# Patient Record
Sex: FEMALE | Race: WHITE | NOT HISPANIC OR LATINO | Employment: OTHER | URBAN - METROPOLITAN AREA
[De-identification: names, ages, dates, MRNs, and addresses within clinical notes are randomized per-mention and may not be internally consistent; named-entity substitution may affect disease eponyms.]

---

## 2017-01-12 ENCOUNTER — TELEPHONE (OUTPATIENT)
Dept: OPHTHALMOLOGY | Facility: CLINIC | Age: 82
End: 2017-01-12

## 2017-01-12 NOTE — TELEPHONE ENCOUNTER
1/12/17    Alejandrina Noble called to say that her right eye lid seems to be a little sore over the last week, it started with the bottom lid and now both lids are sore and she is not sure why.  They are not red or itchy.  If someone could please give her a call and discuss with her what options she might have.    Charisma Nina  Clinical

## 2017-01-12 NOTE — TELEPHONE ENCOUNTER
Called patient.  She complains of sore right eyelids.  No history of trauma.  No itching, redness, mattering.    I recommend that she use warm compresses tid for 5 minutes at a time.  If no improvement or worse, call and set time to see Dr. Gant.

## 2017-04-04 ENCOUNTER — OFFICE VISIT (OUTPATIENT)
Dept: OPHTHALMOLOGY | Facility: CLINIC | Age: 82
End: 2017-04-04
Payer: COMMERCIAL

## 2017-04-04 DIAGNOSIS — Z96.1 PSEUDOPHAKIA: ICD-10-CM

## 2017-04-04 DIAGNOSIS — H40.1492 PSEUDOEXFOLIATION GLAUCOMA, MODERATE STAGE: ICD-10-CM

## 2017-04-04 DIAGNOSIS — H52.4 PRESBYOPIA: ICD-10-CM

## 2017-04-04 DIAGNOSIS — Z01.01 ENCOUNTER FOR EXAMINATION OF EYES AND VISION WITH ABNORMAL FINDINGS: Primary | ICD-10-CM

## 2017-04-04 DIAGNOSIS — H18.519 CORNEAL GUTTATA: ICD-10-CM

## 2017-04-04 PROCEDURE — 92014 COMPRE OPH EXAM EST PT 1/>: CPT | Performed by: OPHTHALMOLOGY

## 2017-04-04 PROCEDURE — 92015 DETERMINE REFRACTIVE STATE: CPT | Performed by: OPHTHALMOLOGY

## 2017-04-04 ASSESSMENT — CUP TO DISC RATIO
OS_RATIO: 0.9
OD_RATIO: 0.7

## 2017-04-04 ASSESSMENT — REFRACTION_WEARINGRX
OS_AXIS: 119
OD_AXIS: 085
OS_SPHERE: -0.75
OD_ADD: +2.75
OS_CYLINDER: +1.00
SPECS_TYPE: PAL
OD_CYLINDER: +1.25
OD_SPHERE: -0.75
OS_ADD: +2.75

## 2017-04-04 ASSESSMENT — VISUAL ACUITY
CORRECTION_TYPE: GLASSES
OS_CC: J2
OD_CC: 20/20
OD_CC: J1
OS_CC: 20/50
METHOD: SNELLEN - LINEAR

## 2017-04-04 ASSESSMENT — TONOMETRY
OS_IOP_MMHG: 14
IOP_METHOD: TONOPEN
OD_IOP_MMHG: 11

## 2017-04-04 ASSESSMENT — REFRACTION_MANIFEST
OD_ADD: +3.00
OD_SPHERE: -0.75
OS_CYLINDER: SPHERE
OD_CYLINDER: +1.00
OS_SPHERE: -1.00
OS_ADD: +3.00
OD_AXIS: 090

## 2017-04-04 ASSESSMENT — EXTERNAL EXAM - RIGHT EYE: OD_EXAM: PROLAPSED FAT PADS: UPPER

## 2017-04-04 ASSESSMENT — SLIT LAMP EXAM - LIDS: COMMENTS: 1+ DERMATOCHALASIS - UPPER LID, 2+ SCLERAL SHOW, 2+ PTOSIS

## 2017-04-04 ASSESSMENT — CONF VISUAL FIELD
OD_NORMAL: 1
OS_NORMAL: 1

## 2017-04-04 ASSESSMENT — EXTERNAL EXAM - LEFT EYE: OS_EXAM: PROLAPSED FAT PADS: UPPER

## 2017-04-04 NOTE — MR AVS SNAPSHOT
"              After Visit Summary   4/4/2017    Alejandrina Whatley    MRN: [id number]           Patient Information     Date Of Birth          8/22/1923        Visit Information        Provider Department      4/4/2017 10:00 AM Prakash Gant MD [facility]        Today's Diagnoses     Encounter for examination of eyes and vision with abnormal findings    -  1    Presbyopia        Pseudoexfoliation glaucoma, moderate stage        Pseudophakia, ou        Corneal guttata          Care Instructions    Continue Cosopt twice daily both eyes and use the Latanoprost at bedtime both eyes   Possible clouding of posterior capsule discussed.   Glasses Rx given -optional   Return visit 4 months for intraocular pressure check, Stokes Visual Field, glaucoma OCT     Prakash Gant M.D.          Follow-ups after your visit        Who to contact     If you have questions or need follow up information about today's clinic visit or your schedule please contact [facility] directly at [phone].  Normal or non-critical lab and imaging results will be communicated to you by [employer], letter or phone within 4 business days after the clinic has received the results. If you do not hear from us within 7 days, please contact the clinic through [employer] or phone. If you have a critical or abnormal lab result, we will notify you by phone as soon as possible.  Submit refill requests through [employer] or call your pharmacy and they will forward the refill request to us. Please allow 3 business days for your refill to be completed.          Additional Information About Your Visit        Decision Curvehart Information     [employer] lets you send messages to your doctor, view your test results, renew your prescriptions, schedule appointments and more. To sign up, go to [email or web address] . Click on \"Log in\" on the left side of the screen, which will take you to the Welcome page. Then click on \"Sign up Now\" on the right side of " the page.     You will be asked to enter the access code listed below, as well as some personal information. Please follow the directions to create your username and password.     Your access code is: [email or web address]  Expires: 7/3/2017 10:59 AM     Your access code will  in 90 days. If you need help or a new code, please call your [place] clinic or [phone].        Care EveryWhere ID     This is your Care EveryWhere ID. This could be used by other organizations to access your [place] medical records  [id number]         Blood Pressure from Last 3 Encounters:   13 138/80   12 126/84   12 139/77    Weight from Last 3 Encounters:   13 60.8 kg (134 lb)   12 62.1 kg (137 lb)   12 62.1 kg (137 lb)              Today, you had the following     No orders found for display       Primary Care Provider Office Phone # Fax #    Jose Buenrostro [phone]       [address] [address]        Thank you!     Thank you for choosing [facility] [place]  for your care. Our goal is always to provide you with excellent care. Hearing back from our patients is one way we can continue to improve our services. Please take a few minutes to complete the written survey that you may receive in the mail after your visit with us. Thank you!             Your Updated Medication List - Protect others around you: Learn how to safely use, store and throw away your medicines at www.disposemymeds.org.          This list is accurate as of: 17 10:59 AM.  Always use your most recent med list.                   Brand Name Dispense Instructions for use    atenolol 25 MG tablet    TENORMIN    90 tablet    Take 1 tablet by mouth daily.       CALCIUM 500 PO      Take 1 tablet by mouth 2 times daily.       dorzolamide-timolol 2-0.5 % ophthalmic solution    COSOPT    1 Bottle    Place 1 drop into both eyes 2 times daily       latanoprost 0.005  % ophthalmic solution    XALATAN    3 Bottle    Place 1 drop into both eyes At Bedtime Both Eyes       simvastatin 20 MG tablet    ZOCOR    30 tablet    Take 1 tablet by mouth At Bedtime.       VITAMIN D HIGH POTENCY 1000 UNITS capsule   Generic drug:  cholecalciferol      Take 1 capsule by mouth daily.

## 2017-04-04 NOTE — PROGRESS NOTES
Current Eye Medications:  Generic cosopt bid both(8am) eyes and Latanoprost nightly left eye, last drops at 6pm     Subjective:  Comprehensive eye exam.   Pt reports she continues to see good and reports no other eye problems.     Objective:  See Ophthalmology Exam.       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      ICD-10-CM    1. Encounter for examination of eyes and vision with abnormal findings Z01.01 REFRACTIVE STATUS   2. Presbyopia H52.4 REFRACTIVE STATUS   3. Pseudoexfoliation glaucoma, moderate stage H40.1492 EYE EXAM (SIMPLE-NONBILLABLE)   4. Pseudophakia, ou Z96.1    5. Corneal guttata H18.51         Plan: Continue Cosopt twice daily both eyes and use the Latanoprost at bedtime both eyes   Possible clouding of posterior capsule discussed.   Glasses Rx given -optional   Return visit 4 months for intraocular pressure check, Stokes Visual Field, glaucoma OCT   Consider Brimonidine or laser in future.  (Had high intraocular pressure left eye after cataract surgery and was on Combigan briefly).    Prakash Gant M.D.

## 2017-04-04 NOTE — PATIENT INSTRUCTIONS
Continue Cosopt twice daily both eyes and use the Latanoprost at bedtime both eyes   Possible clouding of posterior capsule discussed.   Glasses Rx given -optional   Return visit 4 months for intraocular pressure check, Stokes Visual Field, glaucoma OCT     Prakash Gant M.D.

## 2017-08-07 ENCOUNTER — OFFICE VISIT (OUTPATIENT)
Dept: OPHTHALMOLOGY | Facility: CLINIC | Age: 82
End: 2017-08-07
Payer: COMMERCIAL

## 2017-08-07 DIAGNOSIS — H40.1492 PSEUDOEXFOLIATION GLAUCOMA, MODERATE STAGE: Primary | ICD-10-CM

## 2017-08-07 PROCEDURE — 92133 CPTRZD OPH DX IMG PST SGM ON: CPT | Performed by: OPHTHALMOLOGY

## 2017-08-07 PROCEDURE — 92012 INTRM OPH EXAM EST PATIENT: CPT | Performed by: OPHTHALMOLOGY

## 2017-08-07 PROCEDURE — 92083 EXTENDED VISUAL FIELD XM: CPT | Performed by: OPHTHALMOLOGY

## 2017-08-07 ASSESSMENT — VISUAL ACUITY
METHOD: SNELLEN - LINEAR
OD_CC+: -2
OD_CC: 20/20
CORRECTION_TYPE: GLASSES
OS_CC+: +1
OS_CC: 20/60

## 2017-08-07 ASSESSMENT — TONOMETRY
OS_IOP_MMHG: 19
OD_IOP_MMHG: 13
IOP_METHOD: APPLANATION

## 2017-08-07 ASSESSMENT — CUP TO DISC RATIO: OS_RATIO: 0.9 UD

## 2017-08-07 ASSESSMENT — SLIT LAMP EXAM - LIDS: COMMENTS: 1+ DERMATOCHALASIS - UPPER LID, 2+ SCLERAL SHOW, 2+ PTOSIS

## 2017-08-07 ASSESSMENT — EXTERNAL EXAM - RIGHT EYE: OD_EXAM: PROLAPSED FAT PADS: UPPER

## 2017-08-07 ASSESSMENT — EXTERNAL EXAM - LEFT EYE: OS_EXAM: PROLAPSED FAT PADS: UPPER

## 2017-08-07 NOTE — PROGRESS NOTES
Current Eye Medications:  cosopt both eyes twice a day, Latanoprost both eyes every evening.       Subjective:  Follow up Pseudoexfoliation Glaucoma.  Patient is here for a pressure check, OCT, and visual field.  Both eyes water frequently.  Occasionally she wakes with her left eye stuck shut.  Her glasses need adjusting, because she feels she is not seeing very well.     Objective:  See Ophthalmology Exam.       Assessment:  Intraocular pressure remains +/- left eye.  Stable glaucoma OCT and mostly stable Stokes Visual Field both eyes.      Plan:  Continue Cosopt twice daily both eyes ( use these drops 12 hours apart).  Continue Latanoprost at bedtime both eyes.   Return visit 4 months for intraocular pressure check.  Could consider Brimonidine left eye.    Prakash Gant M.D.

## 2017-08-07 NOTE — MR AVS SNAPSHOT
"              After Visit Summary   2017    Alejandrina Whatley    MRN: [id number]           Patient Information     Date Of Birth          1923        Visit Information        Provider Department      2017 10:15 AM Prakash Gant MD [facility]        Care Instructions    Continue Cosopt twice daily both eyes ( use these drops 12 hours apart)  Continue Latanoprost at bedtime both eyes   Return visit 4 months for intraocular pressure check    Prakash Gant M.D.            Follow-ups after your visit        Who to contact     If you have questions or need follow up information about today's clinic visit or your schedule please contact [facility] directly at [phone].  Normal or non-critical lab and imaging results will be communicated to you by Cher[employer], letter or phone within 4 business days after the clinic has received the results. If you do not hear from us within 7 days, please contact the clinic through Cherrish[employer] or phone. If you have a critical or abnormal lab result, we will notify you by phone as soon as possible.  Submit refill requests through [employer] or call your pharmacy and they will forward the refill request to us. Please allow 3 business days for your refill to be completed.          Additional Information About Your Visit        MyChart Information     [employer] lets you send messages to your doctor, view your test results, renew your prescriptions, schedule appointments and more. To sign up, go to [email or web address] . Click on \"Log in\" on the left side of the screen, which will take you to the Welcome page. Then click on \"Sign up Now\" on the right side of the page.     You will be asked to enter the access code listed below, as well as some personal information. Please follow the directions to create your username and password.     Your access code is: [email or web address]  Expires: 2017 11:40 AM     Your access code will  in 90 days. If you need help " or a new code, please call your [place] clinic or [phone].        Care EveryWhere ID     This is your Care EveryWhere ID. This could be used by other organizations to access your [place] medical records  [id number]         Blood Pressure from Last 3 Encounters:   07/18/13 138/80   09/13/12 126/84   09/11/12 139/77    Weight from Last 3 Encounters:   07/18/13 60.8 kg (134 lb)   08/06/12 62.1 kg (137 lb)   08/01/12 62.1 kg (137 lb)              Today, you had the following     No orders found for display       Primary Care Provider Office Phone # Fax #    Jose Buenrostro [phone]       [facility] [address] [facility] [place] [address]        Equal Access to Services     ZI WHITEHEAD : Hadii jeremy gordon hadasho Soomaali, waaxda luqadaha, qaybta kaalmada adeegyada, juan cortes . So [facility] [phone].    ATENCIÓN: Si habla español, tiene a cavazos disposición servicios gratuitos de asistencia lingüística. Chaz al [phone].    We comply with applicable federal civil rights laws and [place] laws. We do not discriminate on the basis of race, color, national origin, age, disability sex, sexual orientation or gender identity.            Thank you!     Thank you for choosing [facility] [place]  for your care. Our goal is always to provide you with excellent care. Hearing back from our patients is one way we can continue to improve our services. Please take a few minutes to complete the written survey that you may receive in the mail after your visit with us. Thank you!             Your Updated Medication List - Protect others around you: Learn how to safely use, store and throw away your medicines at www.disposemymeds.org.          This list is accurate as of: 8/7/17 11:40 AM.  Always use your most recent med list.                   Brand Name Dispense Instructions for use Diagnosis    atenolol 25 MG tablet    TENORMIN    90 tablet    Take 1  tablet by mouth daily.    HTN (hypertension)       CALCIUM 500 PO      Take 1 tablet by mouth 2 times daily.        dorzolamide-timolol 2-0.5 % ophthalmic solution    COSOPT    1 Bottle    Place 1 drop into both eyes 2 times daily    Pseudoexfoliation glaucoma, moderate stage       latanoprost 0.005 % ophthalmic solution    XALATAN    3 Bottle    Place 1 drop into both eyes At Bedtime Both Eyes    Pseudoexfoliation glaucoma, moderate stage       simvastatin 20 MG tablet    ZOCOR    30 tablet    Take 1 tablet by mouth At Bedtime.    Hypercholesteremia       VITAMIN D HIGH POTENCY 1000 UNITS capsule   Generic drug:  cholecalciferol      Take 1 capsule by mouth daily.

## 2017-08-07 NOTE — PATIENT INSTRUCTIONS
Continue Cosopt twice daily both eyes ( use these drops 12 hours apart).  Continue Latanoprost at bedtime both eyes.   Return visit 4 months for intraocular pressure check.  Could consider Brimonidine left eye.    Prakash Gant M.D.

## 2017-08-09 ASSESSMENT — PACHYMETRY
OD_CT(UM): 518
OS_CT(UM): 507

## 2017-08-11 ENCOUNTER — TELEPHONE (OUTPATIENT)
Dept: OPHTHALMOLOGY | Facility: CLINIC | Age: 82
End: 2017-08-11

## 2017-08-11 NOTE — PATIENT INSTRUCTIONS
Patient returned my call - she complains of watering and itching.  I suggested to try hot/cold packs.  If no improvement, she could use Zaditor twice a day, waiting 5 minutes before using her glaucoma drops.

## 2017-08-11 NOTE — TELEPHONE ENCOUNTER
Attempted to call patient - left a message stating I would try to call again later.    I was going to suggest artificial tears, hot/cold packs, and/or Zaditor (waiting 5 minutes between her glaucoma drops).

## 2017-08-24 ENCOUNTER — OFFICE VISIT (OUTPATIENT)
Dept: OPHTHALMOLOGY | Facility: CLINIC | Age: 82
End: 2017-08-24
Payer: COMMERCIAL

## 2017-08-24 ENCOUNTER — TELEPHONE (OUTPATIENT)
Dept: OPHTHALMOLOGY | Facility: CLINIC | Age: 82
End: 2017-08-24

## 2017-08-24 DIAGNOSIS — H16.041 MARGINAL CORNEAL ULCER, RIGHT: Primary | ICD-10-CM

## 2017-08-24 PROCEDURE — 92012 INTRM OPH EXAM EST PATIENT: CPT | Performed by: OPHTHALMOLOGY

## 2017-08-24 RX ORDER — TOBRAMYCIN AND DEXAMETHASONE 3; 1 MG/ML; MG/ML
1 SUSPENSION/ DROPS OPHTHALMIC 4 TIMES DAILY
Qty: 1 BOTTLE | Refills: 3 | Status: SHIPPED | OUTPATIENT
Start: 2017-08-24 | End: 2018-01-08

## 2017-08-24 ASSESSMENT — VISUAL ACUITY
OD_PH_CC: 20/25-1
METHOD: SNELLEN - LINEAR
OD_CC: 20/30
CORRECTION_TYPE: GLASSES
OD_CC+: -2
OS_PH_CC: 20/60-2
OS_CC: 20/70
OS_CC+: -1+1

## 2017-08-24 NOTE — PATIENT INSTRUCTIONS
Don't use Aloway for now.  Continue glaucoma drops as usual.  Tobradex drop right eye four times daily.  Return visit one week for an MD check.  Prakash Gant M.D.

## 2017-08-24 NOTE — PROGRESS NOTES
Current Eye Medications:  Xalatan qhs both eyes, Cosopt BID both eyes, Zaditor both eyes last night.     Subjective: patient was here 8-7-17, called on 8-11-17 with red/itchy/watery eyes.  Has not gotten better since then, worse today.  Tears are running down the check.  Right eye is watery, itchy, red, swollen upper eyelids.   A little sore to the touch and this is the better eye of the two.  Took Cosopt and Zaditor at the same time last evening.  She is very concerned, wanted to be checked today.         Objective:  See Ophthalmology Exam.       Assessment:  Marginal keratitis right eye.      Plan:  Don't use Aloway for now.  Continue glaucoma drops as usual.  Tobradex drop right eye four times daily.  Return visit one week for an MD check.  Prakash Gant M.D.

## 2017-08-24 NOTE — TELEPHONE ENCOUNTER
Called patient, she is a bit panicky today. She has a red sore right eye and it is swollen.  She feels she needs to be seen today to make sure the good eye is ok.  Told her that there will be a wait.  She has a ride now, will come by 11 to see Dr. Gant.

## 2017-08-24 NOTE — MR AVS SNAPSHOT
"              After Visit Summary   8/24/2017    Alejandrina Whatley    MRN: [id number]           Patient Information     Date Of Birth          8/22/1923        Visit Information        Provider Department      8/24/2017 11:00 AM Prakash Gant MD [facility]        Today's Diagnoses     Marginal corneal ulcer, right    -  1      Care Instructions    Don't use Aloway for now.  Continue glaucoma drops as usual.  Tobradex drop right eye four times daily.  Return visit one week for an MD check.  Prakash Gant M.D.            Follow-ups after your visit        Your next 10 appointments already scheduled     Dec 04, 2017 10:15 AM CST   Return Visit with Prakash Gant MD   [facility] ([facility]    [address] [address] [phone]              Who to contact     If you have questions or need follow up information about today's clinic visit or your schedule please contact [facility] directly at [phone].  Normal or non-critical lab and imaging results will be communicated to you by MyChart, letter or phone within 4 business days after the clinic has received the results. If you do not hear from us within 7 days, please contact the clinic through [employer] or phone. If you have a critical or abnormal lab result, we will notify you by phone as soon as possible.  Submit refill requests through [employer] or call your pharmacy and they will forward the refill request to us. Please allow 3 business days for your refill to be completed.          Additional Information About Your Visit        MyChart Information     [employer] lets you send messages to your doctor, view your test results, renew your prescriptions, schedule appointments and more. To sign up, go to [email or web address] . Click on \"Log in\" on the left side of the screen, which will take you to the Welcome page. Then click on \"Sign up Now\" on the right side of the page.     You " will be asked to enter the access code listed below, as well as some personal information. Please follow the directions to create your username and password.     Your access code is: [email or web address]  Expires: 2017 11:40 AM     Your access code will  in 90 days. If you need help or a new code, please call your [place] clinic or [phone].        Care EveryWhere ID     This is your Care EveryWhere ID. This could be used by other organizations to access your [place] medical records  [id number]         Blood Pressure from Last 3 Encounters:   13 138/80   12 126/84   12 139/77    Weight from Last 3 Encounters:   13 60.8 kg (134 lb)   12 62.1 kg (137 lb)   12 62.1 kg (137 lb)              Today, you had the following     No orders found for display         Today's Medication Changes          These changes are accurate as of: 17 12:18 PM.  If you have any questions, ask your nurse or doctor.               Start taking these medicines.        Dose/Directions    tobramycin-dexamethasone 0.3-0.1 % ophthalmic susp   Commonly known as:  TOBRADEX   Used for:  Marginal corneal ulcer, right   Started by:  Prakash Gant MD        Dose:  1 drop   Place 1 drop into the right eye 4 times daily   Quantity:  1 Bottle   Refills:  3            Where to get your medicines      These medications were sent to [id number] [address]  [address] [address]     Phone:  [phone]     tobramycin-dexamethasone 0.3-0.1 % ophthalmic susp                Primary Care Provider Office Phone # Fax #    Jose Buenrostro [phone] [address] [facility] [address]        Equal Access to Services     [facility] VENTURA AH: Nisha Thomas, waaxda luqadaha, qaybta kaalmada roxy, juan gentile. So [facility] [place] [phone].    ATENCIÓN: Si iva dorsey  disposición servicios gratuitos de asistencia lingüística. Chaz corea [phone].    We comply with applicable federal civil rights laws and [place] laws. We do not discriminate on the basis of race, color, national origin, age, disability sex, sexual orientation or gender identity.            Thank you!     Thank you for choosing [facility] [place]  for your care. Our goal is always to provide you with excellent care. Hearing back from our patients is one way we can continue to improve our services. Please take a few minutes to complete the written survey that you may receive in the mail after your visit with us. Thank you!             Your Updated Medication List - Protect others around you: Learn how to safely use, store and throw away your medicines at www.disposemymeds.org.          This list is accurate as of: 8/24/17 12:18 PM.  Always use your most recent med list.                   Brand Name Dispense Instructions for use Diagnosis    atenolol 25 MG tablet    TENORMIN    90 tablet    Take 1 tablet by mouth daily.    HTN (hypertension)       CALCIUM 500 PO      Take 1 tablet by mouth 2 times daily.        dorzolamide-timolol 2-0.5 % ophthalmic solution    COSOPT    1 Bottle    Place 1 drop into both eyes 2 times daily    Pseudoexfoliation glaucoma, moderate stage       latanoprost 0.005 % ophthalmic solution    XALATAN    3 Bottle    Place 1 drop into both eyes At Bedtime Both Eyes    Pseudoexfoliation glaucoma, moderate stage       simvastatin 20 MG tablet    ZOCOR    30 tablet    Take 1 tablet by mouth At Bedtime.    Hypercholesteremia       tobramycin-dexamethasone 0.3-0.1 % ophthalmic susp    TOBRADEX    1 Bottle    Place 1 drop into the right eye 4 times daily    Marginal corneal ulcer, right       VITAMIN D HIGH POTENCY 1000 UNITS capsule   Generic drug:  cholecalciferol      Take 1 capsule by mouth daily.

## 2017-08-25 PROBLEM — H16.041: Status: ACTIVE | Noted: 2017-08-25

## 2017-08-25 ASSESSMENT — SLIT LAMP EXAM - LIDS: COMMENTS: 1+ DERMATOCHALASIS - UPPER LID, 2+ SCLERAL SHOW, 2+ PTOSIS

## 2017-08-25 ASSESSMENT — EXTERNAL EXAM - LEFT EYE: OS_EXAM: PROLAPSED FAT PADS: UPPER

## 2017-08-25 ASSESSMENT — CUP TO DISC RATIO: OS_RATIO: 0.9 UD

## 2017-08-25 ASSESSMENT — EXTERNAL EXAM - RIGHT EYE: OD_EXAM: PROLAPSED FAT PADS: UPPER

## 2017-09-01 ENCOUNTER — OFFICE VISIT (OUTPATIENT)
Dept: OPHTHALMOLOGY | Facility: CLINIC | Age: 82
End: 2017-09-01
Payer: COMMERCIAL

## 2017-09-01 DIAGNOSIS — H40.1492 PSEUDOEXFOLIATION GLAUCOMA, MODERATE STAGE: ICD-10-CM

## 2017-09-01 DIAGNOSIS — H16.041 MARGINAL CORNEAL ULCER, RIGHT: Primary | ICD-10-CM

## 2017-09-01 PROCEDURE — 92012 INTRM OPH EXAM EST PATIENT: CPT | Performed by: OPHTHALMOLOGY

## 2017-09-01 ASSESSMENT — REFRACTION_WEARINGRX
OS_SPHERE: -0.75
OS_AXIS: 119
OD_CYLINDER: +1.25
SPECS_TYPE: PAL
OD_AXIS: 085
OS_CYLINDER: +1.00
OD_SPHERE: -0.75
OS_ADD: +2.75
OD_ADD: +2.75

## 2017-09-01 ASSESSMENT — VISUAL ACUITY
OS_PH_CC: 20/40-1
OD_CC+: -2
OS_CC: 20/70
CORRECTION_TYPE: GLASSES
METHOD: SNELLEN - LINEAR
OD_PH_CC: 20/20-2
OS_CC+: -2
OD_CC: 20/40

## 2017-09-01 ASSESSMENT — CUP TO DISC RATIO: OS_RATIO: 0.9 UD

## 2017-09-01 ASSESSMENT — SLIT LAMP EXAM - LIDS: COMMENTS: 1+ DERMATOCHALASIS - UPPER LID, 2+ SCLERAL SHOW, 2+ PTOSIS

## 2017-09-01 ASSESSMENT — EXTERNAL EXAM - RIGHT EYE: OD_EXAM: PROLAPSED FAT PADS: UPPER

## 2017-09-01 ASSESSMENT — EXTERNAL EXAM - LEFT EYE: OS_EXAM: PROLAPSED FAT PADS: UPPER

## 2017-09-01 NOTE — PATIENT INSTRUCTIONS
Continue Tobradex drop right eye two times daily for a week, then stop  Xalatan at bedtime both eyes   Cosopt twice daily  both eyes  Keep scheduled appt.    Prakash Gant M.D.

## 2017-09-01 NOTE — PROGRESS NOTES
Current Eye Medications: Tobradex drop right eye four times daily, Xalatan qhs both eyes, Cosopt BID both eyes     Subjective:  Here for MD check today for marginal cornea ulcer right eye.  A little watery this am. Left eye is feeling a little watery too today, sometimes she wakes up at night and the left eyelid is stuck together.      Objective:  See Ophthalmology Exam.       Assessment:  Marginal keratitis right eye improved.      Plan:   Continue Tobradex drop right eye two times daily for a week, then stop  Xalatan at bedtime both eyes   Cosopt twice daily  both eyes  Keep scheduled appt.    Prakash Gant M.D.

## 2017-09-01 NOTE — MR AVS SNAPSHOT
"              After Visit Summary   9/1/2017    Alejandrina Whatley    MRN: [id number]           Patient Information     Date Of Birth          8/22/1923        Visit Information        Provider Department      9/1/2017 11:00 AM Prakash Gant MD [facility]        Today's Diagnoses     Marginal corneal ulcer, right    -  1    Pseudoexfoliation glaucoma, moderate stage          Care Instructions    Continue Tobradex drop right eye two times daily for a week, then stop  Xalatan at bedtime both eyes   Cosopt twice daily  both eyes  Keep scheduled appt.    Prakash Gant M.D.            Follow-ups after your visit        Your next 10 appointments already scheduled     Dec 04, 2017 10:15 AM CST   Return Visit with Prakash Gant MD   [facility] ([address] [address] [phone]              Who to contact     If you have questions or need follow up information about today's clinic visit or your schedule please contact [facility] directly at [phone].  Normal or non-critical lab and imaging results will be communicated to you by [employer], letter or phone within 4 business days after the clinic has received the results. If you do not hear from us within 7 days, please contact the clinic through [employer] or phone. If you have a critical or abnormal lab result, we will notify you by phone as soon as possible.  Submit refill requests through [employer] or call your pharmacy and they will forward the refill request to us. Please allow 3 business days for your refill to be completed.          Additional Information About Your Visit        MyChart Information     [employer] lets you send messages to your doctor, view your test results, renew your prescriptions, schedule appointments and more. To sign up, go to [email or web address] . Click on \"Log in\" on the left side of the screen, which will take you to the Welcome page. Then " "click on \"Sign up Now\" on the right side of the page.     You will be asked to enter the access code listed below, as well as some personal information. Please follow the directions to create your username and password.     Your access code is: [email or web address]  Expires: 2017 11:40 AM     Your access code will  in 90 days. If you need help or a new code, please call your [place] clinic or [phone].        Care EveryWhere ID     This is your Care EveryWhere ID. This could be used by other organizations to access your [place] medical records  [id number]         Blood Pressure from Last 3 Encounters:   13 138/80   12 126/84   12 139/77    Weight from Last 3 Encounters:   13 60.8 kg (134 lb)   12 62.1 kg (137 lb)   12 62.1 kg (137 lb)              Today, you had the following     No orders found for display       Primary Care Provider Office Phone # Fax #    Jose Buenrostro [phone]       [address] [facility] [address]        Equal Access to Services     [facility] AH: Hadii jeremy gordon haddello Sojovanali, waaxda luqadaha, qaybta kaalmada adeegyada, juan gentile. So [facility] [phone].    ATENCIÓN: Si habla español, tiene a cavazos disposición servicios gratuitos de asistencia lingüística. Llame al [phone].    We comply with applicable federal civil rights laws and [place] laws. We do not discriminate on the basis of race, color, national origin, age, disability sex, sexual orientation or gender identity.            Thank you!     Thank you for choosing [facility]  for your care. Our goal is always to provide you with excellent care. Hearing back from our patients is one way we can continue to improve our services. Please take a few minutes to complete the written survey that you may receive in the mail after your visit with us. Thank you!             Your Updated Medication List - Protect others " around you: Learn how to safely use, store and throw away your medicines at www.disposemymeds.org.          This list is accurate as of: 9/1/17 11:51 AM.  Always use your most recent med list.                   Brand Name Dispense Instructions for use Diagnosis    atenolol 25 MG tablet    TENORMIN    90 tablet    Take 1 tablet by mouth daily.    HTN (hypertension)       CALCIUM 500 PO      Take 1 tablet by mouth 2 times daily.        dorzolamide-timolol 2-0.5 % ophthalmic solution    COSOPT    1 Bottle    Place 1 drop into both eyes 2 times daily    Pseudoexfoliation glaucoma, moderate stage       latanoprost 0.005 % ophthalmic solution    XALATAN    3 Bottle    Place 1 drop into both eyes At Bedtime Both Eyes    Pseudoexfoliation glaucoma, moderate stage       simvastatin 20 MG tablet    ZOCOR    30 tablet    Take 1 tablet by mouth At Bedtime.    Hypercholesteremia       tobramycin-dexamethasone 0.3-0.1 % ophthalmic susp    TOBRADEX    1 Bottle    Place 1 drop into the right eye 4 times daily    Marginal corneal ulcer, right       VITAMIN D HIGH POTENCY 1000 UNITS capsule   Generic drug:  cholecalciferol      Take 1 capsule by mouth daily.

## 2017-09-29 DIAGNOSIS — H40.1492 PSEUDOEXFOLIATION GLAUCOMA, MODERATE STAGE: ICD-10-CM

## 2017-09-29 RX ORDER — LATANOPROST 50 UG/ML
1 SOLUTION/ DROPS OPHTHALMIC AT BEDTIME
Qty: 3 BOTTLE | Refills: 4 | Status: SHIPPED | OUTPATIENT
Start: 2017-09-29 | End: 2018-04-23

## 2017-09-29 NOTE — TELEPHONE ENCOUNTER
Pt called I cleared up her confusion regarding tobramycin / dex usage, told she does nee to use this drop anymore. Told her take her dorzolamide / timolol twice a day both eye and her latanoprost both eyes nightly.. Pt asked me to refill her  latanoprost for her which I did .

## 2017-10-09 ENCOUNTER — TELEPHONE (OUTPATIENT)
Dept: OPHTHALMOLOGY | Facility: CLINIC | Age: 82
End: 2017-10-09

## 2017-10-09 NOTE — TELEPHONE ENCOUNTER
Spoke with patient - she woke yesterday morning with her right eye red and her eyelids puffy.  Today, her left eye has somewhat similar symptoms, but not as bad as right eye.  No mattering, but some watering and itching.    Spoke with Dr. Gant, he would like patient to schedule an appointment for tomorrow.  If she has any of the Tobradex at home, she could use it three times a day in the meantime.

## 2017-10-09 NOTE — TELEPHONE ENCOUNTER
Reason for call:  Other   Patient called regarding (reason for call): call back  Additional comments: Alejandrina Noble called to say that she woke up with her right eye all red and puffy and this morning she feels it is going into her left eye.  It is a little itchy as well, she believes it is the same infection she had earlier this summer.      Phone number to reach patient:  Home number on file [phone] (home)    Best Time:  any    Can we leave a detailed message on this number?  YES

## 2017-10-10 ENCOUNTER — OFFICE VISIT (OUTPATIENT)
Dept: OPHTHALMOLOGY | Facility: CLINIC | Age: 82
End: 2017-10-10
Payer: COMMERCIAL

## 2017-10-10 DIAGNOSIS — L30.9 PERIORBITAL DERMATITIS: Primary | ICD-10-CM

## 2017-10-10 PROCEDURE — 92012 INTRM OPH EXAM EST PATIENT: CPT | Performed by: OPHTHALMOLOGY

## 2017-10-10 RX ORDER — NEOMYCIN SULFATE, POLYMYXIN B SULFATE, AND DEXAMETHASONE 3.5; 10000; 1 MG/G; [USP'U]/G; MG/G
1 OINTMENT OPHTHALMIC 2 TIMES DAILY
Qty: 1 TUBE | Refills: 3 | Status: SHIPPED | OUTPATIENT
Start: 2017-10-10 | End: 2017-11-06

## 2017-10-10 ASSESSMENT — REFRACTION_WEARINGRX
OD_CYLINDER: +1.25
OD_SPHERE: -0.75
OS_CYLINDER: +1.00
OS_AXIS: 119
OS_ADD: +2.75
OD_ADD: +2.75
OS_SPHERE: -0.75
SPECS_TYPE: PAL
OD_AXIS: 085

## 2017-10-10 ASSESSMENT — TONOMETRY
IOP_METHOD: ICARE
OD_IOP_MMHG: 07
OS_IOP_MMHG: 08

## 2017-10-10 ASSESSMENT — VISUAL ACUITY
OD_CC+: -2
OS_CC: 20/60
CORRECTION_TYPE: GLASSES
OD_CC: 20/25
METHOD: SNELLEN - LINEAR

## 2017-10-10 NOTE — PROGRESS NOTES
Current Eye Medications:  Dorz/timolol BID both eyes last took at 9am, Latanoprost QHS both eyes.     Subjective:  Puffy, red, watering, and itchy right eye since 10/7/17. Now in left eye also since yesterday. Vision feels kind of blurry for last 1-2 days. Zero pain both eyes.     Objective:  See Ophthalmology Exam.       Assessment:  Periocular dermatitis right eye > left eye.      Plan:  Warm washcloth to eyelids for 10 minutes twice daily, then rinse and dry.  Al/Dex ointment: apply to area around both eyes twice daily after warm packs and a small squirt into both eyes at bedtime after Latanoprost drop.  Glasses Rx given - optional.  Keep scheduled appointment.    rPakash Gant M.D.

## 2017-10-10 NOTE — MR AVS SNAPSHOT
"              After Visit Summary   10/10/2017    Alejandrina Whatley    MRN: [id number]           Patient Information     Date Of Birth          8/22/1923        Visit Information        Provider Department      10/10/2017 1:30 PM Prakash Gant MD [facility]        Today's Diagnoses     Periorbital dermatitis, ou     -  1      Care Instructions    Warm washcloth to eyelids for 10 minutes twice daily, then rinse and dry.  Al/Dex ointment: apply to area around both eyes twice daily after warm packs and a small squirt into both eyes at bedtime after Latanoprost drop.  Glasses Rx given - optional  Keep scheduled appointment    Prakash Gant M.D.            Follow-ups after your visit        Your next 10 appointments already scheduled     Dec 04, 2017 10:15 AM CST   Return Visit with Prakash Gant MD   [facility] ([facility])    [address] [place] [address] [phone]              Who to contact     If you have questions or need follow up information about today's clinic visit or your schedule please contact [facility] directly at [phone].  Normal or non-critical lab and imaging results will be communicated to you by MyChart, letter or phone within 4 business days after the clinic has received the results. If you do not hear from us within 7 days, please contact the clinic through [employer] or phone. If you have a critical or abnormal lab result, we will notify you by phone as soon as possible.  Submit refill requests through [employer] or call your pharmacy and they will forward the refill request to us. Please allow 3 business days for your refill to be completed.          Additional Information About Your Visit        MyChart Information     [employer] lets you send messages to your doctor, view your test results, renew your prescriptions, schedule appointments and more. To sign up, go to [email or web address] . Click on \"Log in\" on the " "left side of the screen, which will take you to the Welcome page. Then click on \"Sign up Now\" on the right side of the page.     You will be asked to enter the access code listed below, as well as some personal information. Please follow the directions to create your username and password.     Your access code is: [email or web address]  Expires: 2017 11:40 AM     Your access code will  in 90 days. If you need help or a new code, please call your [facility] or [phone].        Care EveryWhere ID     This is your Care EveryWhere ID. This could be used by other organizations to access your [place] medical records  [id number]         Blood Pressure from Last 3 Encounters:   13 138/80   12 126/84   12 139/77    Weight from Last 3 Encounters:   13 60.8 kg (134 lb)   12 62.1 kg (137 lb)   12 62.1 kg (137 lb)              Today, you had the following     No orders found for display         Today's Medication Changes          These changes are accurate as of: 10/10/17  2:15 PM.  If you have any questions, ask your nurse or doctor.               Start taking these medicines.        Dose/Directions    neomycin-polymyxin-dexamethasone 3.5-78686-0.1 Oint ophthalmic ointment   Commonly known as:  MAXITROL   Used for:  Periorbital dermatitis        Dose:  1 Application   Place 1 Application into both eyes 2 times daily   Quantity:  1 Tube   Refills:  3            Where to get your medicines      These medications were sent to [id number] - [facility] [address] [facility]  [address] [place] [address]     Phone:  [phone]     neomycin-polymyxin-dexamethasone 3.5-75320-5.1 Oint ophthalmic ointment                Primary Care Provider Office Phone # Fax #    Jose Buenrostro [phone] [address] [facility] [address]        Equal Access to Services     ZI WHITEHEAD AH: [place] Zarate, " juan chanaan ah. So [facility] [phone].    ATENCIÓN: Si olivia harris, tiene a cavazos disposición servicios gratuitos de asistencia lingüística. Chaz al [phone].    We comply with applicable federal civil rights laws and [place] laws. We do not discriminate on the basis of race, color, national origin, age, disability, sex, sexual orientation, or gender identity.            Thank you!     Thank you for choosing [facility]\""  for your care. Our goal is always to provide you with excellent care. Hearing back from our patients is one way we can continue to improve our services. Please take a few minutes to complete the written survey that you may receive in the mail after your visit with us. Thank you!             Your Updated Medication List - Protect others around you: Learn how to safely use, store and throw away your medicines at www.disposemymeds.org.          This list is accurate as of: 10/10/17  2:15 PM.  Always use your most recent med list.                   Brand Name Dispense Instructions for use Diagnosis    atenolol 25 MG tablet    TENORMIN    90 tablet    Take 1 tablet by mouth daily.    HTN (hypertension)       CALCIUM 500 PO      Take 1 tablet by mouth 2 times daily.        dorzolamide-timolol 2-0.5 % ophthalmic solution    COSOPT    1 Bottle    Place 1 drop into both eyes 2 times daily    Pseudoexfoliation glaucoma, moderate stage       latanoprost 0.005 % ophthalmic solution    XALATAN    3 Bottle    Place 1 drop into both eyes At Bedtime Both Eyes    Pseudoexfoliation glaucoma, moderate stage       neomycin-polymyxin-dexamethasone 3.5-99831-1.1 Oint ophthalmic ointment    MAXITROL    1 Tube    Place 1 Application into both eyes 2 times daily    Periorbital dermatitis       simvastatin 20 MG tablet    ZOCOR    30 tablet    Take 1 tablet by mouth At Bedtime.    Hypercholesteremia       tobramycin-dexamethasone 0.3-0.1 % ophthalmic  susp    TOBRADEX    1 Bottle    Place 1 drop into the right eye 4 times daily    Marginal corneal ulcer, right       VITAMIN D HIGH POTENCY 1000 UNITS capsule   Generic drug:  cholecalciferol      Take 1 capsule by mouth daily.

## 2017-10-10 NOTE — PATIENT INSTRUCTIONS
Warm washcloth to eyelids for 10 minutes twice daily, then rinse and dry.  Al/Dex ointment: apply to area around both eyes twice daily after warm packs and a small squirt into both eyes at bedtime after Latanoprost drop.  Glasses Rx given - optional.  Keep scheduled appointment.    Prakash Gant M.D.

## 2017-11-06 ENCOUNTER — TELEPHONE (OUTPATIENT)
Dept: OPHTHALMOLOGY | Facility: CLINIC | Age: 82
End: 2017-11-06

## 2017-11-06 DIAGNOSIS — L30.9 PERIORBITAL DERMATITIS: ICD-10-CM

## 2017-11-06 RX ORDER — NEOMYCIN SULFATE, POLYMYXIN B SULFATE, AND DEXAMETHASONE 3.5; 10000; 1 MG/G; [USP'U]/G; MG/G
0.25 OINTMENT OPHTHALMIC AT BEDTIME
Qty: 1 TUBE | Refills: 3 | Status: SHIPPED | OUTPATIENT
Start: 2017-11-06 | End: 2018-01-08

## 2017-11-06 NOTE — TELEPHONE ENCOUNTER
Spoke with patient - she continues to have watering, itching, and her eyelids stuck together when she wakes.  She did not  the ointment that Dr. Gant prescribed.    Suggested she use the ointment as Dr. Gant directed her on 10-10-17 ( twice a day to eyelids, and inside eyelids every evening after using Latanoprost).  I had to repeat these instructions several times while we were on the phone.   If no improvement after using the ointment for 5 days, she will call and schedule an appointment with Dr. Gant.

## 2017-11-06 NOTE — TELEPHONE ENCOUNTER
Reason for call:  Other   Patient called regarding (reason for call): call back  Additional comments: Both eyes watery and right eye feel scratchy.  Not sure if she is using the ointment that she was told to use back in October.  Please give her a call and discuss this with her.        Phone number to reach patient:  Home number on file [phone] (home)    Best Time:  Any     Can we leave a detailed message on this number?  YES

## 2017-12-04 ENCOUNTER — OFFICE VISIT (OUTPATIENT)
Dept: OPHTHALMOLOGY | Facility: CLINIC | Age: 82
End: 2017-12-04
Payer: COMMERCIAL

## 2017-12-04 DIAGNOSIS — H40.1492 PSEUDOEXFOLIATION GLAUCOMA, MODERATE STAGE: Primary | ICD-10-CM

## 2017-12-04 PROCEDURE — 92012 INTRM OPH EXAM EST PATIENT: CPT | Performed by: OPHTHALMOLOGY

## 2017-12-04 ASSESSMENT — TONOMETRY
OD_IOP_MMHG: 13
OS_IOP_MMHG: 18
IOP_METHOD: APPLANATION

## 2017-12-04 ASSESSMENT — VISUAL ACUITY
OD_CC: 20/25
OS_CC+: -2
OS_CC: 20/60
OS_PH_CC: 50-2
METHOD: SNELLEN - LINEAR
CORRECTION_TYPE: GLASSES
OD_CC+: -3

## 2017-12-04 ASSESSMENT — EXTERNAL EXAM - RIGHT EYE: OD_EXAM: PROLAPSED FAT PADS: UPPER

## 2017-12-04 ASSESSMENT — EXTERNAL EXAM - LEFT EYE: OS_EXAM: PROLAPSED FAT PADS: UPPER

## 2017-12-04 NOTE — MR AVS SNAPSHOT
After Visit Summary   12/4/2017    Alejandrina Whatley    MRN: [id number]           Patient Information     Date Of Birth          8/22/1923        Visit Information        Provider Department      12/4/2017 10:15 AM Prakash Gant MD [facility]        Care Instructions    Ok to stop ointment.  Continue Cosopt twice daily both eyes and Latanoprost at bedtime both eyes   Recommend SLT left eye . Risks, benefits, complications, and alternatives were discussed.  No assurance of success was provided.  Patient will still need eye drops after laser.     SLT os scheduled 12-20-17 @ 1:15  PC ride, + walker, + rider  Status Post 1-3-18 @ 10:45      Prakash Gant M.D.            Follow-ups after your visit        Your next 10 appointments already scheduled     Jan 03, 2018 10:45 AM CST   Return Visit with Prakash Gant MD   [facility] ([address] [id number] [phone]              Who to contact     If you have questions or need follow up information about today's clinic visit or your schedule please contact [facility] directly at [phone].  Normal or non-critical lab and imaging results will be communicated to you by [employer], letter or phone within 4 business days after the clinic has received the results. If you do not hear from us within 7 days, please contact the clinic through [employer] or phone. If you have a critical or abnormal lab result, we will notify you by phone as soon as possible.  Submit refill requests through [employer] or call your pharmacy and they will forward the refill request to us. Please allow 3 business days for your refill to be completed.          Additional Information About Your Visit        [employer] Information     [employer] lets you send messages to your doctor, view your test results, renew your prescriptions, schedule appointments and more. To sign up, go to  "[email or web address] . Click on \"Log in\" on the left side of the screen, which will take you to the Welcome page. Then click on \"Sign up Now\" on the right side of the page.     You will be asked to enter the access code listed below, as well as some personal information. Please follow the directions to create your username and password.     Your access code is: [email or web address]  Expires: 3/4/2018 11:35 AM     Your access code will  in 90 days. If you need help or a new code, please call your [place] clinic or [phone].        Care EveryWhere ID     This is your Care EveryWhere ID. This could be used by other organizations to access your [place] medical records  [id number]         Blood Pressure from Last 3 Encounters:   13 138/80   12 126/84   12 139/77    Weight from Last 3 Encounters:   13 60.8 kg (134 lb)   12 62.1 kg (137 lb)   12 62.1 kg (137 lb)              Today, you had the following     No orders found for display       Primary Care Provider Office Phone # Fax #    Jose Buenrostro [phone]       [address] [place] [address]        Equal Access to Services     ZI WHITEHEAD : Hadii jeremy gordon hadasho Soomaali, waaxda luqadaha, qaybta kaalmada adeaviva, juan gentile. So [facility] [phone].    ATENCIÓN: Si habla español, tiene a cavaozs disposición servicios gratuitos de asistencia lingüística. Llame al [phone].    We comply with applicable federal civil rights laws and [place] laws. We do not discriminate on the basis of race, color, national origin, age, disability, sex, sexual orientation, or gender identity.            Thank you!     Thank you for choosing [facility]  for your care. Our goal is always to provide you with excellent care. Hearing back from our patients is one way we can continue to improve our services. Please take a few minutes to complete the written survey that " you may receive in the mail after your visit with us. Thank you!             Your Updated Medication List - Protect others around you: Learn how to safely use, store and throw away your medicines at www.disposemymeds.org.          This list is accurate as of: 12/4/17 11:35 AM.  Always use your most recent med list.                   Brand Name Dispense Instructions for use Diagnosis    atenolol 25 MG tablet    TENORMIN    90 tablet    Take 1 tablet by mouth daily.    HTN (hypertension)       CALCIUM 500 PO      Take 1 tablet by mouth 2 times daily.        dorzolamide-timolol 2-0.5 % ophthalmic solution    COSOPT    1 Bottle    Place 1 drop into both eyes 2 times daily    Pseudoexfoliation glaucoma, moderate stage       latanoprost 0.005 % ophthalmic solution    XALATAN    3 Bottle    Place 1 drop into both eyes At Bedtime Both Eyes    Pseudoexfoliation glaucoma, moderate stage       neomycin-polymyxin-dexamethasone 3.5-29718-8.1 Oint ophthalmic ointment    MAXITROL    1 Tube    Place 0.25 inches into both eyes At Bedtime and apply a small amount on eyelids twice a day    Periorbital dermatitis       simvastatin 20 MG tablet    ZOCOR    30 tablet    Take 1 tablet by mouth At Bedtime.    Hypercholesteremia       tobramycin-dexamethasone 0.3-0.1 % ophthalmic susp    TOBRADEX    1 Bottle    Place 1 drop into the right eye 4 times daily    Marginal corneal ulcer, right       VITAMIN D HIGH POTENCY 1000 UNITS capsule   Generic drug:  cholecalciferol      Take 1 capsule by mouth daily.

## 2017-12-04 NOTE — PATIENT INSTRUCTIONS
Ok to stop ointment.  Continue Cosopt twice daily both eyes and Latanoprost at bedtime both eyes   Recommend SLT left eye . Risks, benefits, complications, and alternatives were discussed.  No assurance of success was provided.  Patient will still need eye drops after laser.     SLT os scheduled 12-20-17 @ 1:15  PC ride, + walker, + rider  Status Post 1-3-18 @ 10:45      Prakash Gant M.D.

## 2017-12-04 NOTE — PROGRESS NOTES
Current Eye Medications:  cosopt both eyes twice a day, Latanoprost both eyes every evening.  maxitrol ointment both eyes every evening. Last drops:  9:30am     Subjective:  Follow up Pseudoexfoliation Glaucoma and periocular dermatitis.  Patient is here for a pressure check.  Patient complains of decreasing vision in each eye, especially when reading.  Eyelids are feeling much better and she no longer has itching or redness.  Patient is very concerned that she is going blind and asks about it repeatedly.      Objective:  See Ophthalmology Exam.       Assessment:  Periocular dermatitis improved.  Intraocular pressure too high left eye in patient with advanced open angle glaucoma.      Plan: Ok to stop ointment.  Continue Cosopt twice daily both eyes and Latanoprost at bedtime both eyes   Recommend SLT left eye . Risks, benefits, complications, and alternatives were discussed.  No assurance of success was provided.  Patient will still need eye drops after laser.     SLT os scheduled 12-20-17 @ 1:15  PC ride, + walker, + rider  Status Post 1-3-18 @ 10:45    Treat superior 180.

## 2018-01-08 ENCOUNTER — OFFICE VISIT (OUTPATIENT)
Dept: OPHTHALMOLOGY | Facility: CLINIC | Age: 83
End: 2018-01-08
Payer: COMMERCIAL

## 2018-01-08 DIAGNOSIS — H40.1492 PSEUDOEXFOLIATION GLAUCOMA, MODERATE STAGE: Primary | ICD-10-CM

## 2018-01-08 PROCEDURE — 92012 INTRM OPH EXAM EST PATIENT: CPT | Performed by: OPHTHALMOLOGY

## 2018-01-08 ASSESSMENT — TONOMETRY
IOP_METHOD: APPLANATION
OS_IOP_MMHG: 15
OD_IOP_MMHG: 16

## 2018-01-08 ASSESSMENT — VISUAL ACUITY
OD_CC+: -2
METHOD: SNELLEN - LINEAR
OD_CC: 20/20
CORRECTION_TYPE: GLASSES
OS_CC: 20/70
OS_PH_CC: 20/60

## 2018-01-08 ASSESSMENT — EXTERNAL EXAM - RIGHT EYE: OD_EXAM: PROLAPSED FAT PADS: UPPER

## 2018-01-08 ASSESSMENT — EXTERNAL EXAM - LEFT EYE: OS_EXAM: PROLAPSED FAT PADS: UPPER

## 2018-01-08 NOTE — PATIENT INSTRUCTIONS
Continue Cosopt twice daily both eyes and Latanoprost at bedtime both eyes.  Return visit one month for intraocular pressure check.    Prakash Gant M.D.

## 2018-01-08 NOTE — PROGRESS NOTES
Current Eye Medications:  cosopt both eyes twice a day, Latanoprost both eyes every evening.  Last drops:  8am.   Patient states she puts in one drop in the morning, one in the afternoon, and a different drop at night.       Subjective:  Status/Post SLT left eye:  12-20-17.  No problems since the laser.  Left eye is comfortable.  Occasionally she wakes with her eyelids stuck to her eye.       Objective:  See Ophthalmology Exam.       Assessment:  Good initial decrease in intraocular pressure left eye after selective laser trabeculoplasty.      Plan:  Continue Cosopt twice daily both eyes and Latanoprost at bedtime both eyes.  Return visit one month for intraocular pressure check.    Prakash Gant M.D.

## 2018-01-08 NOTE — MR AVS SNAPSHOT
"              After Visit Summary   2018    Alejandrina Whatley    MRN: [id number]           Patient Information     Date Of Birth          1923        Visit Information        Provider Department      2018 11:00 AM Prakash Gant MD [facility]        Care Instructions    Continue Cosopt twice daily both eyes and Latanoprost at bedtime both eyes.  Return visit one month for intraocular pressure check.    Prakash Gant M.D.            Follow-ups after your visit        Who to contact     If you have questions or need follow up information about today's clinic visit or your schedule please contact [facility] directly at [phone].  Normal or non-critical lab and imaging results will be communicated to you by [employer], letter or phone within 4 business days after the clinic has received the results. If you do not hear from us within 7 days, please contact the clinic through [employer] or phone. If you have a critical or abnormal lab result, we will notify you by phone as soon as possible.  Submit refill requests through [employer] or call your pharmacy and they will forward the refill request to us. Please allow 3 business days for your refill to be completed.          Additional Information About Your Visit        MyChart Information     [employer] lets you send messages to your doctor, view your test results, renew your prescriptions, schedule appointments and more. To sign up, go to [email or web address] . Click on \"Log in\" on the left side of the screen, which will take you to the Welcome page. Then click on \"Sign up Now\" on the right side of the page.     You will be asked to enter the access code listed below, as well as some personal information. Please follow the directions to create your username and password.     Your access code is: [email or web address]  Expires: 3/4/2018 11:35 AM     Your access code will  in 90 days. If you need help or a new code, please call your " [facility] or [phone].        Care EveryWhere ID     This is your Care EveryWhere ID. This could be used by other organizations to access your [place] medical records  [id number]         Blood Pressure from Last 3 Encounters:   07/18/13 138/80   09/13/12 126/84   09/11/12 139/77    Weight from Last 3 Encounters:   07/18/13 60.8 kg (134 lb)   08/06/12 62.1 kg (137 lb)   08/01/12 62.1 kg (137 lb)              Today, you had the following     No orders found for display       Primary Care Provider Office Phone # Fax #    Jose Buenrostro [phone]       [address] [facility] [address]        Equal Access to Services     ZI WHITEHEAD : Hadii aad ku hadasho Soomaali, waaxda luqadaha, qaybta kaalmada adeegyada, juan cortes . So [facility] [phone].    ATENCIÓN: Si habla español, tiene a cavazos disposición servicios gratuitos de asistencia lingüística. Llame al [phone].    We comply with applicable federal civil rights laws and [place] laws. We do not discriminate on the basis of race, color, national origin, age, disability, sex, sexual orientation, or gender identity.            Thank you!     Thank you for choosing [facility] [place]  for your care. Our goal is always to provide you with excellent care. Hearing back from our patients is one way we can continue to improve our services. Please take a few minutes to complete the written survey that you may receive in the mail after your visit with us. Thank you!             Your Updated Medication List - Protect others around you: Learn how to safely use, store and throw away your medicines at www.disposemymeds.org.          This list is accurate as of: 1/8/18 11:54 AM.  Always use your most recent med list.                   Brand Name Dispense Instructions for use Diagnosis    atenolol 25 MG tablet    TENORMIN    90 tablet    Take 1 tablet by mouth daily.    HTN (hypertension)        CALCIUM 500 PO      Take 1 tablet by mouth 2 times daily.        dorzolamide-timolol 2-0.5 % ophthalmic solution    COSOPT    1 Bottle    Place 1 drop into both eyes 2 times daily    Pseudoexfoliation glaucoma, moderate stage       latanoprost 0.005 % ophthalmic solution    XALATAN    3 Bottle    Place 1 drop into both eyes At Bedtime Both Eyes    Pseudoexfoliation glaucoma, moderate stage       simvastatin 20 MG tablet    ZOCOR    30 tablet    Take 1 tablet by mouth At Bedtime.    Hypercholesteremia       VITAMIN D HIGH POTENCY 1000 UNITS capsule   Generic drug:  cholecalciferol      Take 1 capsule by mouth daily.

## 2018-02-05 ENCOUNTER — OFFICE VISIT (OUTPATIENT)
Dept: OPHTHALMOLOGY | Facility: CLINIC | Age: 83
End: 2018-02-05
Payer: COMMERCIAL

## 2018-02-05 DIAGNOSIS — H40.1492 PSEUDOEXFOLIATION GLAUCOMA, MODERATE STAGE: Primary | ICD-10-CM

## 2018-02-05 PROCEDURE — 92012 INTRM OPH EXAM EST PATIENT: CPT | Performed by: OPHTHALMOLOGY

## 2018-02-05 ASSESSMENT — TONOMETRY
OD_IOP_MMHG: 13
OS_IOP_MMHG: 13
IOP_METHOD: APPLANATION

## 2018-02-05 ASSESSMENT — VISUAL ACUITY
CORRECTION_TYPE: GLASSES
METHOD: SNELLEN - LINEAR
OS_PH_CC: 20/50-2
OD_CC+: -2
OS_CC: 20/80
OD_CC: 20/20
OS_CC+: -1

## 2018-02-05 ASSESSMENT — EXTERNAL EXAM - RIGHT EYE: OD_EXAM: PROLAPSED FAT PADS: UPPER

## 2018-02-05 ASSESSMENT — EXTERNAL EXAM - LEFT EYE: OS_EXAM: PROLAPSED FAT PADS: UPPER

## 2018-02-05 NOTE — PATIENT INSTRUCTIONS
Continue Cosopt twice daily both eyes (use this 12 hours apart)  Continue Latanoprost at bedtime both eyes.  May use the Maxitrol ointment as needed.   Return visit 2 months for a complete exam.    Prakash Gant M.D.

## 2018-02-05 NOTE — PROGRESS NOTES
Current Eye Medications: Cosopt twice daily both eyes and Latanoprost at bedtime both eyes     Subjective:  Here for IOP check today. Doesn't miss a drop.  Doing well.      Objective:  See Ophthalmology Exam.       Assessment:  Good response to selective laser trabeculoplasty left eye.      Plan:  Continue Cosopt twice daily both eyes (use this 12 hours apart)  Continue Latanoprost at bedtime both eyes.  May use the Maxitrol ointment as needed.   Return visit 2 months for a complete exam.    Prakash Gant M.D.

## 2018-02-05 NOTE — MR AVS SNAPSHOT
"              After Visit Summary   2/5/2018    Alejandrina Whatley    MRN: [id number]           Patient Information     Date Of Birth          8/22/1923        Visit Information        Provider Department      2/5/2018 11:00 AM Prakash Gant MD [facility]        Today's Diagnoses     Pseudoexfoliation glaucoma, moderate stage    -  1      Care Instructions    Continue Cosopt twice daily both eyes (use this 12 hours apart)  Continue Latanoprost at bedtime both eyes.  May use the Maxitrol ointment as needed.   Return visit 2 months for a complete exam.    Prakash Gant M.D.            Follow-ups after your visit        Who to contact     If you have questions or need follow up information about today's clinic visit or your schedule please contact [facility] directly at [phone].  Normal or non-critical lab and imaging results will be communicated to you by [employer], letter or phone within 4 business days after the clinic has received the results. If you do not hear from us within 7 days, please contact the clinic through [employer] or phone. If you have a critical or abnormal lab result, we will notify you by phone as soon as possible.  Submit refill requests through [employer] or call your pharmacy and they will forward the refill request to us. Please allow 3 business days for your refill to be completed.          Additional Information About Your Visit        MyChart Information     [employer] lets you send messages to your doctor, view your test results, renew your prescriptions, schedule appointments and more. To sign up, go to [email or web address] . Click on \"Log in\" on the left side of the screen, which will take you to the Welcome page. Then click on \"Sign up Now\" on the right side of the page.     You will be asked to enter the access code listed below, as well as some personal information. Please follow the directions to create your username and password.     Your access code is: " [email or web address]  Expires: 3/4/2018 11:35 AM     Your access code will  in 90 days. If you need help or a new code, please call your [place] clinic or [phone].        Care EveryWhere ID     This is your Care EveryWhere ID. This could be used by other organizations to access your [place] medical records  [id number]         Blood Pressure from Last 3 Encounters:   13 138/80   12 126/84   12 139/77    Weight from Last 3 Encounters:   13 60.8 kg (134 lb)   12 62.1 kg (137 lb)   12 62.1 kg (137 lb)              Today, you had the following     No orders found for display       Primary Care Provider Office Phone # Fax #    Jose Buenrostro [phone]       [address] [facility] [place] [place] [address]        Equal Access to Services     ZA WHITEHEAD : Hadii aad ku hadasho Soomaali, waaxda luqadaha, qaybta kaalmada adeegyada, waxay tamerain hayidalmisn jonathan cortes . So [facility] [phone].    ATENCIÓN: Si habla español, tiene a cavazos disposición servicios gratuitos de asistencia lingüística. Llame al [phone].    We comply with applicable federal civil rights laws and [place] laws. We do not discriminate on the basis of race, color, national origin, age, disability, sex, sexual orientation, or gender identity.            Thank you!     Thank you for choosing [facility] [place]  for your care. Our goal is always to provide you with excellent care. Hearing back from our patients is one way we can continue to improve our services. Please take a few minutes to complete the written survey that you may receive in the mail after your visit with us. Thank you!             Your Updated Medication List - Protect others around you: Learn how to safely use, store and throw away your medicines at www.disposemymeds.org.          This list is accurate as of 18 11:14 AM.  Always use your most recent med list.                   Brand Name Dispense Instructions  for use Diagnosis    atenolol 25 MG tablet    TENORMIN    90 tablet    Take 1 tablet by mouth daily.    HTN (hypertension)       CALCIUM 500 PO      Take 1 tablet by mouth 2 times daily.        dorzolamide-timolol 2-0.5 % ophthalmic solution    COSOPT    1 Bottle    Place 1 drop into both eyes 2 times daily    Pseudoexfoliation glaucoma, moderate stage       latanoprost 0.005 % ophthalmic solution    XALATAN    3 Bottle    Place 1 drop into both eyes At Bedtime Both Eyes    Pseudoexfoliation glaucoma, moderate stage       simvastatin 20 MG tablet    ZOCOR    30 tablet    Take 1 tablet by mouth At Bedtime.    Hypercholesteremia       VITAMIN D HIGH POTENCY 1000 UNITS capsule   Generic drug:  cholecalciferol      Take 1 capsule by mouth daily.

## 2018-02-05 NOTE — LETTER
2/5/2018         RE: Alejandrina Whatley  [address]   [facility] [place] [address]        Dear Colleague,    Thank you for referring your patient, Alejandrina Whatley, to the [facility]. Please see a copy of my visit note below.     Current Eye Medications: Cosopt twice daily both eyes and Latanoprost at bedtime both eyes     Subjective:  Here for IOP check today. Doesn't miss a drop.  Doing well.      Objective:  See Ophthalmology Exam.       Assessment:  Good response to selective laser trabeculoplasty left eye.      Plan:  Continue Cosopt twice daily both eyes (use this 12 hours apart)  Continue Latanoprost at bedtime both eyes.  May use the Maxitrol ointment as needed.   Return visit 2 months for a complete exam.    Prakash Gant M.D.           Again, thank you for allowing me to participate in the care of your patient.        Sincerely,        Prakash Gant MD

## 2018-04-18 ENCOUNTER — TELEPHONE (OUTPATIENT)
Dept: OPHTHALMOLOGY | Facility: CLINIC | Age: 83
End: 2018-04-18

## 2018-04-18 DIAGNOSIS — H40.1492 PSEUDOEXFOLIATION GLAUCOMA, MODERATE STAGE: ICD-10-CM

## 2018-04-18 NOTE — TELEPHONE ENCOUNTER
Reason for call:  Medication   If this is a refill request, has the caller requested the refill from the pharmacy already? Yes  Will the patient be using a [facility]? No  Name of the pharmacy and phone number for the current request: [facility]    Name of the medication requested: dorzolamide-timolol (COSOPT) 2-0.5 % ophthalmic solution    Other request: Patient was recently transferred to a long term care facility in [place]. The Cosopt is not available through the pharmacy that they use. Per the pharmacist they do have Timolol in stock as a partial replacement, however the Dorzolamide is limited as they only receive a few bottles each week to dispense between several patients. Wondering if alternatives for this can be sent to new pharmacy and then have an order with directions for use faxed to [facility] at [phone]. Franchesca can be contacted with questions. The patient currently only has about 1-3 days of drops left on hand.     Phone number to reach patient:  Other phone number:  [phone] - Franchesca     Best Time:  n/a    Can we leave a detailed message on this number?  Not Applicable

## 2018-04-19 ENCOUNTER — TELEPHONE (OUTPATIENT)
Dept: OPHTHALMOLOGY | Facility: CLINIC | Age: 83
End: 2018-04-19

## 2018-04-19 RX ORDER — DORZOLAMIDE HYDROCHLORIDE AND TIMOLOL MALEATE 20; 5 MG/ML; MG/ML
1 SOLUTION/ DROPS OPHTHALMIC 2 TIMES DAILY
Qty: 1 BOTTLE | Refills: 11 | Status: SHIPPED | OUTPATIENT
Start: 2018-04-19

## 2018-04-19 NOTE — TELEPHONE ENCOUNTER
Tried to call and there was no answer at the phone x 2.  No answering machine that I could leave a message.  Spoke to Franchesca and she will give her Timolol only without Dorzolamide due to backorder per Dr. Gant. If it comes back into stock, they will fill for her.

## 2018-04-23 DIAGNOSIS — H40.1492 PSEUDOEXFOLIATION GLAUCOMA, MODERATE STAGE: ICD-10-CM

## 2018-04-23 RX ORDER — LATANOPROST 50 UG/ML
1 SOLUTION/ DROPS OPHTHALMIC AT BEDTIME
Qty: 3 BOTTLE | Refills: 4 | Status: SHIPPED | OUTPATIENT
Start: 2018-04-23 | End: 2022-01-01

## 2018-04-23 NOTE — TELEPHONE ENCOUNTER
Reason for call:  Medication   If this is a refill request, has the caller requested the refill from the pharmacy already? Yes  Will the patient be using a [facility]? No  Name of the pharmacy and phone number for the current request: [facility]    Name of the medication requested: latanoprost (XALATAN) 0.005 % ophthalmic solution    Other request: last dispensed 04/19/2018    Phone number to reach patient:  [phone]    Best Time:  n/a    Can we leave a detailed message on this number?  YES

## 2018-05-02 ENCOUNTER — HOSPITAL ENCOUNTER (EMERGENCY)
Facility: CLINIC | Age: 83
Discharge: HOME | End: 2018-05-02
Attending: EMERGENCY MEDICINE | Admitting: EMERGENCY MEDICINE
Payer: COMMERCIAL

## 2018-05-02 ENCOUNTER — APPOINTMENT (OUTPATIENT)
Dept: GENERAL RADIOLOGY | Facility: CLINIC | Age: 83
End: 2018-05-02
Attending: EMERGENCY MEDICINE
Payer: COMMERCIAL

## 2018-05-02 VITALS
TEMPERATURE: 97.9 F | SYSTOLIC BLOOD PRESSURE: 169 MMHG | RESPIRATION RATE: 16 BRPM | OXYGEN SATURATION: 93 % | DIASTOLIC BLOOD PRESSURE: 84 MMHG

## 2018-05-02 DIAGNOSIS — R11.0 NAUSEA: ICD-10-CM

## 2018-05-02 DIAGNOSIS — J40 BRONCHITIS: ICD-10-CM

## 2018-05-02 LAB
ALBUMIN SERPL-MCNC: 3.1 G/DL (ref 3.4–5)
ALBUMIN UR-MCNC: NEGATIVE MG/DL
ALP SERPL-CCNC: 75 U/L (ref 40–150)
ALT SERPL W P-5'-P-CCNC: 18 U/L (ref 0–50)
ANION GAP SERPL CALCULATED.3IONS-SCNC: 4 MMOL/L (ref 3–14)
APPEARANCE UR: CLEAR
AST SERPL W P-5'-P-CCNC: 22 U/L (ref 0–45)
BACTERIA #/AREA URNS HPF: ABNORMAL /HPF
BASOPHILS # BLD AUTO: 0 10E9/L (ref 0–0.2)
BASOPHILS NFR BLD AUTO: 0.3 %
BILIRUB DIRECT SERPL-MCNC: <0.1 MG/DL (ref 0–0.2)
BILIRUB SERPL-MCNC: 0.3 MG/DL (ref 0.2–1.3)
BILIRUB UR QL STRIP: NEGATIVE
BUN SERPL-MCNC: 22 MG/DL (ref 7–30)
CALCIUM SERPL-MCNC: 8.5 MG/DL (ref 8.5–10.1)
CHLORIDE SERPL-SCNC: 108 MMOL/L (ref 94–109)
CO2 SERPL-SCNC: 27 MMOL/L (ref 20–32)
COLOR UR AUTO: YELLOW
CREAT SERPL-MCNC: 0.9 MG/DL (ref 0.52–1.04)
DIFFERENTIAL METHOD BLD: ABNORMAL
EOSINOPHIL # BLD AUTO: 0.1 10E9/L (ref 0–0.7)
EOSINOPHIL NFR BLD AUTO: 1.1 %
ERYTHROCYTE [DISTWIDTH] IN BLOOD BY AUTOMATED COUNT: 15.4 % (ref 10–15)
GFR SERPL CREATININE-BSD FRML MDRD: 58 ML/MIN/1.7M2
GLUCOSE SERPL-MCNC: 125 MG/DL (ref 70–99)
GLUCOSE UR STRIP-MCNC: NEGATIVE MG/DL
HCT VFR BLD AUTO: 36.9 % (ref 35–47)
HGB BLD-MCNC: 11.9 G/DL (ref 11.7–15.7)
HGB UR QL STRIP: NEGATIVE
IMM GRANULOCYTES # BLD: 0 10E9/L (ref 0–0.4)
IMM GRANULOCYTES NFR BLD: 0.2 %
INTERPRETATION ECG - MUSE: NORMAL
KETONES UR STRIP-MCNC: NEGATIVE MG/DL
LEUKOCYTE ESTERASE UR QL STRIP: NEGATIVE
LYMPHOCYTES # BLD AUTO: 1 10E9/L (ref 0.8–5.3)
LYMPHOCYTES NFR BLD AUTO: 8.3 %
MCH RBC QN AUTO: 32.6 PG (ref 26.5–33)
MCHC RBC AUTO-ENTMCNC: 32.2 G/DL (ref 31.5–36.5)
MCV RBC AUTO: 101 FL (ref 78–100)
MONOCYTES # BLD AUTO: 1.5 10E9/L (ref 0–1.3)
MONOCYTES NFR BLD AUTO: 11.9 %
MUCOUS THREADS #/AREA URNS LPF: PRESENT /LPF
NEUTROPHILS # BLD AUTO: 9.5 10E9/L (ref 1.6–8.3)
NEUTROPHILS NFR BLD AUTO: 78.2 %
NITRATE UR QL: NEGATIVE
NRBC # BLD AUTO: 0 10*3/UL
NRBC BLD AUTO-RTO: 0 /100
PH UR STRIP: 5 PH (ref 5–7)
PLATELET # BLD AUTO: 188 10E9/L (ref 150–450)
POTASSIUM SERPL-SCNC: 4.1 MMOL/L (ref 3.4–5.3)
PROT SERPL-MCNC: 7.4 G/DL (ref 6.8–8.8)
RBC # BLD AUTO: 3.65 10E12/L (ref 3.8–5.2)
RBC #/AREA URNS AUTO: 1 /HPF (ref 0–2)
SODIUM SERPL-SCNC: 139 MMOL/L (ref 133–144)
SOURCE: ABNORMAL
SP GR UR STRIP: 1.02 (ref 1–1.03)
SQUAMOUS #/AREA URNS AUTO: 1 /HPF (ref 0–1)
TROPONIN I BLD-MCNC: 0 UG/L (ref 0–0.1)
UROBILINOGEN UR STRIP-MCNC: 0 MG/DL (ref 0–2)
WBC # BLD AUTO: 12.2 10E9/L (ref 4–11)
WBC #/AREA URNS AUTO: 4 /HPF (ref 0–5)

## 2018-05-02 PROCEDURE — 81001 URINALYSIS AUTO W/SCOPE: CPT | Performed by: EMERGENCY MEDICINE

## 2018-05-02 PROCEDURE — 85025 COMPLETE CBC W/AUTO DIFF WBC: CPT | Performed by: EMERGENCY MEDICINE

## 2018-05-02 PROCEDURE — 96374 THER/PROPH/DIAG INJ IV PUSH: CPT

## 2018-05-02 PROCEDURE — 71046 X-RAY EXAM CHEST 2 VIEWS: CPT

## 2018-05-02 PROCEDURE — 93005 ELECTROCARDIOGRAM TRACING: CPT

## 2018-05-02 PROCEDURE — 84484 ASSAY OF TROPONIN QUANT: CPT

## 2018-05-02 PROCEDURE — 80048 BASIC METABOLIC PNL TOTAL CA: CPT | Performed by: EMERGENCY MEDICINE

## 2018-05-02 PROCEDURE — 99285 EMERGENCY DEPT VISIT HI MDM: CPT | Mod: 25

## 2018-05-02 PROCEDURE — 80076 HEPATIC FUNCTION PANEL: CPT | Performed by: EMERGENCY MEDICINE

## 2018-05-02 PROCEDURE — 25000128 H RX IP 250 OP 636: Performed by: EMERGENCY MEDICINE

## 2018-05-02 RX ORDER — ONDANSETRON 2 MG/ML
4 INJECTION INTRAMUSCULAR; INTRAVENOUS ONCE
Status: COMPLETED | OUTPATIENT
Start: 2018-05-02 | End: 2018-05-02

## 2018-05-02 RX ORDER — ONDANSETRON 4 MG/1
4 TABLET, FILM COATED ORAL EVERY 6 HOURS
Qty: 8 TABLET | Refills: 0 | Status: ON HOLD | OUTPATIENT
Start: 2018-05-02 | End: 2019-12-03

## 2018-05-02 RX ORDER — ACETAMINOPHEN 500 MG
500 TABLET ORAL EVERY 4 HOURS PRN
Status: DISCONTINUED | OUTPATIENT
Start: 2018-05-02 | End: 2018-05-02 | Stop reason: HOSPADM

## 2018-05-02 RX ORDER — BENZONATATE 100 MG/1
100 CAPSULE ORAL 3 TIMES DAILY PRN
Qty: 12 CAPSULE | Refills: 0 | Status: SHIPPED | OUTPATIENT
Start: 2018-05-02 | End: 2018-05-14

## 2018-05-02 RX ORDER — ACETAMINOPHEN 500 MG
1000 TABLET ORAL EVERY 6 HOURS PRN
Qty: 60 TABLET | Refills: 0 | Status: SHIPPED | OUTPATIENT
Start: 2018-05-02 | End: 2018-05-09

## 2018-05-02 RX ORDER — AZITHROMYCIN 250 MG/1
TABLET, FILM COATED ORAL
Qty: 6 TABLET | Refills: 0 | Status: SHIPPED | OUTPATIENT
Start: 2018-05-02 | End: 2018-05-07

## 2018-05-02 RX ADMIN — ONDANSETRON 4 MG: 2 INJECTION INTRAMUSCULAR; INTRAVENOUS at 13:59

## 2018-05-02 RX ADMIN — SODIUM CHLORIDE 500 ML: 9 INJECTION, SOLUTION INTRAVENOUS at 13:59

## 2018-05-02 ASSESSMENT — ENCOUNTER SYMPTOMS
COUGH: 1
APPETITE CHANGE: 1
VOMITING: 0
WEAKNESS: 1
NAUSEA: 1
DIARRHEA: 1

## 2018-05-02 NOTE — ED AVS SNAPSHOT
[facility] Emergency Department    [address]    [facility] [address]    Phone:  [phone]    Fax:  [phone]                                       Alejandrina Whatley   MRN: [id number]    Department:  [facility] Emergency Department   Date of Visit:  5/2/2018           Patient Information     Date Of Birth          8/22/1923        Your diagnoses for this visit were:     Nausea     Bronchitis        You were seen by Timmy Razo MD.      Follow-up Information     Follow up with Reggie Whatley MD In 2 days.    Specialty:  Family Practice    Contact information:    [facility]  [address] [facility] [address]  [phone]          Discharge Instructions         Diet for Vomiting or Diarrhea (Adult)    Your symptoms may return or get worse after eating certain foods listed below. If this happens, stop eating these foods until your symptoms ease and you feel better.  Once the vomiting stops, follow the steps below.   During the first 12 to 24 hours  During the first 12 to 24 hours, follow this diet:    Drinks. Plain water, sport drinks like electrolyte solutions, soft drinks without caffeine, mineral water (plain or flavored), clear fruit juices, and decaffeinated tea and coffee.    Soups. Clear broth.    Desserts. Plain gelatin, popsicles, and fruit juice bars. As you feel better, you may add 6 to 8 ounces of yogurt per day. If you have diarrhea, don't have foods or drinks that contain sugar, high-fructose corn syrup, or sugar alcohols.  During the next 24 hours  During the next 24 hours you may add the following to the above:    Hot cereal, plain toast, bread, rolls, and crackers    Plain noodles, rice, mashed potatoes, and chicken noodle or rice soup    Unsweetened canned fruit (but not pineapple) and bananas  Don't eat more than 15 grams of fat a day. Do this by staying away from margarine, butter, oils, mayonnaise, sauces, gravies, fried foods,  peanut butter, meat, poultry, and fish.  Don't eat much fiber. Stay away from raw or cooked vegetables, fresh fruits (except bananas), and bran cereals.  Limit how much caffeine and chocolate you have. Do not use any spices or seasonings except salt.  During the next 24 hours  Slowly go back to your normal diet, as you feel better and your symptoms ease.  Date Last Reviewed: 8/1/2016 2000-2017 [employer]. [address] [place] [address], [place] [address]. All rights reserved. This information is not intended as a substitute for professional medical care. Always follow your healthcare professional's instructions.          Discharge References/Attachments     BRONCHITIS, ANTIBIOTIC TREATMENT (ADULT) (ENGLISH)      24 Hour Appointment Hotline       To make an appointment at any [facility] clinic, call [phone] ([phone]). If you don't have a family doctor or clinic, we will help you find one. [facility] clinics are conveniently located to serve the needs of you and your family.          ED Discharge Orders     Clostridium difficile toxin B           Enteric Bacteria and Virus Panel by LILIB[facility] Stool                    Review of your medicines      START taking        Dose / Directions Last dose taken    azithromycin 250 MG tablet   Commonly known as:  ZITHROMAX Z-ANDREA   Quantity:  6 tablet        Two tablets on the first day, then one tablet daily for the next 4 days   Refills:  0        benzonatate 100 MG capsule   Commonly known as:  TESSALON   Dose:  100 mg   Quantity:  12 capsule        Take 1 capsule (100 mg) by mouth 3 times daily as needed for cough   Refills:  0        ondansetron 4 MG tablet   Commonly known as:  ZOFRAN   Dose:  4 mg   Quantity:  8 tablet        Take 1 tablet (4 mg) by mouth every 6 hours   Refills:  0          CONTINUE these medicines which may have CHANGED, or have new prescriptions. If we are uncertain of the size of tablets/capsules you have at home, strength may be listed as  something that might have changed.        Dose / Directions Last dose taken    acetaminophen 500 MG tablet   Commonly known as:  TYLENOL   Dose:  1000 mg   What changed:    - medication strength  - how much to take  - when to take this  - reasons to take this   Quantity:  60 tablet        Take 2 tablets (1,000 mg) by mouth every 6 hours as needed for pain or fever   Refills:  0          Our records show that you are taking the medicines listed below. If these are incorrect, please call your family doctor or clinic.        Dose / Directions Last dose taken    atenolol 25 MG tablet   Commonly known as:  TENORMIN   Dose:  25 mg   Quantity:  90 tablet        Take 1 tablet by mouth daily.   Refills:  3        CALCIUM 500 PO   Dose:  1 tablet        Take 1 tablet by mouth 2 times daily.   Refills:  0        dorzolamide-timolol 2-0.5 % ophthalmic solution   Commonly known as:  COSOPT   Dose:  1 drop   Quantity:  1 Bottle        Place 1 drop into both eyes 2 times daily   Refills:  11        DULOXETINE HCL PO   Dose:  30 mg        Take 30 mg by mouth   Refills:  0        latanoprost 0.005 % ophthalmic solution   Commonly known as:  XALATAN   Dose:  1 drop   Quantity:  3 Bottle        Place 1 drop into both eyes At Bedtime Both Eyes   Refills:  4        PROLIA SC        Refills:  0        simvastatin 20 MG tablet   Commonly known as:  ZOCOR   Dose:  20 mg   Quantity:  30 tablet        Take 1 tablet by mouth At Bedtime.   Refills:  0        VITAMIN D HIGH POTENCY 1000 units capsule   Dose:  1 capsule   Generic drug:  cholecalciferol        Take 1 capsule by mouth daily.   Refills:  0                Prescriptions were sent or printed at these locations (4 Prescriptions)                   Other Prescriptions                Printed at Department/Unit printer (4 of 4)         acetaminophen (TYLENOL) 500 MG tablet               azithromycin (ZITHROMAX Z-ANDREA) 250 MG tablet               benzonatate (TESSALON) 100 MG capsule                ondansetron (ZOFRAN) 4 MG tablet                Procedures and tests performed during your visit     Basic metabolic panel (BMP)    CBC + differential    EKG 12 lead    Hepatic panel    ISTAT troponin    IV access    Straight cath for urine    Troponin POCT    UA with Microscopic    XR Chest 2 Views      Orders Needing Specimen Collection     None      Pending Results     Date and Time Order Name Status Description    5/2/2018 1342 EKG 12 lead Preliminary             Pending Culture Results     No orders found from 4/30/2018 to 5/3/2018.            Pending Results Instructions     If you had any lab results that were not finalized at the time of your Discharge, you can call the ED Lab Result RN at [phone]. You will be contacted by this team for any positive Lab results or changes in treatment. The nurses are available 7 days a week from 10A to 6:30P.  You can leave a message 24 hours per day and they will return your call.        Test Results From Your Hospital Stay        5/2/2018  2:13 PM      Component Results     Component Value Ref Range & Units Status    WBC 12.2 (H) 4.0 - 11.0 10e9/L Final    RBC Count 3.65 (L) 3.8 - 5.2 10e12/L Final    Hemoglobin 11.9 11.7 - 15.7 g/dL Final    Hematocrit 36.9 35.0 - 47.0 % Final     (H) 78 - 100 fl Final    MCH 32.6 26.5 - 33.0 pg Final    MCHC 32.2 31.5 - 36.5 g/dL Final    RDW 15.4 (H) 10.0 - 15.0 % Final    Platelet Count 188 150 - 450 10e9/L Final    Diff Method Automated Method  Final    % Neutrophils 78.2 % Final    % Lymphocytes 8.3 % Final    % Monocytes 11.9 % Final    % Eosinophils 1.1 % Final    % Basophils 0.3 % Final    % Immature Granulocytes 0.2 % Final    Nucleated RBCs 0 0 /100 Final    Absolute Neutrophil 9.5 (H) 1.6 - 8.3 10e9/L Final    Absolute Lymphocytes 1.0 0.8 - 5.3 10e9/L Final    Absolute Monocytes 1.5 (H) 0.0 - 1.3 10e9/L Final    Absolute Eosinophils 0.1 0.0 - 0.7 10e9/L Final    Absolute Basophils 0.0 0.0 - 0.2 10e9/L Final     Abs Immature Granulocytes 0.0 0 - 0.4 10e9/L Final    Absolute Nucleated RBC 0.0  Final         5/2/2018  2:32 PM      Component Results     Component Value Ref Range & Units Status    Sodium 139 133 - 144 mmol/L Final    Potassium 4.1 3.4 - 5.3 mmol/L Final    Chloride 108 94 - 109 mmol/L Final    Carbon Dioxide 27 20 - 32 mmol/L Final    Anion Gap 4 3 - 14 mmol/L Final    Glucose 125 (H) 70 - 99 mg/dL Final    Urea Nitrogen 22 7 - 30 mg/dL Final    Creatinine 0.90 0.52 - 1.04 mg/dL Final    GFR Estimate 58 (L) >60 mL/min/1.7m2 Final    Non  GFR Calc    GFR Estimate If Black 70 >60 mL/min/1.7m2 Final    African American GFR Calc    Calcium 8.5 8.5 - 10.1 mg/dL Final         5/2/2018  2:32 PM      Component Results     Component Value Ref Range & Units Status    Bilirubin Direct <0.1 0.0 - 0.2 mg/dL Final    Bilirubin Total 0.3 0.2 - 1.3 mg/dL Final    Albumin 3.1 (L) 3.4 - 5.0 g/dL Final    Protein Total 7.4 6.8 - 8.8 g/dL Final    Alkaline Phosphatase 75 40 - 150 U/L Final    ALT 18 0 - 50 U/L Final    AST 22 0 - 45 U/L Final         5/2/2018  4:07 PM      Component Results     Component Value Ref Range & Units Status    Color Urine Yellow  Final    Appearance Urine Clear  Final    Glucose Urine Negative NEG^Negative mg/dL Final    Bilirubin Urine Negative NEG^Negative Final    Ketones Urine Negative NEG^Negative mg/dL Final    Specific Gravity Urine 1.020 1.003 - 1.035 Final    Blood Urine Negative NEG^Negative Final    pH Urine 5.0 5.0 - 7.0 pH Final    Protein Albumin Urine Negative NEG^Negative mg/dL Final    Urobilinogen mg/dL 0.0 0.0 - 2.0 mg/dL Final    Nitrite Urine Negative NEG^Negative Final    Leukocyte Esterase Urine Negative NEG^Negative Final    Source Midstream Urine  Final    WBC Urine 4 0 - 5 /HPF Final    RBC Urine 1 0 - 2 /HPF Final    Bacteria Urine Few (A) NEG^Negative /HPF Final    Squamous Epithelial /HPF Urine 1 0 - 1 /HPF Final    Mucous Urine Present (A) NEG^Negative  /LPF Final         5/2/2018  3:01 PM      Narrative     XR CHEST 2 VW 5/2/2018 2:45 PM    HISTORY: Cough.    COMPARISON: 8/1/2012    FINDINGS: No airspace consolidation, pleural effusion or pneumothorax.  Normal heart size. Marked degenerative change at both shoulders.        Impression     IMPRESSION: No acute cardiopulmonary abnormality.    YOUNG CASEY MD         5/2/2018  2:14 PM      Component Results     Component Value Ref Range & Units Status    Troponin I 0.00 0.00 - 0.10 ug/L Final                Clinical Quality Measure: Blood Pressure Screening     Your blood pressure was checked while you were in the emergency department today. The last reading we obtained was  BP: 169/84 (Simultaneous filing. User may not have seen previous data.) . Please read the guidelines below about what these numbers mean and what you should do about them.  If your systolic blood pressure (the top number) is less than 120 and your diastolic blood pressure (the bottom number) is less than 80, then your blood pressure is normal. There is nothing more that you need to do about it.  If your systolic blood pressure (the top number) is 120-139 or your diastolic blood pressure (the bottom number) is 80-89, your blood pressure may be higher than it should be. You should have your blood pressure rechecked within a year by a primary care provider.  If your systolic blood pressure (the top number) is 140 or greater or your diastolic blood pressure (the bottom number) is 90 or greater, you may have high blood pressure. High blood pressure is treatable, but if left untreated over time it can put you at risk for heart attack, stroke, or kidney failure. You should have your blood pressure rechecked by a primary care provider within the next 4 weeks.  If your provider in the emergency department today gave you specific instructions to follow-up with your doctor or provider even sooner than that, you should follow that instruction and not wait  "for up to 4 weeks for your follow-up visit.        Thank you for choosing [facility]       Thank you for choosing [facility] for your care. Our goal is always to provide you with excellent care. Hearing back from our patients is one way we can continue to improve our services. Please take a few minutes to complete the written survey that you may receive in the mail after you visit with us. Thank you!        [employer] Mobile Information     [employer] lets you send messages to your doctor, view your test results, renew your prescriptions, schedule appointments and more. To sign up, go to [email or web address] . Click on \"Log in\" on the left side of the screen, which will take you to the Welcome page. Then click on \"Sign up Now\" on the right side of the page.     You will be asked to enter the access code listed below, as well as some personal information. Please follow the directions to create your username and password.     Your access code is: [email or web address]  Expires: 2018  4:36 PM     Your access code will  in 90 days. If you need help or a new code, please call your [facility] clinic or [phone].        Care EveryWhere ID     This is your Care EveryWhere ID. This could be used by other organizations to access your [facility] medical records  [id number]        Equal Access to Services     ZI WHITEHEAD AH: Nisha Thomas, waaxda luqadaha, qaybta kaalmada roxy, juan gentile. So [facility] [phone].    ATENCIÓN: Si habla español, tiene a cavazos disposición servicios gratuitos de asistencia lingüística. Llame al [phone].    We comply with applicable federal civil rights laws and [place] laws. We do not discriminate on the basis of race, color, national origin, age, disability, sex, sexual orientation, or gender identity.            After Visit Summary       This is your record. Keep this with you and show to your community pharmacist(s) and doctor(s) at your next visit.  "

## 2018-05-02 NOTE — ED TRIAGE NOTES
C/O 1 week hx, decreased appetite, diarrhea intermittent x4 times the past week, cough.  C/O chronic back pain LLE sciatic nerve pain that is baseline. Dtr speaks for pt frequently regarding symptoms and complaints. ABC in tact. A/OX4

## 2018-05-02 NOTE — DISCHARGE INSTRUCTIONS
Diet for Vomiting or Diarrhea (Adult)    Your symptoms may return or get worse after eating certain foods listed below. If this happens, stop eating these foods until your symptoms ease and you feel better.  Once the vomiting stops, follow the steps below.   During the first 12 to 24 hours  During the first 12 to 24 hours, follow this diet:    Drinks. Plain water, sport drinks like electrolyte solutions, soft drinks without caffeine, mineral water (plain or flavored), clear fruit juices, and decaffeinated tea and coffee.    Soups. Clear broth.    Desserts. Plain gelatin, popsicles, and fruit juice bars. As you feel better, you may add 6 to 8 ounces of yogurt per day. If you have diarrhea, don't have foods or drinks that contain sugar, high-fructose corn syrup, or sugar alcohols.  During the next 24 hours  During the next 24 hours you may add the following to the above:    Hot cereal, plain toast, bread, rolls, and crackers    Plain noodles, rice, mashed potatoes, and chicken noodle or rice soup    Unsweetened canned fruit (but not pineapple) and bananas  Don't eat more than 15 grams of fat a day. Do this by staying away from margarine, butter, oils, mayonnaise, sauces, gravies, fried foods, peanut butter, meat, poultry, and fish.  Don't eat much fiber. Stay away from raw or cooked vegetables, fresh fruits (except bananas), and bran cereals.  Limit how much caffeine and chocolate you have. Do not use any spices or seasonings except salt.  During the next 24 hours  Slowly go back to your normal diet, as you feel better and your symptoms ease.  Date Last Reviewed: 8/1/2016 2000-2017 [employer]. [address] [place] [address], [place] [address]. All rights reserved. This information is not intended as a substitute for professional medical care. Always follow your healthcare professional's instructions.

## 2018-05-02 NOTE — ED NOTES
Pt given food and drink for PO challenge, Dtr ate crackers. Boxed lunch given to pt and reinforced to dtr to allow pt to eat food. Pt ate some of the meal.

## 2018-05-02 NOTE — ED AVS SNAPSHOT
[facility] Emergency Department    [address]    [facility] [address]    Phone:  [phone]    Fax:  [phone]                                       Alejandrina Whatley   MRN: [id number]    Department:  [facility] Emergency Department   Date of Visit:  5/2/2018           After Visit Summary Signature Page     I have received my discharge instructions, and my questions have been answered. I have discussed any challenges I see with this plan with the nurse or doctor.    ..........................................................................................................................................  Patient/Patient Representative Signature      ..........................................................................................................................................  Patient Representative Print Name and Relationship to Patient    ..................................................               ................................................  Date                                            Time    ..........................................................................................................................................  Reviewed by Signature/Title    ...................................................              ..............................................  Date                                                            Time

## 2018-05-02 NOTE — ED PROVIDER NOTES
History     Chief Complaint:  Generalized Weakness    HPI   Alejandrina Whatley is a 89 year old female, with a complicated past medical history as noted below, who presents with her daughter to the emergency department for evaluation of generalized weakness and associated decreased appetite, intermittent diarrhea and cough for the last week. Of note, daughter speaks frequently for patient regarding symptoms and complaints. It was reported that patient has had 4 episodes of intermittent diarrhea for the past few days, the most recent this morning, producing watery stool. Patient also noted to be nauseated to the point where patient has not felt like eating, with her most recent meal of toast and coffee this morning. Of note, patient reports to drinking prune juice despite having diarrhea. Patient endorses back pain and LLE sciatica nerve pain, though this is baseline. The patient denies any new pain or vomiting.     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  Smoking Status: No  Smokeless  Tobacco: No  Alcohol Use: No   Marital Status:   [5]     Review of Systems   Constitutional: Positive for appetite change.   Respiratory: Positive for cough.    Gastrointestinal: Positive for diarrhea and nausea. Negative for vomiting.   Neurological: Positive for weakness (Generalized).   All other systems reviewed and are negative.    Physical Exam   Vitals:  Patient Vitals for the past 24 hrs:   BP Temp Temp src Heart Rate Resp SpO2   05/02/18 1430 169/84 - - - - 93 %   05/02/18 1415 159/84 - - - - -   05/02/18 1400 156/88 - - - - 93 %   05/02/18 1349 (!) 153/92 - - 76 - 94 %   05/02/18 1345 (!) 153/92 - - - - -   05/02/18 1336 - 97.9  F (36.6  C) Oral - 16 -      Physical Exam  Vital signs and nursing notes reviewed.     Constitutional: laying on gurney appears comfortable  HENT: Mildly dry oral mucosa   Eyes: Conjunctivae are normal bilaterally. Pupils equal  Neck: normal range of motion  Cardiovascular: Normal rate, regular rhythm, normal heart sounds.   Pulmonary/Chest: Effort normal and breath sounds normal. No respiratory distress.   Abdominal: Soft. Bowel sounds are normal. No tenderness to palpation. No rebound or guarding.   Musculoskeletal: No joint swelling or edema.   Neurological: Alert, but some short term memory problems. No focal weakness  Skin: Skin is warm and dry. No rash noted.   Psych: normal affect   Emergency Department Course     ECG:  ECG taken at 1403, ECG read at 1405 by Dr. Razo  Normal sinus rhythm  Normal ECG  Rate 76 bpm. MS interval 174. QRS duration 72. QT/QTc 382/429. P-R-T axes 21 4 3.     Imaging:  Radiology findings were communicated with the patient and family who voiced understanding of the findings.  XR Chest:   IMPRESSION: No acute cardiopulmonary abnormality.  Reading per radiology.     Laboratory:  Laboratory findings were communicated with the patient and family who voiced understanding of the findings.  CBC: WBC 12.2 (H) o/w WNL (HGB 11.9, )  BMP:  Glucose 125 (H), GFR Estimate 58 (L) o/w WNL (Creatinine 0.90)  Hepatic Panel: Albumin 3.1 (L) o/w WNL  Troponin POCT (Collected 1401): 0.00     UA with Microscopic: Bacteria Few (A), Mucous Urine Present (A) o/w WNL     Interventions:  1359 0.9% NaCl Bolus 500 mL IV  1359  Zofran 4 mg IV     Emergency Department Course:  Nursing notes and vitals reviewed.  IV was inserted and blood was drawn for laboratory testing, results above.  The patient was sent for a XR Chest while in the emergency department, results above.   The patient provided a urine sample here in the emergency department. This was sent for laboratory testing, findings above.   EKG obtained in the ED, see results above.      1:33 PM: I performed an exam of the patient as documented above. History obtained from primarily patient's daughter.   2:06 PM: Patient declines a cath at this time. Reports she will try to give a clean sample.   4:30 PM: Reassessed patient. Discussed plan of care and patient will be discharged.   5:05 PM: Spoke with patient and daughter prior to discharge per their request.    I discussed the treatment plan with the patient. They expressed understanding of this plan and consented to discharge. They will be discharged home with instructions for care and follow up. In addition, the patient will return to the emergency department if their symptoms persist, worsen, if new symptoms arise or if there is any concern.  All questions were answered.     I personally reviewed the laboratory results with the Patient and daughter and answered all related questions prior to discharge.    Impression & Plan      Medical Decision Making:  Patient is a 89 year old female who presents with family member due to symptoms of nausea and just not generally feeling well with intermittent episodes of diarrhea for the last week. On examination, she has normal vital signs. Patient also reports an intermittent cough for the last week, which seems to be worse  at night. She does cough occasional greenish-sputum. There is no indication of pneumonia on chest x-ray. She has no signs of hypoxia or increased work of breathing. She has clear lung sounds.  Multiple studies were done here in the emergency department which she showed no evidence or urinary tract infection, signs of dehydration or other concerning lab values. She had a mild leukocytosis of uncertain significance. She was unable to provide a stool sample here so that it is doubtful the diarrhea is causing any of the symptomology, but it is unclear. I did recommend collecting a stool sample if she is still having loose stools tomorrow and bringing it back for lab evaluation. She's given nausea medication. She's drinking fluids and ate a sandwich here in the emergency department without vomiting, therefore I do not feel there is any clear indication for admission. She's given IV fluids, therefore I feel as though she can be safely discharged home.  The plan is to treat her with azithromycin for bronchitis, and mild leukocytosis and she was given some tessalon to hopefully help with the cough at night. She was advised to use zofran and try to stay hydrated and eat consistent meals.  She is to follow up with her primary care physician in 48 hours for recheck and she was discharged home.     Diagnosis:    ICD-10-CM    1. Nausea R11.0    2. Bronchitis J40         Disposition:   Discharged.    Discharge Medications:  New Prescriptions    ACETAMINOPHEN (TYLENOL) 500 MG TABLET    Take 2 tablets (1,000 mg) by mouth every 6 hours as needed for pain or fever    AZITHROMYCIN (ZITHROMAX Z-ANDREA) 250 MG TABLET    Two tablets on the first day, then one tablet daily for the next 4 days    BENZONATATE (TESSALON) 100 MG CAPSULE    Take 1 capsule (100 mg) by mouth 3 times daily as needed for cough    ONDANSETRON (ZOFRAN) 4 MG TABLET    Take 1 tablet (4 mg) by mouth every 6 hours       Scribe Disclosure:  Magdalena UPTON, am serving as  a scribe at 1:33 PM on 5/2/2018 to document services personally performed by Timmy Razo MD, based on my observations and the provider's statements to me.  5/2/2018   [facility] EMERGENCY DEPARTMENT       Timmy Razo MD  05/02/18 1738

## 2018-05-10 ENCOUNTER — RECORDS - HEALTHEAST (OUTPATIENT)
Dept: LAB | Facility: CLINIC | Age: 83
End: 2018-05-10

## 2018-05-10 LAB
BASOPHILS # BLD AUTO: 0 THOU/UL (ref 0–0.2)
BASOPHILS NFR BLD AUTO: 0 % (ref 0–2)
EOSINOPHIL # BLD AUTO: 0.1 THOU/UL (ref 0–0.4)
EOSINOPHIL NFR BLD AUTO: 1 % (ref 0–6)
ERYTHROCYTE [DISTWIDTH] IN BLOOD BY AUTOMATED COUNT: 15.5 % (ref 11–14.5)
HCT VFR BLD AUTO: 38.7 % (ref 35–47)
HGB BLD-MCNC: 12.3 G/DL (ref 12–16)
LYMPHOCYTES # BLD AUTO: 1.9 THOU/UL (ref 0.8–4.4)
LYMPHOCYTES NFR BLD AUTO: 16 % (ref 20–40)
MCH RBC QN AUTO: 32.5 PG (ref 27–34)
MCHC RBC AUTO-ENTMCNC: 31.8 G/DL (ref 32–36)
MCV RBC AUTO: 102 FL (ref 80–100)
MONOCYTES # BLD AUTO: 0.8 THOU/UL (ref 0–0.9)
MONOCYTES NFR BLD AUTO: 7 % (ref 2–10)
NEUTROPHILS # BLD AUTO: 8.8 THOU/UL (ref 2–7.7)
NEUTROPHILS NFR BLD AUTO: 76 % (ref 50–70)
PLATELET # BLD AUTO: 255 THOU/UL (ref 140–440)
PMV BLD AUTO: 9.9 FL (ref 8.5–12.5)
RBC # BLD AUTO: 3.79 MILL/UL (ref 3.8–5.4)
WBC: 11.8 THOU/UL (ref 4–11)

## 2018-05-24 ENCOUNTER — RECORDS - HEALTHEAST (OUTPATIENT)
Dept: LAB | Facility: CLINIC | Age: 83
End: 2018-05-24

## 2018-05-24 LAB
BASOPHILS # BLD AUTO: 0 THOU/UL (ref 0–0.2)
BASOPHILS NFR BLD AUTO: 1 % (ref 0–2)
EOSINOPHIL # BLD AUTO: 0.2 THOU/UL (ref 0–0.4)
EOSINOPHIL NFR BLD AUTO: 3 % (ref 0–6)
ERYTHROCYTE [DISTWIDTH] IN BLOOD BY AUTOMATED COUNT: 15.3 % (ref 11–14.5)
HCT VFR BLD AUTO: 36.2 % (ref 35–47)
HGB BLD-MCNC: 11.6 G/DL (ref 12–16)
LYMPHOCYTES # BLD AUTO: 1.7 THOU/UL (ref 0.8–4.4)
LYMPHOCYTES NFR BLD AUTO: 25 % (ref 20–40)
MCH RBC QN AUTO: 33.1 PG (ref 27–34)
MCHC RBC AUTO-ENTMCNC: 32 G/DL (ref 32–36)
MCV RBC AUTO: 103 FL (ref 80–100)
MONOCYTES # BLD AUTO: 0.7 THOU/UL (ref 0–0.9)
MONOCYTES NFR BLD AUTO: 10 % (ref 2–10)
NEUTROPHILS # BLD AUTO: 4.3 THOU/UL (ref 2–7.7)
NEUTROPHILS NFR BLD AUTO: 62 % (ref 50–70)
PLATELET # BLD AUTO: 207 THOU/UL (ref 140–440)
PMV BLD AUTO: 10.1 FL (ref 8.5–12.5)
RBC # BLD AUTO: 3.5 MILL/UL (ref 3.8–5.4)
WBC: 6.9 THOU/UL (ref 4–11)

## 2018-10-02 ENCOUNTER — RECORDS - HEALTHEAST (OUTPATIENT)
Dept: LAB | Facility: CLINIC | Age: 83
End: 2018-10-02

## 2018-10-02 LAB
25(OH)D3 SERPL-MCNC: 28.3 NG/ML (ref 30–80)
ALBUMIN SERPL-MCNC: 3.4 G/DL (ref 3.5–5)
ALP SERPL-CCNC: 88 U/L (ref 45–120)
ALT SERPL W P-5'-P-CCNC: 12 U/L (ref 0–45)
ANION GAP SERPL CALCULATED.3IONS-SCNC: 9 MMOL/L (ref 5–18)
AST SERPL W P-5'-P-CCNC: 21 U/L (ref 0–40)
BASOPHILS # BLD AUTO: 0 THOU/UL (ref 0–0.2)
BASOPHILS NFR BLD AUTO: 0 % (ref 0–2)
BILIRUB SERPL-MCNC: 0.4 MG/DL (ref 0–1)
BUN SERPL-MCNC: 25 MG/DL (ref 8–28)
CALCIUM SERPL-MCNC: 9.6 MG/DL (ref 8.5–10.5)
CHLORIDE BLD-SCNC: 108 MMOL/L (ref 98–107)
CO2 SERPL-SCNC: 25 MMOL/L (ref 22–31)
CREAT SERPL-MCNC: 0.99 MG/DL (ref 0.6–1.1)
EOSINOPHIL # BLD AUTO: 0.1 THOU/UL (ref 0–0.4)
EOSINOPHIL NFR BLD AUTO: 1 % (ref 0–6)
ERYTHROCYTE [DISTWIDTH] IN BLOOD BY AUTOMATED COUNT: 13.8 % (ref 11–14.5)
GFR SERPL CREATININE-BSD FRML MDRD: 52 ML/MIN/1.73M2
GLUCOSE BLD-MCNC: 121 MG/DL (ref 70–125)
HCT VFR BLD AUTO: 39 % (ref 35–47)
HGB BLD-MCNC: 12.2 G/DL (ref 12–16)
LYMPHOCYTES # BLD AUTO: 1.4 THOU/UL (ref 0.8–4.4)
LYMPHOCYTES NFR BLD AUTO: 14 % (ref 20–40)
MCH RBC QN AUTO: 33.4 PG (ref 27–34)
MCHC RBC AUTO-ENTMCNC: 31.3 G/DL (ref 32–36)
MCV RBC AUTO: 107 FL (ref 80–100)
MONOCYTES # BLD AUTO: 0.8 THOU/UL (ref 0–0.9)
MONOCYTES NFR BLD AUTO: 8 % (ref 2–10)
NEUTROPHILS # BLD AUTO: 7.7 THOU/UL (ref 2–7.7)
NEUTROPHILS NFR BLD AUTO: 77 % (ref 50–70)
PLATELET # BLD AUTO: 199 THOU/UL (ref 140–440)
PMV BLD AUTO: 10.5 FL (ref 8.5–12.5)
POTASSIUM BLD-SCNC: 4.3 MMOL/L (ref 3.5–5)
PROT SERPL-MCNC: 7.2 G/DL (ref 6–8)
RBC # BLD AUTO: 3.65 MILL/UL (ref 3.8–5.4)
SODIUM SERPL-SCNC: 142 MMOL/L (ref 136–145)
WBC: 10 THOU/UL (ref 4–11)

## 2019-02-13 ENCOUNTER — RECORDS - HEALTHEAST (OUTPATIENT)
Dept: LAB | Facility: CLINIC | Age: 84
End: 2019-02-13

## 2019-02-14 LAB
ANION GAP SERPL CALCULATED.3IONS-SCNC: 9 MMOL/L (ref 5–18)
BASOPHILS # BLD AUTO: 0 THOU/UL (ref 0–0.2)
BASOPHILS NFR BLD AUTO: 0 % (ref 0–2)
BUN SERPL-MCNC: 17 MG/DL (ref 8–28)
CALCIUM SERPL-MCNC: 9.4 MG/DL (ref 8.5–10.5)
CHLORIDE BLD-SCNC: 107 MMOL/L (ref 98–107)
CO2 SERPL-SCNC: 24 MMOL/L (ref 22–31)
CREAT SERPL-MCNC: 0.89 MG/DL (ref 0.6–1.1)
EOSINOPHIL # BLD AUTO: 0.1 THOU/UL (ref 0–0.4)
EOSINOPHIL NFR BLD AUTO: 2 % (ref 0–6)
ERYTHROCYTE [DISTWIDTH] IN BLOOD BY AUTOMATED COUNT: 13.8 % (ref 11–14.5)
GFR SERPL CREATININE-BSD FRML MDRD: 59 ML/MIN/1.73M2
GLUCOSE BLD-MCNC: 123 MG/DL (ref 70–125)
HCT VFR BLD AUTO: 38.5 % (ref 35–47)
HGB BLD-MCNC: 12 G/DL (ref 12–16)
LYMPHOCYTES # BLD AUTO: 1.3 THOU/UL (ref 0.8–4.4)
LYMPHOCYTES NFR BLD AUTO: 19 % (ref 20–40)
MCH RBC QN AUTO: 33.5 PG (ref 27–34)
MCHC RBC AUTO-ENTMCNC: 31.2 G/DL (ref 32–36)
MCV RBC AUTO: 108 FL (ref 80–100)
MONOCYTES # BLD AUTO: 0.6 THOU/UL (ref 0–0.9)
MONOCYTES NFR BLD AUTO: 9 % (ref 2–10)
NEUTROPHILS # BLD AUTO: 4.8 THOU/UL (ref 2–7.7)
NEUTROPHILS NFR BLD AUTO: 70 % (ref 50–70)
PLATELET # BLD AUTO: 245 THOU/UL (ref 140–440)
PMV BLD AUTO: 10.8 FL (ref 8.5–12.5)
POTASSIUM BLD-SCNC: 4.6 MMOL/L (ref 3.5–5)
RBC # BLD AUTO: 3.58 MILL/UL (ref 3.8–5.4)
SODIUM SERPL-SCNC: 140 MMOL/L (ref 136–145)
WBC: 6.8 THOU/UL (ref 4–11)

## 2019-02-20 ENCOUNTER — RECORDS - HEALTHEAST (OUTPATIENT)
Dept: LAB | Facility: CLINIC | Age: 84
End: 2019-02-20

## 2019-02-20 LAB
ALBUMIN UR-MCNC: ABNORMAL MG/DL
APPEARANCE UR: ABNORMAL
BACTERIA #/AREA URNS HPF: ABNORMAL HPF
BILIRUB UR QL STRIP: NEGATIVE
COLOR UR AUTO: YELLOW
GLUCOSE UR STRIP-MCNC: NEGATIVE MG/DL
HGB UR QL STRIP: ABNORMAL
KETONES UR STRIP-MCNC: NEGATIVE MG/DL
LEUKOCYTE ESTERASE UR QL STRIP: ABNORMAL
MUCOUS THREADS #/AREA URNS LPF: ABNORMAL LPF
NITRATE UR QL: POSITIVE
PH UR STRIP: 6.5 [PH] (ref 4.5–8)
RBC #/AREA URNS AUTO: ABNORMAL HPF
SP GR UR STRIP: 1.02 (ref 1–1.03)
SQUAMOUS #/AREA URNS AUTO: ABNORMAL LPF
UROBILINOGEN UR STRIP-ACNC: ABNORMAL
WBC #/AREA URNS AUTO: ABNORMAL HPF
WBC CLUMPS #/AREA URNS HPF: PRESENT /[HPF]

## 2019-02-22 LAB — BACTERIA SPEC CULT: ABNORMAL

## 2019-05-22 ENCOUNTER — RECORDS - HEALTHEAST (OUTPATIENT)
Dept: LAB | Facility: CLINIC | Age: 84
End: 2019-05-22

## 2019-05-22 LAB
ALBUMIN SERPL-MCNC: 3.4 G/DL (ref 3.5–5)
ALP SERPL-CCNC: 94 U/L (ref 45–120)
ALT SERPL W P-5'-P-CCNC: <9 U/L (ref 0–45)
ANION GAP SERPL CALCULATED.3IONS-SCNC: 9 MMOL/L (ref 5–18)
AST SERPL W P-5'-P-CCNC: 17 U/L (ref 0–40)
BILIRUB SERPL-MCNC: 0.3 MG/DL (ref 0–1)
BUN SERPL-MCNC: 15 MG/DL (ref 8–28)
CALCIUM SERPL-MCNC: 9.1 MG/DL (ref 8.5–10.5)
CHLORIDE BLD-SCNC: 105 MMOL/L (ref 98–107)
CHOLEST SERPL-MCNC: 230 MG/DL
CO2 SERPL-SCNC: 26 MMOL/L (ref 22–31)
CREAT SERPL-MCNC: 0.94 MG/DL (ref 0.6–1.1)
FASTING STATUS PATIENT QL REPORTED: ABNORMAL
GFR SERPL CREATININE-BSD FRML MDRD: 55 ML/MIN/1.73M2
GLUCOSE BLD-MCNC: 111 MG/DL (ref 70–125)
HDLC SERPL-MCNC: 46 MG/DL
LDLC SERPL CALC-MCNC: 148 MG/DL
POTASSIUM BLD-SCNC: 4.5 MMOL/L (ref 3.5–5)
PROT SERPL-MCNC: 7.2 G/DL (ref 6–8)
SODIUM SERPL-SCNC: 140 MMOL/L (ref 136–145)
TRIGL SERPL-MCNC: 182 MG/DL

## 2019-07-25 ENCOUNTER — DOCUMENTATION ONLY (OUTPATIENT)
Dept: OPHTHALMOLOGY | Facility: CLINIC | Age: 84
End: 2019-07-25

## 2019-08-23 ENCOUNTER — RECORDS - HEALTHEAST (OUTPATIENT)
Dept: LAB | Facility: CLINIC | Age: 84
End: 2019-08-23

## 2019-08-23 LAB
25(OH)D3 SERPL-MCNC: 23.4 NG/ML (ref 30–80)
ANION GAP SERPL CALCULATED.3IONS-SCNC: 7 MMOL/L (ref 5–18)
BASOPHILS # BLD AUTO: 0 THOU/UL (ref 0–0.2)
BASOPHILS NFR BLD AUTO: 1 % (ref 0–2)
BUN SERPL-MCNC: 22 MG/DL (ref 8–28)
CALCIUM SERPL-MCNC: 9.2 MG/DL (ref 8.5–10.5)
CHLORIDE BLD-SCNC: 107 MMOL/L (ref 98–107)
CO2 SERPL-SCNC: 27 MMOL/L (ref 22–31)
CREAT SERPL-MCNC: 0.98 MG/DL (ref 0.6–1.1)
EOSINOPHIL # BLD AUTO: 0.1 THOU/UL (ref 0–0.4)
EOSINOPHIL NFR BLD AUTO: 1 % (ref 0–6)
ERYTHROCYTE [DISTWIDTH] IN BLOOD BY AUTOMATED COUNT: 14 % (ref 11–14.5)
GFR SERPL CREATININE-BSD FRML MDRD: 53 ML/MIN/1.73M2
GLUCOSE BLD-MCNC: 86 MG/DL (ref 70–125)
HCT VFR BLD AUTO: 36.4 % (ref 35–47)
HGB BLD-MCNC: 11.6 G/DL (ref 12–16)
LYMPHOCYTES # BLD AUTO: 1.5 THOU/UL (ref 0.8–4.4)
LYMPHOCYTES NFR BLD AUTO: 19 % (ref 20–40)
MCH RBC QN AUTO: 34.5 PG (ref 27–34)
MCHC RBC AUTO-ENTMCNC: 31.9 G/DL (ref 32–36)
MCV RBC AUTO: 108 FL (ref 80–100)
MONOCYTES # BLD AUTO: 0.9 THOU/UL (ref 0–0.9)
MONOCYTES NFR BLD AUTO: 11 % (ref 2–10)
NEUTROPHILS # BLD AUTO: 5.5 THOU/UL (ref 2–7.7)
NEUTROPHILS NFR BLD AUTO: 68 % (ref 50–70)
PLATELET # BLD AUTO: 172 THOU/UL (ref 140–440)
PMV BLD AUTO: 10.3 FL (ref 8.5–12.5)
POTASSIUM BLD-SCNC: 4.6 MMOL/L (ref 3.5–5)
RBC # BLD AUTO: 3.36 MILL/UL (ref 3.8–5.4)
SODIUM SERPL-SCNC: 141 MMOL/L (ref 136–145)
WBC: 8 THOU/UL (ref 4–11)

## 2019-11-12 ENCOUNTER — RECORDS - HEALTHEAST (OUTPATIENT)
Dept: LAB | Facility: CLINIC | Age: 84
End: 2019-11-12

## 2019-11-12 LAB
ALBUMIN UR-MCNC: ABNORMAL MG/DL
APPEARANCE UR: CLEAR
BACTERIA #/AREA URNS HPF: ABNORMAL HPF
BILIRUB UR QL STRIP: NEGATIVE
COLOR UR AUTO: YELLOW
GLUCOSE UR STRIP-MCNC: NEGATIVE MG/DL
HGB UR QL STRIP: NEGATIVE
KETONES UR STRIP-MCNC: NEGATIVE MG/DL
LEUKOCYTE ESTERASE UR QL STRIP: ABNORMAL
MUCOUS THREADS #/AREA URNS LPF: ABNORMAL LPF
NITRATE UR QL: NEGATIVE
PH UR STRIP: 6.5 [PH] (ref 4.5–8)
RBC #/AREA URNS AUTO: ABNORMAL HPF
SP GR UR STRIP: 1.01 (ref 1–1.03)
SQUAMOUS #/AREA URNS AUTO: ABNORMAL LPF
UROBILINOGEN UR STRIP-ACNC: ABNORMAL
WBC #/AREA URNS AUTO: ABNORMAL HPF

## 2019-11-15 LAB
BACTERIA SPEC CULT: ABNORMAL
BACTERIA SPEC CULT: ABNORMAL

## 2019-12-03 ENCOUNTER — HOSPITAL ENCOUNTER (INPATIENT)
Facility: CLINIC | Age: 84
LOS: 3 days | Discharge: HOME HEALTH | DRG: 065 | End: 2019-12-06
Attending: EMERGENCY MEDICINE | Admitting: INTERNAL MEDICINE
Payer: COMMERCIAL

## 2019-12-03 ENCOUNTER — APPOINTMENT (OUTPATIENT)
Dept: MRI IMAGING | Facility: CLINIC | Age: 84
DRG: 065 | End: 2019-12-03
Attending: EMERGENCY MEDICINE
Payer: COMMERCIAL

## 2019-12-03 DIAGNOSIS — I10 HYPERTENSION GOAL BP (BLOOD PRESSURE) < 130/80: ICD-10-CM

## 2019-12-03 DIAGNOSIS — I63.81 CEREBROVASCULAR ACCIDENT (CVA) DUE TO OCCLUSION OF SMALL ARTERY (H): Primary | ICD-10-CM

## 2019-12-03 DIAGNOSIS — I63.9 ACUTE CVA (CEREBROVASCULAR ACCIDENT) (H): ICD-10-CM

## 2019-12-03 LAB
ALBUMIN UR-MCNC: 10 MG/DL
ANION GAP SERPL CALCULATED.3IONS-SCNC: 7 MMOL/L (ref 3–14)
APPEARANCE UR: CLEAR
BASOPHILS # BLD AUTO: 0 10E9/L (ref 0–0.2)
BASOPHILS NFR BLD AUTO: 0.4 %
BILIRUB UR QL STRIP: NEGATIVE
BUN SERPL-MCNC: 19 MG/DL (ref 7–30)
CALCIUM SERPL-MCNC: 9.1 MG/DL (ref 8.5–10.1)
CHLORIDE SERPL-SCNC: 107 MMOL/L (ref 94–109)
CO2 SERPL-SCNC: 26 MMOL/L (ref 20–32)
COLOR UR AUTO: ABNORMAL
CREAT SERPL-MCNC: 0.9 MG/DL (ref 0.52–1.04)
DIFFERENTIAL METHOD BLD: ABNORMAL
EOSINOPHIL # BLD AUTO: 0.1 10E9/L (ref 0–0.7)
EOSINOPHIL NFR BLD AUTO: 1.1 %
ERYTHROCYTE [DISTWIDTH] IN BLOOD BY AUTOMATED COUNT: 13.7 % (ref 10–15)
GFR SERPL CREATININE-BSD FRML MDRD: 54 ML/MIN/{1.73_M2}
GLUCOSE SERPL-MCNC: 98 MG/DL (ref 70–99)
GLUCOSE UR STRIP-MCNC: NEGATIVE MG/DL
HCT VFR BLD AUTO: 41.7 % (ref 35–47)
HGB BLD-MCNC: 13.3 G/DL (ref 11.7–15.7)
HGB UR QL STRIP: NEGATIVE
IMM GRANULOCYTES # BLD: 0 10E9/L (ref 0–0.4)
IMM GRANULOCYTES NFR BLD: 0.4 %
KETONES UR STRIP-MCNC: NEGATIVE MG/DL
LEUKOCYTE ESTERASE UR QL STRIP: NEGATIVE
LYMPHOCYTES # BLD AUTO: 1.8 10E9/L (ref 0.8–5.3)
LYMPHOCYTES NFR BLD AUTO: 22 %
MCH RBC QN AUTO: 34.2 PG (ref 26.5–33)
MCHC RBC AUTO-ENTMCNC: 31.9 G/DL (ref 31.5–36.5)
MCV RBC AUTO: 107 FL (ref 78–100)
MONOCYTES # BLD AUTO: 0.8 10E9/L (ref 0–1.3)
MONOCYTES NFR BLD AUTO: 9.7 %
MUCOUS THREADS #/AREA URNS LPF: PRESENT /LPF
NEUTROPHILS # BLD AUTO: 5.4 10E9/L (ref 1.6–8.3)
NEUTROPHILS NFR BLD AUTO: 66.4 %
NITRATE UR QL: NEGATIVE
NRBC # BLD AUTO: 0 10*3/UL
NRBC BLD AUTO-RTO: 0 /100
PH UR STRIP: 6.5 PH (ref 5–7)
PLATELET # BLD AUTO: 200 10E9/L (ref 150–450)
POTASSIUM SERPL-SCNC: 4 MMOL/L (ref 3.4–5.3)
RBC # BLD AUTO: 3.89 10E12/L (ref 3.8–5.2)
RBC #/AREA URNS AUTO: 1 /HPF (ref 0–2)
SODIUM SERPL-SCNC: 140 MMOL/L (ref 133–144)
SOURCE: ABNORMAL
SP GR UR STRIP: 1.02 (ref 1–1.03)
SQUAMOUS #/AREA URNS AUTO: <1 /HPF (ref 0–1)
TROPONIN I SERPL-MCNC: <0.015 UG/L (ref 0–0.04)
UROBILINOGEN UR STRIP-MCNC: NORMAL MG/DL (ref 0–2)
WBC # BLD AUTO: 8.1 10E9/L (ref 4–11)
WBC #/AREA URNS AUTO: 1 /HPF (ref 0–5)

## 2019-12-03 PROCEDURE — 25000132 ZZH RX MED GY IP 250 OP 250 PS 637: Performed by: EMERGENCY MEDICINE

## 2019-12-03 PROCEDURE — 81001 URINALYSIS AUTO W/SCOPE: CPT | Performed by: EMERGENCY MEDICINE

## 2019-12-03 PROCEDURE — 99223 1ST HOSP IP/OBS HIGH 75: CPT | Mod: AI | Performed by: INTERNAL MEDICINE

## 2019-12-03 PROCEDURE — 93005 ELECTROCARDIOGRAM TRACING: CPT

## 2019-12-03 PROCEDURE — 84484 ASSAY OF TROPONIN QUANT: CPT | Performed by: EMERGENCY MEDICINE

## 2019-12-03 PROCEDURE — 80048 BASIC METABOLIC PNL TOTAL CA: CPT | Performed by: EMERGENCY MEDICINE

## 2019-12-03 PROCEDURE — 25000132 ZZH RX MED GY IP 250 OP 250 PS 637: Performed by: INTERNAL MEDICINE

## 2019-12-03 PROCEDURE — 70553 MRI BRAIN STEM W/O & W/DYE: CPT

## 2019-12-03 PROCEDURE — 25500064 ZZH RX 255 OP 636: Performed by: EMERGENCY MEDICINE

## 2019-12-03 PROCEDURE — 99285 EMERGENCY DEPT VISIT HI MDM: CPT | Mod: 25

## 2019-12-03 PROCEDURE — 25800030 ZZH RX IP 258 OP 636: Performed by: INTERNAL MEDICINE

## 2019-12-03 PROCEDURE — 25000128 H RX IP 250 OP 636: Performed by: INTERNAL MEDICINE

## 2019-12-03 PROCEDURE — 96374 THER/PROPH/DIAG INJ IV PUSH: CPT | Mod: 59

## 2019-12-03 PROCEDURE — 25000128 H RX IP 250 OP 636: Performed by: EMERGENCY MEDICINE

## 2019-12-03 PROCEDURE — A9585 GADOBUTROL INJECTION: HCPCS | Performed by: EMERGENCY MEDICINE

## 2019-12-03 PROCEDURE — 85025 COMPLETE CBC W/AUTO DIFF WBC: CPT | Performed by: EMERGENCY MEDICINE

## 2019-12-03 PROCEDURE — 12000000 ZZH R&B MED SURG/OB

## 2019-12-03 RX ORDER — CLOPIDOGREL BISULFATE 75 MG/1
75 TABLET ORAL DAILY
Status: DISCONTINUED | OUTPATIENT
Start: 2019-12-04 | End: 2019-12-06 | Stop reason: HOSPADM

## 2019-12-03 RX ORDER — ACETAMINOPHEN 325 MG/1
650 TABLET ORAL EVERY 4 HOURS PRN
Status: DISCONTINUED | OUTPATIENT
Start: 2019-12-03 | End: 2019-12-06 | Stop reason: HOSPADM

## 2019-12-03 RX ORDER — LATANOPROST 50 UG/ML
1 SOLUTION/ DROPS OPHTHALMIC AT BEDTIME
Status: DISCONTINUED | OUTPATIENT
Start: 2019-12-03 | End: 2019-12-06 | Stop reason: HOSPADM

## 2019-12-03 RX ORDER — CITALOPRAM HYDROBROMIDE 20 MG/1
20 TABLET ORAL EVERY EVENING
COMMUNITY
End: 2020-05-22

## 2019-12-03 RX ORDER — HYDRALAZINE HYDROCHLORIDE 20 MG/ML
5 INJECTION INTRAMUSCULAR; INTRAVENOUS EVERY 4 HOURS PRN
Status: DISCONTINUED | OUTPATIENT
Start: 2019-12-03 | End: 2019-12-06

## 2019-12-03 RX ORDER — ONDANSETRON 2 MG/ML
4 INJECTION INTRAMUSCULAR; INTRAVENOUS EVERY 6 HOURS PRN
Status: DISCONTINUED | OUTPATIENT
Start: 2019-12-03 | End: 2019-12-06 | Stop reason: HOSPADM

## 2019-12-03 RX ORDER — POLYETHYLENE GLYCOL 3350 17 G/17G
17 POWDER, FOR SOLUTION ORAL DAILY PRN
Status: DISCONTINUED | OUTPATIENT
Start: 2019-12-03 | End: 2019-12-06

## 2019-12-03 RX ORDER — SODIUM CHLORIDE 9 MG/ML
INJECTION, SOLUTION INTRAVENOUS CONTINUOUS
Status: DISCONTINUED | OUTPATIENT
Start: 2019-12-03 | End: 2019-12-04

## 2019-12-03 RX ORDER — ASPIRIN 325 MG
325 TABLET ORAL ONCE
Status: COMPLETED | OUTPATIENT
Start: 2019-12-03 | End: 2019-12-03

## 2019-12-03 RX ORDER — NALOXONE HYDROCHLORIDE 0.4 MG/ML
.1-.4 INJECTION, SOLUTION INTRAMUSCULAR; INTRAVENOUS; SUBCUTANEOUS
Status: DISCONTINUED | OUTPATIENT
Start: 2019-12-03 | End: 2019-12-06 | Stop reason: HOSPADM

## 2019-12-03 RX ORDER — GADOBUTROL 604.72 MG/ML
7.5 INJECTION INTRAVENOUS ONCE
Status: COMPLETED | OUTPATIENT
Start: 2019-12-03 | End: 2019-12-03

## 2019-12-03 RX ORDER — PROCHLORPERAZINE MALEATE 5 MG
5 TABLET ORAL EVERY 6 HOURS PRN
Status: DISCONTINUED | OUTPATIENT
Start: 2019-12-03 | End: 2019-12-06

## 2019-12-03 RX ORDER — ONDANSETRON 4 MG/1
4 TABLET, ORALLY DISINTEGRATING ORAL EVERY 6 HOURS PRN
Status: DISCONTINUED | OUTPATIENT
Start: 2019-12-03 | End: 2019-12-06 | Stop reason: HOSPADM

## 2019-12-03 RX ORDER — SIMVASTATIN 20 MG
20 TABLET ORAL AT BEDTIME
Status: DISCONTINUED | OUTPATIENT
Start: 2019-12-03 | End: 2019-12-04

## 2019-12-03 RX ORDER — LABETALOL 20 MG/4 ML (5 MG/ML) INTRAVENOUS SYRINGE
20 ONCE
Status: COMPLETED | OUTPATIENT
Start: 2019-12-03 | End: 2019-12-03

## 2019-12-03 RX ORDER — DORZOLAMIDE HYDROCHLORIDE AND TIMOLOL MALEATE 20; 5 MG/ML; MG/ML
1 SOLUTION/ DROPS OPHTHALMIC 2 TIMES DAILY
Status: DISCONTINUED | OUTPATIENT
Start: 2019-12-03 | End: 2019-12-06 | Stop reason: HOSPADM

## 2019-12-03 RX ORDER — CLOPIDOGREL BISULFATE 75 MG/1
300 TABLET ORAL ONCE
Status: COMPLETED | OUTPATIENT
Start: 2019-12-03 | End: 2019-12-03

## 2019-12-03 RX ORDER — ACETAMINOPHEN 500 MG
1000 TABLET ORAL 3 TIMES DAILY
Status: ON HOLD | COMMUNITY
End: 2022-02-19

## 2019-12-03 RX ORDER — AMOXICILLIN 250 MG
2 CAPSULE ORAL 2 TIMES DAILY
Status: DISCONTINUED | OUTPATIENT
Start: 2019-12-03 | End: 2019-12-06

## 2019-12-03 RX ORDER — LIDOCAINE 40 MG/G
CREAM TOPICAL
Status: DISCONTINUED | OUTPATIENT
Start: 2019-12-03 | End: 2019-12-06 | Stop reason: HOSPADM

## 2019-12-03 RX ORDER — METOPROLOL TARTRATE 25 MG/1
25 TABLET, FILM COATED ORAL 2 TIMES DAILY
Status: DISCONTINUED | OUTPATIENT
Start: 2019-12-03 | End: 2019-12-04

## 2019-12-03 RX ORDER — AMOXICILLIN 250 MG
1 CAPSULE ORAL 2 TIMES DAILY
Status: DISCONTINUED | OUTPATIENT
Start: 2019-12-03 | End: 2019-12-06

## 2019-12-03 RX ORDER — CHOLECALCIFEROL (VITAMIN D3) 50 MCG
2000 TABLET ORAL DAILY
COMMUNITY
End: 2023-01-01

## 2019-12-03 RX ORDER — ASPIRIN 81 MG/1
81 TABLET ORAL DAILY
Status: DISCONTINUED | OUTPATIENT
Start: 2019-12-04 | End: 2019-12-06 | Stop reason: HOSPADM

## 2019-12-03 RX ORDER — POLYETHYLENE GLYCOL 3350 17 G/17G
8.5 POWDER, FOR SOLUTION ORAL DAILY
COMMUNITY
End: 2021-09-19

## 2019-12-03 RX ORDER — PROCHLORPERAZINE 25 MG
12.5 SUPPOSITORY, RECTAL RECTAL EVERY 12 HOURS PRN
Status: DISCONTINUED | OUTPATIENT
Start: 2019-12-03 | End: 2019-12-06

## 2019-12-03 RX ADMIN — ENOXAPARIN SODIUM 40 MG: 40 INJECTION SUBCUTANEOUS at 20:25

## 2019-12-03 RX ADMIN — LATANOPROST 1 DROP: 50 SOLUTION OPHTHALMIC at 20:36

## 2019-12-03 RX ADMIN — DORZOLAMIDE HYDROCHLORIDE AND TIMOLOL MALEATE 1 DROP: 20; 5 SOLUTION OPHTHALMIC at 20:35

## 2019-12-03 RX ADMIN — METOPROLOL TARTRATE 25 MG: 25 TABLET ORAL at 20:25

## 2019-12-03 RX ADMIN — SENNOSIDES AND DOCUSATE SODIUM 2 TABLET: 8.6; 5 TABLET ORAL at 20:25

## 2019-12-03 RX ADMIN — GADOBUTROL 6 ML: 604.72 INJECTION INTRAVENOUS at 15:53

## 2019-12-03 RX ADMIN — CLOPIDOGREL BISULFATE 300 MG: 75 TABLET ORAL at 17:38

## 2019-12-03 RX ADMIN — LABETALOL 20 MG/4 ML (5 MG/ML) INTRAVENOUS SYRINGE 20 MG: at 15:07

## 2019-12-03 RX ADMIN — SIMVASTATIN 20 MG: 20 TABLET, FILM COATED ORAL at 20:25

## 2019-12-03 RX ADMIN — ASPIRIN 325 MG ORAL TABLET 325 MG: 325 PILL ORAL at 17:38

## 2019-12-03 RX ADMIN — SODIUM CHLORIDE, PRESERVATIVE FREE: 5 INJECTION INTRAVENOUS at 20:33

## 2019-12-03 ASSESSMENT — ENCOUNTER SYMPTOMS
SHORTNESS OF BREATH: 1
VOMITING: 0
DYSURIA: 0
FREQUENCY: 0
CHILLS: 0
NAUSEA: 1
ABDOMINAL PAIN: 0
DIFFICULTY URINATING: 0
HEADACHES: 0
HEMATURIA: 0
FEVER: 0

## 2019-12-03 ASSESSMENT — MIFFLIN-ST. JEOR: SCORE: 896.19

## 2019-12-03 ASSESSMENT — ACTIVITIES OF DAILY LIVING (ADL): ADLS_ACUITY_SCORE: 13

## 2019-12-03 NOTE — ED TRIAGE NOTES
Pt comes in from assisted living. Pt's blood pressure was high this morning, 218/118. Pt denies headache, blurry vision, chest pain, or shortness of breath. Pt does feel like her head is heavy. Last week on Tuesday and Wednesday pt saw people who were not there in her apartment, pt was not checked out at that time.

## 2019-12-03 NOTE — ED PROVIDER NOTES
"    History     Chief Complaint:  Hypertension    HPI   Alejandrina Whatley is a 89 year old female who presents with hypertension. The patient's daughter reports that her assisted living facility reported a blood pressure of 218/110 today.  She states the patient's assisted living facility told her not to come in, but she called her sister who is an RN and advised her to come in to the ED. Here in the ED, her blood pressure is 189/92. The patient reports that she saw hallucinations on 11/26 and 11/27 while in her room of people who were not their. She reports that while in the lobby of the ED, she had head \"heaviness,\" but denies headache, vision changes, chest pain, vomiting, fevers, chills, abdominal pain, diarrhea, urinary symptoms, and missing medication doses. She notes baseline shortness of breath with exertion that has not changed and occasional nausea. Her daughter reports she seems to be at her baseline.     Allergies:  Actonel  Conjugated Estrogens  Macrobid  Nitrofurantoin  Premarin  Sulfa Drugs  Hydrocodone  Oxycodone  Risedronate    Medications:    Tenormin  Prolia  Zocor    Past Medical History:    Abdominal aortic aneurysm  Actinic keratosis  Cataract  Compression fractures  Degenerative joint disease  Glaucoma  Hypertension  Hypercholesteremia  Hyperlipidemia  Lumbar spinal stenosis  Osteoporosis  Urinary incontinence    Past Surgical History:    AAA repair, stent placement  Vesicourethropexy  Appendectomy  Cataract  Extracapsular cataract extraction with intraocular lens implant  Hysterectomy  Laser selective trabeculoplasty (L) (X2)    Family History:    Heart Disease (Father)  Myocardial Infarction (Father)  Hypertension (Mother)    Social History:  Smoking status: Never  Alcohol use: No  Drug use: No  PCP: Reggie HUMPHRIES Phylicia  Presents to the ED with daughter  Marital Status:   [5]    Review of Systems   Constitutional: Negative for chills and fever.   Eyes: Negative for visual disturbance. "   Respiratory: Positive for shortness of breath.    Cardiovascular: Negative for chest pain.   Gastrointestinal: Positive for nausea. Negative for abdominal pain and vomiting.   Genitourinary: Negative for decreased urine volume, difficulty urinating, dysuria, frequency, hematuria and urgency.   Neurological: Negative for headaches.       Physical Exam     Patient Vitals for the past 24 hrs:   BP Temp Temp src Pulse Heart Rate Resp SpO2   12/03/19 1520 (!) 167/87 -- -- -- 64 -- --   12/03/19 1515 (!) 167/87 -- -- 65 66 -- --   12/03/19 1510 (!) 155/94 -- -- 67 68 -- --   12/03/19 1500 (!) 193/105 -- -- 65 66 -- --   12/03/19 1450 -- -- -- -- 65 -- --   12/03/19 1445 (!) 189/101 -- -- 69 69 -- --   12/03/19 1430 (!) 193/96 -- -- -- -- -- --   12/03/19 1402 (!) 189/92 97.6  F (36.4  C) Temporal 67 -- 18 98 %     Physical Exam  General: Patient is awake, alert and interactive when I enter the room  Head: The scalp, face, and head appear normal  Eyes: The pupils are equal, round, and reactive to light. Conjunctivae and sclerae are normal. No nystagmus. Full ROM of both eyes and appears symmetric without obvious palsy.  Neck: Normal range of motion. No anterior cervical lymphadenopathy noted  CV: Regular rate. S1/S2. No murmurs.   Resp: Lungs are clear without wheezes or rales. No respiratory distress.   GI: Abdomen is soft, no rigidity, guarding, or rebound. No distension. No tenderness to palpation in any quadrant.     MS: Normal tone. Joints grossly normal without effusions. No asymmetric leg swelling, calf or thigh tenderness.    Skin: No rash or lesions noted. Normal capillary refill noted  Neuro:   Speech is normal and fluent.   Face is symmetric without droop. CN's II-XII intact. Negative pronator drift.   Right Arm: Good  strength. 5/5 elbow flexion. 5/5 elbow extension. Sensation intact to light touch.   Left Arm: Good  strength. 5/5 elbow flexion. 5/5 elbow extension. Sensation intact to light touch.    Right Le/5 straight leg raise, 5/5 knee flexion, 5/5 knee extension, 5/5 dorsiflexion, 5/5 plantar flexion. Sensation intact to light touch.   Left Le/5 straight leg raise, 5/5 knee flexion, 5/5 knee extension, 5/5 dorsiflexion, 5/5 plantar flexion. Sensation intact to light touch.    Psych:  Normal affect.  Appropriate interactions.    Emergency Department Course   ECG (14:38:45):  Rate 66 bpm. MI interval 198. QRS duration 68. QT/QTc 396/415. P-R-T axes -8 2 24. Normal sinus rhythm. Normal ECG. No significant change compared to EKG dated 18. Interpreted at 1508 by Xavi Stringer MD.     Imaging:  Radiographic findings were communicated with the patient and family who voiced understanding of the findings.    MR Brain w/o & w Contrast  1. Acute ischemic infarct in the right posterior lateral putamen.  2. Moderate cerebral atrophy.  3. Moderately extensive white matter changes which are most likely due  to chronic small vessel ischemic disease given the patient's age.  As read by Radiology.     MR Brain ([place] [facility] [place]) w/o Contrast Angiogram  Pending    MR Neck (Carotids) w/o & w Contrast Angiogram  Pending    Laboratory:  CBC: WNL (WBC 8.1, HGB 13.3, )  BMP: GFR 54 (L) o/w WNL (Creatinine 0.90)  Troponin I (Collected 1435): <0.015  UA: Protein albumin 10, Mucous present o/w WNL    Procedures:  None    Interventions:  1507: Labetalol 20 mg IV    Emergency Department Course:  Past medical records, nursing notes, and vitals reviewed.  1440: I performed an exam of the patient and obtained history, as documented above.  The patient was sent for a brain MRI while in the emergency department, findings above.  EKG performed, results above.  IV inserted and blood drawn.  The patient provided a urine sample here in the emergency department. This was sent for laboratory testing, findings above.    1631: Findings and plan explained to the Patient and daughter who consents to admission.     1657:  Discussed the patient with Dr. Magdaleno, who will admit the patient to an adult med/surg bed for further monitoring, evaluation, and treatment.      1700: I spoke to Dr. Gongora of stroke neurology who recommended obtaining MRA images     1713: I spoke with MRI about the results.     [id number]: I spoke with Dr. Magdaleno about what MRI recommended.     Impression & Plan    Medical Decision Making:  Patient is a 89-year-old female who resides in a nursing home who presents to the emergency department today with hypertension, head heaviness and new hallucinations over the past week.  Upon initial evaluation the patient is hypertensive but otherwise hemodynamically stable with normal vital signs.  She is afebrile.  Physical exam is detailed above followed by an overall normal neurologic evaluation.  I was concerned about the patient's ongoing hallucinations therefore an MRI was obtained.  This revealed an acute infarct of the posterior right putamen.  Although I do not believe that this adequately explains the patient's hallucinations it does reveal that the patient is having an acute stroke.  There is no clear time of onset and the patient has minimal symptoms therefore is not a significant candidate for TPA at this time.  I spoke with Dr. Gongora of stroke neurology who recommended obtaining MRI imaging.  Unfortunately we will have to wait 8 hours as she has already received a contrast load.  I do not believe that there is a significant emergent indication at this point that we would need to bolus her again so we will wait 8 hours.  I discussed this plan with hospitalist who is amenable.  The remainder of the patient's work-up is overall unremarkable.  EKG did not show any significant signs of ischemia.  Troponin is negative.  Her urine was reassuring.  I have very low suspicion for meningitis as the cause of her presentation here today.  In any case due to the patient's acute infarct she will need to be admitted for further rule out  and re-stratification.  I discussed my plan with patient and her daughter who are amenable at this time.  All questions were answered and patient be admitted in stable condition.    Diagnosis:    ICD-10-CM    1. Acute CVA (cerebrovascular accident) (H) I63.9        Disposition:  Admitted to adult med/surg    Agustin Cole  12/3/2019   [facility] EMERGENCY DEPARTMENT  I, Agustin Cole, am serving as a scribe at 2:40 PM on 12/3/2019 to document services personally performed by Xavi Stringer MD based on my observations and the provider's statements to me.      Xavi Stringer MD  12/03/19 [id number]

## 2019-12-03 NOTE — ED NOTES
RECEIVING UNIT ED HANDOFF REVIEW    Above ED Nurse Handoff Report was reviewed: Yes  Reviewed by: Jackie Walters RN on December 3, 2019 at 6:00 PM   [facility]  ED Nurse Handoff Report    Alejandrina Whatley is a 89 year old female   ED Chief complaint: Hypertension  . ED Diagnosis:   Final diagnoses:   Acute CVA (cerebrovascular accident) (H)     Allergies:   Allergies   Allergen Reactions     Actonel [Bisphosphonates] Rash     Conjugated Estrogens Rash     Macrobid [Nitrofuran Derivatives] Rash     Nitrofurantoin Rash     Premarin Rash     Sulfa Drugs Rash     Hydrocodone Nausea and Vomiting     Oxycodone Nausea and Vomiting     Risedronate      leg pain       Code Status: Full Code  Activity level - Baseline/Home:  Independent. Activity Level - Current:   Assist X 1. Lift room needed: No. Bariatric: No   Needed: No   Isolation: No. Infection: Not Applicable.     Vital Signs:   Vitals:    12/03/19 1630 12/03/19 1645 12/03/19 1700 12/03/19 1715   BP: (!) 174/87 (!) 172/93 (!) 173/90 (!) 192/100   Pulse: 64 63 62 64   Resp:       Temp:       TempSrc:       SpO2: 96%          Cardiac Rhythm:  ,      Pain level: 0-10 Pain Scale: 0  Patient confused: Yes. Patient Falls Risk: Yes.   Elimination Status: Has voided   Patient Report - Initial Complaint: Hyptension. Focused Assessment: Pt complains of 'head heaviness'. Episode of hallucinations earlier this week.  Tests Performed:   Labs Ordered and Resulted from Time of ED Arrival Up to the Time of Departure from the ED   CBC WITH PLATELETS DIFFERENTIAL - Abnormal; Notable for the following components:       Result Value     (*)     MCH 34.2 (*)     All other components within normal limits   BASIC METABOLIC PANEL - Abnormal; Notable for the following components:    GFR Estimate 54 (*)     All other components within normal limits   ROUTINE UA WITH MICROSCOPIC - Abnormal; Notable for the following components:    Protein Albumin Urine 10 (*)      Mucous Urine Present (*)     All other components within normal limits   TROPONIN I     MR Brain w/o & w Contrast   Final Result   IMPRESSION:   1. Acute ischemic infarct in the right posterior lateral putamen.   2. Moderate cerebral atrophy.   3. Moderately extensive white matter changes which are most likely due   to chronic small vessel ischemic disease given the patient's age.      ROXANN WILLIAMSON MD      MRA Brain ([place] [facility] [place]) wo Contrast    (Results Pending)   MRA Neck (Carotids) wo & w Contrast    (Results Pending)     Treatments provided: See MAR  Family Comments: Daughter at bedside  OBS brochure/video discussed/provided to patient:  N/A  ED Medications:   Medications   aspirin (ASA) tablet 325 mg (has no administration in time range)   clopidogrel (PLAVIX) tablet 300 mg (has no administration in time range)   labetalol (NORMODYNE/TRANDATE) syringe 20 mg (20 mg Intravenous Given 12/3/19 1507)   gadobutrol (GADAVIST) injection 7.5 mL (6 mLs Intravenous Given 12/3/19 1553)     Drips infusing:  No  For the majority of the shift, the patient's behavior Green. Interventions performed were monitor.     Severe Sepsis OR Septic Shock Diagnosis Present: No      ED Nurse Name/Phone Number: Soto Mayfield RN,   5:26 PM

## 2019-12-04 ENCOUNTER — APPOINTMENT (OUTPATIENT)
Dept: PHYSICAL THERAPY | Facility: CLINIC | Age: 84
DRG: 065 | End: 2019-12-04
Attending: INTERNAL MEDICINE
Payer: COMMERCIAL

## 2019-12-04 ENCOUNTER — APPOINTMENT (OUTPATIENT)
Dept: CARDIOLOGY | Facility: CLINIC | Age: 84
DRG: 065 | End: 2019-12-04
Attending: INTERNAL MEDICINE
Payer: COMMERCIAL

## 2019-12-04 ENCOUNTER — APPOINTMENT (OUTPATIENT)
Dept: MRI IMAGING | Facility: CLINIC | Age: 84
DRG: 065 | End: 2019-12-04
Attending: INTERNAL MEDICINE
Payer: COMMERCIAL

## 2019-12-04 LAB
ANION GAP SERPL CALCULATED.3IONS-SCNC: 8 MMOL/L (ref 3–14)
BUN SERPL-MCNC: 15 MG/DL (ref 7–30)
CALCIUM SERPL-MCNC: 8.6 MG/DL (ref 8.5–10.1)
CHLORIDE SERPL-SCNC: 109 MMOL/L (ref 94–109)
CHOLEST SERPL-MCNC: 208 MG/DL
CO2 SERPL-SCNC: 23 MMOL/L (ref 20–32)
CREAT SERPL-MCNC: 0.84 MG/DL (ref 0.52–1.04)
GFR SERPL CREATININE-BSD FRML MDRD: 59 ML/MIN/{1.73_M2}
GLUCOSE SERPL-MCNC: 156 MG/DL (ref 70–99)
HBA1C MFR BLD: 6 % (ref 0–5.6)
HDLC SERPL-MCNC: 43 MG/DL
INTERPRETATION ECG - MUSE: NORMAL
LDLC SERPL CALC-MCNC: 137 MG/DL
NONHDLC SERPL-MCNC: 165 MG/DL
POTASSIUM SERPL-SCNC: 3.9 MMOL/L (ref 3.4–5.3)
SODIUM SERPL-SCNC: 140 MMOL/L (ref 133–144)
TRIGL SERPL-MCNC: 139 MG/DL

## 2019-12-04 PROCEDURE — 99233 SBSQ HOSP IP/OBS HIGH 50: CPT | Performed by: INTERNAL MEDICINE

## 2019-12-04 PROCEDURE — 25000132 ZZH RX MED GY IP 250 OP 250 PS 637: Performed by: INTERNAL MEDICINE

## 2019-12-04 PROCEDURE — 97161 PT EVAL LOW COMPLEX 20 MIN: CPT | Mod: GP

## 2019-12-04 PROCEDURE — 93306 TTE W/DOPPLER COMPLETE: CPT | Mod: 26 | Performed by: INTERNAL MEDICINE

## 2019-12-04 PROCEDURE — 25500064 ZZH RX 255 OP 636: Performed by: INTERNAL MEDICINE

## 2019-12-04 PROCEDURE — 25000132 ZZH RX MED GY IP 250 OP 250 PS 637: Performed by: PHYSICIAN ASSISTANT

## 2019-12-04 PROCEDURE — 83036 HEMOGLOBIN GLYCOSYLATED A1C: CPT | Performed by: PHYSICIAN ASSISTANT

## 2019-12-04 PROCEDURE — A9585 GADOBUTROL INJECTION: HCPCS | Performed by: INTERNAL MEDICINE

## 2019-12-04 PROCEDURE — 40000264 ECHOCARDIOGRAM COMPLETE

## 2019-12-04 PROCEDURE — 70544 MR ANGIOGRAPHY HEAD W/O DYE: CPT

## 2019-12-04 PROCEDURE — 25800025 ZZH RX 258: Performed by: INTERNAL MEDICINE

## 2019-12-04 PROCEDURE — G0426 INPT/ED TELECONSULT50: HCPCS | Mod: G0 | Performed by: PHYSICIAN ASSISTANT

## 2019-12-04 PROCEDURE — 80061 LIPID PANEL: CPT | Performed by: INTERNAL MEDICINE

## 2019-12-04 PROCEDURE — 25000128 H RX IP 250 OP 636: Performed by: INTERNAL MEDICINE

## 2019-12-04 PROCEDURE — 36415 COLL VENOUS BLD VENIPUNCTURE: CPT | Performed by: INTERNAL MEDICINE

## 2019-12-04 PROCEDURE — 80048 BASIC METABOLIC PNL TOTAL CA: CPT | Performed by: INTERNAL MEDICINE

## 2019-12-04 PROCEDURE — 70549 MR ANGIOGRAPH NECK W/O&W/DYE: CPT

## 2019-12-04 PROCEDURE — 12000000 ZZH R&B MED SURG/OB

## 2019-12-04 RX ORDER — ACYCLOVIR 200 MG/1
30 CAPSULE ORAL ONCE
Status: COMPLETED | OUTPATIENT
Start: 2019-12-04 | End: 2019-12-04

## 2019-12-04 RX ORDER — GADOBUTROL 604.72 MG/ML
10 INJECTION INTRAVENOUS ONCE
Status: COMPLETED | OUTPATIENT
Start: 2019-12-04 | End: 2019-12-04

## 2019-12-04 RX ORDER — ATENOLOL 25 MG/1
25 TABLET ORAL DAILY
Status: DISCONTINUED | OUTPATIENT
Start: 2019-12-05 | End: 2019-12-05

## 2019-12-04 RX ORDER — CITALOPRAM HYDROBROMIDE 20 MG/1
20 TABLET ORAL DAILY
Status: DISCONTINUED | OUTPATIENT
Start: 2019-12-04 | End: 2019-12-06 | Stop reason: HOSPADM

## 2019-12-04 RX ORDER — LABETALOL 20 MG/4 ML (5 MG/ML) INTRAVENOUS SYRINGE
10
Status: DISCONTINUED | OUTPATIENT
Start: 2019-12-04 | End: 2019-12-05

## 2019-12-04 RX ORDER — ATORVASTATIN CALCIUM 40 MG/1
40 TABLET, FILM COATED ORAL EVERY EVENING
Status: DISCONTINUED | OUTPATIENT
Start: 2019-12-04 | End: 2019-12-06 | Stop reason: HOSPADM

## 2019-12-04 RX ADMIN — ATORVASTATIN CALCIUM 40 MG: 40 TABLET, FILM COATED ORAL at 20:22

## 2019-12-04 RX ADMIN — SODIUM CHLORIDE 30 ML: 9 INJECTION, SOLUTION INTRAMUSCULAR; INTRAVENOUS; SUBCUTANEOUS at 09:45

## 2019-12-04 RX ADMIN — HUMAN ALBUMIN MICROSPHERES AND PERFLUTREN 3 ML: 10; .22 INJECTION, SOLUTION INTRAVENOUS at 09:45

## 2019-12-04 RX ADMIN — CITALOPRAM HYDROBROMIDE 20 MG: 20 TABLET ORAL at 20:22

## 2019-12-04 RX ADMIN — ENOXAPARIN SODIUM 40 MG: 40 INJECTION SUBCUTANEOUS at 20:22

## 2019-12-04 RX ADMIN — CLOPIDOGREL BISULFATE 75 MG: 75 TABLET ORAL at 10:06

## 2019-12-04 RX ADMIN — SENNOSIDES AND DOCUSATE SODIUM 2 TABLET: 8.6; 5 TABLET ORAL at 20:22

## 2019-12-04 RX ADMIN — LATANOPROST 1 DROP: 50 SOLUTION OPHTHALMIC at 20:23

## 2019-12-04 RX ADMIN — ASPIRIN 81 MG: 81 TABLET, COATED ORAL at 10:06

## 2019-12-04 RX ADMIN — SENNOSIDES AND DOCUSATE SODIUM 2 TABLET: 8.6; 5 TABLET ORAL at 10:06

## 2019-12-04 RX ADMIN — DORZOLAMIDE HYDROCHLORIDE AND TIMOLOL MALEATE 1 DROP: 20; 5 SOLUTION OPHTHALMIC at 20:24

## 2019-12-04 RX ADMIN — DORZOLAMIDE HYDROCHLORIDE AND TIMOLOL MALEATE 1 DROP: 20; 5 SOLUTION OPHTHALMIC at 10:07

## 2019-12-04 RX ADMIN — GADOBUTROL 10 ML: 604.72 INJECTION INTRAVENOUS at 08:05

## 2019-12-04 RX ADMIN — METOPROLOL TARTRATE 25 MG: 25 TABLET ORAL at 10:06

## 2019-12-04 ASSESSMENT — ACTIVITIES OF DAILY LIVING (ADL)
ADLS_ACUITY_SCORE: 15

## 2019-12-04 NOTE — PHARMACY-ADMISSION MEDICATION HISTORY
Admission medication history interview status for this patient is complete. See [facility] admission navigator for allergy information, prior to admission medications and immunization status.     Medication history interview source(s):Patient  Medication history resources (including written lists, pill bottles, clinic record):EPIC    Changes made to PTA medication list:  Added: Celexa, Miralax prn, Tylenol, Metrogel prn, Tramadol  Deleted: Duloxetine, Simvastatin, Calcium  Changed: as above    Medication reconciliation/reorder completed by provider prior to medication history?  No     Do you take OTC medications (eg tylenol, ibuprofen, fish oil, eye/ear drops, etc)? Yes     For patients on insulin therapy: No      Prior to Admission medications    Medication Sig Last Dose Taking? Auth Provider   acetaminophen (TYLENOL) 500 MG tablet Take 1,000 mg by mouth 3 times daily 8am, 2pm, 8pm 12/3/2019 at 2x Yes Unknown, Entered By History   atenolol (TENORMIN) 25 MG tablet Take 1 tablet by mouth daily. 12/3/2019 at am Yes Aniyah Eden MD   Cholecalciferol (VITAMIN D3) 50 MCG (2000 UT) TABS Take 2,000 Units by mouth daily 12/3/2019 at am Yes Unknown, Entered By History   citalopram (CELEXA) 10 MG tablet Take 10 mg by mouth every evening 12/2/2019 at Unknown time Yes Unknown, Entered By History   dorzolamide-timolol (COSOPT) 2-0.5 % ophthalmic solution Place 1 drop into both eyes 2 times daily 12/3/2019 at am Yes Prakash Gant MD   latanoprost (XALATAN) 0.005 % ophthalmic solution Place 1 drop into both eyes At Bedtime Both Eyes 12/2/2019 at 8pm Yes Prakash Gant MD   polyethylene glycol (MIRALAX/GLYCOLAX) packet Take 1 packet by mouth daily as needed for constipation  Yes Unknown, Entered By History   traMADol (ULTRAM) 25 mg TABS half-tab Take 25 mg by mouth every evening 12/2/2019 at Unknown time Yes Unknown, Entered By History   metroNIDAZOLE (METROCREAM) 0.75 % external cream Apply 1 g topically 2  times daily as needed (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 and affected areas) not using  Unknown, Entered By History

## 2019-12-04 NOTE — PROGRESS NOTES
[facility]  Hospitalist Progress Note  Theron Clarke MD 12/04/19    Reason for Stay (Diagnosis):          Assessment and Plan:        Alejandrian Whatley is a 89-year-old female with past medical history significant for AAA, hypertension, hyperlipidemia who presented to the emergency room with hypertension and was found to have cerebrovascular accident.  Remarkably she does not appear to have focal deficits though MRI shows an acute ischemic infarct in the right posterior lateral putamen.  Stroke neurology is following.  Plan is for gradual blood pressure reduction after initially allowing a period of permissive hypertension.    1. Acute cerebrovascular accident: Ischemic infarct of the right posterior lateral putamen. no focal neurologic deficits noted at this point.  Neuro Stroke was consulted.  The patient was given loading dose of Plavix and full-strength aspirin.  -- continue Plavix 75 mg and Aspirin 81 mg.     -- Echocardiogram without major abnormalities.    --monitoring on telemetry.   -- Started atorvastatin 40 mg, LDL noted to be elevated.  --Appreciate stroke neurology input.    2. Hypertensive urgency:  hypertension is due to stroke.  I will maintain blood pressure systolic around 180 and will give metoprolol 25 twice a day and hydralazine for accelerated hypertension and systolic above 190.  I will add low dose of lisinopril as well to control her blood pressure better, but will go with permissive hypertension at this point.   --PRN labetalol available for systolic pressure greater than 190  --Plan to resume home atenolol in the morning    3. Chronic kidney disease, stage III:  creatinine 0.9.      4. Hallucinations:   This is likely related to underlying possible undiagnosed early dementia.  The patient is not hallucinating at this point.  Risk of having more hallucinations and delirium in the hospital and needs close monitoring.    Patient's daughter present.  Currently at  "baseline.    DVT Prophylaxis: Pneumatic Compression Devices  Code Status: Full Code  Discharge Dispo/Date: ?1-2 days then back to [facility]        Interval History (Subjective):      I assumed care today, discussed w/ stroke neurology  bp in 150-180 systolic range  No focal deficits, seems to be at baseline  Some nausea this morning advancing diet today                  Physical Exam:      Last Vital Signs:  BP (!) 182/79 (BP Location: Left arm)   Pulse 73   Temp 97.8  F (36.6  C) (Oral)   Resp 16   Ht 1.473 m (4' 10\")   Wt 61.6 kg (135 lb 14.4 oz)   SpO2 95%   BMI 28.40 kg/m        Intake/Output Summary (Last 24 hours) at 12/4/2019 1229  Last data filed at 12/4/2019 0549  Gross per 24 hour   Intake 926 ml   Output --   Net 926 ml       General: Alert, awake, no acute distress.  HEENT: NC/AT, eyes anicteric, external occular movements intact, face symmetric.  Dentition WNL, MM moist.  Cardiac: RRR, S1, S2.  No murmurs appreciated.  Pulmonary: Normal chest rise, normal work of breathing.  Lungs CTA BL  Abdomen: soft, non-tender, non-distended.  Bowel Sounds Present.  No guarding.  Extremities: no deformities.  Warm, well perfused.  Skin: no rashes or lesions noted.  Warm and Dry.  Neuro: No focal deficits noted.  Speech clear.  Coordination and strength grossly normal.  Psych: Appropriate affect.         Medications:      All current medications were reviewed with changes reflected in problem list.         Data:      All new lab and imaging data was reviewed.   Labs:  Recent Labs   Lab 12/04/19  1017      POTASSIUM 3.9   CHLORIDE 109   CO2 23   ANIONGAP 8   *   BUN 15   CR 0.84   GFRESTIMATED 59*   GFRESTBLACK 68   TRANG 8.6     Recent Labs   Lab 12/03/19  1435   WBC 8.1   HGB 13.3   HCT 41.7   *        Recent Labs   Lab 12/04/19  1017   CHOL 208*   HDL 43*   *   TRIG 139      Imaging:   Recent Results (from the past 48 hour(s))   MR Brain w/o & w Contrast    Narrative    MRI BRAIN " WITHOUT AND WITH CONTRAST  12/3/2019 3:55 PM    HISTORY:  Confusion, HTN, headache.     TECHNIQUE:  Multiplanar, multisequence MRI of the brain without and  with 6 mL Gadavist.    COMPARISON: None.    FINDINGS: Diffusion-weighted images show a 0.8 cm area of restricted  diffusion in the posterior lateral right putamen consistent with acute  ischemic infarct. There is no evidence for any intracranial  hemorrhage. Moderate cerebral atrophy is present. Moderately extensive  white matter changes are seen in both hemispheres without mass effect.  Postcontrast images do not show any abnormal areas of enhancement or  any focal mass lesions. Vascular structures are patent at the skull  base. There is some mucosal thickening in the right sphenoid sinus.      Impression    IMPRESSION:  1. Acute ischemic infarct in the right posterior lateral putamen.  2. Moderate cerebral atrophy.  3. Moderately extensive white matter changes which are most likely due  to chronic small vessel ischemic disease given the patient's age.    ROXANN WILLIAMSON MD   MRA Brain ([facility]) wo Contrast    Narrative    MR ANGIOGRAM OF THE HEAD WITHOUT CONTRAST December 4, 2019 8:32 AM     HISTORY: Hallucinations. Right posterior putaminal recent infarct.    TECHNIQUE: 3D time-of-flight MR angiogram of the head without  contrast.    COMPARISON: MRI yesterday.    FINDINGS: Motion artifacts cause signal dropout in the region of the  proximal middle cerebral arteries especially on the right and in the  right carotid siphon. These vessels appear patent on the  gadolinium-enhanced images of the head obtained on the neck MRI. This  also shows moderate stenoses of the right middle cerebral artery M2  segments proximally and congenitally absent right A1 segment as well  as mild atherosclerotic plaque in the carotid siphons without  significant stenosis. There is fetal origin of both posterior cerebral  arteries from the internal carotids, a normal variant  accounting for  small size of basilar and vertebral arteries.      Impression    IMPRESSION:  1. Moderate stenoses of the right middle cerebral artery M2 segments.  2. 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  3. 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  4. No other significant stenosis or arterial occlusion.    UMBERTO CARRASCO MD   MRA Neck (Carotids) wo & w Contrast    Narrative    MRA NECK WITHOUT AND WITH CONTRAST  2019 8:33 AM     HISTORY: Right posterior putaminal infarct on MRI. Hallucinations.    TECHNIQUE: 2D time-of-flight MR angiogram of the neck without contrast  and 3D MR angiogram of the neck with 10 mL Gadavist. Estimates of  carotid stenoses are made relative to the distal internal carotid  artery diameters except as noted.    COMPARISON: None.    FINDINGS:    Right Carotid: Tortuous common carotid and proximal internal carotid.  No significant stenosis.    Left Carotid:  Tortuous common and internal carotid arteries. Mild  stenosis proximal internal carotid artery.    Vertebral and Basilar:  Possible stenoses of the origins of the  vertebral arteries versus artifacts. Mild diffuse narrowing of the  right vertebral artery V4 segment.    Aortic Arch and Branches:  Tortuous subclavian arteries.      Impression    IMPRESSION:    1. Tortuous common and internal carotid arteries.  2. Possible stenoses at origins of the vertebral arteries versus  motion artifacts.    UMBERTO CARRASCO MD   Echo Complete with Bubble Study    Narrative    [id number]  [id number]  [id number]  [id number]^DUNIA^GARRET^[facility]  Echocardiography Laboratory  [address] [place] [address]        Name: JONY WHATLEY  MRN: [id number]  : 1923  Study Date: 2019 09:11 AM  Age: 89 yrs  Gender: Female  Patient Location: [facility]  Reason For Study: Syncope  Ordering Physician: GARRET DUQUE  Referring Physician: Reggie Whatley  Performed By:  Ailyn Stewart     BSA: 1.5 m2  Height: 58 in  Weight: 135 lb  HR: 72  BP: 192/100 mmHg  _____________________________________________________________________________  __        Procedure  Complete Portable Bubble Echo Adult. [employer]ison (NDC #[id number]) given  intravenously.  _____________________________________________________________________________  __        Interpretation Summary     Technically difficult, suboptimal study. he left ventricle is normal in size.  Proximal septal thickening is noted. Left ventricular systolic function is  normal. The visual ejection fraction is estimated at 60-65%. Grade I or early  diastolic dysfunction. No regional wall motion abnormalities noted. There is  no thrombus seen in the left ventricle.  The right ventricle is normal size. The right ventricular systolic function is  normal.  Normal left atrial size. Right atrial size is normal. A contrast injection  (Bubble Study) was performed that was negative for flow across the interatrial  septum.  Mild to moderate tricuspid regurgitation.  No pericardial effusion.  No previous study for comparison.  _____________________________________________________________________________  __        Left Ventricle  The left ventricle is normal in size. Proximal septal thickening is noted.  Left ventricular systolic function is normal. The visual ejection fraction is  estimated at 60-65%. Grade I or early diastolic dysfunction. No regional wall  motion abnormalities noted. There is no thrombus seen in the left ventricle.     Right Ventricle  The right ventricle is normal size. The right ventricular systolic function is  normal.     Atria  Normal left atrial size. Right atrial size is normal. A contrast injection  (Bubble Study) was performed that was negative for flow across the interatrial  septum.     Mitral Valve  The mitral valve leaflets are mildly thickened. There is mild mitral annular  calcification. There is trace mitral  regurgitation.        Tricuspid Valve  There is mild to moderate (1-2+) tricuspid regurgitation. The right  ventricular systolic pressure is approximated at 34.5 mmHg plus the right  atrial pressure.     Aortic Valve  There is mild trileaflet aortic sclerosis. No aortic regurgitation is present.  No aortic stenosis is present.     Pulmonic Valve  There is no pulmonic valvular stenosis.     Vessels  The aortic root is normal size. Normal size ascending aorta. Inferior vena  cava not well visualized for estimation of right atrial pressure.     Pericardium  There is no pericardial effusion.        Rhythm  Sinus rhythm was noted.  _____________________________________________________________________________  __  MMode/2D Measurements & Calculations  IVSd: 1.2 cm     LVIDd: 4.1 cm  LVIDs: 2.8 cm  LVPWd: 1.0 cm  FS: 32.4 %  LV mass(C)d: 147.3 grams  LV mass(C)dI: 95.6 grams/m2  Ao root diam: 3.2 cm  LA dimension: 3.2 cm  asc Aorta Diam: 3.0 cm  LA/Ao: 0.99  LVOT diam: 1.8 cm  LVOT area: 2.7 cm2  LA Volume (BP): 42.0 ml  LA Volume Index (BP): 27.3 ml/m2  RWT: 0.49           Doppler Measurements & Calculations  MV E max delon: 81.9 cm/sec  MV A max delon: 114.0 cm/sec  MV E/A: 0.72  MV dec time: 0.30 sec  LV V1 max PG: 3.6 mmHg  LV V1 max: 94.8 cm/sec  LV V1 VTI: 20.8 cm  SV(LVOT): 55.5 ml  SI(LVOT): 36.0 ml/m2  TR max delon: 293.7 cm/sec  TR max P.5 mmHg  E/E' av.7  Lateral E/e': 17.9  Medial E/e': 21.6           _____________________________________________________________________________  __           Report approved by: Todd Gil 2019 10:21 AM              Theron Clarke MD , MD.

## 2019-12-04 NOTE — PLAN OF CARE
PT: Patient seen by physical therapy for evaluation.  Patient with PMH including AAA, HTN, hyperlipidemia now admitted with HTN, found to have acute CVA.  [facility], uses 4WW for mobility at baseline.  Patient receives assistance with housecleaning, medication management, 3 meals per day.      Discharge Planner PT   Patient plan for discharge: Return to [facility]  Current status: Patient independent with bed mobility.  Patient SBA with repeated sit to stand transfer with 4WW including toilet transfer.  Patient amb 200 feet with 4WW and graded assist from SBA to independent.  Patient reports feeling back to baseline for mobility.  Barriers to return to prior living situation: none  Recommendations for discharge: [facility] with previously received level of assistance  Rationale for recommendations: Patient presents at baseline for functional mobility.  No further inpatient physical therapy required at this time, will complete order.       Entered by: Ilene Huertas 12/04/2019 4:02 PM

## 2019-12-04 NOTE — CONSULTS
Care Transition Initial Assessment - RN        Met with: Patient and talked with Pat Dtg over the phone.  DATA   Active Problems:    CVA (cerebral vascular accident) (H)       Cognitive Status: awake, alert and oriented.  Primary Care Clinic Name: [facility]  Primary Care MD Name: mela  Contact information and PCP information verified: Yes  Lives With: facility resident([facility])   Living Arrangements: assisted living  Quality of Family Relationships: supportive      Who is your support system?: Children, Facility resident(s)/Staff       Insurance concerns: No Insurance issues identified  ASSESSMENT  Patient currently receives the following services:  Pt lives at [facility].  I talked with Cristine Segovia [phone].  She confirmed pt gets medication administration, 3 meals a day, laundry support, and med making.  She is independent with a walker at base line.  I will fax discharge orders to Fax: [phone]        Identified issues/concerns regarding health management: Pt is admitted with a CVA.  Pt doesn't have any deficits.  She plans on returning to [facility].  Her dtg Pat is supportive and involved with her care.  Pat declines needing resources for Neurology f/u.  They don't know if that will be necessary.  They follow with Dr Whatley with [facility].  They were suppose to meet with Petra care coordinator with [facility] today.  She will follow along with her care and would like to be contacted at [phone] at time of discharge.      PLAN  Patient given options and choices for discharge yes .  Patient/family is agreeable to the plan?  Yes:   Patient anticipates discharging to [facility] .        Patient anticipates needs for home equipment: No  Transportation/person available to transport on day of discharge  is her Dtg Pat and she is aware.  Plan/Disposition: Home   Appointments: TBD      Care  (CTS) will continue to follow as needed.    Kailee  Arcadio RN BSN   Inpatient Care Coordination  [facility]  [phone]

## 2019-12-04 NOTE — PROGRESS NOTES
12/04/19 1419   Quick Adds   Type of Visit Initial PT Evaluation   Living Environment   Lives With alone;facility resident   Living Arrangements assisted living   Home Accessibility no concerns   Transportation Anticipated family or friend will provide   Living Environment Comment Patient receives 3 meals a day, housecleaning, medication management   Self-Care   Usual Activity Tolerance good   Current Activity Tolerance good   Equipment Currently Used at Home walker, rolling   Activity/Exercise/Self-Care Comment Pt reports that she walks fast normally, does her own laundry, uses 4WW at baseline   Functional Level Prior   Ambulation 1-->assistive equipment  (4WW)   Transferring 1-->assistive equipment   Toileting 0-->independent   Bathing 0-->independent   Fall history within last six months yes   Number of times patient has fallen within last six months 1   Which of the above functional risks had a recent onset or change? ambulation;transferring;toileting;bathing;dressing   Prior Functional Level Comment Patient uses 4WW with mobility, does own laundry   General Information   Onset of Illness/Injury or Date of Surgery - Date 12/03/19   Referring Physician Khris Magdaleno MD   Patient/Family Goals Statement return to [facility]   Pertinent History of Current Problem (include personal factors and/or comorbidities that impact the POC) Alejandrina Whatley is a 89-year-old female with past medical history significant for AAA, hypertension, hyperlipidemia who presented to the emergency room with hypertension and was found to have cerebrovascular accident.   Precautions/Limitations fall precautions   General Observations Pt appears to be moving well, SBA for all functional mobility   Cognitive Status Examination   Orientation orientation to person, place and time   Level of Consciousness alert   Follows Commands and Answers Questions 100% of the time   Personal Safety and Judgment impaired  (moves quickly at times)   Memory  "intact   Integumentary/Edema   Integumentary/Edema no deficits were identifed   Posture    Posture Forward head position;Protracted shoulders;Kyphosis   Range of Motion (ROM)   ROM Comment WFL   Strength   Strength Comments WFL   Bed Mobility   Bed Mobility Comments Transferred sit<>supine with SBA. Able to reposition and scoot up in bed with SBA and VC. Supine at end of session with all needs in reach.   Transfer Skills   Transfer Comments SBA with 4WW   Gait   Gait Comments Amb 200 feet with 4WW and graded assist from SBA to modified independent   Balance   Balance Comments no loss of balance noted, required UE support at walker during mobility   Clinical Impression   Criteria for Skilled Therapeutic Intervention evaluation only   PT Diagnosis Patient presents at baseline mobility   Influenced by the following impairments fatigue   Clinical Presentation Stable/Uncomplicated   Clinical Presentation Rationale complex pmh, stable presentation, good social support   Clinical Decision Making (Complexity) Low complexity   Therapy Frequency   (evaluation only)   Predicted Duration of Therapy Intervention (days/wks) 1x evaluation    Anticipated Discharge Disposition   (return to [facility] with previously received services)   Risk & Benefits of therapy have been explained Yes   Patient, Family & other staff in agreement with plan of care Yes   [facility] [place]-PAC TM \"6 Clicks\"   2016, Trustees of [facility], under license to [employer].  All rights reserved.   6 Clicks Short Forms Basic Mobility Inpatient Short Form   [facility] AM-PAC  \"6 Clicks\" V.2 Basic Mobility Inpatient Short Form   1. Turning from your back to your side while in a flat bed without using bedrails? 4 - None   2. Moving from lying on your back to sitting on the side of a flat bed without using bedrails? 4 - None   3. Moving to and from a bed to a chair (including a wheelchair)? 4 - None   4. Standing up from a chair using your arms " (e.g., wheelchair, or bedside chair)? 4 - None   5. To walk in hospital room? 4 - None   6. Climbing 3-5 steps with a railing? 2 - A Lot   Basic Mobility Raw Score (Score out of 24.Lower scores equate to lower levels of function) 22   Total Evaluation Time   Total Evaluation Time (Minutes) 20

## 2019-12-04 NOTE — CONSULTS
Attempted stroke education via iPad this afternoon. Patient and family refusing stroke education at this time. Please reorder consult if needed at a later date.

## 2019-12-04 NOTE — PLAN OF CARE
Pt sleeping intermittently overnight and very restless/anxious related to hospital stay and fixation on frequent urination, granddaughter at bedside assisting with IV pole and safety, pt oriented with forgetfullness, VSS except BPs still elevated, tele in place, PIV with NS 75ml/hr, repeat MRI this AM but neuro checks WDL, ECHO due to be done this morning as well.

## 2019-12-04 NOTE — H&P
Admitted:     12/03/2019      CHIEF COMPLAINT:  Elevated blood pressure.      HISTORY OF PRESENT ILLNESS:  Alejandrina Whatley is a 89-year-old female with a past medical history significant for abdominal aortic aneurysm, glaucoma, hypertension, hyperlipidemia, osteoporosis, urinary incontinence, who presented today from assisted living facility for elevated blood pressure.  The patient is in the emergency room with her daughter.  History is obtained mostly from the daughter who stated that her blood pressure was 220/110 today and the assisted living facility contacted her, but stated not to take her to the emergency room as blood pressure improved but another daughter who is an RN advised her to come to the emergency room.  In the emergency room, on arrival, her blood pressure 189/92.  Daughter also reported that patient had some hallucination on 11/26 and 11/27.  While in the room, stated she saw people around in her room.  After arrival to the emergency room, also she felt heaviness in her head, but denied any headache, blurring of vision, no chest pain, no vomiting, no cough, no runny nose, no sore throat.  She also denied any weakness of her extremities or tingling sensation.  The patient has shortness of breath with exertion at baseline.  There were no new symptoms.  Her daughter also stated the patient seems to be at her baseline at this point.      In the emergency room, she was evaluated, discussed with ED physician, Dr. Stringer, who did an MRI of the brain, which showed acute infarct in the right posterior lateral  and contacted Neuro/Stroke, who recommended MRA of the neck and brain, but Radiology stated due to gadolinium load, repeating MRI was not recommended for another 8 hours.      PAST MEDICAL HISTORY:   1. Abdominal aortic aneurysm.   2. Actinic keratosis.   3. Degenerative joint disease.   4. Glaucoma.   5. Hypertension.   6. Hyperlipidemia.   7. Osteoporosis.   8. Urinary incontinence.      PAST SURGICAL  HISTORY:   1. Appendectomy.   2. AAA repair with stent placement.   3. Extracapsular cataract extraction.   4. Hysterectomy.   5. Laser selective trabeculectomy.      FAMILY HISTORY:  Reviewed and noncontributory to her current condition and includes heart disease in father and hypertension in mother.      SOCIAL HISTORY:  The patient lives in an assisted living facility.  She does not smoke.  She does not drink alcohol.  She does not use illicit drugs.  She is in the emergency room with her daughter who is her power of .      REVIEW OF SYSTEMS:  Ten points reviewed, all are negative except those mentioned in history of present illness.      ALLERGIES:  MACROBID, PREMARIN, SULFA, HYDROCODONE, RISEDRONATE AMONG OTHERS.      PHYSICAL EXAMINATION:   GENERAL:  The patient is awake, alert, oriented, looks younger than her stated age.   VITAL SIGNS:  Blood pressure 197/86, pulse rate 67, temperature 96.7, oxygen saturation 97%.   HEENT:  Pink, nonicteric.  Extraocular muscle movement intact.   NECK:  Supple, no JVD, no thyromegaly.   CHEST:  Good air entry bilaterally.  No wheezing, crackles or rales.   CARDIOVASCULAR:  S1 and S2 regular, no gallop or murmur.   ABDOMEN:  Soft, nontender, nondistended, positive bowel sounds, no organomegaly.   EXTREMITIES:  No edema, cyanosis or clubbing.   NEUROLOGIC:  Alert and oriented x 3.  Follows instructions.  Cranial nerves II-XII grossly intact.  Motor, 4/5 bilateral upper and lower extremities.  Deep tendon reflexes +2, sensation intact.  Gait not tested.   PSYCHIATRIC:  Normal mood and affect, keeps eye contact, responds to questions appropriately.      DIAGNOSTIC TESTS OF INTEREST:  Sodium 140, potassium 4, BUN 19, creatinine 0.9, calcium 9.1.  Troponin negative.  Glucose 98.  WBC 8.1, hemoglobin 13.3, platelet 200.  Urinalysis is negative.  MRI of the brain done showed acute ischemic infarct in the right posterolateral putamen, moderate cerebral atrophy, moderately  extensive white matter changes due to chronic small vessel ischemic disease.  EKG showed normal sinus rhythm at 66 beats per minute.  QTc is 415.      ASSESSMENT AND PLAN:   Alejandrina Whatley is a 89-year-old female with past medical history significant for AAA, hypertension, hyperlipidemia who presented to the emergency room with hypertension and was found to have cerebrovascular accident and is being admitted to the hospital.   1. Acute cerebrovascular accident:  The patient came in with hypertension and for heaviness in her head and MRI showed a CVA.  There were no focal neurologic deficits noted at this point. This is at baseline.  Neuro Stroke was contacted in the emergency room and recommended MRA of the neck and brain, but the patient was already given contrast and Radiology recommended to wait at least 8 hours and to hydrate the patient.  The patient will have MRA of the neck and brain done early this morning or tomorrow.  Neuro Stroke was consulted.  The patient was given loading dose of Plavix and full-strength aspirin.  I will continue Plavix 75 mg and Aspirin 81 mg.    Echocardiogram is ordered.  The patient will be monitored on telemetry.  We will continue Zocor.  Check lipid profile in the morning.   2. Hypertensive urgency:  The patient's blood pressure is elevated.  I suspect hypertension is due to stroke.  I will maintain blood pressure systolic around 180 and will give metoprolol 25 twice a day and hydralazine for accelerated hypertension and systolic above 190.  I will add low dose of lisinopril as well to control her blood pressure better, but will go with permissive hypertension at this point.   3. Chronic kidney disease, stage III:  Given her age, the patient has good renal function, creatinine 0.9.  We will check BMP in the morning.  Continue to hydrate.   4. Hallucinations:   This is likely related to underlying possible undiagnosed early dementia.  The patient is not hallucinating at this point.   Risk of having more hallucinations and delirium in the hospital and needs close monitoring.  I discussed with the patient and also with her daughter at bedside who is the POA.  All their questions and concerns were addressed.      CODE STATUS:  In the event of cardiorespiratory arrest, the patient is do not intubate/do not resuscitate.      Continuation #[id number] added lg 19            GARRET DUQUE MD             D: 2019   T: 2019   MT: LATOYA      Name:     JONY WHATLEY   MRN:      [id number]        Account:      [id number]   :      1923        Admitted:     2019                   Document: [id number]       cc: Reggie Whatley MD

## 2019-12-04 NOTE — CONSULTS
"  [facility]    Stroke Consult Note    Reason for Consult:  \"CVA\"    Chief Complaint: Hypertension     HPI  Alejandrina Whatley is a 89 year old woman who presents from her assisted living facility with severe HTN as high as 218/118 with also some hallucinations in the past week who was found to have an acute stroke while in the emergency department. She has complained of some wobbly legs but otherwise has not had any focal weakness.     Stroke Evaluation summarized:  MRI/Head CT: MRI shows acute infarct R posterior lateral putamen, moderately extensive SVID  Intracranial Vascular Imaging: MRA head shows moderate stenoses of b/l M2 segments. Congenital absence of R A1 segment. Fetal origin b/l PCAs.  Cervical Carotid and Vertebral Artery Vascular Imaging: MRA neck shows tortuous common and ICAs. Possible stenoses at origins of verts vs motion artifacts  Echocardiogram: EF 60-65%, normal LA size, mild-mod TR  EKG/Telemetry: SR  LDL: 137  A1c: 6.0  Troponin:   Other testing: Not Applicable    Impression  Ischemic Stroke due to small-vessel occlusion  R putamen    Recommendations  - Continue Plavix 75 mg and aspirin 81mg daily together for 21 days; then aspirin 81mg alone  - Start lipitor 40mg  - Increase home celexa from 10mg to 20mg  - PT/OT/ST  - Patient learning center stroke education ipad video visit (PLC consult)  - Continue permissive HTN until tomorrow, then lower gradually to goal of <140/90 after several weeks    Patient Follow-up    - in the next 1 week(s) with PCP  - in 4-6 weeks with local neurologist    Thank you for this consult. No further stroke evaluation is recommended, so we will sign off. Please contact us with any additional questions.    Andra Russell PA-C  Neurology  12/04/2019 11:19 AM  Text Page (8am-5pm)  To page stroke neurology after hours or on a subsequent day, click here: AMCOM  Choose \"On Call\" tab at top, then search dropdown box for \"Neurology Adult\" & press Enter, look " for Neuro ICU/Stroke    _____________________________________________________    Past Medical History   Past Medical History:   Diagnosis Date     Abdominal aortic aneurysm (H) 2001    had a stent placed 2009     AK (actinic keratosis) 11/11/2009     Cataract 2/22/2011     Compression fractures      Compression Fractures of Lumbar Vertebra 2/4/2010     DJD (degenerative joint disease)      Generalised Weakness and Fatigue 3/3/2011     Glaucoma (increased eye pressure) 2009    Dr. 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  Dr. Clark.     HYSTERECTOMY, CERVIX STATUS UNKNOWN  1971     LASER SELECTIVE TRABECULOPLASTY  3/2010; 10/2015    left eye 1st Clark (notes show inf treatment); inf 180 (MARI)     LASER SELECTIVE TRABECULOPLASTY  12/2017    left eye sup 180     Medications   Home Meds  Medication Sig   acetaminophen (TYLENOL) 500 MG tablet Take 1,000 mg by mouth 3 times daily 8am, 2pm, 8pm   atenolol (TENORMIN) 25 MG tablet Take 1 tablet by mouth daily.   Cholecalciferol (VITAMIN D3) 50 MCG (2000 UT) TABS Take 2,000 Units by mouth daily   citalopram (CELEXA) 10 MG tablet Take 10 mg by mouth every evening   dorzolamide-timolol (COSOPT) 2-0.5 % ophthalmic solution Place 1 drop into both eyes 2 times daily   latanoprost (XALATAN) 0.005 % ophthalmic solution Place 1 drop into both eyes At Bedtime  Both Eyes   polyethylene glycol (MIRALAX/GLYCOLAX) packet Take 17 g by mouth daily as needed for constipation    traMADol (ULTRAM) 25 mg TABS half-tab Take 25 mg by mouth every evening    metroNIDAZOLE (METROCREAM) 0.75 % external cream Apply 1 g topically 2 times daily as needed (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 and affected areas)       Scheduled Meds    aspirin  81 mg Oral Daily     atorvastatin  40 mg Oral QPM     citalopram  20 mg Oral Daily     clopidogrel  75 mg Oral Daily     dorzolamide-timolol  1 drop Both Eyes BID     enoxaparin ANTICOAGULANT  40 mg Subcutaneous Q24H     latanoprost  1 drop Both Eyes At Bedtime     metoprolol tartrate  25 mg Oral BID     senna-docusate  1 tablet Oral BID    Or     senna-docusate  2 tablet Oral BID     sodium chloride (PF)  3 mL Intracatheter Q8H       Infusion Meds      PRN Meds  acetaminophen, hydrALAZINE, lidocaine 4%, lidocaine (buffered or not buffered), melatonin, naloxone, ondansetron **OR** ondansetron, polyethylene glycol, prochlorperazine **OR** prochlorperazine **OR** prochlorperazine, sodium chloride (PF)    Allergies   Allergies   Allergen Reactions     Actonel [Bisphosphonates] Rash     Conjugated Estrogens Rash     Macrobid [Nitrofuran Derivatives] Rash     Nitrofurantoin Rash     Premarin Rash     Sulfa Drugs Rash     Hydrocodone Nausea and Vomiting     Oxycodone Nausea and Vomiting     Risedronate      leg pain     Family History   Family History   Problem Relation Age of Onset     Heart Disease Father 79        MI     Hypertension Mother      Social History   Social History     Tobacco Use     Smoking status: Never Smoker     Smokeless tobacco: Never Used     Tobacco comment: No second hand exposure.   Substance Use Topics     Alcohol use: No     Drug use: No       Review of Systems   The 5 point Review of Systems is negative other than noted in the HPI or here.        PHYSICAL EXAMINATION   Temp:  [96.7  F (35.9  C)-97.8  F (36.6  C)] 97.8  F (36.6  C)  Pulse:  [62-73]  73  Heart Rate:  [62-72] 68  Resp:  [14-18] 16  BP: (144-197)/() 182/79  SpO2:  [93 %-98 %] 95 %    General:  patient lying in bed without any acute distress    HEENT:  normocephalic/atraumatic  Pulmonary:  no respiratory distress    Neurologic  Mental Status:  alert, oriented x 3, follows commands, speech clear and fluent, naming and repetition normal, states own age age is 69 not 89  Cranial Nerves:  visual fields intact (tested by nurse), EOMI with normal smooth pursuit, facial sensation intact and symmetric (tested by nurse), facial movements symmetric, hearing not formally tested but intact to conversation, no dysarthria, shoulder shrug equal bilaterally, tongue protrusion midline  Motor:  no abnormal movements, able to move all limbs antigravity spontaneously with no signs of hemiparesis observed, no pronator drift, mild fix of L arm with rapid arm rolling  Reflexes:  unable to test (telestroke)  Sensory:  light touch sensation intact and symmetric throughout upper and lower extremities (assessed by nurse), no extinction on double simultaneous stimulation (assessed by nurse)  Coordination:  normal finger-to-nose and heel-to-shin bilaterally without dysmetria, rapid alternating movements symmetric  Station/Gait:  unable to test due to telestroke    Dysphagia Screen  Per Nursing    Stroke Scales    NIHSS  Interval     Interval Comments     1a. Level of Consciousness 0-->Alert, keenly responsive   1b. LOC Questions 0-->Answers both questions correctly   1c. LOC Commands 0-->Performs both tasks correctly   2.   Best Gaze 0-->Normal   3.   Visual 0-->No visual loss   4.   Facial Palsy 0-->Normal symmetrical movements   5a. Motor Arm, Left 0-->No drift, limb holds 90 (or 45) degrees for full 10 secs   5b. Motor Arm, Right 0-->No drift, limb holds 90 (or 45) degrees for full 10 secs   6a. Motor Leg, Left 0-->No drift, leg holds 30 degree position for full 5 secs   6b. Motor Leg, right 0-->No drift, leg holds 30  degree position for full 5 secs   7.   Limb Ataxia 0-->Absent   8.   Sensory 0-->Normal, no sensory loss   9.   Best Language 0-->No aphasia, normal   10. Dysarthria 0-->Normal   11. Extinction and Inattention  0-->No abnormality   Total 0 (12/04/19 1119)       Imaging  I personally reviewed all imaging; relevant findings per HPI.    Labs Data   CBC  Recent Labs   Lab 12/03/19  1435   WBC 8.1   RBC 3.89   HGB 13.3   HCT 41.7        Basic Metabolic Panel   Recent Labs   Lab 12/04/19  1017 12/03/19  1435    140   POTASSIUM 3.9 4.0   CHLORIDE 109 107   CO2 23 26   BUN 15 19   CR 0.84 0.90   * 98   TRANG 8.6 9.1     Liver Panel  Recent Labs   Lab Test 05/02/18  1348 07/18/13  1037 10/26/11  1000   PROTTOTAL 7.4  --  7.6   ALBUMIN 3.1*  --  3.9   BILITOTAL 0.3  --  0.5   ALKPHOS 75  --  81   AST 22  --  25   ALT 18 21 16     INRNo lab results found.   Lipid Profile  Recent Labs   Lab Test 12/04/19  1017 07/18/13  1037 10/26/11  1000   CHOL 208* 176 188   HDL 43* 49* 50   * 98 109   TRIG 139 146 142   CHOLHDLRATIO  --  3.6 3.8     A1C  Recent Labs   Lab Test 12/04/19  1017   A1C 6.0*     Troponin I  Recent Labs   Lab 12/03/19  1435   TROPI <0.015          Stroke Code / Stroke Consult Data Data Telestroke Service Details  (for non-emergent stroke consult with tele)  Video start time 12/04/19   1045   Video end time 12/04/19   1117   Type of service telemedicine diagnostic assessment of acute neurological changes   Reason telemedicine is appropriate patient requires assessment with a specialist for diagnosis and treatment of neurological symptoms   Mode of transmission secure interactive audio and video communication per [employer]   Originating site (patient location) [facility]    Distant site (provider location) [facility]       Stroke Education  -[facility] consult    PHQ-2 Depression Screening  Over the last 2 weeks, how many days have you noted: Never   (0 points)  Several  (1 point) More than half  (2 points) Nearly all  (3 points)   Little interest/pleasure in doing things?      3   Feeling down, depressed, or hopeless?    3     PHQ-2 depression score: 6, consistent with a positive screen for depression.        I have personally spent a total of 50 minutes providing care and consulting with this patient's medical providers today, with more than 50% of this time spent in consultation, coordination of care, and discussion with the patient and/or family regarding diagnostic results, prognosis, symptom management, risks and benefits of management options, and development of plan of care.

## 2019-12-05 PROCEDURE — 25000132 ZZH RX MED GY IP 250 OP 250 PS 637: Performed by: INTERNAL MEDICINE

## 2019-12-05 PROCEDURE — 25000128 H RX IP 250 OP 636: Performed by: INTERNAL MEDICINE

## 2019-12-05 PROCEDURE — 12000000 ZZH R&B MED SURG/OB

## 2019-12-05 PROCEDURE — 99232 SBSQ HOSP IP/OBS MODERATE 35: CPT | Performed by: INTERNAL MEDICINE

## 2019-12-05 PROCEDURE — 25000132 ZZH RX MED GY IP 250 OP 250 PS 637: Performed by: PHYSICIAN ASSISTANT

## 2019-12-05 RX ORDER — LABETALOL 20 MG/4 ML (5 MG/ML) INTRAVENOUS SYRINGE
10
Status: DISCONTINUED | OUTPATIENT
Start: 2019-12-05 | End: 2019-12-06

## 2019-12-05 RX ORDER — CITALOPRAM HYDROBROMIDE 10 MG/1
10 TABLET ORAL EVERY EVENING
Status: DISCONTINUED | OUTPATIENT
Start: 2019-12-05 | End: 2019-12-05

## 2019-12-05 RX ORDER — CARVEDILOL 6.25 MG/1
6.25 TABLET ORAL 2 TIMES DAILY WITH MEALS
Status: DISCONTINUED | OUTPATIENT
Start: 2019-12-05 | End: 2019-12-06 | Stop reason: HOSPADM

## 2019-12-05 RX ADMIN — ENOXAPARIN SODIUM 40 MG: 40 INJECTION SUBCUTANEOUS at 21:30

## 2019-12-05 RX ADMIN — DORZOLAMIDE HYDROCHLORIDE AND TIMOLOL MALEATE 1 DROP: 20; 5 SOLUTION OPHTHALMIC at 09:44

## 2019-12-05 RX ADMIN — TRAMADOL HYDROCHLORIDE 25 MG: 50 TABLET, FILM COATED ORAL at 21:29

## 2019-12-05 RX ADMIN — LATANOPROST 1 DROP: 50 SOLUTION OPHTHALMIC at 21:30

## 2019-12-05 RX ADMIN — DORZOLAMIDE HYDROCHLORIDE AND TIMOLOL MALEATE 1 DROP: 20; 5 SOLUTION OPHTHALMIC at 21:30

## 2019-12-05 RX ADMIN — ASPIRIN 81 MG: 81 TABLET, COATED ORAL at 09:43

## 2019-12-05 RX ADMIN — CARVEDILOL 6.25 MG: 6.25 TABLET, FILM COATED ORAL at 19:14

## 2019-12-05 RX ADMIN — CITALOPRAM HYDROBROMIDE 20 MG: 20 TABLET ORAL at 19:56

## 2019-12-05 RX ADMIN — CLOPIDOGREL BISULFATE 75 MG: 75 TABLET ORAL at 09:43

## 2019-12-05 RX ADMIN — ACETAMINOPHEN 650 MG: 325 TABLET, FILM COATED ORAL at 19:56

## 2019-12-05 RX ADMIN — CARVEDILOL 6.25 MG: 6.25 TABLET, FILM COATED ORAL at 09:43

## 2019-12-05 RX ADMIN — ATORVASTATIN CALCIUM 40 MG: 40 TABLET, FILM COATED ORAL at 19:56

## 2019-12-05 ASSESSMENT — ACTIVITIES OF DAILY LIVING (ADL)
ADLS_ACUITY_SCORE: 15
ADLS_ACUITY_SCORE: 14
ADLS_ACUITY_SCORE: 14
ADLS_ACUITY_SCORE: 15
ADLS_ACUITY_SCORE: 14
ADLS_ACUITY_SCORE: 15

## 2019-12-05 NOTE — PROGRESS NOTES
Discharge Planner   Discharge Plans in progress: YES  Barriers to discharge plan: improved BP control  Follow up plan: I notified Cristine RN at the [facility] that pt isn't ready for discharge.  They prefer on day of discharge that pt return late AM or early afternoon.  Dtg Pat will transport.     Kailee Ervin RN BSN   Inpatient Care Coordination  [facility]  [phone]         Entered by: Dayana Ervin 12/05/2019 10:10 AM

## 2019-12-05 NOTE — PLAN OF CARE
Pt sleeping well overnight, triggers alarm for toileting and very forgetful for calling for assistance, VSS with BPs still elevated, denies pain, assist of 1/SBA with rolling walker for mobility, PIV saline locked, tele in place, neuros WDL.

## 2019-12-05 NOTE — PLAN OF CARE
Pt up in room--- family here to help with the tele stroke assessment  but all refused the stroke education ----- appetite good ---continue to monitor for restlessness and impulsiveness

## 2019-12-05 NOTE — PLAN OF CARE
Assumed care of pt at 1100  Ambulatory status:Standby with 4WW walker  VS:B/P: 145/84, T: 97, P: 73, R: 18   Tele:Normal sinus  CV:WDl  Neuro:A&O x4, forgetful  Pain:Denies  Resp:WDL  GI:No BM today. Pt had several loose stools over night. BS active in all 4 quads and passing flatus  :WDL  Treatment:Antihypentensive trial (Coreg 6.25mg added). Blood pressures in 140s-150's  Disposition:Possible discharge to assisted living tomorrow pending blood pressures

## 2019-12-05 NOTE — PROGRESS NOTES
[facility]  Hospitalist Progress Note  Theron Clarke MD 12/05/2019    Reason for Stay (Diagnosis):          Assessment and Plan:        Alejandrina Whatley is a 89-year-old female with past medical history significant for AAA, hypertension, hyperlipidemia who presented to the emergency room with hypertension and was found to have cerebrovascular accident.  Remarkably she does not appear to have focal deficits though MRI shows an acute ischemic infarct in the right posterior lateral putamen.  Stroke neurology is following.  Plan is for gradual blood pressure reduction after initially allowing a period of permissive hypertension.    1. Acute cerebrovascular accident: Ischemic infarct of the right posterior lateral putamen. no focal neurologic deficits noted at this point.  Neuro Stroke was consulted.  The patient was given loading dose of Plavix and full-strength aspirin.  -- continue Plavix 75 mg and Aspirin 81 mg.     -- Echocardiogram without major abnormalities.    --monitoring on telemetry.   -- Started atorvastatin 40 mg, LDL noted to be elevated.  --Appreciate stroke neurology input.    2. Hypertensive urgency:  hypertension is due to stroke.   --PRN labetalol available for systolic pressure greater than 170  --start Coreg 6.25 mg twice daily    3. Chronic kidney disease, stage III:  creatinine 0.9.      4. Hallucinations: Seem to be currently resolved.  This is likely related to underlying possible undiagnosed early dementia.  The patient is not hallucinating at this point.  Risk of having more hallucinations and delirium in the hospital and needs close monitoring.    Patient's daughter present.  Currently at baseline.    DVT Prophylaxis: Pneumatic Compression Devices  Code Status: Full Code  Discharge Dispo/Date: ?1 day none back to [facility] and blood pressure trend        Interval History (Subjective):      Blood pressure 180 systolic this morning, improved to 150 after starting Coreg  Still feels  "well, no new issues.  Anticipate likely discharge back to assisted living tomorrow pending blood pressure trend.                  Physical Exam:      Last Vital Signs:  BP (!) 150/59 (BP Location: Left arm)   Pulse 73   Temp 97  F (36.1  C) (Oral)   Resp 18   Ht 1.473 m (4' 10\")   Wt 61.6 kg (135 lb 14.4 oz)   SpO2 95%   BMI 28.40 kg/m        Intake/Output Summary (Last 24 hours) at 12/4/2019 1229  Last data filed at 12/4/2019 0549  Gross per 24 hour   Intake 926 ml   Output --   Net 926 ml       General: Alert, awake, no acute distress.  HEENT: NC/AT, eyes anicteric, external occular movements intact, face symmetric.  Dentition WNL, MM moist.  Cardiac: RRR, S1, S2.  No murmurs appreciated.  Pulmonary: Normal chest rise, normal work of breathing.  Lungs CTA BL  Abdomen: soft, non-tender, non-distended.  Bowel Sounds Present.  No guarding.  Extremities: no deformities.  Warm, well perfused.  Skin: no rashes or lesions noted.  Warm and Dry.  Neuro: No focal deficits noted.  Speech clear.  Coordination and strength grossly normal.  Psych: Appropriate affect.         Medications:      All current medications were reviewed with changes reflected in problem list.         Data:      All new lab and imaging data was reviewed.   Labs:  Recent Labs   Lab 12/04/19  1017      POTASSIUM 3.9   CHLORIDE 109   CO2 23   ANIONGAP 8   *   BUN 15   CR 0.84   GFRESTIMATED 59*   GFRESTBLACK 68   TRANG 8.6     Recent Labs   Lab 12/03/19  1435   WBC 8.1   HGB 13.3   HCT 41.7   *        Recent Labs   Lab 12/04/19  1017   CHOL 208*   HDL 43*   *   TRIG 139      Imaging:   Recent Results (from the past 48 hour(s))   MR Brain w/o & w Contrast    Narrative    MRI BRAIN WITHOUT AND WITH CONTRAST  12/3/2019 3:55 PM    HISTORY:  Confusion, HTN, headache.     TECHNIQUE:  Multiplanar, multisequence MRI of the brain without and  with 6 mL Gadavist.    COMPARISON: None.    FINDINGS: Diffusion-weighted images show " a 0.8 cm area of restricted  diffusion in the posterior lateral right putamen consistent with acute  ischemic infarct. There is no evidence for any intracranial  hemorrhage. Moderate cerebral atrophy is present. Moderately extensive  white matter changes are seen in both hemispheres without mass effect.  Postcontrast images do not show any abnormal areas of enhancement or  any focal mass lesions. Vascular structures are patent at the skull  base. There is some mucosal thickening in the right sphenoid sinus.      Impression    IMPRESSION:  1. Acute ischemic infarct in the right posterior lateral putamen.  2. Moderate cerebral atrophy.  3. Moderately extensive white matter changes which are most likely due  to chronic small vessel ischemic disease given the patient's age.    ROXANN WILLIAMSON MD   MRA Brain ([place] [facility] [place]) wo Contrast    Narrative    MR ANGIOGRAM OF THE HEAD WITHOUT CONTRAST December 4, 2019 8:32 AM     HISTORY: Hallucinations. Right posterior putaminal recent infarct.    TECHNIQUE: 3D time-of-flight MR angiogram of the head without  contrast.    COMPARISON: MRI yesterday.    FINDINGS: Motion artifacts cause signal dropout in the region of the  proximal middle cerebral arteries especially on the right and in the  right carotid siphon. These vessels appear patent on the  gadolinium-enhanced images of the head obtained on the neck MRI. This  also shows moderate stenoses of the right middle cerebral artery M2  segments proximally and congenitally absent right A1 segment as well  as mild atherosclerotic plaque in the carotid siphons without  significant stenosis. There is fetal origin of both posterior cerebral  arteries from the internal carotids, a normal variant accounting for  small size of basilar and vertebral arteries.      Impression    IMPRESSION:  1. Moderate stenoses of the right middle cerebral artery M2 segments.  2. 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  3.  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  4. No other significant stenosis or arterial occlusion.    UMBERTO CARRASCO MD   MRA Neck (Carotids) wo & w Contrast    Narrative    MRA NECK WITHOUT AND WITH CONTRAST  2019 8:33 AM     HISTORY: Right posterior putaminal infarct on MRI. Hallucinations.    TECHNIQUE: 2D time-of-flight MR angiogram of the neck without contrast  and 3D MR angiogram of the neck with 10 mL Gadavist. Estimates of  carotid stenoses are made relative to the distal internal carotid  artery diameters except as noted.    COMPARISON: None.    FINDINGS:    Right Carotid: Tortuous common carotid and proximal internal carotid.  No significant stenosis.    Left Carotid:  Tortuous common and internal carotid arteries. Mild  stenosis proximal internal carotid artery.    Vertebral and Basilar:  Possible stenoses of the origins of the  vertebral arteries versus artifacts. Mild diffuse narrowing of the  right vertebral artery V4 segment.    Aortic Arch and Branches:  Tortuous subclavian arteries.      Impression    IMPRESSION:    1. Tortuous common and internal carotid arteries.  2. Possible stenoses at origins of the vertebral arteries versus  motion artifacts.    UMBERTO CARRASCO MD   Echo Complete with Bubble Study    Narrative    [id number]  [id number]  [id number]  [id number]^DUNIA^GARRET^[facility]  Echocardiography Laboratory  [address] [place] [address]        Name: JONY WHATLEY  MRN: [id number]  : 1923  Study Date: 2019 09:11 AM  Age: 89 yrs  Gender: Female  Patient Location: [facility]  Reason For Study: Syncope  Ordering Physician: GARRET DUQUE  Referring Physician: Reggie Whatley  Performed By: Ailyn Stewart     BSA: 1.5 m2  Height: 58 in  Weight: 135 lb  HR: 72  BP: 192/100 mmHg  _____________________________________________________________________________  __        Procedure  Complete Portable Bubble Echo Adult. Optison (NDC  #[id number]) given  intravenously.  _____________________________________________________________________________  __        Interpretation Summary     Technically difficult, suboptimal study. he left ventricle is normal in size.  Proximal septal thickening is noted. Left ventricular systolic function is  normal. The visual ejection fraction is estimated at 60-65%. Grade I or early  diastolic dysfunction. No regional wall motion abnormalities noted. There is  no thrombus seen in the left ventricle.  The right ventricle is normal size. The right ventricular systolic function is  normal.  Normal left atrial size. Right atrial size is normal. A contrast injection  (Bubble Study) was performed that was negative for flow across the interatrial  septum.  Mild to moderate tricuspid regurgitation.  No pericardial effusion.  No previous study for comparison.  _____________________________________________________________________________  __        Left Ventricle  The left ventricle is normal in size. Proximal septal thickening is noted.  Left ventricular systolic function is normal. The visual ejection fraction is  estimated at 60-65%. Grade I or early diastolic dysfunction. No regional wall  motion abnormalities noted. There is no thrombus seen in the left ventricle.     Right Ventricle  The right ventricle is normal size. The right ventricular systolic function is  normal.     Atria  Normal left atrial size. Right atrial size is normal. A contrast injection  (Bubble Study) was performed that was negative for flow across the interatrial  septum.     Mitral Valve  The mitral valve leaflets are mildly thickened. There is mild mitral annular  calcification. There is trace mitral regurgitation.        Tricuspid Valve  There is mild to moderate (1-2+) tricuspid regurgitation. The right  ventricular systolic pressure is approximated at 34.5 mmHg plus the right  atrial pressure.     Aortic Valve  There is mild trileaflet aortic  sclerosis. No aortic regurgitation is present.  No aortic stenosis is present.     Pulmonic Valve  There is no pulmonic valvular stenosis.     Vessels  The aortic root is normal size. Normal size ascending aorta. Inferior vena  cava not well visualized for estimation of right atrial pressure.     Pericardium  There is no pericardial effusion.        Rhythm  Sinus rhythm was noted.  _____________________________________________________________________________  __  MMode/2D Measurements & Calculations  IVSd: 1.2 cm     LVIDd: 4.1 cm  LVIDs: 2.8 cm  LVPWd: 1.0 cm  FS: 32.4 %  LV mass(C)d: 147.3 grams  LV mass(C)dI: 95.6 grams/m2  Ao root diam: 3.2 cm  LA dimension: 3.2 cm  asc Aorta Diam: 3.0 cm  LA/Ao: 0.99  LVOT diam: 1.8 cm  LVOT area: 2.7 cm2  LA Volume (BP): 42.0 ml  LA Volume Index (BP): 27.3 ml/m2  RWT: 0.49           Doppler Measurements & Calculations  MV E max delon: 81.9 cm/sec  MV A max delon: 114.0 cm/sec  MV E/A: 0.72  MV dec time: 0.30 sec  LV V1 max PG: 3.6 mmHg  LV V1 max: 94.8 cm/sec  LV V1 VTI: 20.8 cm  SV(LVOT): 55.5 ml  SI(LVOT): 36.0 ml/m2  TR max delon: 293.7 cm/sec  TR max P.5 mmHg  E/E' av.7  Lateral E/e': 17.9  Medial E/e': 21.6           _____________________________________________________________________________  __           Report approved by: Todd Gil 2019 10:21 AM              Theron Clarke MD , MD.

## 2019-12-06 ENCOUNTER — APPOINTMENT (OUTPATIENT)
Dept: PHYSICAL THERAPY | Facility: CLINIC | Age: 84
DRG: 065 | End: 2019-12-06
Attending: INTERNAL MEDICINE
Payer: COMMERCIAL

## 2019-12-06 VITALS
DIASTOLIC BLOOD PRESSURE: 75 MMHG | OXYGEN SATURATION: 98 % | TEMPERATURE: 96.1 F | WEIGHT: 135.9 LBS | RESPIRATION RATE: 16 BRPM | SYSTOLIC BLOOD PRESSURE: 151 MMHG | BODY MASS INDEX: 28.53 KG/M2 | HEIGHT: 58 IN | HEART RATE: 73 BPM

## 2019-12-06 LAB
CREAT SERPL-MCNC: 0.9 MG/DL (ref 0.52–1.04)
GFR SERPL CREATININE-BSD FRML MDRD: 54 ML/MIN/{1.73_M2}
PLATELET # BLD AUTO: 177 10E9/L (ref 150–450)

## 2019-12-06 PROCEDURE — 97530 THERAPEUTIC ACTIVITIES: CPT | Mod: GP | Performed by: PHYSICAL THERAPIST

## 2019-12-06 PROCEDURE — 82565 ASSAY OF CREATININE: CPT | Performed by: INTERNAL MEDICINE

## 2019-12-06 PROCEDURE — 25000132 ZZH RX MED GY IP 250 OP 250 PS 637: Performed by: INTERNAL MEDICINE

## 2019-12-06 PROCEDURE — 85049 AUTOMATED PLATELET COUNT: CPT | Performed by: INTERNAL MEDICINE

## 2019-12-06 PROCEDURE — 97164 PT RE-EVAL EST PLAN CARE: CPT | Mod: GP | Performed by: PHYSICAL THERAPIST

## 2019-12-06 PROCEDURE — 99239 HOSP IP/OBS DSCHRG MGMT >30: CPT | Performed by: INTERNAL MEDICINE

## 2019-12-06 PROCEDURE — 97116 GAIT TRAINING THERAPY: CPT | Mod: GP | Performed by: PHYSICAL THERAPIST

## 2019-12-06 RX ORDER — CARVEDILOL 6.25 MG/1
6.25 TABLET ORAL 2 TIMES DAILY WITH MEALS
Qty: 60 TABLET | Refills: 0 | Status: SHIPPED | OUTPATIENT
Start: 2019-12-06 | End: 2020-05-11

## 2019-12-06 RX ORDER — CLOPIDOGREL BISULFATE 75 MG/1
75 TABLET ORAL DAILY
Qty: 21 TABLET | Refills: 0 | Status: ON HOLD | OUTPATIENT
Start: 2019-12-07 | End: 2019-12-09

## 2019-12-06 RX ORDER — ATORVASTATIN CALCIUM 40 MG/1
40 TABLET, FILM COATED ORAL EVERY EVENING
Qty: 30 TABLET | Refills: 0 | Status: SHIPPED | OUTPATIENT
Start: 2019-12-06 | End: 2020-05-11

## 2019-12-06 RX ORDER — AMLODIPINE BESYLATE 5 MG/1
5 TABLET ORAL DAILY
Qty: 30 TABLET | Refills: 0 | Status: ON HOLD | OUTPATIENT
Start: 2019-12-06 | End: 2022-03-02

## 2019-12-06 RX ORDER — HYDROCHLOROTHIAZIDE 12.5 MG/1
12.5 CAPSULE ORAL DAILY
Status: DISCONTINUED | OUTPATIENT
Start: 2019-12-06 | End: 2019-12-06 | Stop reason: HOSPADM

## 2019-12-06 RX ORDER — HYDROCHLOROTHIAZIDE 12.5 MG/1
12.5 CAPSULE ORAL DAILY
Qty: 30 CAPSULE | Refills: 0 | Status: SHIPPED | OUTPATIENT
Start: 2019-12-06 | End: 2019-12-06

## 2019-12-06 RX ADMIN — DORZOLAMIDE HYDROCHLORIDE AND TIMOLOL MALEATE 1 DROP: 20; 5 SOLUTION OPHTHALMIC at 08:06

## 2019-12-06 RX ADMIN — CLOPIDOGREL BISULFATE 75 MG: 75 TABLET ORAL at 08:04

## 2019-12-06 RX ADMIN — ASPIRIN 81 MG: 81 TABLET, COATED ORAL at 08:04

## 2019-12-06 RX ADMIN — LATANOPROST 1 DROP: 50 SOLUTION OPHTHALMIC at 13:30

## 2019-12-06 RX ADMIN — HYDROCHLOROTHIAZIDE 12.5 MG: 12.5 CAPSULE ORAL at 11:35

## 2019-12-06 RX ADMIN — CARVEDILOL 6.25 MG: 6.25 TABLET, FILM COATED ORAL at 08:05

## 2019-12-06 ASSESSMENT — ACTIVITIES OF DAILY LIVING (ADL)
ADLS_ACUITY_SCORE: 14
ADLS_ACUITY_SCORE: 15

## 2019-12-06 NOTE — PROGRESS NOTES
[facility]  Met with pt and daughter to discuss plans for HC.  Pt to be discharged home today and has agreed to have [facility] follow with services of SN, PT and OT. Patient care support center processing referral.  Pt and daughter verbalized understanding that initial visit is scheduled for 12/7 or 12/8/19.  Daughter has 24 hour phone number for [facility] for any questions or concerns.

## 2019-12-06 NOTE — PLAN OF CARE
Patient up SBA. A & O x4, occasionally forgetful. BP's 150/90, 148/70. Denies SOB. BS+, passing gas. Neuro-checks complete. Held stool softeners d/t frequent stools overnight last night. Hoping to discharge home tomorrow if BP's stabilized.

## 2019-12-06 NOTE — PROGRESS NOTES
CM faxed discharge orders to [facility] in [place] at [phone].    CM will continue to follow patient until discharge for any additional needs.     Pat Segovia, RN, BSN, CPHN, CM  Inpatient Care Coordination  [facility]  [phone]    Addendum 1:donnam - RN updated CM that orders for home care were added to the discharge. Patient & daughter are agreeable to [facility]. Updated AVS and resent discharge orders to [facility] (since the first fax did not include home care company.)  Sent email to [facility] and Cheri Kraft to request services. Spoke with Cheri Kraft via phone. She stated [facility] would be able to accept patient. Updated Petra LOWE - [facility] ([phone])on patient's discharge. adelina

## 2019-12-06 NOTE — PROVIDER NOTIFICATION
Paged MD: Pts. dtr. reports pt. takes Tramadol QHS for herniated disc/back pain. Didn't specify dose. Can we please have this ordered? Thanks!    Update: 25 mg Tramadol ordered.    Sarah Huerta RN on 12/6/2019 at 12:05 AM

## 2019-12-06 NOTE — DISCHARGE SUMMARY
[facility]  Discharge Summary  Name: Alejandrina Whatley    MRN: [id number]  YOB: 1923    Age: 89 year old  Date of Discharge:  12/6/2019  Date of Admission: 12/3/2019  Primary Care Provider: Reggie Whatley  Discharge Physician:  Neto Clarke MD  Discharging Service:  Hospitalist      Hospital Course/Discharge Diagnoses:  Alejandrina Whatley is a 89-year-old female with past medical history significant for AAA, hypertension, hyperlipidemia who presented to the emergency room with hypertension and was found to have cerebrovascular accident.  Remarkably she does not appear to have focal deficits though MRI shows an acute ischemic infarct in the right posterior lateral putamen.  Stroke neurology is following.  She remained hospitalized for gradual blood pressure reduction after initially allowing a period of permissive hypertension.    At this point she will discharge back to her assisted living with multiple new medications as noted below.  Her daughter has been involved in the plan of care as Alejandrina clearly has some memory deficits at this point.  No focal deficits but is generally somewhat weak so I have referred her for home PT/OT.     1. Acute cerebrovascular accident: Ischemic infarct of the right posterior lateral putamen. no focal neurologic deficits noted at this point.  Neuro Stroke was consulted.  The patient was given loading dose of Plavix and full-strength aspirin.  -- continue Plavix 75 mg for 21 days and Aspirin 81 mg.   Aspirin will be continued indefinitely, can follow-up in clinic.  -- Echocardiogram without major abnormalities.    -- Started atorvastatin 40 mg, LDL noted to be elevated.  --Appreciate stroke neurology input.     2. Hypertensive urgency:  hypertension most likely due to stroke.   --started Coreg 6.25 mg twice daily, tolerating well so far but blood pressure control not optimal.  Adding amlodipine 5 mg.  Recommend follow-up basic metabolic panel and recheck blood  pressure within the next few days.       3. Chronic kidney disease, stage III:  creatinine 0.9.       4. Hallucinations: Seem to be currently resolved.  This is likely related to underlying possible undiagnosed early dementia.  The patient is not hallucinating at this point.  Risk of having more hallucinations and delirium in the hospital and needs close monitoring.    Patient's daughter present.  Currently at baseline.      Discharge Disposition:  Discharged to home     Allergies:  Allergies   Allergen Reactions     Actonel [Bisphosphonates] Rash     Conjugated Estrogens Rash     Macrobid [Nitrofuran Derivatives] Rash     Nitrofurantoin Rash     Premarin Rash     Sulfa Drugs Rash     Hydrocodone Nausea and Vomiting     Oxycodone Nausea and Vomiting     Risedronate      leg pain        Discharge Medications:        Review of your medicines      START taking      Dose / Directions   amLODIPine 5 MG tablet  Commonly known as:  NORVASC  Used for:  Hypertension goal BP (blood pressure) < 130/80      Dose:  5 mg  Take 1 tablet (5 mg) by mouth daily  Quantity:  30 tablet  Refills:  0     aspirin 81 MG EC tablet  Commonly known as:  ASA      Dose:  81 mg  Start taking on:  December 7, 2019  Take 1 tablet (81 mg) by mouth daily  Quantity:  30 tablet  Refills:  0     atorvastatin 40 MG tablet  Commonly known as:  LIPITOR      Dose:  40 mg  Take 1 tablet (40 mg) by mouth every evening  Quantity:  30 tablet  Refills:  0     carvedilol 6.25 MG tablet  Commonly known as:  COREG      Dose:  6.25 mg  Take 1 tablet (6.25 mg) by mouth 2 times daily (with meals)  Quantity:  60 tablet  Refills:  0     clopidogrel 75 MG tablet  Commonly known as:  PLAVIX      Dose:  75 mg  Start taking on:  December 7, 2019  Take 1 tablet (75 mg) by mouth daily for 21 days Then stop this medication  Quantity:  21 tablet  Refills:  0        CONTINUE these medicines which have NOT CHANGED      Dose / Directions   acetaminophen 500 MG tablet  Commonly  known as:  TYLENOL      Dose:  1,000 mg  Take 1,000 mg by mouth 3 times daily 8am, 2pm, 8pm  Refills:  0     citalopram 10 MG tablet  Commonly known as:  celeXA      Dose:  10 mg  Take 10 mg by mouth every evening  Refills:  0     dorzolamide-timolol 2-0.5 % ophthalmic solution  Commonly known as:  COSOPT  Used for:  Pseudoexfoliation glaucoma, moderate stage      Dose:  1 drop  Place 1 drop into both eyes 2 times daily  Quantity:  1 Bottle  Refills:  11     latanoprost 0.005 % ophthalmic solution  Commonly known as:  XALATAN  Used for:  Pseudoexfoliation glaucoma, moderate stage      Dose:  1 drop  Place 1 drop into both eyes At Bedtime Both Eyes  Quantity:  3 Bottle  Refills:  4     metroNIDAZOLE 0.75 % external cream  Commonly known as:  METROCREAM      Dose:  1 g  Apply 1 g topically 2 times daily as needed (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 and affected areas)  Refills:  0     polyethylene glycol packet  Commonly known as:  MIRALAX/GLYCOLAX      Dose:  17 g  Take 17 g by mouth daily as needed for constipation  Refills:  0     traMADol 25 mg Tabs half-tab  Commonly known as:  ULTRAM      Dose:  25 mg  Take 25 mg by mouth every evening  Refills:  0     Vitamin D3 50 MCG (2000 UT) Tabs      Dose:  2,000 Units  Take 2,000 Units by mouth daily  Refills:  0        STOP taking    atenolol 25 MG tablet  Commonly known as:  TENORMIN              Where to get your medicines      These medications were sent to [facility] [id number] [facility] [address]    Phone:  [phone]     amLODIPine 5 MG tablet    aspirin 81 MG EC tablet    atorvastatin 40 MG tablet    carvedilol 6.25 MG tablet    clopidogrel 75 MG tablet         Condition on Discharge:  Discharge condition: Stable       Code status on discharge: DNR / DNI     History of Illness:  See detailed admission note for full details.    Physical Exam:  Vital signs:  Temp: 96.1  F (35.6  C) Temp src: Oral BP: (!)  "163/61   Heart Rate: 71 Resp: 16 SpO2: 98 % O2 Device: None (Room air)   Height: 147.3 cm (4' 10\") Weight: 61.6 kg (135 lb 14.4 oz)  Estimated body mass index is 28.4 kg/m  as calculated from the following:    Height as of this encounter: 1.473 m (4' 10\").    Weight as of this encounter: 61.6 kg (135 lb 14.4 oz).    Wt Readings from Last 1 Encounters:   12/03/19 61.6 kg (135 lb 14.4 oz)     General: Alert, awake, no acute distress.  HEENT: NC/AT, eyes anicteric, external occular movements intact, face symmetric.  Dentition WNL, MM moist.  Cardiac: RRR, S1, S2.  No murmurs appreciated.  Pulmonary: Normal chest rise, normal work of breathing.  Lungs CTA BL  Abdomen: soft, non-tender, non-distended.  Bowel Sounds Present.  No guarding.  Extremities: no deformities.  Warm, well perfused.  Skin: no rashes or lesions noted.  Warm and Dry.  Neuro: No focal deficits noted.  Speech clear.  Coordination and strength grossly normal.  Psych: Appropriate affect.    Procedures other than Imaging:  none     Imaging:  Results for orders placed or performed during the hospital encounter of 12/03/19   MR Brain w/o & w Contrast    Narrative    MRI BRAIN WITHOUT AND WITH CONTRAST  12/3/2019 3:55 PM    HISTORY:  Confusion, HTN, headache.     TECHNIQUE:  Multiplanar, multisequence MRI of the brain without and  with 6 mL Gadavist.    COMPARISON: None.    FINDINGS: Diffusion-weighted images show a 0.8 cm area of restricted  diffusion in the posterior lateral right putamen consistent with acute  ischemic infarct. There is no evidence for any intracranial  hemorrhage. Moderate cerebral atrophy is present. Moderately extensive  white matter changes are seen in both hemispheres without mass effect.  Postcontrast images do not show any abnormal areas of enhancement or  any focal mass lesions. Vascular structures are patent at the skull  base. There is some mucosal thickening in the right sphenoid sinus.      Impression    IMPRESSION:  1. Acute " ischemic infarct in the right posterior lateral putamen.  2. Moderate cerebral atrophy.  3. Moderately extensive white matter changes which are most likely due  to chronic small vessel ischemic disease given the patient's age.    ROXANN WILLIAMSON MD   MRA Brain ([facility]) wo Contrast    Narrative    MR ANGIOGRAM OF THE HEAD WITHOUT CONTRAST December 4, 2019 8:32 AM     HISTORY: Hallucinations. Right posterior putaminal recent infarct.    TECHNIQUE: 3D time-of-flight MR angiogram of the head without  contrast.    COMPARISON: MRI yesterday.    FINDINGS: Motion artifacts cause signal dropout in the region of the  proximal middle cerebral arteries especially on the right and in the  right carotid siphon. These vessels appear patent on the  gadolinium-enhanced images of the head obtained on the neck MRI. This  also shows moderate stenoses of the right middle cerebral artery M2  segments proximally and congenitally absent right A1 segment as well  as mild atherosclerotic plaque in the carotid siphons without  significant stenosis. There is fetal origin of both posterior cerebral  arteries from the internal carotids, a normal variant accounting for  small size of basilar and vertebral arteries.      Impression    IMPRESSION:  1. Moderate stenoses of the right middle cerebral artery M2 segments.  2. 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  3. 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  4. No other significant stenosis or arterial occlusion.    UMBERTO CARRASCO MD   MRA Neck (Carotids) wo & w Contrast    Narrative    MRA NECK WITHOUT AND WITH CONTRAST  12/4/2019 8:33 AM     HISTORY: Right posterior putaminal infarct on MRI. Hallucinations.    TECHNIQUE: 2D time-of-flight MR angiogram of the neck without contrast  and 3D MR angiogram of the neck with 10 mL Gadavist. Estimates of  carotid stenoses are made relative to the distal internal carotid  artery diameters except as  noted.    COMPARISON: None.    FINDINGS:    Right Carotid: Tortuous common carotid and proximal internal carotid.  No significant stenosis.    Left Carotid:  Tortuous common and internal carotid arteries. Mild  stenosis proximal internal carotid artery.    Vertebral and Basilar:  Possible stenoses of the origins of the  vertebral arteries versus artifacts. Mild diffuse narrowing of the  right vertebral artery V4 segment.    Aortic Arch and Branches:  Tortuous subclavian arteries.      Impression    IMPRESSION:    1. Tortuous common and internal carotid arteries.  2. Possible stenoses at origins of the vertebral arteries versus  motion artifacts.    UMBERTO CARRASCO MD   Echo Complete with Bubble Study    Narrative    [id number]  [id number]  [id number]  [id number]^DUNIA^GARRET^[facility]  Echocardiography Laboratory  [address] [place] [address]        Name: JONY WHATLEY  MRN: [id number]  : 1923  Study Date: 2019 09:11 AM  Age: 89 yrs  Gender: Female  Patient Location: [facility]  Reason For Study: Syncope  Ordering Physician: GARRET DUQUE  Referring Physician: Reggie Whatley  Performed By: Ailyn Stewart     BSA: 1.5 m2  Height: 58 in  Weight: 135 lb  HR: 72  BP: 192/100 mmHg  _____________________________________________________________________________  __        Procedure  Complete Portable Bubble Echo Adult. [facility] (NDC #5192-5872) given  intravenously.  _____________________________________________________________________________  __        Interpretation Summary     Technically difficult, suboptimal study. he left ventricle is normal in size.  Proximal septal thickening is noted. Left ventricular systolic function is  normal. The visual ejection fraction is estimated at 60-65%. Grade I or early  diastolic dysfunction. No regional wall motion abnormalities noted. There is  no thrombus seen in the left ventricle.  The right ventricle is normal size. The right  ventricular systolic function is  normal.  Normal left atrial size. Right atrial size is normal. A contrast injection  (Bubble Study) was performed that was negative for flow across the interatrial  septum.  Mild to moderate tricuspid regurgitation.  No pericardial effusion.  No previous study for comparison.  _____________________________________________________________________________  __        Left Ventricle  The left ventricle is normal in size. Proximal septal thickening is noted.  Left ventricular systolic function is normal. The visual ejection fraction is  estimated at 60-65%. Grade I or early diastolic dysfunction. No regional wall  motion abnormalities noted. There is no thrombus seen in the left ventricle.     Right Ventricle  The right ventricle is normal size. The right ventricular systolic function is  normal.     Atria  Normal left atrial size. Right atrial size is normal. A contrast injection  (Bubble Study) was performed that was negative for flow across the interatrial  septum.     Mitral Valve  The mitral valve leaflets are mildly thickened. There is mild mitral annular  calcification. There is trace mitral regurgitation.        Tricuspid Valve  There is mild to moderate (1-2+) tricuspid regurgitation. The right  ventricular systolic pressure is approximated at 34.5 mmHg plus the right  atrial pressure.     Aortic Valve  There is mild trileaflet aortic sclerosis. No aortic regurgitation is present.  No aortic stenosis is present.     Pulmonic Valve  There is no pulmonic valvular stenosis.     Vessels  The aortic root is normal size. Normal size ascending aorta. Inferior vena  cava not well visualized for estimation of right atrial pressure.     Pericardium  There is no pericardial effusion.        Rhythm  Sinus rhythm was noted.  _____________________________________________________________________________  __  MMode/2D Measurements & Calculations  IVSd: 1.2 cm     LVIDd: 4.1 cm  LVIDs: 2.8  cm  LVPWd: 1.0 cm  FS: 32.4 %  LV mass(C)d: 147.3 grams  LV mass(C)dI: 95.6 grams/m2  Ao root diam: 3.2 cm  LA dimension: 3.2 cm  asc Aorta Diam: 3.0 cm  LA/Ao: 0.99  LVOT diam: 1.8 cm  LVOT area: 2.7 cm2  LA Volume (BP): 42.0 ml  LA Volume Index (BP): 27.3 ml/m2  RWT: 0.49           Doppler Measurements & Calculations  MV E max delon: 81.9 cm/sec  MV A max delon: 114.0 cm/sec  MV E/A: 0.72  MV dec time: 0.30 sec  LV V1 max PG: 3.6 mmHg  LV V1 max: 94.8 cm/sec  LV V1 VTI: 20.8 cm  SV(LVOT): 55.5 ml  SI(LVOT): 36.0 ml/m2  TR max delon: 293.7 cm/sec  TR max P.5 mmHg  E/E' av.7  Lateral E/e': 17.9  Medial E/e': 21.6           _____________________________________________________________________________  __           Report approved by: Todd Gil 2019 10:21 AM             Consultations:  Consultation during this admission received from neurology.       Recent Lab Results:  Recent Labs   Lab 19  0601 19  1435   WBC  --  8.1   HGB  --  13.3   HCT  --  41.7   MCV  --  107*    200          Lab Results   Component Value Date     2019     2019     2018    Lab Results   Component Value Date    CHLORIDE 109 2019    CHLORIDE 107 2019    CHLORIDE 108 2018    Lab Results   Component Value Date    BUN 15 2019    BUN 19 2019    BUN 22 2018      Lab Results   Component Value Date    POTASSIUM 3.9 2019    POTASSIUM 4.0 2019    POTASSIUM 4.1 2018    Lab Results   Component Value Date    CO2 23 2019    CO2 26 2019    CO2 27 2018    Lab Results   Component Value Date    CR 0.90 2019    CR 0.84 2019    CR 0.90 2019        Recent Labs   Lab 19  1017   CHOL 208*   HDL 43*   *   TRIG 139          Pending Results:    Unresulted Labs Ordered in the Past 30 Days of this Admission     No orders found from 11/3/2019 to 2019.           Discharge Instructions and  Follow-Up:   Discharge Procedure Orders   Home care nursing referral   Referral Priority: Routine Referral Type: Home Health Therapies & Aides   Number of Visits Requested: 1     Home Care PT Referral for Hospital Discharge   Referral Priority: Routine Referral Type: Home Health Therapies & Aides   Number of Visits Requested: 1     Home Care OT Referral for Hospital Discharge   Referral Priority: Routine   Number of Visits Requested: 1     Reason for your hospital stay   Order Comments: You were admitted for an acute stroke which led to very high blood pressure.  We have added multiple new medications to prevent recurrence of stroke and to help you recover.  You will require blood pressure monitoring and repeat lab work after leaving the hospital as directed.     Follow-up and recommended labs and tests    Order Comments: Follow up with primary care provider, Reggie Whatley, within 3-5 days to evaluate medication change and for hospital follow- up.  The following labs/tests are recommended: Please check a basic metabolic panel and review blood pressure.  You were started on low doses of 2 new blood pressure medications here in place of your atenolol which has been stopped.    You were also started on aspirin, Plavix and atorvastatin (cholesterol medication) given that you suffered a stroke     Activity   Order Comments: Your activity upon discharge: activity as tolerated with assistance as needed     Order Specific Question Answer Comments   Is discharge order? Yes      MD face to face encounter   Order Comments: Documentation of Face to Face and Certification for Home Health Services    I certify that patient: Alejandrina Whatley is under my care and that I, or a nurse practitioner or physician's assistant working with me, had a face-to-face encounter that meets the physician face-to-face encounter requirements with this patient on: 12/6/2019.    This encounter with the patient was in whole, or in part, for the following  medical condition, which is the primary reason for home health care: Stroke.    I certify that, based on my findings, the following services are medically necessary home health services: Nursing, Occupational Therapy and Physical Therapy.    My clinical findings support the need for the above services because: Nurse is needed: To assess blood pressure, home safety after changes in medications or other medical regimen.., Occupational Therapy Services are needed to assess and treat cognitive ability and address ADL safety due to impairment in cognition and mobility. and Physical Therapy Services are needed to assess and treat the following functional impairments: Cognition and mobility.    Further, I certify that my clinical findings support that this patient is homebound (i.e. absences from home require considerable and taxing effort and are for medical reasons or [employer] services or infrequently or of short duration when for other reasons) because: Leaving home is medically contraindicated for the following reason(s): Other physician ordered restriction: Increased fall risk due to recent stroke, memory impairment...    Based on the above findings. I certify that this patient is confined to the home and needs intermittent skilled nursing care, physical therapy and/or speech therapy.  The patient is under my care, and I have initiated the establishment of the plan of care.  This patient will be followed by a physician who will periodically review the plan of care.  Physician/Provider to provide follow up care: Reggie Whatley    Attending hospital physician (the Medicare certified [place] provider): Theron Clarke MD  Physician Signature: See electronic signature associated with these discharge orders.  Date: 12/6/2019     DNR/DNI     Order Specific Question Answer Comments   Code status determined by: Other (please document)      Diet   Order Comments: Follow this diet upon discharge: Orders Placed This  Encounter      Combination Diet Regular Diet Adult     Order Specific Question Answer Comments   Is discharge order? Yes        Total time spent in face to face contact with the patient and coordinating discharge was:  50 Minutes.

## 2019-12-06 NOTE — PHARMACY - DISCHARGE MEDICATION RECONCILIATION AND EDUCATION
Discharge medication review for this patient is complete.   Patient was not counseled or given any education materials as discharged to LTC, TCU facility, Memory Care Facility, etc.  See EPIC for allergy information, prior to admission medications and immunization status.   Pharmacist assisted with medication reconciliation of discharge medications with PTA medications.    MD was contacted with any questions/concerns:None    Additional medication history information:None    Discharge Medication List     Review of your medicines      START taking      Dose / Directions   amLODIPine 5 MG tablet  Commonly known as:  NORVASC  Used for:  Hypertension goal BP (blood pressure) < 130/80      Dose:  5 mg  Take 1 tablet (5 mg) by mouth daily  Quantity:  30 tablet  Refills:  0     aspirin 81 MG EC tablet  Commonly known as:  ASA      Dose:  81 mg  Start taking on:  December 7, 2019  Take 1 tablet (81 mg) by mouth daily  Quantity:  30 tablet  Refills:  0     atorvastatin 40 MG tablet  Commonly known as:  LIPITOR      Dose:  40 mg  Take 1 tablet (40 mg) by mouth every evening  Quantity:  30 tablet  Refills:  0     carvedilol 6.25 MG tablet  Commonly known as:  COREG      Dose:  6.25 mg  Take 1 tablet (6.25 mg) by mouth 2 times daily (with meals)  Quantity:  60 tablet  Refills:  0     clopidogrel 75 MG tablet  Commonly known as:  PLAVIX      Dose:  75 mg  Start taking on:  December 7, 2019  Take 1 tablet (75 mg) by mouth daily for 21 days Then stop this medication  Quantity:  21 tablet  Refills:  0        CONTINUE these medicines which have NOT CHANGED      Dose / Directions   acetaminophen 500 MG tablet  Commonly known as:  TYLENOL      Dose:  1,000 mg  Take 1,000 mg by mouth 3 times daily 8am, 2pm, 8pm  Refills:  0     citalopram 10 MG tablet  Commonly known as:  celeXA      Dose:  10 mg  Take 10 mg by mouth every evening  Refills:  0     dorzolamide-timolol 2-0.5 % ophthalmic solution  Commonly known as:  COSOPT  Used for:   Pseudoexfoliation glaucoma, moderate stage      Dose:  1 drop  Place 1 drop into both eyes 2 times daily  Quantity:  1 Bottle  Refills:  11     latanoprost 0.005 % ophthalmic solution  Commonly known as:  XALATAN  Used for:  Pseudoexfoliation glaucoma, moderate stage      Dose:  1 drop  Place 1 drop into both eyes At Bedtime Both Eyes  Quantity:  3 Bottle  Refills:  4     metroNIDAZOLE 0.75 % external cream  Commonly known as:  METROCREAM      Dose:  1 g  Apply 1 g topically 2 times daily as needed (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 and affected areas)  Refills:  0     polyethylene glycol packet  Commonly known as:  MIRALAX/GLYCOLAX      Dose:  17 g  Take 17 g by mouth daily as needed for constipation  Refills:  0     traMADol 25 mg Tabs half-tab  Commonly known as:  ULTRAM      Dose:  25 mg  Take 25 mg by mouth every evening  Refills:  0     Vitamin D3 50 MCG (2000 UT) Tabs      Dose:  2,000 Units  Take 2,000 Units by mouth daily  Refills:  0        STOP taking    atenolol 25 MG tablet  Commonly known as:  TENORMIN              Where to get your medicines      These medications were sent to [facility] [place], [place] - [address] [facility]  [address] [facility] [address]    Phone:  [phone]     amLODIPine 5 MG tablet    aspirin 81 MG EC tablet    atorvastatin 40 MG tablet    carvedilol 6.25 MG tablet    clopidogrel 75 MG tablet

## 2019-12-06 NOTE — PLAN OF CARE
VSS, tele SR w/ PAC in 60s. Neuros intact, pt very forgetful. Pt complaining of weakness in legs and L hand- only able to ambulate to the bathroom, was unable to ambulate back to bed. Using gait belt and walker. Regular diet, SBA.

## 2019-12-06 NOTE — PLAN OF CARE
Pt hospitalized for stroke.  Discharge education provided to patient and patient's daughter who helps a lot with care.  Daughter verbalized understanding of medications and orders.  Medications filled at [facility] pharmacy and sent with patient.   Pt verbalized will follow up with [facility] provider within next week to evaluate medication change.  Patient discharging to [facility] and transportation provided by daughter.  Home care PT, OT, and RN added through [facility]. No further questions at this time.

## 2019-12-06 NOTE — DISCHARGE INSTRUCTIONS
You have a new order for [facility] for OT,PT & RN services. They will be contacting you within 24-48 hours to set up initial visit. If you have not heard from them after this time, please contact them at ([phone]).

## 2019-12-06 NOTE — PROGRESS NOTES
CM was updated that patient will be discharged today. Contacted daughter, Pat. She is on her way to [facility] now. She is still agreeable to transporting her mom back to [facility] in [place]. Attempted to contact [facility] to update on patient's return; left message with Franchesca LE at [facility]. Provided her with CM & SW contact information. Await return call.     CM will continue to follow patient until discharge for any additional needs.     Pat Segovia, RN, BSN, CPHN, CM  Inpatient Care Coordination  [facility]  [phone] (for today)    Addendum 10:15am - Hospitalist wants PT to reassess patient for return to her AL. Possibly may need TCU for further therapies. Await PT evaluation.  adelina

## 2019-12-06 NOTE — PLAN OF CARE
"  PT: Patient seen by physical therapy for re-evaluation.  Patient with PMH including AAA, HTN, hyperlipidemia now admitted with HTN, found to have acute CVA.  Patient lives in care home alone, uses 4WW for mobility at baseline.  Patient receives assistance with housecleaning, medication management, and 3 meals per day.      Discharge Planner PT   Patient plan for discharge: Return to [facility] today  Current status: Pt toileting with nursing upon initiation of session. Able to complete all pericares indep. Tolerates standing at the sink for hand washing. completes sit<>stand from various surfaces with SBA/indep. Pt ambulates 100' with use of 4WW and SBA with occasional cuing for walker management. Pt's daughter frequently reports \"she wont remember that\" or \"she can't do that\" when referring to walker brakes. Attempted to educate pt to park 4WW against wall prior to sitting when able to ensure walker doesn't move. Discussed use of 4WW versus FWW for mobility in the apartment. Discussed HHPT with pt an daughter. Pt sitting in bedside chair at end of session. RN updated.   Barriers to return to prior living situation: slight weakness  Recommendations for discharge: Return to [facility] with previously received level of assistance, and HHPT  Rationale for recommendations: Patient presents slightly below baseline mobility. Would benefit from HHPT to maximize safety/ indep with mobility in home environment. Pt requires HHPT as leaving the home with would be a taxing effort for the pt.        Entered by: Leilani Orozco 12/06/2019 12:31 PM         Physical Therapy Discharge Summary    Reason for therapy discharge:    All goals and outcomes met, no further needs identified.    Progress towards therapy goal(s). See goals on Care Plan in [facility] electronic health record for goal details.  Goals met    Therapy recommendation(s):    Continued therapy is recommended.  Rationale/Recommendations:  HHPT.      "

## 2019-12-06 NOTE — PROGRESS NOTES
12/06/19 1239   Quick Adds   Type of Visit PT Re-evaluation   Living Environment   Lives With alone;facility resident   Living Arrangements assisted living   Home Accessibility no concerns   Transportation Anticipated family or friend will provide   Self-Care   Usual Activity Tolerance good   Current Activity Tolerance moderate   Equipment Currently Used at Home walker, rolling   Functional Level Prior   Ambulation 1-->assistive equipment  (4WW)   Transferring 1-->assistive equipment   Toileting 0-->independent   Bathing 0-->independent   Fall history within last six months yes   Number of times patient has fallen within last six months 1   Which of the above functional risks had a recent onset or change? ambulation;transferring;toileting;bathing;dressing   Prior Functional Level Comment Patient uses 4WW with mobility, does own laundry   General Information   Onset of Illness/Injury or Date of Surgery - Date 12/03/19   Referring Physician Theron Clarke   Patient/Family Goals Statement return to [facility]   Pertinent History of Current Problem (include personal factors and/or comorbidities that impact the POC) Alejandrina Whatley is a 89-year-old female with past medical history significant for AAA, hypertension, hyperlipidemia who presented to the emergency room with hypertension and was found to have cerebrovascular accident.   Precautions/Limitations fall precautions   General Observations Pt appears to be moving well, SBA for all functional mobility   Cognitive Status Examination   Orientation orientation to person, place and time   Level of Consciousness alert   Follows Commands and Answers Questions 100% of the time   Personal Safety and Judgment at risk behaviors demonstrated   Memory intact   Pain Assessment   Patient Currently in Pain No   Integumentary/Edema   Integumentary/Edema no deficits were identifed   Posture    Posture Forward head position;Protracted shoulders;Kyphosis   Range of Motion (ROM)   ROM  "Comment WFL   Strength   Strength Comments WFL   Bed Mobility   Bed Mobility Comments sit<>supine SBA   Transfer Skills   Transfer Comments SBA sit<>stand   Gait   Gait Comments SBA with 4WW   Balance   Balance Comments requires B UE support for safe dynamic mobility   Sensory Examination   Sensory Perception no deficits were identified   Coordination   Coordination no deficits were identified   Muscle Tone   Muscle Tone no deficits were identified   General Therapy Interventions   Planned Therapy Interventions gait training;home program guidelines;progressive activity/exercise   Clinical Impression   Criteria for Skilled Therapeutic Intervention yes, treatment indicated   PT Diagnosis impaired functional mobility   Influenced by the following impairments weakness, cognition   Functional limitations due to impairments Difficulty with ambulation/transfers   Clinical Presentation Stable/Uncomplicated   Clinical Presentation Rationale complex pmh, stable presentation, good social support   Clinical Decision Making (Complexity) Low complexity   Therapy Frequency Daily   Predicted Duration of Therapy Intervention (days/wks) 1x eval and treat   Anticipated Discharge Disposition Home with Home Therapy   Risk & Benefits of therapy have been explained Yes   Patient, Family & other staff in agreement with plan of care Yes   [facility] [employer] TM \"6 Clicks\"   2016, Trustees of [facility], under license to [employer].  All rights reserved.   6 Clicks Short Forms Basic Mobility Inpatient Short Form   [facility] [employer]  \"6 Clicks\" V.2 Basic Mobility Inpatient Short Form   1. Turning from your back to your side while in a flat bed without using bedrails? 4 - None   2. Moving from lying on your back to sitting on the side of a flat bed without using bedrails? 4 - None   3. Moving to and from a bed to a chair (including a wheelchair)? 3 - A Little   4. Standing up from a chair using your arms (e.g., wheelchair, " or bedside chair)? 4 - None   5. To walk in hospital room? 3 - A Little   6. Climbing 3-5 steps with a railing? 3 - A Little   Basic Mobility Raw Score (Score out of 24.Lower scores equate to lower levels of function) 21   Total Evaluation Time   Total Evaluation Time (Minutes) 8

## 2019-12-07 ENCOUNTER — HOSPITAL ENCOUNTER (OUTPATIENT)
Facility: CLINIC | Age: 84
Setting detail: OBSERVATION
Discharge: NURSING, REHAB OR LONG-TERM CARE | End: 2019-12-09
Attending: EMERGENCY MEDICINE | Admitting: INTERNAL MEDICINE
Payer: COMMERCIAL

## 2019-12-07 ENCOUNTER — APPOINTMENT (OUTPATIENT)
Dept: GENERAL RADIOLOGY | Facility: CLINIC | Age: 84
End: 2019-12-07
Attending: EMERGENCY MEDICINE
Payer: COMMERCIAL

## 2019-12-07 ENCOUNTER — APPOINTMENT (OUTPATIENT)
Dept: CT IMAGING | Facility: CLINIC | Age: 84
End: 2019-12-07
Attending: EMERGENCY MEDICINE
Payer: COMMERCIAL

## 2019-12-07 DIAGNOSIS — M19.019 OSTEOARTHRITIS OF SHOULDER, UNSPECIFIED LATERALITY, UNSPECIFIED OSTEOARTHRITIS TYPE: Primary | ICD-10-CM

## 2019-12-07 DIAGNOSIS — R53.1 WEAKNESS: ICD-10-CM

## 2019-12-07 DIAGNOSIS — I63.81 CEREBROVASCULAR ACCIDENT (CVA) DUE TO OCCLUSION OF SMALL ARTERY (H): ICD-10-CM

## 2019-12-07 LAB
ALBUMIN SERPL-MCNC: 3 G/DL (ref 3.4–5)
ALBUMIN UR-MCNC: NEGATIVE MG/DL
ALP SERPL-CCNC: 69 U/L (ref 40–150)
ALT SERPL W P-5'-P-CCNC: 15 U/L (ref 0–50)
ANION GAP SERPL CALCULATED.3IONS-SCNC: 5 MMOL/L (ref 3–14)
APPEARANCE UR: CLEAR
AST SERPL W P-5'-P-CCNC: 19 U/L (ref 0–45)
BASOPHILS # BLD AUTO: 0 10E9/L (ref 0–0.2)
BASOPHILS NFR BLD AUTO: 0.1 %
BILIRUB SERPL-MCNC: 0.2 MG/DL (ref 0.2–1.3)
BILIRUB UR QL STRIP: NEGATIVE
BUN SERPL-MCNC: 24 MG/DL (ref 7–30)
CALCIUM SERPL-MCNC: 8.7 MG/DL (ref 8.5–10.1)
CHLORIDE SERPL-SCNC: 108 MMOL/L (ref 94–109)
CO2 BLDCOV-SCNC: 24 MMOL/L (ref 21–28)
CO2 SERPL-SCNC: 25 MMOL/L (ref 20–32)
COLOR UR AUTO: YELLOW
CREAT SERPL-MCNC: 0.88 MG/DL (ref 0.52–1.04)
DIFFERENTIAL METHOD BLD: ABNORMAL
EOSINOPHIL # BLD AUTO: 0.1 10E9/L (ref 0–0.7)
EOSINOPHIL NFR BLD AUTO: 0.9 %
ERYTHROCYTE [DISTWIDTH] IN BLOOD BY AUTOMATED COUNT: 13.7 % (ref 10–15)
GFR SERPL CREATININE-BSD FRML MDRD: 56 ML/MIN/{1.73_M2}
GLUCOSE SERPL-MCNC: 112 MG/DL (ref 70–99)
GLUCOSE UR STRIP-MCNC: NEGATIVE MG/DL
HCT VFR BLD AUTO: 37.8 % (ref 35–47)
HGB BLD-MCNC: 12.3 G/DL (ref 11.7–15.7)
HGB UR QL STRIP: NEGATIVE
IMM GRANULOCYTES # BLD: 0 10E9/L (ref 0–0.4)
IMM GRANULOCYTES NFR BLD: 0.2 %
INTERPRETATION ECG - MUSE: NORMAL
KETONES UR STRIP-MCNC: NEGATIVE MG/DL
LACTATE BLD-SCNC: 1.2 MMOL/L (ref 0.7–2.1)
LEUKOCYTE ESTERASE UR QL STRIP: NEGATIVE
LYMPHOCYTES # BLD AUTO: 1.3 10E9/L (ref 0.8–5.3)
LYMPHOCYTES NFR BLD AUTO: 15.2 %
MCH RBC QN AUTO: 34.1 PG (ref 26.5–33)
MCHC RBC AUTO-ENTMCNC: 32.5 G/DL (ref 31.5–36.5)
MCV RBC AUTO: 105 FL (ref 78–100)
MONOCYTES # BLD AUTO: 0.8 10E9/L (ref 0–1.3)
MONOCYTES NFR BLD AUTO: 9.4 %
NEUTROPHILS # BLD AUTO: 6.3 10E9/L (ref 1.6–8.3)
NEUTROPHILS NFR BLD AUTO: 74.2 %
NITRATE UR QL: NEGATIVE
NRBC # BLD AUTO: 0 10*3/UL
NRBC BLD AUTO-RTO: 0 /100
PCO2 BLDV: 44 MM HG (ref 40–50)
PH BLDV: 7.35 PH (ref 7.32–7.43)
PH UR STRIP: 6 PH (ref 5–7)
PLATELET # BLD AUTO: 194 10E9/L (ref 150–450)
PO2 BLDV: 34 MM HG (ref 25–47)
POTASSIUM SERPL-SCNC: 3.9 MMOL/L (ref 3.4–5.3)
PROT SERPL-MCNC: 6.9 G/DL (ref 6.8–8.8)
RBC # BLD AUTO: 3.61 10E12/L (ref 3.8–5.2)
RBC #/AREA URNS AUTO: <1 /HPF (ref 0–2)
SAO2 % BLDV FROM PO2: 62 %
SODIUM SERPL-SCNC: 138 MMOL/L (ref 133–144)
SOURCE: NORMAL
SP GR UR STRIP: 1.02 (ref 1–1.03)
SQUAMOUS #/AREA URNS AUTO: <1 /HPF (ref 0–1)
TROPONIN I SERPL-MCNC: <0.015 UG/L (ref 0–0.04)
UROBILINOGEN UR STRIP-MCNC: NORMAL MG/DL (ref 0–2)
WBC # BLD AUTO: 8.5 10E9/L (ref 4–11)
WBC #/AREA URNS AUTO: 1 /HPF (ref 0–5)

## 2019-12-07 PROCEDURE — 96361 HYDRATE IV INFUSION ADD-ON: CPT

## 2019-12-07 PROCEDURE — 99285 EMERGENCY DEPT VISIT HI MDM: CPT | Mod: 25

## 2019-12-07 PROCEDURE — 25000132 ZZH RX MED GY IP 250 OP 250 PS 637: Performed by: INTERNAL MEDICINE

## 2019-12-07 PROCEDURE — 81001 URINALYSIS AUTO W/SCOPE: CPT | Performed by: EMERGENCY MEDICINE

## 2019-12-07 PROCEDURE — 93005 ELECTROCARDIOGRAM TRACING: CPT

## 2019-12-07 PROCEDURE — G0378 HOSPITAL OBSERVATION PER HR: HCPCS

## 2019-12-07 PROCEDURE — 82803 BLOOD GASES ANY COMBINATION: CPT

## 2019-12-07 PROCEDURE — 99219 ZZC INITIAL OBSERVATION CARE,LEVL II: CPT | Performed by: INTERNAL MEDICINE

## 2019-12-07 PROCEDURE — 85025 COMPLETE CBC W/AUTO DIFF WBC: CPT | Performed by: EMERGENCY MEDICINE

## 2019-12-07 PROCEDURE — 70450 CT HEAD/BRAIN W/O DYE: CPT

## 2019-12-07 PROCEDURE — 83605 ASSAY OF LACTIC ACID: CPT

## 2019-12-07 PROCEDURE — 84484 ASSAY OF TROPONIN QUANT: CPT | Performed by: EMERGENCY MEDICINE

## 2019-12-07 PROCEDURE — 25800030 ZZH RX IP 258 OP 636: Performed by: EMERGENCY MEDICINE

## 2019-12-07 PROCEDURE — 80053 COMPREHEN METABOLIC PANEL: CPT | Performed by: EMERGENCY MEDICINE

## 2019-12-07 PROCEDURE — 96360 HYDRATION IV INFUSION INIT: CPT

## 2019-12-07 PROCEDURE — 71046 X-RAY EXAM CHEST 2 VIEWS: CPT

## 2019-12-07 RX ORDER — NALOXONE HYDROCHLORIDE 0.4 MG/ML
.1-.4 INJECTION, SOLUTION INTRAMUSCULAR; INTRAVENOUS; SUBCUTANEOUS
Status: DISCONTINUED | OUTPATIENT
Start: 2019-12-07 | End: 2019-12-09 | Stop reason: HOSPADM

## 2019-12-07 RX ORDER — POLYETHYLENE GLYCOL 3350 17 G/17G
17 POWDER, FOR SOLUTION ORAL DAILY PRN
Status: DISCONTINUED | OUTPATIENT
Start: 2019-12-07 | End: 2019-12-09 | Stop reason: HOSPADM

## 2019-12-07 RX ORDER — ONDANSETRON 2 MG/ML
4 INJECTION INTRAMUSCULAR; INTRAVENOUS EVERY 6 HOURS PRN
Status: DISCONTINUED | OUTPATIENT
Start: 2019-12-07 | End: 2019-12-09 | Stop reason: HOSPADM

## 2019-12-07 RX ORDER — CLOPIDOGREL BISULFATE 75 MG/1
75 TABLET ORAL DAILY
Status: DISCONTINUED | OUTPATIENT
Start: 2019-12-08 | End: 2019-12-09 | Stop reason: HOSPADM

## 2019-12-07 RX ORDER — ASPIRIN 81 MG/1
81 TABLET ORAL DAILY
Status: DISCONTINUED | OUTPATIENT
Start: 2019-12-08 | End: 2019-12-09 | Stop reason: HOSPADM

## 2019-12-07 RX ORDER — AMLODIPINE BESYLATE 5 MG/1
5 TABLET ORAL DAILY
Status: DISCONTINUED | OUTPATIENT
Start: 2019-12-08 | End: 2019-12-09 | Stop reason: HOSPADM

## 2019-12-07 RX ORDER — DORZOLAMIDE HYDROCHLORIDE AND TIMOLOL MALEATE 20; 5 MG/ML; MG/ML
1 SOLUTION/ DROPS OPHTHALMIC 2 TIMES DAILY
Status: DISCONTINUED | OUTPATIENT
Start: 2019-12-07 | End: 2019-12-09 | Stop reason: HOSPADM

## 2019-12-07 RX ORDER — CARVEDILOL 6.25 MG/1
6.25 TABLET ORAL 2 TIMES DAILY WITH MEALS
Status: DISCONTINUED | OUTPATIENT
Start: 2019-12-07 | End: 2019-12-09 | Stop reason: HOSPADM

## 2019-12-07 RX ORDER — LATANOPROST 50 UG/ML
1 SOLUTION/ DROPS OPHTHALMIC AT BEDTIME
Status: DISCONTINUED | OUTPATIENT
Start: 2019-12-07 | End: 2019-12-08

## 2019-12-07 RX ORDER — ACETAMINOPHEN 500 MG
1000 TABLET ORAL 3 TIMES DAILY
Status: DISCONTINUED | OUTPATIENT
Start: 2019-12-07 | End: 2019-12-09 | Stop reason: HOSPADM

## 2019-12-07 RX ORDER — ACETAMINOPHEN 325 MG/1
650 TABLET ORAL EVERY 4 HOURS PRN
Status: DISCONTINUED | OUTPATIENT
Start: 2019-12-07 | End: 2019-12-09 | Stop reason: HOSPADM

## 2019-12-07 RX ORDER — BISACODYL 10 MG
10 SUPPOSITORY, RECTAL RECTAL DAILY PRN
Status: DISCONTINUED | OUTPATIENT
Start: 2019-12-07 | End: 2019-12-09 | Stop reason: HOSPADM

## 2019-12-07 RX ORDER — ONDANSETRON 4 MG/1
4 TABLET, ORALLY DISINTEGRATING ORAL EVERY 6 HOURS PRN
Status: DISCONTINUED | OUTPATIENT
Start: 2019-12-07 | End: 2019-12-09 | Stop reason: HOSPADM

## 2019-12-07 RX ORDER — SODIUM CHLORIDE 9 MG/ML
1000 INJECTION, SOLUTION INTRAVENOUS CONTINUOUS
Status: DISCONTINUED | OUTPATIENT
Start: 2019-12-07 | End: 2019-12-07

## 2019-12-07 RX ORDER — ATORVASTATIN CALCIUM 40 MG/1
40 TABLET, FILM COATED ORAL EVERY EVENING
Status: DISCONTINUED | OUTPATIENT
Start: 2019-12-07 | End: 2019-12-09 | Stop reason: HOSPADM

## 2019-12-07 RX ADMIN — DORZOLAMIDE HYDROCHLORIDE AND TIMOLOL MALEATE 1 DROP: 20; 5 SOLUTION/ DROPS OPHTHALMIC at 19:08

## 2019-12-07 RX ADMIN — SODIUM CHLORIDE 1000 ML: 9 INJECTION, SOLUTION INTRAVENOUS at 13:22

## 2019-12-07 RX ADMIN — ACETAMINOPHEN 1000 MG: 500 TABLET, FILM COATED ORAL at 19:07

## 2019-12-07 RX ADMIN — CARVEDILOL 6.25 MG: 6.25 TABLET, FILM COATED ORAL at 19:07

## 2019-12-07 RX ADMIN — LATANOPROST 1 DROP: 50 SOLUTION/ DROPS OPHTHALMIC at 19:08

## 2019-12-07 RX ADMIN — ATORVASTATIN CALCIUM 40 MG: 40 TABLET, FILM COATED ORAL at 19:07

## 2019-12-07 ASSESSMENT — ACTIVITIES OF DAILY LIVING (ADL)
RETIRED_EATING: 0-->INDEPENDENT
BATHING: 2-->ASSISTIVE PERSON
SWALLOWING: 0-->SWALLOWS FOODS/LIQUIDS WITHOUT DIFFICULTY
RETIRED_COMMUNICATION: 0-->UNDERSTANDS/COMMUNICATES WITHOUT DIFFICULTY
FALL_HISTORY_WITHIN_LAST_SIX_MONTHS: NO
WHICH_OF_THE_ABOVE_FUNCTIONAL_RISKS_HAD_A_RECENT_ONSET_OR_CHANGE?: AMBULATION
DRESS: 2-->ASSISTIVE PERSON
TOILETING: 2-->ASSISTIVE PERSON
COGNITION: 0 - NO COGNITION ISSUES REPORTED

## 2019-12-07 NOTE — ED PROVIDER NOTES
History     Chief Complaint:  Hypertension      HPI   Alejandrina Whatley is a 89 year old female who presents with hypertension. Patient's daughter states that she had a stroke on Tuesday and got home from the hospital yesterday. She was told that the patient's doctor wants to have a blood pressure of 140-150, but is 180 today. Additionally, her blood pressure should not drop below 140. She states that the patient is not herself at all and is dizzy, unable to walk due to weakness, and unable to take simple instructions.     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  Lumbar spinal stenosis  Osteoporosis, post-menopausal  Pseudoexfoliation of lens capsule  Urinary incontinence  Insomnia  CED  CKD  Vitamin D deficiency  Radicular pain of left lower extremity  Mild major depression  Peripheral artery disesae  Thoracic compression fracture  Abdominal aortic anurysm    Past Surgical History:    AAA Repair  C anter vesicourethropexy simple  Appendectomy   Cataract IOL, Rt/Lt  Extracapsular cataract extraction w Intraocular lens implant  Hysterectomy  Laser selective trabeculoplasty, Lt  IR vertebroplasty cervicothoracic including guide x2  HCHG Trabeculoplasty by selective laser unilateral, Left    Family History:    Father: Heart disease  Mother: hypertension  Sister: Alzheimer's disease    Social History:  Denies tobacco use  Denies alcohol use  Denies drug use  Marital Status:   [5]    Review of Systems   All other systems reviewed and are negative.      Physical Exam      Physical Exam    First Vitals:     Patient Vitals for the past 24 hrs:   BP Temp Temp src Pulse Resp SpO2   12/07/19 1137 (!) 140/68 98.6  F (37  C) Oral 70 20 96 %       General: Alert and Interactive.   Head: No signs of trauma.   Mouth/Throat: Oropharynx is clear and moist.   Eyes: Conjunctivae are normal. Pupils are equal, round, and reactive to light.   Neck: Normal range of motion. No nuchal rigidity.   CV: Normal rate and regular rhythm.    Resp: Effort normal and breath sounds normal. No respiratory distress.   GI: Soft. There is no tenderness or guarding.   MSK: Normal range of motion. no edema.   Neuro: The patient is alert and oriented to person, place, and time.  PERRLA, EOMI, strength in upper/lower extremities normal and symmetrical.   Sensation normal. Speech normal.  Neuro Exam:  PERRLA, EOMI, visual fields intact  strength in upper/lower extremities normal and symetrical.  No drift.  DTR's normal.  Sensation normal.  Finger nose finger, rapid alternating movements normal   Rhomberg negative  Speech normal  Gait normal  GCS 15  GCS eye subscore is 4. GCS verbal subscore is 5. GCS motor subscore is 6.   Skin: Skin is warm and dry. No rash noted.   Psych: normal mood and affect. behavior is normal.       Emergency Department Course   ECG:  Indication: hypertension  Time: 1136  Vent. Rate 70 bpm. OH interval 194. QRS duration 70. QT/QTc 384/414. P-R-T axis -13 -1 -7.  Normal sinus rhythm Nonspecific t wave abnormality Abnormal ECG. Read time: 1137    Imaging:  XR Chest 2 views:   IMPRESSION: No significant interval change. Lungs clear. Advanced  degenerative changes in the spine and both glenohumeral joints.  Postoperative changes of vertebroplasty are again noted in the mid  thoracic spine. as per radiology.    CT Head without contrast:   IMPRESSION:   1. Small recent infarct in the posterior right putamen without  hemorrhage.  2. No new infarct elsewhere. No hemorrhage.  3. Atrophy of the brain.   White matter changes consistent with  sequelae of small vessel ischemic disease. This is unchanged. as per radiology.    Laboratory:  UA with Microscopic: o/w WNL    ISTAT VBG: pH 7.35 / PCO2 44 / PO2 34 / Bicarb 24 / O2 sat 62 / lactic acid 1.2    CBC: WBC: 8.5, HGB: 12.3, PLT: 194    CMP: Glucose 112(H), GFR: 56 (L), Albumin: 30 (L), o/w WNL (Creatinine: 0.88)    1259 Troponin: <0.015    Interventions:  1322 NS 1,000 mL IV      Emergency Department Course:  Nursing notes and vitals reviewed. (1222) I performed an exam of the patient as documented above.     IV inserted. Medicine administered as documented above. Blood drawn. This was sent to the lab for further testing, results above.     The patient was sent for a XR chest 2 views, CT head w/o Contrast while in the emergency department, findings above.     1352 I rechecked the patient and discussed the results of her workup thus far.     1345  I consulted with  the hospitalist services. They are in agreement to accept the patient for admission.        Impression & Plan      Medical Decision Making:  Alejandrina Whatley is a 89 year old female who presents for evaluation of weakness.  Associated symptoms today have included weakness lethargy, difficulty getting out of bed.  The workup here in the emergency room was to evaluate for infections, metabolic, electrolyte, neurologic or cardiovascular causes.  Of note, there are no signs of pneumonia or UTI causing the symptoms.  In addition, I do not believe symptoms are due to CVA, TIA, myasthesia gravis, myopathy or acute coronary syndromes and there are no indications at this point for a further workup with a benign history, exam and laboratory tests.  They were ambulated in ED and did well.  I believe supportive outpatient management is indicated; will have them followup with their primary care doctor in 1-2 days for a recheck. Return for any additional symptoms or worsening weakness.        Diagnosis:    ICD-10-CM    1.  Weakness R53.1        Disposition:  Admitted to observation    Discharge Medications:  New Prescriptions    No medications on file     Scribe Disclosure:  I, Deni Buck, am serving as a scribe on 12/7/2019 at 12:22 PM to personally document services performed by Reggie Hannah MD based on my observations and the provider's statements to me.       Deni Buck  12/7/2019    EMERGENCY DEPARTMENT       Reggie Hannah MD  12/07/19 1445

## 2019-12-07 NOTE — ED TRIAGE NOTES
Pt discharged from [facility] 2 days ago after having a stroke. Pt in a care facility, orders stated to send pt to ER if sbp >150, sbp this am at care facillity from 160-178. sbp for ems 133, no further complaints

## 2019-12-07 NOTE — ED NOTES
Bed: ED06  Expected date:   Expected time:   Means of arrival:   Comments:  Isaac 593 - 89F from NH, HTN - ETA 12min

## 2019-12-07 NOTE — PHARMACY-ADMISSION MEDICATION HISTORY
Pharmacy Medication History  Admission medication history interview status for the 12/7/2019  admission is complete. See EPIC admission navigator for prior to admission medications     Medication history sources: MAR (patient have a service with [facility] )  Medication history source reliability: Good  Adherence assessment: Good    Significant changes made to the medication list:  Patient was recently in the hospital. Nothing changed       Additional medication history information:   Called [facility] ([phone]) and talk to nurse Neumann for last doses.     Medication reconciliation completed by provider prior to medication history? No    Time spent in this activity: 20 minutes       Prior to Admission medications    Medication Sig Last Dose Taking? Auth Provider   acetaminophen (TYLENOL) 500 MG tablet Take 1,000 mg by mouth 3 times daily 8am, 2pm, 8pm 12/7/2019 at am Yes Unknown, Entered By History   amLODIPine (NORVASC) 5 MG tablet Take 1 tablet (5 mg) by mouth daily 12/7/2019 at am Yes Theron Clarke MD   aspirin (ASA) 81 MG EC tablet Take 1 tablet (81 mg) by mouth daily 12/7/2019 at am Yes Theron Clarke MD   atorvastatin (LIPITOR) 40 MG tablet Take 1 tablet (40 mg) by mouth every evening 12/6/2019 at Unknown time Yes Theron Clarke MD   carvedilol (COREG) 6.25 MG tablet Take 1 tablet (6.25 mg) by mouth 2 times daily (with meals) 12/7/2019 at am Yes Theron Clarke MD   Cholecalciferol (VITAMIN D3) 50 MCG (2000 UT) TABS Take 2,000 Units by mouth daily 12/7/2019 at am Yes Unknown, Entered By History   citalopram (CELEXA) 10 MG tablet Take 10 mg by mouth every evening 12/6/2019 at Unknown time Yes Unknown, Entered By History   clopidogrel (PLAVIX) 75 MG tablet Take 1 tablet (75 mg) by mouth daily for 21 days Then stop this medication 12/7/2019 at am Yes Theron Clarke MD   dorzolamide-timolol (COSOPT) 2-0.5 %  ophthalmic solution Place 1 drop into both eyes 2 times daily 12/7/2019 at am Yes Prakash Gant MD   latanoprost (XALATAN) 0.005 % ophthalmic solution Place 1 drop into both eyes At Bedtime Both Eyes 12/6/2019 at Unknown time Yes Prakash Gant MD   metroNIDAZOLE (METROCREAM) 0.75 % external cream Apply 1 g topically 2 times daily as needed (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 and affected areas)  Yes Unknown, Entered By History   polyethylene glycol (MIRALAX/GLYCOLAX) packet Take 17 g by mouth daily as needed for constipation   Yes Unknown, Entered By History   traMADol (ULTRAM) 25 mg TABS half-tab Take 25 mg by mouth every evening  12/6/2019 at Unknown time Yes Unknown, Entered By History     Neida Wilson   Student Pharmacist

## 2019-12-07 NOTE — ED NOTES
"[facility]  ED Nurse Handoff Report    ED Chief complaint: Hypertension      ED Diagnosis:   Final diagnoses:   Weakness       Code Status: Full Code    Allergies:   Allergies   Allergen Reactions     Actonel [Bisphosphonates] Rash     Conjugated Estrogens Rash     Macrobid [Nitrofuran Derivatives] Rash     Nitrofurantoin Rash     Premarin Rash     Sulfa Drugs Rash     Hydrocodone Nausea and Vomiting     Oxycodone Nausea and Vomiting     Risedronate      leg pain       Activity level - Baseline/Home:  Stand with Assist  Activity Level - Current:   Stand with Assist    Patient's Preferred language: english   Needed?: No    Isolation: No  Infection: Not Applicable  Bariatric?: No    Vital Signs:   Vitals:    12/07/19 1230 12/07/19 1245 12/07/19 1330 12/07/19 1345   BP: (!) 145/77  138/73 (!) 138/111   Pulse: 70  64 69   Resp:       Temp:       TempSrc:       SpO2: 95% 96% 96% 96%       Cardiac Rhythm: ,        Pain level:      Is this patient confused?: No   Does this patient have a guardian?  No         If yes, is there guardianship documents in the Epic \"Code/ACP\" activity?  N/A         Guardian Notified?  N/A  Yellow Jacket - Suicide Severity Rating Scale Completed?  Yes  If yes, what color did the patient score?  White    Patient Report: Initial Complaint: HTN  Focused Assessment: pt was just at [facility] for CVA, discharged yesterday, today at care facility sbp was running 160-178, staff had orders to sent pt to ER if sbp >150. Sbp here has been 108-140s. No pain. No residual from recent CVA. Pt alert and oriented. Assist of 1 for mobility.   Tests Performed: labs, CT, xray  Abnormal Results:   Results for orders placed or performed during the hospital encounter of 12/07/19   CBC with platelets differential     Status: Abnormal   Result Value Ref Range    WBC 8.5 4.0 - 11.0 10e9/L    RBC Count 3.61 (L) 3.8 - 5.2 10e12/L    Hemoglobin 12.3 11.7 - 15.7 g/dL    Hematocrit 37.8 35.0 - " 47.0 %     (H) 78 - 100 fl    MCH 34.1 (H) 26.5 - 33.0 pg    MCHC 32.5 31.5 - 36.5 g/dL    RDW 13.7 10.0 - 15.0 %    Platelet Count 194 150 - 450 10e9/L    Diff Method Automated Method     % Neutrophils 74.2 %    % Lymphocytes 15.2 %    % Monocytes 9.4 %    % Eosinophils 0.9 %    % Basophils 0.1 %    % Immature Granulocytes 0.2 %    Nucleated RBCs 0 0 /100    Absolute Neutrophil 6.3 1.6 - 8.3 10e9/L    Absolute Lymphocytes 1.3 0.8 - 5.3 10e9/L    Absolute Monocytes 0.8 0.0 - 1.3 10e9/L    Absolute Eosinophils 0.1 0.0 - 0.7 10e9/L    Absolute Basophils 0.0 0.0 - 0.2 10e9/L    Abs Immature Granulocytes 0.0 0 - 0.4 10e9/L    Absolute Nucleated RBC 0.0    Comprehensive metabolic panel     Status: Abnormal   Result Value Ref Range    Sodium 138 133 - 144 mmol/L    Potassium 3.9 3.4 - 5.3 mmol/L    Chloride 108 94 - 109 mmol/L    Carbon Dioxide 25 20 - 32 mmol/L    Anion Gap 5 3 - 14 mmol/L    Glucose 112 (H) 70 - 99 mg/dL    Urea Nitrogen 24 7 - 30 mg/dL    Creatinine 0.88 0.52 - 1.04 mg/dL    GFR Estimate 56 (L) >60 mL/min/[1.73_m2]    GFR Estimate If Black 64 >60 mL/min/[1.73_m2]    Calcium 8.7 8.5 - 10.1 mg/dL    Bilirubin Total 0.2 0.2 - 1.3 mg/dL    Albumin 3.0 (L) 3.4 - 5.0 g/dL    Protein Total 6.9 6.8 - 8.8 g/dL    Alkaline Phosphatase 69 40 - 150 U/L    ALT 15 0 - 50 U/L    AST 19 0 - 45 U/L   Troponin I     Status: None   Result Value Ref Range    Troponin I ES <0.015 0.000 - 0.045 ug/L   UA with Microscopic reflex to Culture     Status: None   Result Value Ref Range    Color Urine Yellow     Appearance Urine Clear     Glucose Urine Negative NEG^Negative mg/dL    Bilirubin Urine Negative NEG^Negative    Ketones Urine Negative NEG^Negative mg/dL    Specific Gravity Urine 1.019 1.003 - 1.035    Blood Urine Negative NEG^Negative    pH Urine 6.0 5.0 - 7.0 pH    Protein Albumin Urine Negative NEG^Negative mg/dL    Urobilinogen mg/dL Normal 0.0 - 2.0 mg/dL    Nitrite Urine Negative NEG^Negative    Leukocyte  Esterase Urine Negative NEG^Negative    Source Catheterized Urine     WBC Urine 1 0 - 5 /HPF    RBC Urine <1 0 - 2 /HPF    Squamous Epithelial /HPF Urine <1 0 - 1 /HPF   EKG 12-lead, tracing only     Status: None   Result Value Ref Range    Interpretation ECG Click View Image link to view waveform and result    ISTAT gases lactate martina POCT     Status: None   Result Value Ref Range    Ph Venous 7.35 7.32 - 7.43 pH    PCO2 Venous 44 40 - 50 mm Hg    PO2 Venous 34 25 - 47 mm Hg    Bicarbonate Venous 24 21 - 28 mmol/L    O2 Sat Venous 62 %    Lactic Acid 1.2 0.7 - 2.1 mmol/L     Treatments provided: fluids    Family Comments: daughter here    OBS brochure/video discussed/provided to patient/family: Yes              Name of person given brochure if not patient: pt and daughter              Relationship to patient: pt and daughter    ED Medications:   Medications   0.9% sodium chloride BOLUS (1,000 mLs Intravenous New Bag 12/7/19 1322)     Followed by   sodium chloride 0.9% infusion (has no administration in time range)       Drips infusing?:  Yes    For the majority of the shift this patient was Green.   Interventions performed were none.    Severe Sepsis OR Septic Shock Diagnosis Present: No    To be done/followed up on inpatient unit:  none    ED NURSE PHONE NUMBER: [phone]

## 2019-12-07 NOTE — PROGRESS NOTES
RECEIVING UNIT ED HANDOFF REVIEW    ED Nurse Handoff Report was reviewed by: Guera Andersen RN on December 7, 2019 at 3:08 PM        ED

## 2019-12-07 NOTE — H&P
[facility]  Hospitalist History and Physical    Name: Alejandrina Whatley    MRN: [id number]  YOB: 1923    Age: 89 year old  Date of Admission:  12/7/2019  Physician:  Michael Leonardo DO, [facility]    Assessment & Plan   Alejandrina Whatley is a 89 year old female who presented to the [facility] ER from her assisted living.  She was recently hospitalized at [facility] for a CVA and discharged yesterday.  She got home and was really struggling to meet her physical needs to function in her residence.  She continues to feel globally weak.    CVA, recent discharge yesterday from [facility]:  -  No new neuro focal deficit noted.  CT head stable.  Continue ASA + plavix (was on 21 day plavix course).  -  Neuro consult given recent event/family request    Hypertension:  -  Patient with some labile BP's taken earlier today.  I explained to her and her daughter some elevation remains common at this point after the CVA.   Patient had some dizziness earlier though I'm not sure it correlated precisely with the BP's.  I will check orthostatics in the AM.  Monitor BP trend here.  Continue d/c amlodipine for now.    Dyslipidemia:  -  Continue statin    Generalized weakness/Deconditioning:  -  Not doing well at home physically.  Suspect she might need TCU/rehab stay.  I have asked PT/OT and SW to see her.  Her daughter does not feel her current setting can meet her needs.    Dementia:  -  Patient with baseline poor memory per her daughter.  No acute change today.    DVT Prophylaxis: Ambulate  Code Status: DNR / DNI    Disposition: Expected discharge in 1-2 days.  Possibly to TCU/rehab.    Primary Care Physician   Reggie Whatley, [phone]    Chief Complaint   Weakness    History is obtained from the patient and her daughter.  I also spoke with the ER provider about the history.     History of Present Illness   Alejandrina Whatley is a 89 year old female who presents with generalized weakness.  She was just discharged from [facility] yesterday following a  CVA hospital stay.  She got home and feels she was not able to manage well.  She also reports she didn't sleep well because staff at the assisted living didn't come during the night when called.   This AM she continued struggling, not feeling well.  She also had some labile BP's and some dizziness at one point.  Given all these issues she presented to the [facility] ER. Eval in the ER did not reveal any major new neuro problems.     Past Medical History    Past Medical History:   Diagnosis Date     Abdominal aortic aneurysm (H) 2001    had a stent placed 2009     AK (actinic keratosis) 11/11/2009     Cataract 2/22/2011     Compression fractures      Compression Fractures of Lumbar Vertebra 2/4/2010     DJD (degenerative joint disease)      Generalised Weakness and Fatigue 3/3/2011     Glaucoma (increased eye pressure) 2009    Dr. 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  Dr. Clark.     HYSTERECTOMY, CERVIX STATUS UNKNOWN  1971     LASER SELECTIVE TRABECULOPLASTY  3/2010; 10/2015    left eye 1st Clark (notes show inf treatment); inf 180 (MARI)     LASER SELECTIVE TRABECULOPLASTY  12/2017    left eye sup 180        Prior to Admission Medications   Prior to Admission Medications   Prescriptions Last Dose Informant Patient Reported? Taking?   Cholecalciferol (VITAMIN D3) 50 MCG (2000 UT) TABS 12/7/2019  at am  Yes Yes   Sig: Take 2,000 Units by mouth daily   acetaminophen (TYLENOL) 500 MG tablet 12/7/2019 at am  Yes Yes   Sig: Take 1,000 mg by mouth 3 times daily 8am, 2pm, 8pm   amLODIPine (NORVASC) 5 MG tablet 12/7/2019 at am  No Yes   Sig: Take 1 tablet (5 mg) by mouth daily   aspirin (ASA) 81 MG EC tablet 12/7/2019 at am  No Yes   Sig: Take 1 tablet (81 mg) by mouth daily   atorvastatin (LIPITOR) 40 MG tablet 12/6/2019 at Unknown time  No Yes   Sig: Take 1 tablet (40 mg) by mouth every evening   carvedilol (COREG) 6.25 MG tablet 12/7/2019 at am  No Yes   Sig: Take 1 tablet (6.25 mg) by mouth 2 times daily (with meals)   citalopram (CELEXA) 10 MG tablet 12/6/2019 at Unknown time  Yes Yes   Sig: Take 10 mg by mouth every evening   clopidogrel (PLAVIX) 75 MG tablet 12/7/2019 at am  No Yes   Sig: Take 1 tablet (75 mg) by mouth daily for 21 days Then stop this medication   dorzolamide-timolol (COSOPT) 2-0.5 % ophthalmic solution 12/7/2019 at am  No Yes   Sig: Place 1 drop into both eyes 2 times daily   latanoprost (XALATAN) 0.005 % ophthalmic solution 12/6/2019 at Unknown time  No Yes   Sig: Place 1 drop into both eyes At Bedtime Both Eyes   metroNIDAZOLE (METROCREAM) 0.75 % external cream   Yes Yes   Sig: Apply 1 g topically 2 times daily as needed (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 and affected areas)   polyethylene glycol (MIRALAX/GLYCOLAX) packet   Yes Yes   Sig: Take 17 g by mouth daily as needed for constipation    traMADol (ULTRAM) 25 mg TABS half-tab 12/6/2019 at Unknown time  Yes Yes   Sig: Take 25 mg by mouth every evening       Facility-Administered Medications: None     Allergies   Allergies   Allergen Reactions     Actonel [Bisphosphonates] Rash     Conjugated Estrogens Rash     Macrobid [Nitrofuran Derivatives] Rash     Nitrofurantoin Rash     Premarin Rash     Sulfa Drugs Rash     Hydrocodone Nausea and Vomiting     Oxycodone Nausea and Vomiting     Risedronate      leg pain       Social History   Social History      Tobacco Use     Smoking status: Never Smoker     Smokeless tobacco: Never Used     Tobacco comment: No second hand exposure.   Substance Use Topics     Alcohol use: No       Family History   Reviewed, non-contributory.    Review of Systems   A Comprehensive greater than 10 system review of systems was carried out.  Pertinent positives and negatives are noted above.  Otherwise negative for contributory information.    Physical Exam   Temp: 96.5  F (35.8  C) Temp src: Oral BP: 128/50 Pulse: 75   Resp: 16 SpO2: 96 % O2 Device: None (Room air)    Vital Signs with Ranges  Temp:  [96.5  F (35.8  C)-98.6  F (37  C)] 96.5  F (35.8  C)  Pulse:  [64-75] 75  Resp:  [16-20] 16  BP: (108-149)/() 128/50  SpO2:  [93 %-97 %] 96 %  0 lbs 0 oz    GEN:  Alert, oriented x 2, confused with details, appears comfortable, no overt distress  HEENT:  Normocephalic/atraumatic, no scleral icterus, no nasal discharge, mouth moist.  CV:  Regular rate and rhythm, no loud murmur/rub.  LUNGS:  Clear to auscultation bilaterally without rales/rhonchi/wheezing/retractions.  Symmetric chest rise on inhalation noted.  ABD:  Active bowel sounds, soft, non-tender/non-distended.  No rebound/guarding/rigidity.  EXT:  Trace edema.  No cyanosis.  No acute joint synovitis noted.  SKIN:  Dry to touch, no exanthems noted in the visualized areas.  NEURO:  Symmetric muscle strength, sensation to touch grossly intact.  Coordination without acute change on Finger-Nose-Finger.  No new focal deficits appreciated.    Data   Data reviewed today:  I personally reviewed the EKG tracing showing sinus without major ST elevation/depression..    Recent Labs   Lab 12/07/19  1227 12/06/19  0601 12/03/19  1435   WBC 8.5  --  8.1   HGB 12.3  --  13.3   HCT 37.8  --  41.7   *  --  107*    177 200     Recent Labs   Lab 12/07/19  1227 12/06/19  0601 12/04/19  1017 12/03/19  1435     --  140 140   POTASSIUM 3.9  --  3.9 4.0   CHLORIDE 108  --  109 107    CO2 25  --  23 26   ANIONGAP 5  --  8 7   *  --  156* 98   BUN 24  --  15 19   CR 0.88 0.90 0.84 0.90   GFRESTIMATED 56* 54* 59* 54*   GFRESTBLACK 64 62 68 62   TRANG 8.7  --  8.6 9.1   PROTTOTAL 6.9  --   --   --    ALBUMIN 3.0*  --   --   --    BILITOTAL 0.2  --   --   --    ALKPHOS 69  --   --   --    AST 19  --   --   --    ALT 15  --   --   --      Recent Labs   Lab 12/07/19  1227 12/03/19  1435   TROPI <0.015 <0.015     Recent Labs   Lab 12/07/19  1322   COLOR Yellow   APPEARANCE Clear   URINEGLC Negative   URINEBILI Negative   URINEKETONE Negative   SG 1.019   UBLD Negative   URINEPH 6.0   PROTEIN Negative   NITRITE Negative   LEUKEST Negative   RBCU <1   WBCU 1         Recent Results (from the past 24 hour(s))   CT Head w/o Contrast    Narrative    CT SCAN OF THE HEAD WITHOUT CONTRAST   12/7/2019 12:55 PM     HISTORY: Weakness, recent infarct in the right posterior putamen.    TECHNIQUE:  Axial images of the head and coronal reformations without  IV contrast material. Radiation dose for this scan was reduced using  automated exposure control, adjustment of the mA and/or kV according  to patient size, or iterative reconstruction technique.    COMPARISON: MRI 12/3/2019    FINDINGS:  There is generalized atrophy of the brain.  There is low  attenuation in the white matter of the cerebral hemispheres consistent  with sequelae of small vessel ischemic disease. The small recent  infarct in the posterior right putamen is seen as an area of low  attenuation. No acute infarct is seen elsewhere. There is no evidence  of intracranial hemorrhage or mass.     The visualized portions of the sinuses and mastoids appear normal.  There is no evidence of trauma.      Impression    IMPRESSION:   1. Small recent infarct in the posterior right putamen without  hemorrhage.  2. No new infarct elsewhere. No hemorrhage.  3. Atrophy of the brain.  White matter changes consistent with  sequelae of small vessel ischemic disease.  This is unchanged.    UMBERTO CARRASCO MD   XR Chest 2 Views    Narrative    CHEST TWO VIEWS  12/7/2019 1:16 PM     HISTORY: Weakness.    COMPARISON: 5/2/2018.      Impression    IMPRESSION: No significant interval change. Lungs clear. Advanced  degenerative changes in the spine and both glenohumeral joints.  Postoperative changes of vertebroplasty are again noted in the mid  thoracic spine.    KISHAN MARKHAM MD

## 2019-12-08 ENCOUNTER — APPOINTMENT (OUTPATIENT)
Dept: PHYSICAL THERAPY | Facility: CLINIC | Age: 84
End: 2019-12-08
Attending: INTERNAL MEDICINE
Payer: COMMERCIAL

## 2019-12-08 PROCEDURE — 99225 ZZC SUBSEQUENT OBSERVATION CARE,LEVEL II: CPT | Performed by: PHYSICIAN ASSISTANT

## 2019-12-08 PROCEDURE — 99215 OFFICE O/P EST HI 40 MIN: CPT | Performed by: PSYCHIATRY & NEUROLOGY

## 2019-12-08 PROCEDURE — G0378 HOSPITAL OBSERVATION PER HR: HCPCS

## 2019-12-08 PROCEDURE — 25000132 ZZH RX MED GY IP 250 OP 250 PS 637: Performed by: HOSPITALIST

## 2019-12-08 PROCEDURE — 97161 PT EVAL LOW COMPLEX 20 MIN: CPT | Mod: GP | Performed by: PHYSICAL THERAPIST

## 2019-12-08 PROCEDURE — 25000132 ZZH RX MED GY IP 250 OP 250 PS 637: Performed by: INTERNAL MEDICINE

## 2019-12-08 RX ORDER — LATANOPROST 50 UG/ML
1 SOLUTION/ DROPS OPHTHALMIC AT BEDTIME
Status: DISCONTINUED | OUTPATIENT
Start: 2019-12-08 | End: 2019-12-09 | Stop reason: HOSPADM

## 2019-12-08 RX ORDER — LOPERAMIDE HCL 2 MG
2 CAPSULE ORAL 4 TIMES DAILY PRN
Status: DISCONTINUED | OUTPATIENT
Start: 2019-12-08 | End: 2019-12-09 | Stop reason: HOSPADM

## 2019-12-08 RX ADMIN — ACETAMINOPHEN 1000 MG: 500 TABLET, FILM COATED ORAL at 13:14

## 2019-12-08 RX ADMIN — LATANOPROST 1 DROP: 50 SOLUTION/ DROPS OPHTHALMIC at 14:03

## 2019-12-08 RX ADMIN — AMLODIPINE BESYLATE 5 MG: 5 TABLET ORAL at 08:21

## 2019-12-08 RX ADMIN — LOPERAMIDE HYDROCHLORIDE 2 MG: 2 CAPSULE ORAL at 03:44

## 2019-12-08 RX ADMIN — ATORVASTATIN CALCIUM 40 MG: 40 TABLET, FILM COATED ORAL at 20:33

## 2019-12-08 RX ADMIN — DORZOLAMIDE HYDROCHLORIDE AND TIMOLOL MALEATE 1 DROP: 20; 5 SOLUTION/ DROPS OPHTHALMIC at 08:26

## 2019-12-08 RX ADMIN — ACETAMINOPHEN 1000 MG: 500 TABLET, FILM COATED ORAL at 20:33

## 2019-12-08 RX ADMIN — CARVEDILOL 6.25 MG: 6.25 TABLET, FILM COATED ORAL at 18:00

## 2019-12-08 RX ADMIN — CARVEDILOL 6.25 MG: 6.25 TABLET, FILM COATED ORAL at 08:21

## 2019-12-08 RX ADMIN — ASPIRIN 81 MG: 81 TABLET, DELAYED RELEASE ORAL at 08:22

## 2019-12-08 RX ADMIN — ACETAMINOPHEN 1000 MG: 500 TABLET, FILM COATED ORAL at 08:21

## 2019-12-08 RX ADMIN — LOPERAMIDE HYDROCHLORIDE 2 MG: 2 CAPSULE ORAL at 09:53

## 2019-12-08 RX ADMIN — DORZOLAMIDE HYDROCHLORIDE AND TIMOLOL MALEATE 1 DROP: 20; 5 SOLUTION/ DROPS OPHTHALMIC at 20:33

## 2019-12-08 RX ADMIN — CLOPIDOGREL BISULFATE 75 MG: 75 TABLET ORAL at 08:21

## 2019-12-08 NOTE — PROGRESS NOTES
"   12/08/19 0941   Quick Adds   Type of Visit Initial PT Evaluation   Living Environment   Lives With facility resident   Living Arrangements assisted living   Home Accessibility no concerns   Transportation Anticipated family or friend will provide  (patient no longer drives)   Living Environment Comment lives in [place]; has long hallways   Self-Care   Usual Activity Tolerance good   Current Activity Tolerance poor   Regular Exercise Yes   Activity/Exercise Type walking;other (see comments)  (ex class when able)   Equipment Currently Used at Home walker, rolling  (4WW)   Activity/Exercise/Self-Care Comment normally independent with walker; gets meals provided; has housekeeping assist   Functional Level Prior   Ambulation 1-->assistive equipment   Transferring 1-->assistive equipment   Toileting 0-->independent   Bathing 0-->independent   Cognition 1 - attention or memory deficits   Fall history within last six months yes   Number of times patient has fallen within last six months 1   Which of the above functional risks had a recent onset or change? ambulation;transferring;toileting;bathing;dressing   Prior Functional Level Comment normally independent and able to manage own cares; use of a 4WW   General Information   Onset of Illness/Injury or Date of Surgery - Date 12/07/19   Referring Physician Michael Leonardo, DO   Patient/Family Goals Statement go to TCU   Pertinent History of Current Problem (include personal factors and/or comorbidities that impact the POC) per chart: \"Alejandrina Whatley is a 89 year old female who presents with generalized weakness.  She was just discharged from [facility] yesterday following a CVA hospital stay.  She got home and feels she was not able to manage well.  She also reports she didn't sleep well because staff at the assisted living didn't come during the night when called.   This AM she continued struggling, not feeling well.  She also had some labile BP's and some dizziness at one point.  " "Given all these issues she presented to the [facility] ER. Eval in the ER did not reveal any major new neuro problems. \" see medical record for further information   Precautions/Limitations fall precautions   General Observations patient in bed   General Info Comments Activity: up with assist   Cognitive Status Examination   Orientation orientation to person, place and time   Level of Consciousness alert   Follows Commands and Answers Questions 75% of the time;100% of the time   Personal Safety and Judgment impaired;at risk behaviors demonstrated   Memory impaired   Cognitive Comment oriented but forgetful   Pain Assessment   Patient Currently in Pain No   Posture    Posture Comments forward flexed at head and trunk;    Range of Motion (ROM)   ROM Comment WFL   Strength   Strength Comments significant functional weakness noted with mobility; LE's buckling during transfers   Bed Mobility   Bed Mobility Comments SBA for supine to sit; mod A to return to bed   Transfer Skills   Transfer Comments sit>stand with mod A; transfer to bedside chair with mod A and walker; return to bed with max A secondary to buckling of LE's   Gait   Gait Comments difficulty taking 2-3 steps to bedside chair secondary to buckling of LE's; use of walker and max A   Balance   Balance Comments impaired; current need for walker and assist   General Therapy Interventions   Intervention Comments eval only for discharge recommendations   Clinical Impression   Criteria for Skilled Therapeutic Intervention yes, treatment indicated;evaluation only  (defer treatment to TCU)   PT Diagnosis impaired gait/transfers; weakness   Influenced by the following impairments weakness; impaired balance; forgetful   Functional limitations due to impairments impaired independence with functional mobility   Clinical Presentation Stable/Uncomplicated   Clinical Presentation Rationale complex pmh, stable presentation, good social support   Clinical Decision Making (Complexity) " "Low complexity   Therapy Frequency Other (see comments)  (1x eval and treatment)   Predicted Duration of Therapy Intervention (days/wks) 1x eval and treatment   Anticipated Equipment Needs at Discharge front wheeled walker;wheelchair   Anticipated Discharge Disposition Transitional Care Facility   Risk & Benefits of therapy have been explained Yes   Patient, Family & other staff in agreement with plan of care Yes   [facility] TM \"6 Clicks\"   2016, Trustees of [facility], under license to [employer].  All rights reserved.   6 Clicks Short Forms Basic Mobility Inpatient Short Form   [facility]  \"6 Clicks\" V.2 Basic Mobility Inpatient Short Form   1. Turning from your back to your side while in a flat bed without using bedrails? 3 - A Little   2. Moving from lying on your back to sitting on the side of a flat bed without using bedrails? 3 - A Little   3. Moving to and from a bed to a chair (including a wheelchair)? 2 - A Lot   4. Standing up from a chair using your arms (e.g., wheelchair, or bedside chair)? 2 - A Lot   5. To walk in hospital room? 2 - A Lot   6. Climbing 3-5 steps with a railing? 1 - Total   Basic Mobility Raw Score (Score out of 24.Lower scores equate to lower levels of function) 13   Total Evaluation Time   Total Evaluation Time (Minutes) 15     "

## 2019-12-08 NOTE — PROGRESS NOTES
Observation Goals:    -diagnostic tests and consults completed and resulted - not met  -vital signs normal or at patient baseline - met  -tolerating oral intake to maintain hydration - met  -returns to baseline functional status - not met  -safe disposition plan has been identified - not met

## 2019-12-08 NOTE — PROGRESS NOTES
I was paged for new onset watery stools, without abdominal pain per report, also reportedly had some loose stools at [place]. No fever, WBC yesterday within normal reference range. PRN Imodium ordered.

## 2019-12-08 NOTE — CONSULTS
"  [facility]    Stroke Consult Note    Reason for Consult:  \"Patient readmitted following recent CVA\"    Chief Complaint: Hypertension       HPI  Alejandrina Whatley is a 89 year old female who was seen at [facility] 12/4 for evaluation following a right putamen stroke. At this time she was noted to have mild left arm weakness and was discharged on 12/6 with home health PT/OT. Yesterday she was brought to the [facility] ED for evaluation of generalized weakness since her discharge. CTH again demonstrated the recent right putamen infarct without new pathology. She denies new focal symptoms, speech difficulty, vision changes, or headache. She does endorse multiple episodes of diarrhea over the last few days.     Impression  Generalized weakness  Known right putamen infarct - LUE with mild increase in weakness from previous presentation. Concern for new area of infarct is low and it is likely related to her recent diarrhea and generalized weakness.     Recommendations  - Continue previously recommended course of DAPT with ASA 81 mg + Plavix 75 mg for 21 days, then  mg alone indefinitely   - Continue to gradually lower BP to goal <140/90 over the next several weeks  - Continue atorvastatin 40 mg with goal LDL 40-70  - Therapies  - No further inpatient stroke evaluation is indicated      Patient Follow-up    - in the next 1-2 week(s) with PCP  - in 4-6 weeks with local neurologist    Thank you for this consult. No further stroke evaluation is recommended, so we will sign off. Please contact us with any additional questions.    CRESCENCIO Brady, [facility]  Neurology  12/08/2019 3:12 PM  Text Page (8am-5pm)  To page stroke neurology after hours or on a subsequent day, click here: AMCOM  Choose \"On Call\" tab at top, then search dropdown box for \"Neurology Adult\" & press Enter, look for Neuro ICU/Stroke  _____________________________________________________    Past Medical History   Past Medical History: "   Diagnosis Date     Abdominal aortic aneurysm (H) 2001    had a stent placed 2009     AK (actinic keratosis) 11/11/2009     Cataract 2/22/2011     Compression fractures      Compression Fractures of Lumbar Vertebra 2/4/2010     DJD (degenerative joint disease)      Generalised Weakness and Fatigue 3/3/2011     Glaucoma (increased eye pressure) 2009    Dr. 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  Dr. Clark.     HYSTERECTOMY, CERVIX STATUS UNKNOWN  1971     LASER SELECTIVE TRABECULOPLASTY  3/2010; 10/2015    left eye 1st Clark (notes show inf treatment); inf 180 (MARI)     LASER SELECTIVE TRABECULOPLASTY  12/2017    left eye sup 180     Medications   Home Meds  Prior to Admission medications    Medication Sig Start Date End Date Taking? Authorizing Provider   acetaminophen (TYLENOL) 500 MG tablet Take 1,000 mg by mouth 3 times daily 8am, 2pm, 8pm   Yes Unknown, Entered By History   amLODIPine (NORVASC) 5 MG tablet Take 1 tablet (5 mg) by mouth daily 12/6/19  Yes Theron Clarke MD   aspirin (ASA) 81 MG EC tablet Take 1 tablet (81 mg) by mouth daily 12/7/19  Yes Theron Clarke MD   atorvastatin (LIPITOR) 40 MG tablet Take 1 tablet (40 mg) by mouth every evening 12/6/19  Yes Theron Clarke MD   carvedilol (COREG) 6.25 MG tablet Take 1 tablet (6.25 mg)  by mouth 2 times daily (with meals) 12/6/19  Yes Theron Clarke MD   Cholecalciferol (VITAMIN D3) 50 MCG (2000 UT) TABS Take 2,000 Units by mouth daily   Yes Unknown, Entered By History   citalopram (CELEXA) 10 MG tablet Take 10 mg by mouth every evening   Yes Unknown, Entered By History   clopidogrel (PLAVIX) 75 MG tablet Take 1 tablet (75 mg) by mouth daily for 21 days Then stop this medication 12/7/19 12/28/19 Yes Theron Clarke MD   dorzolamide-timolol (COSOPT) 2-0.5 % ophthalmic solution Place 1 drop into both eyes 2 times daily 4/19/18  Yes Prakash Gant MD   latanoprost (XALATAN) 0.005 % ophthalmic solution Place 1 drop into both eyes At Bedtime Both Eyes 4/23/18  Yes Prakash Gant MD   metroNIDAZOLE (METROCREAM) 0.75 % external cream Apply 1 g topically 2 times daily as needed (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 and affected areas)   Yes Unknown, Entered By History   polyethylene glycol (MIRALAX/GLYCOLAX) packet Take 17 g by mouth daily as needed for constipation    Yes Unknown, Entered By History   traMADol (ULTRAM) 25 mg TABS half-tab Take 25 mg by mouth every evening    Yes Unknown, Entered By History       Scheduled Meds    acetaminophen  1,000 mg Oral TID     amLODIPine  5 mg Oral Daily     aspirin  81 mg Oral Daily     atorvastatin  40 mg Oral QPM     carvedilol  6.25 mg Oral BID w/meals     clopidogrel  75 mg Oral Daily     dorzolamide-timolol  1 drop Both Eyes BID     latanoprost  1 drop Both Eyes At Bedtime       Infusion Meds      PRN Meds  acetaminophen, bisacodyl, loperamide, melatonin, naloxone, ondansetron **OR** ondansetron, polyethylene glycol, traMADol    Allergies   Allergies   Allergen Reactions     Actonel [Bisphosphonates] Rash     Conjugated Estrogens Rash     Macrobid [Nitrofuran Derivatives] Rash     Nitrofurantoin Rash     Premarin Rash     Sulfa Drugs Rash     Hydrocodone Nausea and Vomiting     Oxycodone Nausea and Vomiting     Risedronate      leg pain      Family History   Family History   Problem Relation Age of Onset     Heart Disease Father 79        MI     Hypertension Mother      Social History   Social History     Tobacco Use     Smoking status: Never Smoker     Smokeless tobacco: Never Used     Tobacco comment: No second hand exposure.   Substance Use Topics     Alcohol use: No     Drug use: No       Review of Systems   The 10 point Review of Systems is negative other than noted in the HPI or here.        PHYSICAL EXAMINATION   Temp:  [95.9  F (35.5  C)-97.2  F (36.2  C)] 96.3  F (35.7  C)  Pulse:  [71-75] 74  Heart Rate:  [70-72] 70  Resp:  [16] 16  BP: (106-166)/(49-75) 106/49  SpO2:  [93 %-96 %] 96 %    Neurologic  Mental Status:  alert, oriented x 3, follows commands, speech clear and fluent, naming and repetition normal  Cranial Nerves:  visual fields intact, PERRL, EOMI with normal smooth pursuit, facial movements symmetric, hearing not formally tested but intact to conversation, no dysarthria, tongue protrusion midline  Motor:  normal muscle tone and bulk, no abnormal movements, able to move all limbs spontaneously, LUE with drift but does not hit bed, left arm fix with rapid arm rolling  Sensory:  light touch sensation intact and symmetric throughout upper and lower extremities, no extinction on double simultaneous stimulation   Coordination:  normal finger-to-nose on RUE, LUE with mild ataxia not out of proportion to weakness  Station/Gait:  deferred    Dysphagia Screen  Per Nursing    Stroke Scales    NIHSS  Interval     Interval Comments     1a. Level of Consciousness 0-->Alert, keenly responsive   1b. LOC Questions 0-->Answers both questions correctly   1c. LOC Commands 0-->Performs both tasks correctly   2.   Best Gaze 0-->Normal   3.   Visual 0-->No visual loss   4.   Facial Palsy 0-->Normal symmetrical movements   5a. Motor Arm, Left 1-->Drift, limb holds 90 (or 45) degrees, but drifts down before full 10 seconds, does not hit bed or other  support   5b. Motor Arm, Right 0-->No drift, limb holds 90 (or 45) degrees for full 10 secs   6a. Motor Leg, Left 0-->No drift, leg holds 30 degree position for full 5 secs   6b. Motor Leg, right 0-->No drift, leg holds 30 degree position for full 5 secs   7.   Limb Ataxia 0-->Absent   8.   Sensory 0-->Normal, no sensory loss   9.   Best Language 0-->No aphasia, normal   10. Dysarthria 0-->Normal   11. Extinction and Inattention  0-->No abnormality   Total 1 (12/08/19 1510)       Imaging  I personally reviewed all imaging; relevant findings per HPI.    Labs Data   CBC  Recent Labs   Lab 12/07/19  1227 12/06/19  0601 12/03/19  1435   WBC 8.5  --  8.1   RBC 3.61*  --  3.89   HGB 12.3  --  13.3   HCT 37.8  --  41.7    177 200     Basic Metabolic Panel   Recent Labs   Lab 12/07/19  1227 12/06/19  0601 12/04/19  1017 12/03/19  1435     --  140 140   POTASSIUM 3.9  --  3.9 4.0   CHLORIDE 108  --  109 107   CO2 25  --  23 26   BUN 24  --  15 19   CR 0.88 0.90 0.84 0.90   *  --  156* 98   TRANG 8.7  --  8.6 9.1     Liver Panel  Recent Labs   Lab Test 12/07/19  1227 05/02/18  1348 07/18/13  1037 10/26/11  1000   PROTTOTAL 6.9 7.4  --  7.6   ALBUMIN 3.0* 3.1*  --  3.9   BILITOTAL 0.2 0.3  --  0.5   ALKPHOS 69 75  --  81   AST 19 22  --  25   ALT 15 18 21 16     INRNo lab results found.   Lipid Profile  Recent Labs   Lab Test 12/04/19  1017 07/18/13  1037 10/26/11  1000   CHOL 208* 176 188   HDL 43* 49* 50   * 98 109   TRIG 139 146 142   CHOLHDLRATIO  --  3.6 3.8     A1C  Recent Labs   Lab Test 12/04/19  1017   A1C 6.0*     Troponin I  Recent Labs   Lab 12/07/19  1227 12/03/19  1435   TROPI <0.015 <0.015          Stroke Code / Stroke Consult Data Data This was a non-emergent, non-tele stroke consult.    I have personally spent a total of 50 minutes providing care and consulting with this patient's medical providers today, with more than 50% of this time spent in consultation, coordination of care, and  discussion with the patient and/or family regarding diagnostic results, prognosis, symptom management, risks and benefits of management options, and development of plan of care.

## 2019-12-08 NOTE — PROGRESS NOTES
[facility]    Medicine Progress Note - Hospitalist Service       Date of Admission:  12/7/2019    Assessment & Plan   Alejandrina Whatley is a 89 year old female who presented to the [facility] ER from her assisted living.  She was recently hospitalized at [facility] for a CVA and discharged yesterday.  She got home and was really struggling to meet her physical needs to function in her residence.  She continues to feel globally weak.    CVA, recent discharge yesterday from [facility]  No new neuro focal deficit noted.  CT head stable.    - Continue ASA + plavix (was on 21 day plavix course)  - Neuro consult given recent event/family request, no new recommendations    Hypertension  Patient with some labile BP's taken earlier today.  I explained to her and her daughter some elevation remains common at this point after the CVA.   Some c/o dizziness but unclear if related to BP changes. Orthostatics negative.   - Continues on PTA regimen of amlodipine, coreg    Dyslipidemia  - Continue statin    Generalized weakness/Deconditioning  Not doing well at home physically.  Her daughter does not feel her current setting can meet her needs. At daughters request, comprehensive workup for alternative causes of weakness; UA, CBC, CMP, troponin, VBG, CXR unremarkable. Suspect weakness 2/2 recent hospitalization, CVA. Discussed extensively with daughter.  - PT eval, recommending TCU  - SW assisting with discharge    Dementia  Patient with baseline poor memory per her daughter. Some question of recent worsening. No acute change today.  - OT eval at TCU    Diet: Regular Diet Adult    DVT Prophylaxis: Ambulate every shift  Clark Catheter: not present  Code Status: DNR/DNI      Disposition Plan   Expected discharge: Tomorrow, recommended to transitional care unit once safe disposition plan/ TCU bed available.  Entered: Randy Wilson PA-C 12/08/2019, 3:30 PM       The patient's care was discussed with the Attending Physician, Dr. Guo,  Bedside Nurse, Care Coordinator/, Patient and Patient's Family.    Randy Wilson PA-C  Hospitalist Service  [facility]    ______________________________________________________________________    Interval History   Pt seen & evaluated with daughter at bedside. Daughter with multiple questions regarding weakness, blood pressures, continuously asking for updates. Highly concerned of the level of deconditioning of her mother. Worried she will not be able to return to [facility]; reiterated it is an early stage and will know more as she progresses with therapies at TCU. Encouraged an ongoing discussion at TCU regarding functional status.    Data reviewed today: I reviewed all medications, new labs and imaging results over the last 24 hours. I personally reviewed no images or EKG's today.    Physical Exam   Vital Signs: Temp: 96.3  F (35.7  C) Temp src: Oral BP: 106/49 Pulse: 74 Heart Rate: 70 Resp: 16 SpO2: 96 % O2 Device: None (Room air)    Weight: 0 lbs 0 oz  GEN: well-developed, well-nourished, appears comfortable  HEENT: NCAT, PERRL, EOM intact bilaterally, sclera clear, conjunctiva normal, nose & mouth patent, mucous membranes moist  CHEST: lungs CTA bilaterally, no increased work of breathing, no wheeze, rales, rhonchi  HEART: RRR, S1 & S2  ABD: soft, nontender, nondistended, no guarding or rigidity, +BS in all 4 quadrants  MSK: full AROM bilateral UE/LE right greater than left  NEURO: awake, alert, follows commands, LUE  strength 4/5, 5/5 on right; no facial droop appreciated, speech clear, moving bilateral lower extremities spontaneously  SKIN: warm & dry without rash, no pedal edema    Data   Recent Labs   Lab 12/07/19  1227 12/06/19  0601 12/04/19  1017 12/03/19  1435   WBC 8.5  --   --  8.1   HGB 12.3  --   --  13.3   *  --   --  107*    177  --  200     --  140 140   POTASSIUM 3.9  --  3.9 4.0   CHLORIDE 108  --  109 107   CO2 25  --  23 26   BUN 24  --  15 19    CR 0.88 0.90 0.84 0.90   ANIONGAP 5  --  8 7   TRANG 8.7  --  8.6 9.1   *  --  156* 98   ALBUMIN 3.0*  --   --   --    PROTTOTAL 6.9  --   --   --    BILITOTAL 0.2  --   --   --    ALKPHOS 69  --   --   --    ALT 15  --   --   --    AST 19  --   --   --    TROPI <0.015  --   --  <0.015     No results found for this or any previous visit (from the past 24 hour(s)).  Medications       acetaminophen  1,000 mg Oral TID     amLODIPine  5 mg Oral Daily     aspirin  81 mg Oral Daily     atorvastatin  40 mg Oral QPM     carvedilol  6.25 mg Oral BID w/meals     clopidogrel  75 mg Oral Daily     dorzolamide-timolol  1 drop Both Eyes BID     latanoprost  1 drop Both Eyes At Bedtime

## 2019-12-08 NOTE — PLAN OF CARE
diagnostic tests and consults completed and resulted  Yes  -vital signs normal or at patient baseline  Yes BP soft after BP meds this AM  -tolerating oral intake to maintain hydration  Yes  -returns to baseline functional status  No  -safe disposition plan has been identified No

## 2019-12-08 NOTE — PROGRESS NOTES
SW:    Writer received phone call from Kelsie at [facility] can medically and financially accept pt tomorrow Monday vs. Tuesday depending on bed availability.  to follow up in the AM.    RACQUEL Beasley

## 2019-12-08 NOTE — PROGRESS NOTES
[facility]      OUTPATIENT PHYSICAL THERAPY EVALUATION  PLAN OF TREATMENT FOR OUTPATIENT REHABILITATION  (COMPLETE FOR INITIAL CLAIMS ONLY)  Patient's Last Name, First Name, M.I.  YOB: 1923  Alejandrina Whatley                        Provider's Name  [facility] Medical Record No.  [id number]                               Onset Date:  12/07/19   Start of Care Date:  12/08/19      Type:     _X_PT   ___OT   ___SLP Medical Diagnosis:  recent CVA; weakness; failure to manage at home                        PT Diagnosis:  impaired gait/transfers; weakness   Visits from SOC:  1   _________________________________________________________________________________  Plan of Treatment/Functional Goals    Planned Interventions:  ,     , eval only for discharge recommendations     Goals: See Physical Therapy Goals on Care Plan in [facility] electronic health record.    Therapy Frequency: Other (see comments)(1x eval and treatment)  Predicted Duration of Therapy Intervention: 1x eval and treatment  _________________________________________________________________________________    I CERTIFY THE NEED FOR THESE SERVICES FURNISHED UNDER        THIS PLAN OF TREATMENT AND WHILE UNDER MY CARE     (Physician co-signature of this document indicates review and certification of the therapy plan).                Certification date from: 12/08/19, Certification date to: 12/08/19    Referring Physician: Michael Leonardo DO            Initial Assessment        See Physical Therapy evaluation dated 12/08/19 in Epic electronic health record.

## 2019-12-08 NOTE — PLAN OF CARE
Discharge Planner OT   Patient plan for discharge: N/A  Current status: Orders rec'd and chart reviewed. Patient under Observation cares; per chart: patient recently at [facility] with a CVA and discharged back to [facility] on 12/6; presented to [facility] on 12/7 for weakness and inability to manage independently at home. At baseline patient reports independence with mobility and cares with use of a 4WW; Gets assist with meals and cleaning only. Spoke with PT, pt needing increased A with ADLs and functional mobility, TCU recommended. Will defer inpt OT to TCU.   Barriers to return to prior living situation: see PT note  Recommendations for discharge: see PT note  Rationale for recommendations: plan is for pt to discharge to TCU, pt agreeable. OT deferred to TCU/next level of care, order completed.        Entered by: Mirtha Foote 12/08/2019 2:47 PM

## 2019-12-08 NOTE — PLAN OF CARE
AVSS; neuros intact except for some occasional forgetfulness to time; up with 1-2 assist and a walker/gait belt to BR/commode; pt with multiple loose stools; MD notified; lomotil given; pt has continued to have loose stools; pt denied pain; tolerating fluids without nausea;  neurology consult ordered.

## 2019-12-08 NOTE — PLAN OF CARE
diagnostic tests and consults completed and resulted  Yes  -vital signs normal or at patient baseline  Yes Orthostatics were negative  -tolerating oral intake to maintain hydration  Yes  -returns to baseline functional status  No  -safe disposition plan has been identified No

## 2019-12-08 NOTE — PROVIDER NOTIFICATION
MD notified pt has had 4 large loose/watery stools without any abdominal pain; also notified MD pt stated she had been given something for constipation at [facility] and had some loose stools there.  MD ordered Imodium.

## 2019-12-08 NOTE — CONSULTS
Care Transition Initial Assessment -      Met with: patient and patients dtr  Active Problems:    Weakness       DATA  Lives With: facility resident   Living Arrangements: assisted living  Identified issues/concerns regarding health management: Consult acknowledged and chart reviewed. Pt is a 89 year old female admitted to [facility] for weakness observation status. Per therapy recommending TCU. Writer met with pt and dtr to discuss discharge. Agreeable to TCU, would like referrals to [facility]. Pt lives in an graciela with 3 meals per day and medication management at baseline. Would like a shared room. HE w/c transport to be setup for discharge.   Transportation Anticipated: family or friend will provide(patient no longer drives)    ASSESSMENT  Cognitive Status:  Alert & oriented  Concerns to be addressed: discharge and placement.     PLAN  Financial costs for the patient includes TBD   Patient given options and choices for discharge YES medicare list given   Patient/family is agreeable to the plan?  Yes   Transportation to be setup with HE w/c   Patient Goals and Preferences: tcu tbd  Patient anticipates discharging to:  Tcu tbd    Referrals sent to [facility] via [facility].     RACQUEL Beasley

## 2019-12-08 NOTE — PLAN OF CARE
Goals to be met before discharge:  1. diagnostic tests and consults completed and resulted: NOT MET, awaiting neurology consult  2. vital signs normal or at patient baseline: MET  3. tolerating oral intake to maintain hydration: MET  4. returns to baseline functional status: MET   5. safe disposition plan has been identified: NOT MET

## 2019-12-08 NOTE — PLAN OF CARE
Discharge Planner PT   Patient plan for discharge: rehab  Current status: PT: Order received; 1x evaluation completed for discharge recommendations. Patient under Observation cares; per chart: patient recently at [facility] with a CVA and discharged back to [facility] on 12/6; presented to [facility] on 12/7 for weakness and inability to manage independently at home. At baseline patient reports independence with mobility and cares with use of a 4WW; Gets assist with meals and cleaning only;On eval patient noted to be oriented but forgetful; reports sleeping poorly and feeling very weak when up with nursing staff this am; on eval patient SBA for supine to sit bed mobility with HOB elevated and use of bedrail; more difficult than normal; sit>stand with mod A; unsteady and some buckling of LE's; transfer to bedside chair with same assist; max A to return to EOB secondary to buckling of LE's; mod A for LE's back into supine at end of session; unable to tolerate further activity; nurse present and aware.  Barriers to return to prior living situation: Lives alone; weakness; falls risk; unable to manage own cares in current condition  Recommendations for discharge: TCU  Rationale for recommendations: Patient below baseline for strength and demonstrates impaired functional mobility/poor activity tolerance. Feels generally weak; was recently discharge from [facility] to home and was unable to manage cares; Would benefit from ongoing skilled PT at TCU to maximize strength, tolerance for activity and return of independence with all functional mobility prior to return to [facility].       Entered by: Hoa Dumont 12/08/2019 9:56 AM

## 2019-12-09 VITALS
OXYGEN SATURATION: 97 % | SYSTOLIC BLOOD PRESSURE: 150 MMHG | TEMPERATURE: 95.7 F | DIASTOLIC BLOOD PRESSURE: 73 MMHG | RESPIRATION RATE: 16 BRPM | HEART RATE: 74 BPM

## 2019-12-09 PROCEDURE — 99217 ZZC OBSERVATION CARE DISCHARGE: CPT | Performed by: PHYSICIAN ASSISTANT

## 2019-12-09 PROCEDURE — G0378 HOSPITAL OBSERVATION PER HR: HCPCS

## 2019-12-09 PROCEDURE — 25000132 ZZH RX MED GY IP 250 OP 250 PS 637: Performed by: INTERNAL MEDICINE

## 2019-12-09 RX ORDER — CLOPIDOGREL BISULFATE 75 MG/1
75 TABLET ORAL DAILY
Qty: 21 TABLET | Refills: 0 | DISCHARGE
Start: 2019-12-09 | End: 2019-12-26

## 2019-12-09 RX ORDER — ASPIRIN 325 MG
325 TABLET ORAL DAILY
Status: ON HOLD | DISCHARGE
Start: 2019-12-25 | End: 2020-01-10

## 2019-12-09 RX ORDER — TRAMADOL HYDROCHLORIDE 50 MG/1
25 TABLET ORAL
Qty: 10 TABLET | Refills: 0 | Status: SHIPPED | OUTPATIENT
Start: 2019-12-09 | End: 2019-12-11

## 2019-12-09 RX ADMIN — DORZOLAMIDE HYDROCHLORIDE AND TIMOLOL MALEATE 1 DROP: 20; 5 SOLUTION/ DROPS OPHTHALMIC at 09:45

## 2019-12-09 RX ADMIN — CLOPIDOGREL BISULFATE 75 MG: 75 TABLET ORAL at 09:05

## 2019-12-09 RX ADMIN — ACETAMINOPHEN 1000 MG: 500 TABLET, FILM COATED ORAL at 09:04

## 2019-12-09 RX ADMIN — CARVEDILOL 6.25 MG: 6.25 TABLET, FILM COATED ORAL at 09:04

## 2019-12-09 RX ADMIN — ASPIRIN 81 MG: 81 TABLET, DELAYED RELEASE ORAL at 09:04

## 2019-12-09 RX ADMIN — AMLODIPINE BESYLATE 5 MG: 5 TABLET ORAL at 09:04

## 2019-12-09 NOTE — PLAN OF CARE
Observation Goals:     -diagnostic tests and consults completed and resulted - met  -vital signs normal or at patient baseline - met  -tolerating oral intake to maintain hydration - met  -returns to baseline functional status - partially met, will need therapy outpatient.   -safe disposition plan has been identified - met    Disoriented to year/day of the month. LUE/LLE weakness (both 4/5 but arm weaker than leg), LUE numbness, L pupil nonreactive/slightly reactive, bilateral ataxia on finger-to-nose. [place] [facility] [facility] via . Granddaughter updated.

## 2019-12-09 NOTE — PLAN OF CARE
Alert to self and situation, confused about time and place.VSS. Up with Assist of 1 GB and walker to bedside commode. Neuro checks q4h, all intact. Regular diet. Continent of bowel and bladder, no loose stools.

## 2019-12-09 NOTE — PLAN OF CARE
diagnostic tests and consults completed and resulted  Yes  -vital signs normal or at patient baseline  Yes   -tolerating oral intake to maintain hydration  Yes  -returns to baseline functional status  No  -safe disposition plan has been identified No

## 2019-12-09 NOTE — PROVIDER NOTIFICATION
MCKINLEY Ruiz, notified of L pupil not reactive or possibly minimally reactive to light (difficult to ascertain). Also aware of minimal weakness to LUE, slight L pronator drift, c/o LUE numbness, and bilateral ataxia on finger-to-nose. Sara will re-assess herself and follow up w/ neuro team as needed.

## 2019-12-09 NOTE — PROVIDER NOTIFICATION
MCKINLEY Ruiz, updated on LUE now weaker but still 4/5 strength, L pronator drift more obvious than before, LLE now slightly weaker than R (4/5 strength); L pupil still non-reactive/very sluggish and bilateral ataxia on finger-to-nose. Sara states still ok to discharge this afternoon.

## 2019-12-09 NOTE — PROGRESS NOTES
Observation Goals:     -diagnostic tests and consults completed and resulted - met  -vital signs normal or at patient baseline - met  -tolerating oral intake to maintain hydration - met  -returns to baseline functional status - partially met, will need therapy outpatient.   -safe disposition plan has been identified - met

## 2019-12-09 NOTE — DISCHARGE SUMMARY
[facility]  Hospitalist Discharge Summary       Date of Admission:  12/7/2019  Date of Discharge:  12/9/2019  Discharging Provider: Sara Britt PA-C      Discharge Diagnoses   Recent right putamen infarct  Hypertension  Dyslipidemia  Generalized weakness/deconditioning    Follow-ups Needed After Discharge   Follow-up Appointments     Follow Up and recommended labs and tests      Follow up with snf physician.  The following labs/tests are   recommended: Monitor BP. Slowly lower SBP < 140 over next 2-3 weeks  Follow up with Neurology 4-6 weeks             Unresulted Labs Ordered in the Past 30 Days of this Admission     No orders found from 11/7/2019 to 12/8/2019.          Discharge Disposition   Discharged to short-term care facility  Condition at discharge: Stable    Hospital Course   HPI from H&P per Dr. Jorden DO   Alejandrina Whatley is a 89 year old female who presents with generalized weakness.  She was just discharged from [facility] yesterday following a CVA hospital stay.  She got home and feels she was not able to manage well.  She also reports she didn't sleep well because staff at the assisted living didn't come during the night when called.   This AM she continued struggling, not feeling well.  She also had some labile BP's and some dizziness at one point.  Given all these issues she presented to the [facility] ER. Eval in the ER did not reveal any major new neuro problems.     Recent right putamen infarct: Recent hospitalization at [facility] 12/4 and started on ASA and plavix. C/o left sided weakness of left hand and generalized weakness.   No new neuro focal deficit noted (had some left sided weakness noted on discharge from [facility] per Neuro).  CT head stable.    - Continue ASA 81 mg + plavix x 21 days total. Last dose 12/24. Then switch to  mg/d lifelong  - Neuro consult given recent event/family request, no new recommendations    Hypertension  Patient with some labile BP's during Obs  stay.  Just recently started on Coreg and Norvasc during most recent hospitalization. Neurology recommends gradually lowing to SBP < 140  - Continues on PTA regimen of amlodipine, coreg. Monitor at TCU. Can consider up titration if remains above goal.    Dyslipidemia  - Continue statin    Generalized weakness/Deconditioning  Not doing well at home physically.  Her daughter does not feel her current setting can meet her needs. At daughters request, comprehensive workup for alternative causes of weakness; UA, CBC, CMP, troponin, VBG, CXR unremarkable. Suspect weakness 2/2 recent hospitalization, CVA. Discussed extensively with daughter.  - PT eval, recommending TCU    Dementia  Patient with baseline poor memory per her daughter. Some question of recent worsening. No acute change today.  - OT eval at TCU      Consultations This Hospital Stay   NEUROLOGY IP CONSULT  SOCIAL WORK IP CONSULT  OCCUPATIONAL THERAPY ADULT IP CONSULT  PHYSICAL THERAPY ADULT IP CONSULT  PHYSICAL THERAPY ADULT IP CONSULT  OCCUPATIONAL THERAPY ADULT IP CONSULT    Code Status   DNR/DNI    Time Spent on this Encounter   I, Sara Britt PA-C, personally saw the patient today and spent greater than 30 minutes discharging this patient.       Sara Britt PA-C  [facility]  ______________________________________________________________________    Physical Exam   Vital Signs: Temp: 95.7  F (35.4  C) Temp src: Oral BP: (!) 150/73   Heart Rate: 69 Resp: 16 SpO2: 97 % O2 Device: None (Room air)    Weight: 0 lbs 0 oz  Constitutional: Alert, resting comfortably in NAD  HEENT: Head normocephalic, atraumatic. Eyes sclera non icteric. Left pupil non/minimally reactive  Respiratory: Normal effort, symmetric expansion, no crackles or wheezing  Cardiovascular: RRR no murmurs   GI: Non distended, normal bowels sounds, no tenderness or guarding  MSK: LE without edema. Dorsalis pedis pulse palpated bilaterally.   Skin/Integumen:  Clear  Neuro: CN II-XII grossly intact. Slight weakness of left finger extension, remainder of strength 5/5 and symmetric throughout UE/LE  Psych:  Alert and oriented x 3. Normal affect         Primary Care Physician   Reggie Whatley    Discharge Orders      General info for SNF    Length of Stay Estimate: Short Term Care: Estimated # of Days <30  Condition at Discharge: Stable  Level of care:skilled   Rehabilitation Potential: Good  Admission H&P remains valid and up-to-date: Yes  Recent Chemotherapy: N/A  Use Nursing Home Standing Orders: Yes     Mantoux instructions    Give two-step Mantoux (PPD) Per Facility Policy Yes     Follow Up and recommended labs and tests    Follow up with [facility] physician.  The following labs/tests are recommended: Monitor BP. Slowly lower SBP < 140 over next 2-3 weeks  Follow up with Neurology 4-6 weeks     Activity - Up with assistive device     Physical Therapy Adult Consult    Evaluate and treat as clinically indicated.    Reason: recent CVA     Occupational Therapy Adult Consult    Evaluate and treat as clinically indicated.    Reason:  Recent CVA     Advance Diet as Tolerated    Follow this diet upon discharge: Orders Placed This Encounter      Regular Diet Adult       Significant Results and Procedures   Most Recent 3 CBC's:  Recent Labs   Lab Test 12/07/19  1227 12/06/19  0601 12/03/19  1435 05/02/18  1348   WBC 8.5  --  8.1 12.2*   HGB 12.3  --  13.3 11.9   *  --  107* 101*    177 200 188     Most Recent 3 BMP's:  Recent Labs   Lab Test 12/07/19  1227 12/06/19  0601 12/04/19  1017 12/03/19  1435     --  140 140   POTASSIUM 3.9  --  3.9 4.0   CHLORIDE 108  --  109 107   CO2 25  --  23 26   BUN 24  --  15 19   CR 0.88 0.90 0.84 0.90   ANIONGAP 5  --  8 7   TRANG 8.7  --  8.6 9.1   *  --  156* 98     Most Recent TSH and T4:No lab results found.,   Results for orders placed or performed during the hospital encounter of 12/07/19   XR Chest 2 Views     Narrative    CHEST TWO VIEWS  12/7/2019 1:16 PM     HISTORY: Weakness.    COMPARISON: 5/2/2018.      Impression    IMPRESSION: No significant interval change. Lungs clear. Advanced  degenerative changes in the spine and both glenohumeral joints.  Postoperative changes of vertebroplasty are again noted in the mid  thoracic spine.    KISHAN MARKHAM MD   CT Head w/o Contrast    Narrative    CT SCAN OF THE HEAD WITHOUT CONTRAST   12/7/2019 12:55 PM     HISTORY: Weakness, recent infarct in the right posterior putamen.    TECHNIQUE:  Axial images of the head and coronal reformations without  IV contrast material. Radiation dose for this scan was reduced using  automated exposure control, adjustment of the mA and/or kV according  to patient size, or iterative reconstruction technique.    COMPARISON: MRI 12/3/2019    FINDINGS:  There is generalized atrophy of the brain.  There is low  attenuation in the white matter of the cerebral hemispheres consistent  with sequelae of small vessel ischemic disease. The small recent  infarct in the posterior right putamen is seen as an area of low  attenuation. No acute infarct is seen elsewhere. There is no evidence  of intracranial hemorrhage or mass.     The visualized portions of the sinuses and mastoids appear normal.  There is no evidence of trauma.      Impression    IMPRESSION:   1. Small recent infarct in the posterior right putamen without  hemorrhage.  2. No new infarct elsewhere. No hemorrhage.  3. Atrophy of the brain.  White matter changes consistent with  sequelae of small vessel ischemic disease. This is unchanged.    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 Then start  mg lifelong  Qty: 14 tablet, Refills: 0    Associated Diagnoses: Cerebrovascular accident (CVA) due to occlusion of small artery (H)      clopidogrel (PLAVIX) 75 MG tablet Take 1 tablet (75 mg) by mouth daily For 14 days. Stop date 12/24. Then start full dose ASA.  Qty: 21 tablet, Refills: 0    Associated Diagnoses: Cerebrovascular accident (CVA) due to occlusion of small artery (H)         CONTINUE these medications which have NOT CHANGED    Details   acetaminophen (TYLENOL) 500 MG tablet Take 1,000 mg by mouth 3 times daily 8am, 2pm, 8pm      amLODIPine (NORVASC) 5 MG tablet Take 1 tablet (5 mg) by mouth daily  Qty: 30 tablet, Refills: 0    Associated Diagnoses: Hypertension goal BP (blood pressure) < 130/80      atorvastatin (LIPITOR) 40 MG tablet Take 1 tablet (40 mg) by mouth every evening  Qty: 30 tablet, Refills: 0    Comments: Future refills by PCP Dr. Reggie Whatley with phone number [phone].  Associated Diagnoses: Cerebrovascular accident (CVA) due to occlusion of small artery (H)      carvedilol (COREG) 6.25 MG tablet Take 1 tablet (6.25 mg) by mouth 2 times daily (with meals)  Qty: 60 tablet, Refills: 0    Comments: Future refills by PCP Dr. Reggie Whatley with phone number [phone].  Associated Diagnoses: Cerebrovascular accident (CVA) due to occlusion of small artery (H)      Cholecalciferol (VITAMIN D3) 50 MCG (2000 UT) TABS Take 2,000 Units by mouth daily      citalopram (CELEXA) 10 MG tablet Take 10 mg by mouth every evening      dorzolamide-timolol (COSOPT) 2-0.5 % ophthalmic solution Place 1 drop into both eyes 2 times daily  Qty: 1 Bottle, Refills: 11    Comments: May be substituted for Timolol and Dorzolamide separately if needed due to long term backorder of Cosopt.  Associated Diagnoses: Pseudoexfoliation glaucoma, moderate stage      latanoprost (XALATAN) 0.005 % ophthalmic  solution Place 1 drop into both eyes At Bedtime Both Eyes  Qty: 3 Bottle, Refills: 4    Associated Diagnoses: Pseudoexfoliation glaucoma, moderate stage      metroNIDAZOLE (METROCREAM) 0.75 % external cream Apply 1 g topically 2 times daily as needed (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 and affected areas)      polyethylene glycol (MIRALAX/GLYCOLAX) packet Take 17 g by mouth daily as needed for constipation       traMADol (ULTRAM) 25 mg TABS half-tab Take 25 mg by mouth every evening            Allergies   Allergies   Allergen Reactions     Actonel [Bisphosphonates] Rash     Conjugated Estrogens Rash     Macrobid [Nitrofuran Derivatives] Rash     Nitrofurantoin Rash     Premarin Rash     Sulfa Drugs Rash     Hydrocodone Nausea and Vomiting     Oxycodone Nausea and Vomiting     Risedronate      leg pain

## 2019-12-09 NOTE — PLAN OF CARE
diagnostic tests and consults completed and resulted  Yes  -vital signs normal or at patient baseline  Yes Orthostatics were negative  -tolerating oral intake to maintain hydration  Yes  -returns to baseline functional status  No  -safe disposition plan has been identified No    A&O for the most part, little forgetful baseline dementia. VSS on RA. BP labile. Neuros grossly intact except for some left sided weakness especially on the UE. At times ataxic and pronator drift on the LUE and other times absent. Wonder if underlying weakness is contributing to it. 1 loose stool this shift.Up AX1-2 with GB and walker to the chair and BSC. On regular diet. Discharge pending TCU placement. Pt declined PIV, removed this shift. Continue to monitor.

## 2019-12-09 NOTE — PROGRESS NOTES
SW:  D: Spoke with [facility] who could accept the pt today into a private room, pt would be responsible for $38.50 / day private room fee, this is all they have available.     Call placed to [facility] and left  for admissions inquiring if they could accept the pt today, awaiting response.     ADDENDUM:  Spoke with [facility] who can accept this pt into a shared room today. Spoke with pt and granddaughter who would like to accept room at [facility] for today. Per granddaughter this pt will need [facility] transport upon discharge, [facility] for coverage.   Tentative transport set for (next available) 1315. Discharge orders faxed.      PAS-RR     D: Per DHS regulation, HARI completed and submitted PAS-RR to [place] Board  on Aging Direct Connect via the Senior LinkAge Line.  PAS-RR  confirmation # is : [id number]    P: Further questions may be directed to Senior LinkAge Line at #[phone], option #4 for PAS-RR staff.      P: HARI will continue to follow for discharge plans.    RACQUEL Hanna   [phone]  [facility]

## 2019-12-11 ENCOUNTER — DOCUMENTATION ONLY (OUTPATIENT)
Dept: OTHER | Facility: CLINIC | Age: 84
End: 2019-12-11

## 2019-12-11 ENCOUNTER — NURSING HOME VISIT (OUTPATIENT)
Dept: GERIATRICS | Facility: CLINIC | Age: 84
End: 2019-12-11
Payer: COMMERCIAL

## 2019-12-11 VITALS
HEIGHT: 58 IN | OXYGEN SATURATION: 95 % | HEART RATE: 73 BPM | WEIGHT: 134 LBS | TEMPERATURE: 98.9 F | SYSTOLIC BLOOD PRESSURE: 137 MMHG | DIASTOLIC BLOOD PRESSURE: 71 MMHG | BODY MASS INDEX: 28.13 KG/M2 | RESPIRATION RATE: 18 BRPM

## 2019-12-11 DIAGNOSIS — Z87.39 HISTORY OF HERNIATED INTERVERTEBRAL DISC: ICD-10-CM

## 2019-12-11 DIAGNOSIS — K59.00 CONSTIPATION, UNSPECIFIED CONSTIPATION TYPE: ICD-10-CM

## 2019-12-11 DIAGNOSIS — R63.0 POOR APPETITE: ICD-10-CM

## 2019-12-11 DIAGNOSIS — I69.354 HEMIPARESIS AFFECTING LEFT SIDE AS LATE EFFECT OF CEREBROVASCULAR ACCIDENT (CVA) (H): Primary | ICD-10-CM

## 2019-12-11 DIAGNOSIS — R53.81 PHYSICAL DECONDITIONING: ICD-10-CM

## 2019-12-11 DIAGNOSIS — I10 BENIGN ESSENTIAL HYPERTENSION: ICD-10-CM

## 2019-12-11 DIAGNOSIS — E78.5 HYPERLIPIDEMIA, UNSPECIFIED HYPERLIPIDEMIA TYPE: ICD-10-CM

## 2019-12-11 DIAGNOSIS — F03.90 DEMENTIA WITHOUT BEHAVIORAL DISTURBANCE, UNSPECIFIED DEMENTIA TYPE: ICD-10-CM

## 2019-12-11 DIAGNOSIS — M19.019 OSTEOARTHRITIS OF SHOULDER, UNSPECIFIED LATERALITY, UNSPECIFIED OSTEOARTHRITIS TYPE: ICD-10-CM

## 2019-12-11 DIAGNOSIS — F32.A DEPRESSION, UNSPECIFIED DEPRESSION TYPE: ICD-10-CM

## 2019-12-11 PROCEDURE — 99310 SBSQ NF CARE HIGH MDM 45: CPT | Performed by: NURSE PRACTITIONER

## 2019-12-11 RX ORDER — TRAMADOL HYDROCHLORIDE 50 MG/1
25 TABLET ORAL
Qty: 30 TABLET | Refills: 0 | Status: SHIPPED | OUTPATIENT
Start: 2019-12-11 | End: 2019-12-18

## 2019-12-11 ASSESSMENT — MIFFLIN-ST. JEOR: SCORE: 887.57

## 2019-12-11 NOTE — LETTER
"    12/11/2019        RE: Alejandrina Whatley  [address]  [place] [address]        [place] [facility]  PRIMARY CARE PROVIDER AND CLINIC:  Reggie Whatley MD, [facility] PHYSICIAN [facility] [address] [facility] / [facility]   Chief Complaint   Patient presents with     Hospital F/U     [facility] Medical Record Number:  [id number]  Place of Service where encounter took place:  [facility] ([facility] [[id number]]    Alejandrina Whatley  is a 89 year old  (8/22/1923), admitted to the above facility from  [facility]. Hospital stay 12/7/19 through 12/9/19. Patient was also admitted to [facility] 12/3/19 through 12/6/19.  Admitted to this facility for  rehab, medical management and nursing care.    HPI:    HPI information obtained from: facility chart records, facility staff, patient report, [facility] chart review and family/first contact daughter, Pat report.     Brief Summary of Hospital Course:     This is a 89-year-old female, with a past medical history significant for right putamen infarct with left-sided weakness on 12/4/19, hypertension, hyperlipidemia and dementia, who was admitted to [facility] with weakness, labile blood pressures and dizziness. A head CT revealed \"1. Small recent infarct in the posterior right putamen without hemorrhage\". No new neuro deficits noted. Neurology consulted with no new recommendations. Orthostatics negative. Elevated blood pressure thought to be related to recent CVA. A TCU was recommended for ongoing physical rehabilitation.     Updates on Status Since Skilled nursing Admission:     Daughter present during today's visit and provides much of the information. Describes events leading up to stroke. States 1 week prior to stroke, patient was having hallucinations. Questioned if this was related, but was told it was not by Neurology. Also had some buzzing that comes and goes which Neurology was made aware of. Walked with a walker " prior to hospitalization. Had a 100 year old friend she used to walk down the halls with quickly at their AL. Has a herniated disc and pain sometimes slows her down. Now, weakness has gotten worse on the left side. Doesn't want to work with therapy because she's too tired. Enjoys all the activities at the TCU. Has had a poor appetite since the stroke. Per patient report, has no numbness on the left side. Would not mind staying in long term care if she can't get stronger.     CODE STATUS/ADVANCE DIRECTIVES DISCUSSION:   DNR / DNI  Patient's living condition: lives in an assisted living facility  ALLERGIES: Actonel [bisphosphonates]; Conjugated estrogens; Macrobid [nitrofuran derivatives]; Nitrofurantoin; Premarin; Sulfa drugs; Hydrocodone; Oxycodone; and Risedronate  PAST MEDICAL HISTORY:  has a past medical history of Abdominal aortic aneurysm (H) (2001), AK (actinic keratosis) (11/11/2009), Cataract (2/22/2011), Compression fractures, Compression Fractures of Lumbar Vertebra (2/4/2010), DJD (degenerative joint disease), Generalised Weakness and Fatigue (3/3/2011), Glaucoma (increased eye pressure) (2009), HTN (hypertension) (11/11/2009), Hypercholesteremia (2001), Hyperlipidemia LDL goal <100 (10/31/2010), 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 She also has no past medical history of Amblyopia, Complication of anesthesia, Diabetes (H), Diabetic retinopathy (H), Macular degeneration, Malignant hyperthermia, Retinal detachment, Strabismus, or Uveitis.  PAST SURGICAL HISTORY:   has a past surgical history that includes hysterectomy, cervix status unknown (1971); APPENDECTOMY (1971); ANTER VESICOURETHROPEXY,SIMPLE (2008); aaa repair (2/2009); Extracapsular cataract extration with intraocular lens implant  (4/2010,5/2010); Laser selective trabeculoplasty (3/2010; 10/2015); cataract iol, rt/lt; and Laser selective trabeculoplasty (12/2017).  FAMILY HISTORY: family history includes Heart Disease (age of onset: 79) in her father; Hypertension in her mother.  SOCIAL HISTORY:   reports that she has never smoked. She has never used smokeless tobacco. She reports that she does not drink alcohol or use drugs.    Post Discharge Medication Reconciliation Status: discharge medications reconciled, continue medications without change    Current Outpatient Medications   Medication Sig Dispense Refill     carvedilol (COREG) 6.25 MG tablet Take 1 tablet (6.25 mg) by mouth 2 times daily (with meals) 60 tablet 0     acetaminophen (TYLENOL) 500 MG tablet Take 1,000 mg by mouth 3 times daily 8am, 2pm, 8pm       amLODIPine (NORVASC) 5 MG tablet Take 1 tablet (5 mg) by mouth daily 30 tablet 0     [START ON 12/25/2019] aspirin (ASA) 325 MG tablet Take 1 tablet (325 mg) by mouth daily (Patient not taking: Reported on 12/11/2019)       aspirin (ASA) 81 MG EC tablet Take 1 tablet (81 mg) by mouth daily for 14 days Stop after 12/24. Then start  mg lifelong 14 tablet 0     atorvastatin (LIPITOR) 40 MG tablet Take 1 tablet (40 mg) by mouth every evening 30 tablet 0     Cholecalciferol (VITAMIN D3) 50 MCG (2000 UT) TABS Take 2,000 Units by mouth daily       citalopram (CELEXA) 10 MG tablet Take 10 mg by mouth every evening       clopidogrel (PLAVIX) 75 MG tablet Take 1 tablet (75 mg) by mouth daily For 14 days. Stop date 12/24. Then start full dose ASA. 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 and affected areas)        "polyethylene glycol (MIRALAX/GLYCOLAX) packet Take 17 g by mouth daily as needed for constipation        traMADol (ULTRAM) 50 MG tablet Take 0.5 tablets (25 mg) by mouth every evening as needed for moderate to severe pain 10 tablet 0     ROS:  10 point ROS of systems including Constitutional, Eyes, Respiratory, Cardiovascular, Gastroenterology, Genitourinary, Integumentary, Musculoskeletal, Psychiatric were all negative except for pertinent positives noted in my HPI.    Vitals:  /71   Pulse 73   Temp 98.9  F (37.2  C)   Resp 18   Ht 1.473 m (4' 10\")   Wt 60.8 kg (134 lb)   SpO2 95%   BMI 28.01 kg/m     Exam:  GENERAL APPEARANCE:  Alert, in no distress  ENT:  Mouth and posterior oropharynx normal, moist mucous membranes  EYES:  EOMI  RESP:  respiratory effort and palpation of chest normal, lungs clear to auscultation , no respiratory distress  CV:  Palpation and auscultation of heart done , regular rate and rhythm, no murmur, rub, or gallop  ABDOMEN:  normal bowel sounds, soft, nontender, no hepatosplenomegaly or other masses  M/S:   Left-sided weakness  SKIN:  Inspection of skin and subcutaneous tissue baseline, Palpation of skin and subcutaneous tissue baseline  NEURO:   Cranial nerves 2-12 are normal tested and grossly at patient's baseline  PSYCH:  affect and mood normal    Lab/Diagnostic data:  Labs done in SNF are in [place] EPIC. Please refer to them using [employer] Everywhere.    ASSESSMENT/PLAN:  Ischemic Infarct of the Right Posterior Lateral Putamen with Left-Sided Weakness on 12/6/19. Continue Clopidogrel for 21 days following hospital discharge on 12/24/19. Continue Aspirin 81 mg PO daily then 325 mg PO daily on 12/25/19.  Physical and Occupational Therapy ordered for deconditioning. Daughter and patient's goal is to return to AL, but would be open to the idea of staying in long term care.    Hypertension and Hyperlipidemia. Allow permissive blood pressures while recovering from above. " "Monitor blood pressure daily. Continue Amlodipine, Atorvastatin and Carvedilol as ordered.    Dementia. With hallucinations on 1 day a week prior to CVA. Occupational Therapy to evaluate cognitive status. Lived in AL prior to admission.    Constipation. Continue Miralax as ordered.     Poor Appetite. Noted since CVA. Patient states she's \"just not hungry\". Does not feel it's related to mood as she enjoys being in TCU. Continue to encourage adequate intake. Dietary to follow while in TCU. Monitor weights.     History of Herniated DIsc. Per daughter's report. Continue Acetaminophen and Tramadol as ordered.    Glaucoma. Continue Dorzolamide-Timolol and Latanoprost as ordered.    Depression. Continue Citalopram as ordered.    Physical Deconditioning. Secondary to recent hospitalizations and co-morbidities. Physical and Occupational Therapy ordered.     Total time spent with patient visit was 35 min including patient visit and review of past records. Greater than 50% of total time spent with counseling and coordinating care due to discussion with daughter about TCU plan of care including how often patient would receive therapy, how long patient would tentatively plan on staying, how insurance determines coverage for therapy, reviewing code status, discussing life after a stroke and probability patient could return to AL.     Electronically signed by:  CRESCENCIO Garnett CNP                     Sincerely,        CRESCENCIO Garnett CNP    "

## 2019-12-11 NOTE — PROGRESS NOTES
"[place] [facility]  PRIMARY CARE PROVIDER AND CLINIC:  Reggie Whatley MD, [facility] PHYSICIAN [facility] [address] [facility]   Chief Complaint   Patient presents with     Hospital F/U     [place] Medical Record Number:  [id number]  Place of Service where encounter took place:  [facility] ([facility]) [[id number]]    Alejandrina Whatley  is a 89 year old  (8/22/1923), admitted to the above facility from  [facility]. Hospital stay 12/7/19 through 12/9/19. Patient was also admitted to [facility] 12/3/19 through 12/6/19.  Admitted to this facility for  rehab, medical management and nursing care.    HPI:    HPI information obtained from: facility chart records, facility staff, patient report, [facility] chart review and family/first contact daughter, Pat report.     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 A head CT revealed \"1. Small recent infarct in the posterior right putamen without hemorrhage\". No new neuro deficits noted. Neurology consulted with no new recommendations. Orthostatics negative. Elevated blood pressure thought to be related to recent CVA. A TCU was recommended for ongoing physical rehabilitation.     Updates on Status Since Skilled nursing Admission:     Daughter present during today's visit and provides much of the information. Describes events leading up to stroke. States 1 week prior to stroke, patient was having hallucinations. Questioned if this was related, but was told it was not by Neurology. Also had some buzzing that comes and goes which Neurology was made aware of. Walked with a walker prior to hospitalization. Had a 100 year old friend she used to walk down the halls with " quickly at their AL. Has a herniated disc and pain sometimes slows her down. Now, weakness has gotten worse on the left side. Doesn't want to work with therapy because she's too tired. Enjoys all the activities at the TCU. Has had a poor appetite since the stroke. Per patient report, has no numbness on the left side. Would not mind staying in long term care if she can't get stronger.     CODE STATUS/ADVANCE DIRECTIVES DISCUSSION:   DNR / DNI  Patient's living condition: lives in an assisted living facility  ALLERGIES: Actonel [bisphosphonates]; Conjugated estrogens; Macrobid [nitrofuran derivatives]; Nitrofurantoin; Premarin; Sulfa drugs; Hydrocodone; Oxycodone; and Risedronate  PAST MEDICAL HISTORY:  has a past medical history of Abdominal aortic aneurysm (H) (2001), AK (actinic keratosis) (11/11/2009), Cataract (2/22/2011), Compression fractures, Compression Fractures of Lumbar Vertebra (2/4/2010), DJD (degenerative joint disease), Generalised Weakness and Fatigue (3/3/2011), Glaucoma (increased eye pressure) (2009), HTN (hypertension) (11/11/2009), Hypercholesteremia (2001), Hyperlipidemia LDL goal <100 (10/31/2010), 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 She also has no past medical history of Amblyopia, Complication of anesthesia, Diabetes (H), Diabetic retinopathy (H), Macular degeneration, Malignant hyperthermia, Retinal detachment, Strabismus, or Uveitis.  PAST SURGICAL HISTORY:   has a past surgical history that includes hysterectomy, cervix status unknown (1971); APPENDECTOMY (1971); ANTER VESICOURETHROPEXY,SIMPLE (2008); aaa repair (2/2009); Extracapsular cataract extration with intraocular lens implant (4/2010,5/2010); Laser selective trabeculoplasty (3/2010; 10/2015); cataract iol, rt/lt; and Laser  selective trabeculoplasty (12/2017).  FAMILY HISTORY: family history includes Heart Disease (age of onset: 79) in her father; Hypertension in her mother.  SOCIAL HISTORY:   reports that she has never smoked. She has never used smokeless tobacco. She reports that she does not drink alcohol or use drugs.    Post Discharge Medication Reconciliation Status: discharge medications reconciled, continue medications without change    Current Outpatient Medications   Medication Sig Dispense Refill     carvedilol (COREG) 6.25 MG tablet Take 1 tablet (6.25 mg) by mouth 2 times daily (with meals) 60 tablet 0     acetaminophen (TYLENOL) 500 MG tablet Take 1,000 mg by mouth 3 times daily 8am, 2pm, 8pm       amLODIPine (NORVASC) 5 MG tablet Take 1 tablet (5 mg) by mouth daily 30 tablet 0     [START ON 12/25/2019] aspirin (ASA) 325 MG tablet Take 1 tablet (325 mg) by mouth daily (Patient not taking: Reported on 12/11/2019)       aspirin (ASA) 81 MG EC tablet Take 1 tablet (81 mg) by mouth daily for 14 days Stop after 12/24. Then start  mg lifelong 14 tablet 0     atorvastatin (LIPITOR) 40 MG tablet Take 1 tablet (40 mg) by mouth every evening 30 tablet 0     Cholecalciferol (VITAMIN D3) 50 MCG (2000 UT) TABS Take 2,000 Units by mouth daily       citalopram (CELEXA) 10 MG tablet Take 10 mg by mouth every evening       clopidogrel (PLAVIX) 75 MG tablet Take 1 tablet (75 mg) by mouth daily For 14 days. Stop date 12/24. Then start full dose ASA. 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 and affected areas)       polyethylene glycol (MIRALAX/GLYCOLAX) packet Take 17 g by mouth daily as needed for constipation         "traMADol (ULTRAM) 50 MG tablet Take 0.5 tablets (25 mg) by mouth every evening as needed for moderate to severe pain 10 tablet 0     ROS:  10 point ROS of systems including Constitutional, Eyes, Respiratory, Cardiovascular, Gastroenterology, Genitourinary, Integumentary, Musculoskeletal, Psychiatric were all negative except for pertinent positives noted in my HPI.    Vitals:  /71   Pulse 73   Temp 98.9  F (37.2  C)   Resp 18   Ht 1.473 m (4' 10\")   Wt 60.8 kg (134 lb)   SpO2 95%   BMI 28.01 kg/m    Exam:  GENERAL APPEARANCE:  Alert, in no distress  ENT:  Mouth and posterior oropharynx normal, moist mucous membranes  EYES:  EOMI  RESP:  respiratory effort and palpation of chest normal, lungs clear to auscultation , no respiratory distress  CV:  Palpation and auscultation of heart done , regular rate and rhythm, no murmur, rub, or gallop  ABDOMEN:  normal bowel sounds, soft, nontender, no hepatosplenomegaly or other masses  M/S:   Left-sided weakness  SKIN:  Inspection of skin and subcutaneous tissue baseline, Palpation of skin and subcutaneous tissue baseline  NEURO:   Cranial nerves 2-12 are normal tested and grossly at patient's baseline  PSYCH:  affect and mood normal    Lab/Diagnostic data:  Labs done in SNF are in [place] [facility]. Please refer to them using [employer] Everywhere.    ASSESSMENT/PLAN:  Ischemic Infarct of the Right Posterior Lateral Putamen with Left-Sided Weakness on 12/6/19. Continue Clopidogrel for 21 days following hospital discharge on 12/24/19. Continue Aspirin 81 mg PO daily then 325 mg PO daily on 12/25/19.  Physical and Occupational Therapy ordered for deconditioning. Daughter and patient's goal is to return to AL, but would be open to the idea of staying in long term care.    Hypertension and Hyperlipidemia. Allow permissive blood pressures while recovering from above. Monitor blood pressure daily. PTA Atenolol was discontinued. Continue Amlodipine, Atorvastatin and Carvedilol " "as ordered which were initiated during hospitalization.    Dementia. With hallucinations on 1 day a week prior to CVA. Occupational Therapy to evaluate cognitive status. Lived in AL prior to admission.    Constipation. Continue Miralax as ordered.     Poor Appetite. Noted since CVA. Patient states she's \"just not hungry\". Does not feel it's related to mood as she enjoys being in TCU. Continue to encourage adequate intake. Dietary to follow while in TCU. Monitor weights.     History of Herniated DIsc. Per daughter's report. Continue Acetaminophen and Tramadol as ordered.    Glaucoma. Continue Dorzolamide-Timolol and Latanoprost as ordered.    Depression. Continue Citalopram as ordered.    Physical Deconditioning. Secondary to recent hospitalizations and co-morbidities. Physical and Occupational Therapy ordered.     Total time spent with patient visit was 35 min including patient visit and review of past records. Greater than 50% of total time spent with counseling and coordinating care due to discussion with daughter about TCU plan of care including how often patient would receive therapy, how long patient would tentatively plan on staying, how insurance determines coverage for therapy, reviewing code status, discussing life after a stroke and probability patient could return to AL.     Electronically signed by:  CRESCENCIO Garnett CNP                 "

## 2019-12-13 ENCOUNTER — TELEPHONE (OUTPATIENT)
Dept: GERIATRICS | Facility: CLINIC | Age: 84
End: 2019-12-13

## 2019-12-13 NOTE — TELEPHONE ENCOUNTER
"89-year-old female recently hospitalized for small recent infarct in the posterior right putamen without hemorrhage    Call from PT today, concerned that previous L dorsiflexion 2/5 but today no active movement.    I contacted floor nurse, no hallucinations, VS stable, /73, appears OK, nothing out of ordinary, intake appropriate, continues ASA and plavix, of note was \"let down\" to floor yesterday during bath and has bruise on L knee and abrasion R hand, neuro checks pupil, hand/leg generally WNL with L side weakness.    No new orders, facility to continue to monitor.  "

## 2019-12-17 ENCOUNTER — NURSING HOME VISIT (OUTPATIENT)
Dept: GERIATRICS | Facility: CLINIC | Age: 84
End: 2019-12-17
Payer: COMMERCIAL

## 2019-12-17 VITALS
HEIGHT: 58 IN | BODY MASS INDEX: 28.21 KG/M2 | TEMPERATURE: 98.1 F | WEIGHT: 134.4 LBS | DIASTOLIC BLOOD PRESSURE: 77 MMHG | RESPIRATION RATE: 18 BRPM | OXYGEN SATURATION: 98 % | HEART RATE: 71 BPM | SYSTOLIC BLOOD PRESSURE: 129 MMHG

## 2019-12-17 DIAGNOSIS — F32.A DEPRESSION, UNSPECIFIED DEPRESSION TYPE: ICD-10-CM

## 2019-12-17 DIAGNOSIS — F03.90 DEMENTIA WITHOUT BEHAVIORAL DISTURBANCE, UNSPECIFIED DEMENTIA TYPE: ICD-10-CM

## 2019-12-17 DIAGNOSIS — I10 ESSENTIAL HYPERTENSION: ICD-10-CM

## 2019-12-17 DIAGNOSIS — I63.81 CEREBROVASCULAR ACCIDENT (CVA) DUE TO OCCLUSION OF SMALL ARTERY (H): Primary | ICD-10-CM

## 2019-12-17 DIAGNOSIS — R53.81 PHYSICAL DECONDITIONING: ICD-10-CM

## 2019-12-17 DIAGNOSIS — I69.354 HEMIPARESIS AFFECTING LEFT SIDE AS LATE EFFECT OF CEREBROVASCULAR ACCIDENT (CVA) (H): ICD-10-CM

## 2019-12-17 PROCEDURE — 99207 ZZC CDG-EXAM COMPONENT: MEETS DETAILED - DOWN CODED: CPT | Performed by: INTERNAL MEDICINE

## 2019-12-17 PROCEDURE — 99304 1ST NF CARE SF/LOW MDM 25: CPT | Performed by: INTERNAL MEDICINE

## 2019-12-17 RX ORDER — ACYCLOVIR 50 MG/G
CREAM TOPICAL
COMMUNITY
Start: 2019-12-16 | End: 2019-12-26

## 2019-12-17 ASSESSMENT — MIFFLIN-ST. JEOR: SCORE: 889.38

## 2019-12-17 NOTE — PROGRESS NOTES
"[place] [facility]  PHYSICIAN NOTE    PRIMARY CARE PROVIDER AND CLINIC:  Reggie Whatley MD, [facility] PHYSICIAN [facility] [address] [facility]    Chief Complaint   Patient presents with     Hospital F/U     [place] Medical Record Number:  [id number]  Place of Service where encounter took place:  [facility] ([facility]) [[id number]]    Alejandrina Whatley is a 89 year old (8/22/1923), admitted to the above facility from  [facility]. Hospital stay 12/7/19 through 12/9/19. Admitted to this facility for  rehab, medical management and nursing care. NOTE, Patient was also admitted to [facility] 12/3/19 through 12/6/19.    HPI:    HPI information obtained from: facility chart records, facility staff, patient report and [facility] chart review.     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 Noted to have hallucinations and memory deficit concerning for dementia though not formally diagnosed. She discharged to her prior [facility] but didn't feel had enough support there and also had generalized weakness/deconditioning so readmitted to [facility] prior to transition to TCU.    Updates on status since skilled nursing admission: Daughter involved in her mom's care though not present at time of my visit. Alejandrina is reportedly enjoying activities at TCU though has low appetite. Open to idea of LTC rather than residential if doesn't get stronger. No nursing concerns today.  When I spoke with Alejandrina in her room she was lying in bed awake in late afternoon. Denies dep/anxiety though has a lot of down thoughts she voices like, \"no hope, not much to live for, I used to be so independent\". She is unable to tell me anything about her hospital stay but when I mention the stroke she says, \"oh yeah, my entire left side is paralyzed\"! She is feeling " "very tired. No pain or dyspnea. Says she sleeps ok. She is on Tramadol at home and thinks its for her \"bones\"; when I say its for pain she denies pain and is ok with stopping it. No more on-going diarrhea.    CODE STATUS/ADVANCE DIRECTIVES DISCUSSION:   DNR / DNI  Patient's living condition: lives in an assisted living facility    ALLERGIES: Actonel [bisphosphonates]; Conjugated estrogens; Macrobid [nitrofuran derivatives]; Nitrofurantoin; Premarin; Sulfa drugs; Hydrocodone; Oxycodone; and Risedronate    Past Medical History:   Diagnosis Date     Abdominal aortic aneurysm (H) 2001    had a stent placed 2009     AK (actinic keratosis) 11/11/2009     Cataract 2/22/2011     Compression fractures      Compression Fractures of Lumbar Vertebra 2/4/2010     DJD (degenerative joint disease)      Generalised Weakness and Fatigue 3/3/2011     Glaucoma (increased eye pressure) 2009    Dr. 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  Dr. Clark.     HYSTERECTOMY, CERVIX STATUS UNKNOWN  1971     LASER SELECTIVE TRABECULOPLASTY  3/2010; 10/2015    left eye 1st Clark (notes show inf treatment); inf 180 (MARI)     LASER SELECTIVE TRABECULOPLASTY  12/2017    left eye sup 180     Family History   Problem Relation Age of Onset     Heart Disease Father 79        MI     Hypertension Mother      Social History     Tobacco Use     Smoking " status: Never Smoker     Smokeless tobacco: Never Used     Tobacco comment: No second hand exposure.   Substance Use Topics     Alcohol use: No     Drug use: No        Post-discharge medication reconciliation status: I discontinued Tramadol today    Current Outpatient Medications   Medication Sig Dispense Refill     acetaminophen (TYLENOL) 500 MG tablet Take 1,000 mg by mouth 3 times daily 8am, 2pm, 8pm       acyclovir (ZOVIRAX) 5 % external cream Apply topically 5 times daily       amLODIPine (NORVASC) 5 MG tablet Take 1 tablet (5 mg) by mouth daily 30 tablet 0     aspirin (ASA) 81 MG EC tablet Take 1 tablet (81 mg) by mouth daily for 14 days Stop after 12/24. Then start  mg lifelong 14 tablet 0     atorvastatin (LIPITOR) 40 MG tablet Take 1 tablet (40 mg) by mouth every evening 30 tablet 0     carvedilol (COREG) 6.25 MG tablet Take 1 tablet (6.25 mg) by mouth 2 times daily (with meals) 60 tablet 0     Cholecalciferol (VITAMIN D3) 50 MCG (2000 UT) TABS Take 2,000 Units by mouth daily       citalopram (CELEXA) 10 MG tablet Take 10 mg by mouth every evening       clopidogrel (PLAVIX) 75 MG tablet Take 1 tablet (75 mg) by mouth daily For 14 days. Stop date 12/24. Then start full dose ASA. 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 and affected areas)       Multiple Vitamins-Minerals (CERTAVITE/ANTIOXIDANTS PO) Take 1 tablet by mouth daily       polyethylene glycol (MIRALAX/GLYCOLAX) packet Take 17 g by mouth daily as needed for constipation        traMADol (ULTRAM) 50 MG tablet Take 0.5 tablets (25 mg) by mouth every evening as needed for moderate to severe pain 30 tablet 0     [START ON 12/25/2019]  "aspirin (ASA) 325 MG tablet Take 1 tablet (325 mg) by mouth daily (Patient not taking: Reported on 12/11/2019)         ROS:  Limited secondary to cognitive impairment but today pt reports as above in HPI    Exam:  /77   Pulse 71   Temp 98.1  F (36.7  C)   Resp 18   Ht 1.473 m (4' 10\")   Wt 61 kg (134 lb 6.4 oz)   SpO2 98%   BMI 28.09 kg/m    Sad affect, frustrated but pleasant  No scleral icterus   Moist oral mucosa  Bandage along nose from derm biopsy site yesterday  HRRR without mrg  Breathing non-labored  Abdomen soft  No edema  Mild left facial droop and hemiparesis on left    Lab/Diagnostic data:  CMP, CBC, UA and CXR unremarkable recent hospital stay    Head CT 12/7/19  IMPRESSION:   1. Small recent infarct in the posterior right putamen without hemorrhage.  2. No new infarct elsewhere. No hemorrhage.  3. Atrophy of the brain.  White matter changes consistent with sequelae of small vessel ischemic disease. This is unchanged.    ASSESSMENT/PLAN:  Ischemic cerebrovascular accident (CVA) Right Putamen d/t small vessel occlusion  Hemiparesis affecting left side as late effect of cerebrovascular accident (CVA)  Physical deconditioning  Continue physical therapy and occupational therapy here for safe dispo planning; ?LTC  ASA 81 mg + Plavix 75 mg daily til 12/24/19 and then transition to single agent  mg  F/u with neuro as directed    Essential hypertension  Reasonable control on new regimen; -130s  No labs needed    Dementia without behavioral disturbance, unspecified dementia type  Occupational therapy eval and treat  Tramadol 25 mg at bedtime which is home medication discontinued today to try and help avoid delirium as she says she has no pain    Depression  Continue Celexa; seemed down today though notes have also mentioned she enjoys activities at TCU  If truly seems down which can happen after a stroke, consider increasing Celexa to 20 mg daily         Electronically signed " by:  Yulissa Garcia, DO

## 2019-12-17 NOTE — LETTER
"    12/17/2019        RE: Alejandrina Whatley  [address]  [facility] [address]        [place] [facility]  PHYSICIAN NOTE    PRIMARY CARE PROVIDER AND CLINIC:  Reggie Whatley MD, [facility] PHYSICIAN [facility]    Chief Complaint   Patient presents with     Hospital F/U     [place] Medical Record Number:  [id number]  Place of Service where encounter took place:  [facility] ([facility]) [[id number]]    Alejandrina Whatley is a 89 year old (8/22/1923), admitted to the above facility from  [facility]. Hospital stay 12/7/19 through 12/9/19. Admitted to this facility for  rehab, medical management and nursing care. NOTE, Patient was also admitted to [facility] 12/3/19 through 12/6/19.    HPI:    HPI information obtained from: facility chart records, facility staff, patient report and [facility] chart review.     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 Noted to have hallucinations and memory deficit concerning for dementia though not formally diagnosed. She discharged to her prior [facility] but didn't feel had enough support there and also had generalized weakness/deconditioning so readmitted to [facility] prior to transition to TCU.    Updates on status since skilled nursing admission: Daughter involved in her mom's care though not present at time of my visit. Alejandrina is reportedly enjoying activities at TCU though has low appetite. Open to idea of LTC rather than [facility] if doesn't get stronger. No nursing concerns today.  When I spoke with Alejandrina in her room she was lying in bed awake in late afternoon. Denies dep/anxiety though has a lot of down thoughts she voices like, \"no hope, not much to live for, I used to be so independent\". She is unable to tell me anything about her hospital stay but " "when I mention the stroke she says, \"oh yeah, my entire left side is paralyzed\"! She is feeling very tired. No pain or dyspnea. Says she sleeps ok. She is on Tramadol at home and thinks its for her \"bones\"; when I say its for pain she denies pain and is ok with stopping it. No more on-going diarrhea.    CODE STATUS/ADVANCE DIRECTIVES DISCUSSION:   DNR / DNI  Patient's living condition: lives in an assisted living facility    ALLERGIES: Actonel [bisphosphonates]; Conjugated estrogens; Macrobid [nitrofuran derivatives]; Nitrofurantoin; Premarin; Sulfa drugs; Hydrocodone; Oxycodone; and Risedronate    Past Medical History:   Diagnosis Date     Abdominal aortic aneurysm (H) 2001    had a stent placed 2009     AK (actinic keratosis) 11/11/2009     Cataract 2/22/2011     Compression fractures      Compression Fractures of Lumbar Vertebra 2/4/2010     DJD (degenerative joint disease)      Generalised Weakness and Fatigue 3/3/2011     Glaucoma (increased eye pressure) 2009    Dr. 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  Dr. Clark.     HYSTERECTOMY, CERVIX STATUS UNKNOWN  1971     LASER SELECTIVE TRABECULOPLASTY  3/2010; 10/2015    left eye 1st Clark (notes show inf treatment); inf 180 (MARI)     LASER SELECTIVE TRABECULOPLASTY  12/2017    left eye sup 180     Family History   Problem Relation Age of Onset     Heart Disease " Father 79        MI     Hypertension Mother      Social History     Tobacco Use     Smoking status: Never Smoker     Smokeless tobacco: Never Used     Tobacco comment: No second hand exposure.   Substance Use Topics     Alcohol use: No     Drug use: No        Post-discharge medication reconciliation status: I discontinued Tramadol today    Current Outpatient Medications   Medication Sig Dispense Refill     acetaminophen (TYLENOL) 500 MG tablet Take 1,000 mg by mouth 3 times daily 8am, 2pm, 8pm       acyclovir (ZOVIRAX) 5 % external cream Apply topically 5 times daily       amLODIPine (NORVASC) 5 MG tablet Take 1 tablet (5 mg) by mouth daily 30 tablet 0     aspirin (ASA) 81 MG EC tablet Take 1 tablet (81 mg) by mouth daily for 14 days Stop after 12/24. Then start  mg lifelong 14 tablet 0     atorvastatin (LIPITOR) 40 MG tablet Take 1 tablet (40 mg) by mouth every evening 30 tablet 0     carvedilol (COREG) 6.25 MG tablet Take 1 tablet (6.25 mg) by mouth 2 times daily (with meals) 60 tablet 0     Cholecalciferol (VITAMIN D3) 50 MCG (2000 UT) TABS Take 2,000 Units by mouth daily       citalopram (CELEXA) 10 MG tablet Take 10 mg by mouth every evening       clopidogrel (PLAVIX) 75 MG tablet Take 1 tablet (75 mg) by mouth daily For 14 days. Stop date 12/24. Then start full dose ASA. 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 and affected areas)       Multiple Vitamins-Minerals (CERTAVITE/ANTIOXIDANTS PO) Take 1 tablet by mouth daily       polyethylene glycol (MIRALAX/GLYCOLAX) packet Take 17 g by mouth daily as needed for constipation        traMADol (ULTRAM) 50 MG tablet Take 0.5 tablets (25 mg) by mouth  "every evening as needed for moderate to severe pain 30 tablet 0     [START ON 12/25/2019] aspirin (ASA) 325 MG tablet Take 1 tablet (325 mg) by mouth daily (Patient not taking: Reported on 12/11/2019)         ROS:  Limited secondary to cognitive impairment but today pt reports as above in HPI    Exam:  /77   Pulse 71   Temp 98.1  F (36.7  C)   Resp 18   Ht 1.473 m (4' 10\")   Wt 61 kg (134 lb 6.4 oz)   SpO2 98%   BMI 28.09 kg/m     Sad affect, frustrated but pleasant  No scleral icterus   Moist oral mucosa  Bandage along nose from derm biopsy site yesterday  HRRR without mrg  Breathing non-labored  Abdomen soft  No edema  Mild left facial droop and hemiparesis on left    Lab/Diagnostic data:  CMP, CBC, UA and CXR unremarkable recent hospital stay    Head CT 12/7/19  IMPRESSION:   1. Small recent infarct in the posterior right putamen without hemorrhage.  2. No new infarct elsewhere. No hemorrhage.  3. Atrophy of the brain.  White matter changes consistent with sequelae of small vessel ischemic disease. This is unchanged.    ASSESSMENT/PLAN:  Ischemic cerebrovascular accident (CVA) Right Putamen d/t small vessel occlusion  Hemiparesis affecting left side as late effect of cerebrovascular accident (CVA)  Physical deconditioning  Continue physical therapy and occupational therapy here for safe dispo planning; ?LTC  ASA 81 mg + Plavix 75 mg daily til 12/24/19 and then transition to single agent  mg  F/u with neuro as directed    Essential hypertension  Reasonable control on new regimen; -130s  No labs needed    Dementia without behavioral disturbance, unspecified dementia type  Occupational therapy eval and treat  Tramadol 25 mg at bedtime which is home medication discontinued today to try and help avoid delirium as she says she has no pain    Depression  Continue Celexa; seemed down today though notes have also mentioned she enjoys activities at TCU  If truly seems down which can happen after " a stroke, consider increasing Celexa to 20 mg daily         Electronically signed by:  Yulissa Garcia, DO        Sincerely,        Yulissa Garcia, DO

## 2019-12-20 ENCOUNTER — NURSING HOME VISIT (OUTPATIENT)
Dept: GERIATRICS | Facility: CLINIC | Age: 84
End: 2019-12-20
Payer: COMMERCIAL

## 2019-12-20 VITALS
BODY MASS INDEX: 28.21 KG/M2 | TEMPERATURE: 98.6 F | RESPIRATION RATE: 18 BRPM | SYSTOLIC BLOOD PRESSURE: 142 MMHG | HEIGHT: 58 IN | WEIGHT: 134.4 LBS | HEART RATE: 80 BPM | DIASTOLIC BLOOD PRESSURE: 74 MMHG | OXYGEN SATURATION: 95 %

## 2019-12-20 DIAGNOSIS — R53.81 PHYSICAL DECONDITIONING: ICD-10-CM

## 2019-12-20 DIAGNOSIS — F32.A DEPRESSION, UNSPECIFIED DEPRESSION TYPE: ICD-10-CM

## 2019-12-20 DIAGNOSIS — G47.00 INSOMNIA, UNSPECIFIED TYPE: ICD-10-CM

## 2019-12-20 DIAGNOSIS — I69.354 HEMIPARESIS AFFECTING LEFT SIDE AS LATE EFFECT OF CEREBROVASCULAR ACCIDENT (CVA) (H): Primary | ICD-10-CM

## 2019-12-20 DIAGNOSIS — F03.90 DEMENTIA WITHOUT BEHAVIORAL DISTURBANCE, UNSPECIFIED DEMENTIA TYPE: ICD-10-CM

## 2019-12-20 DIAGNOSIS — I10 BENIGN ESSENTIAL HYPERTENSION: ICD-10-CM

## 2019-12-20 PROCEDURE — 99309 SBSQ NF CARE MODERATE MDM 30: CPT | Performed by: NURSE PRACTITIONER

## 2019-12-20 RX ORDER — LANOLIN ALCOHOL/MO/W.PET/CERES
3 CREAM (GRAM) TOPICAL
Start: 2019-12-20 | End: 2022-02-15

## 2019-12-20 ASSESSMENT — MIFFLIN-ST. JEOR: SCORE: 889.38

## 2019-12-20 NOTE — PROGRESS NOTES
[place] [facility]  [place] Medical Record Number:  [id number]  Place of Service where encounter took place:  [facility] ([facility]) [[id number]]  Chief Complaint   Patient presents with     RECHECK       HPI:    Alejandrina Whatley  is a 89 year old (8/22/1923), who is being seen today for an episodic care visit.  HPI information obtained from: facility chart records, facility staff, patient report and [facility] chart review.     Brief Summary of Hospital Course: Ms. Alejandrina Whatley is a 89 year old female with PMH significant for hypertension, hyperlipidemia and dementia. She was admitted to [facility] from 12/3/19-12/6/19 for weakness, dizziness, and labile blood pressures. 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 Continues on plavix + baby ASA through 12/24 (21 day course), then to start  mg daily. Aslo started on atorvastatin, coreg and amlodipine for HTN urgency with goal SBP around 140. Was then readmitted to [facility] 12/7-12/8 for placement to TCU for additional support and generalized deconditioning. Discharged to TCU for physical rehabilitation.    Today's concern is:  Ms. Whatley was seen today for routine follow up at TCU. Overall she tells me she is feeling well. She tells me she has left sided weakness to her entire left side from her recent stroke. She tells me her strength is improving a little, but she is still very weak. She is right handed. She is able to feed herself and denies any issues swallowing after stroke. She tells me she has poor appetite however. Denies SOB, cough, CP, dizziness/lightheadedness. She reports some edema to LLE at times. Reports bowels moving well- denies constipation/ loose stools. Denies trouble urinating or dysuria. Has no MS pain. Denies headaches, vision changes. Reports that she is a little down, but feels she is coping ok. We discussed in house psychologist, but she is not  interested in starting this now- she wants to wait. Reports she is having trouble sleeping at night. Nursing tells me she is having restless legs. SBP generally 130s-140s, generally 70s. Weight down 1 lb from admission. Ambulating 20 feet on parallel bars.    Past Medical and Surgical History reviewed in Epic today.    MEDICATIONS:  Current Outpatient Medications   Medication Sig Dispense Refill     acetaminophen (TYLENOL) 500 MG tablet Take 1,000 mg by mouth 3 times daily 8am, 2pm, 8pm       acyclovir (ZOVIRAX) 5 % external cream Apply topically 5 times daily To cold sore on lips       amLODIPine (NORVASC) 5 MG tablet Take 1 tablet (5 mg) by mouth daily 30 tablet 0     aspirin (ASA) 81 MG EC tablet Take 1 tablet (81 mg) by mouth daily for 14 days Stop after 12/24. Then start  mg lifelong 14 tablet 0     atorvastatin (LIPITOR) 40 MG tablet Take 1 tablet (40 mg) by mouth every evening 30 tablet 0     carvedilol (COREG) 6.25 MG tablet Take 1 tablet (6.25 mg) by mouth 2 times daily (with meals) 60 tablet 0     Cholecalciferol (VITAMIN D3) 50 MCG (2000 UT) TABS Take 2,000 Units by mouth daily       citalopram (CELEXA) 10 MG tablet Take 10 mg by mouth every evening       clopidogrel (PLAVIX) 75 MG tablet Take 1 tablet (75 mg) by mouth daily For 14 days. Stop date 12/24. Then start full dose ASA. 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 and affected areas)       Multiple Vitamins-Minerals (CERTAVITE/ANTIOXIDANTS PO) Take 1 tablet by mouth daily       polyethylene glycol (MIRALAX/GLYCOLAX) packet Take 17 g by mouth daily as needed for constipation        [START ON 12/25/2019] aspirin (ASA) 325 MG tablet  "Take 1 tablet (325 mg) by mouth daily (Patient not taking: Reported on 12/11/2019)       REVIEW OF SYSTEMS:  10 point ROS of systems including Constitutional, Eyes, Respiratory, Cardiovascular, Gastroenterology, Genitourinary, Integumentary, Musculoskeletal, Psychiatric were all negative except for pertinent positives noted in my HPI.    Objective:  BP (!) 142/74   Pulse 80   Temp 98.6  F (37  C)   Resp 18   Ht 1.473 m (4' 10\")   Wt 61 kg (134 lb 6.4 oz)   SpO2 95%   BMI 28.09 kg/m    Exam:  GENERAL APPEARANCE:  Alert, pleasant and cooperative, elderly female sitting in wheelchair on exam today  HEENT: normocephalic, conjunctivae, lids, pupils and irises normal, moist mucous membranes, nose without drainage or crusting, hearing acuity: intact  RESP:  respiratory effort normal, no respiratory distress, Lung sounds clear, patient is on RA  CV: auscultation of heart done, rate and rhythm regular. no murmur, no rub or gallop. No peripheral edema  ABDOMEN: + bowel sounds, soft, nontender, no grimacing or guarding with palpation.  M/S:  no tenderness or swelling of the joints; able to move all extremities, but weakness to left upper and lower extermities   SKIN:  Inspection and palpation of skin and subcutaneous tissue: skin warm, dry without rashes  NEURO: no facial asymmetry, no speech deficits and able to follow directions, moves all extremities symmetrically  PSYCH: insight and judgement impaired, memory impaired, affect ok and mood down    Labs:   Labs done in SNF are in [place] [facility]. Please refer to them using [employer] Everywhere. and Recent labs in [facility] reviewed by me today.     ASSESSMENT/PLAN:  (I69.354) Hemiparesis affecting left side as late effect of cerebrovascular accident (CVA) (H)  (primary encounter diagnosis)  Comment: acute on chronic -not at goal  - with right putamen CVA d/t small vessel occlusion diagnosed 12/4  Plan: Continue baby ASA+ plavix through 12/24, then  mg daily. Therapy as " below. Follow up with neurology as scheduled for 1/7/19.     (I10) Benign essential hypertension  Comment: acute on chronic, BP reviewed and is generally meeting goal of SBP around 140  Plan: Continue amlodipine, coreg. Vs per TCU policy. Monitor.     (F03.90) Dementia without behavioral disturbance, unspecified dementia type (H)  Comment: Acute, CPT 4.2/5.6 indicating 24 hour supervision, SLUMS 14/30  Plan: Nursing to continue to provide supportive cares. May need higher level of care at discharge.  to assist with discharge planning.     (F32.9) Depression, unspecified depression type  Comment: acute on chronic, does report being more down recently- PHQ9 per  6/27  Plan: Continue PTA celexa. Discussed in house psych is available, but she declines at this time. Monitor. Could consider increase in celexa if persists.     (R53.81) Physical deconditioning  Comment: Acute, secondary to recent hospitalization, medical conditions as above  Plan: Encourage participation in physical therapy/occupational therapy for strengthening and deconditioning. Discharge planning per their recommendation. Social work to assist with d/c planning.    (G47.00) Insomnia  Comment: acute, tells me she sleeps well at home, but has not slept well here- may also be having some restless legs that could be contributing  Plan: melatonin 3 mg at bedtime PRN for insomnia. Monitor.       Electronically signed by:  CRESCENCIO Narayanan CNP

## 2019-12-20 NOTE — LETTER
12/20/2019        RE: Alejandrina Whatley  [address]  [facility] [address]        [place] [facility]  [place] Medical Record Number:  [id number]  Place of Service where encounter took place:  [facility] ([facility]) [[id number]]  Chief Complaint   Patient presents with     RECHECK       HPI:    Alejandrina Whtaley  is a 89 year old (8/22/1923), who is being seen today for an episodic care visit.  HPI information obtained from: facility chart records, facility staff, patient report and [facility] chart review.     Brief Summary of Hospital Course:  Ms. Alejandrina Whatley is a 89 year old female with PMH significant for hypertension, hyperlipidemia and dementia. She was admitted to [facility] from 12/3/19-12/6/19 for weakness, dizziness, and labile blood pressures. 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 Continues on plavix + baby ASA through 12/24 (21 day course), then to start  mg daily. Aslo started on atorvastatin, coreg and amlodipine for HTN urgency with goal SBP around 140. Was then readmitted to [facility] 12/7-12/8 for placement to TCU for additional support and generalized deconditioning. Discharged to TCU for physical rehabilitation.    Today's concern is:  Ms. Whatley was seen today for routine follow up at TCU. Overall she tells me she is feeling well. She tells me she has left sided weakness to her entire left side from her recent stroke. She tells me her strength is improving a little, but she is still very weak. She is right handed. She is able to feed herself and denies any issues swallowing after stroke. She tells me she has poor appetite however. Denies SOB, cough, CP, dizziness/lightheadedness. She reports some edema to LLE at times. Reports bowels moving well- denies constipation/ loose stools. Denies trouble urinating or dysuria. Has no MS pain. Denies headaches, vision changes. Reports that she  is a little down, but feels she is coping ok. We discussed in house psychologist, but she is not interested in starting this now- she wants to wait. Reports she is having trouble sleeping at night. Nursing tells me she is having restless legs. SBP generally 130s-140s, generally 70s. Weight down 1 lb from admission. Ambulating 20 feet on parallel bars.    Past Medical and Surgical History reviewed in Epic today.    MEDICATIONS:  Current Outpatient Medications   Medication Sig Dispense Refill     acetaminophen (TYLENOL) 500 MG tablet Take 1,000 mg by mouth 3 times daily 8am, 2pm, 8pm       acyclovir (ZOVIRAX) 5 % external cream Apply topically 5 times daily To cold sore on lips       amLODIPine (NORVASC) 5 MG tablet Take 1 tablet (5 mg) by mouth daily 30 tablet 0     aspirin (ASA) 81 MG EC tablet Take 1 tablet (81 mg) by mouth daily for 14 days Stop after 12/24. Then start  mg lifelong 14 tablet 0     atorvastatin (LIPITOR) 40 MG tablet Take 1 tablet (40 mg) by mouth every evening 30 tablet 0     carvedilol (COREG) 6.25 MG tablet Take 1 tablet (6.25 mg) by mouth 2 times daily (with meals) 60 tablet 0     Cholecalciferol (VITAMIN D3) 50 MCG (2000 UT) TABS Take 2,000 Units by mouth daily       citalopram (CELEXA) 10 MG tablet Take 10 mg by mouth every evening       clopidogrel (PLAVIX) 75 MG tablet Take 1 tablet (75 mg) by mouth daily For 14 days. Stop date 12/24. Then start full dose ASA. 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 and affected areas)       Multiple Vitamins-Minerals (CERTAVITE/ANTIOXIDANTS PO) Take 1 tablet by mouth daily       polyethylene glycol (MIRALAX/GLYCOLAX) packet Take 17 g by  "mouth daily as needed for constipation        [START ON 12/25/2019] aspirin (ASA) 325 MG tablet Take 1 tablet (325 mg) by mouth daily (Patient not taking: Reported on 12/11/2019)       REVIEW OF SYSTEMS:  10 point ROS of systems including Constitutional, Eyes, Respiratory, Cardiovascular, Gastroenterology, Genitourinary, Integumentary, Musculoskeletal, Psychiatric were all negative except for pertinent positives noted in my HPI.    Objective:  BP (!) 142/74   Pulse 80   Temp 98.6  F (37  C)   Resp 18   Ht 1.473 m (4' 10\")   Wt 61 kg (134 lb 6.4 oz)   SpO2 95%   BMI 28.09 kg/m     Exam:  GENERAL APPEARANCE:  Alert, pleasant and cooperative, elderly female sitting in wheelchair on exam today  HEENT: normocephalic, conjunctivae, lids, pupils and irises normal, moist mucous membranes, nose without drainage or crusting, hearing acuity: intact  RESP:  respiratory effort normal, no respiratory distress, Lung sounds clear, patient is on RA  CV: auscultation of heart done, rate and rhythm regular. no murmur, no rub or gallop. No peripheral edema  ABDOMEN: + bowel sounds, soft, nontender, no grimacing or guarding with palpation.  M/S:  no tenderness or swelling of the joints; able to move all extremities, but weakness to left upper and lower extermities   SKIN:  Inspection and palpation of skin and subcutaneous tissue: skin warm, dry without rashes  NEURO: no facial asymmetry, no speech deficits and able to follow directions, moves all extremities symmetrically  PSYCH: insight and judgement impaired, memory impaired, affect ok and mood down    Labs:   [facility] in SNF [facility]. Please refer to them using [employer] Everywhere. and Recent labs in [facility] reviewed by me today.     ASSESSMENT/PLAN:  (I69.354) Hemiparesis affecting left side as late effect of cerebrovascular accident (CVA) (H)  (primary encounter diagnosis)  Comment: acute on chronic -not at goal  - with right putamen CVA d/t small vessel occlusion " diagnosed 12/4  Plan: Continue baby ASA+ plavix through 12/24, then  mg daily. Therapy as below. Follow up with neurology as scheduled for 1/7/19.     (I10) Benign essential hypertension  Comment: acute on chronic, BP reviewed and is generally meeting goal of SBP around 140  Plan: Continue amlodipine, coreg. Vs per TCU policy. Monitor.     (F03.90) Dementia without behavioral disturbance, unspecified dementia type (H)  Comment: Acute, CPT 4.2/5.6 indicating 24 hour supervision, SLUMS 14/30  Plan: Nursing to continue to provide supportive cares. May need higher level of care at discharge.  to assist with discharge planning.     (F32.9) Depression, unspecified depression type  Comment: acute on chronic, does report being more down recently- PHQ9 per  6/27  Plan: Continue PTA celexa. Discussed in house psych is available, but she declines at this time. Monitor. Could consider increase in celexa if persists.     (R53.81) Physical deconditioning  Comment: Acute, secondary to recent hospitalization, medical conditions as above  Plan: Encourage participation in physical therapy/occupational therapy for strengthening and deconditioning. Discharge planning per their recommendation. Social work to assist with d/c planning.    (G47.00) Insomnia  Comment: acute, tells me she sleeps well at home, but has not slept well here- may also be having some restless legs that could be contributing  Plan: melatonin 3 mg at bedtime PRN for insomnia. Monitor.       Electronically signed by:  CRESCENCIO Narayanan CNP           Sincerely,        CRESCENCIO Narayanan CNP

## 2019-12-26 ENCOUNTER — NURSING HOME VISIT (OUTPATIENT)
Dept: GERIATRICS | Facility: CLINIC | Age: 84
End: 2019-12-26
Payer: COMMERCIAL

## 2019-12-26 VITALS
OXYGEN SATURATION: 93 % | DIASTOLIC BLOOD PRESSURE: 78 MMHG | HEART RATE: 72 BPM | TEMPERATURE: 97.9 F | WEIGHT: 133 LBS | RESPIRATION RATE: 18 BRPM | BODY MASS INDEX: 27.92 KG/M2 | SYSTOLIC BLOOD PRESSURE: 118 MMHG | HEIGHT: 58 IN

## 2019-12-26 DIAGNOSIS — F03.90 DEMENTIA WITHOUT BEHAVIORAL DISTURBANCE, UNSPECIFIED DEMENTIA TYPE: ICD-10-CM

## 2019-12-26 DIAGNOSIS — R53.81 PHYSICAL DECONDITIONING: ICD-10-CM

## 2019-12-26 DIAGNOSIS — F32.A DEPRESSION, UNSPECIFIED DEPRESSION TYPE: ICD-10-CM

## 2019-12-26 DIAGNOSIS — I10 BENIGN ESSENTIAL HYPERTENSION: ICD-10-CM

## 2019-12-26 DIAGNOSIS — I69.354 HEMIPARESIS AFFECTING LEFT SIDE AS LATE EFFECT OF CEREBROVASCULAR ACCIDENT (CVA) (H): Primary | ICD-10-CM

## 2019-12-26 DIAGNOSIS — G47.00 INSOMNIA, UNSPECIFIED TYPE: ICD-10-CM

## 2019-12-26 DIAGNOSIS — K59.00 CONSTIPATION, UNSPECIFIED CONSTIPATION TYPE: ICD-10-CM

## 2019-12-26 PROCEDURE — 99309 SBSQ NF CARE MODERATE MDM 30: CPT | Performed by: NURSE PRACTITIONER

## 2019-12-26 ASSESSMENT — MIFFLIN-ST. JEOR: SCORE: 883.03

## 2019-12-26 NOTE — LETTER
"    12/26/2019        RE: Alejandrina Whatley  [address]  [facility] [address]        [place] [facility]  [place] Medical Record Number:  [id number]  Place of Service where encounter took place:  [facility] ([facility]) [[id number]]  Chief Complaint   Patient presents with     RECHECK       HPI:    Alejandrina Whatley  is a 89 year old (8/22/1923), who is being seen today for an episodic care visit.  HPI information obtained from: facility chart records, facility staff, patient report and [facility] chart review.     Brief Summary of Hospital Course: Ms. Alejandrina Whatley is a 89 year old female with PMH significant for hypertension, hyperlipidemia and dementia. She was admitted to [facility] from 12/3/19-12/6/19 for weakness, dizziness, and labile blood pressures. 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 Continues on plavix + baby ASA through 12/24 (21 day course), then to start  mg daily. Aslo started on atorvastatin, coreg and amlodipine for HTN urgency with goal SBP around 140. Was then readmitted to [facility] 12/7-12/8 for placement to TCU for additional support and generalized deconditioning. Discharged to TCU for physical rehabilitation.      Today's concern is:  Ms. Whatley was seen today for routine follow up at TCU. She tells me she is feeling well. Denies SOB, CP, dizziness/lightheadedness, edema, trouble urinating/ dysuria. Reports therapy is going well and feels her strength is continuing to improve to her right arm. Ambulating 12 feet per therapy notes. Therapy also working on transfers. She is still max assist for toileting. Reports feeling constipated- last \"good\" BM was earlier this week. Denies headaches/vision changes. Has found melatonin effective for her sleep. -153- most 130s.     Past Medical and Surgical History reviewed in Epic today.    MEDICATIONS:  Current Outpatient Medications " "  Medication Sig Dispense Refill     acetaminophen (TYLENOL) 500 MG tablet Take 1,000 mg by mouth 3 times daily 8am, 2pm, 8pm       amLODIPine (NORVASC) 5 MG tablet Take 1 tablet (5 mg) by mouth daily 30 tablet 0     aspirin (ASA) 325 MG tablet Take 1 tablet (325 mg) by mouth daily       atorvastatin (LIPITOR) 40 MG tablet Take 1 tablet (40 mg) by mouth every evening 30 tablet 0     carvedilol (COREG) 6.25 MG tablet Take 1 tablet (6.25 mg) by mouth 2 times daily (with meals) 60 tablet 0     Cholecalciferol (VITAMIN D3) 50 MCG (2000 UT) TABS Take 2,000 Units by mouth daily       citalopram (CELEXA) 10 MG tablet Take 10 mg by mouth every evening       dorzolamide-timolol (COSOPT) 2-0.5 % ophthalmic solution Place 1 drop into both eyes 2 times daily 1 Bottle 11     latanoprost (XALATAN) 0.005 % ophthalmic solution Place 1 drop into both eyes At Bedtime Both Eyes 3 Bottle 4     melatonin 3 MG tablet Take 1 tablet (3 mg) by mouth nightly as needed for sleep       metroNIDAZOLE (METROCREAM) 0.75 % external cream Apply 1 g topically 2 times daily as needed (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    Objective:  /78   Pulse 72   Temp 97.9  F (36.6  C)   Resp 18   Ht 1.473 m (4' 10\")   Wt 60.3 kg (133 lb)   SpO2 93%   BMI 27.80 kg/m     Exam:  GENERAL APPEARANCE:  Alert, pleasant and cooperative, elderly female sitting in wheelchair on exam today  HEENT: normocephalic, conjunctivae, lids, pupils and irises normal, moist mucous membranes, nose without drainage or crusting, " hearing acuity: intact  RESP:  respiratory effort normal, no respiratory distress, Lung sounds clear, patient is on RA  CV: auscultation of heart done, rate and rhythm regular. no murmur, no rub or gallop. No peripheral edema  ABDOMEN: + bowel sounds, soft, nontender, no grimacing or guarding with palpation.  M/S:  no tenderness or swelling of the joints; able to move all extremities, but weakness to left upper and lower extermities   SKIN:  Inspection and palpation of skin and subcutaneous tissue: skin warm, dry without rashes  NEURO: no facial asymmetry, no speech deficits and able to follow directions, moves all extremities symmetrically  PSYCH: insight and judgement impaired, memory impaired, affect  and mood ok    Labs:   Labs done in SNF are in [place] [employer] Please refer to them using [employer] Everywhere. and Recent labs in EPIC reviewed by me today.     ASSESSMENT/PLAN:  (I69.354) Hemiparesis affecting left side as late effect of cerebrovascular accident (CVA) (H)  (primary encounter diagnosis)  Comment: acute on chronic -not at goal -continues to have left sided weakness.  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 Therapy as below. Follow up with neurology as scheduled for 1/7/19.      (I10) Benign essential hypertension  Comment: acute on chronic, BP reviewed and is generally meeting goal   Plan: Continue amlodipine, coreg. Vs per TCU policy. Monitor.      (F03.90) Dementia without behavioral disturbance, unspecified dementia type (H)  Comment: Acute, CPT 4.2/5.6 indicating 24 hour supervision, SLUMS 14/30  Plan: Nursing to continue to provide supportive cares. May need higher level of care at discharge.  to assist with discharge planning.      (F32.9) Depression, unspecified depression type  Comment: acute on chronic, PHQ9 per  6/27  Plan: Continue PTA celexa.. Monitor.  Could consider increase in celexa if persists, or referral for ACP.      (R53.81) Physical deconditioning  Comment: Acute, secondary to recent hospitalization, medical conditions as above  Plan: Encourage participation in physical therapy/occupational therapy for strengthening and deconditioning. Discharge planning per their recommendation. Social work to assist with d/c planning.     (G47.00) Insomnia  Comment: acute, would like melatonin scheduled  Plan: Melatonin 3 mg at bedtime for insomnia. Monitor.      (K59.00) constipation  Comment: acute   Plan: Senna S 1 tab po at bedtime. Miralax PRN. Nursing to monitor bowels and use house orders PRN- notify provider if used to adjust daily regimen.        Electronically signed by:  CRESCENCIO Narayanan CNP           Sincerely,        CRESCENCIO Narayanan CNP

## 2019-12-26 NOTE — PROGRESS NOTES
"[place] [facility]  [place] Medical Record Number:  [id number]  Place of Service where encounter took place:  [facility] ([facility]) [[id number]]  Chief Complaint   Patient presents with     RECHECK       HPI:    Alejandrina Whatley  is a 89 year old (8/22/1923), who is being seen today for an episodic care visit.  HPI information obtained from: facility chart records, facility staff, patient report and [facility] chart review.     Brief Summary of Hospital Course: Ms. Alejandrina Whatley is a 89 year old female with PMH significant for hypertension, hyperlipidemia and dementia. She was admitted to [facility] from 12/3/19-12/6/19 for weakness, dizziness, and labile blood pressures. 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 Continues on plavix + baby ASA through 12/24 (21 day course), then to start  mg daily. Aslo started on atorvastatin, coreg and amlodipine for HTN urgency with goal SBP around 140. Was then readmitted to [facility] 12/7-12/8 for placement to TCU for additional support and generalized deconditioning. Discharged to TCU for physical rehabilitation.      Today's concern is:  Ms. Whatley was seen today for routine follow up at TCU. She tells me she is feeling well. Denies SOB, CP, dizziness/lightheadedness, edema, trouble urinating/ dysuria. Reports therapy is going well and feels her strength is continuing to improve to her right arm. Ambulating 12 feet per therapy notes. Therapy also working on transfers. She is still max assist for toileting. Reports feeling constipated- last \"good\" BM was earlier this week. Denies headaches/vision changes. Has found melatonin effective for her sleep. -153- most 130s.     Past Medical and Surgical History reviewed in Epic today.    MEDICATIONS:  Current Outpatient Medications   Medication Sig Dispense Refill     acetaminophen (TYLENOL) 500 MG tablet Take 1,000 mg by mouth 3 " "times daily 8am, 2pm, 8pm       amLODIPine (NORVASC) 5 MG tablet Take 1 tablet (5 mg) by mouth daily 30 tablet 0     aspirin (ASA) 325 MG tablet Take 1 tablet (325 mg) by mouth daily       atorvastatin (LIPITOR) 40 MG tablet Take 1 tablet (40 mg) by mouth every evening 30 tablet 0     carvedilol (COREG) 6.25 MG tablet Take 1 tablet (6.25 mg) by mouth 2 times daily (with meals) 60 tablet 0     Cholecalciferol (VITAMIN D3) 50 MCG (2000 UT) TABS Take 2,000 Units by mouth daily       citalopram (CELEXA) 10 MG tablet Take 10 mg by mouth every evening       dorzolamide-timolol (COSOPT) 2-0.5 % ophthalmic solution Place 1 drop into both eyes 2 times daily 1 Bottle 11     latanoprost (XALATAN) 0.005 % ophthalmic solution Place 1 drop into both eyes At Bedtime Both Eyes 3 Bottle 4     melatonin 3 MG tablet Take 1 tablet (3 mg) by mouth nightly as needed for sleep       metroNIDAZOLE (METROCREAM) 0.75 % external cream Apply 1 g topically 2 times daily as needed (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    Objective:  /78   Pulse 72   Temp 97.9  F (36.6  C)   Resp 18   Ht 1.473 m (4' 10\")   Wt 60.3 kg (133 lb)   SpO2 93%   BMI 27.80 kg/m    Exam:  GENERAL APPEARANCE:  Alert, pleasant and cooperative, elderly female sitting in wheelchair on exam today  HEENT: normocephalic, conjunctivae, lids, pupils and irises normal, moist mucous membranes, nose without drainage or crusting, hearing acuity: intact  RESP:  respiratory effort normal, no respiratory distress, Lung sounds " clear, patient is on RA  CV: auscultation of heart done, rate and rhythm regular. no murmur, no rub or gallop. No peripheral edema  ABDOMEN: + bowel sounds, soft, nontender, no grimacing or guarding with palpation.  M/S:  no tenderness or swelling of the joints; able to move all extremities, but weakness to left upper and lower extermities   SKIN:  Inspection and palpation of skin and subcutaneous tissue: skin warm, dry without rashes  NEURO: no facial asymmetry, no speech deficits and able to follow directions, moves all extremities symmetrically  PSYCH: insight and judgement impaired, memory impaired, affect and mood ok    Labs:   Labs done in SNF are in [place] EPIC. Please refer to them using [employer] Everywhere. and Recent labs in EPIC reviewed by me today.     ASSESSMENT/PLAN:  (I69.354) Hemiparesis affecting left side as late effect of cerebrovascular accident (CVA) (H)  (primary encounter diagnosis)  Comment: acute on chronic -not at goal -continues to have left sided weakness.  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 Therapy as below. Follow up with neurology as scheduled for 1/7/19.      (I10) Benign essential hypertension  Comment: acute on chronic, BP reviewed and is generally meeting goal   Plan: Continue amlodipine, coreg. Vs per TCU policy. Monitor.      (F03.90) Dementia without behavioral disturbance, unspecified dementia type (H)  Comment: Acute, CPT 4.2/5.6 indicating 24 hour supervision, SLUMS 14/30  Plan: Nursing to continue to provide supportive cares. May need higher level of care at discharge.  to assist with discharge planning.      (F32.9) Depression, unspecified depression type  Comment: acute on chronic, PHQ9 per  6/27  Plan: Continue PTA celexa.. Monitor. Could consider increase in celexa if persists, or referral for ACP.      (R53.81) Physical  deconditioning  Comment: Acute, secondary to recent hospitalization, medical conditions as above  Plan: Encourage participation in physical therapy/occupational therapy for strengthening and deconditioning. Discharge planning per their recommendation. Social work to assist with d/c planning.     (G47.00) Insomnia  Comment: acute, would like melatonin scheduled  Plan: Melatonin 3 mg at bedtime for insomnia. Monitor.      (K59.00) constipation  Comment: acute   Plan: Senna S 1 tab po at bedtime. Miralax PRN. Nursing to monitor bowels and use house orders PRN- notify provider if used to adjust daily regimen.        Electronically signed by:  CRESCENCIO Narayanan CNP

## 2020-01-03 ENCOUNTER — NURSING HOME VISIT (OUTPATIENT)
Dept: GERIATRICS | Facility: CLINIC | Age: 85
End: 2020-01-03
Payer: COMMERCIAL

## 2020-01-03 VITALS
DIASTOLIC BLOOD PRESSURE: 79 MMHG | RESPIRATION RATE: 19 BRPM | OXYGEN SATURATION: 96 % | WEIGHT: 132.2 LBS | SYSTOLIC BLOOD PRESSURE: 141 MMHG | HEART RATE: 67 BPM | TEMPERATURE: 98.1 F | HEIGHT: 58 IN | BODY MASS INDEX: 27.75 KG/M2

## 2020-01-03 DIAGNOSIS — G47.00 INSOMNIA, UNSPECIFIED TYPE: ICD-10-CM

## 2020-01-03 DIAGNOSIS — I10 BENIGN ESSENTIAL HYPERTENSION: ICD-10-CM

## 2020-01-03 DIAGNOSIS — R53.81 PHYSICAL DECONDITIONING: ICD-10-CM

## 2020-01-03 DIAGNOSIS — I69.354 HEMIPARESIS AFFECTING LEFT SIDE AS LATE EFFECT OF CEREBROVASCULAR ACCIDENT (CVA) (H): Primary | ICD-10-CM

## 2020-01-03 DIAGNOSIS — F32.A DEPRESSION, UNSPECIFIED DEPRESSION TYPE: ICD-10-CM

## 2020-01-03 DIAGNOSIS — F03.90 DEMENTIA WITHOUT BEHAVIORAL DISTURBANCE, UNSPECIFIED DEMENTIA TYPE: ICD-10-CM

## 2020-01-03 PROCEDURE — 99309 SBSQ NF CARE MODERATE MDM 30: CPT | Performed by: NURSE PRACTITIONER

## 2020-01-03 RX ORDER — AMOXICILLIN 250 MG
1 CAPSULE ORAL AT BEDTIME
Status: ON HOLD | COMMUNITY
End: 2022-02-19

## 2020-01-03 ASSESSMENT — MIFFLIN-ST. JEOR: SCORE: 879.41

## 2020-01-03 NOTE — PROGRESS NOTES
"[place] [facility]  [place] Medical Record Number:  [id number]  Place of Service where encounter took place:  [facility] ([facility]) [[id number]]  Chief Complaint   Patient presents with     RECHECK       HPI:    Alejandrina Whatley  is a 89 year old (8/22/1923), who is being seen today for an episodic care visit.  HPI information obtained from: facility chart records, facility staff, patient report and [facility] chart review.     Brief Summary of Hospital Course: Ms. Alejandrina Whatley is a 89 year old female with PMH significant for hypertension, hyperlipidemia and dementia. She was admitted to [facility] from 12/3/19-12/6/19 for weakness, dizziness, and labile blood pressures. 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 Continues on plavix + baby ASA through 12/24 (21 day course), then to start  mg daily. Also started on atorvastatin, coreg and amlodipine for HTN urgency with goal SBP around 140. Was then readmitted to [facility] 12/7-12/8 for placement to TCU for additional support and generalized deconditioning. Discharged to TCU for physical rehabilitation.    Today's concern is:  Ms. Whatley was seen today for routine follow up at TCU. She has just woken up and is feeling tired this morning. Has no complaints today. Denies headaches, vision changes, SOB, dizziness/lightheadedness, CP. Reports some chronic edema to left lower extremity- was not present at time of visit. Bowels moving well. Denies dysuria/trouble voiding. Reports her mood is fine and she \"feels good today.\" Is sleeping well. -147 recently- generally under 140, HR , oxygen saturations >93% on RA. SLUMS 14 /30. Ambulating 40 feet with walker. Plan is for patient to move to LTC when therapy complete.      Past Medical and Surgical History reviewed in Epic today.    MEDICATIONS:  Current Outpatient Medications   Medication Sig Dispense Refill     " "acetaminophen (TYLENOL) 500 MG tablet Take 1,000 mg by mouth 3 times daily 8am, 2pm, 8pm       amLODIPine (NORVASC) 5 MG tablet Take 1 tablet (5 mg) by mouth daily 30 tablet 0     aspirin (ASA) 325 MG tablet Take 1 tablet (325 mg) by mouth daily       atorvastatin (LIPITOR) 40 MG tablet Take 1 tablet (40 mg) by mouth every evening 30 tablet 0     carvedilol (COREG) 6.25 MG tablet Take 1 tablet (6.25 mg) by mouth 2 times daily (with meals) 60 tablet 0     Cholecalciferol (VITAMIN D3) 50 MCG (2000 UT) TABS Take 2,000 Units by mouth daily       citalopram (CELEXA) 10 MG tablet Take 10 mg by mouth every evening       dorzolamide-timolol (COSOPT) 2-0.5 % ophthalmic solution Place 1 drop into both eyes 2 times daily 1 Bottle 11     latanoprost (XALATAN) 0.005 % ophthalmic solution Place 1 drop into both eyes At Bedtime Both Eyes 3 Bottle 4     melatonin 3 MG tablet Take 1 tablet (3 mg) by mouth nightly as needed for sleep       metroNIDAZOLE (METROCREAM) 0.75 % external cream Apply 1 g topically 2 times daily as needed (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    Objective:  BP (!) 141/79   Pulse 67   Temp 98.1  F (36.7  C)   Resp 19   Ht 1.473 m (4' 10\")   Wt 60 kg (132 lb 3.2 oz)   SpO2 96%   BMI 27.63 kg/m    Exam:  GENERAL APPEARANCE:  Alert, pleasant and cooperative, elderly female sitting in wheelchair on exam today  HEENT: normocephalic, conjunctivae, lids, pupils and irises normal, " moist mucous membranes, nose without drainage or crusting, hearing acuity: intact  RESP:  respiratory effort normal, no respiratory distress, Lung sounds clear, patient is on RA  CV: auscultation of heart done, rate and rhythm regular. no murmur, no rub or gallop. No peripheral edema  ABDOMEN: + bowel sounds, soft, nontender, no grimacing or guarding with palpation.  M/S:  no tenderness or swelling of the joints; able to move all extremities, but weakness to left upper and lower extermities   SKIN:  Inspection and palpation of skin and subcutaneous tissue: skin warm, dry without rashes  NEURO: no facial asymmetry, no speech deficits and able to follow directions, moves all extremities symmetrically  PSYCH: insight and judgement impaired, memory impaired, affect and mood ok    Labs:   Recent labs in [facility] reviewed by me today.     ASSESSMENT/PLAN:  (I69.354) Hemiparesis affecting left side as late effect of cerebrovascular accident (CVA) (H)  (primary encounter diagnosis)  Comment: acute on chronic  -continues to have left sided weakness- working with therapy.  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 Therapy as below. Follow up with neurology as scheduled for 1/7/19.      (I10) Benign essential hypertension  Comment: acute on chronic, BP reviewed and is meeting goal   -goal SBP around 140  Plan: Continue amlodipine, coreg. Vs per TCU policy. Monitor.      (F03.90) Dementia without behavioral disturbance, unspecified dementia type (H)  Comment: Acute, CPT 4.2/5.6 indicating 24 hour supervision, SLUMS 14/30  Plan: Nursing to continue to provide supportive cares. Plan is for move to LTC when finished with therapy in TCU.      (F32.9) Depression, unspecified depression type  Comment: acute on chronic, PHQ9 per  6/27 on admission to TCU, today reports that her mood is good.   Plan: Continue PTA celexa.  Monitor.      (R53.81) Physical deconditioning  Comment: Acute, secondary to recent hospitalization, medical conditions as above- making progress with therapy as above.  Plan: Encourage participation in physical therapy/occupational therapy for strengthening and deconditioning. Discharge planning per their recommendation. Social work to assist with d/c planning.     (G47.00) Insomnia  Comment: acute, is now sleeping well  -melatonin started at TCU  Plan: Melatonin 3 mg at bedtime for insomnia. Monitor.            Electronically signed by:  CRESCENCIO Narayanan CNP       ADDENDUM: Patient's daughter requested to meet with me to review medication list and answer questions. She states patient doesn't use metrondiazole so will discontinue. Had several questions regarding stopping of tramadol. Nursing without reports of pain, today Alejandrina also reported no complaints of back pain recently. Discussed risks of taking tramadol, especially when no signs of pain. I guess patient was on previously for hernia of back. Patient and family agreeable to having nursing monitor for back pain. Daughter also plans to monitor for back pain. At this time will not restart that medication since no indication. Can consider restarting if back pain redevelops.

## 2020-01-03 NOTE — LETTER
"    1/3/2020        RE: Alejandrina Whatley  [address]  [facility] [address]        [facility]  [facility] [facility] Record Number:  [id number]  Place of Service where encounter took place:  [facility] ([facility]) [[id number]]  Chief Complaint   Patient presents with     RECHECK       HPI:    Alejandrina Whatley  is a 89 year old (8/22/1923), who is being seen today for an episodic care visit.  HPI information obtained from: facility chart records, facility staff, patient report and [facility] chart review.     Brief Summary of Hospital Course: Ms. Alejandrina Whatley is a 89 year old female with PMH significant for hypertension, hyperlipidemia and dementia. She was admitted to [facility] from 12/3/19-12/6/19 for weakness, dizziness, and labile blood pressures. 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 Continues on plavix + baby ASA through 12/24 (21 day course), then to start  mg daily. Also started on atorvastatin, coreg and amlodipine for HTN urgency with goal SBP around 140. Was then readmitted to [facility] 12/7-12/8 for placement to TCU for additional support and generalized deconditioning. Discharged to TCU for physical rehabilitation.    Today's concern is:  Ms. Whatley was seen today for routine follow up at TCU. She has just woken up and is feeling tired this morning. Has no complaints today. Denies headaches, vision changes, SOB, dizziness/lightheadedness, CP. Reports some chronic edema to left lower extremity- was not present at time of visit. Bowels moving well. Denies dysuria/trouble voiding. Reports her mood is fine and she \"feels good today.\" Is sleeping well. -147 recently- generally under 140, HR , oxygen saturations >93% on RA. SLUMS 14 /30. Ambulating 40 feet with walker. Plan is for patient to move to LTC when therapy complete.      Past Medical and Surgical History reviewed in Epic " "today.    MEDICATIONS:  Current Outpatient Medications   Medication Sig Dispense Refill     acetaminophen (TYLENOL) 500 MG tablet Take 1,000 mg by mouth 3 times daily 8am, 2pm, 8pm       amLODIPine (NORVASC) 5 MG tablet Take 1 tablet (5 mg) by mouth daily 30 tablet 0     aspirin (ASA) 325 MG tablet Take 1 tablet (325 mg) by mouth daily       atorvastatin (LIPITOR) 40 MG tablet Take 1 tablet (40 mg) by mouth every evening 30 tablet 0     carvedilol (COREG) 6.25 MG tablet Take 1 tablet (6.25 mg) by mouth 2 times daily (with meals) 60 tablet 0     Cholecalciferol (VITAMIN D3) 50 MCG (2000 UT) TABS Take 2,000 Units by mouth daily       citalopram (CELEXA) 10 MG tablet Take 10 mg by mouth every evening       dorzolamide-timolol (COSOPT) 2-0.5 % ophthalmic solution Place 1 drop into both eyes 2 times daily 1 Bottle 11     latanoprost (XALATAN) 0.005 % ophthalmic solution Place 1 drop into both eyes At Bedtime Both Eyes 3 Bottle 4     melatonin 3 MG tablet Take 1 tablet (3 mg) by mouth nightly as needed for sleep       metroNIDAZOLE (METROCREAM) 0.75 % external cream Apply 1 g topically 2 times daily as needed (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    Objective:  BP (!) 141/79   Pulse 67   Temp 98.1  F (36.7  C)   Resp 19   Ht 1.473 m (4' 10\")   Wt 60 kg (132 lb 3.2 oz)   SpO2 96%   BMI 27.63 kg/m     Exam:  GENERAL APPEARANCE:  Alert, pleasant and cooperative, elderly female sitting in " wheelchair on exam today  HEENT: normocephalic, conjunctivae, lids, pupils and irises normal, moist mucous membranes, nose without drainage or crusting, hearing acuity: intact  RESP:  respiratory effort normal, no respiratory distress, Lung sounds clear, patient is on RA  CV: auscultation of heart done, rate and rhythm regular. no murmur, no rub or gallop. No peripheral edema  ABDOMEN: + bowel sounds, soft, nontender, no grimacing or guarding with palpation.  M/S:  no tenderness or swelling of the joints; able to move all extremities, but weakness to left upper and lower extermities   SKIN:  Inspection and palpation of skin and subcutaneous tissue: skin warm, dry without rashes  NEURO: no facial asymmetry, no speech deficits and able to follow directions, moves all extremities symmetrically  PSYCH: insight and judgement impaired, memory impaired, affect and mood ok    Labs:   Recent labs in EPIC reviewed by me today.     ASSESSMENT/PLAN:  (I69.354) Hemiparesis affecting left side as late effect of cerebrovascular accident (CVA) (H)  (primary encounter diagnosis)  Comment: acute on chronic  -continues to have left sided weakness- working with therapy.  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 Therapy as below. Follow up with neurology as scheduled for 1/7/19.      (I10) Benign essential hypertension  Comment: acute on chronic, BP reviewed and is meeting goal   -goal SBP around 140  Plan: Continue amlodipine, coreg. Vs per TCU policy. Monitor.      (F03.90) Dementia without behavioral disturbance, unspecified dementia type (H)  Comment: Acute, CPT 4.2/5.6 indicating 24 hour supervision, SLUMS 14/30  Plan: Nursing to continue to provide supportive cares. Plan is for move to LTC when finished with therapy in TCU.      (F32.9) Depression, unspecified depression type  Comment: acute on chronic, PHQ9 per social  services 6/27 on admission to TCU, today reports that her mood is good.   Plan: Continue PTA celexa. Monitor.      (R53.81) Physical deconditioning  Comment: Acute, secondary to recent hospitalization, medical conditions as above- making progress with therapy as above.  Plan: Encourage participation in physical therapy/occupational therapy for strengthening and deconditioning. Discharge planning per their recommendation. Social work to assist with d/c planning.     (G47.00) Insomnia  Comment: acute,  is now sleeping well  -melatonin started at TCU  Plan: Melatonin 3 mg at bedtime for insomnia. Monitor.            Electronically signed by:  CRESCENCIO Narayanan CNP           Sincerely,        CRESCENCIO Narayanan CNP

## 2020-01-05 ENCOUNTER — RECORDS - HEALTHEAST (OUTPATIENT)
Dept: LAB | Facility: CLINIC | Age: 85
End: 2020-01-05

## 2020-01-05 ENCOUNTER — TELEPHONE (OUTPATIENT)
Dept: GERIATRICS | Facility: CLINIC | Age: 85
End: 2020-01-05

## 2020-01-05 LAB
ALBUMIN UR-MCNC: NEGATIVE MG/DL
APPEARANCE UR: ABNORMAL
BACTERIA #/AREA URNS HPF: ABNORMAL HPF
BILIRUB UR QL STRIP: NEGATIVE
COLOR UR AUTO: ABNORMAL
GLUCOSE UR STRIP-MCNC: NEGATIVE MG/DL
HGB UR QL STRIP: NEGATIVE
KETONES UR STRIP-MCNC: NEGATIVE MG/DL
LEUKOCYTE ESTERASE UR QL STRIP: ABNORMAL
NITRATE UR QL: POSITIVE
PH UR STRIP: 6.5 [PH] (ref 4.5–8)
RBC #/AREA URNS AUTO: ABNORMAL HPF
SP GR UR STRIP: 1.01 (ref 1–1.03)
SQUAMOUS #/AREA URNS AUTO: ABNORMAL LPF
TRANS CELLS #/AREA URNS HPF: ABNORMAL LPF
UROBILINOGEN UR STRIP-ACNC: ABNORMAL
WBC #/AREA URNS AUTO: ABNORMAL HPF

## 2020-01-05 NOTE — TELEPHONE ENCOUNTER
Patient with urinary pain, urgency and frequency. Staff request UA/UC order    PLAN  UA/UC now. Update with results.     Electronically signed by CRESCENCIO Mcconnell GNP

## 2020-01-07 ENCOUNTER — TRANSFERRED RECORDS (OUTPATIENT)
Dept: HEALTH INFORMATION MANAGEMENT | Facility: CLINIC | Age: 85
End: 2020-01-07

## 2020-01-07 ENCOUNTER — NURSING HOME VISIT (OUTPATIENT)
Dept: GERIATRICS | Facility: CLINIC | Age: 85
End: 2020-01-07
Payer: COMMERCIAL

## 2020-01-07 VITALS
BODY MASS INDEX: 25.57 KG/M2 | DIASTOLIC BLOOD PRESSURE: 72 MMHG | SYSTOLIC BLOOD PRESSURE: 139 MMHG | OXYGEN SATURATION: 97 % | TEMPERATURE: 98.2 F | HEIGHT: 58 IN | RESPIRATION RATE: 18 BRPM | WEIGHT: 121.8 LBS | HEART RATE: 66 BPM

## 2020-01-07 DIAGNOSIS — I69.354 HEMIPARESIS AFFECTING LEFT SIDE AS LATE EFFECT OF CEREBROVASCULAR ACCIDENT (CVA) (H): ICD-10-CM

## 2020-01-07 DIAGNOSIS — R53.81 PHYSICAL DECONDITIONING: ICD-10-CM

## 2020-01-07 DIAGNOSIS — G47.00 INSOMNIA, UNSPECIFIED TYPE: ICD-10-CM

## 2020-01-07 DIAGNOSIS — F32.A DEPRESSION, UNSPECIFIED DEPRESSION TYPE: ICD-10-CM

## 2020-01-07 DIAGNOSIS — I10 BENIGN ESSENTIAL HYPERTENSION: ICD-10-CM

## 2020-01-07 DIAGNOSIS — N39.0 URINARY TRACT INFECTION WITHOUT HEMATURIA, SITE UNSPECIFIED: Primary | ICD-10-CM

## 2020-01-07 DIAGNOSIS — F03.90 DEMENTIA WITHOUT BEHAVIORAL DISTURBANCE, UNSPECIFIED DEMENTIA TYPE: ICD-10-CM

## 2020-01-07 LAB — BACTERIA SPEC CULT: ABNORMAL

## 2020-01-07 PROCEDURE — 99309 SBSQ NF CARE MODERATE MDM 30: CPT | Performed by: NURSE PRACTITIONER

## 2020-01-07 RX ORDER — CEFDINIR 300 MG/1
300 CAPSULE ORAL 2 TIMES DAILY
Start: 2020-01-07 | End: 2020-01-20

## 2020-01-07 ASSESSMENT — MIFFLIN-ST. JEOR: SCORE: 832.23

## 2020-01-07 NOTE — PROGRESS NOTES
[facility]  [facility] Record Number:  [id number]  Place of Service where encounter took place:  [facility] ([facility] [[id number]]  Chief Complaint   Patient presents with     RECHECK       HPI:    Alejandrina Whatley  is a 89 year old (8/22/1923), who is being seen today for an episodic care visit.  HPI information obtained from: facility chart records, facility staff, patient report and [facility] chart review.     Brief Summary of Hospital Course: Ms. Alejandrina Whatley is a 89 year old female with PMH significant for hypertension, hyperlipidemia and dementia. She was admitted to [facility] from 12/3/19-12/6/19 for weakness, dizziness, and labile blood pressures. 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 Continues on plavix + baby ASA through 12/24 (21 day course), then to start  mg daily. Also started on atorvastatin, coreg and amlodipine for HTN urgency with goal SBP around 140. Was then readmitted to [facility] 12/7-12/8 for placement to TCU for additional support and generalized deconditioning. Discharged to TCU for physical rehabilitation.    Today's concern is:  Ms. Whatley was seen today for routine follow up at TCU. Continues to report frequency, urgency. Denies dysuria today. Has remained afebrile. UC result shows >100,000 col/ml of Klebsiella pneumoniae. Besides urinary symptoms she reports she is feeling well. Denies CP, dizziness/lightheadedness, edema, SOB, back pain. Reports chronic edema to LLE. Reports bowels moving well. -139 over past week, HR , oxygen saturations >92% on RA. Ambulating 50 feet with rail in hallway per therapy notes. Plan is to move to LTC when therapy us complete.    Past Medical and Surgical History reviewed in Epic today.    MEDICATIONS:  Current Outpatient Medications   Medication Sig Dispense Refill     acetaminophen (TYLENOL) 500 MG tablet Take 1,000 mg by  "mouth 3 times daily 8am, 2pm, 8pm       amLODIPine (NORVASC) 5 MG tablet Take 1 tablet (5 mg) by mouth daily 30 tablet 0     aspirin (ASA) 325 MG tablet Take 1 tablet (325 mg) by mouth daily       atorvastatin (LIPITOR) 40 MG tablet Take 1 tablet (40 mg) by mouth every evening 30 tablet 0     carvedilol (COREG) 6.25 MG tablet Take 1 tablet (6.25 mg) by mouth 2 times daily (with meals) 60 tablet 0     cefdinir (OMNICEF) 300 MG capsule Take 1 capsule (300 mg) by mouth 2 times daily       Cholecalciferol (VITAMIN D3) 50 MCG (2000 UT) TABS Take 2,000 Units by mouth daily       citalopram (CELEXA) 10 MG tablet Take 10 mg by mouth every evening       dorzolamide-timolol (COSOPT) 2-0.5 % ophthalmic solution Place 1 drop into both eyes 2 times daily 1 Bottle 11     latanoprost (XALATAN) 0.005 % ophthalmic solution Place 1 drop into both eyes At Bedtime Both Eyes 3 Bottle 4     melatonin 3 MG tablet Take 1 tablet (3 mg) by mouth nightly as needed for sleep       Multiple Vitamins-Minerals (CERTAVITE/ANTIOXIDANTS PO) Take 1 tablet by mouth daily       polyethylene glycol (MIRALAX/GLYCOLAX) packet Take 17 g by mouth daily as needed for constipation        senna-docusate (SENOKOT-S/PERICOLACE) 8.6-50 MG tablet Take 1 tablet by mouth daily         REVIEW OF SYSTEMS:  10 point ROS of systems including Constitutional, Eyes, Respiratory, Cardiovascular, Gastroenterology, Genitourinary, Integumentary, Musculoskeletal, Psychiatric were all negative except for pertinent positives noted in my HPI.    Objective:  /72   Pulse 66   Temp 98.2  F (36.8  C)   Resp 18   Ht 1.473 m (4' 10\")   Wt 55.2 kg (121 lb 12.8 oz)   SpO2 97%   BMI 25.46 kg/m    Exam:  GENERAL APPEARANCE:  Alert, pleasant and cooperative, elderly female sitting in wheelchair on exam today  HEENT: normocephalic, conjunctivae, lids, pupils and irises normal, moist mucous membranes, nose without drainage or crusting, hearing acuity: intact  RESP:  respiratory " effort normal, no respiratory distress, Lung sounds clear, patient is on RA  CV: auscultation of heart done, rate and rhythm regular. no murmur, no rub or gallop. No peripheral edema  ABDOMEN: + bowel sounds, soft, nontender, no grimacing or guarding with palpation.  M/S: no tenderness or swelling of the joints; able to move all extremities, though with weakness to left upper and lower extremity  SKIN:  Inspection and palpation of skin and subcutaneous tissue: skin warm, dry without rashes  NEURO: no facial asymmetry, no speech deficits and able to follow directions, moves all extremities symmetrically  PSYCH: insight and judgement impaired, memory impaired, affect and mood normal      Labs:   Recent labs in [facility] reviewed by me today.     ASSESSMENT/PLAN:  (N39.0) UTI  Comment: Acute, UC shows >100,000 col/ml Klebsiella pneumoniae  Estimated Creatinine Clearance: 32.6 mL/min (based on SCr of 0.88 mg/dL).   Plan: Cefdinir 300 mg po BID x 7 days. Monitor symptoms for improvement.    (I69.354) Hemiparesis affecting left side as late effect of cerebrovascular accident (CVA) (H)   Comment: acute on chronic  -continues to have left sided weakness- working with therapy.  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 Therapy as below. Follow up with neurology as scheduled for 1/7/19.      (I10) Benign essential hypertension  Comment: acute on chronic, BP reviewed and is meeting goal   -goal SBP around 140  Plan: Continue amlodipine, coreg. Vs per TCU policy. Monitor.      (F03.90) Dementia without behavioral disturbance, unspecified dementia type (H)  Comment: Acute, CPT 4.2/5.6 indicating 24 hour supervision, SLUMS 14/30  Plan: Nursing to continue to provide supportive cares. Plan is for move to LTC when finished with therapy in TCU.      (F32.9) Depression, unspecified depression type  Comment:  chronic, baseline  Plan: Continue PTA celexa. Monitor.      (R53.81) Physical deconditioning  Comment: Acute, secondary to recent hospitalization, medical conditions as above- making progress with therapy as above.  Plan: Encourage participation in physical therapy/occupational therapy for strengthening and deconditioning. Discharge planning per their recommendation. Social work to assist with d/c planning.     (G47.00) Insomnia  Comment: acute, is now sleeping well  -melatonin started at TCU  Plan: Melatonin 3 mg at bedtime for insomnia. Monitor.       Electronically signed by:  CRESCENCIO Narayanan CNP

## 2020-01-07 NOTE — LETTER
1/7/2020        RE: Alejandrina Whatley  [address]  [facility] [address]        [facility]  [place] [facility] Record Number:  [id number]  Place of Service where encounter took place:  [facility] ([facility]) [[id number]]  Chief Complaint   Patient presents with     RECHECK       HPI:    Alejandrina Whatley  is a 89 year old (8/22/1923), who is being seen today for an episodic care visit.  HPI information obtained from: facility chart records, facility staff, patient report and [facility] chart review.     Brief Summary of Hospital Course: Ms. Alejandrina Whatley is a 89 year old female with PMH significant for hypertension, hyperlipidemia and dementia. She was admitted to [facility] from 12/3/19-12/6/19 for weakness, dizziness, and labile blood pressures. 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 Continues on plavix + baby ASA through 12/24 (21 day course), then to start  mg daily. Also started on atorvastatin, coreg and amlodipine for HTN urgency with goal SBP around 140. Was then readmitted to [facility] 12/7-12/8 for placement to TCU for additional support and generalized deconditioning. Discharged to TCU for physical rehabilitation.    Today's concern is:  Ms. Whatley was seen today for routine follow up at TCU. Continues to report frequency, urgency. Denies dysuria today. Has remained afebrile. UC result shows >100,000 col/ml of Klebsiella pneumoniae. Besides urinary symptoms she reports she is feeling well. Denies CP, dizziness/lightheadedness, edema, SOB, back pain. Reports chronic edema to LLE. Reports bowels moving well. -139 over past week, HR , oxygen saturations >92% on RA. Ambulating 50 feet with rail in hallway per therapy notes. Plan is to move to LTC when therapy us complete.    Past Medical and Surgical History reviewed in Epic today.    MEDICATIONS:  Current Outpatient Medications  "  Medication Sig Dispense Refill     acetaminophen (TYLENOL) 500 MG tablet Take 1,000 mg by mouth 3 times daily 8am, 2pm, 8pm       amLODIPine (NORVASC) 5 MG tablet Take 1 tablet (5 mg) by mouth daily 30 tablet 0     aspirin (ASA) 325 MG tablet Take 1 tablet (325 mg) by mouth daily       atorvastatin (LIPITOR) 40 MG tablet Take 1 tablet (40 mg) by mouth every evening 30 tablet 0     carvedilol (COREG) 6.25 MG tablet Take 1 tablet (6.25 mg) by mouth 2 times daily (with meals) 60 tablet 0     cefdinir (OMNICEF) 300 MG capsule Take 1 capsule (300 mg) by mouth 2 times daily       Cholecalciferol (VITAMIN D3) 50 MCG (2000 UT) TABS Take 2,000 Units by mouth daily       citalopram (CELEXA) 10 MG tablet Take 10 mg by mouth every evening       dorzolamide-timolol (COSOPT) 2-0.5 % ophthalmic solution Place 1 drop into both eyes 2 times daily 1 Bottle 11     latanoprost (XALATAN) 0.005 % ophthalmic solution Place 1 drop into both eyes At Bedtime Both Eyes 3 Bottle 4     melatonin 3 MG tablet Take 1 tablet (3 mg) by mouth nightly as needed for sleep       Multiple Vitamins-Minerals (CERTAVITE/ANTIOXIDANTS PO) Take 1 tablet by mouth daily       polyethylene glycol (MIRALAX/GLYCOLAX) packet Take 17 g by mouth daily as needed for constipation        senna-docusate (SENOKOT-S/PERICOLACE) 8.6-50 MG tablet Take 1 tablet by mouth daily         REVIEW OF SYSTEMS:  10 point ROS of systems including Constitutional, Eyes, Respiratory, Cardiovascular, Gastroenterology, Genitourinary, Integumentary, Musculoskeletal, Psychiatric were all negative except for pertinent positives noted in my HPI.    Objective:  /72   Pulse 66   Temp 98.2  F (36.8  C)   Resp 18   Ht 1.473 m (4' 10\")   Wt 55.2 kg (121 lb 12.8 oz)   SpO2 97%   BMI 25.46 kg/m     Exam:  GENERAL APPEARANCE:  Alert, pleasant and cooperative, elderly female sitting in wheelchair on exam today  HEENT: normocephalic, conjunctivae, lids, pupils and irises normal, moist " mucous membranes, nose without drainage or crusting, hearing acuity: intact  RESP:  respiratory effort normal, no respiratory distress, Lung sounds clear, patient is on RA  CV: auscultation of heart done, rate and rhythm regular. no murmur, no rub or gallop. No peripheral edema  ABDOMEN: + bowel sounds, soft, nontender, no grimacing or guarding with palpation.  M/S: no tenderness or swelling of the joints; able to move all extremities, though with weakness to left upper and lower extremity  SKIN:  Inspection and palpation of skin and subcutaneous tissue: skin warm, dry without rashes  NEURO: no facial asymmetry, no speech deficits and able to follow directions, moves all extremities symmetrically  PSYCH: insight and judgement impaired, memory impaired, affect and mood normal      Labs:   Recent labs in [facility] reviewed by me today.     ASSESSMENT/PLAN:  (N39.0) UTI  Comment: Acute, UC shows >100,000 col/ml Klebsiella pneumoniae  Estimated Creatinine Clearance: 32.6 mL/min (based on SCr of 0.88 mg/dL).   Plan: Cefdinir 300 mg po BID x 7 days. Monitor symptoms for improvement.    (I69.354) Hemiparesis affecting left side as late effect of cerebrovascular accident (CVA) (H)   Comment: acute on chronic  -continues to have left sided weakness- working with therapy.  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 Therapy as below. Follow up with neurology as scheduled for 1/7/19.      (I10) Benign essential hypertension  Comment: acute on chronic, BP reviewed and is meeting goal   -goal SBP around 140  Plan: Continue amlodipine, coreg. Vs per TCU policy. Monitor.      (F03.90) Dementia without behavioral disturbance, unspecified dementia type (H)  Comment: Acute, CPT 4.2/5.6 indicating 24 hour supervision, SLUMS 14/30  Plan: Nursing to continue to provide supportive cares. Plan is for move to LTC when finished with therapy in  TCU.      (F32.9) Depression, unspecified depression type  Comment: chronic, baseline  Plan: Continue PTA celexa. Monitor.      (R53.81) Physical deconditioning  Comment: Acute, secondary to recent hospitalization, medical conditions as above- making progress with therapy as above.  Plan: Encourage participation in physical therapy/occupational therapy for strengthening and deconditioning. Discharge planning per their recommendation. Social work to assist with d/c planning.     (G47.00) Insomnia  Comment: acute, is now sleeping well  -melatonin started at [facility]  Plan: Melatonin 3 mg at bedtime for insomnia. Monitor.       Electronically signed by:  CRESCENCIO Narayanan CNP             Sincerely,        CRESCENCIO Narayanan CNP

## 2020-01-08 ENCOUNTER — HOSPITAL ENCOUNTER (INPATIENT)
Facility: CLINIC | Age: 85
LOS: 2 days | Discharge: NURSING, REHAB OR LONG-TERM CARE | DRG: 065 | End: 2020-01-10
Attending: EMERGENCY MEDICINE | Admitting: HOSPITALIST
Payer: COMMERCIAL

## 2020-01-08 ENCOUNTER — HOSPITAL ENCOUNTER (EMERGENCY)
Facility: CLINIC | Age: 85
End: 2020-01-08

## 2020-01-08 DIAGNOSIS — I63.81 CEREBROVASCULAR ACCIDENT (CVA) DUE TO OCCLUSION OF SMALL ARTERY (H): Primary | ICD-10-CM

## 2020-01-08 DIAGNOSIS — I63.9 CEREBROVASCULAR ACCIDENT (CVA), UNSPECIFIED MECHANISM (H): ICD-10-CM

## 2020-01-08 LAB
ANION GAP SERPL CALCULATED.3IONS-SCNC: 4 MMOL/L (ref 3–14)
BASOPHILS # BLD AUTO: 0 10E9/L (ref 0–0.2)
BASOPHILS NFR BLD AUTO: 0.4 %
BUN SERPL-MCNC: 23 MG/DL (ref 7–30)
CALCIUM SERPL-MCNC: 9.2 MG/DL (ref 8.5–10.1)
CHLORIDE SERPL-SCNC: 109 MMOL/L (ref 94–109)
CO2 SERPL-SCNC: 28 MMOL/L (ref 20–32)
CREAT SERPL-MCNC: 0.86 MG/DL (ref 0.52–1.04)
DIFFERENTIAL METHOD BLD: ABNORMAL
EOSINOPHIL # BLD AUTO: 0.2 10E9/L (ref 0–0.7)
EOSINOPHIL NFR BLD AUTO: 2.2 %
ERYTHROCYTE [DISTWIDTH] IN BLOOD BY AUTOMATED COUNT: 13.3 % (ref 10–15)
GFR SERPL CREATININE-BSD FRML MDRD: 57 ML/MIN/{1.73_M2}
GLUCOSE SERPL-MCNC: 154 MG/DL (ref 70–99)
HCT VFR BLD AUTO: 39.6 % (ref 35–47)
HGB BLD-MCNC: 12.8 G/DL (ref 11.7–15.7)
IMM GRANULOCYTES # BLD: 0 10E9/L (ref 0–0.4)
IMM GRANULOCYTES NFR BLD: 0.1 %
INR PPP: 1.06 (ref 0.86–1.14)
INTERPRETATION ECG - MUSE: NORMAL
LYMPHOCYTES # BLD AUTO: 1.9 10E9/L (ref 0.8–5.3)
LYMPHOCYTES NFR BLD AUTO: 23 %
MCH RBC QN AUTO: 33.5 PG (ref 26.5–33)
MCHC RBC AUTO-ENTMCNC: 32.3 G/DL (ref 31.5–36.5)
MCV RBC AUTO: 104 FL (ref 78–100)
MONOCYTES # BLD AUTO: 0.6 10E9/L (ref 0–1.3)
MONOCYTES NFR BLD AUTO: 7.7 %
NEUTROPHILS # BLD AUTO: 5.4 10E9/L (ref 1.6–8.3)
NEUTROPHILS NFR BLD AUTO: 66.6 %
NRBC # BLD AUTO: 0 10*3/UL
NRBC BLD AUTO-RTO: 0 /100
PLATELET # BLD AUTO: 191 10E9/L (ref 150–450)
POTASSIUM SERPL-SCNC: 4.4 MMOL/L (ref 3.4–5.3)
RBC # BLD AUTO: 3.82 10E12/L (ref 3.8–5.2)
SODIUM SERPL-SCNC: 141 MMOL/L (ref 133–144)
TROPONIN I SERPL-MCNC: <0.015 UG/L (ref 0–0.04)
WBC # BLD AUTO: 8.1 10E9/L (ref 4–11)

## 2020-01-08 PROCEDURE — 80048 BASIC METABOLIC PNL TOTAL CA: CPT | Performed by: EMERGENCY MEDICINE

## 2020-01-08 PROCEDURE — 25000132 ZZH RX MED GY IP 250 OP 250 PS 637: Performed by: EMERGENCY MEDICINE

## 2020-01-08 PROCEDURE — 93005 ELECTROCARDIOGRAM TRACING: CPT

## 2020-01-08 PROCEDURE — 99223 1ST HOSP IP/OBS HIGH 75: CPT | Mod: AI | Performed by: HOSPITALIST

## 2020-01-08 PROCEDURE — 36415 COLL VENOUS BLD VENIPUNCTURE: CPT | Performed by: HOSPITALIST

## 2020-01-08 PROCEDURE — 84484 ASSAY OF TROPONIN QUANT: CPT | Performed by: HOSPITALIST

## 2020-01-08 PROCEDURE — 99285 EMERGENCY DEPT VISIT HI MDM: CPT | Mod: 25

## 2020-01-08 PROCEDURE — 25800030 ZZH RX IP 258 OP 636: Performed by: HOSPITALIST

## 2020-01-08 PROCEDURE — 85025 COMPLETE CBC W/AUTO DIFF WBC: CPT | Performed by: EMERGENCY MEDICINE

## 2020-01-08 PROCEDURE — 12000000 ZZH R&B MED SURG/OB

## 2020-01-08 PROCEDURE — 85610 PROTHROMBIN TIME: CPT | Performed by: EMERGENCY MEDICINE

## 2020-01-08 PROCEDURE — 25000132 ZZH RX MED GY IP 250 OP 250 PS 637: Performed by: HOSPITALIST

## 2020-01-08 RX ORDER — CLOPIDOGREL 300 MG/1
300 TABLET, FILM COATED ORAL ONCE
Status: COMPLETED | OUTPATIENT
Start: 2020-01-08 | End: 2020-01-08

## 2020-01-08 RX ORDER — CEFDINIR 300 MG/1
300 CAPSULE ORAL 2 TIMES DAILY
Status: DISCONTINUED | OUTPATIENT
Start: 2020-01-08 | End: 2020-01-10 | Stop reason: HOSPADM

## 2020-01-08 RX ORDER — LABETALOL HYDROCHLORIDE 5 MG/ML
10-40 INJECTION, SOLUTION INTRAVENOUS EVERY 10 MIN PRN
Status: DISCONTINUED | OUTPATIENT
Start: 2020-01-08 | End: 2020-01-10 | Stop reason: HOSPADM

## 2020-01-08 RX ORDER — AMOXICILLIN 250 MG
1 CAPSULE ORAL DAILY
Status: DISCONTINUED | OUTPATIENT
Start: 2020-01-09 | End: 2020-01-10 | Stop reason: HOSPADM

## 2020-01-08 RX ORDER — ONDANSETRON 4 MG/1
4 TABLET, ORALLY DISINTEGRATING ORAL EVERY 6 HOURS PRN
Status: DISCONTINUED | OUTPATIENT
Start: 2020-01-08 | End: 2020-01-10 | Stop reason: HOSPADM

## 2020-01-08 RX ORDER — DORZOLAMIDE HYDROCHLORIDE AND TIMOLOL MALEATE 20; 5 MG/ML; MG/ML
1 SOLUTION/ DROPS OPHTHALMIC 2 TIMES DAILY
Status: DISCONTINUED | OUTPATIENT
Start: 2020-01-08 | End: 2020-01-10 | Stop reason: HOSPADM

## 2020-01-08 RX ORDER — SODIUM CHLORIDE 9 MG/ML
INJECTION, SOLUTION INTRAVENOUS CONTINUOUS
Status: ACTIVE | OUTPATIENT
Start: 2020-01-08 | End: 2020-01-09

## 2020-01-08 RX ORDER — POLYETHYLENE GLYCOL 3350 17 G/17G
17 POWDER, FOR SOLUTION ORAL DAILY PRN
Status: DISCONTINUED | OUTPATIENT
Start: 2020-01-08 | End: 2020-01-10 | Stop reason: HOSPADM

## 2020-01-08 RX ORDER — LANOLIN ALCOHOL/MO/W.PET/CERES
3 CREAM (GRAM) TOPICAL
Status: DISCONTINUED | OUTPATIENT
Start: 2020-01-08 | End: 2020-01-10 | Stop reason: HOSPADM

## 2020-01-08 RX ORDER — ACETAMINOPHEN 500 MG
1000 TABLET ORAL 3 TIMES DAILY
Status: DISCONTINUED | OUTPATIENT
Start: 2020-01-08 | End: 2020-01-10 | Stop reason: HOSPADM

## 2020-01-08 RX ORDER — CLOPIDOGREL BISULFATE 75 MG/1
75 TABLET ORAL DAILY
Status: DISCONTINUED | OUTPATIENT
Start: 2020-01-09 | End: 2020-01-10 | Stop reason: HOSPADM

## 2020-01-08 RX ORDER — CITALOPRAM HYDROBROMIDE 10 MG/1
10 TABLET ORAL EVERY EVENING
Status: DISCONTINUED | OUTPATIENT
Start: 2020-01-09 | End: 2020-01-10 | Stop reason: HOSPADM

## 2020-01-08 RX ORDER — LIDOCAINE 40 MG/G
CREAM TOPICAL
Status: DISCONTINUED | OUTPATIENT
Start: 2020-01-08 | End: 2020-01-10 | Stop reason: HOSPADM

## 2020-01-08 RX ORDER — HYDRALAZINE HYDROCHLORIDE 20 MG/ML
10-20 INJECTION INTRAMUSCULAR; INTRAVENOUS
Status: DISCONTINUED | OUTPATIENT
Start: 2020-01-08 | End: 2020-01-10 | Stop reason: HOSPADM

## 2020-01-08 RX ORDER — PROCHLORPERAZINE MALEATE 5 MG
5 TABLET ORAL EVERY 6 HOURS PRN
Status: DISCONTINUED | OUTPATIENT
Start: 2020-01-08 | End: 2020-01-10 | Stop reason: HOSPADM

## 2020-01-08 RX ORDER — ONDANSETRON 2 MG/ML
4 INJECTION INTRAMUSCULAR; INTRAVENOUS EVERY 6 HOURS PRN
Status: DISCONTINUED | OUTPATIENT
Start: 2020-01-08 | End: 2020-01-10 | Stop reason: HOSPADM

## 2020-01-08 RX ORDER — LATANOPROST 50 UG/ML
1 SOLUTION/ DROPS OPHTHALMIC AT BEDTIME
Status: DISCONTINUED | OUTPATIENT
Start: 2020-01-08 | End: 2020-01-10 | Stop reason: HOSPADM

## 2020-01-08 RX ORDER — ASPIRIN 81 MG/1
81 TABLET ORAL DAILY
Status: DISCONTINUED | OUTPATIENT
Start: 2020-01-09 | End: 2020-01-10 | Stop reason: HOSPADM

## 2020-01-08 RX ORDER — NALOXONE HYDROCHLORIDE 0.4 MG/ML
.1-.4 INJECTION, SOLUTION INTRAMUSCULAR; INTRAVENOUS; SUBCUTANEOUS
Status: DISCONTINUED | OUTPATIENT
Start: 2020-01-08 | End: 2020-01-10 | Stop reason: HOSPADM

## 2020-01-08 RX ORDER — PROCHLORPERAZINE 25 MG
12.5 SUPPOSITORY, RECTAL RECTAL EVERY 12 HOURS PRN
Status: DISCONTINUED | OUTPATIENT
Start: 2020-01-08 | End: 2020-01-10 | Stop reason: HOSPADM

## 2020-01-08 RX ORDER — ATORVASTATIN CALCIUM 40 MG/1
40 TABLET, FILM COATED ORAL EVERY EVENING
Status: DISCONTINUED | OUTPATIENT
Start: 2020-01-09 | End: 2020-01-10 | Stop reason: HOSPADM

## 2020-01-08 RX ADMIN — SODIUM CHLORIDE: 9 INJECTION, SOLUTION INTRAVENOUS at 23:08

## 2020-01-08 RX ADMIN — CLOPIDOGREL BISULFATE 300 MG: 300 TABLET, FILM COATED ORAL at 21:02

## 2020-01-08 RX ADMIN — MELATONIN 3 MG: 3 TAB ORAL at 23:23

## 2020-01-08 RX ADMIN — ACETAMINOPHEN 1000 MG: 500 TABLET, FILM COATED ORAL at 23:08

## 2020-01-08 ASSESSMENT — MIFFLIN-ST. JEOR
SCORE: 873.96
SCORE: 873.51

## 2020-01-08 ASSESSMENT — ENCOUNTER SYMPTOMS
WEAKNESS: 1
HEADACHES: 0
SHORTNESS OF BREATH: 1
NUMBNESS: 1

## 2020-01-09 ENCOUNTER — APPOINTMENT (OUTPATIENT)
Dept: OCCUPATIONAL THERAPY | Facility: CLINIC | Age: 85
DRG: 065 | End: 2020-01-09
Attending: HOSPITALIST
Payer: COMMERCIAL

## 2020-01-09 ENCOUNTER — APPOINTMENT (OUTPATIENT)
Dept: CARDIOLOGY | Facility: CLINIC | Age: 85
DRG: 065 | End: 2020-01-09
Payer: COMMERCIAL

## 2020-01-09 ENCOUNTER — APPOINTMENT (OUTPATIENT)
Dept: CT IMAGING | Facility: CLINIC | Age: 85
DRG: 065 | End: 2020-01-09
Payer: COMMERCIAL

## 2020-01-09 LAB
GLUCOSE BLDC GLUCOMTR-MCNC: 107 MG/DL (ref 70–99)
TROPONIN I SERPL-MCNC: <0.015 UG/L (ref 0–0.04)

## 2020-01-09 PROCEDURE — 36415 COLL VENOUS BLD VENIPUNCTURE: CPT | Performed by: HOSPITALIST

## 2020-01-09 PROCEDURE — 97535 SELF CARE MNGMENT TRAINING: CPT | Mod: GO | Performed by: OCCUPATIONAL THERAPIST

## 2020-01-09 PROCEDURE — 70498 CT ANGIOGRAPHY NECK: CPT

## 2020-01-09 PROCEDURE — 93308 TTE F-UP OR LMTD: CPT | Mod: 26 | Performed by: INTERNAL MEDICINE

## 2020-01-09 PROCEDURE — 40000895 ZZH STATISTIC SLP IP EVAL DEFER

## 2020-01-09 PROCEDURE — 25000132 ZZH RX MED GY IP 250 OP 250 PS 637: Performed by: PHYSICIAN ASSISTANT

## 2020-01-09 PROCEDURE — 99232 SBSQ HOSP IP/OBS MODERATE 35: CPT | Performed by: PHYSICIAN ASSISTANT

## 2020-01-09 PROCEDURE — 93321 DOPPLER ECHO F-UP/LMTD STD: CPT | Mod: 26 | Performed by: INTERNAL MEDICINE

## 2020-01-09 PROCEDURE — 84484 ASSAY OF TROPONIN QUANT: CPT | Performed by: HOSPITALIST

## 2020-01-09 PROCEDURE — 00000146 ZZHCL STATISTIC GLUCOSE BY METER IP

## 2020-01-09 PROCEDURE — 40000264 ECHOCARDIOGRAM LIMITED

## 2020-01-09 PROCEDURE — 12000000 ZZH R&B MED SURG/OB

## 2020-01-09 PROCEDURE — 25000132 ZZH RX MED GY IP 250 OP 250 PS 637: Performed by: HOSPITALIST

## 2020-01-09 PROCEDURE — 25000128 H RX IP 250 OP 636: Performed by: STUDENT IN AN ORGANIZED HEALTH CARE EDUCATION/TRAINING PROGRAM

## 2020-01-09 PROCEDURE — 97165 OT EVAL LOW COMPLEX 30 MIN: CPT | Mod: GO | Performed by: OCCUPATIONAL THERAPIST

## 2020-01-09 PROCEDURE — 99222 1ST HOSP IP/OBS MODERATE 55: CPT | Mod: GC | Performed by: PSYCHIATRY & NEUROLOGY

## 2020-01-09 PROCEDURE — 25000125 ZZHC RX 250: Performed by: STUDENT IN AN ORGANIZED HEALTH CARE EDUCATION/TRAINING PROGRAM

## 2020-01-09 PROCEDURE — 25500064 ZZH RX 255 OP 636: Performed by: HOSPITALIST

## 2020-01-09 PROCEDURE — 93325 DOPPLER ECHO COLOR FLOW MAPG: CPT | Mod: 26 | Performed by: INTERNAL MEDICINE

## 2020-01-09 RX ORDER — AMLODIPINE BESYLATE 5 MG/1
5 TABLET ORAL DAILY
Status: DISCONTINUED | OUTPATIENT
Start: 2020-01-09 | End: 2020-01-10 | Stop reason: HOSPADM

## 2020-01-09 RX ORDER — CARVEDILOL 6.25 MG/1
6.25 TABLET ORAL 2 TIMES DAILY WITH MEALS
Status: DISCONTINUED | OUTPATIENT
Start: 2020-01-09 | End: 2020-01-10 | Stop reason: HOSPADM

## 2020-01-09 RX ORDER — IOPAMIDOL 755 MG/ML
70 INJECTION, SOLUTION INTRAVASCULAR ONCE
Status: COMPLETED | OUTPATIENT
Start: 2020-01-09 | End: 2020-01-09

## 2020-01-09 RX ADMIN — HUMAN ALBUMIN MICROSPHERES AND PERFLUTREN 9 ML: 10; .22 INJECTION, SOLUTION INTRAVENOUS at 15:30

## 2020-01-09 RX ADMIN — DORZOLAMIDE HYDROCHLORIDE AND TIMOLOL MALEATE 1 DROP: 20; 5 SOLUTION/ DROPS OPHTHALMIC at 21:06

## 2020-01-09 RX ADMIN — IOPAMIDOL 70 ML: 755 INJECTION, SOLUTION INTRAVENOUS at 19:20

## 2020-01-09 RX ADMIN — ACETAMINOPHEN 1000 MG: 500 TABLET, FILM COATED ORAL at 23:47

## 2020-01-09 RX ADMIN — ACETAMINOPHEN 1000 MG: 500 TABLET, FILM COATED ORAL at 10:39

## 2020-01-09 RX ADMIN — SENNOSIDES AND DOCUSATE SODIUM 1 TABLET: 8.6; 5 TABLET ORAL at 10:40

## 2020-01-09 RX ADMIN — SODIUM CHLORIDE 90 ML: 9 INJECTION, SOLUTION INTRAVENOUS at 19:20

## 2020-01-09 RX ADMIN — LATANOPROST 1 DROP: 50 SOLUTION/ DROPS OPHTHALMIC at 21:06

## 2020-01-09 RX ADMIN — CEFDINIR 300 MG: 300 CAPSULE ORAL at 10:39

## 2020-01-09 RX ADMIN — AMLODIPINE BESYLATE 5 MG: 5 TABLET ORAL at 18:13

## 2020-01-09 RX ADMIN — ATORVASTATIN CALCIUM 40 MG: 40 TABLET, FILM COATED ORAL at 21:04

## 2020-01-09 RX ADMIN — ACETAMINOPHEN 1000 MG: 500 TABLET, FILM COATED ORAL at 18:12

## 2020-01-09 RX ADMIN — CARVEDILOL 6.25 MG: 6.25 TABLET, FILM COATED ORAL at 18:12

## 2020-01-09 RX ADMIN — DORZOLAMIDE HYDROCHLORIDE AND TIMOLOL MALEATE 1 DROP: 20; 5 SOLUTION/ DROPS OPHTHALMIC at 10:44

## 2020-01-09 RX ADMIN — CITALOPRAM HYDROBROMIDE 10 MG: 10 TABLET ORAL at 21:04

## 2020-01-09 RX ADMIN — CLOPIDOGREL BISULFATE 75 MG: 75 TABLET ORAL at 10:39

## 2020-01-09 RX ADMIN — CEFDINIR 300 MG: 300 CAPSULE ORAL at 21:04

## 2020-01-09 RX ADMIN — ASPIRIN 81 MG: 81 TABLET, DELAYED RELEASE ORAL at 10:40

## 2020-01-09 ASSESSMENT — ACTIVITIES OF DAILY LIVING (ADL)
ADLS_ACUITY_SCORE: 21
ADLS_ACUITY_SCORE: 21
ADLS_ACUITY_SCORE: 26
ADLS_ACUITY_SCORE: 21
ADLS_ACUITY_SCORE: 26
ADLS_ACUITY_SCORE: 21

## 2020-01-09 NOTE — H&P
[facility]    History and Physical - Hospitalist Service       Date of Admission:  1/8/2020    Assessment & Plan   Alejandrina Whatley is a 89 year old female admitted on 1/8/2020.  Past history of HTN, hyperlipidemia, CKD and recent CVA (discharged 12/6/19) with left sided weakness who presents with recurrent stroke.    Acute CVA  Patient instructed to present to ED 1/8 after MRI performed in neurology clinic follow up on 1/7/20 returned positive for new right hemisphere CVA with restricted diffusion per ED report.  Unfortunately we do not have access to MRI imaging or formal report.  Unclear why MRI was obtained as patient denies any new symptoms.  Her neuro exam demonstrates 4/5 strength in left upper and lower extremities, though review of prior notes suggest intact strength.  Neurology was contacted by ED provider who recommended Plavix load (had previously completed 21 days of ASA+Plavix and has been on aspirin alone since 12/24), recommend no additional imaging or work-up at this time as was completed just 1 month ago.  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  Currently disoriented to specific hospital, but otherwise fully intact.  - frequent re-orientation    Herniated disc of spine  Daughter requesting to resume prn tramadol for pain, currently receiving only scheduled tylenol.  Discussed that I feel this is a poor medication in this age group and would certainly not recommend  starting it in acute CVA as it may cloud the neurologic picture, but could be revisited in the future.  - continue scheduled tylenol  - trial lidocaine patch if reporting uncontrolled pain         Diet: reg diet once cleared by bedside swallow  DVT Prophylaxis: Pneumatic Compression Devices  Clark Catheter: not present  Code Status: DNR/DNI per patient, daughter and POLST    Disposition Plan   Expected discharge: 2 - 3 days, recommended to prior living arrangement once work-up complete.  Entered: Pablo Robbins MD 01/08/2020, 9:33 PM     The patient's care was discussed with the Patient and Patient's Family.    Pablo Robbins MD  [facility]    ______________________________________________________________________    Chief Complaint   Acute CVA    History is obtained from the patient, her daughter who is at bedside, question with emergency room provider in addition to chart review    History of Present Illness   Alejandrina Whatley is a 89 year old female who presents with acute CVA.  She was hospitalized in early December with acute CVA of the right putamen.  Interestingly, she had no symptoms at time of admission but did develop left-sided weakness on the day of discharge according to her daughter.  Neurology recommended aspirin and Plavix for 21 days which she completed in late December and has been on aspirin alone since this time.  She initially discharged home but returned to the hospital 1 day later as she did not have enough support at home and she was subsequently discharged to TCU where she has been residing ever since.  She had follow-up with [facility] yesterday.  A repeat MRI was ordered for unclear reasons.  The patient's daughter was contacted this evening by Dr. Kingston and was instructed to bring her mother emergently to the ED as the MRI showed a new active stroke.    Patient denies any strokelike symptoms.  She reports she has been having left-sided weakness ever since  her original stroke in early December.  Her daughter reports that her strength has somewhat improved, but therapy has told her that she will never walk independently again.  She denies any increase in left-sided weakness and denies any new weakness or paresthesias.  She denies any headache, visual changes, dysarthria, dysphasia or confusion.  She denies any chest pain or pressure or palpitations.  She denies any lightheadedness, nausea and has no pain complaints.  She states that she is feeling at her baseline.  Her daughter reports that she is chronically fatigued following her stroke and has had chronically poor appetite with a 9 pound weight loss over the past month.    Neurology was contacted by the emergency room provider and they recommended loading her with Plavix.  Recommended against any further imaging at this time.  We will attempt to obtain outside imaging tomorrow.    Review of Systems    The 10 point Review of Systems is negative other than noted in the HPI or here.     Past Medical History    History of CVA of the right putamen  Hypertension  Hyperlipidemia  Chronic kidney disease stage III  Glaucoma  Depression   Dementia dementia      Past Surgical History   I have reviewed this patient's surgical history and updated it with pertinent information if needed.  Past Surgical History:   Procedure Laterality Date     AAA REPAIR  2/2009    stent placed     C ANTER VESICOURETHROPEXY,SIMPLE  2008    Helped     C APPENDECTOMY  1971     CATARACT IOL, RT/LT       EXTRACAPSULAR CATARACT EXTRATION WITH INTRAOCULAR LENS IMPLANT  4/2010,5/2010    bilaterally.  Dr. Clark.     HYSTERECTOMY, CERVIX STATUS UNKNOWN  1971     LASER SELECTIVE TRABECULOPLASTY  3/2010; 10/2015    left eye 1st Clark (notes show inf treatment); inf 180 (MARI)     LASER SELECTIVE TRABECULOPLASTY  12/2017    left eye sup 180       Social History   Currently residing in TCU following stroke, with plan to transition to long-term care.  She has no  history of tobacco or alcohol use.    Family History   Reviewed and noncontributory.    Prior to Admission Medications   Prior to Admission Medications   Prescriptions Last Dose Informant Patient Reported? Taking?   Cholecalciferol (VITAMIN D3) 50 MCG (2000 UT) TABS 1/8/2020 at 0800 [facility] Yes Yes   Sig: Take 2,000 Units by mouth daily   Multiple Vitamins-Minerals (CERTAVITE/ANTIOXIDANTS PO) 1/8/2020 at 0800 [facility] Yes Yes   Sig: Take 1 tablet by mouth daily   acetaminophen (TYLENOL) 500 MG tablet 1/8/2020 at 1400 [facility] Yes Yes   Sig: Take 1,000 mg by mouth 3 times daily 8am, 2pm, 8pm   amLODIPine (NORVASC) 5 MG tablet 1/8/2020 at 0800 [facility] No Yes   Sig: Take 1 tablet (5 mg) by mouth daily   aspirin (ASA) 325 MG tablet 1/8/2020 at 0800 [facility] No Yes   Sig: Take 1 tablet (325 mg) by mouth daily   atorvastatin (LIPITOR) 40 MG tablet 1/8/2020 at 1600 [facility] No Yes   Sig: Take 1 tablet (40 mg) by mouth every evening   carvedilol (COREG) 6.25 MG tablet 1/8/2020 at 1800 [facility] No Yes   Sig: Take 1 tablet (6.25 mg) by mouth 2 times daily (with meals)   cefdinir (OMNICEF) 300 MG capsule 1/8/2020 at 0800 [facility] No Yes   Sig: Take 1 capsule (300 mg) by mouth 2 times daily   citalopram (CELEXA) 10 MG tablet 1/8/2020 at 1600  Yes Yes   Sig: Take 10 mg by mouth every evening   dorzolamide-timolol (COSOPT) 2-0.5 % ophthalmic solution 1/8/2020 at 0800  No Yes   Sig: Place 1 drop into both eyes 2 times daily   latanoprost (XALATAN) 0.005 % ophthalmic solution 1/8/2020 at 1400 [facility] No Yes   Sig: Place 1 drop into both eyes At Bedtime Both Eyes   melatonin 3 MG tablet 1/7/2020 at 2000  No Yes   Sig: Take 1 tablet (3 mg) by mouth nightly as needed for sleep   polyethylene glycol (MIRALAX/GLYCOLAX) packet Past Month at Unknown time Nursing Home Yes Yes   Sig: Take 17 g by mouth daily as needed for constipation    senna-docusate (SENOKOT-S/PERICOLACE) 8.6-50 MG tablet 1/7/2020  at 2000 Nursing Home Yes Yes   Sig: Take 1 tablet by mouth daily      Facility-Administered Medications: None     Allergies   Allergies   Allergen Reactions     Actonel [Bisphosphonates] Rash     Conjugated Estrogens Rash     Macrobid [Nitrofuran Derivatives] Rash     Nitrofurantoin Rash     Premarin Rash     Sulfa Drugs Rash     Hydrocodone Nausea and Vomiting     Oxycodone Nausea and Vomiting     Risedronate      leg pain       Physical Exam   Vital Signs: Temp: 98.3  F (36.8  C) Temp src: Oral BP: 137/57   Heart Rate: 74 Resp: 16 SpO2: 95 % O2 Device: None (Room air)    Weight: 131 lbs 0 oz    General Appearance: Well-nourished elderly female no acute distress  Eyes: Pupils equal, round and reactive to light, sclera anicteric  HEENT: Mucous membranes moist, no neck lymphadenopathy  Respiratory: Lungs clear to auscultation bilaterally, no wheeze or crackles, no tachypnea  Cardiovascular: Regular in rhythm, normal S1/S2, no murmurs, rubs or gallops  GI: Abdomen soft, nontender, nondistended, normal bowel sounds  Lymph/Hematologic: No peripheral edema  Musculoskeletal: Extremities warm well perfused  Neurologic: Alert and oriented to self, hospital and date, cranial nerves II through XII intact, no pronator drift, sensation to light touch intact, decreased coordination of left upper and lower extremities, 4 5 strength in left upper and lower extremities, 5 of 5 strength in right  Psychiatric: Normal affect    Data   Data reviewed today: I reviewed all medications, new labs and imaging results over the last 24 hours. I personally reviewed the EKG tracing showing normal sinus rhythm without ischemic changes.    Recent Labs   Lab 01/08/20  1927   WBC 8.1   HGB 12.8   *      INR 1.06      POTASSIUM 4.4   CHLORIDE 109   CO2 28   BUN 23   CR 0.86   ANIONGAP 4   TRANG 9.2   *

## 2020-01-09 NOTE — ED PROVIDER NOTES
History     Chief Complaint:  Neurologic Problem    The history is provided by the patient, the EMS personnel and a relative.      Alejandrina Whatley is a 89 year old female who presents with EMS from facility after found to have stroke yesterday. EMS states that the patient has a history of a stroke in early December 2019 that resulted in left sided weakness that is still present. She was started on Plavix for 3 weeks and has since stopped that and now on full strength aspirin. The patient has been in TCU since her stroke, doing PT two hours a day to try and regain strength in her left side though still can't ambulate w/o assistance. She notes some small improvement in strength. Has numbness on left side as well but no new neuro symptoms.    The patient's daughter brought her to neurology yesterday to make sure she has not had any strokes. They had an MRI done which resulted today and the patient was instructed to be seen immediately for an abnormal MRI. The patient tells EMS she has no new symptoms. They also note that the patient is on Aspirin and Cefdinir for an UTI which she was given yesterday and will continue for one week (1/7-1/13).    Patient denies any new numbness, weakness, headache, or illness recently.     Spoke to Dr. Kingston from [facility] who patient saw in clinic and obtained MRI brain yesterday that showed an area of new stroke with restricted diffusion on right cerebral white matter. Not able to send MRI results or images at this time as clinic is closed and images will have to be sent tomorrow.    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  Lumbar spinal stenosis  Osteoporosis, post-menopausal  Pseudoexfoliation of lens capsule  Urinary incontinence  Insomnia  CED  CKD  Vitamin D deficiency  Radicular pain of left lower extremity  Mild major depression  Peripheral artery disease  Thoracic compression fracture  Abdominal aortic aneurysm     Past Surgical History:    AAA Repair  C anter vesicourethropexy simple  Appendectomy   Cataract IOL, Rt/Lt  Extracapsular cataract extraction w Intraocular lens implant  Hysterectomy  Laser selective trabeculoplasty, Lt  IR vertebroplasty cervicothoracic including guide x2  HCHG Trabeculoplasty by selective laser unilateral, Left    Family History:    MI: father  Hypertension     Social History:  Negative for tobacco use.  Negative for alcohol use.  Negative for drug use.  Presents via EMS with her daughter and granddaughters.   Marital Status:       Review of Systems   Respiratory: Positive for shortness of breath.    Neurological: Positive for weakness (not new) and numbness (not new). Negative for headaches.   All other systems reviewed and are negative.    Physical Exam     Patient Vitals for the past 24 hrs:   BP Temp Temp src Heart Rate Resp SpO2 Height Weight   01/08/20 1912 137/57 98.3  F (36.8  C) Oral 74 16 96 % 1.473 m (4' 10\") 59.4 kg (131 lb)       Physical Exam  General: Resting comfortably on the gurney  Eyes:  The pupils are equal and round    Conjunctivae and sclerae are normal  ENT:    Moist mucous membranes  Neck:  Normal range of motion  CV:  Regular rate and rhythm    Skin warm and well perfused   Resp:  Lungs are clear    Non-labored    No rales    No wheezing   GI:  Abdomen is soft, there is no rigidity    No distension    No rebound tenderness     No abdominal tenderness  MS:  Normal muscular tone  Skin:  No rash or acute skin lesions noted  Neuro:  "  Awake, alert.      Speech is normal and fluent.    Face is symmetric.     4/5 strength on LUE/LLE. 5/5 strength on right UE/LE    SILT on UE/LE bilaterally  Psych: Normal affect.  Appropriate interactions.    Emergency Department Course     ECG:  Indication: 1935  Time: Neurologic Problem  Vent. Rate 77 bpm. TN interval 204. QRS duration 74. QT/QTc 394/445. P-R-T axis 27 1 40. Sinus rhythm. Normal ECG. No significant change when compared to EKG dated 12/7/19. Read time: 1940    Laboratory:  Laboratory findings were communicated with the patient, family and Admitting MD who voiced understanding of the findings.    CBC: WBC: 8.1, HGB: 12.8, PLT: 191  BMP: Glucose 154 (H), GFR Estimate: 57 (L), o/w WNL (Creatinine: 0.86)    INR: 1.06    Interventions:  2102 Plavix 300 mg PO     Emergency Department Course:  Past medical records, nursing notes, and vitals reviewed.    1914 I performed an exam of the patient as documented above.     EKG obtained in the ED, see results above.   IV was inserted and blood was drawn for laboratory testing, results above.    1905 I consulted with neurology who saw the patient yesterday, regarding the patient's history and presentation here in the emergency department. They note that just a brain MRI was done. No imaging of the vessels were done.     2000 I consulted with Dr. Brown, stroke/neurology, regarding the patient's history and presentation here in the emergency department.    2004 I rechecked the patient and discussed the results of her workup thus far.     2021 Findings and plan explained to the Patient and daughter who consents to admission. Discussed the patient with Dr. Robbins, who will admit the patient to a neurology bed for further monitoring, evaluation, and treatment.    2040 I rechecked the patient and discussed the results of her workup thus far.     I personally reviewed the laboratory results with the Patient and daughter and answered all related questions prior to  admission.     Impression & Plan     Medical Decision Making:  Alejandrina Whatley is a 89-year-old female who presented emergency department with stroke.  Per report over the phone from physician who obtained MRI brain yesterday, has an area of new stroke on the right cerebral white matter (though I do not have the report or images as done at clinic and clinic is now closed so he will have to have images sent over tomorrow when clinic open).  Recommended for patient to be admitted to the hospital and likely plavix loaded again.  I am unclear as to why the MRI brain was done though I think the daughter was pushing for it to be done yesterday.  Does not appear to have any new neurologic deficits.  Not a TPA candidate.  Spoke to Dr. Palm from stroke neurology who recommended loading with Plavix. No additional imaging recommended given that she had imaging of vessels done last month and no new neurologic symptoms. Discussed with hospitalist for admission.    Diagnosis:    ICD-10-CM    1. Cerebrovascular accident (CVA), unspecified mechanism (H) I63.9        Disposition:  Admitted to Dr. Robbins.    Scribe Disclosure:  I, Mario Brunner, am serving as a scribe at 7:22 PM on 1/8/2020 to document services personally performed by Daysi Anaya MD based on my observations and the provider's statements to me.     I, Cici Nichols, am serving as a scribe on 1/8/2020 at 8:06 PM to personally document services performed by Daysi Anaya MD based on my observations and the provider's statements to me.        Daysi Anaya MD  01/09/20 0058

## 2020-01-09 NOTE — PLAN OF CARE
Discharge Planner OT   Patient plan for discharge: Per dtr., back to TCU  Current status: Evaluation completed and treatment initiated. Pt. admitted due to recurrent CVA(admitted from TCU);pt. had a CVA early December, has been residing at [facility]. Per. dtr., pt. gets assist w / all ADL's, completed stand-pivot transfer X 1, has been ambulating up to 40 feet w/PT/assist X 1.     Currently, dtr./pt. report no new neuro symptoms, pt. A & O X 3, able to recall 3/3 words after short delay(low score on SLUMS at TCU per chart); L-sided weakness appears baseline, LUE MMT=grossly 4 to 4-/5, able to actively move LUE into shoulder flex. yo grossly 45 degrees;vision=baseline, visual tracking=intact;peripheral vision=WNL. Pt. needed overall mod. A for supine-sit, SBA to sit EOB;completed sit-stand w/ overall mod. A, pivot transfer to chair overall min. -mod. A X 2, leaning forward;pt. sat up for breakfast, transferred back to bed w/ min. A X 2/vc's for posture(improved posture 2nd trial). Will plan to see 5/week while hospitalized.  Barriers to return to prior living situation: None to TCU  Recommendations for discharge: Return to TCU  Rationale for recommendations: Pt. would benefit from ongoing PT/OT to assist pt. in achieving maximal safety/indep. w/ ADL's, fx. transfers/mobility for eventual return to E.       Entered by: Franchesca Tolbert 01/09/2020 1:03 PM

## 2020-01-09 NOTE — PROGRESS NOTES
01/09/20 1200   Quick Adds   Type of Visit Initial Occupational Therapy Evaluation   Living Environment   Lives With other (see comments)  (admitted from [facility])   Living Arrangements extended care facility   Home Accessibility no concerns   Transportation Anticipated family or friend will provide   Living Environment Comment Pt. admitted from TCU;per dtr., plan to go back ot TCU, then LTC(was in senior living prior to CVA in December).   Self-Care   Usual Activity Tolerance fair   Current Activity Tolerance fair   Regular Exercise Yes  (PT/OT at TCU;has made good progress per dtr.)   Equipment Currently Used at Home grab bar, toilet;grab bar, tub/shower;raised toilet;tub bench;walker, rolling   Activity/Exercise/Self-Care Comment Per dtr., pt. has been ambulating approx. 40 feet w/ PT/assist X 1(conflictin info., as dtr. also stated pt. does not ambulate);Dtr. related pt. completes stand-pivottransfers w/ assist X 1-2;Pt. gets assist for dressing, toileting, bathing;pt. sits in WC at sink to complete grooming tasks.   Functional Level   Ambulation 3-->assistive equipment and person   Transferring 3-->assistive equipment and person   Toileting 3-->assistive equipment and person   Bathing 3-->assistive equipment and person   Dressing 2-->assistive person   Cognition 1 - attention or memory deficits  (low SLUMS score at TCU)   Which of the above functional risks had a recent onset or change? ambulation;transferring;toileting;bathing;dressing   General Information   Onset of Illness/Injury or Date of Surgery - Date 01/08/20   Referring Physician Dr. Robbins   Patient/Family Goals Statement Per dtr., back to [facility]   Additional Occupational Profile Info/Pertinent History of Current Problem OT:Alejandrina Whatley is a 89 year old female admitted on 1/8/2020.  Past history of HTN, hyperlipidemia, CKD and recent CVA (discharged 12/6/19) with left sided weakness who presents with recurrent stroke.    Precautions/Limitations fall precautions   General Observations Dtr. present, answered baseline questions for pt., due to pt. fatigue/lack of sleep. Pt. pleasant and coooperative, no new neuro. symptoms reported(baseline L-sided weakness).   General Info Comments Per dtr., pt. has made good progress at TCU since early December;plan is to move to LTC following rehab.   Cognitive Status Examination   Orientation orientation to person, place and time   Level of Consciousness alert   Follows Commands (Cognition) WFL;follows one step commands   Memory intact  (STM recall 3/3 after short delay)   Cognitive Comment will onitor, defer formal testing to TCU;appears baseline;per chart--low score on SLUMS at U.    Visual Perception   Visual Perception Comments wers glasses;visual tracking-intact;peripheral vision=WNL   Sensory Examination   Sensory Comments no numbness/tingling reported   Pain Assessment   Patient Currently in Pain No   Posture   Posture forward head position;protracted shoulders;kyphosis   Range of Motion (ROM)   ROM Comment RUE=WNL LUE=AROM shoulder flex. to grossly 45 degrees, PROM shoulder flex. to WFL/grossly 130;elbow thru hand=WNL/WFL   Strength   Strength Comments RUE=WNL LUE=grossly 4-4-/5 shoulder/elbow strength   Hand Strength   Hand Strength Comments WFL, weak L grasp(baseline per pt.)   Coordination   Upper Extremity Coordination Left UE impaired   Coordination Comments impaired LUE GMC coord. noted, baseline per pt.   Mobility   Bed Mobility Comments mod. A supine-sit--baseline per pt.   Transfer Skill: Sit to Stand   Level of [place]: Sit/Stand minimum assist (75% patients effort)   Physical Assist/Nonphysical Assist: Sit/Stand verbal cues;2 persons  (stand-pivot transfer)   Transfer Skill: Toilet Transfer   Toilet Transfer Skill Comments assist for toileting/baseline   Tub/Shower Transfer   Tub/Shower Transfer Comments assist for bathing/baseline   Balance   Balance Comments impaired  "  Toileting   Level of [place]: Toilet dependent (less than 25% patients effort)   Instrumental Activities of Daily Living (IADL)   Previous Responsibilities   (IADL's completed by staff/family)   Clinical Impression   Criteria for Skilled Therapeutic Interventions Met yes, treatment indicated   OT Diagnosis Decline in ADL performance   Influenced by the following impairments L-sided weakness, impaired balance, impaired cognition   Assessment of Occupational Performance 3-5 Performance Deficits   Identified Performance Deficits Currently below baseline w/ dressing,bathing, toileitng, stnading/grooming, fx. transfers/mobility   Clinical Decision Making (Complexity) Low complexity   Therapy Frequency 5x/week   Predicted Duration of Therapy Intervention (days/wks)   (3-5 days)   Anticipated Discharge Disposition Transitional Care Facility  (return to TCU)   Risks and Benefits of Treatment have been explained. Yes   Patient, Family & other staff in agreement with plan of care Yes   [facility] TM \"6 Clicks\"   2016, Trustees of [facility], under license to [employer].  All rights reserved.   6 Clicks Short Forms Daily Activity Inpatient Short Form   [facility]  \"6 Clicks\" Daily Activity Inpatient Short Form   1. Putting on and taking off regular lower body clothing? 2 - A Lot   2. Bathing (including washing, rinsing, drying)? 2 - A Lot   3. Toileting, which includes using toilet, bedpan or urinal? 2 - A Lot   4. Putting on and taking off regular upper body clothing? 3 - A Little   5. Taking care of personal grooming such as brushing teeth? 3 - A Little   6. Eating meals? 4 - None   Daily Activity Raw Score (Score out of 24.Lower scores equate to lower levels of function) 16   Total Evaluation Time   Total Evaluation Time (Minutes) 15     "

## 2020-01-09 NOTE — ED TRIAGE NOTES
EMS arrival:    Neurologist called daughter.  Told daughter she needs to be in the ED now.  Stroke in Dec.  Possible occulsive CVA.  MRI yesterday.  MRI results today.      Patient from [facility] in [place].   Residual left sided weakness from previous stroke in Dec.

## 2020-01-09 NOTE — PROGRESS NOTES
[facility]    Medicine Progress Note - Hospitalist Service       Date of Admission:  1/8/2020  Assessment & Plan   Alejandrina Whatley is a 89 year old female admitted on 1/8/2020.  Past history of HTN, hyperlipidemia, CKD and recent CVA (discharged 12/6/19) with left sided weakness who presents with recurrent stroke.    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 Treated with ASA and Plavix x 3 weeks, now on  mg/d. No new residual symptoms Patient instructed to present to ED 1/8 after MRI performed in neurology clinic follow up on 1/7/20 returned positive for new right hemisphere CVA with restricted diffusion per ED report.  Unfortunately we do not have access to MRI imaging or formal report.    - Continues to to deny new symptoms.  - Neurology consult, [facility] from [facility] clinic of neurology be pushed to our system.  - Received Plavix load in ER, continue Plavix and ASA  - Permissive HTN  - Tele  - Holding on lipid, A1C, echo as this was just completed in Dec  - PT/OT/SW, suspect likely d/c back to TCU    HTN  - hold PTA amlodipine and carvedilol to allow for permissive HTN    Hyperlipidemia  - continue PTA statin    UTI  - continue PTA cefdinir (initiated 1/7/20 at TCU)    Depression  - continue PTA Celexa    Glaucoma  - continue PTA eye drops    Dementia  Low SLUMS score noted at TCU.  Currently disoriented to specific hospital, but otherwise fully intact.  - frequent re-orientation    Herniated disc of spine  Daughter requesting to resume prn tramadol for pain, currently receiving only scheduled tylenol.  Discussed that I feel this is a poor medication in this age group and would certainly not recommend starting it in acute CVA as it may cloud the neurologic picture, but could be revisited in the future.  - continue scheduled tylenol  - trial lidocaine patch if reporting uncontrolled pain.  Currently reporting pain at 0/10         Diet: Regular Diet Adult    DVT Prophylaxis: Pneumatic Compression Devices  Clark Catheter: not present  Code Status: DNR/DNI      Disposition Plan   Expected discharge: Pending Neuro input. Today vs tomorrow, suspect likely back to TCU  Entered: Sara Britt PA-C 01/09/2020, 8:39 AM       The patient's care was discussed with the Patient.    Sara Britt PA-C  Hospitalist Service  [facility]    ______________________________________________________________________    Interval History   Didn't sleep well.  Denies concerns or complaints. Hopeful to go back to TCU. Currently denying pain    Data reviewed today: I reviewed all medications, new labs and imaging results over the last 24 hours. I personally reviewed no images or EKG's today.    Physical Exam   Vital Signs: Temp: 97.8  F (36.6  C) Temp src: Oral BP: (!) 148/78   Heart Rate: 66 Resp: 16 SpO2: 96 % O2 Device: None (Room air)    Weight: 130 lbs 14.4 oz  Constitutional: Alert, resting comfortably in NAD  HEENT: Head normocephalic, atraumatic. Eyes sclera non icteric. Oropharynx clear and most  Respiratory: Normal effort, symmetric expansion, no crackles or wheezing  Cardiovascular: RRR no murmurs   GI: Non distended, normal bowels sounds, no tenderness or guarding  MSK: LE without edema. Dorsalis pedis pulse palpated bilaterally.   Skin/Integumen: Clear  Neuro: CN II-XII grossly intact. Strength in RUE 5/5. Strength LUE 4/5. Strenght in LE 5/5 and symmetric.   Psych:  Alert and oriented x 3. Normal affect      Data   Recent Labs   Lab 01/09/20  0618 01/08/20 2239 01/08/20  1927   WBC  --   --  8.1   HGB  --   --  12.8   MCV  --   --  104*   PLT  --   --  191   INR  --   --  1.06   NA  --   --  141   POTASSIUM  --   --  4.4   CHLORIDE  --   --  109   CO2  --   --  28   BUN  --   --  23   CR  --   --  0.86   ANIONGAP  --   --  4   TRANG  --   --  9.2   GLC  --   --  154*   TROPI <0.015 <0.015   --      No results found for this or any previous visit (from the past 24 hour(s)).

## 2020-01-09 NOTE — PHARMACY-ADMISSION MEDICATION HISTORY
Pharmacy Medication History  Admission medication history interview status for the 1/8/2020  admission is complete. See EPIC admission navigator for prior to admission medications     Medication history sources: MAR ([facility])  Medication history source reliability: Good  Adherence assessment: Good    Significant changes made to the medication list:  None      Additional medication history information:   None    Medication reconciliation completed by provider prior to medication history? No    Time spent in this activity: 15 minutes      Prior to Admission medications    Medication Sig Last Dose Taking? Auth Provider   acetaminophen (TYLENOL) 500 MG tablet Take 1,000 mg by mouth 3 times daily 8am, 2pm, 8pm 1/8/2020 at 1400 Yes Unknown, Entered By History   amLODIPine (NORVASC) 5 MG tablet Take 1 tablet (5 mg) by mouth daily 1/8/2020 at 0800 Yes Theron Clarke MD   aspirin (ASA) 325 MG tablet Take 1 tablet (325 mg) by mouth daily 1/8/2020 at 0800 Yes Sara Britt PA-C   atorvastatin (LIPITOR) 40 MG tablet Take 1 tablet (40 mg) by mouth every evening 1/8/2020 at 1600 Yes Theron Clarke MD   carvedilol (COREG) 6.25 MG tablet Take 1 tablet (6.25 mg) by mouth 2 times daily (with meals) 1/8/2020 at 1800 Yes Theron Clarke MD   cefdinir (OMNICEF) 300 MG capsule Take 1 capsule (300 mg) by mouth 2 times daily 1/8/2020 at 0800 Yes Silva Armijo APRN CNP   Cholecalciferol (VITAMIN D3) 50 MCG (2000 UT) TABS Take 2,000 Units by mouth daily 1/8/2020 at 0800 Yes Unknown, Entered By History   citalopram (CELEXA) 10 MG tablet Take 10 mg by mouth every evening 1/8/2020 at 1600 Yes Unknown, Entered By History   dorzolamide-timolol (COSOPT) 2-0.5 % ophthalmic solution Place 1 drop into both eyes 2 times daily 1/8/2020 at 0800 Yes Prakash Gant MD   latanoprost (XALATAN) 0.005 % ophthalmic solution Place 1 drop into both eyes At Bedtime Both Eyes 1/8/2020 at 1400 Yes  Prakash Gant MD   melatonin 3 MG tablet Take 1 tablet (3 mg) by mouth nightly as needed for sleep 1/7/2020 at 2000 Yes Silva Armijo APRN CNP   Multiple Vitamins-Minerals (CERTAVITE/ANTIOXIDANTS PO) Take 1 tablet by mouth daily 1/8/2020 at 0800 Yes Reported, Patient   polyethylene glycol (MIRALAX/GLYCOLAX) packet Take 17 g by mouth daily as needed for constipation  Past Month at Unknown time Yes Unknown, Entered By History   senna-docusate (SENOKOT-S/PERICOLACE) 8.6-50 MG tablet Take 1 tablet by mouth daily 1/7/2020 at 2000 Yes Reported, Patient

## 2020-01-09 NOTE — ED NOTES
"[facility]  ED Nurse Handoff Report    ED Chief complaint: Neurologic Problem      ED Diagnosis:   Final diagnoses:   Cerebrovascular accident (CVA), unspecified mechanism (H)       Code Status: MD to address    Allergies:   Allergies   Allergen Reactions     Actonel [Bisphosphonates] Rash     Conjugated Estrogens Rash     Macrobid [Nitrofuran Derivatives] Rash     Nitrofurantoin Rash     Premarin Rash     Sulfa Drugs Rash     Hydrocodone Nausea and Vomiting     Oxycodone Nausea and Vomiting     Risedronate      leg pain       Patient Story:  Patient at  yesterday.  Daughter brought her in.  Daughter states she requested to have an MRI completed.  Phone call today to bring her in due to stroke.     Focused Assessment:   Patient has no complaints.  Has residual left arm weakness from previous stroke in Dec.  Currently staying in a TCU.      Treatments and/or interventions provided:  IV insertion.  Labs sent.    Patient's response to treatments and/or interventions:      To be done/followed up on inpatient unit:  Contin[facility] of care    Does this patient have any cognitive concerns?: Oriented to person, place.     Activity level - Baseline/Home:  Stand with Assist  Activity Level - Current:   Not assessed.      Patient's Preferred language: English   Needed?: No    Isolation: None  Infection: Not Applicable  Bariatric?: No    Vital Signs:   Vitals:    01/08/20 1912   BP: 137/57   Resp: 16   Temp: 98.3  F (36.8  C)   TempSrc: Oral   SpO2: 96%   Weight: 59.4 kg (131 lb)   Height: 1.473 m (4' 10\")       Cardiac Rhythm:     Was the PSS-3 completed:   Yes  What interventions are required if any?               Family Comments:  Daughter & Granddaughter with patient.  Very involved & fixated with patient care, medications, diagnosis.   OBS brochure/video discussed/provided to patient/family: N/A              Name of person given brochure if not patient:               Relationship to patient: "     For the majority of the shift this patient's behavior was Green.   Behavioral interventions performed were     ED NURSE PHONE NUMBER:  *[phone]

## 2020-01-09 NOTE — PLAN OF CARE
Patient A&0x3.  Disoriented to place. Up with 1, belt and walker from bed to chair. Neuro's are unchanged w left side weakness noted. Patient up to chair this shift. Tele shows SR w 1st degree AV block. Tolerating reg diet. Lungs clear. On RA. Denies pain.

## 2020-01-09 NOTE — PLAN OF CARE
A&O. VSS. Denies pain. Neuros intact ex LUE and LLE weakness, ataxia to LLE. NIH score 1. Passed bedside swallow test. Awaiting MRI results from clinic. Pure wick in place. UOP adequate. Reports minimal sleep. Cares clustered. Plan for neuro consult today.

## 2020-01-09 NOTE — PLAN OF CARE
Discharge Planner SLP   Patient plan for discharge: Did not discus  Current status: Pt passed nurse swallow screen and a regular diet order was placed. Per chart review and conversation with pt, no changes in speech, language, cognition, or swallow function.     SLP evaluation not warranted. Please re consult should concerns arise.     Barriers to return to prior living situation: None per SLP  Recommendations for discharge: Defer to other medical providers  Rationale for recommendations: No SLP evaluation warranted          Entered by: Donita Rodriguez 01/09/2020 8:25 AM

## 2020-01-09 NOTE — CONSULTS
[facility]    Stroke Consult Note    Reason for Consult:  Stroke consult    Chief Complaint: Neurologic Problem       HPI  89F hx of ischemic stroke R putamen 12/2019, HLD, HTN, cognitive impairment who presents from TCU with new incidental ischemic strokes found on MRI. The patient had her R putamen stroke last month and was recovering in TCU. She had a repeat MRI scan for unclear reasons showing two new strokes, one in the R periventricular white matter of R MCA territory and L cerebellum, both are subacute. She has no new symptoms but was called in for workup.  _____________________________________________________    Past Medical History   Past Medical History:   Diagnosis Date     Abdominal aortic aneurysm (H) 2001    had a stent placed 2009     AK (actinic keratosis) 11/11/2009     Cataract 2/22/2011     Compression fractures      Compression Fractures of Lumbar Vertebra 2/4/2010     DJD (degenerative joint disease)      Generalised Weakness and Fatigue 3/3/2011     Glaucoma (increased eye pressure) 2009    Dr. 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  Dr. Clark.     HYSTERECTOMY, CERVIX STATUS UNKNOWN  1971     LASER SELECTIVE TRABECULOPLASTY  3/2010; 10/2015    left eye 1st dEuardo (notes show inf treatment); inf 180 (MARI)     LASER SELECTIVE TRABECULOPLASTY  12/2017     left eye sup 180     Medications   Home Meds  Prior to Admission medications    Medication Sig Start Date End Date Taking? Authorizing Provider   acetaminophen (TYLENOL) 500 MG tablet Take 1,000 mg by mouth 3 times daily 8am, 2pm, 8pm   Yes Unknown, Entered By History   amLODIPine (NORVASC) 5 MG tablet Take 1 tablet (5 mg) by mouth daily 12/6/19  Yes Theron Clarke MD   aspirin (ASA) 325 MG tablet Take 1 tablet (325 mg) by mouth daily 12/25/19  Yes Sara Britt PA-C   atorvastatin (LIPITOR) 40 MG tablet Take 1 tablet (40 mg) by mouth every evening 12/6/19  Yes Theron Clarke MD   carvedilol (COREG) 6.25 MG tablet Take 1 tablet (6.25 mg) by mouth 2 times daily (with meals) 12/6/19  Yes Theron Clarke MD   cefdinir (OMNICEF) 300 MG capsule Take 1 capsule (300 mg) by mouth 2 times daily 1/7/20  Yes Silva Armijo APRN CNP   Cholecalciferol (VITAMIN D3) 50 MCG (2000 UT) TABS Take 2,000 Units by mouth daily   Yes Unknown, Entered By History   citalopram (CELEXA) 10 MG tablet Take 10 mg by mouth every evening   Yes Unknown, Entered By History   dorzolamide-timolol (COSOPT) 2-0.5 % ophthalmic solution Place 1 drop into both eyes 2 times daily 4/19/18  Yes Prakash Gant MD   latanoprost (XALATAN) 0.005 % ophthalmic solution Place 1 drop into both eyes At Bedtime Both Eyes 4/23/18  Yes Prakash Gant MD   melatonin 3 MG tablet Take 1 tablet (3 mg) by mouth nightly as needed for sleep 12/20/19  Yes Silva Armijo APRN CNP   Multiple Vitamins-Minerals (CERTAVITE/ANTIOXIDANTS PO) Take 1 tablet by mouth daily   Yes Reported, Patient   polyethylene glycol (MIRALAX/GLYCOLAX) packet Take 17 g by mouth daily as needed for constipation    Yes Unknown, Entered By History   senna-docusate (SENOKOT-S/PERICOLACE) 8.6-50 MG tablet Take 1 tablet by mouth daily   Yes Reported, Patient       Scheduled Meds    acetaminophen  1,000 mg Oral TID     aspirin  81 mg Oral  Daily     atorvastatin  40 mg Oral QPM     cefdinir  300 mg Oral BID     citalopram  10 mg Oral QPM     clopidogrel  75 mg Oral or NG Tube Daily     dorzolamide-timolol  1 drop Both Eyes BID     latanoprost  1 drop Both Eyes At Bedtime     senna-docusate  1 tablet Oral Daily     sodium chloride (PF)  3 mL Intracatheter Q8H       Infusion Meds    - MEDICATION INSTRUCTIONS -         PRN Meds  hydrALAZINE, labetalol, lidocaine 4%, lidocaine (buffered or not buffered), - MEDICATION INSTRUCTIONS -, melatonin, naloxone, ondansetron **OR** ondansetron, polyethylene glycol, prochlorperazine **OR** prochlorperazine **OR** prochlorperazine, sodium chloride (PF)    Allergies   Allergies   Allergen Reactions     Actonel [Bisphosphonates] Rash     Conjugated Estrogens Rash     Macrobid [Nitrofuran Derivatives] Rash     Nitrofurantoin Rash     Premarin Rash     Sulfa Drugs Rash     Hydrocodone Nausea and Vomiting     Oxycodone Nausea and Vomiting     Risedronate      leg pain     Family History   Family History   Problem Relation Age of Onset     Heart Disease Father 79        MI     Hypertension Mother      Social History   Social History     Tobacco Use     Smoking status: Never Smoker     Smokeless tobacco: Never Used     Tobacco comment: No second hand exposure.   Substance Use Topics     Alcohol use: No     Drug use: No       Review of Systems   The 10 point Review of Systems is negative other than noted in the HPI or here.        PHYSICAL EXAMINATION   Temp:  [97.4  F (36.3  C)-98.3  F (36.8  C)] 97.8  F (36.6  C)  Heart Rate:  [61-81] 81  Resp:  [16] 16  BP: (130-150)/(54-78) 150/66  SpO2:  [93 %-98 %] 93 %      Mental status: AOx4, speech fluent  Cranial nerves: EOMI, VFF, face symmetric  Motor: wiggles feet equally. No drift of UEs  Sensory: equal to LT  Gait: defer      Dysphagia Screen  Passed screening, no dysarthria - Regular Diet with thin liquids  01/09/2020 06347    Stroke Scales    NIHSS  Interval baseline  (01/08/20 2217)   Interval Comments     1a. Level of Consciousness 0-->Alert, keenly responsive   1b. LOC Questions 0-->Answers both questions correctly   1c. LOC Commands 0-->Performs both tasks correctly   2.   Best Gaze 0-->Normal   3.   Visual 0-->No visual loss   4.   Facial Palsy 0-->Normal symmetrical movements   5a. Motor Arm, Left 0-->No drift, limb holds 90 (or 45) degrees for full 10 secs   5b. Motor Arm, Right 0-->No drift, limb holds 90 (or 45) degrees for full 10 secs   6a. Motor Leg, Left 0-->No drift, leg holds 30 degree position for full 5 secs   6b. Motor Leg, right 0-->No drift, leg holds 30 degree position for full 5 secs   7.   Limb Ataxia 1-->Present in one limb(LLE)   8.   Sensory 0-->Normal, no sensory loss   9.   Best Language 0-->No aphasia, normal   10. Dysarthria 0-->Normal   11. Extinction and Inattention  0-->No abnormality   Total 1 (01/08/20 2217)       Imaging  I personally reviewed all imaging; relevant findings per HPI.    Labs CBC  Recent Labs   Lab 01/08/20 1927   WBC 8.1   RBC 3.82   HGB 12.8   HCT 39.6        Basic Metabolic Panel   Recent Labs   Lab 01/08/20 1927      POTASSIUM 4.4   CHLORIDE 109   CO2 28   BUN 23   CR 0.86   *   TRANG 9.2     Liver Panel  Recent Labs   Lab Test 12/07/19  1227 05/02/18  1348 07/18/13  1037 10/26/11  1000   PROTTOTAL 6.9 7.4  --  7.6   ALBUMIN 3.0* 3.1*  --  3.9   BILITOTAL 0.2 0.3  --  0.5   ALKPHOS 69 75  --  81   AST 19 22  --  25   ALT 15 18 21 16     INR  Recent Labs   Lab Test 01/08/20 1927   INR 1.06      Lipid Profile  Recent Labs   Lab Test 12/04/19  1017 07/18/13  1037 10/26/11  1000   CHOL 208* 176 188   HDL 43* 49* 50   * 98 109   TRIG 139 146 142   CHOLHDLRATIO  --  3.6 3.8     A1C  Recent Labs   Lab Test 12/04/19  1017   A1C 6.0*     Troponin I  Recent Labs   Lab 01/09/20  0618 01/08/20  2239   TROPI <0.015 <0.015     =================================================================    ASSESSMENT/PLAN:  "89F hx of ischemic stroke R putamen 12/2019, HLD, HTN, cognitive impairment who presents from TCU with new incidental ischemic strokes found on MRI.     ## subacute ischemic strokes: etiology is ESUS. With AIS in L cerebellum and R periventricular R MCA white matter. DAPT was restarted here on admission. This is her 3rd stroke in 2 months, making small vessel disease as the etiology unlikely. Stroke workup:  (12/2019), A1c 6 (12/2019).   --continue aspirin and plavix for 3 weeks, then just aspirin  --recommended JAYASHREE but family and patient declined.   --goal SBP < 180, goal A1c < 7, goal LDL < 70  --is outside the window for 24h permissive HTN as these strokes are subacute on MRI  --CTA head/neck to better visualize her L MCA stenoses  --PT/OT/SLP  --cardiac monitoring  --will need zio patch at discharge  --atorvastatin 40mg daily  --TTE to be repeated in the setting of possible cardioembolic stroke  --stroke team to continue to follow    =================================================================    Neto Teresa  Vascular Neurology Fellow    01/09/2020 2:25 PM  Text Page (8am-7pm)  To page stroke neurology after hours or on a subsequent day, click here: [facility]  Choose \"On Call\" tab at top, then search dropdown box for \"Neurology Adult\" & press Enter, look for Neuro ICU/Stroke      Stroke Code / Stroke Consult Data Data     "

## 2020-01-09 NOTE — PLAN OF CARE
Discharge Planner PT   Patient plan for discharge: per chart back to TCU.   Current status: 89y/o F admitted due to recurrent CVA. Per OT, pt has been residing at [facility]. Had a CVA early Dec and currently working with PT for this. Per discussion with OT, pt with no new neuro symptoms, currently requiring mod A with bed mobility, mod A with pivot transfers.   Barriers to return to prior living situation: none anticipated for return to TCU  Recommendations for discharge: return to TCU.   Rationale for recommendations: Per OT report and chart, pt with no new neuro symptoms. Chart also indicating discharge possibly today or tomorrow, therefore will defer PT evaluation to TCU. Will sign off.        Entered by: Jolie Mahmood 01/09/2020 2:41 PM

## 2020-01-10 VITALS
HEIGHT: 58 IN | TEMPERATURE: 97.8 F | RESPIRATION RATE: 16 BRPM | SYSTOLIC BLOOD PRESSURE: 140 MMHG | BODY MASS INDEX: 27.48 KG/M2 | WEIGHT: 130.9 LBS | HEART RATE: 68 BPM | DIASTOLIC BLOOD PRESSURE: 76 MMHG | OXYGEN SATURATION: 96 %

## 2020-01-10 PROCEDURE — 25000132 ZZH RX MED GY IP 250 OP 250 PS 637: Performed by: HOSPITALIST

## 2020-01-10 PROCEDURE — 25000132 ZZH RX MED GY IP 250 OP 250 PS 637: Performed by: PHYSICIAN ASSISTANT

## 2020-01-10 PROCEDURE — 99239 HOSP IP/OBS DSCHRG MGMT >30: CPT | Performed by: PHYSICIAN ASSISTANT

## 2020-01-10 RX ORDER — CLOPIDOGREL BISULFATE 75 MG/1
75 TABLET ORAL DAILY
Qty: 20 TABLET | Refills: 0 | DISCHARGE
Start: 2020-01-11 | End: 2020-02-04

## 2020-01-10 RX ORDER — ASPIRIN 325 MG
325 TABLET ORAL DAILY
Qty: 30 TABLET | Refills: 0 | Status: ON HOLD | DISCHARGE
Start: 2020-01-31 | End: 2022-02-19

## 2020-01-10 RX ADMIN — ACETAMINOPHEN 1000 MG: 500 TABLET, FILM COATED ORAL at 09:10

## 2020-01-10 RX ADMIN — SENNOSIDES AND DOCUSATE SODIUM 1 TABLET: 8.6; 5 TABLET ORAL at 09:10

## 2020-01-10 RX ADMIN — CARVEDILOL 6.25 MG: 6.25 TABLET, FILM COATED ORAL at 09:10

## 2020-01-10 RX ADMIN — DORZOLAMIDE HYDROCHLORIDE AND TIMOLOL MALEATE 1 DROP: 20; 5 SOLUTION/ DROPS OPHTHALMIC at 09:13

## 2020-01-10 RX ADMIN — AMLODIPINE BESYLATE 5 MG: 5 TABLET ORAL at 09:10

## 2020-01-10 RX ADMIN — ASPIRIN 81 MG: 81 TABLET, DELAYED RELEASE ORAL at 09:10

## 2020-01-10 RX ADMIN — CEFDINIR 300 MG: 300 CAPSULE ORAL at 09:10

## 2020-01-10 RX ADMIN — CLOPIDOGREL BISULFATE 75 MG: 75 TABLET ORAL at 09:10

## 2020-01-10 ASSESSMENT — ACTIVITIES OF DAILY LIVING (ADL)
ADLS_ACUITY_SCORE: 26

## 2020-01-10 NOTE — PLAN OF CARE
Up with [facility]BA to [facility]. Denies pain. Discharged back to [facility] at this time via [facility]. Copy of discharge paperwork sent with pt.

## 2020-01-10 NOTE — DISCHARGE SUMMARY
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  No follow up labs or test are   needed.             Unresulted Labs Ordered in the Past 30 Days of this Admission     No orders found from 12/9/2019 to 1/9/2020.          Discharge Disposition   Discharged to home  Condition at discharge: Stable    Hospital Course   Alejandrina Whatley is a 89 year old female admitted on 1/8/2020.  Past history of HTN, hyperlipidemia, CKD and recent CVA (discharged 12/6/19) with left sided weakness who presents with recurrent stroke.    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 Treated with ASA and Plavix x 3 weeks, now on  mg/d. No new residual symptoms. Patient instructed to present to ED 1/8 after MRI performed in neurology clinic follow up on 1/7/20 returned positive for new subacute CVA  - Greatly appreciate Neurology consult. They were able to review outpatient imaging and confirmed subacute CVA in left cerebellar and right MCA territory. CTA Head & Neck with mild stenosis of right MCA. Neurology recs JAYASHREE and [facility] to look for embolic source. Family/patient declined. They note they want to limit excessive tests, medications and hospitalizations. Ultimately patient will discharge on ASA 81 mg/d and Plavix 75 mg/d x 3 weeks and return to  mg/d. Neurology rec follow up with local Neurology but family declined  - Continue Atorvastatin.       - Discharge back to TCU    HTN  - Continue PTA Norvasc and Coreg    Hyperlipidemia  - continue PTA statin    UTI, present on admission: Started on Cefdinir at TCU for UTI. Cultures not available to review. Continue to complete course      Depression  - continue PTA Celexa          Consultations This Hospital Stay   NEUROLOGY IP CONSULT  PHYSICAL THERAPY ADULT IP CONSULT  OCCUPATIONAL THERAPY ADULT IP CONSULT  SPEECH LANGUAGE PATH ADULT IP CONSULT  SWALLOW EVAL SPEECH PATH AT BEDSIDE IP CONSULT  SMOKING CESSATION PROGRAM IP CONSULT  PHYSICAL THERAPY ADULT IP CONSULT  OCCUPATIONAL THERAPY ADULT IP CONSULT    Code Status   DNR/DNI    Time Spent on this Encounter   I, Sara Britt PA-C, personally saw the patient today and spent greater than 30 minutes discharging this patient.       Sara Britt PA-C  [facility]  ______________________________________________________________________    Physical Exam   Vital Signs: Temp: 97.8  F (36.6  C) Temp src: Oral BP: (!) 140/76 Pulse: 68 Heart Rate: 72 Resp: 16 SpO2: 96 % O2 Device: None (Room air)    Weight: 130 lbs 14.4 oz  Constitutional: Alert, resting comfortably in NAD  HEENT: Head normocephalic, atraumatic. Eyes sclera non icteric. Oropharynx clear and most  Respiratory:     Normal effort, symmetric expansion, no crackles or wheezing  Cardiovascular: RRR no murmurs   GI: Non distended, normal bowels sounds, no tenderness or guarding  MSK: LE without edema. Dorsalis pedis pulse palpated bilaterally.   Skin/Integumen: Clear  Neuro: CN II-XII grossly intact. Strength in RUE 5/5. Strength LUE 4/5. Strenght in LE 5/5 and symmetric.   Psych:  Alert and oriented x 3. Normal affect         Primary Care Physician   Reggie Whatley    Discharge Orders      General info for SNF    Length of Stay Estimate: Short Term Care: Estimated # of Days <30  Condition at Discharge: Stable  Level of care:skilled   Rehabilitation Potential: Fair  Admission H&P remains  valid and up-to-date: Yes  Recent Chemotherapy: N/A  Use Nursing Home Standing Orders: Yes     Mantoux instructions    Give two-step Mantoux (PPD) Per Facility Policy Yes     Follow Up and recommended labs and tests    Follow up with Nursing home physician.  No follow up labs or test are needed.     Activity - Up with nursing assistance     Physical Therapy Adult Consult    Evaluate and treat as clinically indicated.    Reason:  H/o CVA     Occupational Therapy Adult Consult    Evaluate and treat as clinically indicated.    Reason:  H/o CVA     Fall precautions     Advance Diet as Tolerated    Follow this diet upon discharge: Orders Placed This Encounter      Regular Diet Adult       Significant Results and Procedures   Most Recent 3 CBC's:  Recent Labs   Lab Test 01/08/20 1927 12/07/19  1227 12/06/19  0601 12/03/19  1435   WBC 8.1 8.5  --  8.1   HGB 12.8 12.3  --  13.3   * 105*  --  107*    194 177 200     Most Recent 3 BMP's:  Recent Labs   Lab Test 01/08/20 1927 12/07/19 1227 12/06/19  0601 12/04/19  1017    138  --  140   POTASSIUM 4.4 3.9  --  3.9   CHLORIDE 109 108  --  109   CO2 28 25  --  23   BUN 23 24  --  15   CR 0.86 0.88 0.90 0.84   ANIONGAP 4 5  --  8   TRANG 9.2 8.7  --  8.6   * 112*  --  156*     Most Recent 3 INR's:  Recent Labs   Lab Test 01/08/20  1927   INR 1.06     Most Recent 3 Troponin's:  Recent Labs   Lab Test 01/09/20  0618 01/08/20  2239 12/07/19  1227  05/02/18  1401   TROPI <0.015 <0.015 <0.015   < >  --    TROPONIN  --   --   --   --  0.00    < > = values in this interval not displayed.     Most Recent Cholesterol Panel:  Recent Labs   Lab Test 12/04/19  1017   CHOL 208*   *   HDL 43*   TRIG 139   ,   Results for orders placed or performed during the hospital encounter of 01/08/20   CTA Head Neck with Contrast    Narrative    CT ANGIOGRAM OF THE HEAD AND NECK WITH CONTRAST  1/9/2020 8:32 PM     HISTORY: Evaluate known right MCA stenoses.     TECHNIQUE:   CT angiography with an injection of 70 mL [id number]-370 IV  with scans through the head and neck. Images were transferred to a  separate 3-D workstation where multiplanar reformations and 3-D images  were created. Estimates of carotid stenoses are made relative to the  distal internal carotid artery diameters except as noted. Radiation  dose for this scan was reduced using automated exposure control,  adjustment of the mA and/or kV according to patient size, or iterative  reconstruction technique.    COMPARISON: MRA 12/4/2019.     CT ANGIOGRAM HEAD FINDINGS:    The vertebral arteries, basilar artery, and posterior cerebral  arteries are patent. The intracranial arteries, anterior cerebral  arteries, and middle cerebral arteries are patent. No evidence of  large vessel occlusion or high-grade stenosis. Moderate plaquing is  present throughout the carotid siphons. No aneurysms are identified.  Large bilateral posterior communicating arteries are present. A patent  anterior communicating artery is present. Right A1 segment is  hypoplastic. No aneurysms are identified.     CT ANGIOGRAM NECK FINDINGS:   A three-vessel aortic arch is present. The bilateral common carotid,  internal carotid, external carotid, and vertebral arteries are patent.  Moderate plaquing is present at the carotid bifurcations without  evidence of flow-limiting stenosis. There is approximately 10%  stenosis of the proximal right internal carotid artery, and  approximately 5% stenosis of the proximal left internal carotid  artery. Mild plaquing is present at the left vertebral artery origin  without evidence of flow-limiting stenosis.    There is complete opacification of the right sphenoid sinus and  sphenoethmoidal recess. Associated right sphenoid sinus high  attenuation material may represent inspissated secretions or mycetoma.  There is chronic right sphenoid sinus wall thickening suggestive of  chronic sinusitis.       Impression    IMPRESSION:    CTA Head:  1. Mild stenosis of the right middle cerebral artery distal M1  segment, less conspicuous compared to prior MRA.  2. Otherwise unremarkable MRA of the head.  CTA Neck:   1. Moderate plaquing at the carotid bifurcations without evidence of  flow-limiting stenosis.  2. Otherwise unremarkable MRA of the neck.       UMBERTO REYES MD   Echo Limited    Narrative    [id number]  [id number]  [id number]  [id number]^TREVON^HOLLI           [facility]  Echocardiography Laboratory  [facility] [place] [address]        Name: JONY DONALDSON  MRN: [id number]  : 1923  Study Date: 2020 02:56 PM  Age: 89 yrs  Gender: Female  Patient Location: [facility]  Reason For Study: CVA  Ordering Physician: HOLLI LESTER  Referring Physician: HOLLI LESTER  Performed By: BETO Perez     BSA: 1.7 m2  Height: 69 in  Weight: 130 lb  HR: 73  BP: 150/96 mmHg  _____________________________________________________________________________  __        Procedure  Limited Portable Echo Adult. Optison (NDC #[id number]) given intravenously.  _____________________________________________________________________________  __        Interpretation Summary     Technically difficult imaging.  A cardiac source of embolus was not identified.  Left ventricular systolic function is normal.  The visual ejection fraction is estimated at 55-60%.  Doppler interrogation does not demonstrate significant stenosis or  insufficiency involving cardiac valves.     No change since 2019.  _____________________________________________________________________________  __        Left Ventricle  The left ventricle is normal in size. There is normal left ventricular wall  thickness. Left ventricular systolic function is normal. The visual ejection  fraction is estimated at 55-60%. Grade I or early diastolic dysfunction. No  regional wall motion abnormalities noted. There is no thrombus seen in the  left ventricle.     Right  Ventricle  The right ventricle is normal in structure, function and size. There is no  mass or thrombus in the right ventricle.     Atria  Normal left atrial size. Right atrial size is normal. There is no atrial shunt  seen. The left atrial appendage is not well visualized.     Mitral Valve  There is mild mitral annular calcification. There is no mitral regurgitation  noted. There is no mitral valve stenosis.        Tricuspid Valve  Normal tricuspid valve. The right ventricular systolic pressure is  approximated at 31.3 mmHg plus the right atrial pressure. Right ventricle  systolic pressure estimate normal. There is no tricuspid stenosis.     Aortic Valve  The aortic valve is trileaflet. No aortic regurgitation is present. No aortic  stenosis is present.     Pulmonic Valve  Normal pulmonic valve. There is no pulmonic valvular regurgitation. There is  no pulmonic valvular stenosis.     Vessels  The aortic root is normal size. Normal size ascending aorta. Inferior vena  cava not well visualized for estimation of right atrial pressure.     Pericardium  The pericardium appears normal. There is no pleural effusion.        Rhythm  Sinus rhythm was noted.  _____________________________________________________________________________  __  MMode/2D Measurements & Calculations  IVSd: 1.1 cm     LVIDd: 3.6 cm  LVIDs: 2.0 cm  LVPWd: 1.2 cm  FS: 43.7 %  LV mass(C)d: 132.8 grams  LV mass(C)dI: 77.2 grams/m2  Ao root diam: 3.1 cm  asc Aorta Diam: 2.9 cm  LVOT diam: 1.8 cm  LVOT area: 2.6 cm2  RWT: 0.68        Doppler Measurements & Calculations  PA acc time: 0.08 sec  TR max delon: 279.9 cm/sec  TR max P.3 mmHg              _____________________________________________________________________________  __        Report approved by: Dr. Willie Collins 2020 04:32 PM            Discharge Medications   Discharge Medication List as of 1/10/2020 10:25 AM      START taking these medications    Details   aspirin (ASA) 81 MG EC  tablet Take 1 tablet (81 mg) by mouth daily for 20 days, Disp-20 tablet, R-0, Transitional      clopidogrel (PLAVIX) 75 MG tablet Take 1 tablet (75 mg) by mouth daily for 20 days, Disp-20 tablet, R-0, Transitional         CONTINUE these medications which have CHANGED    Details   aspirin (ASA) 325 MG tablet Take 1 tablet (325 mg) by mouth daily, Disp-30 tablet, R-0, Transitional         CONTINUE these medications which have NOT CHANGED    Details   acetaminophen (TYLENOL) 500 MG tablet Take 1,000 mg by mouth 3 times daily 8am, 2pm, 8pm, Historical      amLODIPine (NORVASC) 5 MG tablet Take 1 tablet (5 mg) by mouth daily, Disp-30 tablet, R-0, E-Prescribe      atorvastatin (LIPITOR) 40 MG tablet Take 1 tablet (40 mg) by mouth every evening, Disp-30 tablet, R-0, E-PrescribeFuture refills by PCP Dr. Reggie Whatley with phone number [phone].      carvedilol (COREG) 6.25 MG tablet Take 1 tablet (6.25 mg) by mouth 2 times daily (with meals), Disp-60 tablet, R-0, E-PrescribeFuture refills by PCP Dr. Reggie Whatley with phone number [phone].      cefdinir (OMNICEF) 300 MG capsule Take 1 capsule (300 mg) by mouth 2 times daily, No Print Out      Cholecalciferol (VITAMIN D3) 50 MCG (2000 UT) TABS Take 2,000 Units by mouth daily, Historical      citalopram (CELEXA) 10 MG tablet Take 10 mg by mouth every evening, Historical      dorzolamide-timolol (COSOPT) 2-0.5 % ophthalmic solution Place 1 drop into both eyes 2 times daily, Disp-1 Bottle, R-11, Local PrintMay be substituted for Timolol and Dorzolamide separately if needed due to long term backorder of Cosopt.      latanoprost (XALATAN) 0.005 % ophthalmic solution Place 1 drop into both eyes At Bedtime Both Eyes, Disp-3 Bottle, R-4, E-Prescribe      melatonin 3 MG tablet Take 1 tablet (3 mg) by mouth nightly as needed for sleep, No Print Out      Multiple Vitamins-Minerals (CERTAVITE/ANTIOXIDANTS PO) Take 1 tablet by mouth daily, Historical      polyethylene glycol  (MIRALAX/GLYCOLAX) packet Take 17 g by mouth daily as needed for constipation , Historical      senna-docusate (SENOKOT-S/PERICOLACE) 8.6-50 MG tablet Take 1 tablet by mouth daily, Historical           Allergies   Allergies   Allergen Reactions     Actonel [Bisphosphonates] Rash     Conjugated Estrogens Rash     Macrobid [Nitrofuran Derivatives] Rash     Nitrofurantoin Rash     Premarin Rash     Sulfa Drugs Rash     Hydrocodone Nausea and Vomiting     Oxycodone Nausea and Vomiting     Risedronate      leg pain

## 2020-01-10 NOTE — PLAN OF CARE
Occupational Therapy Discharge Summary    Reason for therapy discharge:    Discharged to transitional care facility.    Progress towards therapy goal(s). [facility] for goal details.  Goals not met.  Barriers to achieving goals:   discharge from facility.    Therapy recommendation(s):    Continued therapy is recommended.  Rationale/Recommendations:  Pt discharged back to [facility] 1/10/20. Rec. continued skilled OT to assist pt. in achieving maximal safety/indep. W/ ADL's/fx.transfers/mobility.

## 2020-01-10 NOTE — PLAN OF CARE
Pt. A&Ox4, but forgetful at times. Tolerating regular diet. Up A+2 to commode. Voiding with external catheter overnight (Purewhick). VSS on Ra, ex htn. CMS/Skin intact. Neuros intact ex LUE/LLE hemiparesis and ataxia. Denies pain. Possible discharge back to TCU today ([place]). Continue to monitor.

## 2020-01-10 NOTE — PLAN OF CARE
"Brief neuro note:    CTA head/neck unchanged from MRA.    Family refusing further workup for stroke.     Please continue aspirin and plavix for 21 days, then just aspirin 81mg daily.     Follow up stroke clinic 4-8 weeks.    Patient ok to discharge from stroke standpoint. Please call if questions.     =================================================================    Neto Teresa  Vascular Neurology Fellow    01/10/2020 8:30 AM  Text Page (8am-7pm)  To page stroke neurology after hours or on a subsequent day, click here: AMCOM  Choose \"On Call\" tab at top, then search dropdown box for \"Neurology Adult\" & press Enter, look for Neuro ICU/Stroke    "

## 2020-01-10 NOTE — PROGRESS NOTES
SW:  D:Call placed to Balw[facility] zu[facility]iga [facility] regarding bed hold and if patient can return to TCU. Adry zuñiga [facility] called back to say that they can take this patient today. Patient to leave via [facility] wheelchair ride at 11:00 today, 1/10/20.  P: Will continue to follow.     DERREK Denny, [facility]  [phone]

## 2020-01-10 NOTE — PLAN OF CARE
VSS. RA. A&Ox4, forgetful. Neuro deficits include LUE/LLE hemiparesis and ataxia. Tele: NSR with first degree AVB. Incontinent of urine at times. Up Ax2 GB walker to commode. NPO for CTA, completed, awaiting results, back on regular diet. Possible discharge back to [facility] tomorrow.

## 2020-01-13 ENCOUNTER — DOCUMENTATION ONLY (OUTPATIENT)
Dept: OTHER | Facility: CLINIC | Age: 85
End: 2020-01-13

## 2020-01-14 ENCOUNTER — NURSING HOME VISIT (OUTPATIENT)
Dept: GERIATRICS | Facility: CLINIC | Age: 85
End: 2020-01-14
Payer: COMMERCIAL

## 2020-01-14 VITALS
HEART RATE: 68 BPM | TEMPERATURE: 98 F | BODY MASS INDEX: 27.66 KG/M2 | OXYGEN SATURATION: 96 % | DIASTOLIC BLOOD PRESSURE: 69 MMHG | RESPIRATION RATE: 18 BRPM | WEIGHT: 131.8 LBS | SYSTOLIC BLOOD PRESSURE: 109 MMHG | HEIGHT: 58 IN

## 2020-01-14 DIAGNOSIS — M51.26 LUMBAR HERNIATED DISC: ICD-10-CM

## 2020-01-14 DIAGNOSIS — N39.0 URINARY TRACT INFECTION WITHOUT HEMATURIA, SITE UNSPECIFIED: ICD-10-CM

## 2020-01-14 DIAGNOSIS — I69.354 HEMIPARESIS AFFECTING LEFT SIDE AS LATE EFFECT OF CEREBROVASCULAR ACCIDENT (CVA) (H): ICD-10-CM

## 2020-01-14 DIAGNOSIS — K59.00 CONSTIPATION, UNSPECIFIED CONSTIPATION TYPE: ICD-10-CM

## 2020-01-14 DIAGNOSIS — E78.5 HYPERLIPIDEMIA, UNSPECIFIED HYPERLIPIDEMIA TYPE: ICD-10-CM

## 2020-01-14 DIAGNOSIS — F32.A DEPRESSION, UNSPECIFIED DEPRESSION TYPE: ICD-10-CM

## 2020-01-14 DIAGNOSIS — G47.00 INSOMNIA, UNSPECIFIED TYPE: ICD-10-CM

## 2020-01-14 DIAGNOSIS — I63.9 CEREBROVASCULAR ACCIDENT (CVA), UNSPECIFIED MECHANISM (H): Primary | ICD-10-CM

## 2020-01-14 DIAGNOSIS — I10 ESSENTIAL HYPERTENSION: ICD-10-CM

## 2020-01-14 PROCEDURE — 99310 SBSQ NF CARE HIGH MDM 45: CPT | Performed by: NURSE PRACTITIONER

## 2020-01-14 RX ORDER — TRAMADOL HYDROCHLORIDE 50 MG/1
25 TABLET ORAL AT BEDTIME
Qty: 20 TABLET | Refills: 0 | Status: SHIPPED | OUTPATIENT
Start: 2020-01-14 | End: 2020-02-27

## 2020-01-14 RX ORDER — TRAMADOL HYDROCHLORIDE 50 MG/1
25 TABLET ORAL AT BEDTIME
COMMUNITY
End: 2020-01-14

## 2020-01-14 ASSESSMENT — MIFFLIN-ST. JEOR: SCORE: 877.59

## 2020-01-14 NOTE — LETTER
1/14/2020        RE: Alejandrina Whatley  [address]  [facility] [address]        [place] [facility]  PRIMARY CARE PROVIDER AND CLINIC:  Reggie Whatley MD, [facility] PHYSICIAN [facility] [address] [facility] / [facility]   Chief Complaint   Patient presents with     Hospital F/U     [place] Medical Record Number:  [id number]  Place of Service where encounter took place:  [facility] ([facility] [[id number]]    Alejandrina Whatley  is a 89 year old  (8/22/1923), re- admitted to the above facility from  [facility]. Hospital stay 1/8/20 through 1/10/20..  Re-admitted to this facility for  rehab, medical management and nursing care.    HPI:    HPI information obtained from: facility chart records, facility staff, patient report, [facility] chart review and family/first contact daughter Pat report.     Brief Summary of Hospital Course:     This is a 89-year-old female, with a past medical history significant for right putamen infarct with left-sided weakness on 12/4/19, hypertension, hyperlipidemia and dementia, who was initially admitted to [facility] 12/3/19 through 12/6/19 for CVA then [facility] 12/7/19 through 12/9/19 for labile blood pressures weakness and dizziness. [facility] until 1/8/20 when she was instructed to go the ED by Neurologist for a new subacute CVA on MRI 1/7/20 outpatient. Neurology was consulted and reviewed outpatient MRI. Confirmed subacute CVA in left cerebellar and right MCA territory. A CTA of the head and neck revealed mild stenosis of right MCA. Recommended JAYASHREE and Ziopatch. Family declined further work-up in an effort to limit excessive tests, medications and hospitalizations. Discharged back to [facility] for ongoing physical rehabilitation.     Updates on Status Since Skilled nursing Admission:     States she was sent to the hospital because the doctor read  the test wrong. Wasn't happy about rushing to the hospital. Is hoping to walk again. Drank prune juice today in an effort to have a bowel movement. States she has never had daily bowel movements. Does not like to take medications. Would like daughter to be called regarding her care. Denies shortness of breath at rest.     Contacted daughter, Pat, who recalls events leading up to hospitalization and hospitalization. Questions why patient's Tramadol was discontinued. States she has tried many things for her back pain and this was the first thing that worked. Has been complaining of back pain more without it. Also questions why patient has multiple medications administered in the afternoon. Was only on 3 time per day medication pass at her previous AL.    CODE STATUS/ADVANCE DIRECTIVES DISCUSSION:   DNR / DNI  Patient's living condition: lives in an assisted living facility  ALLERGIES: Actonel [bisphosphonates]; Conjugated estrogens; Macrobid [nitrofuran derivatives]; Nitrofurantoin; Premarin; Sulfa drugs; Hydrocodone; Oxycodone; and Risedronate  PAST MEDICAL HISTORY:  has a past medical history of Abdominal aortic aneurysm (H) (2001), AK (actinic keratosis) (11/11/2009), Cataract (2/22/2011), Compression fractures, Compression Fractures of Lumbar Vertebra (2/4/2010), DJD (degenerative joint disease), Generalised Weakness and Fatigue (3/3/2011), Glaucoma (increased eye pressure) (2009), HTN (hypertension) (11/11/2009), Hypercholesteremia (2001), Hyperlipidemia LDL goal <100 (10/31/2010), 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 She also has no past medical history of Amblyopia, Complication of anesthesia, Diabetes (H), Diabetic retinopathy (H), Macular degeneration, Malignant hyperthermia, Retinal detachment,  Strabismus, or Uveitis.  PAST SURGICAL HISTORY:   has a past surgical history that includes hysterectomy, cervix status unknown (1971); APPENDECTOMY (1971); ANTER VESICOURETHROPEXY,SIMPLE (2008); aaa repair (2/2009); Extracapsular cataract extration with intraocular lens implant (4/2010,5/2010); Laser selective trabeculoplasty (3/2010; 10/2015); cataract iol, rt/lt; and Laser selective trabeculoplasty (12/2017).  FAMILY HISTORY: family history includes Heart Disease (age of onset: 79) in her father; Hypertension in her mother.  SOCIAL HISTORY:   reports that she has never smoked. She has never used smokeless tobacco. She reports that she does not drink alcohol or use drugs.    Post Discharge Medication Reconciliation Status: discharge medications reconciled and changed, per note/orders (see AVS)    Current Outpatient Medications   Medication Sig Dispense Refill     acetaminophen (TYLENOL) 500 MG tablet Take 1,000 mg by mouth 3 times daily 8am, 2pm, 8pm       amLODIPine (NORVASC) 5 MG tablet Take 1 tablet (5 mg) by mouth daily 30 tablet 0     aspirin (ASA) 81 MG EC tablet Take 1 tablet (81 mg) by mouth daily for 20 days 20 tablet 0     atorvastatin (LIPITOR) 40 MG tablet Take 1 tablet (40 mg) by mouth every evening 30 tablet 0     carvedilol (COREG) 6.25 MG tablet Take 1 tablet (6.25 mg) by mouth 2 times daily (with meals) 60 tablet 0     cefdinir (OMNICEF) 300 MG capsule Take 1 capsule (300 mg) by mouth 2 times daily       Cholecalciferol (VITAMIN D3) 50 MCG (2000 UT) TABS Take 2,000 Units by mouth daily       citalopram (CELEXA) 10 MG tablet Take 10 mg by mouth every evening       clopidogrel (PLAVIX) 75 MG tablet Take 1 tablet (75 mg) by mouth daily for 20 days 20 tablet 0     dorzolamide-timolol (COSOPT) 2-0.5 % ophthalmic solution Place 1 drop into both eyes 2 times daily 1 Bottle 11     latanoprost (XALATAN) 0.005 % ophthalmic solution Place 1 drop into both eyes At Bedtime Both Eyes 3 Bottle 4     melatonin 3  "MG tablet Take 1 tablet (3 mg) by mouth nightly as needed for sleep       Multiple Vitamins-Minerals (CERTAVITE/ANTIOXIDANTS PO) Take 1 tablet by mouth daily       polyethylene glycol (MIRALAX/GLYCOLAX) packet Take 17 g by mouth daily as needed for constipation        senna-docusate (SENOKOT-S/PERICOLACE) 8.6-50 MG tablet Take 1 tablet by mouth daily       traMADol (ULTRAM) 50 MG tablet Take 0.5 tablets (25 mg) by mouth At Bedtime 20 tablet 0     [START ON 1/31/2020] aspirin (ASA) 325 MG tablet Take 1 tablet (325 mg) by mouth daily (Patient not taking: Reported on 1/14/2020) 30 tablet 0     ROS:  4 point ROS including Respiratory, CV, GI and , other than that noted in the HPI,  is negative    Vitals:  /69   Pulse 68   Temp 98  F (36.7  C)   Resp 18   Ht 1.473 m (4' 10\")   Wt 59.8 kg (131 lb 12.8 oz)   SpO2 96%   BMI 27.55 kg/m     Exam:  GENERAL APPEARANCE:  Alert, in no distress  ENT:  Mouth and posterior oropharynx normal, moist mucous membranes  EYES:  EOMI  RESP:  respiratory effort and palpation of chest normal, lungs clear to auscultation , no respiratory distress  CV:  Palpation and auscultation of heart done , regular rate and rhythm, no murmur, rub, or gallop  ABDOMEN:  normal bowel sounds, soft, nontender, no hepatosplenomegaly or other masses  M/S:   Left-sided weakness  SKIN:  Inspection of skin and subcutaneous tissue baseline, Palpation of skin and subcutaneous tissue baseline  NEURO:   Cranial nerves 2-12 are normal tested and grossly at patient's baseline  PSYCH:  affect and mood normal    Lab/Diagnostic data:  Labs done in SNF are in [facility]. Please refer to them using [facility]/Care Everywhere.    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 Initially treated with Aspirin 81 mg PO daily  and Clopidogrel 75 mg daily x 21 days until 12/24/19 then 325 mg PO " daily. Started on Atorvastatin due to LDL of  137 on 12/4/19. During this hospitalization, recommended treatment with Aspirin 81 mg and Clopidogrel 75 mg PO daily x 21 days until 1/30/20 then Aspirin 325 mg PO daily indefinitely. Family declined JAYASHREE and Ziopatch to look for embolic source with request to limit excessive tests, medications and hospitalizations.Family also declined Neurology follow-up which was recommended in 4-8 weeks per hospital documentation.    Klebsiella Pneumoniae Urinary Tract Infection 1/5/20. Treated with Cefdinir x 7 days starting 1/7/20. Last day of antibiotic should be today. No urinary symptoms reported.     Hypertension and Hyperlipidemia. Monitor blood pressure daily. PTA Atenolol was discontinued during 12/3/19 through 12/6/19 hospitalization. Continue Amlodipine, Atorvastatin and Carvedilol as ordered which were initiated during the 12/3/19 through 12/6/19 hospitalization.     Dementia. CPT Score 4.2/5.6 and SLUMS Score 14/30 during TCU stay indicating the need for 24 hour supervision. The plan is for patient to move to long term care once her TCU stay is completed.     Constipation. Patient reports she does not routinely have daily bowel movements. Feels her bowels are at baseline and does not want medication adjusted at this point. Drinking Prune Juice. Continue Miralax and Senna-S as ordered.      History of Herniated DIsc. Per daughter's report, patient is experiencing back pain that is limiting her. Tramadol was discontinued 12/17/19 due to concerns of delirium. MD during hospitalization also re-enforced side effects of medication to daughter. After daughter's insistence, will re-initiate Tramadol 25 mg PO at bedtime given goals of care is ultimately comfort. Discontinue if increased confusion and/or falls. Continue Acetaminophen as ordered.     Glaucoma. Continue Dorzolamide-Timolol and Latanoprost as ordered.     Depression. Continue Citalopram as ordered.    Insomnia. Started  on Melatonin 12/26/19.      Physical Deconditioning. Secondary to recent hospitalizations and co-morbidities. Physical and Occupational Therapy ordered    Total time spent with patient visit at the skilled nursing facility was 35 min including patient visit and review of past records. Greater than 50% of total time spent with counseling and coordinating care due to review of recent hospitalizations, review of TCU stay, discussion with daughter regarding Tramadol and scheduling of medications via telephone and discussion with patient regarding plan of care.     Electronically signed by:  CRESCENCIO Garnett CNP                 Sincerely,        CRESCENCIO Garnett CNP

## 2020-01-14 NOTE — PROGRESS NOTES
[place] [facility]  PRIMARY CARE PROVIDER AND CLINIC:  Reggie Whatley MD, [facility] PHYSICIAN [facility] [address] [facility] / [facility]   Chief Complaint   Patient presents with     Hospital F/U     [place] Medical Record Number:  [id number]  Place of Service where encounter took place:  [facility] ([facility]) [[id number]]    Alejandrina Whatley  is a 89 year old  (8/22/1923), re- admitted to the above facility from  [facility]. Hospital stay 1/8/20 through 1/10/20..  Re-admitted to this facility for  rehab, medical management and nursing care.    HPI:    HPI information obtained from: facility chart records, facility staff, patient report, [facility] chart review and family/first contact daughter Pat report.     Brief Summary of Hospital Course:     This is a 89-year-old female, with a past medical history significant for right putamen infarct with left-sided weakness on 12/4/19, hypertension, hyperlipidemia and dementia, who was initially admitted to [facility] 12/3/19 through 12/6/19 for CVA then [facility] 12/7/19 through 12/9/19 for labile blood pressures weakness and dizziness. [facility] until 1/8/20 when she was instructed to go the ED by Neurologist for a new subacute CVA on MRI 1/7/20 outpatient. Neurology was consulted and reviewed outpatient MRI. Confirmed subacute CVA in left cerebellar and right MCA territory. A CTA of the head and neck revealed mild stenosis of right MCA. Recommended JAYASHREE and Ziopatch. Family declined further work-up in an effort to limit excessive tests, medications and hospitalizations. Discharged back to [facility] for ongoing physical rehabilitation.     Updates on Status Since Skilled nursing Admission:     States she was sent to the hospital because the doctor read the test wrong. Wasn't happy about rushing to the hospital. Is hoping to walk again.  Drank prune juice today in an effort to have a bowel movement. States she has never had daily bowel movements. Does not like to take medications. Would like daughter to be called regarding her care. Denies shortness of breath at rest.     Contacted daughter, Pat, who recalls events leading up to hospitalization and hospitalization. Questions why patient's Tramadol was discontinued. States she has tried many things for her back pain and this was the first thing that worked. Has been complaining of back pain more without it. Also questions why patient has multiple medications administered in the afternoon. Was only on 3 time per day medication pass at her previous AL.    CODE STATUS/ADVANCE DIRECTIVES DISCUSSION:   DNR / DNI  Patient's living condition: lives in an assisted living facility  ALLERGIES: Actonel [bisphosphonates]; Conjugated estrogens; Macrobid [nitrofuran derivatives]; Nitrofurantoin; Premarin; Sulfa drugs; Hydrocodone; Oxycodone; and Risedronate  PAST MEDICAL HISTORY:  has a past medical history of Abdominal aortic aneurysm (H) (2001), AK (actinic keratosis) (11/11/2009), Cataract (2/22/2011), Compression fractures, Compression Fractures of Lumbar Vertebra (2/4/2010), DJD (degenerative joint disease), Generalised Weakness and Fatigue (3/3/2011), Glaucoma (increased eye pressure) (2009), HTN (hypertension) (11/11/2009), Hypercholesteremia (2001), Hyperlipidemia LDL goal <100 (10/31/2010), 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 She also has no past medical history of Amblyopia, Complication of anesthesia, Diabetes (H), Diabetic retinopathy (H), Macular degeneration, Malignant hyperthermia, Retinal detachment, Strabismus, or Uveitis.  PAST SURGICAL HISTORY:   has a past surgical history that includes  hysterectomy, cervix status unknown (1971); APPENDECTOMY (1971); ANTER VESICOURETHROPEXY,SIMPLE (2008); aaa repair (2/2009); Extracapsular cataract extration with intraocular lens implant (4/2010,5/2010); Laser selective trabeculoplasty (3/2010; 10/2015); cataract iol, rt/lt; and Laser selective trabeculoplasty (12/2017).  FAMILY HISTORY: family history includes Heart Disease (age of onset: 79) in her father; Hypertension in her mother.  SOCIAL HISTORY:   reports that she has never smoked. She has never used smokeless tobacco. She reports that she does not drink alcohol or use drugs.    Post Discharge Medication Reconciliation Status: discharge medications reconciled and changed, per note/orders (see AVS)    Current Outpatient Medications   Medication Sig Dispense Refill     acetaminophen (TYLENOL) 500 MG tablet Take 1,000 mg by mouth 3 times daily 8am, 2pm, 8pm       amLODIPine (NORVASC) 5 MG tablet Take 1 tablet (5 mg) by mouth daily 30 tablet 0     aspirin (ASA) 81 MG EC tablet Take 1 tablet (81 mg) by mouth daily for 20 days 20 tablet 0     atorvastatin (LIPITOR) 40 MG tablet Take 1 tablet (40 mg) by mouth every evening 30 tablet 0     carvedilol (COREG) 6.25 MG tablet Take 1 tablet (6.25 mg) by mouth 2 times daily (with meals) 60 tablet 0     cefdinir (OMNICEF) 300 MG capsule Take 1 capsule (300 mg) by mouth 2 times daily       Cholecalciferol (VITAMIN D3) 50 MCG (2000 UT) TABS Take 2,000 Units by mouth daily       citalopram (CELEXA) 10 MG tablet Take 10 mg by mouth every evening       clopidogrel (PLAVIX) 75 MG tablet Take 1 tablet (75 mg) by mouth daily for 20 days 20 tablet 0     dorzolamide-timolol (COSOPT) 2-0.5 % ophthalmic solution Place 1 drop into both eyes 2 times daily 1 Bottle 11     latanoprost (XALATAN) 0.005 % ophthalmic solution Place 1 drop into both eyes At Bedtime Both Eyes 3 Bottle 4     melatonin 3 MG tablet Take 1 tablet (3 mg) by mouth nightly as needed for sleep       Multiple  "Vitamins-Minerals (CERTAVITE/ANTIOXIDANTS PO) Take 1 tablet by mouth daily       polyethylene glycol (MIRALAX/GLYCOLAX) packet Take 17 g by mouth daily as needed for constipation        senna-docusate (SENOKOT-S/PERICOLACE) 8.6-50 MG tablet Take 1 tablet by mouth daily       traMADol (ULTRAM) 50 MG tablet Take 0.5 tablets (25 mg) by mouth At Bedtime 20 tablet 0     [START ON 1/31/2020] aspirin (ASA) 325 MG tablet Take 1 tablet (325 mg) by mouth daily (Patient not taking: Reported on 1/14/2020) 30 tablet 0     ROS:  4 point ROS including Respiratory, CV, GI and , other than that noted in the HPI,  is negative    Vitals:  /69   Pulse 68   Temp 98  F (36.7  C)   Resp 18   Ht 1.473 m (4' 10\")   Wt 59.8 kg (131 lb 12.8 oz)   SpO2 96%   BMI 27.55 kg/m    Exam:  GENERAL APPEARANCE:  Alert, in no distress  ENT:  Mouth and posterior oropharynx normal, moist mucous membranes  EYES:  EOMI  RESP:  respiratory effort and palpation of chest normal, lungs clear to auscultation , no respiratory distress  CV:  Palpation and auscultation of heart done , regular rate and rhythm, no murmur, rub, or gallop  ABDOMEN:  normal bowel sounds, soft, nontender, no hepatosplenomegaly or other masses  M/S:   Left-sided weakness  SKIN:  Inspection of skin and subcutaneous tissue baseline, Palpation of skin and subcutaneous tissue baseline  NEURO:   Cranial nerves 2-12 are normal tested and grossly at patient's baseline  PSYCH:  affect and mood normal    Lab/Diagnostic data:  Labs done in SNF are in [place] EPIC. Please refer to them using [facility]/Care Everywhere.    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 Initially treated with Aspirin 81 mg PO daily and Clopidogrel 75 mg daily x 21 days until 12/24/19 then 325 mg PO daily. Started on Atorvastatin due to LDL of  137 on 12/4/19. During this " hospitalization, recommended treatment with Aspirin 81 mg and Clopidogrel 75 mg PO daily x 21 days until 1/30/20 then Aspirin 325 mg PO daily indefinitely. Family declined JAYASHREE and Ziopatch to look for embolic source with request to limit excessive tests, medications and hospitalizations.Family also declined Neurology follow-up which was recommended in 4-8 weeks per hospital documentation.    Klebsiella Pneumoniae Urinary Tract Infection 1/5/20. Treated with Cefdinir x 7 days starting 1/7/20. Last day of antibiotic should be today. No urinary symptoms reported.     Hypertension and Hyperlipidemia. Monitor blood pressure daily. PTA Atenolol was discontinued during 12/3/19 through 12/6/19 hospitalization. Continue Amlodipine, Atorvastatin and Carvedilol as ordered which were initiated during the 12/3/19 through 12/6/19 hospitalization.     Dementia. CPT Score 4.2/5.6 and SLUMS Score 14/30 during TCU stay indicating the need for 24 hour supervision. The plan is for patient to move to long term care once her TCU stay is completed.     Constipation. Patient reports she does not routinely have daily bowel movements. Feels her bowels are at baseline and does not want medication adjusted at this point. Drinking Prune Juice. Continue Miralax and Senna-S as ordered.      History of Herniated DIsc. Per daughter's report, patient is experiencing back pain that is limiting her. Tramadol was discontinued 12/17/19 due to concerns of delirium. MD during hospitalization also re-enforced side effects of medication to daughter. After daughter's insistence, will re-initiate Tramadol 25 mg PO at bedtime given goals of care is ultimately comfort. Discontinue if increased confusion and/or falls. Continue Acetaminophen as ordered.     Glaucoma. Continue Dorzolamide-Timolol and Latanoprost as ordered.     Depression. Continue Citalopram as ordered.    Insomnia. Started on Melatonin 12/26/19.      Physical Deconditioning. Secondary to recent  hospitalizations and co-morbidities. Physical and Occupational Therapy ordered    Total time spent with patient visit at the skilled nursing facility was 35 min including patient visit and review of past records. Greater than 50% of total time spent with counseling and coordinating care due to review of recent hospitalizations, review of TCU stay, discussion with daughter regarding Tramadol and scheduling of medications via telephone and discussion with patient regarding plan of care.     Electronically signed by:  CRESCENCIO Garnett CNP

## 2020-01-20 ENCOUNTER — NURSING HOME VISIT (OUTPATIENT)
Dept: GERIATRICS | Facility: CLINIC | Age: 85
End: 2020-01-20
Payer: COMMERCIAL

## 2020-01-20 VITALS
RESPIRATION RATE: 18 BRPM | HEIGHT: 58 IN | WEIGHT: 131.8 LBS | BODY MASS INDEX: 27.66 KG/M2 | DIASTOLIC BLOOD PRESSURE: 79 MMHG | HEART RATE: 71 BPM | SYSTOLIC BLOOD PRESSURE: 130 MMHG | OXYGEN SATURATION: 98 % | TEMPERATURE: 97.9 F

## 2020-01-20 DIAGNOSIS — F03.90 DEMENTIA WITHOUT BEHAVIORAL DISTURBANCE, UNSPECIFIED DEMENTIA TYPE: ICD-10-CM

## 2020-01-20 DIAGNOSIS — M51.26 LUMBAR HERNIATED DISC: ICD-10-CM

## 2020-01-20 DIAGNOSIS — K59.00 CONSTIPATION, UNSPECIFIED CONSTIPATION TYPE: ICD-10-CM

## 2020-01-20 DIAGNOSIS — I63.9 CEREBROVASCULAR ACCIDENT (CVA), UNSPECIFIED MECHANISM (H): ICD-10-CM

## 2020-01-20 DIAGNOSIS — I10 ESSENTIAL HYPERTENSION: ICD-10-CM

## 2020-01-20 DIAGNOSIS — F32.A DEPRESSION, UNSPECIFIED DEPRESSION TYPE: ICD-10-CM

## 2020-01-20 DIAGNOSIS — I69.354 HEMIPARESIS AFFECTING LEFT SIDE AS LATE EFFECT OF CEREBROVASCULAR ACCIDENT (CVA) (H): Primary | ICD-10-CM

## 2020-01-20 DIAGNOSIS — G47.00 INSOMNIA, UNSPECIFIED TYPE: ICD-10-CM

## 2020-01-20 DIAGNOSIS — R53.81 PHYSICAL DECONDITIONING: ICD-10-CM

## 2020-01-20 PROCEDURE — 99309 SBSQ NF CARE MODERATE MDM 30: CPT | Performed by: NURSE PRACTITIONER

## 2020-01-20 ASSESSMENT — MIFFLIN-ST. JEOR: SCORE: 877.59

## 2020-01-20 NOTE — PROGRESS NOTES
[facility]  [facility] Record Number:  [id number]  Place of Service where encounter took place:  [facility] ([facility]) [[id number]]  Chief Complaint   Patient presents with     RECHECK       HPI:    Alejandrina Whatley  is a 89 year old (8/22/1923), who is being seen today for an episodic care visit.  HPI information obtained from: facility chart records, facility staff, patient report and [facility] chart review.     Brief Summary of Hospital/TCU Course: Ms. Alejandrina Whatley is a 89 year old female with PMH significant for hypertension, hyperlipidemia and dementia. She was admitted to [facility] from 12/3/19-12/6/19 for weakness, dizziness, and labile blood pressures. 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 Continues on plavix + baby ASA through 12/24 (21 day course), then to start  mg daily. Also started on atorvastatin, coreg and amlodipine for HTN urgency with goal SBP around 140. Was then readmitted to [facility] 12/7-12/9 for labile blood pressures, weakness and dizziness. Discharged to TCU for physical rehabilitation. While at TCU developed UTI  with urine culture showing >100,000 col/ml Klebsiella pneumoniae. Treated with 7 days of cefdinir.    On 1/8/20 when she was instructed by Neurologist to go to ED for subacute CVA on MRI that was completed 1/7/20. She was hospitalized at [facility] from 1/8-1/10. 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 CTA of head and neck showed mild stenosis of MCA. TTE and ziopatch were recommended, but family declined additional work up to limit excessive tests, medications and hospitalizations. They also declined wanting to have further evaluation with outpatient neurology. Recommended to complete ASA 81 mg + Plavix daily x21 days until 1/30/20 and then restart  mg daily indefinitely. [facility].     Today's concern is:  Alejandrina was seen today for routine follow up at TCU. Reports she is doing well. Had therapy this morning and is exhausted from it. Feels therapy is going well. Ambulating 34 feet with therapy today. Denies any pain today. Reports feeling constipated and is interested in trying 1/2 dose of miralax daily. Denies abdominal pain, nausea, SOB, CP, dizzziness/lightheadedness. No dysuria or trouble voiding. SBP 120s-130s. HR 60s-70s, oxygen saturations >94% on RA.     Past Medical and Surgical History reviewed in Epic today.    MEDICATIONS:  Current Outpatient Medications   Medication Sig Dispense Refill     acetaminophen (TYLENOL) 500 MG tablet Take 1,000 mg by mouth 3 times daily 8am, 2pm, 8pm       amLODIPine (NORVASC) 5 MG tablet Take 1 tablet (5 mg) by mouth daily 30 tablet 0     aspirin (ASA) 81 MG EC tablet Take 1 tablet (81 mg) by mouth daily for 20 days 20 tablet 0     atorvastatin (LIPITOR) 40 MG tablet Take 1 tablet (40 mg) by mouth every evening 30 tablet 0     carvedilol (COREG) 6.25 MG tablet Take 1 tablet (6.25 mg) by mouth 2 times daily (with meals) 60 tablet 0     Cholecalciferol (VITAMIN D3) 50 MCG (2000 UT) TABS Take 2,000 Units by mouth daily       citalopram (CELEXA) 10 MG tablet Take 10 mg by mouth every evening       clopidogrel (PLAVIX) 75 MG tablet Take 1 tablet (75 mg) by mouth daily for 20 days 20 tablet 0     dorzolamide-timolol (COSOPT) 2-0.5 % ophthalmic solution Place 1 drop into both eyes 2 times daily 1 Bottle 11     latanoprost (XALATAN) 0.005 % ophthalmic solution Place 1 drop into both eyes At Bedtime Both Eyes 3 Bottle 4     melatonin 3 MG tablet Take 1 tablet (3 mg) by mouth nightly as needed for sleep       Multiple Vitamins-Minerals (CERTAVITE/ANTIOXIDANTS PO) Take 1 tablet by mouth daily       polyethylene glycol (MIRALAX/GLYCOLAX) packet Take 17 g by mouth daily as needed for constipation        senna-docusate  "(SENOKOT-S/PERICOLACE) 8.6-50 MG tablet Take 1 tablet by mouth daily       traMADol (ULTRAM) 50 MG tablet Take 0.5 tablets (25 mg) by mouth At Bedtime 20 tablet 0     [START ON 1/31/2020] aspirin (ASA) 325 MG tablet Take 1 tablet (325 mg) by mouth daily (Patient not taking: Reported on 1/14/2020) 30 tablet 0     REVIEW OF SYSTEMS:  10 point ROS of systems including Constitutional, Eyes, Respiratory, Cardiovascular, Gastroenterology, Genitourinary, Integumentary, Musculoskeletal, Psychiatric were all negative except for pertinent positives noted in my HPI.    Objective:  /79   Pulse 71   Temp 97.9  F (36.6  C)   Resp 18   Ht 1.473 m (4' 10\")   Wt 59.8 kg (131 lb 12.8 oz)   SpO2 98%   BMI 27.55 kg/m    Exam:  GENERAL APPEARANCE:  Alert, pleasant and cooperative, elderly female lying in bed on exam  HEENT: normocephalic, conjunctivae, lids, pupils and irises normal, moist mucous membranes, nose without drainage or crusting, hearing acuity: intact  RESP:  respiratory effort normal, no respiratory distress, Lung sounds clear, patient is on RA  CV: auscultation of heart done, rate and rhythm regular. no murmur, no rub or gallop.  ABDOMEN: + bowel sounds, soft, nontender, no grimacing or guarding with palpation.  M/S:   Gait and station: uses walker for ambulation; no tenderness or swelling of the joints; able to move all extremities - with left sided weakness  SKIN:  Inspection and palpation of skin and subcutaneous tissue: skin warm, dry without rashes  NEURO cranial nerves 2-12 grossly intact and at patient's baseline: no facial asymmetry, no speech deficits and able to follow directions  PSYCH: affect and mood normal    Labs:   Labs done in SNF are in [place] EPIC. Please refer to them using [facility] Everywhere. and Recent labs in EPIC reviewed by me today.     ASSESSMENT/PLAN:  (I69.354) Hemiparesis affecting left side as late effect of cerebrovascular accident (CVA) (H)   (I63.9) CVA " (H)  Comment: acute on chronic with left sided weakness  -initially with right putamen CVA d/t small vessel occlusion diagnosed 12/4. Treated with 21 day course of baby ASA + plavix at TCU. Also started on atorvastatin at that time due to .  - most recently with subacute CVA in left cerebellar and right MCA territory  Plan: Continue 21 day course of baby ASA + plavix through 1/30/20, then restart  mg daily. Continue atorvastatin. Therapy as below. Family declined JAYASHREE and ziopatch to look for embolic sources with request to limit excessive tests, medications and hospitalizations. They also declined Neurology follow up outpatient which was recommended in 4-8 weeks.      (I10) Benign essential hypertension  Comment: acute on chronic, BP reviewed and is meeting goal   -goal SBP around 140  -PTA atenolol discontinued during initial hospitalization  Plan: Continue amlodipine, coreg. Vs per TCU policy. Monitor.      (K59.00) Constipation  Comment: acute, suspect scheduled tramadol may be exacerbating  Plan: Start miralax 7.5 grams daily. Continue senna S at bedtime and miralax daily PRN. Nursing to monitor bowels and use house orders PRN- notify provider if used to adjust daily regimen.    (M51.26) History of Lumbar herniated disc  Comment: chronic, patient denies pain, no reports of pain documented per nursing and therapy  -tramadol was restarted at insistence of daughter at last visit (1/14). It was initially stopped on 12/17 with concerns for delirium and MD during hospitalization also reinforced side effects of medication to daughter. I also previously had discussed side effects and how tramadol works with daughter at TCU prior to recent hospitalization, as patient has had no complaints of pain at TCU and daughter had thought this would prevent further herniation to her mother's back. However, goals of care are ultimately for patient comfort.  Plan: Continue tramadol 25 mg po at bedtime. Continue  scheduled tylenol. Discontinue tramadol if increased confusion or falls. Monitor.       (F03.90) Dementia without behavioral disturbance, unspecified dementia type (H)  Comment: Acute, CPT 4.2/5.6 indicating 24 hour supervision, SLUMS 14/30  Plan: Nursing to continue to provide supportive cares. Plan is for move to LTC when finished with therapy in TCU.      (F32.9) Depression, unspecified depression type  Comment: chronic, baseline  Plan: Continue PTA celexa. Monitor.      (R53.81) Physical deconditioning  Comment: Acute, secondary to recent hospitalization, medical conditions as above- making progress with therapy as above.  Plan: Encourage participation in physical therapy/occupational therapy for strengthening and deconditioning. Discharge planning per their recommendation. Social work to assist with d/c planning.     (G47.00) Insomnia  Comment: acute, sleeping well  -melatonin started at TCU  Plan: Melatonin 3 mg at bedtime for insomnia. Monitor.     (N39.0) UTI-resolved  Comment: Acute, resolved   - UC grew >100,000 col/ml Klebsiella pneumoniae  Plan: Completed course of Cefdinir 300 mg po BID x 7 days. Monitor.    Electronically signed by:  CRESCENCIO Narayanan CNP

## 2020-01-28 ENCOUNTER — HOSPITAL ENCOUNTER (EMERGENCY)
Facility: CLINIC | Age: 85
Discharge: NURSING, REHAB OR LONG-TERM CARE | End: 2020-01-28
Attending: EMERGENCY MEDICINE | Admitting: EMERGENCY MEDICINE
Payer: COMMERCIAL

## 2020-01-28 ENCOUNTER — RECORDS - HEALTHEAST (OUTPATIENT)
Dept: LAB | Facility: CLINIC | Age: 85
End: 2020-01-28

## 2020-01-28 ENCOUNTER — NURSING HOME VISIT (OUTPATIENT)
Dept: GERIATRICS | Facility: CLINIC | Age: 85
End: 2020-01-28
Payer: COMMERCIAL

## 2020-01-28 VITALS
RESPIRATION RATE: 18 BRPM | TEMPERATURE: 99.2 F | OXYGEN SATURATION: 94 % | SYSTOLIC BLOOD PRESSURE: 139 MMHG | HEART RATE: 95 BPM | DIASTOLIC BLOOD PRESSURE: 85 MMHG

## 2020-01-28 VITALS
BODY MASS INDEX: 27.04 KG/M2 | HEART RATE: 77 BPM | WEIGHT: 128.8 LBS | RESPIRATION RATE: 18 BRPM | OXYGEN SATURATION: 95 % | DIASTOLIC BLOOD PRESSURE: 86 MMHG | SYSTOLIC BLOOD PRESSURE: 134 MMHG | HEIGHT: 58 IN | TEMPERATURE: 98.8 F

## 2020-01-28 DIAGNOSIS — R53.81 PHYSICAL DECONDITIONING: ICD-10-CM

## 2020-01-28 DIAGNOSIS — I10 ESSENTIAL HYPERTENSION: ICD-10-CM

## 2020-01-28 DIAGNOSIS — R11.2 NAUSEA AND VOMITING, INTRACTABILITY OF VOMITING NOT SPECIFIED, UNSPECIFIED VOMITING TYPE: ICD-10-CM

## 2020-01-28 DIAGNOSIS — M51.26 LUMBAR HERNIATED DISC: ICD-10-CM

## 2020-01-28 DIAGNOSIS — K52.9 GASTROENTERITIS: ICD-10-CM

## 2020-01-28 DIAGNOSIS — I69.354 HEMIPARESIS AFFECTING LEFT SIDE AS LATE EFFECT OF CEREBROVASCULAR ACCIDENT (CVA) (H): ICD-10-CM

## 2020-01-28 DIAGNOSIS — F03.90 DEMENTIA WITHOUT BEHAVIORAL DISTURBANCE, UNSPECIFIED DEMENTIA TYPE: ICD-10-CM

## 2020-01-28 DIAGNOSIS — R19.5 LOOSE STOOLS: Primary | ICD-10-CM

## 2020-01-28 DIAGNOSIS — I63.9 CEREBROVASCULAR ACCIDENT (CVA), UNSPECIFIED MECHANISM (H): ICD-10-CM

## 2020-01-28 LAB
ANION GAP SERPL CALCULATED.3IONS-SCNC: 7 MMOL/L (ref 3–14)
BASOPHILS # BLD AUTO: 0 10E9/L (ref 0–0.2)
BASOPHILS NFR BLD AUTO: 0.3 %
BUN SERPL-MCNC: 29 MG/DL (ref 7–30)
C DIFF TOX B STL QL: NEGATIVE
CALCIUM SERPL-MCNC: 9.5 MG/DL (ref 8.5–10.1)
CHLORIDE SERPL-SCNC: 108 MMOL/L (ref 94–109)
CO2 SERPL-SCNC: 25 MMOL/L (ref 20–32)
CREAT SERPL-MCNC: 0.94 MG/DL (ref 0.52–1.04)
DIFFERENTIAL METHOD BLD: ABNORMAL
EOSINOPHIL # BLD AUTO: 0 10E9/L (ref 0–0.7)
EOSINOPHIL NFR BLD AUTO: 0 %
ERYTHROCYTE [DISTWIDTH] IN BLOOD BY AUTOMATED COUNT: 13.8 % (ref 10–15)
GFR SERPL CREATININE-BSD FRML MDRD: 51 ML/MIN/{1.73_M2}
GLUCOSE SERPL-MCNC: 149 MG/DL (ref 70–99)
HCT VFR BLD AUTO: 40.9 % (ref 35–47)
HGB BLD-MCNC: 13 G/DL (ref 11.7–15.7)
IMM GRANULOCYTES # BLD: 0.1 10E9/L (ref 0–0.4)
IMM GRANULOCYTES NFR BLD: 0.5 %
LACTATE BLD-SCNC: 1.4 MMOL/L (ref 0.7–2)
LYMPHOCYTES # BLD AUTO: 0.4 10E9/L (ref 0.8–5.3)
LYMPHOCYTES NFR BLD AUTO: 2.5 %
MCH RBC QN AUTO: 33.4 PG (ref 26.5–33)
MCHC RBC AUTO-ENTMCNC: 31.8 G/DL (ref 31.5–36.5)
MCV RBC AUTO: 105 FL (ref 78–100)
MONOCYTES # BLD AUTO: 0.5 10E9/L (ref 0–1.3)
MONOCYTES NFR BLD AUTO: 3.4 %
NEUTROPHILS # BLD AUTO: 14.5 10E9/L (ref 1.6–8.3)
NEUTROPHILS NFR BLD AUTO: 93.3 %
NRBC # BLD AUTO: 0 10*3/UL
NRBC BLD AUTO-RTO: 0 /100
PLATELET # BLD AUTO: 198 10E9/L (ref 150–450)
POTASSIUM SERPL-SCNC: 4.2 MMOL/L (ref 3.4–5.3)
RBC # BLD AUTO: 3.89 10E12/L (ref 3.8–5.2)
SODIUM SERPL-SCNC: 140 MMOL/L (ref 133–144)
SPECIMEN SOURCE: NORMAL
WBC # BLD AUTO: 15.5 10E9/L (ref 4–11)

## 2020-01-28 PROCEDURE — 80048 BASIC METABOLIC PNL TOTAL CA: CPT | Performed by: EMERGENCY MEDICINE

## 2020-01-28 PROCEDURE — 99309 SBSQ NF CARE MODERATE MDM 30: CPT | Performed by: NURSE PRACTITIONER

## 2020-01-28 PROCEDURE — 96374 THER/PROPH/DIAG INJ IV PUSH: CPT

## 2020-01-28 PROCEDURE — 87493 C DIFF AMPLIFIED PROBE: CPT | Mod: 59 | Performed by: EMERGENCY MEDICINE

## 2020-01-28 PROCEDURE — 99284 EMERGENCY DEPT VISIT MOD MDM: CPT | Mod: 25

## 2020-01-28 PROCEDURE — 25800030 ZZH RX IP 258 OP 636: Performed by: EMERGENCY MEDICINE

## 2020-01-28 PROCEDURE — 83605 ASSAY OF LACTIC ACID: CPT | Performed by: EMERGENCY MEDICINE

## 2020-01-28 PROCEDURE — 25000128 H RX IP 250 OP 636: Performed by: EMERGENCY MEDICINE

## 2020-01-28 PROCEDURE — 93005 ELECTROCARDIOGRAM TRACING: CPT

## 2020-01-28 PROCEDURE — 87506 IADNA-DNA/RNA PROBE TQ 6-11: CPT | Performed by: EMERGENCY MEDICINE

## 2020-01-28 PROCEDURE — 96361 HYDRATE IV INFUSION ADD-ON: CPT

## 2020-01-28 PROCEDURE — 85025 COMPLETE CBC W/AUTO DIFF WBC: CPT | Performed by: EMERGENCY MEDICINE

## 2020-01-28 RX ORDER — SODIUM CHLORIDE 9 MG/ML
1000 INJECTION, SOLUTION INTRAVENOUS CONTINUOUS
Status: DISCONTINUED | OUTPATIENT
Start: 2020-01-28 | End: 2020-01-29 | Stop reason: HOSPADM

## 2020-01-28 RX ORDER — ONDANSETRON 2 MG/ML
4 INJECTION INTRAMUSCULAR; INTRAVENOUS EVERY 30 MIN PRN
Status: DISCONTINUED | OUTPATIENT
Start: 2020-01-28 | End: 2020-01-29 | Stop reason: HOSPADM

## 2020-01-28 RX ORDER — ONDANSETRON 4 MG/1
4 TABLET, ORALLY DISINTEGRATING ORAL EVERY 8 HOURS PRN
Qty: 10 TABLET | Refills: 0 | Status: SHIPPED | OUTPATIENT
Start: 2020-01-28 | End: 2020-03-02

## 2020-01-28 RX ADMIN — ONDANSETRON HYDROCHLORIDE 4 MG: 2 INJECTION, SOLUTION INTRAMUSCULAR; INTRAVENOUS at 18:07

## 2020-01-28 RX ADMIN — SODIUM CHLORIDE 1000 ML: 9 INJECTION, SOLUTION INTRAVENOUS at 22:01

## 2020-01-28 RX ADMIN — SODIUM CHLORIDE 1000 ML: 9 INJECTION, SOLUTION INTRAVENOUS at 17:45

## 2020-01-28 ASSESSMENT — MIFFLIN-ST. JEOR: SCORE: 863.98

## 2020-01-28 ASSESSMENT — ENCOUNTER SYMPTOMS
VOMITING: 1
DIZZINESS: 0
BLOOD IN STOOL: 0
NAUSEA: 1
ABDOMINAL PAIN: 0
DIARRHEA: 1

## 2020-01-28 NOTE — ED PROVIDER NOTES
History     Chief Complaint:  Nausea, Vomiting, & Diarrhea    HPI   Alejandrina Whatley is a 89 year old female, with a history of AAA s/p repair, who presents to the ED for evaluation of nausea, vomiting, and diarrhea. The patient reports she developed nausea, vomiting, and diarrhea today. No blood in her emesis or stool. She attempted to eat and drink this afternoon but was unable to tolerate. The patient notes she is wheelchair bound due to a stroke last month. She currently resides at [facility]. The patient denies any recent antibiotics, dizziness, abdominal pain, other pain, or urinary symptoms.      Allergies:  Actonel: rash   Conjugated estrogens: rash  Macrobid: rash   Nitrofurantoin: rash   Premarin: rash   Sulfa drugs: rash   Hydrocodone: nausea & vomiting  Oxycodone: nausea & vomiting   Risedronate: leg pain      Medications:    Amlodipine  Aspirin 81mg  Atorvastatin  Coreg  Vitamin D  Celexa  Plavix   Melatonin  Multivitamin-minerals  Tramadol     Past Medical History:    AAA  Actinic keratosis  Cataract  DJD  Glaucoma  CKD  Osteoarthritis  Corneal guttata  Periorbital dermatitis   Stroke   HTN  HLD  Lumbar spinal stenosis  Osteoporosis  Lens capsule pseudoexfoliation    Past Surgical History:    AAA repair w/ stent placement   Vesicourethropexy   Appendectomy   Bilateral extracapsular cataract extraction w/ IOL implant  Hysterectomy  Left trabeculoplasty x3  Vertebroplasty x2    Family History:    MI: father  HTN: mother     Social History:  Smoking status: Never smoker    Substance use: No   Alcohol use: No  Resides at [facility]   Marital Status:   [5]     Review of Systems   Gastrointestinal: Positive for diarrhea, nausea and vomiting. Negative for abdominal pain and blood in stool.   Neurological: Negative for dizziness.   All other systems reviewed and are negative.    Physical Exam     Patient Vitals for the past 24 hrs:   BP Temp Temp src Pulse Heart Rate Resp SpO2    01/28/20 2230 139/85 -- -- 95 95 -- 94 %   01/28/20 2215 (!) 144/69 -- -- 88 90 -- 97 %   01/28/20 2200 (!) 145/69 -- -- 96 -- -- --   01/28/20 2115 128/71 -- -- 110 -- -- 95 %   01/28/20 2030 126/71 -- -- 115 -- -- 96 %   01/28/20 2000 134/81 -- -- 114 -- -- --   01/28/20 1930 (!) 142/97 -- -- 112 -- -- 95 %   01/28/20 1915 (!) 141/78 -- -- 109 -- -- 97 %   01/28/20 1900 137/73 -- -- 114 -- -- 92 %   01/28/20 1845 (!) 133/99 -- -- 107 -- -- --   01/28/20 1830 (!) 142/82 -- -- 107 -- -- --   01/28/20 1815 -- -- -- -- -- -- 96 %   01/28/20 1800 (!) 156/78 -- -- 108 -- -- 97 %   01/28/20 1734 (!) 141/79 99.2  F (37.3  C) Oral -- -- -- --   01/28/20 1725 -- -- -- -- -- -- 93 %   01/28/20 1720 -- -- -- -- -- -- 96 %   01/28/20 1715 (!) 141/79 -- -- 104 -- 18 91 %     Physical Exam  General: Patient is alert and interactive when I enter the room  Head:  The scalp, face, and head appear normal  Eyes:  Conjunctivae are normal  ENT:    The nose is normal    Pinnae are normal    External acoustic canals are normal  Neck:  Trachea midline  CV:  Pulses are normal   Resp:  No respiratory distress   Abdomen:      Soft, non-tender, non-distended  Musc:  Normal muscular tone    No major joint effusions    No asymmetric leg swelling  Skin:  No rash or lesions noted  Neuro:  Speech is normal and fluent. Face is symmetric.     Moving all extremities well.   Psych: Awake. Alert.  Normal affect.  Appropriate interactions.    Emergency Department Course     ECG (18:05:13):  Rate 108 bpm. CT interval 192. QRS duration 66. QT/QTc 324/434. P-R-T axes 30 -6 -85. Sinus tachycardia. Nonspecific ST and T wave abnormality. Abnormal ECG. Interpreted at 1809 by Lacy Borden MD.    Laboratory:  Laboratory findings were communicated with the patient who voiced understanding of the findings.    Lactic acid (1748): 1.4    CBC: WBC 15.5(H), o/w WNL (HGB 13.0, )  BMP: Glucose 149(H), GFR estimate 51(L), o/w WNL (Creatinine 0.94)     C diff  toxin B PCR: Negative     Enteric bacteria & virus panel-LILIBETH stool: In process    Interventions:  1745: NS 1L Bolus IV  1807: Zofran 4mg IV  2201: NS 1L Infusion IV    Emergency Department Course:  Patient arrived by EMS.     Past medical records, nursing notes, and vitals reviewed.    1733: I performed an exam of the patient as documented above.     1734: IV was inserted and blood was drawn for laboratory testing, results above.    1805: EKG obtained in the ED, see results above.     2100: Patient provided a stool sample.     2235: I rechecked the patient and discussed the results of her workup thus far.     Findings and plan explained to the patient. Patient discharged with instructions regarding supportive care, medications, and reasons to return. The importance of close follow-up was reviewed. The patient was prescribed Zofran-ODT.     I personally reviewed the laboratory results with the patient and answered all related questions prior to discharge.     Impression & Plan      Medical Decision Making:  Alejandrina Whatley is a 89 year old female who presents for evaluation of nausea, vomiting and diarrhea with a nonfocal abdominal exam.  I considered a broad differential diagnosis for this patient including viral gastroenteritis, bacterial infection of the large intestine (salmonella, shigella, campylobacter, e coli, etc), bowel obstruction, intra-abdominal infection such as colitis, food poisoning, cholecystitis, UTI, pyelonephritis, appendicitis, etc.  There are no signs of worrisome intra-abdominal pathologies detected during the visit today. Suspect gastroenteritis.  Stool culture obtained. She has a completely benign abdominal exam.  Supportive outpatient management is therefore indicated.  No indication for CT at this time.  She passed oral challenge here in the ED. Patient has confusion but is at her baseline. Given her ability to take PO, improvement of tachycardia and normal blood work, I think she can be  discharged. Discussed with Balwinder and we will call with stool results. Return precautions given.     Diagnosis:    ICD-10-CM   1. Gastroenteritis K52.9     Disposition: Patient discharged back to [facility]     Discharge Medications:  New Prescriptions    ONDANSETRON (ZOFRAN ODT) 4 MG ODT TAB    Take 1 tablet (4 mg) by mouth every 8 hours as needed for nausea     Amanda Low  1/28/2020   [facility] EMERGENCY DEPARTMENT    Scribe Disclosure:  I, Amanda Low, am serving as a scribe at 5:33 PM on 1/28/2020 to document services personally performed by Lacy Borden MD based on my observations and the provider's statements to me.        Lacy Borden MD  02/01/20 0418

## 2020-01-28 NOTE — PROGRESS NOTES
[place] [facility]  [place] Medical Record Number:  [id number]  Place of Service where encounter took place:  [facility] ([facility]) [[id number]]  Chief Complaint   Patient presents with     RECHECK       HPI:    Alejandrina Whatley  is a 89 year old (8/22/1923), who is being seen today for an episodic care visit.  HPI information obtained from: facility chart records, facility staff, patient report and [facility] chart review.     Brief Summary of Hospital/TCU Course: Ms. Alejandrina Whatley is a 89 year old female with PMH significant for hypertension, hyperlipidemia and dementia. She was admitted to [facility] from 12/3/19-12/6/19 for weakness, dizziness, and labile blood pressures. 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 Continues on plavix + baby ASA through 12/24 (21 day course), then to start  mg daily. Also started on atorvastatin, coreg and amlodipine for HTN urgency with goal SBP around 140. Was then readmitted to [facility] 12/7-12/9 for labile blood pressures, weakness and dizziness. Discharged to TCU for physical rehabilitation. While at TCU developed UTI  with urine culture showing >100,000 col/ml Klebsiella pneumoniae. Treated with 7 days of cefdinir.               On 1/8/20 when she was instructed by Neurologist to go to ED for subacute CVA on MRI that was completed 1/7/20. She was hospitalized at [facility] from 1/8-1/10. 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 CTA of head and neck showed mild stenosis of MCA. TTE and ziopatch were recommended, but family declined additional work up to limit excessive tests, medications and hospitalizations. They also declined wanting to have further evaluation with outpatient neurology. Recommended to complete ASA 81 mg + Plavix daily x21 days until 1/30/20 and then restart  mg daily indefinitely.  "[facility].     Today's concern is:  Alejandrina seen today for routine follow up visit at TCU. She tells me she isn't feeling well this morning. She reports having loose stool that \"poured out of her\" this AM. Also reported emesis x1. With abdominal discomfort and nausea. Patient denies being around any people with similar symptoms. Discussed with nurse manager and we have had some \"GI viral bug\" going around that caused loose stool in several HD patients. One other patient with loose stools/ nausea over the weekend. She remains afebrile this morning. Otherwise denies SOB, dizziness/lightheadedness, CP. Denies dysuria/ trouble voiding. In the past week -138, oxygen saturations >93% on RA, pulse 71-80.       Past Medical and Surgical History reviewed in Epic today.    MEDICATIONS:  Current Outpatient Medications   Medication Sig Dispense Refill     acetaminophen (TYLENOL) 500 MG tablet Take 1,000 mg by mouth 3 times daily 8am, 2pm, 8pm       amLODIPine (NORVASC) 5 MG tablet Take 1 tablet (5 mg) by mouth daily 30 tablet 0     aspirin (ASA) 81 MG EC tablet Take 1 tablet (81 mg) by mouth daily for 20 days 20 tablet 0     atorvastatin (LIPITOR) 40 MG tablet Take 1 tablet (40 mg) by mouth every evening 30 tablet 0     carvedilol (COREG) 6.25 MG tablet Take 1 tablet (6.25 mg) by mouth 2 times daily (with meals) 60 tablet 0     Cholecalciferol (VITAMIN D3) 50 MCG (2000 UT) TABS Take 2,000 Units by mouth daily       citalopram (CELEXA) 10 MG tablet Take 10 mg by mouth every evening       clopidogrel (PLAVIX) 75 MG tablet Take 1 tablet (75 mg) by mouth daily for 20 days 20 tablet 0     dorzolamide-timolol (COSOPT) 2-0.5 % ophthalmic solution Place 1 drop into both eyes 2 times daily 1 Bottle 11     latanoprost (XALATAN) 0.005 % ophthalmic solution Place 1 drop into both eyes At Bedtime Both Eyes 3 Bottle 4     melatonin 3 MG tablet Take 1 tablet (3 mg) by mouth nightly as " "needed for sleep       Multiple Vitamins-Minerals (CERTAVITE/ANTIOXIDANTS PO) Take 1 tablet by mouth daily       polyethylene glycol (MIRALAX/GLYCOLAX) packet Take 17 g by mouth daily AND DAILY PRN       senna-docusate (SENOKOT-S/PERICOLACE) 8.6-50 MG tablet Take 1 tablet by mouth daily       traMADol (ULTRAM) 50 MG tablet Take 0.5 tablets (25 mg) by mouth At Bedtime 20 tablet 0     [START ON 1/31/2020] aspirin (ASA) 325 MG tablet Take 1 tablet (325 mg) by mouth daily (Patient not taking: Reported on 1/14/2020) 30 tablet 0     REVIEW OF SYSTEMS:  4 point ROS including Respiratory, CV, GI and , other than that noted in the HPI,  is negative    Objective:  /86   Pulse 77   Temp 98.8  F (37.1  C)   Resp 18   Ht 1.473 m (4' 10\")   Wt 58.4 kg (128 lb 12.8 oz)   SpO2 95%   BMI 26.92 kg/m    Exam:  GENERAL APPEARANCE:  Alert, pleasant, elderly female lying in bed on exam  HEENT: normocephalic, conjunctivae, lids, pupils and irises normal, dry mucous membranes, nose without drainage or crusting, hearing acuity: intact  RESP:  respiratory effort normal, no respiratory distress, Lung sounds clear, patient is on RA  CV: auscultation of heart done, rate and rhythm regular. no murmur, no rub or gallop.  ABDOMEN: + bowel sounds, soft, nontender, no grimacing or guarding with palpation.  M/S:   Gait and station: uses walker for ambulation; no tenderness or swelling of the joints; able to move all extremities - with left sided weakness  SKIN:  Inspection and palpation of skin and subcutaneous tissue: skin warm, dry without rashes  NEURO cranial nerves 2-12 grossly intact and at patient's baseline: no facial asymmetry, no speech deficits and able to follow directions  PSYCH: affect and mood normal      Labs:   Labs done in SNF are in [place] EPIC. Please refer to them using EPIC/Care Everywhere. and Recent labs in EPIC reviewed by me today.     ASSESSMENT/PLAN:  (R19.5) Loose stools  (R11.2) Nausea with " vomiting  Comment: acute, started this AM- with concern this may be norovirus  Plan: Nursing to send stool sample for norovirus. Encourage fluids. Hold miralax and senna with loose stools. Update provider with concerns or change in status.     (I69.354) Hemiparesis affecting left side as late effect of cerebrovascular accident (CVA) (H)   (I63.9) CVA (H)  Comment: acute on chronic with left sided weakness  -initially with right putamen CVA d/t small vessel occlusion diagnosed 12/4. Treated with 21 day course of baby ASA + plavix at TCU. Also started on atorvastatin at that time due to .  - most recently with subacute CVA in left cerebellar and right MCA territory  Plan: Continue 21 day course of baby ASA + plavix through 1/30/20, then restart  mg daily. Continue atorvastatin. Therapy as below. Family declined JAYASHREE and ziopatch to look for embolic sources with request to limit excessive tests, medications and hospitalizations. They also declined Neurology follow up outpatient which was recommended in 4-8 weeks.      (I10) Benign essential hypertension  Comment: acute on chronic, BP reviewed and is meeting goal   -goal SBP around 140  -PTA atenolol discontinued during initial hospitalization  Plan: Continue amlodipine, coreg. Vs per TCU policy. Monitor.      (F03.90) Dementia without behavioral disturbance, unspecified dementia type (H)  Comment: Acute, CPT 4.2/5.6 indicating 24 hour supervision, SLUMS 14/30  Plan: Nursing to continue to provide supportive cares. Plan is for move to LTC when finished with therapy in TCU.      (M51.26) History of Lumbar herniated disc  Comment: chronic, patient denies pain, no reports of pain documented per nursing and therapy  -tramadol was restarted at insistence of daughter at last visit (1/14). It was initially stopped on 12/17 with concerns for delirium and MD during hospitalization also reinforced side effects of medication to daughter. I also previously had  discussed side effects and how tramadol works with daughter at TCU prior to recent hospitalization, as patient has had no complaints of pain at TCU and daughter had thought this would prevent further herniation to her mother's back. However, goals of care are ultimately for patient comfort.  Plan: Continue tramadol 25 mg po at bedtime. Continue scheduled tylenol. Discontinue tramadol if increased confusion or falls. Monitor.     (F32.9) Depression, unspecified depression type  Comment: chronic, baseline  Plan: Continue PTA celexa. Monitor.      (R53.81) Physical deconditioning  Comment: Acute, secondary to recent hospitalization, medical conditions as above- continues to progress with therapy.  Plan: Encourage participation in physical therapy/occupational therapy for strengthening and deconditioning. Patient planning to move to LTC when therapy is complete     (G47.00) Insomnia  Comment: acute, sleeping well  -melatonin started at TCU  Plan: Melatonin 3 mg at bedtime for insomnia. Monitor.      (N39.0) UTI-resolved  Comment: Acute, resolved   - UC grew >100,000 col/ml Klebsiella pneumoniae  Plan: Completed course of Cefdinir 300 mg po BID x 7 days. Monitor.         Electronically signed by:  CRESCENCIO Narayanan CNP

## 2020-01-28 NOTE — ED NOTES
Bed: ED25  Expected date:   Expected time:   Means of arrival:   Comments:  Isaac [age] yo F N/V/D

## 2020-01-28 NOTE — ED AVS SNAPSHOT
[facility] Emergency Department  [address]  [facility] [address]  Phone:  [phone]  Fax:  [phone]                                    Alejandrina Whatley   MRN: [id number]    Department:  [facility] Emergency Department   Date of Visit:  1/28/2020           After Visit Summary Signature Page    I have received my discharge instructions, and my questions have been answered. I have discussed any challenges I see with this plan with the nurse or doctor.    ..........................................................................................................................................  Patient/Patient Representative Signature      ..........................................................................................................................................  Patient Representative Print Name and Relationship to Patient    ..................................................               ................................................  Date                                   Time    ..........................................................................................................................................  Reviewed by Signature/Title    ...................................................              ..............................................  Date                                               Time          22EPIC Rev 08/18

## 2020-01-28 NOTE — ED TRIAGE NOTES
"Pt brought in by ambulance, she began having nausea/vomiting/diarrhea that began today. Not currently nauseated.    Currently resides at [facility] in [place] following a CVA this past December.    ABC's intact, alert and oriented to self and place, also to events. States \"I am forgetful\".  "

## 2020-01-28 NOTE — LETTER
1/28/2020        RE: Alejandrina Whatley  [address]  [place] [address]        [place] [facility]  [place] Medical Record Number:  [id number]  Place of Service where encounter took place:  [facility] [[id number]]  Chief Complaint   Patient presents with     RECHECK       HPI:    Alejandrina Whatley  is a 89 year old (8/22/1923), who is being seen today for an episodic care visit.  HPI information obtained from: facility chart records, facility staff, patient report and [facility] chart review.     Brief Summary of Hospital/TCU Course: Ms. Alejandrina Whatley is a 89 year old female with PMH significant for hypertension, hyperlipidemia and dementia. She was admitted to [facility] from 12/3/19-12/6/19 for weakness, dizziness, and labile blood pressures. 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 Continues on plavix + baby ASA through 12/24 (21 day course), then to start  mg daily. Also started on atorvastatin, coreg and amlodipine for HTN urgency with goal SBP around 140. Was then readmitted to [facility] 12/7-12/9 for labile blood pressures, weakness and dizziness. Discharged to TCU for physical rehabilitation. While at TCU developed UTI  with urine culture showing >100,000 col/ml Klebsiella pneumoniae. Treated with 7 days of cefdinir.               On 1/8/20 when she was instructed by Neurologist to go to ED for subacute CVA on MRI that was completed 1/7/20. She was hospitalized at [facility] from 1/8-1/10. 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 CTA of head and neck showed mild stenosis of MCA. TTE and ziopatch were recommended, but family declined additional work up to limit excessive tests, medications and hospitalizations. They also declined wanting to have further evaluation with outpatient neurology. Recommended to complete ASA 81 mg +  "Plavix daily x21 days until 1/30/20 and then restart  mg daily indefinitely. Discharged back to [facility] TCU for ongoing rehabilitation.     Today's concern is:  Alejandrina seen today for routine follow up visit at TCU. She tells me she isn't feeling well this morning. She reports having loose stool that \"poured out of her\" this AM. Also reported emesis x1. With abdominal discomfort and nausea. Patient denies being around any people with similar symptoms. Discussed with nurse manager and we have had some \"GI viral bug\" going around that caused loose stool in several HD patients. One other patient with loose stools/ nausea over the weekend. She remains afebrile this morning. Otherwise denies SOB, dizziness/lightheadedness, CP. Denies dysuria/ trouble voiding. In the past week -138, oxygen saturations >93% on RA, pulse 71-80.       Past Medical and Surgical History reviewed in Epic today.    MEDICATIONS:  Current Outpatient Medications   Medication Sig Dispense Refill     acetaminophen (TYLENOL) 500 MG tablet Take 1,000 mg by mouth 3 times daily 8am, 2pm, 8pm       amLODIPine (NORVASC) 5 MG tablet Take 1 tablet (5 mg) by mouth daily 30 tablet 0     aspirin (ASA) 81 MG EC tablet Take 1 tablet (81 mg) by mouth daily for 20 days 20 tablet 0     atorvastatin (LIPITOR) 40 MG tablet Take 1 tablet (40 mg) by mouth every evening 30 tablet 0     carvedilol (COREG) 6.25 MG tablet Take 1 tablet (6.25 mg) by mouth 2 times daily (with meals) 60 tablet 0     Cholecalciferol (VITAMIN D3) 50 MCG (2000 UT) TABS Take 2,000 Units by mouth daily       citalopram (CELEXA) 10 MG tablet Take 10 mg by mouth every evening       clopidogrel (PLAVIX) 75 MG tablet Take 1 tablet (75 mg) by mouth daily for 20 days 20 tablet 0     dorzolamide-timolol (COSOPT) 2-0.5 % ophthalmic solution Place 1 drop into both eyes 2 times daily 1 Bottle 11     latanoprost (XALATAN) 0.005 % ophthalmic solution Place 1 drop into both eyes At Bedtime Both " "Eyes 3 Bottle 4     melatonin 3 MG tablet Take 1 tablet (3 mg) by mouth nightly as needed for sleep       Multiple Vitamins-Minerals (CERTAVITE/ANTIOXIDANTS PO) Take 1 tablet by mouth daily       polyethylene glycol (MIRALAX/GLYCOLAX) packet Take 17 g by mouth daily AND DAILY PRN       senna-docusate (SENOKOT-S/PERICOLACE) 8.6-50 MG tablet Take 1 tablet by mouth daily       traMADol (ULTRAM) 50 MG tablet Take 0.5 tablets (25 mg) by mouth At Bedtime 20 tablet 0     [START ON 1/31/2020] aspirin (ASA) 325 MG tablet Take 1 tablet (325 mg) by mouth daily (Patient not taking: Reported on 1/14/2020) 30 tablet 0     REVIEW OF SYSTEMS:  4 point ROS including Respiratory, CV, GI and , other than that noted in the HPI,  is negative    Objective:  /86   Pulse 77   Temp 98.8  F (37.1  C)   Resp 18   Ht 1.473 m (4' 10\")   Wt 58.4 kg (128 lb 12.8 oz)   SpO2 95%   BMI 26.92 kg/m     Exam:  GENERAL APPEARANCE:  Alert, pleasant, elderly female lying in bed on exam  HEENT: normocephalic, conjunctivae, lids, pupils and irises normal, dry mucous membranes, nose without drainage or crusting, hearing acuity: intact  RESP:  respiratory effort normal, no respiratory distress, Lung sounds clear, patient is on RA  CV: auscultation of heart done, rate and rhythm regular. no murmur, no rub or gallop.  ABDOMEN: + bowel sounds, soft, nontender, no grimacing or guarding with palpation.  M/S:   Gait and station: uses walker for ambulation; no tenderness or swelling of the joints; able to move all extremities - with left sided weakness  SKIN:  Inspection and palpation of skin and subcutaneous tissue: skin warm, dry without rashes  NEURO cranial nerves 2-12 grossly intact and at patient's baseline: no facial asymmetry, no speech deficits and able to follow directions  PSYCH: affect and mood normal      Labs:   Labs done in SNF are in [place] EPIC. Please refer to them using EPIC/Care Everywhere. and Recent labs in EPIC reviewed by me " today.     ASSESSMENT/PLAN:  (R19.5) Loose stools  (R11.2) Nausea with vomiting  Comment: acute, started this AM- with concern this may be norovirus  Plan: Nursing to send stool sample for norovirus. Encourage fluids. Hold miralax and senna with loose stools. Update provider with concerns or change in status.     (I69.354) Hemiparesis affecting left side as late effect of cerebrovascular accident (CVA) (H)   (I63.9) CVA (H)  Comment: acute on chronic with left sided weakness  -initially with right putamen CVA d/t small vessel occlusion diagnosed 12/4. Treated with 21 day course of baby ASA + plavix at TCU. Also started on atorvastatin at that time due to .  - most recently with subacute CVA in left cerebellar and right MCA territory  Plan: Continue 21 day course of baby ASA + plavix through 1/30/20, then restart  mg daily. Continue atorvastatin. Therapy as below. Family declined JAYASHREE and ziopatch to look for embolic sources with request to limit excessive tests, medications and hospitalizations. They also declined Neurology follow up outpatient which was recommended in 4-8 weeks.      (I10) Benign essential hypertension  Comment: acute on chronic, BP reviewed and is meeting goal   -goal SBP around 140  -PTA atenolol discontinued during initial hospitalization  Plan: Continue amlodipine, coreg. Vs per TCU policy. Monitor.      (F03.90) Dementia without behavioral disturbance, unspecified dementia type (H)  Comment: Acute, CPT 4.2/5.6 indicating 24 hour supervision, SLUMS 14/30  Plan: Nursing to continue to provide supportive cares. Plan is for move to LTC when finished with therapy in TCU.      (M51.26) History of Lumbar herniated disc  Comment: chronic, patient denies pain, no reports of pain documented per nursing and therapy  -tramadol was restarted at insistence of daughter at last visit (1/14). It was initially stopped on 12/17 with concerns for delirium and MD during hospitalization also reinforced  side effects of medication to daughter. I also previously had discussed side effects and how tramadol works with daughter at TCU prior to recent hospitalization, as patient has had no complaints of pain at TCU and daughter had thought this would prevent further herniation to her mother's back. However, goals of care are ultimately for patient comfort.  Plan: Continue tramadol 25 mg po at bedtime. Continue scheduled tylenol. Discontinue tramadol if increased confusion or falls. Monitor.     (F32.9) Depression, unspecified depression type  Comment: chronic, baseline  Plan: Continue PTA celexa. Monitor.      (R53.81) Physical deconditioning  Comment: Acute, secondary to recent hospitalization, medical conditions as above- continues to progress with therapy.  Plan: Encourage participation in physical therapy/occupational therapy for strengthening and deconditioning. Patient planning to move to LTC when therapy is complete     (G47.00) Insomnia  Comment: acute, sleeping well  -melatonin started at TCU  Plan: Melatonin 3 mg at bedtime for insomnia. Monitor.      (N39.0) UTI-resolved  Comment: Acute, resolved   - UC grew >100,000 col/ml Klebsiella pneumoniae  Plan: Completed course of Cefdinir 300 mg po BID x 7 days. Monitor.         Electronically signed by:  CRESCENCIO Nraayanan CNP          Sincerely,        CRESCENCIO Narayanan CNP

## 2020-01-29 ENCOUNTER — TELEPHONE (OUTPATIENT)
Dept: EMERGENCY MEDICINE | Facility: CLINIC | Age: 85
End: 2020-01-29

## 2020-01-29 LAB
C COLI+JEJUNI+LARI FUSA STL QL NAA+PROBE: NOT DETECTED
EC STX1 GENE STL QL NAA+PROBE: NOT DETECTED
EC STX2 GENE STL QL NAA+PROBE: NOT DETECTED
ENTERIC PATHOGEN COMMENT: ABNORMAL
ENTERIC PATHOGEN COMMENT: ABNORMAL
INTERPRETATION ECG - MUSE: NORMAL
NOROV GI+II ORF1-ORF2 JNC STL QL NAA+PR: ABNORMAL
NOROV GI+II ORF1-ORF2 JNC STL QL NAA+PR: ABNORMAL
RVA NSP5 STL QL NAA+PROBE: NOT DETECTED
SALMONELLA SP RPOD STL QL NAA+PROBE: NOT DETECTED
SHIGELLA SP+EIEC IPAH STL QL NAA+PROBE: NOT DETECTED
V CHOL+PARA RFBL+TRKH+TNAA STL QL NAA+PR: NOT DETECTED
Y ENTERO RECN STL QL NAA+PROBE: NOT DETECTED

## 2020-01-29 NOTE — DISCHARGE INSTRUCTIONS
Discharge Instructions  Vomiting    You have been seen today for vomiting (throwing up). This is usually caused by a virus, but some bacteria, parasites, medicines or other medical conditions can cause similar symptoms. At this time your provider does not find that your vomiting is a sign of anything dangerous or life-threatening. However, sometimes the signs of serious illness do not show up right away. If you have new or worse symptoms, you may need to be seen again in the Emergency Department or by your primary provider. Remember that serious problems like appendicitis can start as vomiting.    Generally, every Emergency Department visit should have a follow-up clinic visit with either a primary or a specialty clinic/provider. Please follow-up as instructed by your emergency provider today.    Return to the Emergency Department if:  You keep vomiting and you are not able to keep liquids down.   You feel you are getting dehydrated, such as being very thirsty, not urinating (peeing) at least every 8-12 hours, or feeling faint or lightheaded.   You develop a new fever, or your fever continues for more than 2 days.   You have abdominal (belly pain) that seems worse than cramps, is in one spot, or is getting worse over time. Appendicitis usually causes pain in the right lower abdomen (to the right and below your belly button) so watch for pain in this location.  You have blood in your vomit or stools.   You feel very weak.  You are not starting to improve within 24 hours of your visit here.     What can I do to help myself?  The most important thing to do is to drink clear liquids. If you have been vomiting a lot, it is best to have only small, frequent sips of liquids. Drinking too much at once may cause more vomiting. If you are vomiting often, you must replace minerals, sodium and potassium lost with your illness. Pedialyte  is the best available rehydration liquid but some find that it doesn t taste good so sports  drinks are an alterative. You can also drink clear liquids such as water, weak tea, apple juice, and 7-Up . Avoid acid liquids (orange), caffeine (coffee) or alcohol. Do not drink milk until you no longer have diarrhea (loose stools).   After liquids are staying down, you may start eating mild foods. Soda crackers, toast, plain noodles, gelatin, applesauce and bananas are good first choices. Avoid foods that have acid, are spicy, fatty or have a lot of fiber (such as meats, coarse grains, vegetables). You may start eating these foods again in about 3 days when you are better.   Sometimes treatment includes prescription medicine to prevent nausea (sick to your stomach) and vomiting. If your provider prescribes these for you, take them as directed.   Do not take ibuprofen, naproxen, or other nonsteroidal anti-inflammatory (NSAID) medicines without checking with your healthcare provider.     If you were given a prescription for medicine here today, be sure to read all of the information (including the package insert) that comes with your prescription.  This will include important information about the medicine, its side effects, and any warnings that you need to know about.  The pharmacist who fills the prescription can provide more information and answer questions you may have about the medicine.  If you have questions or concerns that the pharmacist cannot address, please call or return to the Emergency Department.     Remember that you can always come back to the Emergency Department if you are not able to see your regular provider in the amount of time listed above, if you get any new symptoms, or if there is anything that worries you.     Discharge Instructions  Adult Diarrhea    You have been seen today for diarrhea (loose stools). This is usually caused by a virus, but some bacteria, parasites, medicines, or other medical conditions can cause similar symptoms. At this time your provider does not find that your  diarrhea is a sign of anything dangerous or life-threatening. However, sometimes the signs of serious illness do not show up right away. If you have new or worse symptoms, you may need to be seen again in the Emergency Department or by your primary provider.     Generally, every Emergency Department visit should have a follow-up clinic visit with either a primary or a specialty clinic/provider. Please follow-up as instructed by your emergency provider today.    Return to the Emergency Department if:  You feel you are getting dehydrated, such as being very thirsty, not urinating (peeing) like usual, or feeling faint or lightheaded.   You develop a new fever.  You have abdominal (belly) pain that seems worse than cramps, is in one spot, or is getting worse over time.   You have blood in your stool or your stool becomes black.  (Remember that if you take Pepto-Bismol , this will turn your stool black).   You feel very weak.    What can I do to help myself?  The most important thing to do is to drink clear liquids.   It is best to have only small, frequent sips of liquids. Drinking too much at once may cause more diarrhea. You should also replace minerals, sodium and potassium lost with diarrhea. Pedialyte  and sports drinks can help you replace these minerals. You can also drink clear liquids such as water, weak tea, apple juice, and 7-Up . Avoid acidic liquids (orange juice), caffeine (coffee) or alcohol. Milk products will make the diarrhea worse.  Eat bland (plain) foods. Soda crackers, toast, plain noodles, gelatin, applesauce and bananas are good first choices. Avoid foods that have acid, are spicy, fatty or fibrous (such as meats, coarse grains, vegetables). You may start eating these foods again in about 3 days when you are better.   Sometimes treatment includes prescription medicine to prevent diarrhea. If your provider prescribes these for you, take them as directed.   Nonprescription medicine is available for  "the treatment of diarrhea and can be very effective. If you use it, make sure you use the dose recommended on the package. Check with your healthcare provider before you use any medicine for diarrhea.   Do not take ibuprofen, or other nonsteroidal anti-inflammatory medicines, without checking with your healthcare provider.   Probiotics: If you have been given an antibiotic, you may want to also take a probiotic pill or eat yogurt with live cultures. Probiotics have \"good bacteria\" to help your intestines stay healthy. Studies have shown that probiotics help prevent diarrhea and other intestine problems (including C. diff infection) when you take antibiotics. You can buy these without a prescription in the pharmacy section of the store.   If you were given a prescription for medicine here today, be sure to read all of the information (including the package insert) that comes with your prescription.  This will include important information about the medicine, its side effects, and any warnings that you need to know about.  The pharmacist who fills the prescription can provide more information and answer questions you may have about the medicine.  If you have questions or concerns that the pharmacist cannot address, please call or return to the Emergency Department.  Remember that you can always come back to the Emergency Department if you are not able to see your regular provider in the amount of time listed above, if you get any new symptoms, or if there is anything that worries you.   "

## 2020-01-29 NOTE — TELEPHONE ENCOUNTER
[facility] Emergency Department Lab result notification     Patient/parent Name  Tawnya LOWE    Reason for call  Nurse requesting lab result    Lab Result  Final Enteric Bacteria and Virus Pa angelica by LILIBETH Stool is POSITIVE for Norovirus I and II by LILIBETH  Patient to be notified, symptom's assessed and advised per [place] ED lab result protocol.    C-diff is negative    Current symptoms  10:01AM: Spoke with patient's nurse  Recommendations/Instructions  Results verbally given and Final result faxed to [facility] in [place] attcaterina Bowling RN at fax [phone].      PCP follow-up Questions asked: YES       [RN Name]  Aide King RN  [facility] RN  Lung Nodule and ED Lab Result RN  Epic pool (ED late result f/u RN): [id number]  [facility] INCIDENTAL RADIOLOGY F/U NURSES: P [id number]  # [phone]      Copy of Lab result   Clostridium difficile toxin B PCR [[id number]] (Order [id number])   Order Requisition     Clostridium difficile toxin B PCR (Order #[id number]) on 1/28/20   Exam Information     Exam Date Exam Time Accession # Results    1/28/20  9:00 PM [id number]    Specimen Information: Feces        Component Value Flag Ref Range Units Status Collected Lab   Specimen Description Feces     Final 01/28/2020  9:00 PM  Lab   C Diff Toxin B PCR Negative   NEG^Negative  Final 01/28/2020  9:00 PM 51   Comment:   Negative: C. difficile target DNA sequences NOT detected, presumed negative   for C.difficile toxin B or the number of bacteria present may be below the   limit of detection for the test.   FDA approved assay performed using [employer] GeneXpert real-time PCR.   A negative result does not exclude actual disease due to Clostridium difficile    and may be due to improper collection, handling and storage of the specimen   or the number of organisms in the specimen is below the detection limit of the    assay.      Enteric Bacteria and Virus Panel by LILIBETH Stool  [[id number]] (Order [id number])   Order Requisition     Enteric Bacteria and Virus Panel by LILIBETH Stool (Order #[id number]) on 1/28/20   Exam Information     Exam Date Exam Time Accession # Results    1/28/20  9:00 PM [id number]    Specimen Information: Feces        Component Value Flag Ref Range Units Status Collected Lab   Campylobacter group by LILIBETH Not Detected   NDET^Not Detected  Final 01/28/2020  9:00    Salmonella species by LILIBETH Not Detected   NDET^Not Detected  Final 01/28/2020  9:00    Shigella species by LILIBETH Not Detected   NDET^Not Detected  Final 01/28/2020  9:00    Vibrio group by LILIBETH Not Detected   NDET^Not Detected  Final 01/28/2020  9:00    Rotavirus A by LILIBETH Not Detected   NDET^Not Detected  Final 01/28/2020  9:00    Shiga toxin 1 gene by LILIBETH Not Detected   NDET^Not Detected  Final 01/28/2020  9:00    Shiga toxin 2 gene by LILIBETH Not Detected   NDET^Not Detected  Final 01/28/2020  9:00    Norovirus I and II by LILIBETH Abnormal    NDET^Not Detected  Final 01/28/2020  9:00    Detected, Abnormal Result    Yersinia enterocolitica by LILIBETH Not Detected   NDET^Not Detected  Final 01/28/2020  9:00    Enteric pathogen comment     Final 01/28/2020  9:00    Testing performed by multiplexed, qualitative PCR using the [employer] Enteric   Pathogens Nucleic Acid Test. Results should not be used as the sole basis for diagnosis,   treatment, or other patient management decisions.    Comment:   Positive results do not rule out co-infection with other organisms that are   not detected by this test, and may not be the sole or definitive cause of   patient illness.   Negative results in the setting of clinical illness compatible with   gastroenteritis may be due to infection by pathogens that are not detected by   this test or non-infectious causes such as ulcerative colitis, irritable bowel    syndrome, or Crohn's disease.   Note: Shiga toxin producing E.  coli (STEC) typically harbor one or both genes   that encode for Shiga toxins 1 and 2.

## 2020-01-30 ENCOUNTER — NURSING HOME VISIT (OUTPATIENT)
Dept: GERIATRICS | Facility: CLINIC | Age: 85
End: 2020-01-30
Payer: COMMERCIAL

## 2020-01-30 VITALS
DIASTOLIC BLOOD PRESSURE: 73 MMHG | TEMPERATURE: 99.6 F | SYSTOLIC BLOOD PRESSURE: 134 MMHG | OXYGEN SATURATION: 96 % | BODY MASS INDEX: 27.04 KG/M2 | RESPIRATION RATE: 18 BRPM | HEART RATE: 74 BPM | HEIGHT: 58 IN | WEIGHT: 128.8 LBS

## 2020-01-30 DIAGNOSIS — I10 ESSENTIAL HYPERTENSION: ICD-10-CM

## 2020-01-30 DIAGNOSIS — A08.11 NOROVIRUS: Primary | ICD-10-CM

## 2020-01-30 PROCEDURE — 99309 SBSQ NF CARE MODERATE MDM 30: CPT | Performed by: NURSE PRACTITIONER

## 2020-01-30 ASSESSMENT — MIFFLIN-ST. JEOR: SCORE: 863.98

## 2020-01-30 NOTE — PROGRESS NOTES
[facility]  [place] Medical Record Number:  [id number]  Place of Service where encounter took place:  [facility] ([facility]) [[id number]]  Chief Complaint   Patient presents with     Nursing Home Acute       HPI:    Alejandrina Whatley  is a 89 year old (8/22/1923), who is being seen today for an episodic care visit.  HPI information obtained from: facility chart records, facility staff, patient report and [facility] chart review.     Brief Summary of Hospital/TCU Course: Ms. Alejandrina Whatley is a 89 year old female with PMH significant for hypertension, hyperlipidemia and dementia. She was admitted to [facility] from 12/3/19-12/6/19 for weakness, dizziness, and labile blood pressures. 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 Continues on plavix + baby ASA through 12/24 (21 day course), then to start  mg daily. Also started on atorvastatin, coreg and amlodipine for HTN urgency with goal SBP around 140. Was then readmitted to [facility] 12/7-12/9 for labile blood pressures, weakness and dizziness. Discharged to TCU for physical rehabilitation. While at TCU developed UTI  with urine culture showing >100,000 col/ml Klebsiella pneumoniae. Treated with 7 days of cefdinir.               On 1/8/20 when she was instructed by Neurologist to go to ED for subacute CVA on MRI that was completed 1/7/20. She was hospitalized at [facility] from 1/8-1/10. 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 CTA of head and neck showed mild stenosis of MCA. TTE and ziopatch were recommended, but family declined additional work up to limit excessive tests, medications and hospitalizations. They also declined wanting to have further evaluation with outpatient neurology. Recommended to complete ASA 81 mg + Plavix daily x21 days until 1/30/20 and then restart  mg daily  indefinitely. Discharged back to [facility] for ongoing rehabilitation.      Today's concern is:  Alejandrina was seen today for episodic follow up at TCU. With loose stools and nausea that started on 1/28 and were persistent that day. Was sent to ED and received 2L of IV fluids and zofran. Stool culture positive for norovirus. Nursing tells me she had one loose stool still this morning. No recent emesis. With tmax 100.3 on 1/29. No further temps. She is eating and drinking and able to keep fluids down. HR tachycardic yesterday, but today improved to 79. -143 over past few days- no hypotensive BPs.       Past Medical and Surgical History reviewed in Epic today.    MEDICATIONS:  Current Outpatient Medications   Medication Sig Dispense Refill     acetaminophen (TYLENOL) 500 MG tablet Take 1,000 mg by mouth 3 times daily 8am, 2pm, 8pm       amLODIPine (NORVASC) 5 MG tablet Take 1 tablet (5 mg) by mouth daily 30 tablet 0     aspirin (ASA) 81 MG EC tablet Take 1 tablet (81 mg) by mouth daily for 20 days 20 tablet 0     atorvastatin (LIPITOR) 40 MG tablet Take 1 tablet (40 mg) by mouth every evening 30 tablet 0     carvedilol (COREG) 6.25 MG tablet Take 1 tablet (6.25 mg) by mouth 2 times daily (with meals) 60 tablet 0     Cholecalciferol (VITAMIN D3) 50 MCG (2000 UT) TABS Take 2,000 Units by mouth daily       citalopram (CELEXA) 10 MG tablet Take 10 mg by mouth every evening       clopidogrel (PLAVIX) 75 MG tablet Take 1 tablet (75 mg) by mouth daily for 20 days 20 tablet 0     dorzolamide-timolol (COSOPT) 2-0.5 % ophthalmic solution Place 1 drop into both eyes 2 times daily 1 Bottle 11     latanoprost (XALATAN) 0.005 % ophthalmic solution Place 1 drop into both eyes At Bedtime Both Eyes 3 Bottle 4     melatonin 3 MG tablet Take 1 tablet (3 mg) by mouth nightly as needed for sleep       Multiple Vitamins-Minerals (CERTAVITE/ANTIOXIDANTS PO) Take 1 tablet by mouth daily       ondansetron (ZOFRAN ODT) 4 MG ODT tab Take  "1 tablet (4 mg) by mouth every 8 hours as needed for nausea 10 tablet 0     polyethylene glycol (MIRALAX/GLYCOLAX) packet Take 8.5 g by mouth daily AND DAILY PRN - hold with loose stools       senna-docusate (SENOKOT-S/PERICOLACE) 8.6-50 MG tablet Take 1 tablet by mouth daily       traMADol (ULTRAM) 50 MG tablet Take 0.5 tablets (25 mg) by mouth At Bedtime 20 tablet 0     [START ON 1/31/2020] aspirin (ASA) 325 MG tablet Take 1 tablet (325 mg) by mouth daily (Patient not taking: Reported on 1/14/2020) 30 tablet 0     REVIEW OF SYSTEMS:  4 point ROS including Respiratory, CV, GI and , other than that noted in the HPI,  is negative    Objective:  /73   Pulse 74   Temp 99.6  F (37.6  C)   Resp 18   Ht 1.473 m (4' 10\")   Wt 58.4 kg (128 lb 12.8 oz)   SpO2 96%   BMI 26.92 kg/m    Exam:  GENERAL APPEARANCE:  Alert, pleasant, elderly female lying in bed on exam  HEENT: normocephalic, conjunctivae, lids, pupils and irises normal, dry mucous membranes, nose without drainage or crusting, hearing acuity: intact  RESP:  respiratory effort normal, no respiratory distress, Lung sounds clear, patient is on RA  CV: auscultation of heart done, rate and rhythm regular. no murmur, no rub or gallop.  ABDOMEN: hyperactive bowel sounds, soft, nontender, no grimacing or guarding with palpation.  PSYCH: affect and mood normal    Labs:   [facility] in SNF [facility]. Please refer to them using EPIC/Care Everywhere. and Recent labs in [facility] reviewed by me today.     ASSESSMENT/PLAN:  (A08.11) Norovirus  (primary encounter diagnosis)  Comment: acute, symptoms are subsiding, although still with loose stools this AM- no tachycardia, hypotension- is tolerating oral intake (and received IV fluids in the ED)  Plan: Continue to encourage po intake/ hydration- discussed with nurse and patient today. Zofran PRN for nausea. Contact precautions. Update provider if symptoms not improving.     (I10) Benign essential " hypertension  Comment: acute on chronic, BP reviewed and is meeting goal - no signs of hypotension with norovirus  -goal SBP around 140  -PTA atenolol discontinued during initial hospitalization  Plan: Continue amlodipine, coreg. Vs per TCU policy. Monitor.     Electronically signed by:  CRESCENCIO Narayanan CNP

## 2020-01-30 NOTE — LETTER
1/30/2020        RE: Alejandrina Whatley  [address] [facility] [address]        [place] [facility]  [place] Medical Record Number:  [id number]  Place of Service where encounter took place:  [facility] ([facility]) [[id number]]  Chief Complaint   Patient presents with     Nursing Home Acute       HPI:    Alejandrina Whatley  is a 89 year old (8/22/1923), who is being seen today for an episodic care visit.  HPI information obtained from: facility chart records, facility staff, patient report and [facility] chart review.     Brief Summary of Hospital/TCU Course: Ms. Alejandrina Whatley is a 89 year old female with PMH significant for hypertension, hyperlipidemia and dementia. She was admitted to [facility] from 12/3/19-12/6/19 for weakness, dizziness, and labile blood pressures. 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 Continues on plavix + baby ASA through 12/24 (21 day course), then to start  mg daily. Also started on atorvastatin, coreg and amlodipine for HTN urgency with goal SBP around 140. Was then readmitted to [facility] 12/7-12/9 for labile blood pressures, weakness and dizziness. Discharged to TCU for physical rehabilitation. While at TCU developed UTI  with urine culture showing >100,000 col/ml Klebsiella pneumoniae. Treated with 7 days of cefdinir.               On 1/8/20 when she was instructed by Neurologist to go to ED for subacute CVA on MRI that was completed 1/7/20. She was hospitalized at [facility] from 1/8-1/10. 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 CTA of head and neck showed mild stenosis of MCA. TTE and ziopatch were recommended, but family declined additional work up to limit excessive tests, medications and hospitalizations. They also declined wanting to have further evaluation with outpatient neurology. Recommended to complete  ASA 81 mg + Plavix daily x21 days until 1/30/20 and then restart  mg daily indefinitely. [facility]      Today's concern is:  Alejandrina was seen today for episodic follow up at TCU. With loose stools and nausea that started on 1/28 and were persistent that day. Was sent to ED and received 2L of IV fluids and zofran. Stool culture positive for norovirus. Nursing tells me she had one loose stool still this morning. No recent emesis. With tmax 100.3 on 1/29. No further temps. She is eating and drinking and able to keep fluids down. HR tachycardic yesterday, but today improved to 79. -143 over past few days- no hypotensive BPs.       Past Medical and Surgical History reviewed in Epic today.    MEDICATIONS:  Current Outpatient Medications   Medication Sig Dispense Refill     acetaminophen (TYLENOL) 500 MG tablet Take 1,000 mg by mouth 3 times daily 8am, 2pm, 8pm       amLODIPine (NORVASC) 5 MG tablet Take 1 tablet (5 mg) by mouth daily 30 tablet 0     aspirin (ASA) 81 MG EC tablet Take 1 tablet (81 mg) by mouth daily for 20 days 20 tablet 0     atorvastatin (LIPITOR) 40 MG tablet Take 1 tablet (40 mg) by mouth every evening 30 tablet 0     carvedilol (COREG) 6.25 MG tablet Take 1 tablet (6.25 mg) by mouth 2 times daily (with meals) 60 tablet 0     Cholecalciferol (VITAMIN D3) 50 MCG (2000 UT) TABS Take 2,000 Units by mouth daily       citalopram (CELEXA) 10 MG tablet Take 10 mg by mouth every evening       clopidogrel (PLAVIX) 75 MG tablet Take 1 tablet (75 mg) by mouth daily for 20 days 20 tablet 0     dorzolamide-timolol (COSOPT) 2-0.5 % ophthalmic solution Place 1 drop into both eyes 2 times daily 1 Bottle 11     latanoprost (XALATAN) 0.005 % ophthalmic solution Place 1 drop into both eyes At Bedtime Both Eyes 3 Bottle 4     melatonin 3 MG tablet Take 1 tablet (3 mg) by mouth nightly as needed for sleep       Multiple Vitamins-Minerals (CERTAVITE/ANTIOXIDANTS  "PO) Take 1 tablet by mouth daily       ondansetron (ZOFRAN ODT) 4 MG ODT tab Take 1 tablet (4 mg) by mouth every 8 hours as needed for nausea 10 tablet 0     polyethylene glycol (MIRALAX/GLYCOLAX) packet Take 8.5 g by mouth daily AND DAILY PRN - hold with loose stools       senna-docusate (SENOKOT-S/PERICOLACE) 8.6-50 MG tablet Take 1 tablet by mouth daily       traMADol (ULTRAM) 50 MG tablet Take 0.5 tablets (25 mg) by mouth At Bedtime 20 tablet 0     [START ON 1/31/2020] aspirin (ASA) 325 MG tablet Take 1 tablet (325 mg) by mouth daily (Patient not taking: Reported on 1/14/2020) 30 tablet 0     REVIEW OF SYSTEMS:  4 point ROS including Respiratory, CV, GI and , other than that noted in the HPI,  is negative    Objective:  /73   Pulse 74   Temp 99.6  F (37.6  C)   Resp 18   Ht 1.473 m (4' 10\")   Wt 58.4 kg (128 lb 12.8 oz)   SpO2 96%   BMI 26.92 kg/m     Exam:  GENERAL APPEARANCE:  Alert, pleasant, elderly female lying in bed on exam  HEENT: normocephalic, conjunctivae, lids, pupils and irises normal, dry mucous membranes, nose without drainage or crusting, hearing acuity: intact  RESP:  respiratory effort normal, no respiratory distress, Lung sounds clear, patient is on RA  CV: auscultation of heart done, rate and rhythm regular. no murmur, no rub or gallop.  ABDOMEN: hyperactive bowel sounds, soft, nontender, no grimacing or guarding with palpation.  PSYCH: affect and mood normal    Labs:   Labs done in SNF are in [facility]. Please refer to them using EPIC/Care Everywhere. and Recent labs in EPIC reviewed by me today.     ASSESSMENT/PLAN:  (A08.11) Norovirus  (primary encounter diagnosis)  Comment: acute, symptoms are subsiding, although still with loose stools this AM- no tachycardia, hypotension- is tolerating oral intake (and received IV fluids in the ED)  Plan: Continue to encourage po intake/ hydration- discussed with nurse and patient today. Zofran PRN for nausea. Contact precautions. " Update provider if symptoms not improving.     (I10) Benign essential hypertension  Comment: acute on chronic, BP reviewed and is meeting goal - no signs of hypotension with norovirus  -goal SBP around 140  -PTA atenolol discontinued during initial hospitalization  Plan: Continue amlodipine, coreg. Vs per TCU policy. Monitor.     Electronically signed by:  CRESCENCIO Narayanan CNP             Sincerely,        CRESCENCIO Narayanan CNP

## 2020-02-04 ENCOUNTER — NURSING HOME VISIT (OUTPATIENT)
Dept: GERIATRICS | Facility: CLINIC | Age: 85
End: 2020-02-04
Payer: COMMERCIAL

## 2020-02-04 VITALS
DIASTOLIC BLOOD PRESSURE: 76 MMHG | WEIGHT: 128.8 LBS | HEIGHT: 58 IN | RESPIRATION RATE: 18 BRPM | TEMPERATURE: 98.3 F | SYSTOLIC BLOOD PRESSURE: 121 MMHG | HEART RATE: 76 BPM | BODY MASS INDEX: 27.04 KG/M2 | OXYGEN SATURATION: 95 %

## 2020-02-04 DIAGNOSIS — A08.11 NOROVIRUS: Primary | ICD-10-CM

## 2020-02-04 DIAGNOSIS — D72.829 LEUKOCYTOSIS, UNSPECIFIED TYPE: ICD-10-CM

## 2020-02-04 DIAGNOSIS — I10 ESSENTIAL HYPERTENSION: ICD-10-CM

## 2020-02-04 DIAGNOSIS — R53.81 PHYSICAL DECONDITIONING: ICD-10-CM

## 2020-02-04 PROCEDURE — 99309 SBSQ NF CARE MODERATE MDM 30: CPT | Performed by: NURSE PRACTITIONER

## 2020-02-04 ASSESSMENT — MIFFLIN-ST. JEOR: SCORE: 863.98

## 2020-02-04 NOTE — LETTER
2/4/2020        RE: Alejandrina Whatley  [address]  [facility] [address]        [place] [facility]  [place] Medical Record Number:  [id number]  Place of Service where encounter took place:  [facility] ([facility]) [[id number]]  Chief Complaint   Patient presents with     RECHECK       HPI:    Alejandrina Whatley  is a 89 year old (8/22/1923), who is being seen today for an episodic care visit.  HPI information obtained from: facility chart records, facility staff, patient report and [facility] chart review.     Brief Summary of Hospital/TCU Course: Ms. Alejandrina Whatley is a 89 year old female with PMH significant for hypertension, hyperlipidemia and dementia. She was admitted to [facility] from 12/3/19-12/6/19 for weakness, dizziness, and labile blood pressures. 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 Continues on plavix + baby ASA through 12/24 (21 day course), then to start  mg daily. Also started on atorvastatin, coreg and amlodipine for HTN urgency with goal SBP around 140. Was then readmitted to [facility] 12/7-12/9 for labile blood pressures, weakness and dizziness. Discharged to TCU for physical rehabilitation. While at TCU developed UTI  with urine culture showing >100,000 col/ml Klebsiella pneumoniae. Treated with 7 days of cefdinir.               On 1/8/20 when she was instructed by Neurologist to go to ED for subacute CVA on MRI that was completed 1/7/20. She was hospitalized at [facility] from 1/8-1/10. 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 CTA of head and neck showed mild stenosis of MCA. TTE and ziopatch were recommended, but family declined additional work up to limit excessive tests, medications and hospitalizations. They also declined wanting to have further evaluation with outpatient neurology. Recommended to complete ASA 81 mg +  "Plavix daily x21 days until 1/30/20 and then restart  mg daily indefinitely. Discharged back to [facility] for ongoing rehabilitation.    While at TCU developed loose stools, nausea and vomiting and was seen in ED on 1/28 and found to have norovirus. She received IV fluids and zofran. Patient completed therapy 1/27.    Today's concern is:  Alejandrina was seen today for routine follow up at TCU. Patient had 2 loose stool 2 days ago, overall stools are much less frequent. No further emesis. Reports her stomach is still a little \"off.\" Also reports feeling very tired and weak today. Nursing reported cough recently. Patient tells me she is not concerned about cough- it is very infrequent. Declines wanting cough medicine. Denies SOB, CP, dizziness/ lightheadedness, dysuria/ trouble voiding. VS over past week with -145, HR 70-83, oxygen saturations >92%, afebrile. Patient completed therapy 1/27 and is awaiting LTC bed.     Past Medical and Surgical History reviewed in Epic today.    MEDICATIONS:  Current Outpatient Medications   Medication Sig Dispense Refill     acetaminophen (TYLENOL) 500 MG tablet Take 1,000 mg by mouth 3 times daily 8am, 2pm, 8pm       amLODIPine (NORVASC) 5 MG tablet Take 1 tablet (5 mg) by mouth daily 30 tablet 0     aspirin (ASA) 325 MG tablet Take 1 tablet (325 mg) by mouth daily 30 tablet 0     atorvastatin (LIPITOR) 40 MG tablet Take 1 tablet (40 mg) by mouth every evening 30 tablet 0     carvedilol (COREG) 6.25 MG tablet Take 1 tablet (6.25 mg) by mouth 2 times daily (with meals) 60 tablet 0     Cholecalciferol (VITAMIN D3) 50 MCG (2000 UT) TABS Take 2,000 Units by mouth daily       citalopram (CELEXA) 10 MG tablet Take 10 mg by mouth every evening       dorzolamide-timolol (COSOPT) 2-0.5 % ophthalmic solution Place 1 drop into both eyes 2 times daily 1 Bottle 11     latanoprost (XALATAN) 0.005 % ophthalmic solution Place 1 drop into both eyes At Bedtime Both Eyes 3 Bottle 4     " "melatonin 3 MG tablet Take 1 tablet (3 mg) by mouth nightly as needed for sleep       Multiple Vitamins-Minerals (CERTAVITE/ANTIOXIDANTS PO) Take 1 tablet by mouth daily       ondansetron (ZOFRAN ODT) 4 MG ODT tab Take 1 tablet (4 mg) by mouth every 8 hours as needed for nausea 10 tablet 0     polyethylene glycol (MIRALAX/GLYCOLAX) packet Take 8.5 g by mouth daily AND DAILY PRN - hold with loose stools       senna-docusate (SENOKOT-S/PERICOLACE) 8.6-50 MG tablet Take 1 tablet by mouth daily       traMADol (ULTRAM) 50 MG tablet Take 0.5 tablets (25 mg) by mouth At Bedtime 20 tablet 0       REVIEW OF SYSTEMS:  4 point ROS including Respiratory, CV, GI and , other than that noted in the HPI,  is negative    Objective:  /76   Pulse 76   Temp 98.3  F (36.8  C)   Resp 18   Ht 1.473 m (4' 10\")   Wt 58.4 kg (128 lb 12.8 oz)   SpO2 95%   BMI 26.92 kg/m     Exam:  GENERAL APPEARANCE:  Alert, pleasant, elderly female lying in bed on exam  HEENT: normocephalic, conjunctivae, lids, pupils and irises normal, dry mucous membranes, nose without drainage or crusting, hearing acuity: intact  RESP:  respiratory effort normal, no respiratory distress, Lung sounds clear, patient is on RA  CV: auscultation of heart done, rate and rhythm regular. no murmur, no rub or gallop.  ABDOMEN: hyperactive bowel sounds, soft, nontender, no grimacing or guarding with palpation.  PSYCH: affect and mood normal    Labs:   Labs done in SNF are in [facility]. Please refer to them using [employer]/Care Everywhere. and Recent labs in EPIC reviewed by me today.     ASSESSMENT/PLAN:  (A08.11) Norovirus  (primary encounter diagnosis)  Comment: acute, symptoms are subsiding, - no tachycardia, hypotension- is tolerating oral intake   Plan: Continue to encourage po intake/ hydration- discussed with nurse and patient today. Zofran PRN for nausea. Contact precautions per facility policy. CBC, BMP 2/5.    (D72.829) Leukocytosis  Comment: Acute, suspect " secondary to norovirus- is afebrile and has cough- but no other symptoms  Plan: CBC 2/5. If WBC remains elevated consider other source of infection.     (I10) Benign essential hypertension  Comment: acute on chronic, BP reviewed and is meeting goal - no signs of hypotension with norovirus  -goal SBP around 140  -PTA atenolol discontinued during initial hospitalization  Plan: Continue amlodipine, coreg. Vs per TCU policy. Monitor.      (R53.81) Physical deconditioning  Comment: Acute, secondary to recent hospitalization, medical conditions- completed therapy 1/27  Plan: Patient  awaiting bed in LTC.  to assist with discharge planning.     Electronically signed by:  CRESCENCIO Narayanan CNP           Sincerely,        CRESCENCIO Narayanan CNP

## 2020-02-04 NOTE — PROGRESS NOTES
[place] [facility]  [place] Medical Record Number:  [id number]  Place of Service where encounter took place:  [facility] ([facility]) [[id number]]  Chief Complaint   Patient presents with     RECHECK       HPI:    Alejandrina Whatley  is a 89 year old (8/22/1923), who is being seen today for an episodic care visit.  HPI information obtained from: facility chart records, facility staff, patient report and [facility] chart review.     Brief Summary of Hospital/TCU Course: Ms. Alejandrina Whatley is a 89 year old female with PMH significant for hypertension, hyperlipidemia and dementia. She was admitted to [facility] from 12/3/19-12/6/19 for weakness, dizziness, and labile blood pressures. 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 Continues on plavix + baby ASA through 12/24 (21 day course), then to start  mg daily. Also started on atorvastatin, coreg and amlodipine for HTN urgency with goal SBP around 140. Was then readmitted to [facility] 12/7-12/9 for labile blood pressures, weakness and dizziness. Discharged to TCU for physical rehabilitation. While at TCU developed UTI  with urine culture showing >100,000 col/ml Klebsiella pneumoniae. Treated with 7 days of cefdinir.               On 1/8/20 when she was instructed by Neurologist to go to ED for subacute CVA on MRI that was completed 1/7/20. She was hospitalized at [facility] from 1/8-1/10. 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 CTA of head and neck showed mild stenosis of MCA. TTE and ziopatch were recommended, but family declined additional work up to limit excessive tests, medications and hospitalizations. They also declined wanting to have further evaluation with outpatient neurology. Recommended to complete ASA 81 mg + Plavix daily x21 days until 1/30/20 and then restart  mg daily indefinitely.  [facility]    While at U developed loose stools, nausea and vomiting and was seen in ED on 1/28 and found to have norovirus. She received IV fluids and zofran. Patient completed therapy 1/27.    Today's concern is:  Alejandrina was seen today for routine follow up at TCU. Patient had 2 loose stool 2 days ago, overall stools are much less frequent. No further emesis. Reports her stomach is still a little \"off.\" Also reports feeling very tired and weak today. Nursing reported cough recently. Patient tells me she is not concerned about cough- it is very infrequent. Declines wanting cough medicine. Denies SOB, CP, dizziness/ lightheadedness, dysuria/ trouble voiding. VS over past week with -145, HR 70-83, oxygen saturations >92%, afebrile. Patient completed therapy 1/27 and is awaiting LTC bed.     Past Medical and Surgical History reviewed in Epic today.    MEDICATIONS:  Current Outpatient Medications   Medication Sig Dispense Refill     acetaminophen (TYLENOL) 500 MG tablet Take 1,000 mg by mouth 3 times daily 8am, 2pm, 8pm       amLODIPine (NORVASC) 5 MG tablet Take 1 tablet (5 mg) by mouth daily 30 tablet 0     aspirin (ASA) 325 MG tablet Take 1 tablet (325 mg) by mouth daily 30 tablet 0     atorvastatin (LIPITOR) 40 MG tablet Take 1 tablet (40 mg) by mouth every evening 30 tablet 0     carvedilol (COREG) 6.25 MG tablet Take 1 tablet (6.25 mg) by mouth 2 times daily (with meals) 60 tablet 0     Cholecalciferol (VITAMIN D3) 50 MCG (2000 UT) TABS Take 2,000 Units by mouth daily       citalopram (CELEXA) 10 MG tablet Take 10 mg by mouth every evening       dorzolamide-timolol (COSOPT) 2-0.5 % ophthalmic solution Place 1 drop into both eyes 2 times daily 1 Bottle 11     latanoprost (XALATAN) 0.005 % ophthalmic solution Place 1 drop into both eyes At Bedtime Both Eyes 3 Bottle 4     melatonin 3 MG tablet Take 1 tablet (3 mg) by mouth nightly as needed for sleep       " "Multiple Vitamins-Minerals (CERTAVITE/ANTIOXIDANTS PO) Take 1 tablet by mouth daily       ondansetron (ZOFRAN ODT) 4 MG ODT tab Take 1 tablet (4 mg) by mouth every 8 hours as needed for nausea 10 tablet 0     polyethylene glycol (MIRALAX/GLYCOLAX) packet Take 8.5 g by mouth daily AND DAILY PRN - hold with loose stools       senna-docusate (SENOKOT-S/PERICOLACE) 8.6-50 MG tablet Take 1 tablet by mouth daily       traMADol (ULTRAM) 50 MG tablet Take 0.5 tablets (25 mg) by mouth At Bedtime 20 tablet 0       REVIEW OF SYSTEMS:  4 point ROS including Respiratory, CV, GI and , other than that noted in the HPI,  is negative    Objective:  /76   Pulse 76   Temp 98.3  F (36.8  C)   Resp 18   Ht 1.473 m (4' 10\")   Wt 58.4 kg (128 lb 12.8 oz)   SpO2 95%   BMI 26.92 kg/m    Exam:  GENERAL APPEARANCE:  Alert, pleasant, elderly female lying in bed on exam  HEENT: normocephalic, conjunctivae, lids, pupils and irises normal, dry mucous membranes, nose without drainage or crusting, hearing acuity: intact  RESP:  respiratory effort normal, no respiratory distress, Lung sounds clear, patient is on RA  CV: auscultation of heart done, rate and rhythm regular. no murmur, no rub or gallop.  ABDOMEN: hyperactive bowel sounds, soft, nontender, no grimacing or guarding with palpation.  PSYCH: affect and mood normal    Labs:   Labs done in SNF are in [facility]. Please refer to them using EPIC/Care Everywhere. and Recent labs in [facility] reviewed by me today.     ASSESSMENT/PLAN:  (A08.11) Norovirus  (primary encounter diagnosis)  Comment: acute, symptoms are subsiding, - no tachycardia, hypotension- is tolerating oral intake   Plan: Continue to encourage po intake/ hydration- discussed with nurse and patient today. Zofran PRN for nausea. Contact precautions per facility policy. CBC, BMP 2/5.    (D72.829) Leukocytosis  Comment: Acute, suspect secondary to norovirus- is afebrile and has cough- but no other symptoms  Plan: CBC " 2/5. If WBC remains elevated consider other source of infection.     (I10) Benign essential hypertension  Comment: acute on chronic, BP reviewed and is meeting goal - no signs of hypotension with norovirus  -goal SBP around 140  -PTA atenolol discontinued during initial hospitalization  Plan: Continue amlodipine, coreg. Vs per TCU policy. Monitor.      (R53.81) Physical deconditioning  Comment: Acute, secondary to recent hospitalization, medical conditions- completed therapy 1/27  Plan: Patient awaiting bed in LTC.  to assist with discharge planning.     Electronically signed by:  CRESCENCIO Narayanan CNP

## 2020-02-05 ENCOUNTER — TRANSFERRED RECORDS (OUTPATIENT)
Dept: HEALTH INFORMATION MANAGEMENT | Facility: CLINIC | Age: 85
End: 2020-02-05

## 2020-02-05 ENCOUNTER — RECORDS - HEALTHEAST (OUTPATIENT)
Dept: LAB | Facility: CLINIC | Age: 85
End: 2020-02-05

## 2020-02-05 LAB
ANION GAP SERPL CALCULATED.3IONS-SCNC: 8 MMOL/L (ref 5–18)
ANION GAP SERPL CALCULATED.3IONS-SCNC: 8 MMOL/L (ref 5–18)
BUN SERPL-MCNC: 19 MG/DL (ref 8–28)
BUN SERPL-MCNC: 19 MG/DL (ref 8–28)
CALCIUM SERPL-MCNC: 8.4 MG/DL (ref 8.5–10.5)
CALCIUM SERPL-MCNC: 8.4 MG/DL (ref 8.5–10.5)
CHLORIDE BLD-SCNC: 106 MMOL/L (ref 98–107)
CHLORIDE SERPLBLD-SCNC: 106 MMOL/L (ref 98–107)
CO2 SERPL-SCNC: 24 MMOL/L (ref 22–31)
CO2 SERPL-SCNC: 24 MMOL/L (ref 22–31)
CREAT SERPL-MCNC: 0.78 MG/DL (ref 0.6–1.1)
CREAT SERPL-MCNC: 0.78 MG/DL (ref 0.6–1.1)
ERYTHROCYTE [DISTWIDTH] IN BLOOD BY AUTOMATED COUNT: 13.3 % (ref 11–14.5)
ERYTHROCYTE [DISTWIDTH] IN BLOOD BY AUTOMATED COUNT: 13.3 % (ref 11–14.5)
GFR SERPL CREATININE-BSD FRML MDRD: >60 ML/MIN/1.73M2
GFR SERPL CREATININE-BSD FRML MDRD: >60 ML/MIN/1.73M2
GLUCOSE BLD-MCNC: 88 MG/DL (ref 70–125)
GLUCOSE SERPL-MCNC: 88 MG/DL (ref 70–125)
HCT VFR BLD AUTO: 34.7 % (ref 35–47)
HCT VFR BLD AUTO: 34.7 % (ref 35–47)
HEMOGLOBIN: 11.1 G/DL (ref 12–16)
HGB BLD-MCNC: 11.1 G/DL (ref 12–16)
MCH RBC QN AUTO: 33.8 PG (ref 27–34)
MCH RBC QN AUTO: 33.8 PG (ref 27–34)
MCHC RBC AUTO-ENTMCNC: 32 G/DL (ref 32–36)
MCHC RBC AUTO-ENTMCNC: 32 G/DL (ref 32–36)
MCV RBC AUTO: 106 FL (ref 80–100)
MCV RBC AUTO: 106 FL (ref 80–100)
PLATELET # BLD AUTO: 197 THOU/UL (ref 140–440)
PLATELET # BLD AUTO: 197 THOU/UL (ref 140–440)
PMV BLD AUTO: 10.8 FL (ref 8.5–12.5)
POTASSIUM BLD-SCNC: 4.3 MMOL/L (ref 3.5–5)
POTASSIUM SERPL-SCNC: 4.3 MMOL/L (ref 3.5–5)
RBC # BLD AUTO: 3.28 MILL/UL (ref 3.8–5.4)
RBC # BLD AUTO: 3.28 MILL/UL (ref 3.8–5.4)
SODIUM SERPL-SCNC: 138 MMOL/L (ref 136–145)
SODIUM SERPL-SCNC: 138 MMOL/L (ref 136–145)
WBC # BLD AUTO: 9.2 THOU/UL (ref 4–11)
WBC: 9.2 THOU/UL (ref 4–11)

## 2020-02-23 ENCOUNTER — RECORDS - HEALTHEAST (OUTPATIENT)
Dept: LAB | Facility: CLINIC | Age: 85
End: 2020-02-23

## 2020-02-23 ENCOUNTER — TRANSFERRED RECORDS (OUTPATIENT)
Dept: HEALTH INFORMATION MANAGEMENT | Facility: CLINIC | Age: 85
End: 2020-02-23

## 2020-02-23 LAB
ALBUMIN UR-MCNC: ABNORMAL MG/DL
APPEARANCE UR: ABNORMAL
BACTERIA #/AREA URNS HPF: ABNORMAL HPF
BILIRUB UR QL STRIP: NEGATIVE
COLOR UR AUTO: YELLOW
GLUCOSE UR STRIP-MCNC: NEGATIVE MG/DL
HGB UR QL STRIP: ABNORMAL
KETONES UR STRIP-MCNC: NEGATIVE MG/DL
LEUKOCYTE ESTERASE UR QL STRIP: ABNORMAL
NITRATE UR QL: POSITIVE
PH UR STRIP: 7 [PH] (ref 4.5–8)
RBC #/AREA URNS AUTO: ABNORMAL HPF
SP GR UR STRIP: 1.01 (ref 1–1.03)
SQUAMOUS #/AREA URNS AUTO: ABNORMAL LPF
UROBILINOGEN UR STRIP-ACNC: ABNORMAL
WBC #/AREA URNS AUTO: ABNORMAL HPF
WBC CLUMPS #/AREA URNS HPF: PRESENT /[HPF]

## 2020-02-24 ENCOUNTER — TELEPHONE (OUTPATIENT)
Dept: GERIATRICS | Facility: CLINIC | Age: 85
End: 2020-02-24

## 2020-02-24 DIAGNOSIS — N30.01 ACUTE CYSTITIS WITH HEMATURIA: Primary | ICD-10-CM

## 2020-02-24 RX ORDER — AMOXICILLIN AND CLAVULANATE POTASSIUM 500; 125 MG/1; MG/1
1 TABLET, FILM COATED ORAL 2 TIMES DAILY
Qty: 14 TABLET | Refills: 0
Start: 2020-02-24 | End: 2020-05-01

## 2020-02-24 NOTE — TELEPHONE ENCOUNTER
Ua back - cloudy small blood. + nitrate large leuk.   Acute dysuria and bladder pain started yesterday.   Acute cystitis with hematuria  Given acute symptpoms - will start aBX now - noted cefdinir in Jan. - allergy to macrobid.   Will start below with orders to kamila when UC and S back.   - amoxicillin-clavulanate (AUGMENTIN) 500-125 MG tablet; Take 1 tablet by mouth 2 times daily

## 2020-02-25 ENCOUNTER — TELEPHONE (OUTPATIENT)
Dept: GERIATRICS | Facility: CLINIC | Age: 85
End: 2020-02-25

## 2020-02-25 LAB — BACTERIA SPEC CULT: ABNORMAL

## 2020-02-25 NOTE — TELEPHONE ENCOUNTER
UCx back showing >100,000 E.coli with sensitivities to Augmentin which she is currently on. Per notes still having symptoms. Discussion with staff is she has had a tough transition from 1st TCU floor to 3rd LTC floor and staff are going to talk with her daughter on ways to improve transition. Frequently wants incontinent brief changed even if dry; has dementia. Daughter closely involved in her care. She was resting comfortably this AM. I spoke with her nurse who was aware of results as noted above. She reports that Alejandrina has c/o increased urinary frequency as a main complaint but no current dysuria or flank pain. Will complete Augmentin course as ordered.    Yulissa Garcia, DO

## 2020-02-27 ENCOUNTER — TELEPHONE (OUTPATIENT)
Dept: GERIATRICS | Facility: CLINIC | Age: 85
End: 2020-02-27

## 2020-02-27 DIAGNOSIS — R52 PAIN: Primary | ICD-10-CM

## 2020-02-27 RX ORDER — TRAMADOL HYDROCHLORIDE 50 MG/1
25 TABLET ORAL AT BEDTIME
Qty: 20 TABLET | Refills: 0 | Status: SHIPPED | OUTPATIENT
Start: 2020-02-27 | End: 2020-03-25

## 2020-03-02 ENCOUNTER — NURSING HOME VISIT (OUTPATIENT)
Dept: GERIATRICS | Facility: CLINIC | Age: 85
End: 2020-03-02
Payer: COMMERCIAL

## 2020-03-02 VITALS
DIASTOLIC BLOOD PRESSURE: 80 MMHG | OXYGEN SATURATION: 96 % | HEIGHT: 58 IN | RESPIRATION RATE: 14 BRPM | HEART RATE: 73 BPM | TEMPERATURE: 97.8 F | SYSTOLIC BLOOD PRESSURE: 142 MMHG | BODY MASS INDEX: 27.16 KG/M2 | WEIGHT: 129.4 LBS

## 2020-03-02 DIAGNOSIS — F32.A DEPRESSION, UNSPECIFIED DEPRESSION TYPE: ICD-10-CM

## 2020-03-02 DIAGNOSIS — N18.30 CHRONIC KIDNEY DISEASE, STAGE 3 (MODERATE): ICD-10-CM

## 2020-03-02 DIAGNOSIS — I69.354 HEMIPARESIS AFFECTING LEFT SIDE AS LATE EFFECT OF CEREBROVASCULAR ACCIDENT (CVA) (H): ICD-10-CM

## 2020-03-02 DIAGNOSIS — M51.26 LUMBAR HERNIATED DISC: ICD-10-CM

## 2020-03-02 DIAGNOSIS — I10 ESSENTIAL HYPERTENSION: Primary | ICD-10-CM

## 2020-03-02 DIAGNOSIS — G47.00 INSOMNIA, UNSPECIFIED TYPE: ICD-10-CM

## 2020-03-02 DIAGNOSIS — M15.0 PRIMARY OSTEOARTHRITIS INVOLVING MULTIPLE JOINTS: ICD-10-CM

## 2020-03-02 DIAGNOSIS — F03.90 DEMENTIA WITHOUT BEHAVIORAL DISTURBANCE, UNSPECIFIED DEMENTIA TYPE: ICD-10-CM

## 2020-03-02 DIAGNOSIS — R31.9 URINARY TRACT INFECTION WITH HEMATURIA, SITE UNSPECIFIED: ICD-10-CM

## 2020-03-02 DIAGNOSIS — N39.0 URINARY TRACT INFECTION WITH HEMATURIA, SITE UNSPECIFIED: ICD-10-CM

## 2020-03-02 PROCEDURE — 99309 SBSQ NF CARE MODERATE MDM 30: CPT | Performed by: NURSE PRACTITIONER

## 2020-03-02 ASSESSMENT — MIFFLIN-ST. JEOR: SCORE: 866.7

## 2020-03-02 NOTE — PROGRESS NOTES
"[place]  Chief Complaint   Patient presents with     FCI Regulatory     [place] Medical Record Number:  [id number]  Place of Service where encounter took place:  [facility] ([facility]) [[id number]]    HPI:    Alejandrina Whatley  is 89 year old (8/22/1923), who is being seen today for a federally mandated E/M visit.  HPI information obtained from: facility chart records, facility staff, patient report, [facility] chart review and family/first contact daughter, Pat report. Today's concerns are:    1. Essential hypertension    2. Chronic kidney disease, stage 3 (moderate) (H)    3. Hemiparesis affecting left side as late effect of cerebrovascular accident (CVA) (H)    4. Dementia without behavioral disturbance, unspecified dementia type (H)    5. Depression, unspecified depression type    6. Insomnia, unspecified type    7. Lumbar herniated disc    8. Primary osteoarthritis involving multiple joints    9. Urinary tract infection with hematuria, site unspecified      Patient is a 89 year old female with PMH significant for HTN, HLD, dementia, CKD, CVA (December 2019) with left sided weakness, and repeat CVA January 2020,  Depression, and herniated disc of spine.  She recently moved to LTC from TCU setting as unsafe to return to prior residence after her CVAs.      Patient is seen today in her room, resting in bed.  She is finishing her last day of antibiotics for UTI.  She denies any further symptoms of dysuria, abdominal pain.  She reports sleeping well at night, denies chest pain, shortness of breath or dizziness, headaches, parathesias.  She is bothered that she has left sided weakness after her CVA and tells me \"I never thought I'd live to be crippled up like this\".  She reports adjusting well to LTC, denies sadness or thoughts to harm herself or others.  Patient denies pain     Her daughter, Pat, is present at visit.  We discussed that Tramadol should be " "discontinued given patient's age and history of CVA.  Also, pt is denying pain and staff report patient has denied pain.  Daughter insists this med not be stopped and tells me \"she is not having pain because she is taking the medication, you are not about to stop that\".  Daughter without concerns today other then she tells me her mom gets frequent UTI's and need to be treated quickly for them     ALLERGIES:Actonel [bisphosphonates]; Conjugated estrogens; Macrobid [nitrofuran derivatives]; Nitrofurantoin; Premarin; Sulfa drugs; Hydrocodone; Oxycodone; and Risedronate  PAST MEDICAL HISTORY:   has a past medical history of Abdominal aortic aneurysm (H) (2001), AK (actinic keratosis) (11/11/2009), Cataract (2/22/2011), Compression fractures, Compression Fractures of Lumbar Vertebra (2/4/2010), DJD (degenerative joint disease), Generalised Weakness and Fatigue (3/3/2011), Glaucoma (increased eye pressure) (2009), HTN (hypertension) (11/11/2009), Hypercholesteremia (2001), Hyperlipidemia LDL goal <100 (10/31/2010), 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 She also has no past medical history of Amblyopia, Complication of anesthesia, Diabetes (H), Diabetic retinopathy (H), Macular degeneration, Malignant hyperthermia, Retinal detachment, Strabismus, or Uveitis.  PAST SURGICAL HISTORY:   has a past surgical history that includes hysterectomy, cervix status unknown (1971); APPENDECTOMY (1971); ANTER VESICOURETHROPEXY,SIMPLE (2008); aaa repair (2/2009); Extracapsular cataract extration with intraocular lens implant (4/2010,5/2010); Laser selective trabeculoplasty (3/2010; 10/2015); cataract iol, rt/lt; and Laser selective trabeculoplasty (12/2017).  FAMILY HISTORY: family history includes Heart Disease (age of onset: 79) in " her father; Hypertension in her mother.  SOCIAL HISTORY:  reports that she has never smoked. She has never used smokeless tobacco. She reports that she does not drink alcohol or use drugs.    MEDICATIONS:  Current Outpatient Medications   Medication Sig Dispense Refill     acetaminophen (TYLENOL) 500 MG tablet Take 1,000 mg by mouth 3 times daily 8am, 2pm, 8pm       amLODIPine (NORVASC) 5 MG tablet Take 1 tablet (5 mg) by mouth daily 30 tablet 0     amoxicillin-clavulanate (AUGMENTIN) 500-125 MG tablet Take 1 tablet by mouth 2 times daily 14 tablet 0     aspirin (ASA) 325 MG tablet Take 1 tablet (325 mg) by mouth daily 30 tablet 0     atorvastatin (LIPITOR) 40 MG tablet Take 1 tablet (40 mg) by mouth every evening 30 tablet 0     carvedilol (COREG) 6.25 MG tablet Take 1 tablet (6.25 mg) by mouth 2 times daily (with meals) 60 tablet 0     Cholecalciferol (VITAMIN D3) 50 MCG (2000 UT) TABS Take 2,000 Units by mouth daily       citalopram (CELEXA) 10 MG tablet Take 10 mg by mouth every evening       dorzolamide-timolol (COSOPT) 2-0.5 % ophthalmic solution Place 1 drop into both eyes 2 times daily 1 Bottle 11     latanoprost (XALATAN) 0.005 % ophthalmic solution Place 1 drop into both eyes At Bedtime Both Eyes 3 Bottle 4     melatonin 3 MG tablet Take 1 tablet (3 mg) by mouth nightly as needed for sleep       Multiple Vitamins-Minerals (CERTAVITE/ANTIOXIDANTS PO) Take 1 tablet by mouth daily       ondansetron (ZOFRAN ODT) 4 MG ODT tab Take 1 tablet (4 mg) by mouth every 8 hours as needed for nausea 10 tablet 0     polyethylene glycol (MIRALAX/GLYCOLAX) packet Take 8.5 g by mouth daily AND DAILY PRN - hold with loose stools       senna-docusate (SENOKOT-S/PERICOLACE) 8.6-50 MG tablet Take 1 tablet by mouth daily       traMADol (ULTRAM) 50 MG tablet Take 0.5 tablets (25 mg) by mouth At Bedtime 20 tablet 0         Case Management:  I have reviewed the care plan and MDS and do agree with the plan. Patient's desire to return  "to the community is not present. Information reviewed:  Medications, vital signs, orders, and nursing notes.    ROS:  10 point ROS of systems including Constitutional, Eyes, Respiratory, Cardiovascular, Gastroenterology, Genitourinary, Integumentary, Musculoskeletal, Psychiatric were all negative except for pertinent positives noted in my HPI.    Vitals:  BP (!) 142/80   Pulse 73   Temp 97.8  F (36.6  C)   Resp 14   Ht 1.473 m (4' 10\")   Wt 58.7 kg (129 lb 6.4 oz)   SpO2 96%   BMI 27.04 kg/m    Body mass index is 27.04 kg/m .  Exam:  GENERAL APPEARANCE:  Alert, in no distress, cooperative  EYES:  EOM, conjunctivae, lids, pupils and irises normal, PERRL  NECK:  No adenopathy,masses or thyromegaly  RESP:  respiratory effort and palpation of chest normal, lungs clear to auscultation , no respiratory distress  CV:  Palpation and auscultation of heart done , rate-normal, no murmur, trace BLLE edema L slightly greater then right, no excess warmth, no erythema patient and daughter report finding baseline post CVA  ABDOMEN:  no guarding or rebound, bowel sounds normal  M/S:   Gait and station abnormal primarily w/c bound, no edema/erythema joints, generalized weakness   SKIN:  no rashes or open areas noted to anterior side  NEURO:   speech clear, no facial asymmetry, PERRL, left sided strenght +4/5, right side 5/5 (baseline) no focal findings   PSYCH:  insight and judgement impaired, memory impaired , affect and mood normal    Lab/Diagnostic data:   Recent labs in [facility] reviewed by me today.     ASSESSMENT/PLAN  (I10) Essential hypertension  (primary encounter diagnosis)  Comment: goal SBP around 140, per review of NH BP's meeting goal  Plan: continue norvasc and coreg  -monitor BP    (N18.3) Chronic kidney disease, stage 3 (moderate) (H)  Comment: per history due to chronic effects htn  Plan: avoid nephrotoxic meds, renally adjust meds  -BMP tomorrow and Q 3 months     (I69.354) Hemiparesis affecting left side as " late effect of cerebrovascular accident (CVA) (H)  Comment: history acute CVA right putamen December 2019 with ongoing left-sided weakness  -completed 21 days of asa and Plavix per neurology  -presented to ED 1/8 after MRI from neurology clinic f/u returned with positive new subacute CVA in left cerebellar and right MCA  -CTA head and neck with mild stenosis or right MCA  -neurology recommended JAYASHREE and ziopatch to look for embolic source: family and patient have declined  Plan: family/patient have declined further f/u with neurology despite recommendation in hospital  -continue statin  -continue asa  -staff update with acute neuro changes     (F03.90) Dementia without behavioral disturbance, unspecified dementia type (H)  Comment: SULMS 14/30  -CPT 4.2/5.6  -now in LTC  Plan: staff assist with cares, dispense meds     (F32.9) Depression, unspecified depression type  Comment: per history, on celexa  -no concerns about mood per patient or daughter  -recently moved to LTC  Plan: continue celexa and staff to update with concerns.  Would not recommend GDR at this time due to recent change and patient tolerating well     (G47.00) Insomnia, unspecified type  Comment: per history, reports no sleep difficulties   Plan: prn melatonin     (M51.26) Lumbar herniated disc  (M15.0) Primary osteoarthritis involving multiple joints  Comment: history chronic pain and herniated disc  -no complaints of pain per patient or staff  -discussed as noted in HPI recommendation to d'c Tramadol today.  Daughter resistant   Plan: continue APA  -recommend that tramadol be d'cd given no reports of pain, patient's age and history of CVA's, family declining recommendations and adamant she continues    (N39.0,  R31.9) Urinary tract infection with hematuria, site unspecified  Comment: per history, per daughter has frequent UTI's  -speaking with TCU provider, patient had frequent c/o dysuria, usually self relieved with pushing fluids  -daughter feels  strongly that UTI's be treated promptly   -completing last day of antibiotics today, asymptomatic   Plan: push fluids, monitor for symptoms     The health plan new enrollment has happened. I have reviewed the  MDS, the preventative needs,  and facility care plan. The level of care is appropriate. I have reviewed the code status/advanced directives.       Electronically signed by:  CRESCENCIO Ghosh CNP

## 2020-03-02 NOTE — LETTER
"    3/2/2020        RE: Alejandrina Whatley  [address]  [place] [address]        [facility]  Chief Complaint   Patient presents with     assisted Regulatory     [place] Medical Record Number:  [id number]  Place of Service where encounter took place:  [facility] ([facility]) [[id number]]    HPI:    Alejandrina Whatley  is 89 year old (8/22/1923), who is being seen today for a federally mandated E/M visit.  HPI information obtained from: facility chart records, facility staff, patient report, [place] [facility] chart review and family/first contact daughter, Pat report. Today's concerns are:    1. Essential hypertension    2. Chronic kidney disease, stage 3 (moderate) (H)    3. Hemiparesis affecting left side as late effect of cerebrovascular accident (CVA) (H)    4. Dementia without behavioral disturbance, unspecified dementia type (H)    5. Depression, unspecified depression type    6. Insomnia, unspecified type    7. Lumbar herniated disc    8. Primary osteoarthritis involving multiple joints    9. Urinary tract infection with hematuria, site unspecified      Patient is a 89 year old female with PMH significant for HTN, HLD, dementia, CKD, CVA (December 2019) with left sided weakness, and repeat CVA January 2020,  Depression, and herniated disc of spine.  She recently moved to LTC from TCU setting as unsafe to return to prior residence after her CVAs.      Patient is seen today in her room, resting in bed.  She is finishing her last day of antibiotics for UTI.  She denies any further symptoms of dysuria, abdominal pain.  She reports sleeping well at night, denies chest pain, shortness of breath or dizziness, headaches, parathesias.  She is bothered that she has left sided weakness after her CVA and tells me \"I never thought I'd live to be crippled up like this\".  She reports adjusting well to LTC, denies sadness or thoughts to harm herself or others.  Patient denies pain " "    Her daughter, Pat, is present at visit.  We discussed that Tramadol should be discontinued given patient's age and history of CVA.  Also, pt is denying pain and staff report patient has denied pain.  Daughter insists this med not be stopped and tells me \"she is not having pain because she is taking the medication, you are not about to stop that\".  Daughter without concerns today other then she tells me her mom gets frequent UTI's and need to be treated quickly for them     ALLERGIES:Actonel [bisphosphonates]; Conjugated estrogens; Macrobid [nitrofuran derivatives]; Nitrofurantoin; Premarin; Sulfa drugs; Hydrocodone; Oxycodone; and Risedronate  PAST MEDICAL HISTORY:   has a past medical history of Abdominal aortic aneurysm (H) (2001), AK (actinic keratosis) (11/11/2009), Cataract (2/22/2011), Compression fractures, Compression Fractures of Lumbar Vertebra (2/4/2010), DJD (degenerative joint disease), Generalised Weakness and Fatigue (3/3/2011), Glaucoma (increased eye pressure) (2009), HTN (hypertension) (11/11/2009), Hypercholesteremia (2001), Hyperlipidemia LDL goal <100 (10/31/2010), 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 She also has no past medical history of Amblyopia, Complication of anesthesia, Diabetes (H), Diabetic retinopathy (H), Macular degeneration, Malignant hyperthermia, Retinal detachment, Strabismus, or Uveitis.  PAST SURGICAL HISTORY:   has a past surgical history that includes hysterectomy, cervix status unknown (1971); APPENDECTOMY (1971); ANTER VESICOURETHROPEXY,SIMPLE (2008); aaa repair (2/2009); Extracapsular cataract extration with intraocular lens implant (4/2010,5/2010); Laser selective trabeculoplasty (3/2010; 10/2015); cataract iol, rt/lt; and Laser selective trabeculoplasty " (12/2017).  FAMILY HISTORY: family history includes Heart Disease (age of onset: 79) in her father; Hypertension in her mother.  SOCIAL HISTORY:  reports that she has never smoked. She has never used smokeless tobacco. She reports that she does not drink alcohol or use drugs.    MEDICATIONS:  Current Outpatient Medications   Medication Sig Dispense Refill     acetaminophen (TYLENOL) 500 MG tablet Take 1,000 mg by mouth 3 times daily 8am, 2pm, 8pm       amLODIPine (NORVASC) 5 MG tablet Take 1 tablet (5 mg) by mouth daily 30 tablet 0     amoxicillin-clavulanate (AUGMENTIN) 500-125 MG tablet Take 1 tablet by mouth 2 times daily 14 tablet 0     aspirin (ASA) 325 MG tablet Take 1 tablet (325 mg) by mouth daily 30 tablet 0     atorvastatin (LIPITOR) 40 MG tablet Take 1 tablet (40 mg) by mouth every evening 30 tablet 0     carvedilol (COREG) 6.25 MG tablet Take 1 tablet (6.25 mg) by mouth 2 times daily (with meals) 60 tablet 0     Cholecalciferol (VITAMIN D3) 50 MCG (2000 UT) TABS Take 2,000 Units by mouth daily       citalopram (CELEXA) 10 MG tablet Take 10 mg by mouth every evening       dorzolamide-timolol (COSOPT) 2-0.5 % ophthalmic solution Place 1 drop into both eyes 2 times daily 1 Bottle 11     latanoprost (XALATAN) 0.005 % ophthalmic solution Place 1 drop into both eyes At Bedtime Both Eyes 3 Bottle 4     melatonin 3 MG tablet Take 1 tablet (3 mg) by mouth nightly as needed for sleep       Multiple Vitamins-Minerals (CERTAVITE/ANTIOXIDANTS PO) Take 1 tablet by mouth daily       ondansetron (ZOFRAN ODT) 4 MG ODT tab Take 1 tablet (4 mg) by mouth every 8 hours as needed for nausea 10 tablet 0     polyethylene glycol (MIRALAX/GLYCOLAX) packet Take 8.5 g by mouth daily AND DAILY PRN - hold with loose stools       senna-docusate (SENOKOT-S/PERICOLACE) 8.6-50 MG tablet Take 1 tablet by mouth daily       traMADol (ULTRAM) 50 MG tablet Take 0.5 tablets (25 mg) by mouth At Bedtime 20 tablet 0         Case Management:  I  "have reviewed the care plan and MDS and do agree with the plan. Patient's desire to return to the community is not present. Information reviewed:  Medications, vital signs, orders, and nursing notes.    ROS:  10 point ROS of systems including Constitutional, Eyes, Respiratory, Cardiovascular, Gastroenterology, Genitourinary, Integumentary, Musculoskeletal, Psychiatric were all negative except for pertinent positives noted in my HPI.    Vitals:  BP (!) 142/80   Pulse 73   Temp 97.8  F (36.6  C)   Resp 14   Ht 1.473 m (4' 10\")   Wt 58.7 kg (129 lb 6.4 oz)   SpO2 96%   BMI 27.04 kg/m     Body mass index is 27.04 kg/m .  Exam:  GENERAL APPEARANCE:  Alert, in no distress, cooperative  EYES:  EOM, conjunctivae, lids, pupils and irises normal, PERRL  NECK:  No adenopathy,masses or thyromegaly  RESP:  respiratory effort and palpation of chest normal, lungs clear to auscultation , no respiratory distress  CV:  Palpation and auscultation of heart done , rate-normal, no murmur, trace BLLE edema L slightly greater then right, no excess warmth, no erythema patient and daughter report finding baseline post CVA  ABDOMEN:  no guarding or rebound, bowel sounds normal  M/S:   Gait and station abnormal primarily w/c bound, no edema/erythema joints, generalized weakness   SKIN:  no rashes or open areas noted to anterior side  NEURO:   speech clear, no facial asymmetry, PERRL, left sided strenght +4/5, right side 5/5 (baseline) no focal findings   PSYCH:  insight and judgement impaired, memory impaired , affect and mood normal    Lab/Diagnostic data:   Recent labs in [facility] reviewed by me today.     ASSESSMENT/PLAN  (I10) Essential hypertension  (primary encounter diagnosis)  Comment: goal SBP around 140, per review of NH BP's meeting goal  Plan: continue norvasc and coreg  -monitor BP    (N18.3) Chronic kidney disease, stage 3 (moderate) (H)  Comment: per history due to chronic effects htn  Plan: avoid nephrotoxic meds, renally " adjust meds  -BMP tomorrow and Q 3 months     (I69.354) Hemiparesis affecting left side as late effect of cerebrovascular accident (CVA) (H)  Comment: history acute CVA right putamen December 2019 with ongoing left-sided weakness  -completed 21 days of asa and Plavix per neurology  -presented to ED 1/8 after MRI from neurology clinic f/u returned with positive new subacute CVA in left cerebellar and right MCA  -CTA head and neck with mild stenosis or right MCA  -neurology recommended JAYAHSREE and ziopatch to look for embolic source: family and patient have declined  Plan: family/patient have declined further f/u with neurology despite recommendation in hospital  -continue statin  -continue asa  -staff update with acute neuro changes     (F03.90) Dementia without behavioral disturbance, unspecified dementia type (H)  Comment: SULMS 14/30  -CPT 4.2/5.6  -now in LTC  Plan: staff assist with cares, dispense meds     (F32.9) Depression, unspecified depression type  Comment: per history, on celexa  -no concerns about mood per patient or daughter  -recently moved to LTC  Plan: continue celexa and staff to update with concerns.  Would not recommend GDR at this time due to recent change and patient tolerating well     (G47.00) Insomnia, unspecified type  Comment: per history, reports no sleep difficulties   Plan: prn melatonin     (M51.26) Lumbar herniated disc  (M15.0) Primary osteoarthritis involving multiple joints  Comment: history chronic pain and herniated disc  -no complaints of pain per patient or staff  -discussed as noted in HPI recommendation to d'c Tramadol today.  Daughter resistant   Plan: continue APA  -recommend that tramadol be d'cd given no reports of pain, patient's age and history of CVA's, family declining recommendations and adamant she continues    (N39.0,  R31.9) Urinary tract infection with hematuria, site unspecified  Comment: per history, per daughter has frequent UTI's  -speaking with TCU provider,  patient had frequent c/o dysuria, usually self relieved with pushing fluids  -daughter feels strongly that UTI's be treated promptly   -completing last day of antibiotics today, asymptomatic   Plan: push fluids, monitor for symptoms       Electronically signed by:  CRESCENCIO Ghosh CNP      Sincerely,        CRESCENCIO Ghosh CNP

## 2020-03-03 ENCOUNTER — TRANSFERRED RECORDS (OUTPATIENT)
Dept: HEALTH INFORMATION MANAGEMENT | Facility: CLINIC | Age: 85
End: 2020-03-03

## 2020-03-03 ENCOUNTER — RECORDS - HEALTHEAST (OUTPATIENT)
Dept: LAB | Facility: CLINIC | Age: 85
End: 2020-03-03

## 2020-03-03 LAB
ANION GAP SERPL CALCULATED.3IONS-SCNC: 7 MMOL/L (ref 5–18)
ANION GAP SERPL CALCULATED.3IONS-SCNC: 7 MMOL/L (ref 5–18)
BUN SERPL-MCNC: 19 MG/DL (ref 8–28)
BUN SERPL-MCNC: 19 MG/DL (ref 8–28)
CALCIUM SERPL-MCNC: 8.9 MG/DL (ref 8.5–10.5)
CALCIUM SERPL-MCNC: 8.9 MG/DL (ref 8.5–10.5)
CHLORIDE BLD-SCNC: 107 MMOL/L (ref 98–107)
CHLORIDE SERPLBLD-SCNC: 107 MMOL/L (ref 98–107)
CO2 SERPL-SCNC: 27 MMOL/L (ref 22–31)
CO2 SERPL-SCNC: 27 MMOL/L (ref 22–31)
CREAT SERPL-MCNC: 0.77 MG/DL (ref 0.6–1.1)
CREAT SERPL-MCNC: 0.77 MG/DL (ref 0.6–1.1)
ERYTHROCYTE [DISTWIDTH] IN BLOOD BY AUTOMATED COUNT: 14.1 % (ref 11–14.5)
ERYTHROCYTE [DISTWIDTH] IN BLOOD BY AUTOMATED COUNT: 14.1 % (ref 11–14.5)
GFR SERPL CREATININE-BSD FRML MDRD: >60 ML/MIN/1.73M2
GFR SERPL CREATININE-BSD FRML MDRD: >60 ML/MIN/1.73M2
GLUCOSE BLD-MCNC: 97 MG/DL (ref 70–125)
GLUCOSE SERPL-MCNC: 97 MG/DL (ref 70–125)
HCT VFR BLD AUTO: 33.5 % (ref 35–47)
HCT VFR BLD AUTO: 33.5 % (ref 35–47)
HEMOGLOBIN: 10.8 G/DL (ref 12–16)
HGB BLD-MCNC: 10.8 G/DL (ref 12–16)
MCH RBC QN AUTO: 33.9 PG (ref 27–34)
MCH RBC QN AUTO: 33.9 PG (ref 27–34)
MCHC RBC AUTO-ENTMCNC: 32.2 G/DL (ref 32–36)
MCHC RBC AUTO-ENTMCNC: 32.2 G/DL (ref 32–36)
MCV RBC AUTO: 105 FL (ref 80–100)
MCV RBC AUTO: 105 FL (ref 80–100)
PLATELET # BLD AUTO: 267 THOU/UL (ref 140–440)
PLATELET # BLD AUTO: 267 THOU/UL (ref 140–440)
PMV BLD AUTO: 10.2 FL (ref 8.5–12.5)
POTASSIUM BLD-SCNC: 3.9 MMOL/L (ref 3.5–5)
POTASSIUM SERPL-SCNC: 3.9 MMOL/L (ref 3.5–5)
RBC # BLD AUTO: 3.19 MILL/UL (ref 3.8–5.4)
RBC # BLD AUTO: 3.19 MILL/UL (ref 3.8–5.4)
SODIUM SERPL-SCNC: 141 MMOL/L (ref 136–145)
SODIUM SERPL-SCNC: 141 MMOL/L (ref 136–145)
WBC # BLD AUTO: 10.3 THOU/UL (ref 4–11)
WBC: 10.3 THOU/UL (ref 4–11)

## 2020-03-25 DIAGNOSIS — R52 PAIN: ICD-10-CM

## 2020-03-25 RX ORDER — TRAMADOL HYDROCHLORIDE 50 MG/1
25 TABLET ORAL AT BEDTIME
Qty: 20 TABLET | Refills: 0 | Status: SHIPPED | OUTPATIENT
Start: 2020-03-25 | End: 2020-04-20

## 2020-04-01 ENCOUNTER — CLINICAL UPDATE (OUTPATIENT)
Dept: PHARMACY | Facility: CLINIC | Age: 85
End: 2020-04-01
Payer: COMMERCIAL

## 2020-04-01 DIAGNOSIS — I63.9 CEREBROVASCULAR ACCIDENT (CVA), UNSPECIFIED MECHANISM (H): Primary | ICD-10-CM

## 2020-04-01 DIAGNOSIS — M19.90 OSTEOARTHRITIS, UNSPECIFIED OSTEOARTHRITIS TYPE, UNSPECIFIED SITE: ICD-10-CM

## 2020-04-01 DIAGNOSIS — I10 ESSENTIAL HYPERTENSION: ICD-10-CM

## 2020-04-01 DIAGNOSIS — E63.9 NUTRITIONAL DEFICIENCY: ICD-10-CM

## 2020-04-01 PROCEDURE — 99207 ZZC NO CHARGE LOS: CPT | Performed by: PHARMACIST

## 2020-04-01 NOTE — PROGRESS NOTES
This patient's medication list and chart were reviewed as part of the service provided by [facility].    Assessment/Recommendations:  1. (h/o CVA):  Pt on ASA 325mg daily per neurology recommendation.  May be of benefit to consider checking with neurology to see if ok to reduce to 81mg daily due to lower risk of bleed with 81mg dose, similar efficacy.  Appears per most recent Hgb, trending down.  If continues on full strength ASA, appropriate to consider addition of Omeprazole 20mg daily for gi protection, noting risks associated with these as well.  Combo of Citalopram plus full strength ASA inc risk of bleed further.  2. (Supplements):  May consider d'c MVI, especially if pt eating most of meals.    Noted on chart review that NP has recommended d'c Tramadol & has discussed risks, however, daughter resistant.  Appropriate to readdress with daughter as able.    Suki Ureña, Pharm.D.,BCGP  Board Certified Geriatric Pharmacist  Medication Therapy Management Pharmacist  [phone]

## 2020-04-20 DIAGNOSIS — R52 PAIN: ICD-10-CM

## 2020-04-20 RX ORDER — TRAMADOL HYDROCHLORIDE 50 MG/1
25 TABLET ORAL AT BEDTIME
Qty: 20 TABLET | Refills: 0 | Status: SHIPPED | OUTPATIENT
Start: 2020-04-20 | End: 2020-06-04

## 2020-04-30 VITALS
HEART RATE: 77 BPM | DIASTOLIC BLOOD PRESSURE: 68 MMHG | TEMPERATURE: 96.8 F | OXYGEN SATURATION: 95 % | WEIGHT: 130.3 LBS | BODY MASS INDEX: 27.35 KG/M2 | RESPIRATION RATE: 16 BRPM | SYSTOLIC BLOOD PRESSURE: 122 MMHG | HEIGHT: 58 IN

## 2020-04-30 RX ORDER — GUAIFENESIN 200 MG/10ML
200 LIQUID ORAL EVERY 4 HOURS PRN
COMMUNITY
Start: 2020-04-22 | End: 2020-07-17

## 2020-04-30 ASSESSMENT — MIFFLIN-ST. JEOR: SCORE: 870.79

## 2020-04-30 NOTE — PROGRESS NOTES
"[facility]  REGULATORY    Alejandrina Whatley is being evaluated via a billable video visit due to the restrictions of the Covid-19 pandemic.   The patient has been notified of following:\"This video visit will be conducted via a call between you and your provider. We have found that certain health care needs can be provided without the need for an in-person physical exam.  This service lets us provide the care you need with a video conversation. If during the course of the call the provider feels a video visit is not appropriate, you will not be charged for this service.\"   The provider has received verbal consent for a Video Visit from the patient or first contact? Yes    Video Start Time: 11:48 AM    [place] Medical Record Number:  [id number]  Place of Location at the time of visit: [facility]   Chief Complaint   Patient presents with     [facility] Regulatory     HPI:  Alejandrina Whatley  is a 89 year old (8/22/1923), who is being seen today for an E-REG visit.  HPI information obtained from: facility chart records, facility staff, patient report and [facility] chart review.   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 She has some lingering left sided hemiparesis and also cognitive impairment consistent with dementia. She unfortunately had recurrent subacute ischemic strokes (left cerebellar and right MCA) in January and neurology recommended consideration of further work up (Kathe BLANC) but family decided based on goals of care to limit excessive testing.   She has h/o depression on Celexa, frequent UTIs and chronic back pain on Tramadol (attempt to discontinue in December but daughter heard Alejandrina c/o back pain more and requested it be restarted at prior dose 25 mg at bedtime).  Alejandrina is seen in her room today and she was talking on the phone to her daughter Pat but welcomed the visit and Pat stayed on " via speaker phone. They miss each other d/t COVID-19 visitor restrictions. However, they were thrilled that staff are able to assist with a FaceTime visit later today. Alejandrina had a shower today and feels good from that. Denies dyspnea, pain, or concerns. She does feel bored at times and is sad with not being able to see her daughter. Staff also note her sadness. She is on Celexa 10 mg at bedtime and we discussed option of increasing to 20 mg and both she and Pat agreed that would be a good idea.    Past Medical and Surgical History reviewed in Epic today.    MEDICATIONS: Reviewed in [facility]  Current Outpatient Medications   Medication Sig Dispense Refill     acetaminophen (TYLENOL) 500 MG tablet Take 1,000 mg by mouth 3 times daily 8am, 2pm, 8pm       amLODIPine (NORVASC) 5 MG tablet Take 1 tablet (5 mg) by mouth daily 30 tablet 0     aspirin (ASA) 325 MG tablet Take 1 tablet (325 mg) by mouth daily 30 tablet 0     atorvastatin (LIPITOR) 40 MG tablet Take 1 tablet (40 mg) by mouth every evening 30 tablet 0     carvedilol (COREG) 6.25 MG tablet Take 1 tablet (6.25 mg) by mouth 2 times daily (with meals) 60 tablet 0     Cholecalciferol (VITAMIN D3) 50 MCG (2000 UT) TABS Take 2,000 Units by mouth daily       citalopram (CELEXA) 10 MG tablet Take 10 mg by mouth every evening       dorzolamide-timolol (COSOPT) 2-0.5 % ophthalmic solution Place 1 drop into both eyes 2 times daily 1 Bottle 11     guaiFENesin (ROBITUSSIN) 100 MG/5ML liquid Take 200 mg by mouth every 4 hours as needed for cough       latanoprost (XALATAN) 0.005 % ophthalmic solution Place 1 drop into both eyes At Bedtime Both Eyes 3 Bottle 4     melatonin 3 MG tablet Take 1 tablet (3 mg) by mouth nightly as needed for sleep       Multiple Vitamins-Minerals (CERTAVITE/ANTIOXIDANTS PO) Take 1 tablet by mouth daily       polyethylene glycol (MIRALAX/GLYCOLAX) packet Take 8.5 g by mouth daily AND DAILY PRN - hold with loose stools       senna-docusate  "(SENOKOT-S/PERICOLACE) 8.6-50 MG tablet Take 1 tablet by mouth daily       traMADol (ULTRAM) 50 MG tablet Take 0.5 tablets (25 mg) by mouth At Bedtime 20 tablet 0     REVIEW OF SYSTEMS: Limited secondary to cognitive impairment but today pt reports as above in HPI    Objective: /68   Pulse 77   Temp 96.8  F (36  C)   Resp 16   Ht 1.473 m (4' 10\")   Wt 59.1 kg (130 lb 4.8 oz)   SpO2 95%   BMI 27.23 kg/m     Weight stable  Limited visit exam done given COVID-19 precautions.   Alert, pleasant, NAD  Smiles brightly but has insight into COVID-19 visitor restrictions and is saddened by this  Able to lift left arm up about 90 degrees  No edema  Breathing non-labored, no cough    Labs:   Last Comprehensive Metabolic Panel:  Sodium   Date Value Ref Range Status   03/03/2020 141 136 - 145 mmol/L Final     Potassium   Date Value Ref Range Status   03/03/2020 3.9 3.5 - 5.0 mmol/L Final     Chloride   Date Value Ref Range Status   03/03/2020 107 98 - 107 mmol/L Final     Carbon Dioxide   Date Value Ref Range Status   03/03/2020 27 22 - 31 mmol/L Final     Anion Gap   Date Value Ref Range Status   03/03/2020 7 5 - 18 mmol/L Final     Glucose   Date Value Ref Range Status   03/03/2020 97 70 - 125 mg/dL Final     Urea Nitrogen   Date Value Ref Range Status   03/03/2020 19 8 - 28 mg/dL Final     Creatinine   Date Value Ref Range Status   03/03/2020 0.77 0.60 - 1.10 mg/dL Final     GFR Estimate   Date Value Ref Range Status   03/03/2020 >60 >60 ml/min/1.73m2 Final     Calcium   Date Value Ref Range Status   03/03/2020 8.9 8.5 - 10.5 mg/dL Final     Hemoglobin   Date Value Ref Range Status   03/03/2020 10.8 (L) 12.0 - 16.0 g/dL Final       ASSESSMENT/PLAN:  Cerebrovascular disease with h/o recurrent ischemic bilateral CVAs  See HPI that has some left sided weakness and cognitive impairment   Family declined further work up with neurology to look for embolic source based on goals of care  Remains on  mg daily and " Atorvastatin    Chronic back pain, unspecified back location, unspecified back pain laterality  See several notes re: chronic pain (she denies today) and daughter feeling Tramadol 25 mg at bedtime has historically been very helpful in keeping mom comfortable and doesn't want her to stop it    Dementia without behavioral disturbance, unspecified dementia type (H)  Appreciate support of LTC team  CPT Score 4.2/5.6 and SLUMS Score 14/30 during TCU stay    Adjustment disorder with depressed mood  Verbal consent given by Alejandrina and her daughter Pat to increase Celexa from 10 mg to 20 mg at bedtime  They are also looking forward to FaceTime visit today as aided by staff    Anemia, unspecified type  Mild, no signs of blood loss  Is on  mg daily and has h/o CKD3  Monitor periodically      Electronically signed by:  Yulissa Garcia DO     Video-Visit Details  Type of service:  Video Visit  Video End Time (time video stopped): 11:58 AM  Distant Location (provider location):  [place] [facility]

## 2020-05-01 ENCOUNTER — VIRTUAL VISIT (OUTPATIENT)
Dept: GERIATRICS | Facility: CLINIC | Age: 85
End: 2020-05-01
Payer: COMMERCIAL

## 2020-05-01 DIAGNOSIS — M54.9 CHRONIC BACK PAIN, UNSPECIFIED BACK LOCATION, UNSPECIFIED BACK PAIN LATERALITY: ICD-10-CM

## 2020-05-01 DIAGNOSIS — D64.9 ANEMIA, UNSPECIFIED TYPE: ICD-10-CM

## 2020-05-01 DIAGNOSIS — G89.29 CHRONIC BACK PAIN, UNSPECIFIED BACK LOCATION, UNSPECIFIED BACK PAIN LATERALITY: ICD-10-CM

## 2020-05-01 DIAGNOSIS — F03.90 DEMENTIA WITHOUT BEHAVIORAL DISTURBANCE, UNSPECIFIED DEMENTIA TYPE: ICD-10-CM

## 2020-05-01 DIAGNOSIS — I67.9 CEREBROVASCULAR DISEASE: Primary | ICD-10-CM

## 2020-05-01 DIAGNOSIS — F43.21 ADJUSTMENT DISORDER WITH DEPRESSED MOOD: ICD-10-CM

## 2020-05-01 PROCEDURE — 99309 SBSQ NF CARE MODERATE MDM 30: CPT | Mod: 95 | Performed by: INTERNAL MEDICINE

## 2020-05-01 NOTE — LETTER
"    5/1/2020        RE: Alejandrina Whatley  [facility]  [id number] [address] [id number]        [place] [facility]  REGULATORY    Alejandrina Whatley is being evaluated via a billable video visit due to the restrictions of the Covid-19 pandemic.   The patient has been notified of following:\"This video visit will be conducted via a call between you and your provider. We have found that certain health care needs can be provided without the need for an in-person physical exam.  This service lets us provide the care you need with a video conversation. If during the course of the call the provider feels a video visit is not appropriate, you will not be charged for this service.\"   The provider has received verbal consent for a Video Visit from the patient or first contact? Yes    Video Start Time: 11:48 AM    [place] Medical Record Number:  [id number]  Place of Location at the time of visit: [facility]   Chief Complaint   Patient presents with     cor[facility] Regulatory     HPI:  Alejandrina Whatley  is a 89 year old (8/22/1923), who is being seen today for an E-REG visit.  HPI information obtained from: facility chart records, facility staff, patient report and [place] Epic chart review.   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 She has some lingering left sided hemiparesis and also cognitive impairment consistent with dementia. She unfortunately had recurrent subacute ischemic strokes (left cerebellar and right MCA) in January and neurology recommended consideration of further work up (JAYASHREE, Kathe) but family decided based on goals of care to limit excessive testing.   She has h/o depression on Celexa, frequent UTIs and chronic back pain on Tramadol (attempt to discontinue in December but daughter heard Alejandrina c/o back pain more and requested it be restarted at prior dose 25 mg at bedtime).  Alejandrina is seen in her " room today and she was talking on the phone to her daughter Pat but welcomed the visit and Pat stayed on via speaker phone. They miss each other d/t COVID-19 visitor restrictions. However, they were thrilled that staff are able to assist with a FaceTime visit later today. Alejandrina had a shower today and feels good from that. Denies dyspnea, pain, or concerns. She does feel bored at times and is sad with not being able to see her daughter. Staff also note her sadness. She is on Celexa 10 mg at bedtime and we discussed option of increasing to 20 mg and both she and Pat agreed that would be a good idea.    Past Medical and Surgical History reviewed in Epic today.    MEDICATIONS: Reviewed in [facility]  Current Outpatient Medications   Medication Sig Dispense Refill     acetaminophen (TYLENOL) 500 MG tablet Take 1,000 mg by mouth 3 times daily 8am, 2pm, 8pm       amLODIPine (NORVASC) 5 MG tablet Take 1 tablet (5 mg) by mouth daily 30 tablet 0     aspirin (ASA) 325 MG tablet Take 1 tablet (325 mg) by mouth daily 30 tablet 0     atorvastatin (LIPITOR) 40 MG tablet Take 1 tablet (40 mg) by mouth every evening 30 tablet 0     carvedilol (COREG) 6.25 MG tablet Take 1 tablet (6.25 mg) by mouth 2 times daily (with meals) 60 tablet 0     Cholecalciferol (VITAMIN D3) 50 MCG (2000 UT) TABS Take 2,000 Units by mouth daily       citalopram (CELEXA) 10 MG tablet Take 10 mg by mouth every evening       dorzolamide-timolol (COSOPT) 2-0.5 % ophthalmic solution Place 1 drop into both eyes 2 times daily 1 Bottle 11     guaiFENesin (ROBITUSSIN) 100 MG/5ML liquid Take 200 mg by mouth every 4 hours as needed for cough       latanoprost (XALATAN) 0.005 % ophthalmic solution Place 1 drop into both eyes At Bedtime Both Eyes 3 Bottle 4     melatonin 3 MG tablet Take 1 tablet (3 mg) by mouth nightly as needed for sleep       Multiple Vitamins-Minerals (CERTAVITE/ANTIOXIDANTS PO) Take 1 tablet by mouth daily       polyethylene glycol  "(MIRALAX/GLYCOLAX) packet Take 8.5 g by mouth daily AND DAILY PRN - hold with loose stools       senna-docusate (SENOKOT-S/PERICOLACE) 8.6-50 MG tablet Take 1 tablet by mouth daily       traMADol (ULTRAM) 50 MG tablet Take 0.5 tablets (25 mg) by mouth At Bedtime 20 tablet 0     REVIEW OF SYSTEMS: Limited secondary to cognitive impairment but today pt reports as above in HPI    Objective: /68   Pulse 77   Temp 96.8  F (36  C)   Resp 16   Ht 1.473 m (4' 10\")   Wt 59.1 kg (130 lb 4.8 oz)   SpO2 95%   BMI 27.23 kg/m     Weight stable  Limited visit exam done given COVID-19 precautions.   Alert, pleasant, NAD  Smiles brightly but has insight into COVID-19 visitor restrictions and is saddened by this  Able to lift left arm up about 90 degrees  No edema  Breathing non-labored, no cough    Labs:   Last Comprehensive Metabolic Panel:  Sodium   Date Value Ref Range Status   03/03/2020 141 136 - 145 mmol/L Final     Potassium   Date Value Ref Range Status   03/03/2020 3.9 3.5 - 5.0 mmol/L Final     Chloride   Date Value Ref Range Status   03/03/2020 107 98 - 107 mmol/L Final     Carbon Dioxide   Date Value Ref Range Status   03/03/2020 27 22 - 31 mmol/L Final     Anion Gap   Date Value Ref Range Status   03/03/2020 7 5 - 18 mmol/L Final     Glucose   Date Value Ref Range Status   03/03/2020 97 70 - 125 mg/dL Final     Urea Nitrogen   Date Value Ref Range Status   03/03/2020 19 8 - 28 mg/dL Final     Creatinine   Date Value Ref Range Status   03/03/2020 0.77 0.60 - 1.10 mg/dL Final     GFR Estimate   Date Value Ref Range Status   03/03/2020 >60 >60 ml/min/1.73m2 Final     Calcium   Date Value Ref Range Status   03/03/2020 8.9 8.5 - 10.5 mg/dL Final     Hemoglobin   Date Value Ref Range Status   03/03/2020 10.8 (L) 12.0 - 16.0 g/dL Final       ASSESSMENT/PLAN:  Cerebrovascular disease with h/o recurrent ischemic bilateral CVAs  See HPI that has some left sided weakness and cognitive impairment   Family declined " further work up with neurology to look for embolic source based on goals of care  Remains on  mg daily and Atorvastatin    Chronic back pain, unspecified back location, unspecified back pain laterality  See several notes re: chronic pain (she denies today) and daughter feeling Tramadol 25 mg at bedtime has historically been very helpful in keeping mom comfortable and doesn't want her to stop it    Dementia without behavioral disturbance, unspecified dementia type (H)  Appreciate support of LTC team  CPT Score 4.2/5.6 and SLUMS Score 14/30 during TCU stay    Adjustment disorder with depressed mood  Verbal consent given by Alejandrina and her daughter Pat to increase Celexa from 10 mg to 20 mg at bedtime  They are also looking forward to FaceTime visit today as aided by staff    Anemia, unspecified type  Mild, no signs of blood loss  Is on  mg daily and has h/o CKD3  Monitor periodically      Electronically signed by:  Yulissa Garcia DO     Video-Visit Details  Type of service:  Video Visit  Video End Time (time video stopped): 11:58 AM  Distant Location (provider location):  [place] [facility]             Sincerely,        Yulissa Garcia DO

## 2020-05-11 PROBLEM — F03.90 DEMENTIA WITHOUT BEHAVIORAL DISTURBANCE, UNSPECIFIED DEMENTIA TYPE: Status: ACTIVE | Noted: 2020-05-11

## 2020-05-11 RX ORDER — CARVEDILOL 6.25 MG/1
6.25 TABLET ORAL 2 TIMES DAILY WITH MEALS
COMMUNITY
End: 2023-01-01

## 2020-05-11 RX ORDER — ATORVASTATIN CALCIUM 40 MG/1
40 TABLET, FILM COATED ORAL EVERY EVENING
COMMUNITY

## 2020-05-15 ENCOUNTER — TELEPHONE (OUTPATIENT)
Dept: GERIATRICS | Facility: CLINIC | Age: 85
End: 2020-05-15

## 2020-05-15 NOTE — TELEPHONE ENCOUNTER
Received report from staff patient was not feeling well earlier this morning. VSS. On-call updated and recommended to continue to monitor.     This evening, patient developed temperature of 101.4 F. Refused to eat. All other vital signs stable. Lung sounds clear. No cough or shortness of breath.     Orders:  Ok to swab for COVID-19  Droplet precautions

## 2020-05-16 ENCOUNTER — TELEPHONE (OUTPATIENT)
Dept: GERIATRICS | Facility: CLINIC | Age: 85
End: 2020-05-16

## 2020-05-17 ENCOUNTER — RECORDS - HEALTHEAST (OUTPATIENT)
Dept: LAB | Facility: CLINIC | Age: 85
End: 2020-05-17

## 2020-05-17 ENCOUNTER — TELEPHONE (OUTPATIENT)
Dept: GERIATRICS | Facility: CLINIC | Age: 85
End: 2020-05-17

## 2020-05-17 LAB
ALBUMIN UR-MCNC: ABNORMAL MG/DL
APPEARANCE UR: ABNORMAL
BACTERIA #/AREA URNS HPF: ABNORMAL HPF
BILIRUB UR QL STRIP: NEGATIVE
COLOR UR AUTO: YELLOW
GLUCOSE UR STRIP-MCNC: NEGATIVE MG/DL
HGB UR QL STRIP: ABNORMAL
KETONES UR STRIP-MCNC: NEGATIVE MG/DL
LEUKOCYTE ESTERASE UR QL STRIP: ABNORMAL
NITRATE UR QL: NEGATIVE
PH UR STRIP: 6.5 [PH] (ref 4.5–8)
RBC #/AREA URNS AUTO: ABNORMAL HPF
SP GR UR STRIP: 1.02 (ref 1–1.03)
SQUAMOUS #/AREA URNS AUTO: ABNORMAL LPF
UROBILINOGEN UR STRIP-ACNC: ABNORMAL
WBC #/AREA URNS AUTO: >100 HPF
WBC CLUMPS #/AREA URNS HPF: PRESENT /[HPF]

## 2020-05-17 NOTE — TELEPHONE ENCOUNTER
[place] [facility] TELEPHONE ENCOUNTER    Chief Complaint   Patient presents with     Fever       Alejandrina Whatley is a 89 year old  (8/22/1923),Nurse called today to report: Patient developed fever yesterday - COVID test sent, results today are negative. Staff called to report that she continues to have a fever- was 101.2 earlier, now 100.5 after tylenol.. No cough or SOB. Some wheezing in LLL of lung. VS: 118/68, P 90, R 16. 91% O2 Patient reports she is tired, is not drinking or eating well.     ASSESSMENT/PLAN  Unclear etiology - COVID negative. Currently hemodynamically stable    Send UA/UC    2 view CXR ASAP    Continue scheduled APAP  At 8A, 2P. 8P, - will additional 1000mg at 0200 if fever >100, ok to apply cool wet towel to help cool if needed.     Encourage fluid intake.   Electronically signed by:   CRESCENCIO Adams CNP

## 2020-05-17 NOTE — TELEPHONE ENCOUNTER
[place] [facility] TELEPHONE ENCOUNTER    Chief Complaint   Patient presents with     UA results       Alejandrina Whatley is a 89 year old  (8/22/1923),Nurse called today with an Urgent page because UA results in and patient is unwell.  Temperature has normalized, CXR negative for infiltrate.  UA cloudy, large Leukocyte estrace blood many bacteria    ASSESSMENT/PLAN  UTI     Rocephin 1 gram IM with Lidocaine x 1 - get from [facility]    Cipro 500 mg po BID x 7 days, get 1st dose from [facility]  Electronically signed by:   CRESCENCIO Lee CNP

## 2020-05-18 ENCOUNTER — TELEPHONE (OUTPATIENT)
Dept: GERIATRICS | Facility: CLINIC | Age: 85
End: 2020-05-18

## 2020-05-18 DIAGNOSIS — N30.00 ACUTE CYSTITIS WITHOUT HEMATURIA: Primary | ICD-10-CM

## 2020-05-18 LAB — BACTERIA SPEC CULT: NORMAL

## 2020-05-18 RX ORDER — CIPROFLOXACIN 250 MG/1
250 TABLET, FILM COATED ORAL 2 TIMES DAILY
Qty: 14 TABLET | Refills: 0
Start: 2020-05-18 | End: 2020-05-22

## 2020-05-18 NOTE — TELEPHONE ENCOUNTER
Called re: Ms. Whatley does not want an injection and is declining the ceftriaxone. Order given to discontinue ceftriaxone and start cipro tonight.     Melany Gutierrez MD

## 2020-05-18 NOTE — TELEPHONE ENCOUNTER
[place] [facility] TELEPHONE ENCOUNTER    Chief Complaint   Patient presents with     UTI       Alejandrian Whatley is a 89 year old  (8/22/1923),Nurse called today to report:       Patient this weekend with acute onset of fever.  Please see epic encounters.  COVID was negative, chest x-ray negative.  UA/UC indicative of infection.  She was started on cipro     Spoke with nurse today.  She states patient is feeling better, temperature is decreased and eating ok.      -creatinine clearance 39.5  -was stared on cipro 500mg BID X 7 days       ASSESSMENT/PLAN    -change cipro to 250mg po BID X 7 days due to current kidney function   -staff to update if culture results show resistance to cipro     Electronically signed by:   CRESCENCIO Ghosh CNP

## 2020-05-21 ENCOUNTER — TELEPHONE (OUTPATIENT)
Dept: GERIATRICS | Facility: CLINIC | Age: 85
End: 2020-05-21

## 2020-05-21 NOTE — TELEPHONE ENCOUNTER
[facility] Telephone Encounter  Chief Complaint   Patient presents with     Cough   Alejandrina Whatley is a 89 year old (8/22/1923). Nursing team called/messaged today to report family concerned about patient:    CXR: negative for infiltrate last week. Placed on Duonebs TID through 5/23.     COVID swab last week was negative.     UA/UC done, Cipro x 7 days (currently in-process).     Nausea x1 today. 98.8F, VSS, 94% on RA.    Ongoing: cough, nausea. Wheezing. No SOB.     Family requesting further testing/treatment.    ASSESSMENT/PLAN  Acute. Ongoing. No improvement. Will increase duonebs and continue them through the holiday weekend for sxs management. Will swab for COVID again and place this patient in isolation until negative result returns. For nausea, will advise nursing give Cipro w/ food to mitigate gastric irritation. Team to follow-up w/ results or as needed.    Orders:  1. Swab for COVID x1 asap.   2. Increase Duonebs from TID to QID and continue through 5/26.   3. Droplet precautions until negative test.   4. Take Cipro w/ food. Dx: nausea.      Electronically signed by:  Dr. Keya David, APRN CNP DNP

## 2020-05-22 ENCOUNTER — VIRTUAL VISIT (OUTPATIENT)
Dept: GERIATRICS | Facility: CLINIC | Age: 85
End: 2020-05-22
Payer: COMMERCIAL

## 2020-05-22 VITALS
TEMPERATURE: 98.8 F | OXYGEN SATURATION: 92 % | HEIGHT: 58 IN | RESPIRATION RATE: 16 BRPM | HEART RATE: 82 BPM | DIASTOLIC BLOOD PRESSURE: 72 MMHG | SYSTOLIC BLOOD PRESSURE: 142 MMHG | BODY MASS INDEX: 27.41 KG/M2 | WEIGHT: 130.6 LBS

## 2020-05-22 DIAGNOSIS — F41.9 ANXIETY AND DEPRESSION: ICD-10-CM

## 2020-05-22 DIAGNOSIS — R05.9 COUGH: Primary | ICD-10-CM

## 2020-05-22 DIAGNOSIS — F32.A ANXIETY AND DEPRESSION: ICD-10-CM

## 2020-05-22 DIAGNOSIS — R50.9 LOW GRADE FEVER: ICD-10-CM

## 2020-05-22 PROCEDURE — 99310 SBSQ NF CARE HIGH MDM 45: CPT | Mod: 95 | Performed by: INTERNAL MEDICINE

## 2020-05-22 RX ORDER — IPRATROPIUM BROMIDE AND ALBUTEROL SULFATE 2.5; .5 MG/3ML; MG/3ML
1 SOLUTION RESPIRATORY (INHALATION) 4 TIMES DAILY
COMMUNITY
End: 2020-05-22

## 2020-05-22 RX ORDER — ESCITALOPRAM OXALATE 10 MG/1
5 TABLET ORAL DAILY
COMMUNITY
End: 2021-09-19

## 2020-05-22 RX ORDER — PREDNISONE 20 MG/1
20 TABLET ORAL DAILY
COMMUNITY
End: 2020-07-17

## 2020-05-22 ASSESSMENT — MIFFLIN-ST. JEOR: SCORE: 872.15

## 2020-05-22 NOTE — Clinical Note
Repeat COVID negative and she is doing better as of today! Hope trend continues. Changed from Celexa to Lexapro.

## 2020-05-22 NOTE — PROGRESS NOTES
"[facility]   ACUTE    Alejandrina Whatley is being evaluated via a billable video visit due to the restrictions of the Covid-19 pandemic.   The patient has been notified of following:\"This video visit will be conducted via a call between you and your provider. We have found that certain health care needs can be provided without the need for an in-person physical exam.  This service lets us provide the care you need with a video conversation. If during the course of the call the provider feels a video visit is not appropriate, you will not be charged for this service.\"   The provider has received verbal consent for a Video Visit from the patient or first contact? Yes    Video Start Time: 1:18 PM    [facility] Medical Record Number:  [id number]  Place of Location at the time of visit: [facility]   Chief Complaint   Patient presents with     Nursing Home Acute     HPI:  Alejandrina Whatley  is a 89 year old (8/22/1923), who is being seen today for a visit.  HPI information obtained from: facility chart records, facility staff, patient report and [facility] chart review.     Alejandrina Noble has been having intermittent low grade fevers, some upper airway expiratory wheezing per staff, feeling more depressed, fatigued & lying in bed for about a week. Had negative COVID-19 testing and no residents are positive at facility. CXR showing some chronic peribronchial thickening without infiltrates and UA+ placed on Cipro but negative UCx. Has remained on Cipro for 5 days now and still had low grad fever to 100.3 last night. Repeat COVID-19 lab pending from last night and expected to be back over the weekend.  Visited Alejandrina Noble in her room via video visit and she was lying in bed awake, participating in conversation but depressed. Really missing her daughter Pat d/t visitor restrictions and worried about her too as she is in [place] at the moment (planning to come back to [place] tomorrow). Admits to " feeling generalized weakness and mild cough. Denies dyspnea but looks to be slightly tachypneic. Nurse can also hear slight audible expiratory wheezes. Denies dysuria or abdominal pain or chest pain or skin concerns. Has a roommate who is not ill. DuoNebs were started with first negative COVID test result and haven't been helpful. Had some nausea/emesis now better but still not eating much. Denies sore throat or dysphagia but nurse thinks that her voice is a tad weaker than baseline. H/o CVA but no known h/o aspiration.   I spoke with nursing staff who are concerned about her clinical symptoms and also spoke with Alejandrina Noble's daughter Pat who is also very concerned. Of note, a few weeks ago I increased her Celexa from 10 mg to 20 mg at bedtime but it hasn't helped her mood at all.     Past Medical and Surgical History reviewed in Epic today.    MEDICATIONS:  Reviewed in [facility]  Current Outpatient Medications   Medication Sig Dispense Refill     acetaminophen (TYLENOL) 500 MG tablet Take 1,000 mg by mouth 3 times daily 8am, 2pm, 8pm       amLODIPine (NORVASC) 5 MG tablet Take 1 tablet (5 mg) by mouth daily 30 tablet 0     aspirin (ASA) 325 MG tablet Take 1 tablet (325 mg) by mouth daily 30 tablet 0     atorvastatin (LIPITOR) 40 MG tablet Take 40 mg by mouth every evening       carvedilol (COREG) 6.25 MG tablet Take 6.25 mg by mouth 2 times daily (with meals)       Cholecalciferol (VITAMIN D3) 50 MCG (2000 UT) TABS Take 2,000 Units by mouth daily       ciprofloxacin (CIPRO) 250 MG tablet Take 1 tablet (250 mg) by mouth 2 times daily for 7 days 14 tablet 0     citalopram (CELEXA) 20 MG tablet Take 20 mg by mouth every evening        dorzolamide-timolol (COSOPT) 2-0.5 % ophthalmic solution Place 1 drop into both eyes 2 times daily 1 Bottle 11     guaiFENesin (ROBITUSSIN) 100 MG/5ML liquid Take 200 mg by mouth every 4 hours as needed for cough       ipratropium - albuterol 0.5 mg/2.5 mg/3 mL (DUONEB) 0.5-2.5 (3)  "MG/3ML neb solution Take 1 vial by nebulization 4 times daily       latanoprost (XALATAN) 0.005 % ophthalmic solution Place 1 drop into both eyes At Bedtime Both Eyes 3 Bottle 4     melatonin 3 MG tablet Take 1 tablet (3 mg) by mouth nightly as needed for sleep       Multiple Vitamins-Minerals (CERTAVITE/ANTIOXIDANTS PO) Take 1 tablet by mouth daily       polyethylene glycol (MIRALAX/GLYCOLAX) packet Take 8.5 g by mouth daily AND 17 g DAILY PRN - hold with loose stools       senna-docusate (SENOKOT-S/PERICOLACE) 8.6-50 MG tablet Take 1 tablet by mouth At Bedtime        traMADol (ULTRAM) 50 MG tablet Take 0.5 tablets (25 mg) by mouth At Bedtime 20 tablet 0     REVIEW OF SYSTEMS: Limited secondary to cognitive impairment but today pt reports as above in HPI    Objective: BP (!) 142/72   Pulse 82   Temp 98.8  F (37.1  C)   Resp 16   Ht 1.473 m (4' 10\")   Wt 59.2 kg (130 lb 9.6 oz)   SpO2 92%   BMI 27.30 kg/m    Limited visit exam done given COVID-19 precautions.   Lying in bed, casually dressed, frail  Slightly pale  Denies dyspnea subjectively but objectively has mildly labored breathing, no cough  Though I can't hear it via video, RN reports upper lung field/neck area expiratory wheezes (?stridor)  RN palpated neck and didn't feel any asymmetry or detect any areas of tenderness  Alejandrina was able to open mouth showing mildly dry oral mucosa; no tongue swelling  Voice seems normal to me but per RN it seems slightly weaker than Alejandrina Noble's baseline  Affect frustrated and irritated by COVID-19 pandemic visitor restrictions    Labs:   CXR 5/17/20 showing chronic peribronchial thickening without infiltrates or fluid    UA 5/16/20 moderate blood, protein +, Lg leukocytes, Many bacteria, 5-10 RBC, >100 WBC  UCx showed \"mixed bacteria\" and sensitivities thus not completed    Last Basic Metabolic Panel:  Sodium   Date Value Ref Range Status   03/03/2020 141 136 - 145 mmol/L Final     Potassium   Date Value Ref Range Status "   03/03/2020 3.9 3.5 - 5.0 mmol/L Final     Chloride   Date Value Ref Range Status   03/03/2020 107 98 - 107 mmol/L Final     Carbon Dioxide   Date Value Ref Range Status   03/03/2020 27 22 - 31 mmol/L Final     Anion Gap   Date Value Ref Range Status   03/03/2020 7 5 - 18 mmol/L Final     Glucose   Date Value Ref Range Status   03/03/2020 97 70 - 125 mg/dL Final     Urea Nitrogen   Date Value Ref Range Status   03/03/2020 19 8 - 28 mg/dL Final     Creatinine   Date Value Ref Range Status   03/03/2020 0.77 0.60 - 1.10 mg/dL Final     GFR Estimate   Date Value Ref Range Status   03/03/2020 >60 >60 ml/min/1.73m2 Final     Calcium   Date Value Ref Range Status   03/03/2020 8.9 8.5 - 10.5 mg/dL Final     Lab Results   Component Value Date    WBC 10.3 03/03/2020     Lab Results   Component Value Date    RBC 3.19 03/03/2020     Lab Results   Component Value Date    HGB 10.8 03/03/2020     Lab Results   Component Value Date    HCT 33.5 03/03/2020     Lab Results   Component Value Date     03/03/2020     Lab Results   Component Value Date    MCH 33.9 03/03/2020     Lab Results   Component Value Date    MCHC 32.2 03/03/2020     Lab Results   Component Value Date    RDW 14.1 03/03/2020     Lab Results   Component Value Date     03/03/2020       ASSESSMENT/PLAN:  Alejandrina Whatley has h/o dementia, ischemic CVAs, recurrent UTI and depression seen today for 1 week of low-grade fever, cough, wheezing and lethargy. UA+ but UCx mixed and not improving on 5 days of Cipro (doesn't endorse UTI symptoms either) and has on-going respiratory symptoms. Recent CXR showing chronic peribronchial thickening without infiltrates or fluid.  Discontinue DuoNebs (not helping and in case second COVID-19 test returns positive would want these discontinued anyway plus she has no known h/o chronic lung disease)  Suspect COVID-19 results will be back tomorrow and I gave my direct number for the staff to contact me with results; recall no  residents at facility are positive to date though  Start prednisone 20 mg daily x 5 days given wheezing; monitor for delirium given h/o dementia  Discontinue Cipro (already had 5 day course and still having periodic low grade temps without urinary symptoms and with respiratory symptoms)  Start Augmentin 875-125 mg One tablet BID x 10 days for respiratory illness (?aspiration in setting of remote CVAs), on-going low grade temps with cough and upper airway wheezing/voice changes  Continue routine vital sign monitoring as is being currently done  Brought up the idea of speech eval and treat to look for dysphagia given possible stridor and known h/o strokes but she declined this  Consider future repeat labs to compare to March labs and/or repeat CXR (or to include neck/tracheal area) if on-going wheezing/fevers/poor oral intake  Daryl Stewart is very involved and calls NM several times per day and is very anxiety-ridden with Alejandrina Noble's illness  I spoke with daryl Stewart for 17 minutes today 3:15 PM to 3:32 PM and will also change Celexa 20 mg every PM to Lexapro 10 mg every PM to see if less sedation as well as improved control of depression and Pat liked this idea as well    ADDENDUM: Alejandrina Noble's second COVID-19 screening has now also thankfully returned negative. Still no positive cases at facility either. I updated daryl Stewart and also spoke with CHRISSY Parra the following day and Alejandrina Noble is doing much better, barely coughing, no audible wheezing, remaining afebrile now almost 48 hours and a tad more energy. If continues to clinically improve, would continue to monitor, no repeat imaging or labs needed nor likely speech eval.      Electronically signed by:  Yulissa Garcia DO     Video-Visit Details  Type of service:  Video Visit  Video End Time (time video stopped): 1:35 PM  Distant Location (provider location):  [facility]     Total time spent with patient visit was 59 minutes including  patient visit (17 minutes), review of past records (10 minutes), discussion with nursing staff (15 minutes - phone call with nursing manager from 1:55 PM - 2:10 PM) and discussion with daughter Pat over the phone (17 minutes from 3:15 PM - 3:32 PM) on overall plan of care going forward. Greater than 50% of total time spent with counseling and coordinating care due to several medication changes and continued work up and management of depression, fatigue and febrile illness.

## 2020-05-22 NOTE — LETTER
"    5/22/2020        RE: Alejandrina Whatley  [facility]  [address] [address] [address]        [facility]   ACUTE    Alejandrina Whatley is being evaluated via a billable video visit due to the restrictions of the Covid-19 pandemic.   The patient has been notified of following:\"This video visit will be conducted via a call between you and your provider. We have found that certain health care needs can be provided without the need for an in-person physical exam.  This service lets us provide the care you need with a video conversation. If during the course of the call the provider feels a video visit is not appropriate, you will not be charged for this service.\"   The provider has received verbal consent for a Video Visit from the patient or first contact? Yes    Video Start Time: 1:18 PM    [facility] Medical Record Number:  [id number]  Place of Location at the time of visit: [facility]   Chief Complaint   Patient presents with     Nursing Home Acute     HPI:  Alejandrina Whatley  is a 89 year old (8/22/1923), who is being seen today for a visit.  HPI information obtained from: facility chart records, facility staff, patient report and [facility] chart review.     Alejandrina Noble has been having intermittent low grade fevers, some upper airway expiratory wheezing per staff, feeling more depressed, fatigued & lying in bed for about a week. Had negative COVID-19 testing and no residents are positive at facility. CXR showing some chronic peribronchial thickening without infiltrates and UA+ placed on Cipro but negative UCx. Has remained on Cipro for 5 days now and still had low grad fever to 100.3 last night. Repeat COVID-19 lab pending from last night and expected to be back over the weekend.  Visited Alejandrina Noble in her room via video visit and she was lying in bed awake, participating in conversation but depressed. Really missing her daughter Pat d/t visitor restrictions " and worried about her too as she is in [place] at the moment (planning to come back to [place] tomorrow). Admits to feeling generalized weakness and mild cough. Denies dyspnea but looks to be slightly tachypneic. Nurse can also hear slight audible expiratory wheezes. Denies dysuria or abdominal pain or chest pain or skin concerns. Has a roommate who is not ill. DuoNebs were started with first negative COVID test result and haven't been helpful. Had some nausea/emesis now better but still not eating much. Denies sore throat or dysphagia but nurse thinks that her voice is a tad weaker than baseline. H/o CVA but no known h/o aspiration.   I spoke with nursing staff who are concerned about her clinical symptoms and also spoke with Alejandrina Noble's daughter Pat who is also very concerned. Of note, a few weeks ago I increased her Celexa from 10 mg to 20 mg at bedtime but it hasn't helped her mood at all.     Past Medical and Surgical History reviewed in Epic today.    MEDICATIONS:  Reviewed in [facility]  Current Outpatient Medications   Medication Sig Dispense Refill     acetaminophen (TYLENOL) 500 MG tablet Take 1,000 mg by mouth 3 times daily 8am, 2pm, 8pm       amLODIPine (NORVASC) 5 MG tablet Take 1 tablet (5 mg) by mouth daily 30 tablet 0     aspirin (ASA) 325 MG tablet Take 1 tablet (325 mg) by mouth daily 30 tablet 0     atorvastatin (LIPITOR) 40 MG tablet Take 40 mg by mouth every evening       carvedilol (COREG) 6.25 MG tablet Take 6.25 mg by mouth 2 times daily (with meals)       Cholecalciferol (VITAMIN D3) 50 MCG (2000 UT) TABS Take 2,000 Units by mouth daily       ciprofloxacin (CIPRO) 250 MG tablet Take 1 tablet (250 mg) by mouth 2 times daily for 7 days 14 tablet 0     citalopram (CELEXA) 20 MG tablet Take 20 mg by mouth every evening        dorzolamide-timolol (COSOPT) 2-0.5 % ophthalmic solution Place 1 drop into both eyes 2 times daily 1 Bottle 11     guaiFENesin (ROBITUSSIN) 100 MG/5ML liquid Take 200 mg by mouth  "every 4 hours as needed for cough       ipratropium - albuterol 0.5 mg/2.5 mg/3 mL (DUONEB) 0.5-2.5 (3) MG/3ML neb solution Take 1 vial by nebulization 4 times daily       latanoprost (XALATAN) 0.005 % ophthalmic solution Place 1 drop into both eyes At Bedtime Both Eyes 3 Bottle 4     melatonin 3 MG tablet Take 1 tablet (3 mg) by mouth nightly as needed for sleep       Multiple Vitamins-Minerals (CERTAVITE/ANTIOXIDANTS PO) Take 1 tablet by mouth daily       polyethylene glycol (MIRALAX/GLYCOLAX) packet Take 8.5 g by mouth daily AND 17 g DAILY PRN - hold with loose stools       senna-docusate (SENOKOT-S/PERICOLACE) 8.6-50 MG tablet Take 1 tablet by mouth At Bedtime        traMADol (ULTRAM) 50 MG tablet Take 0.5 tablets (25 mg) by mouth At Bedtime 20 tablet 0     REVIEW OF SYSTEMS: Limited secondary to cognitive impairment but today pt reports as above in HPI    Objective: BP (!) 142/72   Pulse 82   Temp 98.8  F (37.1  C)   Resp 16   Ht 1.473 m (4' 10\")   Wt 59.2 kg (130 lb 9.6 oz)   SpO2 92%   BMI 27.30 kg/m    Limited visit exam done given COVID-19 precautions.   Lying in bed, casually dressed, frail  Slightly pale  Denies dyspnea subjectively but objectively has mildly labored breathing, no cough  Though I can't hear it via video, RN reports upper lung field/neck area expiratory wheezes (?stridor)  RN palpated neck and didn't feel any asymmetry or detect any areas of tenderness  Alejandrina was able to open mouth showing mildly dry oral mucosa; no tongue swelling  Voice seems normal to me but per RN it seems slightly weaker than Alejandrina Noble's baseline  Affect frustrated and irritated by COVID-19 pandemic visitor restrictions    Labs:   CXR 5/17/20 showing chronic peribronchial thickening without infiltrates or fluid    UA 5/16/20 moderate blood, protein +, Lg leukocytes, Many bacteria, 5-10 RBC, >100 WBC  UCx showed \"mixed bacteria\" and sensitivities thus not completed    Last Basic Metabolic Panel:  Sodium   Date " Value Ref Range Status   03/03/2020 141 136 - 145 mmol/L Final     Potassium   Date Value Ref Range Status   03/03/2020 3.9 3.5 - 5.0 mmol/L Final     Chloride   Date Value Ref Range Status   03/03/2020 107 98 - 107 mmol/L Final     Carbon Dioxide   Date Value Ref Range Status   03/03/2020 27 22 - 31 mmol/L Final     Anion Gap   Date Value Ref Range Status   03/03/2020 7 5 - 18 mmol/L Final     Glucose   Date Value Ref Range Status   03/03/2020 97 70 - 125 mg/dL Final     Urea Nitrogen   Date Value Ref Range Status   03/03/2020 19 8 - 28 mg/dL Final     Creatinine   Date Value Ref Range Status   03/03/2020 0.77 0.60 - 1.10 mg/dL Final     GFR Estimate   Date Value Ref Range Status   03/03/2020 >60 >60 ml/min/1.73m2 Final     Calcium   Date Value Ref Range Status   03/03/2020 8.9 8.5 - 10.5 mg/dL Final     Lab Results   Component Value Date    WBC 10.3 03/03/2020     Lab Results   Component Value Date    RBC 3.19 03/03/2020     Lab Results   Component Value Date    HGB 10.8 03/03/2020     Lab Results   Component Value Date    HCT 33.5 03/03/2020     Lab Results   Component Value Date     03/03/2020     Lab Results   Component Value Date    MCH 33.9 03/03/2020     Lab Results   Component Value Date    MCHC 32.2 03/03/2020     Lab Results   Component Value Date    RDW 14.1 03/03/2020     Lab Results   Component Value Date     03/03/2020       ASSESSMENT/PLAN:  Alejandrina Whatley has h/o dementia, ischemic CVAs, recurrent UTI and depression seen today for 1 week of low-grade fever, cough, wheezing and lethargy. UA+ but UCx mixed and not improving on 5 days of Cipro (doesn't endorse UTI symptoms either) and has on-going respiratory symptoms. Recent CXR showing chronic peribronchial thickening without infiltrates or fluid.  Discontinue DuoNebs (not helping and in case second COVID-19 test returns positive would want these discontinued anyway plus she has no known h/o chronic lung disease)  Suspect COVID-19  results will be back tomorrow and I gave my direct number for the staff to contact me with results; recall no residents at facility are positive to date though  Start prednisone 20 mg daily x 5 days given wheezing; monitor for delirium given h/o dementia  Discontinue Cipro (already had 5 day course and still having periodic low grade temps without urinary symptoms and with respiratory symptoms)  Start Augmentin 875-125 mg One tablet BID x 10 days for respiratory illness (?aspiration in setting of remote CVAs), on-going low grade temps with cough and upper airway wheezing/voice changes  Continue routine vital sign monitoring as is being currently done  Brought up the idea of speech eval and treat to look for dysphagia given possible stridor and known h/o strokes but she declined this  Consider future repeat labs to compare to March labs and/or repeat CXR (or to include neck/tracheal area) if on-going wheezing/fevers/poor oral intake  Daryl Stewart is very involved and calls NM several times per day and is very anxiety-ridden with Alejandrina Noble's illness  I spoke with daryl Stewart for 17 minutes today 3:15 PM to 3:32 PM and will also change Celexa 20 mg every PM to Lexapro 10 mg every PM to see if less sedation as well as improved control of depression and Pat liked this idea as well    ADDENDUM: Alejandrina Noble's second COVID-19 screening has now also thankfully returned negative. Still no positive cases at facility either. I updated daryl Stewart and also spoke with CHRISSY Parra the following day and Alejandrina Noble is doing much better, barely coughing, no audible wheezing, remaining afebrile now almost 48 hours and a tad more energy. If continues to clinically improve, would continue to monitor, no repeat imaging or labs needed nor likely speech eval.      Electronically signed by:  Yulissa Garcia DO     Video-Visit Details  Type of service:  Video Visit  Video End Time (time video stopped): 1:35 PM  Distant Location  (provider location):  [facility]     Total time spent with patient visit was 59 minutes including patient visit (17 minutes), review of past records (10 minutes), discussion with nursing staff (15 minutes - phone call with nursing manager from 1:55 PM - 2:10 PM) and discussion with daughter Pat over the phone (17 minutes from 3:15 PM - 3:32 PM) on overall plan of care going forward. Greater than 50% of total time spent with counseling and coordinating care due to several medication changes and continued work up and management of depression, fatigue and febrile illness.          Sincerely,        Yulissa Garcia, DO

## 2020-05-23 PROBLEM — F32.A ANXIETY AND DEPRESSION: Status: ACTIVE | Noted: 2020-05-23

## 2020-05-23 PROBLEM — F41.9 ANXIETY AND DEPRESSION: Status: ACTIVE | Noted: 2020-05-23

## 2020-06-03 ENCOUNTER — TELEPHONE (OUTPATIENT)
Dept: MEDSURG UNIT | Facility: CLINIC | Age: 85
End: 2020-06-03

## 2020-06-04 DIAGNOSIS — R52 PAIN: ICD-10-CM

## 2020-06-04 RX ORDER — TRAMADOL HYDROCHLORIDE 50 MG/1
25 TABLET ORAL AT BEDTIME
Qty: 20 TABLET | Refills: 0 | Status: SHIPPED | OUTPATIENT
Start: 2020-06-04 | End: 2020-07-03

## 2020-06-08 ENCOUNTER — TELEPHONE (OUTPATIENT)
Dept: GERIATRICS | Facility: CLINIC | Age: 85
End: 2020-06-08

## 2020-06-08 NOTE — TELEPHONE ENCOUNTER
Patient with fever 99.5 today and sats 89% on room air. No cough and lungs clear. Tested negative for COVID two weeks ago.     PLAN  PRN 1-3 lpm o2 to keep sats 90% or better  COVID19 swab today  Precautions until xray results available.     Electronically signed by CRESCENCIO Mcconnell GNP

## 2020-07-03 DIAGNOSIS — R52 PAIN: ICD-10-CM

## 2020-07-03 RX ORDER — TRAMADOL HYDROCHLORIDE 50 MG/1
25 TABLET ORAL AT BEDTIME
Qty: 20 TABLET | Refills: 0 | Status: SHIPPED | OUTPATIENT
Start: 2020-07-03 | End: 2020-08-13

## 2020-07-16 ENCOUNTER — VIRTUAL VISIT (OUTPATIENT)
Dept: GERIATRICS | Facility: CLINIC | Age: 85
End: 2020-07-16
Payer: COMMERCIAL

## 2020-07-16 VITALS
OXYGEN SATURATION: 93 % | SYSTOLIC BLOOD PRESSURE: 140 MMHG | TEMPERATURE: 98 F | HEART RATE: 75 BPM | BODY MASS INDEX: 27.18 KG/M2 | RESPIRATION RATE: 18 BRPM | WEIGHT: 129.5 LBS | DIASTOLIC BLOOD PRESSURE: 74 MMHG | HEIGHT: 58 IN

## 2020-07-16 DIAGNOSIS — F32.A DEPRESSION, UNSPECIFIED DEPRESSION TYPE: ICD-10-CM

## 2020-07-16 DIAGNOSIS — M48.061 SPINAL STENOSIS OF LUMBAR REGION WITHOUT NEUROGENIC CLAUDICATION: ICD-10-CM

## 2020-07-16 DIAGNOSIS — I69.354 HEMIPARESIS AFFECTING LEFT SIDE AS LATE EFFECT OF CEREBROVASCULAR ACCIDENT (CVA) (H): ICD-10-CM

## 2020-07-16 DIAGNOSIS — U07.1 2019 NOVEL CORONAVIRUS DISEASE (COVID-19): ICD-10-CM

## 2020-07-16 DIAGNOSIS — R53.81 DEBILITY: ICD-10-CM

## 2020-07-16 DIAGNOSIS — Z86.73 H/O: CVA (CEREBROVASCULAR ACCIDENT): Primary | ICD-10-CM

## 2020-07-16 DIAGNOSIS — G89.29 OTHER CHRONIC PAIN: ICD-10-CM

## 2020-07-16 DIAGNOSIS — N18.30 CHRONIC KIDNEY DISEASE, STAGE 3 (MODERATE): ICD-10-CM

## 2020-07-16 DIAGNOSIS — F03.90 DEMENTIA WITHOUT BEHAVIORAL DISTURBANCE, UNSPECIFIED DEMENTIA TYPE: ICD-10-CM

## 2020-07-16 DIAGNOSIS — I71.40 ABDOMINAL AORTIC ANEURYSM (AAA) WITHOUT RUPTURE (H): ICD-10-CM

## 2020-07-16 DIAGNOSIS — I10 ESSENTIAL HYPERTENSION: ICD-10-CM

## 2020-07-16 PROCEDURE — 99309 SBSQ NF CARE MODERATE MDM 30: CPT | Mod: 95 | Performed by: NURSE PRACTITIONER

## 2020-07-16 ASSESSMENT — MIFFLIN-ST. JEOR: SCORE: 867.16

## 2020-07-16 NOTE — PROGRESS NOTES
"[facility]  Type of service: Video Visit  Alejandrina Whatley is being evaluated via a billable visit due to the restrictions of the Covid-19 pandemic and facility request that providers do not come into the building at this time.    The patient or first contact has been notified of following:  \"This video visit will be conducted via a call between you and your provider. We have found that certain health care needs can be provided without the need for an in-person physical exam.  This service lets us provide the care you need with a video conversation. If during the course of the call the provider feels a video visit is not appropriate, you will not be charged for this service.\"   The provider has received verbal consent for a Video or Telephone Visit from the patient and or first contact? Yes  Video or Telephone Start Time: 1005  [facility] Record Number:  [id number]  Where the Patient is living now: [facility]   Chief Complaint   Patient presents with     Annual Comprehensive Nursing Home     HPI:    Alejandrina Whatley  is a 89 year old  (8/22/1923), who is being seen today for an annual comprehensive visit. HPI information obtained from: facility chart records, facility staff, patient report, [facility] chart review and family/first contact daughter/Pat report.      Met with Mrs. Whatley today for her annual review.  Mrs. Whatley was sitting up in a wheelchair, cheerful, pleasant.  She reports she is doing well, kept complementing the RN/staff on the wonderful care she get's.  She reports pain has been minimal to none in her low back, sleeping well, doing well.  Her only complaint/wish is that her Left leg wound \"Work again\" so she could walk.  She has no other complaints.      ALLERGIES: Actonel [bisphosphonates]; Conjugated estrogens; Macrobid [nitrofuran derivatives]; Nitrofurantoin; Premarin; Sulfa drugs; Hydrocodone; Oxycodone; and Risedronate  PAST MEDICAL HISTORY:  " has a past medical history of Abdominal aortic aneurysm (H) (2001), AK (actinic keratosis) (11/11/2009), Cataract (2/22/2011), Compression fractures, Compression Fractures of Lumbar Vertebra (2/4/2010), DJD (degenerative joint disease), Generalised Weakness and Fatigue (3/3/2011), Glaucoma (increased eye pressure) (2009), HTN (hypertension) (11/11/2009), Hypercholesteremia (2001), Hyperlipidemia LDL goal <100 (10/31/2010), 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 She also has no past medical history of Amblyopia, Complication of anesthesia, Diabetes (H), Diabetic retinopathy (H), Macular degeneration, Malignant hyperthermia, Retinal detachment, Strabismus, or Uveitis.  PAST SURGICAL HISTORY:  has a past surgical history that includes hysterectomy, cervix status unknown (1971); APPENDECTOMY (1971); ANTER VESICOURETHROPEXY,SIMPLE (2008); aaa repair (2/2009); Extracapsular cataract extration with intraocular lens implant (4/2010,5/2010); Laser selective trabeculoplasty (3/2010; 10/2015); cataract iol, rt/lt; and Laser selective trabeculoplasty (12/2017).  IMMUNIZATIONS:  Immunization History   Administered Date(s) Administered     Flu 65+ Years 09/19/2017     Pneumo Conj 13-V (2010&after) 09/12/2016     Pneumococcal 23 valent 11/01/2006, 01/01/2009     TD (ADULT, 7+) 11/01/2006, 11/01/2008     TDAP Vaccine (Adacel) 01/24/2013     Zoster vaccine, live 03/23/2017     Above immunizations pulled from [facility]. [facility] and facility records also reconciled. Outstanding information sent to  to update [facility].  Future immunizations are not needed at this point as all recommended immunizations are up to date.     Current Outpatient Medications   Medication Sig Dispense Refill     acetaminophen (TYLENOL)  500 MG tablet Take 1,000 mg by mouth 3 times daily 8am, 2pm, 8pm       amLODIPine (NORVASC) 5 MG tablet Take 1 tablet (5 mg) by mouth daily 30 tablet 0     amoxicillin-clavulanate (AUGMENTIN) 875-125 MG tablet Take 1 tablet by mouth 2 times daily For 10 days       aspirin (ASA) 325 MG tablet Take 1 tablet (325 mg) by mouth daily 30 tablet 0     atorvastatin (LIPITOR) 40 MG tablet Take 40 mg by mouth every evening       carvedilol (COREG) 6.25 MG tablet Take 6.25 mg by mouth 2 times daily (with meals)       Cholecalciferol (VITAMIN D3) 50 MCG (2000 UT) TABS Take 2,000 Units by mouth daily       dorzolamide-timolol (COSOPT) 2-0.5 % ophthalmic solution Place 1 drop into both eyes 2 times daily 1 Bottle 11     escitalopram (LEXAPRO) 10 MG tablet Take 10 mg by mouth every evening       latanoprost (XALATAN) 0.005 % ophthalmic solution Place 1 drop into both eyes At Bedtime Both Eyes 3 Bottle 4     melatonin 3 MG tablet Take 1 tablet (3 mg) by mouth nightly as needed for sleep       Multiple Vitamins-Minerals (CERTAVITE/ANTIOXIDANTS PO) Take 1 tablet by mouth daily       polyethylene glycol (MIRALAX/GLYCOLAX) packet Take 8.5 g by mouth daily AND 17 g DAILY PRN - hold with loose stools       senna-docusate (SENOKOT-S/PERICOLACE) 8.6-50 MG tablet Take 1 tablet by mouth At Bedtime        traMADol (ULTRAM) 50 MG tablet Take 0.5 tablets (25 mg) by mouth At Bedtime 20 tablet 0     Case Management:  I have reviewed the facility/SNF care plan/MDS, including the falls risk, nutrition and pain screening. I also reviewed the current immunizations, and preventive care. .Future cancer screening is not clinically indicated secondary to age/goals of care Patient's desire to return to the community is not present. Current Level of Care is appropriate.    Wellness checks  Labs: Recommended but declined by POA/daughter.   Cancer screening:  Not indicated due to age/goals of care.   Vaccinations: Influenza, Pneumo, Tdap: All current.  "    Advance Directive Discussion:    I reviewed the current advanced directives as reflected in EPIC, the POLST and the facility chart, and verified the congruency of orders. I contacted the first party Pat/daughter and discussed the plan of Care.  I did not due to cognitive impairment review the advance directives with the resident.     Pat notes Mrs. Whatley remains DNR/DNI but also notes she does not want aggressive cares, wants to avoid returns to hospital if possible.  I did mention for her annual we would recommend some labs, but daughter even did not want those at this time.     Team Discussion:  I communicated with the appropriate disciplines involved with the Plan of Care:   Nursing    Patient's goal is unobtainable secondary to cognitive impairment and family's/responsible party's goal for the patient is pain control and comfort.  Information reviewed:  Medications, vital signs, orders, and nursing notes.    ROS:  Limited secondary to cognitive impairment but today pt reports 7 point ROS done including, light headedness/dizziness, fever/chills, pain, Resp, CV, GI, and  and is negative other than noted in HPI.      Vitals:  BP (!) 140/74   Pulse 75   Temp 98  F (36.7  C)   Resp 18   Ht 1.473 m (4' 10\")   Wt 58.7 kg (129 lb 8 oz)   SpO2 93%   BMI 27.07 kg/m   Body mass index is 27.07 kg/m .     Exam:  EXAM LIMITED DUE TO [facility] social distancing recommendations:  GENERAL APPEARANCE:  Elderly female sitting up in wheelchair. NAD, non-toxic.  RESP:   Regular relaxed breathing effort.  No cough.   EXTREMITIES:  No lower extremity edema, no calf tenderness.   PSYCH: Alert to self and some what to situation, date.  Clear degree of cognitive/memory impairment, but mild.      Lab/Diagnostic data:   I have personally reviewed labs, which are in facility or EMR chart.     ASSESSMENT/PLAN  2019 novel coronavirus disease (COVID-19)  Mrs. Whatley turned up positive for COVID during a system wide screening of the LTC " on/around 6/29.  She was and remained afebrile and asymptomatic.  Has been moved to [facility] for isolation/pandemic precautions.  She continues to be asymptomatic.   -Retesting done 7/15 and to be done today 7/16 to see current COVID status, results are still pending.     H/O: CVA (cerebrovascular accident), Dec 19 and Jan 20  Hemiparesis affecting left side as late effect of cerebrovascular accident (CVA) (H)  Had a Right ischemic CVA in Dec 2019 and then bilateral one in Jan 2020, residual cognitive impairment and Left hemiparesis.  Was followed by Neuro, recommended cardiac workup with burgos and ECHO, family declined.    Lately stable, but Left hemiparesis leaves her WC bound, non-ambulatory.   -Continues ASA and Statin.     Essential hypertension  Abdominal aortic aneurysm (AAA) without rupture (H)  H/o bypass graph for AAA.    Review of VS's show VSS and within acceptable range.   -Continues on Norvasc and Coreg.     Chronic kidney disease, stage 3 (moderate) (H)  Creatine baseline 0.7-0.9 with last creatine being 0.77 in March.   -Family declined annual recheck.     Lumbar stenosis without claudication  Chronic pain  Currently denies any pain, reports she is doing well.  Daughter reports she has failed two prior attempts at GDR, is requesting we do not try again.  I would concur, Mrs. Whatley is doing well, no pain, active, thus continue as is.   -Continues TID Acetaminophen and at bedtime Tramadol.     Depression, unspecified depression type  Dementia without behavioral disturbance, unspecified dementia type (H)  Reports mood in good spirits.  Do note cognitive/memory impairment, but no cognitive testing noted since CVA's.      -Continues Escitalopram.     Debility  Has ongoing Left hemiparesis due to CVA's, can pivot transfer with help, but mostly WC bound.   -No changes, continue to clinically monitor.     Orders written by provider at facility  -NNO.     -Called/updated daughter/Pat, questions answered.      Electronically signed by:  CRESCENCIO Loyola CNP     Visit Details  Video or Telephone End Time: Time started 1005 ; time ended 1010 ; total of 5 minutes.    Distant Location (provider location):  [facility]

## 2020-07-16 NOTE — LETTER
"    7/16/2020        RE: Alejandrina Whatley  [facility]  [address] [facility] [address]        [facility]  Type of service: Video Visit  Alejandrina Whatley is being evaluated via a billable visit due to the restrictions of the Covid-19 pandemic and facility request that providers do not come into the building at this time.    The patient or first contact has been notified of following:  \"This video visit will be conducted via a call between you and your provider. We have found that certain health care needs can be provided without the need for an in-person physical exam.  This service lets us provide the care you need with a video conversation. If during the course of the call the provider feels a video visit is not appropriate, you will not be charged for this service.\"   The provider has received verbal consent for a Video or Telephone Visit from the patient and or first contact? Yes  Video or Telephone Start Time: 1005  [facility] Medical Record Number:  [id number]  Where the Patient is living now: [facility]   Chief Complaint   Patient presents with     Annual Comprehensive Nursing Home     HPI:    Alejandrina Whatley  is a 89 year old  (8/22/1923), who is being seen today for an annual comprehensive visit. HPI information obtained from: facility chart records, facility staff, patient report, [facility] chart review and family/first contact daughter/Pat report.      Met with Mrs. Whatley today for her annual review.  Mrs. Whatley was sitting up in a wheelchair, cheerful, pleasant.  She reports she is doing well, kept complementing the RN/staff on the wonderful care she get's.  She reports pain has been minimal to none in her low back, sleeping well, doing well.  Her only complaint/wish is that her Left leg wound \"Work again\" so she could walk.  She has no other complaints.      ALLERGIES: Actonel [bisphosphonates]; Conjugated estrogens; Macrobid [nitrofuran " derivatives]; Nitrofurantoin; Premarin; Sulfa drugs; Hydrocodone; Oxycodone; and Risedronate  PAST MEDICAL HISTORY:  has a past medical history of Abdominal aortic aneurysm (H) (2001), AK (actinic keratosis) (11/11/2009), Cataract (2/22/2011), Compression fractures, Compression Fractures of Lumbar Vertebra (2/4/2010), DJD (degenerative joint disease), Generalised Weakness and Fatigue (3/3/2011), Glaucoma (increased eye pressure) (2009), HTN (hypertension) (11/11/2009), Hypercholesteremia (2001), Hyperlipidemia LDL goal <100 (10/31/2010), 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 She also has no past medical history of Amblyopia, Complication of anesthesia, Diabetes (H), Diabetic retinopathy (H), Macular degeneration, Malignant hyperthermia, Retinal detachment, Strabismus, or Uveitis.  PAST SURGICAL HISTORY:  has a past surgical history that includes hysterectomy, cervix status unknown (1971); APPENDECTOMY (1971); ANTER VESICOURETHROPEXY,SIMPLE (2008); aaa repair (2/2009); Extracapsular cataract extration with intraocular lens implant (4/2010,5/2010); Laser selective trabeculoplasty (3/2010; 10/2015); cataract iol, rt/lt; and Laser selective trabeculoplasty (12/2017).  IMMUNIZATIONS:  Immunization History   Administered Date(s) Administered     Flu 65+ Years 09/19/2017     Pneumo Conj 13-V (2010&after) 09/12/2016     Pneumococcal 23 valent 11/01/2006, 01/01/2009     TD (ADULT, 7+) 11/01/2006, 11/01/2008     TDAP Vaccine (Adacel) 01/24/2013     Zoster vaccine, live 03/23/2017     Above immunizations pulled from [employer]. [facility] and facility records also reconciled. Outstanding information sent to  to update [employer].  Future immunizations are not needed at this point as all recommended  immunizations are up to date.     Current Outpatient Medications   Medication Sig Dispense Refill     acetaminophen (TYLENOL) 500 MG tablet Take 1,000 mg by mouth 3 times daily 8am, 2pm, 8pm       amLODIPine (NORVASC) 5 MG tablet Take 1 tablet (5 mg) by mouth daily 30 tablet 0     amoxicillin-clavulanate (AUGMENTIN) 875-125 MG tablet Take 1 tablet by mouth 2 times daily For 10 days       aspirin (ASA) 325 MG tablet Take 1 tablet (325 mg) by mouth daily 30 tablet 0     atorvastatin (LIPITOR) 40 MG tablet Take 40 mg by mouth every evening       carvedilol (COREG) 6.25 MG tablet Take 6.25 mg by mouth 2 times daily (with meals)       Cholecalciferol (VITAMIN D3) 50 MCG (2000 UT) TABS Take 2,000 Units by mouth daily       dorzolamide-timolol (COSOPT) 2-0.5 % ophthalmic solution Place 1 drop into both eyes 2 times daily 1 Bottle 11     escitalopram (LEXAPRO) 10 MG tablet Take 10 mg by mouth every evening       latanoprost (XALATAN) 0.005 % ophthalmic solution Place 1 drop into both eyes At Bedtime Both Eyes 3 Bottle 4     melatonin 3 MG tablet Take 1 tablet (3 mg) by mouth nightly as needed for sleep       Multiple Vitamins-Minerals (CERTAVITE/ANTIOXIDANTS PO) Take 1 tablet by mouth daily       polyethylene glycol (MIRALAX/GLYCOLAX) packet Take 8.5 g by mouth daily AND 17 g DAILY PRN - hold with loose stools       senna-docusate (SENOKOT-S/PERICOLACE) 8.6-50 MG tablet Take 1 tablet by mouth At Bedtime        traMADol (ULTRAM) 50 MG tablet Take 0.5 tablets (25 mg) by mouth At Bedtime 20 tablet 0     Case Management:  I have reviewed the facility/SNF care plan/MDS, including the falls risk, nutrition and pain screening. I also reviewed the current immunizations, and preventive care. .Future cancer screening is not clinically indicated secondary to age/goals of care Patient's desire to return to the community is not present. Current Level of Care is appropriate.    Wellness checks  Labs: Recommended but declined by  "POA/daughter.   Cancer screening:  Not indicated due to age/goals of care.   Vaccinations: Influenza, Pneumo, Tdap: All current.     Advance Directive Discussion:    I reviewed the current advanced directives as reflected in EPIC, the POLST and the facility chart, and verified the congruency of orders. I contacted the first party Pat/daughter and discussed the plan of Care.  I did not due to cognitive impairment review the advance directives with the resident.     Pat notes Mrs. Whatley remains DNR/DNI but also notes she does not want aggressive cares, wants to avoid returns to hospital if possible.  I did mention for her annual we would recommend some labs, but daughter even did not want those at this time.     Team Discussion:  I communicated with the appropriate disciplines involved with the Plan of Care:   Nursing    Patient's goal is unobtainable secondary to cognitive impairment and family's/responsible party's goal for the patient is pain control and comfort.  Information reviewed:  Medications, vital signs, orders, and nursing notes.    ROS:  Limited secondary to cognitive impairment but today pt reports 7 point ROS done including, light headedness/dizziness, fever/chills, pain, Resp, CV, GI, and  and is negative other than noted in HPI.      Vitals:  BP (!) 140/74   Pulse 75   Temp 98  F (36.7  C)   Resp 18   Ht 1.473 m (4' 10\")   Wt 58.7 kg (129 lb 8 oz)   SpO2 93%   BMI 27.07 kg/m   Body mass index is 27.07 kg/m .     Exam:  EXAM LIMITED DUE TO [facility] social distancing recommendations:  GENERAL APPEARANCE:  Elderly female sitting up in wheelchair. NAD, non-toxic.  RESP:   Regular relaxed breathing effort.  No cough.   EXTREMITIES:  No lower extremity edema, no calf tenderness.   PSYCH: Alert to self and some what to situation, date.  Clear degree of cognitive/memory impairment, but mild.      Lab/Diagnostic data:   I have personally reviewed labs, which are in facility or EMR chart. "     ASSESSMENT/PLAN  H/O: CVA (cerebrovascular accident), Dec 19 and Jan 20  Hemiparesis affecting left side as late effect of cerebrovascular accident (CVA) (H)  Had a Right ischemic CVA in Dec 2019 and then bilateral one in Jan 2020, residual cognitive impairment and Left hemiparesis.  Was followed by Neuro, recommended cardiac workup with burgos and ECHO, family declined.    Lately stable, but Left hemiparesis leaves her WC bound, non-ambulatory.   -Continues ASA and Statin.     Essential hypertension  Abdominal aortic aneurysm (AAA) without rupture (H)  H/o bypass graph for AAA.    Review of VS's show VSS and within acceptable range.   -Continues on Norvasc and Coreg.     Chronic kidney disease, stage 3 (moderate) (H)  Creatine baseline 0.7-0.9 with last creatine being 0.77 in March.   -Family declined annual recheck.     Lumbar stenosis without claudication  Chronic pain  Currently denies any pain, reports she is doing well.  Daughter reports she has failed two prior attempts at GDR, is requesting we do not try again.  I would concur, Mrs. Whatley is doing well, no pain, active, thus continue as is.   -Continues TID Acetaminophen and at bedtime Tramadol.     Depression, unspecified depression type  Dementia without behavioral disturbance, unspecified dementia type (H)  Reports mood in good spirits.  Do note cognitive/memory impairment, but no cognitive testing noted since CVA's.      -Continues Escitalopram.     Debility  Has ongoing Left hemiparesis due to CVA's, can pivot transfer with help, but mostly WC bound.   -No changes, continue to clinically monitor.     Orders written by provider at facility  -NNO.     -Called/updated daughter/Pat, questions answered.     Electronically signed by:  CRESCENCIO Loyola CNP     Visit Details  Video or Telephone End Time: Time started 1005 ; time ended 1010 ; total of 5 minutes.    Distant Location (provider location):  [place] [facility]        Sincerely,        CRESCENCIO Loyola CNP

## 2020-07-17 PROBLEM — G89.29 OTHER CHRONIC PAIN: Status: ACTIVE | Noted: 2020-07-17

## 2020-07-17 PROBLEM — M48.061 SPINAL STENOSIS OF LUMBAR REGION WITHOUT NEUROGENIC CLAUDICATION: Status: ACTIVE | Noted: 2020-07-17

## 2020-07-17 PROBLEM — R53.81 DEBILITY: Status: ACTIVE | Noted: 2020-07-17

## 2020-07-17 PROBLEM — F32.A DEPRESSION, UNSPECIFIED DEPRESSION TYPE: Status: ACTIVE | Noted: 2020-07-17

## 2020-07-17 PROBLEM — Z86.73 H/O: CVA (CEREBROVASCULAR ACCIDENT): Status: ACTIVE | Noted: 2020-07-17

## 2020-07-17 PROBLEM — I69.354 HEMIPARESIS AFFECTING LEFT SIDE AS LATE EFFECT OF CEREBROVASCULAR ACCIDENT (CVA) (H): Status: ACTIVE | Noted: 2020-07-17

## 2020-08-13 DIAGNOSIS — R52 PAIN: ICD-10-CM

## 2020-08-13 RX ORDER — TRAMADOL HYDROCHLORIDE 50 MG/1
25 TABLET ORAL AT BEDTIME
Qty: 30 TABLET | Refills: 0 | Status: ON HOLD | OUTPATIENT
Start: 2020-08-13 | End: 2021-09-22

## 2020-09-04 ENCOUNTER — RECORDS - HEALTHEAST (OUTPATIENT)
Dept: LAB | Facility: CLINIC | Age: 85
End: 2020-09-04

## 2020-09-04 ENCOUNTER — TELEPHONE (OUTPATIENT)
Dept: GERIATRICS | Facility: CLINIC | Age: 85
End: 2020-09-04

## 2020-09-04 LAB
ALBUMIN UR-MCNC: NEGATIVE MG/DL
APPEARANCE UR: CLEAR
BACTERIA #/AREA URNS HPF: ABNORMAL HPF
BILIRUB UR QL STRIP: NEGATIVE
COLOR UR AUTO: YELLOW
GLUCOSE UR STRIP-MCNC: NEGATIVE MG/DL
HGB UR QL STRIP: NEGATIVE
KETONES UR STRIP-MCNC: NEGATIVE MG/DL
LEUKOCYTE ESTERASE UR QL STRIP: ABNORMAL
MUCOUS THREADS #/AREA URNS LPF: ABNORMAL LPF
NITRATE UR QL: NEGATIVE
PH UR STRIP: 6.5 [PH] (ref 4.5–8)
RBC #/AREA URNS AUTO: ABNORMAL HPF
SP GR UR STRIP: 1.01 (ref 1–1.03)
SQUAMOUS #/AREA URNS AUTO: ABNORMAL LPF
UROBILINOGEN UR STRIP-ACNC: ABNORMAL
WBC #/AREA URNS AUTO: ABNORMAL HPF

## 2020-09-04 NOTE — TELEPHONE ENCOUNTER
RN called to report Mrs. Whatley is having dysuria, does have a h/o recurrent UTI's.  Apparently they called the on-call last night and urine was done, it was equivocal.   Yellow/clear, 25-50 WBC, many bacteria, clean catch urine.  Has been afebrile.   Plan:  -Push PO fluids for several days.   -Call when culture is available and let us know if pushing fluids was enough or if still symptomatic.

## 2020-09-06 LAB — BACTERIA SPEC CULT: ABNORMAL

## 2020-09-14 ENCOUNTER — NURSING HOME VISIT (OUTPATIENT)
Dept: GERIATRICS | Facility: CLINIC | Age: 85
End: 2020-09-14
Payer: COMMERCIAL

## 2020-09-14 VITALS
WEIGHT: 129.41 LBS | HEIGHT: 58 IN | DIASTOLIC BLOOD PRESSURE: 76 MMHG | BODY MASS INDEX: 27.16 KG/M2 | TEMPERATURE: 97.4 F | SYSTOLIC BLOOD PRESSURE: 133 MMHG | HEART RATE: 68 BPM | OXYGEN SATURATION: 95 %

## 2020-09-14 DIAGNOSIS — F03.90 DEMENTIA WITHOUT BEHAVIORAL DISTURBANCE, UNSPECIFIED DEMENTIA TYPE: ICD-10-CM

## 2020-09-14 DIAGNOSIS — F41.9 ANXIETY AND DEPRESSION: ICD-10-CM

## 2020-09-14 DIAGNOSIS — I69.354 HEMIPARESIS AFFECTING LEFT SIDE AS LATE EFFECT OF CEREBROVASCULAR ACCIDENT (CVA) (H): ICD-10-CM

## 2020-09-14 DIAGNOSIS — Z87.440 HISTORY OF RECURRENT UTI (URINARY TRACT INFECTION): ICD-10-CM

## 2020-09-14 DIAGNOSIS — Z86.16 HISTORY OF 2019 NOVEL CORONAVIRUS DISEASE (COVID-19): Primary | ICD-10-CM

## 2020-09-14 DIAGNOSIS — F32.A ANXIETY AND DEPRESSION: ICD-10-CM

## 2020-09-14 PROCEDURE — 99309 SBSQ NF CARE MODERATE MDM 30: CPT | Performed by: INTERNAL MEDICINE

## 2020-09-14 ASSESSMENT — MIFFLIN-ST. JEOR: SCORE: 861.75

## 2020-09-14 NOTE — Clinical Note
MARY, thanks for helping with her discharge to group home - sounds that will be a good fit for Alejandrina Norman

## 2020-09-14 NOTE — PROGRESS NOTES
[place] [facility]  PHYSICIAN NOTE    Chief Complaint   Patient presents with     FPC Regulatory       HPI:    Alejandrina Whatley is a 89 year old  (8/22/1923), who is being seen today for a federally mandated E/M visit at [facility] .     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 She has some lingering left sided hemiparesis and also cognitive impairment consistent with dementia. She unfortunately had recurrent subacute ischemic strokes (left cerebellar and right MCA) in January 2020 and neurology recommended consideration of further work up (JAYASHREE, Kathe) but family decided based on goals of care to limit excessive testing. She also has chronic mild back pain with low dose Tramadol which has been helpful with failed attempts at GDR, recurrent UTI and depression/anxiety as well as recent COVID-19 infection.    She is seen in her room today and didn't seem to know she'd had the COVID-19 infection as she felt asymptomatic throughout; remembers going to [facility] for a couple weeks and is now back in her prior room. She was positive for COVID-19 infection in June 2020. She is having some ongoing physical therapy for rehab after COVID-19 but is starting to plateau again. I spoke with her physical therapist who was also in room with Alejandrina Noble today who mentioned that she has been cooperative but recently starting to try to avoid physical therapy. Alejandrina Noble is appreciative of staff but references at her age its a lot of work and she just isn't that interested. However, does also wish her left side could be stronger again. Physical therapist mentioned Alejandrina Noble has made some progress with improved transfer ability, can walk 50 feet with walker and SBA though has chronic post-stroke steppage gait.    Alejandrina Noble says her mood is good and denies depression and anxiety which is really a first for me as its been tough  for her to be away from her daughter Pat. We changed Celexa to Lexapro in May of this year. She says she didn't realize she was on a medication for her mood but is ok with this.    She denies dyspnea or any current pain. She did need help to get up and use bathroom and wipe afterwards. Able to wash her own hands. Of note, 1.5 weeks ago had some repeat UTI concerns and UA/UCx obtained but there was a delay in results. Ultimately had UCx showing >100,000 Klebsiella but as staff had been pushing oral hydration, by the time results came back she was completely asymptomatic so no treatment initiated. I specifically asked her if she had dysuria or frequency and she denied this today. As I was the one to help her use restroom, I could verify urine was light yellow, non-concentrated, no hematuria.    ALLERGIES: Actonel [bisphosphonates]; Conjugated estrogens; Macrobid [nitrofuran derivatives]; Nitrofurantoin; Premarin; Sulfa drugs; Hydrocodone; Oxycodone; and Risedronate    Past Medical History:   Diagnosis Date     Abdominal aortic aneurysm (H) 2001    had a stent placed 2009     AK (actinic keratosis) 11/11/2009     Cataract 2/22/2011     Compression fractures      Compression Fractures of Lumbar Vertebra 2/4/2010     CVA (cerebral vascular accident) (H)     Dec 2019 and Jan 2020     Dementia (H)      DJD (degenerative joint disease)      Generalised Weakness and Fatigue 3/3/2011     Glaucoma (increased eye pressure) 2009    Dr. 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     Strain of shoulder, left 3/3/2011     Urinary incontinence 11/10/2009      CODE STATUS: DNR/I    MEDICATIONS: Reviewed and updated in [facility] according to facility MAR  Current Outpatient Medications   Medication Sig  "Dispense Refill     acetaminophen (TYLENOL) 500 MG tablet Take 1,000 mg by mouth 3 times daily 8am, 2pm, 8pm       amLODIPine (NORVASC) 5 MG tablet Take 1 tablet (5 mg) by mouth daily 30 tablet 0     aspirin (ASA) 325 MG tablet Take 1 tablet (325 mg) by mouth daily 30 tablet 0     atorvastatin (LIPITOR) 40 MG tablet Take 40 mg by mouth every evening       carvedilol (COREG) 6.25 MG tablet Take 6.25 mg by mouth 2 times daily (with meals)       Cholecalciferol (VITAMIN D3) 50 MCG (2000 UT) TABS Take 2,000 Units by mouth daily       dorzolamide-timolol (COSOPT) 2-0.5 % ophthalmic solution Place 1 drop into both eyes 2 times daily 1 Bottle 11     escitalopram (LEXAPRO) 10 MG tablet Take 10 mg by mouth every evening       latanoprost (XALATAN) 0.005 % ophthalmic solution Place 1 drop into both eyes At Bedtime Both Eyes 3 Bottle 4     melatonin 3 MG tablet Take 1 tablet (3 mg) by mouth nightly as needed for sleep       Multiple Vitamins-Minerals (CERTAVITE/ANTIOXIDANTS PO) Take 1 tablet by mouth daily       polyethylene glycol (MIRALAX/GLYCOLAX) packet Take 8.5 g by mouth daily AND 17 g DAILY PRN - hold with loose stools       senna-docusate (SENOKOT-S/PERICOLACE) 8.6-50 MG tablet Take 1 tablet by mouth At Bedtime        traMADol (ULTRAM) 50 MG tablet Take 0.5 tablets (25 mg) by mouth At Bedtime 30 tablet 0       ROS:  Limited secondary to cognitive impairment but today pt reports as above in HPI    Exam:  /76   Pulse 68   Temp 97.4  F (36.3  C)   Ht 1.473 m (4' 10\")   Wt 58.7 kg (129 lb 6.6 oz)   SpO2 95%   BMI 27.05 kg/m    Alert, pleasant, casually dressed, NAD  Breathing non-labored, no cough  Mood euthymic; denies depression/anxiety today  Able to transfer with assist of one from wheelchair to toilet and back and able to self propel wheelchair  No peripheral edema  Normal speech, no tremor    Lab/Diagnostic Data:    No signs/sx of UTI (she has had frequent in the past); had Klebsiella on UCx 9/4/20 but " maybe colonization/passed with pushing fluids - as per HPI she is asymptomatic and no treatment indicated    Last Basic Metabolic Panel:  Sodium   Date Value Ref Range Status   03/03/2020 141 136 - 145 mmol/L Final     Potassium   Date Value Ref Range Status   03/03/2020 3.9 3.5 - 5.0 mmol/L Final     Chloride   Date Value Ref Range Status   03/03/2020 107 98 - 107 mmol/L Final     Carbon Dioxide   Date Value Ref Range Status   03/03/2020 27 22 - 31 mmol/L Final     Anion Gap   Date Value Ref Range Status   03/03/2020 7 5 - 18 mmol/L Final     Glucose   Date Value Ref Range Status   03/03/2020 97 70 - 125 mg/dL Final     Urea Nitrogen   Date Value Ref Range Status   03/03/2020 19 8 - 28 mg/dL Final     Creatinine   Date Value Ref Range Status   03/03/2020 0.77 0.60 - 1.10 mg/dL Final     GFR Estimate   Date Value Ref Range Status   03/03/2020 >60 >60 ml/min/1.73m2 Final     Calcium   Date Value Ref Range Status   03/03/2020 8.9 8.5 - 10.5 mg/dL Final     Lab Results   Component Value Date    WBC 10.3 03/03/2020     Lab Results   Component Value Date    HGB 10.8 03/03/2020       Lab Results   Component Value Date     03/03/2020       Lab Results   Component Value Date     03/03/2020         ASSESSMENT/PLAN:  History of 2019 novel coronavirus disease (COVID-19)  Never symptomatic per history and currently denies any concerns in this regard    Dementia without behavioral disturbance, unspecified dementia type (H)  Appreciate support of LTC team  CPT Score 4.2/5.6 and SLUMS Score 14/30 during TCU stay  No behaviors but does seem to expect a lot from staff    Hemiparesis affecting left side as late effect of cerebrovascular accidents in Dec 2019 and Jan 2020  Noted comorbidity but made some progress as noted above in HPI with physical therapy post-COVID-19  Family declined further work up with neurology to look for embolic source based on goals of care  Remains on  mg daily and Atorvastatin  Vitals  have been stable    Anxiety and depression  Denies dep/anxiety today which is great to see this improvement despite isolation of COVID-19 pandemic from her daughter Pat whom she is very close to  Celexa was changed to Lexapro in May 2020 and may have been a helpful key to this improvement    History of recurrent UTI (urinary tract infection)  Asymptomatic today 1.5 weeks from positive culture (?colonization)  Continue hydration and monitoring    Anemia, mild  No recent labs since March d/t COVID-19 pandemic  Mild macrocytic anemia  Future follow up lab to include Vitamin B12 level could be helpful  No signs/sx of blood loss      Electronically signed by:  Yulissa Garcia,

## 2020-09-14 NOTE — LETTER
9/14/2020        RE: Alejandrina Whatley  [facility]  [address] [facility] [address]        [place] [facility]  PHYSICIAN NOTE    Chief Complaint   Patient presents with     detention Regulatory       HPI:    Alejandrina Whatley is a 89 year old  (8/22/1923), who is being seen today for a federally mandated E/M visit at [facility] .     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 She has some lingering left sided hemiparesis and also cognitive impairment consistent with dementia. She unfortunately had recurrent subacute ischemic strokes (left cerebellar and right MCA) in January 2020 and neurology recommended consideration of further work up (JAYASHREE, [place]) but family decided based on goals of care to limit excessive testing. She also has chronic mild back pain with low dose Tramadol which has been helpful with failed attempts at GDR, recurrent UTI and depression/anxiety as well as recent COVID-19 infection.    She is seen in her room today and didn't seem to know she'd had the COVID-19 infection as she felt asymptomatic throughout; remembers going to [facility] for a couple weeks and is now back in her prior room. She was positive for COVID-19 infection in June 2020. She is having some ongoing physical therapy for rehab after COVID-19 but is starting to plateau again. I spoke with her physical therapist who was also in room with Alejandrina Noble today who mentioned that she has been cooperative but recently starting to try to avoid physical therapy. Alejandrina Noble is appreciative of staff but references at her age its a lot of work and she just isn't that interested. However, does also wish her left side could be stronger again. Physical therapist mentioned Alejandrina Noble has made some progress with improved transfer ability, can walk 50 feet with walker and SBA though has chronic post-stroke steppage  gait.    Alejandrina Noble says her mood is good and denies depression and anxiety which is really a first for me as its been tough for her to be away from her daughter Pat. We changed Celexa to Lexapro in May of this year. She says she didn't realize she was on a medication for her mood but is ok with this.    She denies dyspnea or any current pain. She did need help to get up and use bathroom and wipe afterwards. Able to wash her own hands. Of note, 1.5 weeks ago had some repeat UTI concerns and UA/UCx obtained but there was a delay in results. Ultimately had UCx showing >100,000 Klebsiella but as staff had been pushing oral hydration, by the time results came back she was completely asymptomatic so no treatment initiated. I specifically asked her if she had dysuria or frequency and she denied this today. As I was the one to help her use restroom, I could verify urine was light yellow, non-concentrated, no hematuria.    ALLERGIES: Actonel [bisphosphonates]; Conjugated estrogens; Macrobid [nitrofuran derivatives]; Nitrofurantoin; Premarin; Sulfa drugs; Hydrocodone; Oxycodone; and Risedronate    Past Medical History:   Diagnosis Date     Abdominal aortic aneurysm (H) 2001    had a stent placed 2009     AK (actinic keratosis) 11/11/2009     Cataract 2/22/2011     Compression fractures      Compression Fractures of Lumbar Vertebra 2/4/2010     CVA (cerebral vascular accident) (H)     Dec 2019 and Jan 2020     Dementia (H)      DJD (degenerative joint disease)      Generalised Weakness and Fatigue 3/3/2011     Glaucoma (increased eye pressure) 2009    Dr. 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     Strain of shoulder, left 3/3/2011     Urinary incontinence 11/10/2009      CODE STATUS:  "DNR/I    MEDICATIONS: Reviewed and updated in [facility] according to facility MAR  Current Outpatient Medications   Medication Sig Dispense Refill     acetaminophen (TYLENOL) 500 MG tablet Take 1,000 mg by mouth 3 times daily 8am, 2pm, 8pm       amLODIPine (NORVASC) 5 MG tablet Take 1 tablet (5 mg) by mouth daily 30 tablet 0     aspirin (ASA) 325 MG tablet Take 1 tablet (325 mg) by mouth daily 30 tablet 0     atorvastatin (LIPITOR) 40 MG tablet Take 40 mg by mouth every evening       carvedilol (COREG) 6.25 MG tablet Take 6.25 mg by mouth 2 times daily (with meals)       Cholecalciferol (VITAMIN D3) 50 MCG (2000 UT) TABS Take 2,000 Units by mouth daily       dorzolamide-timolol (COSOPT) 2-0.5 % ophthalmic solution Place 1 drop into both eyes 2 times daily 1 Bottle 11     escitalopram (LEXAPRO) 10 MG tablet Take 10 mg by mouth every evening       latanoprost (XALATAN) 0.005 % ophthalmic solution Place 1 drop into both eyes At Bedtime Both Eyes 3 Bottle 4     melatonin 3 MG tablet Take 1 tablet (3 mg) by mouth nightly as needed for sleep       Multiple Vitamins-Minerals (CERTAVITE/ANTIOXIDANTS PO) Take 1 tablet by mouth daily       polyethylene glycol (MIRALAX/GLYCOLAX) packet Take 8.5 g by mouth daily AND 17 g DAILY PRN - hold with loose stools       senna-docusate (SENOKOT-S/PERICOLACE) 8.6-50 MG tablet Take 1 tablet by mouth At Bedtime        traMADol (ULTRAM) 50 MG tablet Take 0.5 tablets (25 mg) by mouth At Bedtime 30 tablet 0       ROS:  Limited secondary to cognitive impairment but today pt reports as above in HPI    Exam:  /76   Pulse 68   Temp 97.4  F (36.3  C)   Ht 1.473 m (4' 10\")   Wt 58.7 kg (129 lb 6.6 oz)   SpO2 95%   BMI 27.05 kg/m    Alert, pleasant, casually dressed, NAD  Breathing non-labored, no cough  Mood euthymic; denies depression/anxiety today  Able to transfer with assist of one from wheelchair to toilet and back and able to self propel wheelchair  No peripheral edema  Normal speech, " no tremor    Lab/Diagnostic Data:    No signs/sx of UTI (she has had frequent in the past); had Klebsiella on UCx 9/4/20 but maybe colonization/passed with pushing fluids - as per HPI she is asymptomatic and no treatment indicated    Last Basic Metabolic Panel:  Sodium   Date Value Ref Range Status   03/03/2020 141 136 - 145 mmol/L Final     Potassium   Date Value Ref Range Status   03/03/2020 3.9 3.5 - 5.0 mmol/L Final     Chloride   Date Value Ref Range Status   03/03/2020 107 98 - 107 mmol/L Final     Carbon Dioxide   Date Value Ref Range Status   03/03/2020 27 22 - 31 mmol/L Final     Anion Gap   Date Value Ref Range Status   03/03/2020 7 5 - 18 mmol/L Final     Glucose   Date Value Ref Range Status   03/03/2020 97 70 - 125 mg/dL Final     Urea Nitrogen   Date Value Ref Range Status   03/03/2020 19 8 - 28 mg/dL Final     Creatinine   Date Value Ref Range Status   03/03/2020 0.77 0.60 - 1.10 mg/dL Final     GFR Estimate   Date Value Ref Range Status   03/03/2020 >60 >60 ml/min/1.73m2 Final     Calcium   Date Value Ref Range Status   03/03/2020 8.9 8.5 - 10.5 mg/dL Final     Lab Results   Component Value Date    WBC 10.3 03/03/2020     Lab Results   Component Value Date    HGB 10.8 03/03/2020       Lab Results   Component Value Date     03/03/2020       Lab Results   Component Value Date     03/03/2020         ASSESSMENT/PLAN:  History of 2019 novel coronavirus disease (COVID-19)  Never symptomatic per history and currently denies any concerns in this regard    Dementia without behavioral disturbance, unspecified dementia type (H)  Appreciate support of LTC team  CPT Score 4.2/5.6 and SLUMS Score 14/30 during TCU stay  No behaviors but does seem to expect a lot from staff    Hemiparesis affecting left side as late effect of cerebrovascular accidents in Dec 2019 and Jan 2020  Noted comorbidity but made some progress as noted above in HPI with physical therapy post-COVID-19  Family declined further  work up with neurology to look for embolic source based on goals of care  Remains on  mg daily and Atorvastatin  Vitals have been stable    Anxiety and depression  Denies dep/anxiety today which is great to see this improvement despite isolation of COVID-19 pandemic from her daughter Pat whom she is very close to  Celexa was changed to Lexapro in May 2020 and may have been a helpful key to this improvement    History of recurrent UTI (urinary tract infection)  Asymptomatic today 1.5 weeks from positive culture (?colonization)  Continue hydration and monitoring    Anemia, mild  No recent labs since March d/t COVID-19 pandemic  Mild macrocytic anemia  Future follow up lab to include Vitamin B12 level could be helpful  No signs/sx of blood loss      Electronically signed by:  Yulissa Garcia DO          Sincerely,        Yulissa Garcia DO

## 2020-09-17 ENCOUNTER — PATIENT OUTREACH (OUTPATIENT)
Dept: GERIATRIC MEDICINE | Facility: CLINIC | Age: 85
End: 2020-09-17

## 2020-09-17 NOTE — PROGRESS NOTES
[facility] Care Coordination Contact    Member/family requesting EW assessment for planned NH ([place] [facility]) discharge. Member has been accepted at:     [facility], [address], [place] [address]. [facility] - [phone]   Member will use [facility] (wants Guera Burns as [phone] - she has had her in the past and is aware client is moving back to community) for primary care at [facility].     CC left message for Alessia Sutton, [facility] SWer [phone]. I will plan on completing assessment 9/24/20 at 10:00am.  CC called and spoke with clients daughter, Pat, to review the processes. She will let [facility] know assessment date.  She is aware EW forms need to be faxed into [facility] before EW can be opened.  Pat states clients Case # is: [id number] and [facility] financial worker is Kiersten Shah; [phone] fax: [phone].    Asked CMS to prep chart.    RACQUEL Tovar   [facility] Care Coordinator  [phone]

## 2020-09-24 ENCOUNTER — PATIENT OUTREACH (OUTPATIENT)
Dept: GERIATRIC MEDICINE | Facility: CLINIC | Age: 85
End: 2020-09-24

## 2020-09-24 SDOH — ECONOMIC STABILITY: FOOD INSECURITY: WITHIN THE PAST 12 MONTHS, THE FOOD YOU BOUGHT JUST DIDN'T LAST AND YOU DIDN'T HAVE MONEY TO GET MORE.: NEVER TRUE

## 2020-09-24 SDOH — HEALTH STABILITY: PHYSICAL HEALTH: ON AVERAGE, HOW MANY MINUTES DO YOU ENGAGE IN EXERCISE AT THIS LEVEL?: 0 MIN

## 2020-09-24 SDOH — SOCIAL STABILITY: SOCIAL NETWORK: HOW OFTEN DO YOU ATTENT MEETINGS OF THE CLUB OR ORGANIZATION YOU BELONG TO?: 1 TO 4 TIMES PER YEAR

## 2020-09-24 SDOH — SOCIAL STABILITY: SOCIAL INSECURITY
WITHIN THE LAST YEAR, HAVE TO BEEN RAPED OR FORCED TO HAVE ANY KIND OF SEXUAL ACTIVITY BY YOUR PARTNER OR EX-PARTNER?: NO

## 2020-09-24 SDOH — ECONOMIC STABILITY: INCOME INSECURITY: HOW HARD IS IT FOR YOU TO PAY FOR THE VERY BASICS LIKE FOOD, HOUSING, MEDICAL CARE, AND HEATING?: NOT VERY HARD

## 2020-09-24 SDOH — SOCIAL STABILITY: SOCIAL INSECURITY
WITHIN THE LAST YEAR, HAVE YOU BEEN KICKED, HIT, SLAPPED, OR OTHERWISE PHYSICALLY HURT BY YOUR PARTNER OR EX-PARTNER?: NO

## 2020-09-24 SDOH — SOCIAL STABILITY: SOCIAL NETWORK
DO YOU BELONG TO ANY CLUBS OR ORGANIZATIONS SUCH AS CHURCH GROUPS UNIONS, FRATERNAL OR ATHLETIC GROUPS, OR SCHOOL GROUPS?: YES

## 2020-09-24 SDOH — SOCIAL STABILITY: SOCIAL NETWORK: HOW OFTEN DO YOU ATTEND CHURCH OR RELIGIOUS SERVICES?: MORE THAN 4 TIMES PER YEAR

## 2020-09-24 SDOH — SOCIAL STABILITY: SOCIAL NETWORK: ARE YOU MARRIED, WIDOWED, DIVORCED, SEPARATED, NEVER MARRIED, OR LIVING WITH A PARTNER?: WIDOWED

## 2020-09-24 SDOH — SOCIAL STABILITY: SOCIAL INSECURITY: WITHIN THE LAST YEAR, HAVE YOU BEEN HUMILIATED OR EMOTIONALLY ABUSED IN OTHER WAYS BY YOUR PARTNER OR EX-PARTNER?: NO

## 2020-09-24 SDOH — SOCIAL STABILITY: SOCIAL NETWORK
IN A TYPICAL WEEK, HOW MANY TIMES DO YOU TALK ON THE PHONE WITH FAMILY, FRIENDS, OR NEIGHBORS?: MORE THAN THREE TIMES A WEEK

## 2020-09-24 SDOH — ECONOMIC STABILITY: TRANSPORTATION INSECURITY
IN THE PAST 12 MONTHS, HAS THE LACK OF TRANSPORTATION KEPT YOU FROM MEDICAL APPOINTMENTS OR FROM GETTING MEDICATIONS?: NO

## 2020-09-24 SDOH — SOCIAL STABILITY: SOCIAL NETWORK: HOW OFTEN DO YOU GET TOGETHER WITH FRIENDS OR RELATIVES?: THREE TIMES A WEEK

## 2020-09-24 SDOH — HEALTH STABILITY: MENTAL HEALTH
STRESS IS WHEN SOMEONE FEELS TENSE, NERVOUS, ANXIOUS, OR CAN'T SLEEP AT NIGHT BECAUSE THEIR MIND IS TROUBLED. HOW STRESSED ARE YOU?: ONLY A LITTLE

## 2020-09-24 SDOH — HEALTH STABILITY: PHYSICAL HEALTH: ON AVERAGE, HOW MANY DAYS PER WEEK DO YOU ENGAGE IN MODERATE TO STRENUOUS EXERCISE (LIKE A BRISK WALK)?: 0 DAYS

## 2020-09-24 SDOH — SOCIAL STABILITY: SOCIAL INSECURITY: WITHIN THE LAST YEAR, HAVE YOU BEEN AFRAID OF YOUR PARTNER OR EX-PARTNER?: NO

## 2020-09-24 SDOH — ECONOMIC STABILITY: TRANSPORTATION INSECURITY
IN THE PAST 12 MONTHS, HAS LACK OF TRANSPORTATION KEPT YOU FROM MEETINGS, WORK, OR FROM GETTING THINGS NEEDED FOR DAILY LIVING?: NO

## 2020-09-24 SDOH — ECONOMIC STABILITY: FOOD INSECURITY: WITHIN THE PAST 12 MONTHS, YOU WORRIED THAT YOUR FOOD WOULD RUN OUT BEFORE YOU GOT MONEY TO BUY MORE.: NEVER TRUE

## 2020-09-24 ASSESSMENT — ACTIVITIES OF DAILY LIVING (ADL)
DEPENDENT_IADLS:: CLEANING;COOKING;LAUNDRY;SHOPPING;MONEY MANAGEMENT;MEDICATION MANAGEMENT;MEAL PREPARATION;TRANSPORTATION;INCONTINENCE

## 2020-09-24 ASSESSMENT — PATIENT HEALTH QUESTIONNAIRE - PHQ9: SUM OF ALL RESPONSES TO PHQ QUESTIONS 1-9: 6

## 2020-09-28 ENCOUNTER — PATIENT OUTREACH (OUTPATIENT)
Dept: GERIATRIC MEDICINE | Facility: CLINIC | Age: 85
End: 2020-09-28

## 2020-09-28 NOTE — PROGRESS NOTES
[facility] Care Coordination Contact    CC heard back from clients [facility] financial worker, Kiersten Shah [phone], who states all necessary paperwork was received and client is approved to discharge to [facility]'s [facility] and open to EW effective 10/1/20.  CC notified Alessia Sutton, [facility] SWer.    CC will complete RS Tool with [facility] after client has been at facility for a week or so to ensure we get the most accurate assessment of her needs for the tool.    RACQUEL Tovar   [facility] Care Coordinator  [phone]

## 2020-09-29 ENCOUNTER — DISCHARGE SUMMARY NURSING HOME (OUTPATIENT)
Dept: GERIATRICS | Facility: CLINIC | Age: 85
End: 2020-09-29
Payer: COMMERCIAL

## 2020-09-29 VITALS
OXYGEN SATURATION: 97 % | HEIGHT: 58 IN | TEMPERATURE: 97.5 F | SYSTOLIC BLOOD PRESSURE: 132 MMHG | RESPIRATION RATE: 16 BRPM | DIASTOLIC BLOOD PRESSURE: 76 MMHG | BODY MASS INDEX: 27.04 KG/M2 | HEART RATE: 70 BPM | WEIGHT: 128.8 LBS

## 2020-09-29 DIAGNOSIS — I69.354 HEMIPARESIS AFFECTING LEFT SIDE AS LATE EFFECT OF CEREBROVASCULAR ACCIDENT (CVA) (H): ICD-10-CM

## 2020-09-29 DIAGNOSIS — I71.40 ABDOMINAL AORTIC ANEURYSM (AAA) WITHOUT RUPTURE (H): ICD-10-CM

## 2020-09-29 DIAGNOSIS — Z86.73 H/O: CVA (CEREBROVASCULAR ACCIDENT): Primary | ICD-10-CM

## 2020-09-29 DIAGNOSIS — N18.30 CHRONIC KIDNEY DISEASE, STAGE 3 (MODERATE): ICD-10-CM

## 2020-09-29 DIAGNOSIS — G89.29 CHRONIC BACK PAIN, UNSPECIFIED BACK LOCATION, UNSPECIFIED BACK PAIN LATERALITY: ICD-10-CM

## 2020-09-29 DIAGNOSIS — Z86.16 HISTORY OF 2019 NOVEL CORONAVIRUS DISEASE (COVID-19): ICD-10-CM

## 2020-09-29 DIAGNOSIS — M48.061 SPINAL STENOSIS OF LUMBAR REGION WITHOUT NEUROGENIC CLAUDICATION: ICD-10-CM

## 2020-09-29 DIAGNOSIS — Z87.440 HISTORY OF RECURRENT UTI (URINARY TRACT INFECTION): ICD-10-CM

## 2020-09-29 DIAGNOSIS — F03.90 DEMENTIA WITHOUT BEHAVIORAL DISTURBANCE, UNSPECIFIED DEMENTIA TYPE: ICD-10-CM

## 2020-09-29 DIAGNOSIS — I10 ESSENTIAL HYPERTENSION: ICD-10-CM

## 2020-09-29 DIAGNOSIS — R53.81 DEBILITY: ICD-10-CM

## 2020-09-29 DIAGNOSIS — M54.9 CHRONIC BACK PAIN, UNSPECIFIED BACK LOCATION, UNSPECIFIED BACK PAIN LATERALITY: ICD-10-CM

## 2020-09-29 PROCEDURE — 99316 NF DSCHRG MGMT 30 MIN+: CPT | Performed by: NURSE PRACTITIONER

## 2020-09-29 ASSESSMENT — MIFFLIN-ST. JEOR: SCORE: 858.98

## 2020-09-29 NOTE — PROGRESS NOTES
[place] [facility] DISCHARGE SUMMARY  PATIENT'S NAME: Alejandrina Whatley  YOB: 1923  MEDICAL RECORD NUMBER:  [id number]  Place of Service where encounter took place:  [facility] [[id number]]    PRIMARY CARE PROVIDER AND CLINIC RESPONSIBLE AFTER TRANSFER:   [facility]      Transferring providers: CRESCENCIO Loyola CNP, Yulissa Amanda MD  Recent Hospitalization/ED:  No recent hospitalizations.     Date of SNF Admission: December / 09 / 2019  Date of SNF (anticipated) Discharge: October / 01 / 2020  Discharged to: [facility], [facility].     Cognitive Scores: SLUMS: 14/30 and CPT: 4.2/5.6 last Dec 2019.   Physical Function: Mostly WC bound, does need assistance to pivot transfer to .  Is independent once in .   High fall risk.   DME: Wheelchair    CODE STATUS/ADVANCE DIRECTIVES DISCUSSION:  DNR / DNI   ALLERGIES: Actonel [bisphosphonates]; Conjugated estrogens; Macrobid [nitrofuran derivatives]; Nitrofurantoin; Premarin; Sulfa drugs; Hydrocodone; Oxycodone; and Risedronate    DISCHARGE DIAGNOSIS/NURSING FACILITY COURSE:   Mrs. Whatley was found to have HTN and a Right posterior lateral putamen ischemic infarct in Dec 2019, had ongoing Left sided weakness.  She returned to hospital in Jan 2020 and found to have a subacute CVA in the Left cerebellar and Right MCA territory.  Was on ASA, Plavix, Statin already.  Was to have Neurology f/u whom recommended JAYASHREE, ziopatch to look for embolic source but family declined and noted more comfort care goals.  She has a PMH of HTN, AAA with h/o graph, CVA with L hemiparesis, CKD III, recurrent CKD III, Lumbar Stenosis, compression fractures, chronic pain and cognitive/memory impairment.      She was admitted to the TCU back in Dec 2019 and transitioned to LTC and has been here since.  Lately Mrs. Whatley has been doing well.  She had some mild dysuria a few weeks ago, urine grew Klebsiella but time we had the  lab data, she reported the dysuria resolved and she declined antibiotics.  No dysuria since then, that was 4 Sept.  She was found to be COVID positive back in July and was put on isolation peña, but she was asymptomatic and later negative and returned to normal floor.     Otherwise Mrs. Whatley has been stable and doing well.  She denies any pain issues, but per family request we have not GDR'd her analgesics.      Per family Mrs. Whatley is going to move to a group home.  In meeting her today for discharge, she is aware of the move and excited.  She is highly social and does not like it here since the pandemic restrictions.  She reports she has been doing well, excited about the move, has no complaints.      H/O: CVA (cerebrovascular accident), Dec 19 and Jan 20  Essential hypertension  Abdominal aortic aneurysm (AAA) without rupture (H)  Hemiparesis affecting left side as late effect of cerebrovascular accident (CVA) (H)  Was to have Neuro follow up and work up for embolic source but family has declined, and more of a comfort goal.    Review of VS's show VSS and within acceptable range.   No current s/s of clinical CHF.   -Continues on  mg's and statin.   -Continues on Norvasc and Coreg.     Chronic kidney disease, stage 3 (moderate)  Creatine baseline is around 0.7-0.9, creatine was .77 back in March when last checked.     History of recurrent UTI (urinary tract infection)  Has h/o recurrent UTI's.  Did have mild dysuria, afebrile early Sept.  We encouraged fluids.  When culture came back with Klebsiella we offered antibiotics but she reported dysuria resolved, and declined;  Due to still asymptomatic, afebrile, we concurred.    Has not had any complaints since.   -No changes, continue to clinically monitor.     Spinal stenosis of lumbar region without neurogenic claudication  Chronic back pain, unspecified back location, unspecified back pain laterality  She has been reporting no pain for some time.  We  considered GDR of pain medications but family declined, and due to low doses, we concurred.  She has been doing well with current regimen.   -Continues Acetaminophen TID.   -Continues Tramadol at bedtime.     History of 2019 novel coronavirus disease (COVID-19)  Was positive back in July, but asymptomatic, afebrile.  Has not had any COVID symptoms since.    -No changes, continue to clinically monitor.     Dementia without behavioral disturbance, unspecified dementia type (H)  Does have mild-mod cognitive/memory impairment but no behavior issues.   SLUMS was 14/30 and CPT was 4.2/5.6 back in Jan 2020.    -No changes, continue to clinically monitor.     Debility  Since being at TCU/LTC she is wheelchair bound.  She is an assist to pivot transfer into  but once in  she can mobilize independently.    -No changes, continue to clinically monitor.     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 She also has no past medical history of Amblyopia, Complication of anesthesia, Diabetes (H), Diabetic retinopathy (H), Macular degeneration, Malignant hyperthermia, Retinal detachment, Strabismus, or Uveitis.    Discharge Medications:  Current Outpatient Medications   Medication Sig Dispense Refill     acetaminophen (TYLENOL) 500 MG tablet  "Take 1,000 mg by mouth 3 times daily 8am, 2pm, 8pm       amLODIPine (NORVASC) 5 MG tablet Take 1 tablet (5 mg) by mouth daily 30 tablet 0     aspirin (ASA) 325 MG tablet Take 1 tablet (325 mg) by mouth daily 30 tablet 0     atorvastatin (LIPITOR) 40 MG tablet Take 40 mg by mouth every evening       carvedilol (COREG) 6.25 MG tablet Take 6.25 mg by mouth 2 times daily (with meals)       Cholecalciferol (VITAMIN D3) 50 MCG (2000 UT) TABS Take 2,000 Units by mouth daily       dorzolamide-timolol (COSOPT) 2-0.5 % ophthalmic solution Place 1 drop into both eyes 2 times daily 1 Bottle 11     escitalopram (LEXAPRO) 10 MG tablet Take 10 mg by mouth every evening       latanoprost (XALATAN) 0.005 % ophthalmic solution Place 1 drop into both eyes At Bedtime Both Eyes 3 Bottle 4     melatonin 3 MG tablet Take 1 tablet (3 mg) by mouth nightly as needed for sleep       Multiple Vitamins-Minerals (CERTAVITE/ANTIOXIDANTS PO) Take 1 tablet by mouth daily       polyethylene glycol (MIRALAX/GLYCOLAX) packet Take 8.5 g by mouth daily AND 17 g DAILY PRN - hold with loose stools       senna-docusate (SENOKOT-S/PERICOLACE) 8.6-50 MG tablet Take 1 tablet by mouth At Bedtime        traMADol (ULTRAM) 50 MG tablet Take 0.5 tablets (25 mg) by mouth At Bedtime 30 tablet 0     Medication Changes/Rationale:   -As noted above.     Controlled medications sent with patient:   Medication: qhs Tramadol , 16 tabs given to patient at the time of discharge to take to new group home.     ROS:   Limited secondary to cognitive impairment but today pt reports 7 point ROS done including, light headedness/dizziness, fever/chills, pain, Resp, CV, GI, and  and is negative other than noted in HPI.      Physical Exam:   Vitals: /76   Pulse 70   Temp 97.5  F (36.4  C)   Resp 16   Ht 1.473 m (4' 10\")   Wt 58.4 kg (128 lb 12.8 oz)   SpO2 97%   BMI 26.92 kg/m    BMI= Body mass index is 26.92 kg/m .     EXAM LIMITED DUE TO [facility] social distancing " recommendations:  GENERAL APPEARANCE:  Elderly female resting in bed, NAD, non-toxic.  RESP:  Lungs CTA.  Regular relaxed breathing effort.  No cough.   ABDOMEN:   Flat but soft, non-tender with active bowel sounds.    EXTREMITIES:  Minimal bilateral lower extremity edema, no calf tenderness.   PSYCH: Alert to self and place, somewhat to situation, clear degree of cognitive/memory impairment; stable/unchanged.     SNF labs: Labs done while at Skilled Nursing Facility done by [facility] lab.     DISCHARGE PLAN:    Follow up labs: No labs orders/due    Medical Follow Up:      Follow up with group home provider in 1-2 weeks      Discharge Services: No therapy or home care recommended.     Leaving to group home/new provider discretion.     Discharge Instructions Verbalized to Patient at Discharge:     Instructed patient and daugher to arrange/attend above appointments.     TOTAL DISCHARGE TIME:   Greater than 30 minutes  Electronically signed by:  CRESCENCIO Loyola CNP

## 2020-09-29 NOTE — LETTER
9/29/2020        RE: Alejandrina Whatley  [facility]  [address]  [facility] [address]        [place] [facility] DISCHARGE SUMMARY  PATIENT'S NAME: Alejandrina Whatley  YOB: 1923  MEDICAL RECORD NUMBER:  [id number]  Place of Service where encounter took place:  [facility] [[id number]]    PRIMARY CARE PROVIDER AND CLINIC RESPONSIBLE AFTER TRANSFER:   [facility]      Transferring providers: CRESCENCIO Loyola CNP, Yulissa Amanda MD  Recent Hospitalization/ED:  No recent hospitalizations.     Date of SNF Admission: December / 09 / 2019  Date of SNF (anticipated) Discharge: October / 01 / 2020  Discharged to: [facility], [facility].     Cognitive Scores: SLUMS: 14/30 and CPT: 4.2/5.6 last Dec 2019.   Physical Function: Mostly WC bound, does need assistance to pivot transfer to .  Is independent once in .   High fall risk.   DME: Wheelchair    CODE STATUS/ADVANCE DIRECTIVES DISCUSSION:  DNR / DNI   ALLERGIES: Actonel [bisphosphonates]; Conjugated estrogens; Macrobid [nitrofuran derivatives]; Nitrofurantoin; Premarin; Sulfa drugs; Hydrocodone; Oxycodone; and Risedronate    DISCHARGE DIAGNOSIS/NURSING FACILITY COURSE:   Mrs. Whatley was found to have HTN and a Right posterior lateral putamen ischemic infarct in Dec 2019, had ongoing Left sided weakness.  She returned to hospital in Jan 2020 and found to have a subacute CVA in the Left cerebellar and Right MCA territory.  Was on ASA, Plavix, Statin already.  Was to have Neurology f/u whom recommended JAYASHREE, ziopatch to look for embolic source but family declined and noted more comfort care goals.  She has a PMH of HTN, AAA with h/o graph, CVA with L hemiparesis, CKD III, recurrent CKD III, Lumbar Stenosis, compression fractures, chronic pain and cognitive/memory impairment.      She was admitted to the TCU back in Dec 2019 and transitioned to LTC and has been here since.  Lately Mrs.  Phylicia has been doing well.  She had some mild dysuria a few weeks ago, urine grew Klebsiella but time we had the lab data, she reported the dysuria resolved and she declined antibiotics.  No dysuria since then, that was 4 Sept.  She was found to be COVID positive back in July and was put on isolation peña, but she was asymptomatic and later negative and returned to normal floor.     Otherwise Mrs. Whatley has been stable and doing well.  She denies any pain issues, but per family request we have not GDR'd her analgesics.      Per family Mrs. Whatley is going to move to a group home.  In meeting her today for discharge, she is aware of the move and excited.  She is highly social and does not like it here since the pandemic restrictions.  She reports she has been doing well, excited about the move, has no complaints.      H/O: CVA (cerebrovascular accident), Dec 19 and Jan 20  Essential hypertension  Abdominal aortic aneurysm (AAA) without rupture (H)  Hemiparesis affecting left side as late effect of cerebrovascular accident (CVA) (H)  Was to have Neuro follow up and work up for embolic source but family has declined, and more of a comfort goal.    Review of VS's show VSS and within acceptable range.   No current s/s of clinical CHF.   -Continues on  mg's and statin.   -Continues on Norvasc and Coreg.     Chronic kidney disease, stage 3 (moderate)  Creatine baseline is around 0.7-0.9, creatine was .77 back in March when last checked.     History of recurrent UTI (urinary tract infection)  Has h/o recurrent UTI's.  Did have mild dysuria, afebrile early Sept.  We encouraged fluids.  When culture came back with Klebsiella we offered antibiotics but she reported dysuria resolved, and declined;  Due to still asymptomatic, afebrile, we concurred.    Has not had any complaints since.   -No changes, continue to clinically monitor.     Spinal stenosis of lumbar region without neurogenic claudication  Chronic back pain,  unspecified back location, unspecified back pain laterality  She has been reporting no pain for some time.  We considered GDR of pain medications but family declined, and due to low doses, we concurred.  She has been doing well with current regimen.   -Continues Acetaminophen TID.   -Continues Tramadol at bedtime.     History of 2019 novel coronavirus disease (COVID-19)  Was positive back in July, but asymptomatic, afebrile.  Has not had any COVID symptoms since.    -No changes, continue to clinically monitor.     Dementia without behavioral disturbance, unspecified dementia type (H)  Does have mild-mod cognitive/memory impairment but no behavior issues.   SLUMS was 14/30 and CPT was 4.2/5.6 back in Jan 2020.    -No changes, continue to clinically monitor.     Debility  Since being at TCU/LTC she is wheelchair bound.  She is an assist to pivot transfer into  but once in  she can mobilize independently.    -No changes, continue to clinically monitor.     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 She also has no past medical history of Amblyopia, Complication of anesthesia, Diabetes (H), Diabetic retinopathy (H), Macular degeneration, Malignant hyperthermia, Retinal detachment, Strabismus, or Uveitis.    Discharge  "Medications:  Current Outpatient Medications   Medication Sig Dispense Refill     acetaminophen (TYLENOL) 500 MG tablet Take 1,000 mg by mouth 3 times daily 8am, 2pm, 8pm       amLODIPine (NORVASC) 5 MG tablet Take 1 tablet (5 mg) by mouth daily 30 tablet 0     aspirin (ASA) 325 MG tablet Take 1 tablet (325 mg) by mouth daily 30 tablet 0     atorvastatin (LIPITOR) 40 MG tablet Take 40 mg by mouth every evening       carvedilol (COREG) 6.25 MG tablet Take 6.25 mg by mouth 2 times daily (with meals)       Cholecalciferol (VITAMIN D3) 50 MCG (2000 UT) TABS Take 2,000 Units by mouth daily       dorzolamide-timolol (COSOPT) 2-0.5 % ophthalmic solution Place 1 drop into both eyes 2 times daily 1 Bottle 11     escitalopram (LEXAPRO) 10 MG tablet Take 10 mg by mouth every evening       latanoprost (XALATAN) 0.005 % ophthalmic solution Place 1 drop into both eyes At Bedtime Both Eyes 3 Bottle 4     melatonin 3 MG tablet Take 1 tablet (3 mg) by mouth nightly as needed for sleep       Multiple Vitamins-Minerals (CERTAVITE/ANTIOXIDANTS PO) Take 1 tablet by mouth daily       polyethylene glycol (MIRALAX/GLYCOLAX) packet Take 8.5 g by mouth daily AND 17 g DAILY PRN - hold with loose stools       senna-docusate (SENOKOT-S/PERICOLACE) 8.6-50 MG tablet Take 1 tablet by mouth At Bedtime        traMADol (ULTRAM) 50 MG tablet Take 0.5 tablets (25 mg) by mouth At Bedtime 30 tablet 0     Medication Changes/Rationale:   -As noted above.     Controlled medications sent with patient:   Medication: qhs Tramadol , 16 tabs given to patient at the time of discharge to take to new group home.     ROS:   Limited secondary to cognitive impairment but today pt reports 7 point ROS done including, light headedness/dizziness, fever/chills, pain, Resp, CV, GI, and  and is negative other than noted in HPI.      Physical Exam:   Vitals: /76   Pulse 70   Temp 97.5  F (36.4  C)   Resp 16   Ht 1.473 m (4' 10\")   Wt 58.4 kg (128 lb 12.8 oz)   " SpO2 97%   BMI 26.92 kg/m    BMI= Body mass index is 26.92 kg/m .     EXAM LIMITED DUE TO [facility] social distancing recommendations:  GENERAL APPEARANCE:  Elderly female resting in bed, NAD, non-toxic.  RESP:  Lungs CTA.  Regular relaxed breathing effort.  No cough.   ABDOMEN:   Flat but soft, non-tender with active bowel sounds.    EXTREMITIES:  Minimal bilateral lower extremity edema, no calf tenderness.   PSYCH: Alert to self and place, somewhat to situation, clear degree of cognitive/memory impairment; stable/unchanged.     SNF labs: Labs done while at Skilled Nursing Facility done by [facility] lab.     DISCHARGE PLAN:    Follow up labs: No labs orders/due    Medical Follow Up:      Follow up with group home provider in 1-2 weeks      Discharge Services: No therapy or home care recommended.     Leaving to group home/new provider discretion.     Discharge Instructions Verbalized to Patient at Discharge:     Instructed patient and daugher to arrange/attend above appointments.     TOTAL DISCHARGE TIME:   Greater than 30 minutes  Electronically signed by:  CRESCENCIO Loyola CNP             Sincerely,        CRESCENCIO Loyola CNP

## 2020-10-01 ENCOUNTER — PATIENT OUTREACH (OUTPATIENT)
Dept: GERIATRIC MEDICINE | Facility: CLINIC | Age: 85
End: 2020-10-01

## 2020-10-01 NOTE — PROGRESS NOTES
[facility] Care Coordination Contact    CC confirmed that member discharge today to [facility].  Updated Kiersten at [facility] WILLIAM.  RS Tool initiated.  MMIS entered to open EW effective 10/1/20.    RACQUEL Tovar  [facility] Care Coordinator  [phone]

## 2020-10-13 ENCOUNTER — PATIENT OUTREACH (OUTPATIENT)
Dept: GERIATRIC MEDICINE | Facility: CLINIC | Age: 85
End: 2020-10-13

## 2020-10-14 ENCOUNTER — PATIENT OUTREACH (OUTPATIENT)
Dept: GERIATRIC MEDICINE | Facility: CLINIC | Age: 85
End: 2020-10-14

## 2020-10-14 NOTE — LETTER
October 14, 2020    [facility]  C/O JONY DONALDSON  [address] [facility] [place] [address]      Dear Jony:    At [facility], we are dedicated to improving your health and well-being. Enclosed is the Comprehensive Care Plan that we developed with you on 09/24/2020. Please review the Care Plan carefully.    As a reminder, some of the things we discussed at your visit include:    Your physical and mental health    Ways to reduce falls    Health care needs you may have    Don t forget to contact your care coordinator if you:    Have been hospitalized or plan to be hospitalized     Have had a fall     Have experienced a change in physical health    Are experiencing emotional problems     If you do not agree with your Care Plan, have questions about it, or have experienced a change in your needs, please call me at [phone]. If you are hearing impaired, please call the [place] Relay at 297 or [phone] (belvel-vq-ubwvak relay service).    Sincerely,    RACQUEL Tovar    E-mail: [email or web address]   Phone: [phone]      [facility] ([facility]) is a health plan that contracts with both Medicare and the [place] Medical Assistance (Medicaid) program to provide benefits of both programs to enrollees. Enrollment in [facility] depends on contract renewal.    [id number])     [id number] (11/18)

## 2020-10-14 NOTE — PROGRESS NOTES
[facility] Care Coordination Contact    Received after visit chart from care coordinator.  Completed following tasks: Mailed copy of care plan to client and Updated services in access  , Mailed copy of CL tool to member, faxed copy to AL facility, and submitted authorization to health plan.   and Provider Signature - Full Care Plan:  Member indicates that they would like their POC shared with the following EW providers:  [facility].  Letter and full POC faxed to providers for signature.     Shaista Torres  Care Management Specialist  [facility]  [phone]

## 2020-10-14 NOTE — PROGRESS NOTES
[facility] Care Coordination Contact    Member's chart reviewed for transfer to [facility] effective 11/1/20.  Disenrollment check-list completed, UTF done, chart handed off to CMS to process.     RACQUEL Tovar  [facility] Care Coordinator  [phone]

## 2021-05-23 NOTE — PROGRESS NOTES
"[facility] Care Coordination Contact    [facility] Change in Condition Assessment    Home visit for Change in Condition Health Risk Assessment with Alejandrina Whatley completed on September 24, 2020    Reason for Early reassessment: Request for Elderly Waiver services Yes, if yes explain client plans to move out of [facility] to [facility]    Type of residence:: Nursing home  Current living arrangement:: I live in a nursing home - [facility]    Assessment completed with:: Patient, Care Team Member - Alessia NORIEGA, Family - daughter Pat, Caregiver - Aracelis from [facility].  Assessment completed via telephone due to Covid-19.    Current Care Plan  Member currently receiving the following home care services:   None  Member currently receiving the following community resources: None    Medication Review  Medication reconciliation completed in Epic: Yes  Medication set-up & administration: RN set up weekly.  Nursing Home staff administers medications.  Medication Risk Assessment Medication (1 or more, place referral to MTM): Taking 1 or more high-risk medications for adults >65 years  MTM Referral Placed: No: declines    Mental/Behavioral Health   Depression Screening: Client scores 6 on the phq-9.  She reports her mood is \"up and down - depending on if I am around people.\"  She has felt very lonely since Covid-19 and not being able to socialize, do activities, see loved ones. She reports she does better when she is around people. Plans to move to a small group home like [facility] that has 5 other female residents. Looking forward to getting to know and socializing with them.  [facility] states they will be able to get member moving/exercise more than the nursing home which should also help her mood.   PHQ-9 Total Score: 6  Mental health DX:: Yes   Mental health DX how managed:: Medication    Falls Assessment:   Fallen 2 or more times in the past year?: No   Any fall with injury in the past year?: " No    ADL/IADL Dependencies:   Dependent ADLs:: Ambulation-walker, Bathing, Dressing, Eating, Incontinence, Transfers, Wheelchair-with assist, Toileting  Dependent IADLs:: Cleaning, Cooking, Laundry, Shopping, Money Management, Medication Management, Meal Preparation, Transportation, Incontinence    [facility] sponsored benefits: Shared information re: Silver Sneakers/gym memberships, ASA, Calcium +D.    PCA Assessment completed at visit: No      Elderly Waiver Eligibility: Yes-will open to EW. DHS [id number] and DHS [id number] faxed to Kiersten Shah at [facility] today.    Care Plan & Recommendations: Member is hoping to move to [facility] on 10/1/20. Everyone is aware I need approval from [facility] before I can open EW.  CC will make referral to [facility] for incontinence products to be delivered monthly to the AL. Member will be followed by Katharine at the AL (Fern Rodriguez DNP and Clemencia Rios MD).  CC will transfer case to [facility] for care coordination effective 11/1/20.     See [facility] for detailed assessment information.    Follow-Up Plan: Member informed of future contact, plan to f/u with member with a 6 month telephone assessment.  Contact information shared with member and family, encouraged member to call with any questions or concerns at any time.    [place] care continuum providers: Please refer to Health Care Home on the Epic Problem List to view this patient's [facility] Care Plan Summary.    RACQUEL Tovar   [facility] Care Coordinator  [phone]       Orthopedic

## 2021-09-19 ENCOUNTER — HOSPITAL ENCOUNTER (INPATIENT)
Facility: CLINIC | Age: 86
LOS: 3 days | Discharge: HOME HEALTH | DRG: 690 | End: 2021-09-22
Attending: EMERGENCY MEDICINE | Admitting: INTERNAL MEDICINE
Payer: COMMERCIAL

## 2021-09-19 ENCOUNTER — APPOINTMENT (OUTPATIENT)
Dept: CT IMAGING | Facility: CLINIC | Age: 86
DRG: 690 | End: 2021-09-19
Attending: EMERGENCY MEDICINE
Payer: COMMERCIAL

## 2021-09-19 DIAGNOSIS — Z86.73 H/O: CVA (CEREBROVASCULAR ACCIDENT): ICD-10-CM

## 2021-09-19 DIAGNOSIS — Z87.440 HISTORY OF RECURRENT UTI (URINARY TRACT INFECTION): ICD-10-CM

## 2021-09-19 DIAGNOSIS — M62.81 GENERALIZED MUSCLE WEAKNESS: ICD-10-CM

## 2021-09-19 DIAGNOSIS — B96.20 E. COLI URINARY TRACT INFECTION: Primary | ICD-10-CM

## 2021-09-19 DIAGNOSIS — R47.81 SLURRED SPEECH: ICD-10-CM

## 2021-09-19 DIAGNOSIS — N39.0 E. COLI URINARY TRACT INFECTION: Primary | ICD-10-CM

## 2021-09-19 DIAGNOSIS — R52 PAIN: ICD-10-CM

## 2021-09-19 LAB
ALBUMIN UR-MCNC: NEGATIVE MG/DL
AMORPH CRY #/AREA URNS HPF: ABNORMAL /HPF
ANION GAP SERPL CALCULATED.3IONS-SCNC: 6 MMOL/L (ref 3–14)
APPEARANCE UR: ABNORMAL
APTT PPP: 29 SECONDS (ref 22–38)
BACTERIA #/AREA URNS HPF: ABNORMAL /HPF
BASOPHILS # BLD AUTO: 0 10E3/UL (ref 0–0.2)
BASOPHILS NFR BLD AUTO: 0 %
BILIRUB UR QL STRIP: NEGATIVE
BUN SERPL-MCNC: 27 MG/DL (ref 7–30)
CALCIUM SERPL-MCNC: 8.9 MG/DL (ref 8.5–10.1)
CHLORIDE BLD-SCNC: 110 MMOL/L (ref 94–109)
CHOLEST SERPL-MCNC: 116 MG/DL
CO2 SERPL-SCNC: 25 MMOL/L (ref 20–32)
COLOR UR AUTO: ABNORMAL
CREAT SERPL-MCNC: 1.05 MG/DL (ref 0.52–1.04)
EOSINOPHIL # BLD AUTO: 0.2 10E3/UL (ref 0–0.7)
EOSINOPHIL NFR BLD AUTO: 2 %
ERYTHROCYTE [DISTWIDTH] IN BLOOD BY AUTOMATED COUNT: 14.2 % (ref 10–15)
GFR SERPL CREATININE-BSD FRML MDRD: 44 ML/MIN/1.73M2
GLUCOSE BLD-MCNC: 95 MG/DL (ref 70–99)
GLUCOSE BLDC GLUCOMTR-MCNC: 100 MG/DL (ref 70–99)
GLUCOSE BLDC GLUCOMTR-MCNC: 101 MG/DL (ref 70–99)
GLUCOSE UR STRIP-MCNC: NEGATIVE MG/DL
HBA1C MFR BLD: 5.8 % (ref 0–5.6)
HCT VFR BLD AUTO: 32.9 % (ref 35–47)
HDLC SERPL-MCNC: 40 MG/DL
HGB BLD-MCNC: 10.5 G/DL (ref 11.7–15.7)
HGB UR QL STRIP: NEGATIVE
HOLD SPECIMEN: NORMAL
IMM GRANULOCYTES # BLD: 0 10E3/UL
IMM GRANULOCYTES NFR BLD: 0 %
INR PPP: 1.11 (ref 0.85–1.15)
KETONES UR STRIP-MCNC: NEGATIVE MG/DL
LDLC SERPL CALC-MCNC: 49 MG/DL
LEUKOCYTE ESTERASE UR QL STRIP: ABNORMAL
LYMPHOCYTES # BLD AUTO: 1.8 10E3/UL (ref 0.8–5.3)
LYMPHOCYTES NFR BLD AUTO: 18 %
MAGNESIUM SERPL-MCNC: 2.2 MG/DL (ref 1.6–2.3)
MCH RBC QN AUTO: 34.9 PG (ref 26.5–33)
MCHC RBC AUTO-ENTMCNC: 31.9 G/DL (ref 31.5–36.5)
MCV RBC AUTO: 109 FL (ref 78–100)
MONOCYTES # BLD AUTO: 0.8 10E3/UL (ref 0–1.3)
MONOCYTES NFR BLD AUTO: 8 %
NEUTROPHILS # BLD AUTO: 6.8 10E3/UL (ref 1.6–8.3)
NEUTROPHILS NFR BLD AUTO: 72 %
NITRATE UR QL: NEGATIVE
NONHDLC SERPL-MCNC: 76 MG/DL
NRBC # BLD AUTO: 0 10E3/UL
NRBC BLD AUTO-RTO: 0 /100
PH UR STRIP: 8 [PH] (ref 5–7)
PHOSPHATE SERPL-MCNC: 3.8 MG/DL (ref 2.5–4.5)
PLATELET # BLD AUTO: 147 10E3/UL (ref 150–450)
POTASSIUM BLD-SCNC: 4.1 MMOL/L (ref 3.4–5.3)
POTASSIUM BLD-SCNC: 4.3 MMOL/L (ref 3.4–5.3)
RBC # BLD AUTO: 3.01 10E6/UL (ref 3.8–5.2)
RBC URINE: 3 /HPF
SARS-COV-2 RNA RESP QL NAA+PROBE: NEGATIVE
SODIUM SERPL-SCNC: 141 MMOL/L (ref 133–144)
SP GR UR STRIP: 1.02 (ref 1–1.03)
SQUAMOUS EPITHELIAL: <1 /HPF
TRIGL SERPL-MCNC: 133 MG/DL
TROPONIN I SERPL-MCNC: <0.015 UG/L (ref 0–0.04)
TROPONIN I SERPL-MCNC: <0.015 UG/L (ref 0–0.04)
UROBILINOGEN UR STRIP-MCNC: NORMAL MG/DL
WBC # BLD AUTO: 9.7 10E3/UL (ref 4–11)
WBC URINE: 14 /HPF

## 2021-09-19 PROCEDURE — 87635 SARS-COV-2 COVID-19 AMP PRB: CPT | Performed by: EMERGENCY MEDICINE

## 2021-09-19 PROCEDURE — 250N000011 HC RX IP 250 OP 636: Performed by: EMERGENCY MEDICINE

## 2021-09-19 PROCEDURE — 99285 EMERGENCY DEPT VISIT HI MDM: CPT | Mod: 25

## 2021-09-19 PROCEDURE — 85730 THROMBOPLASTIN TIME PARTIAL: CPT | Performed by: EMERGENCY MEDICINE

## 2021-09-19 PROCEDURE — 87086 URINE CULTURE/COLONY COUNT: CPT | Performed by: EMERGENCY MEDICINE

## 2021-09-19 PROCEDURE — 80061 LIPID PANEL: CPT | Performed by: INTERNAL MEDICINE

## 2021-09-19 PROCEDURE — 85025 COMPLETE CBC W/AUTO DIFF WBC: CPT | Performed by: EMERGENCY MEDICINE

## 2021-09-19 PROCEDURE — 250N000009 HC RX 250: Performed by: EMERGENCY MEDICINE

## 2021-09-19 PROCEDURE — 70496 CT ANGIOGRAPHY HEAD: CPT

## 2021-09-19 PROCEDURE — 83036 HEMOGLOBIN GLYCOSYLATED A1C: CPT | Performed by: INTERNAL MEDICINE

## 2021-09-19 PROCEDURE — 36415 COLL VENOUS BLD VENIPUNCTURE: CPT | Performed by: INTERNAL MEDICINE

## 2021-09-19 PROCEDURE — 83735 ASSAY OF MAGNESIUM: CPT | Performed by: INTERNAL MEDICINE

## 2021-09-19 PROCEDURE — 87040 BLOOD CULTURE FOR BACTERIA: CPT | Performed by: EMERGENCY MEDICINE

## 2021-09-19 PROCEDURE — 96365 THER/PROPH/DIAG IV INF INIT: CPT | Mod: 59

## 2021-09-19 PROCEDURE — 0042T CT HEAD PERFUSION WITH CONTRAST: CPT

## 2021-09-19 PROCEDURE — 80048 BASIC METABOLIC PNL TOTAL CA: CPT | Performed by: EMERGENCY MEDICINE

## 2021-09-19 PROCEDURE — 36415 COLL VENOUS BLD VENIPUNCTURE: CPT | Performed by: EMERGENCY MEDICINE

## 2021-09-19 PROCEDURE — 81001 URINALYSIS AUTO W/SCOPE: CPT | Performed by: EMERGENCY MEDICINE

## 2021-09-19 PROCEDURE — C9803 HOPD COVID-19 SPEC COLLECT: HCPCS

## 2021-09-19 PROCEDURE — 84100 ASSAY OF PHOSPHORUS: CPT | Performed by: INTERNAL MEDICINE

## 2021-09-19 PROCEDURE — 84132 ASSAY OF SERUM POTASSIUM: CPT | Performed by: INTERNAL MEDICINE

## 2021-09-19 PROCEDURE — 74176 CT ABD & PELVIS W/O CONTRAST: CPT

## 2021-09-19 PROCEDURE — 93005 ELECTROCARDIOGRAM TRACING: CPT

## 2021-09-19 PROCEDURE — 70450 CT HEAD/BRAIN W/O DYE: CPT

## 2021-09-19 PROCEDURE — 85610 PROTHROMBIN TIME: CPT | Performed by: EMERGENCY MEDICINE

## 2021-09-19 PROCEDURE — 99223 1ST HOSP IP/OBS HIGH 75: CPT | Mod: AI | Performed by: INTERNAL MEDICINE

## 2021-09-19 PROCEDURE — 84484 ASSAY OF TROPONIN QUANT: CPT | Performed by: EMERGENCY MEDICINE

## 2021-09-19 PROCEDURE — 120N000001 HC R&B MED SURG/OB

## 2021-09-19 PROCEDURE — 84484 ASSAY OF TROPONIN QUANT: CPT | Performed by: INTERNAL MEDICINE

## 2021-09-19 RX ORDER — IOPAMIDOL 755 MG/ML
500 INJECTION, SOLUTION INTRAVASCULAR ONCE
Status: COMPLETED | OUTPATIENT
Start: 2021-09-19 | End: 2021-09-19

## 2021-09-19 RX ORDER — CEFTRIAXONE 1 G/1
1 INJECTION, POWDER, FOR SOLUTION INTRAMUSCULAR; INTRAVENOUS EVERY 24 HOURS
Status: DISCONTINUED | OUTPATIENT
Start: 2021-09-20 | End: 2021-09-22

## 2021-09-19 RX ORDER — ACETAMINOPHEN 500 MG
1000 TABLET ORAL 3 TIMES DAILY
Status: DISCONTINUED | OUTPATIENT
Start: 2021-09-19 | End: 2021-09-22 | Stop reason: HOSPADM

## 2021-09-19 RX ORDER — LIDOCAINE 40 MG/G
CREAM TOPICAL
Status: DISCONTINUED | OUTPATIENT
Start: 2021-09-19 | End: 2021-09-22 | Stop reason: HOSPADM

## 2021-09-19 RX ORDER — CARVEDILOL 6.25 MG/1
6.25 TABLET ORAL 2 TIMES DAILY WITH MEALS
Status: DISCONTINUED | OUTPATIENT
Start: 2021-09-19 | End: 2021-09-22 | Stop reason: HOSPADM

## 2021-09-19 RX ORDER — ESCITALOPRAM OXALATE 5 MG/1
5 TABLET ORAL DAILY
Status: ON HOLD | COMMUNITY
End: 2022-02-28

## 2021-09-19 RX ORDER — ESCITALOPRAM OXALATE 5 MG/1
5 TABLET ORAL DAILY
Status: DISCONTINUED | OUTPATIENT
Start: 2021-09-20 | End: 2021-09-22 | Stop reason: HOSPADM

## 2021-09-19 RX ORDER — POLYETHYLENE GLYCOL 3350 17 G/17G
1 POWDER, FOR SOLUTION ORAL EVERY OTHER DAY
COMMUNITY

## 2021-09-19 RX ORDER — LATANOPROST 50 UG/ML
1 SOLUTION/ DROPS OPHTHALMIC AT BEDTIME
Status: DISCONTINUED | OUTPATIENT
Start: 2021-09-19 | End: 2021-09-22 | Stop reason: HOSPADM

## 2021-09-19 RX ORDER — POLYETHYLENE GLYCOL 3350 17 G/17G
17 POWDER, FOR SOLUTION ORAL DAILY
Status: DISCONTINUED | OUTPATIENT
Start: 2021-09-20 | End: 2021-09-22 | Stop reason: HOSPADM

## 2021-09-19 RX ORDER — ESCITALOPRAM OXALATE 10 MG/1
10 TABLET ORAL DAILY
COMMUNITY
End: 2023-01-01

## 2021-09-19 RX ORDER — DORZOLAMIDE HYDROCHLORIDE AND TIMOLOL MALEATE 20; 5 MG/ML; MG/ML
1 SOLUTION/ DROPS OPHTHALMIC 2 TIMES DAILY
Status: DISCONTINUED | OUTPATIENT
Start: 2021-09-19 | End: 2021-09-22 | Stop reason: HOSPADM

## 2021-09-19 RX ORDER — ESCITALOPRAM OXALATE 10 MG/1
10 TABLET ORAL DAILY
Status: DISCONTINUED | OUTPATIENT
Start: 2021-09-20 | End: 2021-09-22 | Stop reason: HOSPADM

## 2021-09-19 RX ORDER — ASPIRIN 325 MG
325 TABLET ORAL DAILY
Status: DISCONTINUED | OUTPATIENT
Start: 2021-09-20 | End: 2021-09-20

## 2021-09-19 RX ORDER — ATORVASTATIN CALCIUM 40 MG/1
40 TABLET, FILM COATED ORAL EVERY EVENING
Status: DISCONTINUED | OUTPATIENT
Start: 2021-09-19 | End: 2021-09-22 | Stop reason: HOSPADM

## 2021-09-19 RX ORDER — CEFTRIAXONE 1 G/1
1 INJECTION, POWDER, FOR SOLUTION INTRAMUSCULAR; INTRAVENOUS ONCE
Status: COMPLETED | OUTPATIENT
Start: 2021-09-19 | End: 2021-09-19

## 2021-09-19 RX ADMIN — SODIUM CHLORIDE 95 ML: 9 INJECTION, SOLUTION INTRAVENOUS at 14:16

## 2021-09-19 RX ADMIN — CEFTRIAXONE 1 G: 1 INJECTION, POWDER, FOR SOLUTION INTRAMUSCULAR; INTRAVENOUS at 19:26

## 2021-09-19 RX ADMIN — IOPAMIDOL 120 ML: 755 INJECTION, SOLUTION INTRAVENOUS at 14:15

## 2021-09-19 ASSESSMENT — ENCOUNTER SYMPTOMS
CHILLS: 0
FEVER: 0
NECK PAIN: 0
ABDOMINAL PAIN: 0
HEADACHES: 0
SHORTNESS OF BREATH: 0
SPEECH DIFFICULTY: 1
WEAKNESS: 1

## 2021-09-19 ASSESSMENT — MIFFLIN-ST. JEOR
SCORE: 814.07
SCORE: 823.14
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
## 2021-09-19 NOTE — PHARMACY-ADMISSION MEDICATION HISTORY
Admission medication history interview status for this patient is complete. See [facility] admission navigator for allergy information, prior to admission medications and immunization status.     Medication history interview done, indicate source(s): Family and Caregiver  Medication history resources (including written lists, pill bottles, clinic record): None  Pharmacy: [facility]    Changes made to PTA medication list:  Added: escitalopram (see below)  Changed: Miralax (increase to full 17 gram dose per day)  Reported as Not Taking: none  Removed: none    Actions taken by pharmacist (provider contacted, etc): I spoke with daughter, Pat, who informed me I should call Theron who is the RN that manages Candi's meds. I spoke with Theron via phone and he read off her entire med list and last doses.    Additional medication history information: Theron confirmed Candi takes escitalopram 5 mg in the morning and 10 mg at 1400.    Medication reconciliation/reorder completed by provider prior to medication history?  Y    Prior to Admission medications    Medication Sig Last Dose Taking? Auth Provider   acetaminophen (TYLENOL) 500 MG tablet Take 1,000 mg by mouth 3 times daily 8am, 2pm, 8pm 9/19/2021 at 0800 Yes Unknown, Entered By History   amLODIPine (NORVASC) 5 MG tablet Take 1 tablet (5 mg) by mouth daily 9/19/2021 at 0800 Yes Theron Clarke MD   aspirin (ASA) 325 MG tablet Take 1 tablet (325 mg) by mouth daily 9/19/2021 at 0800 Yes Sara Britt PA-C   atorvastatin (LIPITOR) 40 MG tablet Take 40 mg by mouth every evening 9/18/2021 at 2000 Yes Reported, Patient   carvedilol (COREG) 6.25 MG tablet Take 6.25 mg by mouth 2 times daily (with meals) 9/19/2021 at 0800 Yes Reported, Patient   Cholecalciferol (VITAMIN D3) 50 MCG (2000 UT) TABS Take 2,000 Units by mouth daily 9/19/2021 at 0800 Yes Unknown, Entered By History   dorzolamide-timolol (COSOPT) 2-0.5 % ophthalmic solution Place 1 drop into  both eyes 2 times daily 9/19/2021 at 0800 Yes Prakash Gant MD   escitalopram (LEXAPRO) 10 MG tablet Take 10 mg by mouth daily At 1400 9/18/2021 at 1400 Yes Reported, Patient   escitalopram (LEXAPRO) 5 MG tablet Take 5 mg by mouth daily 9/19/2021 at 0800 Yes Reported, Patient   latanoprost (XALATAN) 0.005 % ophthalmic solution Place 1 drop into both eyes At Bedtime Both Eyes 9/18/2021 at 1400 Yes Prakash Gant MD   melatonin 3 MG tablet Take 1 tablet (3 mg) by mouth nightly as needed for sleep 9/18/2021 at 2000 Yes Silva Armijo APRN CNP   polyethylene glycol (MIRALAX) 17 g packet Take 1 packet by mouth daily 9/19/2021 at 0800 Yes Reported, Patient   senna-docusate (SENOKOT-S/PERICOLACE) 8.6-50 MG tablet Take 1 tablet by mouth At Bedtime  9/18/2021 at 2000 Yes Reported, Patient   traMADol (ULTRAM) 50 MG tablet Take 0.5 tablets (25 mg) by mouth At Bedtime 9/18/2021 at 2000 Yes Shashi Garcia APRN CNP   Multiple Vitamins-Minerals (CERTAVITE/ANTIOXIDANTS PO) Take 1 tablet by mouth daily   Reported, Patient

## 2021-09-19 NOTE — ED NOTES
[facility]  ED Nurse Handoff Report    Candi Whatley is a 89 year old female   ED Chief complaint: Generalized Weakness  . ED Diagnosis:   Final diagnoses:   Slurred speech   Generalized muscle weakness     Allergies:   Allergies   Allergen Reactions     Actonel [Bisphosphonates] Rash     Conjugated Estrogens Rash     Macrobid [Nitrofuran Derivatives] Rash     Nitrofurantoin Rash     Premarin Rash     Sulfa Drugs Rash     Hydrocodone Nausea and Vomiting     Oxycodone Nausea and Vomiting     Risedronate      leg pain       Code Status: DNR / DNI  Activity level - Baseline/Home:  Assist X 2. Activity Level - Current:   Assist X 2. Lift room needed: No. Bariatric: No   Needed: No   Isolation: No. Infection: Not Applicable.     Vital Signs:   Vitals:    09/19/21 1635 09/19/21 1645 09/19/21 1700 09/19/21 1730   BP:  124/68 133/74 124/72   Pulse: 62 64 66 63   Resp:       SpO2: 93% 93% 94% 93%   Weight:       Height:           Cardiac Rhythm:  ,      Pain level:    Patient confused: No. Patient Falls Risk: Yes.   Elimination Status: Has voided   Patient Report - Initial Complaint: Generalized weakness. Focused Assessment: General:              Frail appearing elderly female              Hard of hearing              Answers some questions appropriately              Speech is slurred  Eyes:              Conjunctiva without injection or scleral icterus  ENT:              Moist mucous membranes              Nares patent              Pinnae normal  Neck:              Full ROM              No stiffness appreciated  Resp:              Lungs CTAB              No crackles, wheezing or audible rubs              Good air movement  CV:                    Normal rate, regular rhythm              S1 and S2 present              No murmur, gallop or rub  GI:              BS present              Abdomen soft without distention              Non-tender to light and deep palpation              No guarding or rebound  tenderness  Skin:              Warm, dry, well perfused              No rashes or open wounds on exposed skin  MSK:              Moves all extremities              No focal deformities or swelling  Neuro:              Awake, alert              Speech is slurred              CN III-XII grossly intact              Left arm drift (baseline weakness from prior stroke)              Difficulty lifting left leg off gurney (hemiplegia from prior stroke)              Patient reports intact sensation to upper and lower extremities              Weakness noted to transition from gurney to commode  Psych:              Normal affect, normal mood   Tests Performed: labs, CT. Abnormal Results:   Labs Ordered and Resulted from Time of ED Arrival Up to the Time of Departure from the ED   GLUCOSE BY METER - Abnormal; Notable for the following components:       Result Value    GLUCOSE BY METER POCT 101 (*)     All other components within normal limits   BASIC METABOLIC PANEL - Abnormal; Notable for the following components:    Chloride 110 (*)     Creatinine 1.05 (*)     GFR Estimate 44 (*)     All other components within normal limits   ROUTINE UA WITH MICROSCOPIC REFLEX TO CULTURE - Abnormal; Notable for the following components:    Appearance Urine Slightly Cloudy (*)     pH Urine 8.0 (*)     Leukocyte Esterase Urine Small (*)     Bacteria Urine Few (*)     Amorphous Crystals Urine Few (*)     RBC Urine 3 (*)     WBC Urine 14 (*)     All other components within normal limits    Narrative:     Urine Culture ordered based on laboratory criteria   CBC WITH PLATELETS AND DIFFERENTIAL - Abnormal; Notable for the following components:    RBC Count 3.01 (*)     Hemoglobin 10.5 (*)     Hematocrit 32.9 (*)      (*)     MCH 34.9 (*)     Platelet Count 147 (*)     All other components within normal limits   INR - Normal   PARTIAL THROMBOPLASTIN TIME - Normal   TROPONIN I - Normal   GLUCOSE MONITOR NURSING POCT   EXTRA RED TOP TUBE    COVID-19 VIRUS (CORONAVIRUS) BY PCR   INITIATE   NOTIFY   MEASURE WEIGHT   VITAL SIGNS   NEURO CHECKS   CARDIAC CONTINUOUS MONITORING   PULSE OXIMETRY NURSING   ACTIVITY   HEAD OF BED   PERIPHERAL IV CATHETER   IV ACCESS   IP DYSPHAGIA SCREEN   URINE CULTURE   BLOOD CULTURE   BLOOD CULTURE   CBC WITH PLATELETS & DIFFERENTIAL    Narrative:     The following orders were created for panel order CBC with Platelets & Differential.  Procedure                               Abnormality         Status                     ---------                               -----------         ------                     CBC with platelets and d...[[id number]]  Abnormal            Final result                 Please view results for these tests on the individual orders.   EXTRA TUBE    Narrative:     The following orders were created for panel order [facility] Draw.  Procedure                               Abnormality         Status                     ---------                               -----------         ------                     Extra Red Top Tube[[id number]]                               Final result                 Please view results for these tests on the individual orders.     CT Head Perfusion w Contrast   Final Result   IMPRESSION:    HEAD CTA:    1.  When compared to the 01/09/2020 study, there is unchanged severe stenosis of a distal branch of the posterior division of the M2 segment of the right MCA. There is unchanged moderate stenosis of an adjacent M2 branch.      2.  There is unchanged moderate stenosis of the origin of the P1 segment of the right PCA. Unchanged severe stenosis of the P3-P4 junction of the right PCA.      3.  No aneurysm.      NECK CTA:   1.  Patent cervical arterial vasculature without flow-limiting stenosis.      CT PERFUSION:   1.  Normal cerebral perfusion.      CTA and perfusion results discussed with Dr. Antunez on 09/19/2021 at 1449      CTA Head Neck with Contrast   Final Result   IMPRESSION:     HEAD CTA:    1.  When compared to the 01/09/2020 study, there is unchanged severe stenosis of a distal branch of the posterior division of the M2 segment of the right MCA. There is unchanged moderate stenosis of an adjacent M2 branch.      2.  There is unchanged moderate stenosis of the origin of the P1 segment of the right PCA. Unchanged severe stenosis of the P3-P4 junction of the right PCA.      3.  No aneurysm.      NECK CTA:   1.  Patent cervical arterial vasculature without flow-limiting stenosis.      CT PERFUSION:   1.  Normal cerebral perfusion.      CTA and perfusion results discussed with Dr. Antunez on 09/19/2021 at 1449      CT Head w/o Contrast   Final Result   IMPRESSION:   1.  No acute intracranial hemorrhage.   2.  Chronic infarct involving the posterior right putamen and corona radiata.   3.  Moderate burden presume sequela of chronic small vessel ischemic disease.      Results discussed with Jame Antunez on 9/19/2021 2:27 PM.      XR Chest Port 1 View    (Results Pending)     .   Treatments provided: IV abx  Family Comments: daughter at bedside  OBS brochure/video discussed/provided to patient:  N/A  ED Medications:   Medications   cefTRIAXone (ROCEPHIN) 1 g vial to attach to  mL bag for ADULTS or NS 50 mL bag for PEDS (has no administration in time range)   CT Flush 100mL Saline (95 mLs Intravenous Given 9/19/21 1416)   iopamidol (ISOVUE-370) solution 500 mL (120 mLs Intravenous Given 9/19/21 1415)     Drips infusing:  Yes  For the majority of the shift, the patient's behavior Green. Interventions performed were N/a.    Sepsis treatment initiated: No     Patient tested for COVID 19 prior to admission: YES    ED Nurse Name/Phone Number: Aida Dorman RN,   6:39 PM    RECEIVING UNIT ED HANDOFF REVIEW    Above ED Nurse Handoff Report was reviewed: Yes  Reviewed by: Rubi Ashraf RN on September 19, 2021 at 6:52 PM

## 2021-09-19 NOTE — ED TRIAGE NOTES
Pt presents today from a group home with increased weakness and lethargy. Pt woke up like this at 0700 this morning. Per ems VSS . Upon arrival patient has somewhat garbled speech, and a weak smile. Hx of multiple strokes. ABCs intact.

## 2021-09-19 NOTE — CONSULTS
[facility]    Stroke Telephone Note    I was called by Jame Antunez on 09/19/21 regarding patient Candi Whatley. The patient is a 89 year old female w hx of HTN HLD RBG infarct with residual L sided weakness. LKN 1800 last night when patient went to bed, this am woke up with new R sided weakness and slurred speech as well as worsening of chronic L sided weakness.    Stroke Code Data (for stroke code without tele)  Stroke code activated 09/19/21   1357   Stroke provider first response  09/19/21   1358            Last known normal 09/18/21   1800        Time of discovery   (or onset of symptoms) 09/19/21   0700   Head CT read by Stroke Neuro Dr/Provider 09/19/21   1620   Was stroke code de-escalated? Yes 09/19/21 1441          Imaging Findings   CTH chronic RBG infarct  CTA H+N stable RM2 severe stenosis, R P1 stable moderate stenosis  CTP normal     Thrombolytic Treatment   No, 20 hours from LKN, risk outweighs benefit    Endovascular Treatment  Not initiated due to absence of proximal vessel occlusion    Impression    89yo w numerous stroke risk factors, significant multivessel intracranial atherosclerotic disease, comes in with new R weakenss and slurred speech. Suspect subcortical LMCA ischemic events, likely small vessel 2/2 HTN HLD vs large vessel 2/2 intracranial atheroscleroses. TNK not given as patient is 20 hours from LKN, risk outweighs benefit, no LVO target for IR.     Recommendations   -MRI brain, if acute infarct admit for workup  -If no acute infarct, recommend infectious and metabolic workup, neuro will sign off     My recommendations are based on the information provided over the phone by Candi Whatley's in-person providers. They are not intended to replace the clinical judgment of her in-person providers. I was not requested to personally see or examine the patient at this time.    The Stroke Staff is Dr. Jensen.    Reyes Arciniega MD  Vascular Neurology Fellow  To  "page me or covering stroke neurology team member, click here: [facility]   Choose \"On Call\" tab at top, then search dropdown box for \"Neurology Adult\", select location, press Enter, then look for stroke/neuro ICU/telestroke.      "

## 2021-09-19 NOTE — ED PROVIDER NOTES
History   Chief Complaint:  Generalized Weakness    HPI History limited by patient's baseline dementia. History provided by patient and her daughter.  Candi Whatley is a 89 year old female with history of dementia, CVA, hypertension, hyperlipidemia, and CKD 3 who presents via EMS from skilled nursing with generalized weakness and slurred speech. The patient reportedly woke up at 7 AM with slurred speech. She went back to bed and took a nap until she woke up again at 9 AM and continued to have slurred speech. The patient's daughter reports that her last known well time was last night before going to bed at 6:30 PM. The patient does wake up twice throughout the night to go to the bathroom but is unable to provide reliable history regarding if she had symptoms at these times. The daughter states that she also appears weaker than normal. She does have residual left sided weakness from prior strokes. She does not ambulate at her baseline. The patient denies fever, chills, shortness of breath, headache, neck pain, vision changes, chest pain, or abdominal pain. She received her full dose of aspirin this morning.    Review of Systems   Constitutional: Negative for chills and fever.   Eyes: Negative for visual disturbance.   Respiratory: Negative for shortness of breath.    Gastrointestinal: Negative for abdominal pain.   Musculoskeletal: Negative for neck pain.   Neurological: Positive for speech difficulty and weakness. Negative for headaches.   All other systems reviewed and are negative.    Allergies:  Actonel [Bisphosphonates]  Conjugated Estrogens  Macrobid [Nitrofuran Derivatives]  Nitrofurantoin  Premarin  Sulfa Drugs  Hydrocodone  Oxycodone  Risedronate    Medications:  Lexapro  Lipitor  Tramadol  Atenolol  Coreg  Miralax  Senokot    Past Medical History:    Neuropathy  CED  CKD stage 3   Osteoporosis   Dyslipidemia  Hypertension   Depression   Keratitis  Sinusitis  Thoracic  "fracture  UTI  AAA  CVA  Dementia  Hyperlipidemia     Past Surgical History:    AAA repair  Hysterectomy  TVT insertion   Stent in place  Trabeculoplasty  Appendectomy     Family History:    Father: MI  Mother: hypertension    Social History:  Patient presents to the ED via EMS. Daughter arrived bedside.  Lives in group home.    Physical Exam     Patient Vitals for the past 24 hrs:   BP Pulse Resp SpO2 Height Weight   09/19/21 2015 (!) 145/77 65 19 96 % -- --   09/19/21 1930 109/70 66 -- -- -- --   09/19/21 1830 127/72 65 -- 94 % -- --   09/19/21 1730 124/72 63 -- 93 % -- --   09/19/21 1700 133/74 66 -- 94 % -- --   09/19/21 1645 124/68 64 -- 93 % -- --   09/19/21 1635 -- 62 -- 93 % -- --   09/19/21 1630 117/59 64 -- -- -- --   09/19/21 1615 117/65 67 -- 91 % -- --   09/19/21 1600 113/65 63 -- 92 % -- --   09/19/21 1530 114/76 75 -- 93 % -- --   09/19/21 1515 123/80 75 18 90 % -- --   09/19/21 1500 135/79 75 -- 93 % -- --   09/19/21 1455 -- 78 -- 94 % -- --   09/19/21 1450 -- 75 -- 95 % -- --   09/19/21 1445 122/84 77 -- 96 % -- --   09/19/21 1443 -- -- -- -- 1.473 m (4' 10\") 55.3 kg (122 lb)   09/19/21 1440 127/74 76 18 95 % -- --   09/19/21 1435 135/75 77 -- 94 % -- --   09/19/21 1430 135/75 -- -- -- -- --   09/19/21 1415 (!) 123/91 -- -- -- -- --   09/19/21 1410 (!) 123/91 70 -- 99 % -- --   09/19/21 1405 -- -- -- 99 % -- --   09/19/21 1400 -- -- -- 96 % -- --   09/19/21 1355 126/73 72 -- 96 % -- --   09/19/21 1350 -- -- -- 95 % -- --   09/19/21 1345 -- -- -- 97 % -- --   09/19/21 1340 121/64 68 -- 96 % -- --       Physical Exam  General:              Frail appearing elderly female              Hard of hearing              Answers some questions appropriately              Speech is slurred  Eyes:              Conjunctiva without injection or scleral icterus  ENT:              Moist mucous membranes              Nares patent              Pinnae normal  Neck:              Full ROM              No stiffness " appreciated  Resp:              Lungs CTAB              No crackles, wheezing or audible rubs              Good air movement  CV:                    Normal rate, regular rhythm              S1 and S2 present              No murmur, gallop or rub  GI:              BS present              Abdomen soft without distention              Non-tender to light and deep palpation              No guarding or rebound tenderness  Skin:              Warm, dry, well perfused              No rashes or open wounds on exposed skin  MSK:              Moves all extremities              No focal deformities or swelling  Neuro:              Awake, alert              Speech is slurred              CN III-XII grossly intact              Left arm drift (baseline weakness from prior stroke)              Difficulty lifting left leg off gurney (hemiplegia from prior stroke)              Patient reports intact sensation to upper and lower extremities              Weakness noted to transition from gurney to commode  Psych:              Normal affect, normal mood     Emergency Department Course     ECG:  ECG taken at 1437, ECG read at 1445  Sinus rhythm with 1st degree AV block   Septal infarct, age undetermined   Abnormal ECG  When compared to prior ECG from 28/JAN/2020: LA interval has increased septal infarct is now present.  Rate 77 bpm. LA interval 250 ms. QRS duration 70 ms. QT/QTc 354/400 ms. P-R-T axes 20 0 -36.    Imaging:  CT Head Perfusion with Contrast  1.  Normal cerebral perfusion.     CTA Head Neck with Contrast  HEAD CTA:   1.  When compared to the 01/09/2020 study, there is unchanged severe stenosis of a distal branch of the posterior division of the M2 segment of the right MCA. There is unchanged moderate stenosis of an adjacent M2 branch.   2.  There is unchanged moderate stenosis of the origin of the P1 segment of the right PCA. Unchanged severe stenosis of the P3-P4 junction of the right PCA.   3.  No aneurysm.     NECK CTA:    1.  Patent cervical arterial vasculature without flow-limiting stenosis.     CT Head without Contrast  1.  No acute intracranial hemorrhage.   2.  Chronic infarct involving the posterior right putamen and corona radiata.   3.  Moderate burden presume sequela of chronic small vessel ischemic disease.    CT Abdomen Pelvis without Contrast  1.  Colonic diverticulosis. No diverticulitis.   2.  Atherosclerotic disease with an aortobiiliac stent graft.     Reading per radiology.    Laboratory:  CBC: WBC: 9.7, HGB: 10.5 (L), PLT: 147 (L)    BMP: Chloride: 110 (H), Creatinine: 1.05 (H), GFR: 44 (L), o/w WNL    Troponin (Collected 1440): <0.015    INR: 1.11    PTT: 29    Glucose by meter (1348): 101      UA: Light yellow slightly cloudy urine. pH: 8.0 (H), Leukocyte Esterase: small (A), WBC: 14 (H), RBC: 3 (H), Bacteria: few (A), Amorphous Crystals: few (A), o/w Negative    Urine culture: pending    Blood cultures x2: pending     Asymptomatic COVID-19 by PCR: pending    Emergency Department Course:    Reviewed:  I reviewed nursing notes, vitals and past medical history  1355 Dr. Anaya briefly saw the patient, called a code stroke, and started orders. Discussed patient's presentation.    Assessments:  1356 Dr. Anaya called Tier 2 code stroke.  1405 I obtained history and examined the patient as noted above.   1441 Code stroke deescalated.   1452 I rechecked the patient and explained findings to the patient and her daughter.   1650 Patient declined chest x-ray.   1710 Patient rechecked and updated.    1734 I discussed findings and patient's plan of care.  1855 I rechecked the patient. She has 350 ml retained urine on bladder scan.  1917 Patient rechecked and updated.    2050 Patient updated on abdominal CT results and admission.    Consults:   1400 Dr. Anaya consulted with Dr. Arciniega stroke neurology, regarding the patient's history and presentation in the ED.   1426 I spoke with radiology regarding patient's CT Head without  contrast imaging results.  1442 I consulted with the stroke/neuro team.   1449 I spoke with Dr. Tran from radiology regarding CTA and perfusion results.   1800 I spoke with Dr. Hernandes of the hospitalist service regarding patient's presentation, findings, and plan of care.  1840 I briefly spoke with Dr. Hernandes regarding patient's RLQ abdominal tenderness after she evaluated the patient here in the ED. Will bladder scan, possible abdominal CT.    Interventions:  1926 Rocephin, 1 g, IV     Disposition:  The patient was admitted to the hospital under the care of Dr. Hernandes.     Impression & Plan     CMS Diagnoses: The patient has stroke symptoms:         ED Stroke specific documentation           NIHSS PDF     Patient last known well time: 09/18/21 1600  ED Provider first to bedside at: Dr. Anaya  CT Results received at: 1426    Thrombolytics:   Not given due to unclear or unfavorable risk-benefit profile for extended window thrombolysis beyond the conventional 4.5 hour time window.    If treating with thrombolytics: Ensure SBP<180 and DBP<105 prior to treatment with thrombolytics.  Administering thrombolytics after treatment with IV labetalol, hydralazine, or nicardipine is reasonable once BP control is established.    Endovascular Retrieval:  Not initiated due to absence of proximal vessel occlusion    [employer] Stroke Scale (Baseline)  Time Performed: 1405     Score    Level of consciousness: (0)   Alert, keenly responsive    LOC questions: (0)   Answers both questions correctly    LOC commands: (0)   Performs both tasks correctly    Best gaze: (0)   Normal    Visual: (0)   No visual loss    Facial palsy: (0)   Normal symmetrical movements    Motor arm (left): (1)   Drift    Motor arm (right): (0)   No drift    Motor leg (left): (1)   Drift    Motor leg (right): (0)   No drift    Limb ataxia: (0)   Absent    Sensory: (0)   Normal- no sensory loss    Best language: (1)   Mild to moderate  aphasia    Dysarthria: (0)   Normal    Extinction and inattention: (0)   No abnormality         Total Score:  3        Stroke Mimics were considered (including migraine headache, seizure disorder, hypoglycemia (or hyperglycemia), head or spinal trauma, CNS infection, Toxin ingestion and shock state (e.g. sepsis) .    Medical Decision Making:  Candi Whatley is a 89 year old female who presents to the emergency department for evaluation of generalized weakness.  History limited from the patient, and supplemented by chart review and discussion with daughter present at bedside.  Past medical history is notable for previous CVA with resultant left-sided weakness at baseline, as well as a history of frequent UTIs per daughter.  Care was initiated by my partner, who had activated a tier 2 code stroke given the patient's slurred speech and weakness.  Care was expedited to obtain CT of the head, and neck.  Fortunately, no evidence of acute intracranial hemorrhage, new large vessel occlusion, aneurysm, or perfusion defect is appreciated.  Given the time since last known normal approaching 20 hours, and following consultation with neurology, patient is not deemed to be an appropriate candidate for IV thrombolytic therapy or intra-arterial intervention.  This was discussed with the patient's daughter at bedside, who is understanding.  Other etiologies for patient's weakness are certainly considered including underlying infectious etiologies given patient's history of frequent UTIs.  UA obtained today demonstrates subtle suggestion of infection, with small leukocyte esterase and 14 WBCs.  Patient was treated empirically with IV Rocephin while awaiting culture results.  During patient's emergency department course, she did develop worsening lower abdominal pain.  Bladder scan confirms 350 cc of retained urine, which following bladder decompression resulted in improvements in pain, though she continues to exhibit tenderness to  palpation to the lower abdomen and right lower quadrant.  For that reason a noncontrast CT was performed, which demonstrates colonic diverticulosis, renal cysts, though no other evidence of acute appendicitis or acute surgical pathology.  Given episode of weakness, slurred speech which has improved during her ER course, will plan for hospital observation for treatment of possible UTI, MRI to further evaluate possible stroke, and neurology consultation.  Patient and daughter were updated at bedside.  Questions answered prior to admission.        Diagnosis:    ICD-10-CM    1. Slurred speech  R47.81    2. Generalized muscle weakness  M62.81        Scribe Disclosure:  I, Romana Deleonhaydee, am serving as a scribe at 1:53 PM on 9/19/2021 to document services personally performed by Jame Antunez MD based on my observations and the provider's statements to me.            Jame Antunez MD  09/19/21 9180

## 2021-09-20 ENCOUNTER — APPOINTMENT (OUTPATIENT)
Dept: SPEECH THERAPY | Facility: CLINIC | Age: 86
DRG: 690 | End: 2021-09-20
Attending: INTERNAL MEDICINE
Payer: COMMERCIAL

## 2021-09-20 ENCOUNTER — APPOINTMENT (OUTPATIENT)
Dept: GENERAL RADIOLOGY | Facility: CLINIC | Age: 86
DRG: 690 | End: 2021-09-20
Attending: INTERNAL MEDICINE
Payer: COMMERCIAL

## 2021-09-20 ENCOUNTER — APPOINTMENT (OUTPATIENT)
Dept: MRI IMAGING | Facility: CLINIC | Age: 86
DRG: 690 | End: 2021-09-20
Attending: INTERNAL MEDICINE
Payer: COMMERCIAL

## 2021-09-20 ENCOUNTER — APPOINTMENT (OUTPATIENT)
Dept: PHYSICAL THERAPY | Facility: CLINIC | Age: 86
DRG: 690 | End: 2021-09-20
Attending: INTERNAL MEDICINE
Payer: COMMERCIAL

## 2021-09-20 LAB
ANION GAP SERPL CALCULATED.3IONS-SCNC: 9 MMOL/L (ref 3–14)
ATRIAL RATE - MUSE: 77 BPM
BUN SERPL-MCNC: 23 MG/DL (ref 7–30)
CALCIUM SERPL-MCNC: 8.5 MG/DL (ref 8.5–10.1)
CHLORIDE BLD-SCNC: 112 MMOL/L (ref 94–109)
CO2 SERPL-SCNC: 23 MMOL/L (ref 20–32)
CREAT SERPL-MCNC: 1.05 MG/DL (ref 0.52–1.04)
DIASTOLIC BLOOD PRESSURE - MUSE: NORMAL MMHG
ERYTHROCYTE [DISTWIDTH] IN BLOOD BY AUTOMATED COUNT: 14.2 % (ref 10–15)
GFR SERPL CREATININE-BSD FRML MDRD: 44 ML/MIN/1.73M2
GLUCOSE BLD-MCNC: 103 MG/DL (ref 70–99)
GLUCOSE BLDC GLUCOMTR-MCNC: 105 MG/DL (ref 70–99)
GLUCOSE BLDC GLUCOMTR-MCNC: 118 MG/DL (ref 70–99)
HCT VFR BLD AUTO: 34.1 % (ref 35–47)
HGB BLD-MCNC: 10.9 G/DL (ref 11.7–15.7)
INTERPRETATION ECG - MUSE: NORMAL
MAGNESIUM SERPL-MCNC: 2.3 MG/DL (ref 1.6–2.3)
MCH RBC QN AUTO: 34.2 PG (ref 26.5–33)
MCHC RBC AUTO-ENTMCNC: 32 G/DL (ref 31.5–36.5)
MCV RBC AUTO: 107 FL (ref 78–100)
P AXIS - MUSE: 20 DEGREES
PHOSPHATE SERPL-MCNC: 3.5 MG/DL (ref 2.5–4.5)
PLATELET # BLD AUTO: 178 10E3/UL (ref 150–450)
POTASSIUM BLD-SCNC: 4.1 MMOL/L (ref 3.4–5.3)
PR INTERVAL - MUSE: 250 MS
QRS DURATION - MUSE: 70 MS
QT - MUSE: 354 MS
QTC - MUSE: 400 MS
R AXIS - MUSE: 0 DEGREES
RBC # BLD AUTO: 3.19 10E6/UL (ref 3.8–5.2)
SODIUM SERPL-SCNC: 144 MMOL/L (ref 133–144)
SYSTOLIC BLOOD PRESSURE - MUSE: NORMAL MMHG
T AXIS - MUSE: -36 DEGREES
VENTRICULAR RATE- MUSE: 77 BPM
WBC # BLD AUTO: 10 10E3/UL (ref 4–11)

## 2021-09-20 PROCEDURE — 84100 ASSAY OF PHOSPHORUS: CPT | Performed by: INTERNAL MEDICINE

## 2021-09-20 PROCEDURE — 71045 X-RAY EXAM CHEST 1 VIEW: CPT

## 2021-09-20 PROCEDURE — 99232 SBSQ HOSP IP/OBS MODERATE 35: CPT | Performed by: INTERNAL MEDICINE

## 2021-09-20 PROCEDURE — 80048 BASIC METABOLIC PNL TOTAL CA: CPT | Performed by: INTERNAL MEDICINE

## 2021-09-20 PROCEDURE — 120N000001 HC R&B MED SURG/OB

## 2021-09-20 PROCEDURE — 250N000013 HC RX MED GY IP 250 OP 250 PS 637: Performed by: INTERNAL MEDICINE

## 2021-09-20 PROCEDURE — 250N000011 HC RX IP 250 OP 636: Performed by: INTERNAL MEDICINE

## 2021-09-20 PROCEDURE — 97530 THERAPEUTIC ACTIVITIES: CPT | Mod: GP

## 2021-09-20 PROCEDURE — 97161 PT EVAL LOW COMPLEX 20 MIN: CPT | Mod: GP

## 2021-09-20 PROCEDURE — 92610 EVALUATE SWALLOWING FUNCTION: CPT | Mod: GN

## 2021-09-20 PROCEDURE — 70551 MRI BRAIN STEM W/O DYE: CPT

## 2021-09-20 PROCEDURE — 85027 COMPLETE CBC AUTOMATED: CPT | Performed by: INTERNAL MEDICINE

## 2021-09-20 PROCEDURE — 83735 ASSAY OF MAGNESIUM: CPT | Performed by: INTERNAL MEDICINE

## 2021-09-20 PROCEDURE — 36415 COLL VENOUS BLD VENIPUNCTURE: CPT | Performed by: INTERNAL MEDICINE

## 2021-09-20 PROCEDURE — 99207 PR NO BILLABLE SERVICE THIS VISIT: CPT | Performed by: STUDENT IN AN ORGANIZED HEALTH CARE EDUCATION/TRAINING PROGRAM

## 2021-09-20 RX ORDER — GADOBUTROL 604.72 MG/ML
7.5 INJECTION INTRAVENOUS ONCE
Status: DISCONTINUED | OUTPATIENT
Start: 2021-09-20 | End: 2021-09-22 | Stop reason: HOSPADM

## 2021-09-20 RX ORDER — ASPIRIN 81 MG/1
81 TABLET, CHEWABLE ORAL DAILY
Status: DISCONTINUED | OUTPATIENT
Start: 2021-09-21 | End: 2021-09-22 | Stop reason: HOSPADM

## 2021-09-20 RX ADMIN — POLYETHYLENE GLYCOL 3350 17 G: 17 POWDER, FOR SOLUTION ORAL at 09:40

## 2021-09-20 RX ADMIN — DORZOLAMIDE HYDROCHLORIDE AND TIMOLOL MALEATE 1 DROP: 22.3; 6.8 SOLUTION/ DROPS OPHTHALMIC at 09:42

## 2021-09-20 RX ADMIN — LATANOPROST 1 DROP: 50 SOLUTION OPHTHALMIC at 21:32

## 2021-09-20 RX ADMIN — ATORVASTATIN CALCIUM 40 MG: 40 TABLET, FILM COATED ORAL at 19:16

## 2021-09-20 RX ADMIN — CARVEDILOL 6.25 MG: 6.25 TABLET, FILM COATED ORAL at 19:16

## 2021-09-20 RX ADMIN — ESCITALOPRAM 5 MG: 5 TABLET, FILM COATED ORAL at 09:39

## 2021-09-20 RX ADMIN — ACETAMINOPHEN 1000 MG: 500 TABLET, FILM COATED ORAL at 19:16

## 2021-09-20 RX ADMIN — ACETAMINOPHEN 1000 MG: 500 TABLET, FILM COATED ORAL at 09:40

## 2021-09-20 RX ADMIN — ASPIRIN 325 MG ORAL TABLET 325 MG: 325 PILL ORAL at 09:40

## 2021-09-20 RX ADMIN — DORZOLAMIDE HYDROCHLORIDE AND TIMOLOL MALEATE 1 DROP: 22.3; 6.8 SOLUTION/ DROPS OPHTHALMIC at 19:19

## 2021-09-20 RX ADMIN — CEFTRIAXONE 1 G: 1 INJECTION, POWDER, FOR SOLUTION INTRAMUSCULAR; INTRAVENOUS at 13:56

## 2021-09-20 RX ADMIN — CARVEDILOL 6.25 MG: 6.25 TABLET, FILM COATED ORAL at 09:40

## 2021-09-20 ASSESSMENT — ACTIVITIES OF DAILY LIVING (ADL)
ADLS_ACUITY_SCORE: 16
ADLS_ACUITY_SCORE: 14
ADLS_ACUITY_SCORE: 16
ADLS_ACUITY_SCORE: 14
ADLS_ACUITY_SCORE: 14
ADLS_ACUITY_SCORE: 17

## 2021-09-20 NOTE — PLAN OF CARE
"Neuro checks documented.  Patient alert during this shift but was talking about \"baby sleeping\" this am upon awakening.  Denies pain.  Tolerating regular diet.  Swallows pills without difficulty.  MRI and CXR unremarkable.  Briefly stood at bedside with Melany Tavarez.  Voiding via purewick.  Dtr Pat involved with care.   "

## 2021-09-20 NOTE — PLAN OF CARE
Evening RN    Patient vital signs are at baseline: Yes  Patient able to ambulate as they were prior to admission or with assist devices provided by therapies during their stay:  No,  Reason:  Very weak trying to stand up, even with Melany Steady and A2.  Patient MUST void prior to discharge:  Yes  Patient able to tolerate oral intake:  Yes  Pain has adequate pain control using Oral analgesics:  Yes    Pt A/O x self - confused but very pleasant and cooperative.  Sleepy but easily arousible.  VSS and afebrile.  Tele in place - SR w/ HR 64.  Pt denied pain.  CMS intact.  Skin wdl.  Up heavy A2 with Melany Steady and gait belt.  Voided only appprox 100ml this shift w/ PVR of only 25ml - purewick in place d/t incontinence.  Tolerating regular diet well - minimal appetite.  Neurology MD communicated with pt and daughter this shift, no stroke and plan to treat UTI with appropriate antibiotics through general hospitalist MD.  Will continue to monitor.

## 2021-09-20 NOTE — ED NOTES
Pt c/o feeling like she is now unable to urinate. Had previously been emptying bladder using Purewick. Bladder scan revealed approx 400mL of urine. Pt assisted to bedside commode and she was able to urinate about 200mL after straining.

## 2021-09-20 NOTE — PROGRESS NOTES
"Speech-Language Pathology Clinical Swallow Evaluation       09/20/21 0929   General Information   Onset of Illness/Injury or Date of Surgery 09/19/21   Referring Physician Dr. Hernandes   Pertinent History of Current Problem Per H&P, \"Candi Whatley is a 89 year old female with past medical history significant for hypertension, hyperlipidemia, CKD, CVA presented to the emergency room from group home with an episode of slurry speech confusion and generalized weakness since this morning. In the emergency room stroke protocol was carried out.  Initial Head CT negative.  Stroke neuro was consulted recommends MRI and further evaluation.  Also in the emergency room UA was equivocal was started on treatment for UTI\"   General Observations Pt awake/alert, with fair attention, cooperative. Daughter present and reported prior slurred speech; currently resolved.   Past History of Dysphagia None per pt and daughter/chart   Disability/Function   Fall history within last six months no   Pain Assessment   Patient Currently in Pain No   Type of Evaluation   Type of Evaluation Swallow Evaluation   Oral Motor   Oral Musculature generally intact   Mucosal Quality good   Dentition (Oral Motor)   Dentition (Oral Motor) natural dentition;adequate dentition   Cough/Swallow/Gag Reflex (Oral Motor)   Volitional Throat Clear/Cough (Oral Motor) reduced strength   Vocal Quality/Secretion Management (Oral Motor)   Vocal Quality (Oral Motor) WNL   Secretion Management (Oral Motor) WNL   General Swallowing Observations   Current Diet/Method of Nutritional Intake (General Swallowing Observations, NIS) regular diet;thin liquids (level 0)   Respiratory Support (General Swallowing Observations) none   Swallowing Evaluation Clinical swallow evaluation   Clinical Swallow Evaluation   Clinical Swallow Evaluation Textures Trialed thin liquids;pureed;solid foods   Clinical Swallow Eval: Thin Liquid Texture Trial   Mode of Presentation, Thin Liquids " self-fed;straw   Oral Phase of Swallow WFL   Pharyngeal Phase of Swallow suspect intact   Clinical Swallow Evaluation: Puree Solid Texture Trial   Mode of Presentation, Puree spoon;self-fed   Oral Phase, Puree WFL   Pharyngeal Phase, Puree suspect intact   Clinical Swallow Evaluation: Solid Food Texture Trial   Mode of Presentation self-fed   Oral Phase WFL   Pharyngeal Phase suspect intact   Esophageal Phase of Swallow   Patient reports or presents with symptoms of esophageal dysphagia No   Swallowing Recommendations   Diet Consistency Recommendations regular diet;thin liquids (level 0)   Mode of Delivery Recommendations bolus size, small;slow rate of intake   Swallowing Maneuver Recommendations alternate food and liquid intake   Recommended Feeding/Eating Techniques (Swallow Eval) maintain upright posture during/after eating for 30 minutes   Medication Administration Recommendations, Swallowing (SLP) whole with liquid or in puree   Instrumental Assessment Recommendations instrumental evaluation not recommended at this time   Comment, Swallowing Recommendations Pt currently presents with grossly clinically functional swallow. + adequate acceptance/containment. Bolus formation/propulsion and lingual coordination appeared adequate. Noted slightly reduced, prolonged mastication with minimally increased oral transit time and complete clearance. Suspect timely swallow with adequate hyolaryngeal elevation. No cough, throat clear, wet vocal quality, eye watering, or increased WOB.   SLP Therapy Assessment/Plan   Criteria for Skilled Therapeutic Interventions Met (SLP Eval) no problems identified which require skilled intervention   SLP Diagnosis grossly clinically functional swallow   Therapy Frequency (SLP Eval) evaluation only   Therapy Plan Review/Discharge Plan (SLP)   Therapy Plan Review (SLP) evaluation/treatment results reviewed;participants voiced agreement with care plan;participants included;patient;daughter    SLP Discharge Planning    SLP Discharge Recommendation (DC Rec)   (?prior environment, defer to PT/OT)   SLP Rationale for DC Rec No further SLP needs   SLP Brief overview of current status  Recommend regular diet with thin liquid (0); please re-consult if need arises.    Total Evaluation Time   Total Evaluation Time (Minutes) 16

## 2021-09-20 NOTE — ED NOTES
Pt found sitting on the commode. Pt was assisted back into bed using a tin stedy sit with an assist x2. Pt was then boosted in bed in a position of comfort. Pt was placed back on vitals. A bladder scan was preformed finding 180 mL of residual urine. RN present and MD notified.

## 2021-09-20 NOTE — PLAN OF CARE
RN [phone]  Pt arrived to the floor at 2115. Pt disoriented to time and situation, knew she was in a hospital but not which one. Daughter is at the bedside and the healthcare decision maker. Pt's speech intermittently slurred. L side significantly weaker than the R. L pupil fixed, irregular. R pupil equal, round, reactive. Denies pain. Tele SR. Ax2 w/ lift. Plan for brain MRI this AM.   8

## 2021-09-20 NOTE — PROGRESS NOTES
[facility]  Hospitalist Progress Note  Medardo Ring MD, MD 09/20/2021  (Text Page)  Reason for Stay (Diagnosis): Generalized weakness, slurred speech, ruling out TIA versus CVA         Assessment and Plan:      Summary of Stay:   Candi Whatley is a 89 year old female with past medical history significant for hypertension, hyperlipidemia, CKD, CVA presented to the emergency room from group home with an episode of slurry speech confusion and generalized weakness since this morning.       In the emergency room stroke protocol was carried out.  Initial Head CT negative.  Stroke neuro was consulted recommends MRI and further evaluation.  Also in the emergency room UA was equivocal was started on treatment for UTI     Slurry speech generalized weakness  -Patient with known history of CVAs  -Cannot exclude TIA  -Is a pending MRI of the brain today  -Stroke neurology following with pending formal evaluation recommendations  -Earlier slurring of speech has resolved  -PT/OT, SLP evaluation  -Will need social service for discharge disposition planning    -Continue aspirin        Altered mental status could be due to infectious encephalopathy  -UA equivocal  -Urine culture sent  -Started on antibiotics  -Continued on intravenous ceftriaxone  -Cultures being followed     Right lower quadrant abdominal tenderness on exam  -CT abdomen and pelvis in ED with no acute pathology     Hypertension  -Normotensive in the emergency room  -Possible concern for stroke stroke and TIA  -We will avoid sudden drops in blood pressure  -Currently blood pressure levels within acceptable ranges     Hyperlipidemia  -Continue statins    Patient's daughter had some concerns regarding coughing spells requesting for evaluation such as chest x-ray           DVT Prophylaxis: Pneumatic Compression Devices  Code Status: DNR / DNI  as discussed earlier during admission process    Disposition:  Anticipating still needing hospitalization  "inpatient care at least in the next 24 hours                Interval History (Subjective):      I assume service care for this a 89-year-old  lady who initially came in last night.  Increasing generalized weakness and slurring of speech.  I found Sanjana laying comfortably in bed this morning.  She is cooperative, following simple verbal instructions.  She denies any ongoing focal weakness.  No reported nausea, vomiting.  Afebrile.  Not requiring any oxygen support.  No recurrence of any of slurring of speech this morning       # Pain Assessment:  Current Pain Score 9/19/2021   Patient currently in pain? denies   Pain score (0-10) -   Candi arambula pain level was assessed and she currently denies pain.                  Physical Exam:      Last Vital Signs:  /59 (BP Location: Right arm)   Pulse 83   Temp 97.8  F (36.6  C) (Temporal)   Resp 16   Ht 1.473 m (4' 10\")   Wt 54.4 kg (120 lb)   SpO2 92%   BMI 25.08 kg/m      I/O last 3 completed shifts:  In: -   Out: 250 [Urine:250]  Wt Readings from Last 1 Encounters:   09/19/21 54.4 kg (120 lb)     Vitals:    09/19/21 1443 09/19/21 2100   Weight: 55.3 kg (122 lb) 54.4 kg (120 lb)       Constitutional: Awake, alert, cooperative, no apparent distress   Respiratory: Clear to auscultation bilaterally, no crackles or wheezing   Cardiovascular: Regular rate and rhythm, normal S1 and S2, +1 pitting edema lower extremities bilateral ankle level    Abdomen: Normal bowel sounds, soft, non-distended, non-tender   Skin: No rashes, no cyanosis, dry to touch   Neuro: Alert and oriented x2, no weakness, spontaneous and coherent speech   Extremities: No edema, normal range of motion   Other(s): Euthymic mood, not agitated       All other systems: Negative          Medications:      All current medications were reviewed with changes reflected in problem list.         Data:      All new lab and imaging data was reviewed.   Labs:  No results for input(s): CULT in the last " 168 hours.  Recent Labs   Lab 09/20/21 0602 09/19/21  1440   WBC 10.0 9.7   HGB 10.9* 10.5*   HCT 34.1* 32.9*   * 109*    147*     Recent Labs   Lab 09/20/21  0602 09/20/21  0159 09/19/21 2214 09/19/21 2125 09/19/21  1545 09/19/21  1348     --   --   --  141  --    POTASSIUM 4.1  --   --  4.1 4.3  --    CHLORIDE 112*  --   --   --  110*  --    CO2 23  --   --   --  25  --    ANIONGAP 9  --   --   --  6  --    * 118* 100*  --  95   < >   BUN 23  --   --   --  27  --    CR 1.05*  --   --   --  1.05*  --    GFRESTIMATED 44*  --   --   --  44*  --    TRANG 8.5  --   --   --  8.9  --    MAG 2.3  --   --  2.2  --   --    PHOS 3.5  --   --  3.8  --   --     < > = values in this interval not displayed.     No results for input(s): SED, CRP in the last 168 hours.  Recent Labs   Lab 09/20/21 0602 09/20/21 0159 09/19/21 2214 09/19/21  1545 09/19/21  1348   * 118* 100* 95 101*     Recent Labs   Lab 09/19/21  1440   INR 1.11     Recent Labs   Lab 09/19/21 2125 09/19/21  1440   TROPONIN <0.015 <0.015     Recent Labs   Lab 09/19/21  1454   COLOR Light Yellow   APPEARANCE Slightly Cloudy*   URINEGLC Negative   URINEBILI Negative   URINEKETONE Negative   SG 1.023   UBLD Negative   URINEPH 8.0*   PROTEIN Negative   NITRITE Negative   LEUKEST Small*   RBCU 3*   WBCU 14*      Imaging:   Results for orders placed or performed during the hospital encounter of 09/19/21   CT Head Perfusion w Contrast    Narrative    EXAM: CTA  HEAD NECK WITH CONTRAST, CT HEAD PERFUSION WITH CONTRAST  LOCATION: [facility]  DATE/TIME: 9/19/2021 2:13 PM    INDICATION: Neuro deficit, acute, stroke suspected  COMPARISON: CTA head neck 01/09/2020  CONTRAST: 70mL Isovue-370 (accession [id number]), 50mL Isovue-370 (accession [id number])  TECHNIQUE:   Head and neck CT angiogram with IV contrast. Noncontrast head CT followed by axial helical CT images of the head and neck vessels obtained during the  arterial phase of intravenous contrast administration. Axial 2D reconstructed images and multiplanar 3D   MIP reconstructed images of the head and neck vessels were performed by the technologist. Additional CT cerebral perfusion was performed utilizing a second contrast bolus. Perfusion data were post processed with generation of standard perfusion maps and   estimation of ischemic/infarcted volumes utilizing standard threshold values. Dose reduction techniques were used. All stenosis measurements made according to NASCET criteria unless otherwise specified.    FINDINGS:   HEAD CTA:  ANTERIOR CIRCULATION: Hypoplastic right A1 segment. Unchanged severe focal stenosis of a distal branch of the posterior division of the M2 segment of the right MCA (image 358, series 5). Unchanged moderate stenosis of an adjacent M2 branch (image 354,   series 5).     POSTERIOR CIRCULATION: Unchanged moderate stenosis of the origin of the P1 segment of the right PCA. Unchanged severe stenosis of the P3-P4 junction of the right PCA (image 323, series 5).     DURAL VENOUS SINUSES: Expected enhancement of the major dural venous sinuses.    NECK CTA:  RIGHT CAROTID: No measurable stenosis or dissection.    LEFT CAROTID: No measurable stenosis or dissection.    VERTEBRAL ARTERIES: No focal stenosis or dissection. Balanced vertebral arteries.    AORTIC ARCH: Classic aortic arch anatomy with no significant stenosis at the origin of the great vessels.    NONVASCULAR STRUCTURES: Multiple lobulated low-density lesions arising from the left glenohumeral joint, likely related to either synovitis or synovial osteochondromatosis..    CT PERFUSION:  PERFUSION MAPS: Symmetrical cerebral perfusion. No focal deficits in cerebral blood flow or volume to suggest ischemia/oligemia.    RAPID ANALYSIS:  CBF<30%: 0 mL  Tmax>6sec: 0 mL  Mismatch volume: 0 mL  Mismatch ratio: None      Impression    IMPRESSION:   HEAD CTA:   1.  When compared to the 01/09/2020  study, there is unchanged severe stenosis of a distal branch of the posterior division of the M2 segment of the right MCA. There is unchanged moderate stenosis of an adjacent M2 branch.    2.  There is unchanged moderate stenosis of the origin of the P1 segment of the right PCA. Unchanged severe stenosis of the P3-P4 junction of the right PCA.    3.  No aneurysm.    NECK CTA:  1.  Patent cervical arterial vasculature without flow-limiting stenosis.    CT PERFUSION:  1.  Normal cerebral perfusion.    CTA and perfusion results discussed with Dr. Antunez on 09/19/2021 at 1449   CTA Head Neck with Contrast    Narrative    EXAM: CTA  HEAD NECK WITH CONTRAST, CT HEAD PERFUSION WITH CONTRAST  LOCATION: [facility]  DATE/TIME: 9/19/2021 2:13 PM    INDICATION: Neuro deficit, acute, stroke suspected  COMPARISON: CTA head neck 01/09/2020  CONTRAST: 70mL Isovue-370 (accession [id number]), 50mL Isovue-370 (accession [id number])  TECHNIQUE:   Head and neck CT angiogram with IV contrast. Noncontrast head CT followed by axial helical CT images of the head and neck vessels obtained during the arterial phase of intravenous contrast administration. Axial 2D reconstructed images and multiplanar 3D   MIP reconstructed images of the head and neck vessels were performed by the technologist. Additional CT cerebral perfusion was performed utilizing a second contrast bolus. Perfusion data were post processed with generation of standard perfusion maps and   estimation of ischemic/infarcted volumes utilizing standard threshold values. Dose reduction techniques were used. All stenosis measurements made according to NASCET criteria unless otherwise specified.    FINDINGS:   HEAD CTA:  ANTERIOR CIRCULATION: Hypoplastic right A1 segment. Unchanged severe focal stenosis of a distal branch of the posterior division of the M2 segment of the right MCA (image 358, series 5). Unchanged moderate stenosis of an adjacent M2 branch (image  354,   series 5).     POSTERIOR CIRCULATION: Unchanged moderate stenosis of the origin of the P1 segment of the right PCA. Unchanged severe stenosis of the P3-P4 junction of the right PCA (image 323, series 5).     DURAL VENOUS SINUSES: Expected enhancement of the major dural venous sinuses.    NECK CTA:  RIGHT CAROTID: No measurable stenosis or dissection.    LEFT CAROTID: No measurable stenosis or dissection.    VERTEBRAL ARTERIES: No focal stenosis or dissection. Balanced vertebral arteries.    AORTIC ARCH: Classic aortic arch anatomy with no significant stenosis at the origin of the great vessels.    NONVASCULAR STRUCTURES: Multiple lobulated low-density lesions arising from the left glenohumeral joint, likely related to either synovitis or synovial osteochondromatosis..    CT PERFUSION:  PERFUSION MAPS: Symmetrical cerebral perfusion. No focal deficits in cerebral blood flow or volume to suggest ischemia/oligemia.    RAPID ANALYSIS:  CBF<30%: 0 mL  Tmax>6sec: 0 mL  Mismatch volume: 0 mL  Mismatch ratio: None      Impression    IMPRESSION:   HEAD CTA:   1.  When compared to the 01/09/2020 study, there is unchanged severe stenosis of a distal branch of the posterior division of the M2 segment of the right MCA. There is unchanged moderate stenosis of an adjacent M2 branch.    2.  There is unchanged moderate stenosis of the origin of the P1 segment of the right PCA. Unchanged severe stenosis of the P3-P4 junction of the right PCA.    3.  No aneurysm.    NECK CTA:  1.  Patent cervical arterial vasculature without flow-limiting stenosis.    CT PERFUSION:  1.  Normal cerebral perfusion.    CTA and perfusion results discussed with Dr. Antunez on 09/19/2021 at 1449   CT Head w/o Contrast    Narrative    EXAM: CT HEAD W/O CONTRAST  LOCATION: [facility]  DATE/TIME: 9/19/2021 2:17 PM    INDICATION: Neuro deficit, acute, stroke suspected  COMPARISON: None.  TECHNIQUE: Routine CT Head without IV  contrast. Multiplanar reformats. Dose reduction techniques were used.    FINDINGS:  INTRACRANIAL CONTENTS: No acute intracranial hemorrhage. No extra-axial fluid collection. No mass effect or midline shift. Chronic infarct involving the posterior right putamen and corona radiata. Moderate presumed chronic small vessel ischemic changes.   Moderate generalized volume loss. No hydrocephalus.     VISUALIZED ORBITS/SINUSES/MASTOIDS: Prior bilateral cataract surgery. Visualized portions of the orbits are otherwise unremarkable. Opacified bilateral sphenoid sinuses. No middle ear or mastoid effusion.    BONES/SOFT TISSUES: No acute abnormality.      Impression    IMPRESSION:  1.  No acute intracranial hemorrhage.  2.  Chronic infarct involving the posterior right putamen and corona radiata.  3.  Moderate burden presume sequela of chronic small vessel ischemic disease.    Results discussed with Jame Antunez on 9/19/2021 2:27 PM.   CT Abdomen Pelvis w/o Contrast    Narrative    EXAM: CT ABDOMEN PELVIS W/O CONTRAST  LOCATION: [facility]  DATE/TIME: 9/19/2021 8:03 PM    INDICATION: Lower abdominal pain.   COMPARISON: None.  TECHNIQUE: CT scan of the abdomen and pelvis was performed without IV contrast. Multiplanar reformats were obtained. Dose reduction techniques were used.  CONTRAST: None.    FINDINGS:   LOWER CHEST: Coronary artery disease. Thoracic aorta tortuosity. Mild scarring and bronchiectasis at the lung bases.    HEPATOBILIARY: Normal.    PANCREAS: Normal.    SPLEEN: Normal.    ADRENAL GLANDS: Normal.    KIDNEYS/BLADDER: Simple cortical and parapelvic renal cysts do not require follow-up. No hydronephrosis or hydroureter.     BOWEL: There is colonic diverticulosis. Normal appendix.     LYMPH NODES: Normal.    VASCULATURE: Atherosclerotic disease with an aortobiiliac stent graft.     PELVIC ORGANS: Hysterectomy. No adnexal masses.     MUSCULOSKELETAL: Osteopenia. Severe degenerative changes at  the right and left hips. There is multilevel degenerative change in the spine.       Impression    IMPRESSION:   1.  Colonic diverticulosis. No diverticulitis.   2.  Atherosclerotic disease with an aortobiiliac stent graft.

## 2021-09-20 NOTE — PHARMACY-CONSULT NOTE
Pharmacy Consult to evaluate for medication related stroke core measures    Candi Whatley, 89 year old female admitted for generalized weakness and slurred speech on 9/19/2021.    Thrombolytic was not given because of Time from onset contraindications    VTE Prophylaxis SCDs /PCDs placed on 9/19, as appropriate prior to end of hospital day 2.    Antithrombotic: aspirin continued on 9/20, as appropriate by end of hospital day 2. Continue antithrombotic therapy on discharge to meet quality measures, unless contraindicated.    Anticoagulation if history of A-fib/flutter: Patient does not have history of A-fib/flutter - anticoagulation not required for medication related stroke core measures.     LDL Cholesterol Calculated   Date Value Ref Range Status   09/19/2021 49 <=100 mg/dL Final   12/04/2019 137 (H) <100 mg/dL Final     Comment:     Above desirable:  100-129 mg/dl  Borderline High:  130-159 mg/dL  High:             160-189 mg/dL  Very high:       >189 mg/dl         Patient currently receiving Lipitor (atorvastatin) continue statin on discharge to meet quality measures, unless contraindicated.    Recommendations: None at this time    Thank you for the consult.    Lino Clark [facility] 9/20/2021 3:07 PM

## 2021-09-20 NOTE — ED NOTES
Straight cath performed to empty patient's bladder. 250mL drained post-commode void. Abdominal pain reduced but still present. MD notified

## 2021-09-20 NOTE — CONSULTS
"[facility]    Stroke Consult Note    Reason for Consult:  \"stroke\"    Chief Complaint: Generalized Weakness     HPI  Candi Whatley is a 89 year old female who presented to the hospital with confusion and weakness. Her daughter provides the history. At 7 am yesterday, she was noted by an aide at her facility to be so weak that she could not stand up. Her speech was slurred, she was shaking, couldn't hold cup with her R hand, confused. Normally her R is her stronger side due to prior strokes causing L side weakness.    12/2019-- stroke in the R posterior lateral putamen. Presented with hallucinations from assisted living and complained of \"wobbly legs\"    1/2020- stable intracranial stenosis, new brain MRI showed two new strokes- one in R periventricular white matter and L cerebellum, both subacute.    Yesterday 9/19/21-- stable intracranial stenosis. MRI brain today negative.    Today, her daughter reports that she is no better. She is nowhere near her baseline.  Only had two meals today at 11 am and 4pm, had to be fed. No improvement throughout the day today.  Confused, thought she was in a school. Knows daughter.      Urine culture growing 100k+ colonies of E. Coli    Impression  1. Encephalopathy due to E. Coli urinary tract infection  No concern for new stroke given negative brain MRI  No concern for TIA given that her symptoms have been present for 36+ hours and still ongoing.    Recommendations  - Treatment of UTI per primary team  - Long term secondary stroke prevention in this patient includes: aspirin 81mg daily due to low weight below 70kg; statin for goal LDL 40-70; long term tight BP control.    Thank you for this consult. No further stroke evaluation is recommended, so we will sign off. Please contact us with any additional questions.    Andra Russell PA-C  Neurology  09/20/2021 4:39 PM  To page me or covering stroke neurology team member, click here: [facility]COM   Choose \"On " "Call\" tab at top, then search dropdown box for \"Neurology Adult\", select location, press Enter, then look for stroke/neuro ICU/telestroke.    _____________________________________________________    Clinically Significant Risk Factors Present on Admission             # Platelet Defect: home medication list includes an antiplatelet medication    # CKD, Stage 3b (GFR 30-44): Will monitor and treat as appropriate  - last Cr =  1.05 mg/dL (Ref range: 0.52 - 1.04 mg/dL)  - last GFR = 44 mL/min/1.73m2 (Ref range: >60 mL/min/1.73m2)     Past Medical History   Past Medical History:   Diagnosis Date     Abdominal aortic aneurysm (H) 2001    had a stent placed 2009     AK (actinic keratosis) 11/11/2009     Cataract 2/22/2011     Compression fractures      Compression Fractures of Lumbar Vertebra 2/4/2010     CVA (cerebral vascular accident) (H)     Dec 2019 and Jan 2020     Dementia (H)      DJD (degenerative joint disease)      Generalised Weakness and Fatigue 3/3/2011     Glaucoma (increased eye pressure) 2009    Dr. 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  Dr. Clark.     HYSTERECTOMY, CERVIX STATUS UNKNOWN  1971     LASER SELECTIVE TRABECULOPLASTY  3/2010; 10/2015    left eye 1st Clark (notes show inf treatment); inf 180 (MARI)     LASER SELECTIVE TRABECULOPLASTY  12/2017    left eye sup 180 "     Medications   Home Meds  Prior to Admission medications    Medication Sig Start Date End Date Taking? Authorizing Provider   acetaminophen (TYLENOL) 500 MG tablet Take 1,000 mg by mouth 3 times daily 8am, 2pm, 8pm   Yes Unknown, Entered By History   amLODIPine (NORVASC) 5 MG tablet Take 1 tablet (5 mg) by mouth daily 12/6/19  Yes Theron Clarke MD   aspirin (ASA) 325 MG tablet Take 1 tablet (325 mg) by mouth daily 1/31/20  Yes Sara Britt PA-C   atorvastatin (LIPITOR) 40 MG tablet Take 40 mg by mouth every evening   Yes Reported, Patient   carvedilol (COREG) 6.25 MG tablet Take 6.25 mg by mouth 2 times daily (with meals)   Yes Reported, Patient   Cholecalciferol (VITAMIN D3) 50 MCG (2000 UT) TABS Take 2,000 Units by mouth daily   Yes Unknown, Entered By History   dorzolamide-timolol (COSOPT) 2-0.5 % ophthalmic solution Place 1 drop into both eyes 2 times daily 4/19/18  Yes Prakash Gant MD   escitalopram (LEXAPRO) 10 MG tablet Take 10 mg by mouth daily At 1400   Yes Reported, Patient   escitalopram (LEXAPRO) 5 MG tablet Take 5 mg by mouth daily   Yes Reported, Patient   latanoprost (XALATAN) 0.005 % ophthalmic solution Place 1 drop into both eyes At Bedtime Both Eyes 4/23/18  Yes Prakash Gant MD   melatonin 3 MG tablet Take 1 tablet (3 mg) by mouth nightly as needed for sleep 12/20/19  Yes Silva Armijo APRN CNP   polyethylene glycol (MIRALAX) 17 g packet Take 1 packet by mouth daily   Yes Reported, Patient   senna-docusate (SENOKOT-S/PERICOLACE) 8.6-50 MG tablet Take 1 tablet by mouth At Bedtime    Yes Reported, Patient   traMADol (ULTRAM) 50 MG tablet Take 0.5 tablets (25 mg) by mouth At Bedtime 8/13/20  Yes Shashi Garcia APRN CNP   Multiple Vitamins-Minerals (CERTAVITE/ANTIOXIDANTS PO) Take 1 tablet by mouth daily    Reported, Patient       Scheduled Meds    acetaminophen  1,000 mg Oral TID     aspirin  325 mg Oral Daily     atorvastatin  40 mg Oral QPM      carvedilol  6.25 mg Oral BID w/meals     cefTRIAXone  1 g Intravenous Q24H     dorzolamide-timolol  1 drop Both Eyes BID     escitalopram  10 mg Oral Daily     escitalopram  5 mg Oral Daily     gadobutrol  7.5 mL Intravenous Once     latanoprost  1 drop Both Eyes At Bedtime     polyethylene glycol  17 g Oral Daily     sodium chloride (PF)  3 mL Intracatheter Q8H     sodium chloride (PF)  3 mL Intracatheter Q8H       Infusion Meds    - MEDICATION INSTRUCTIONS -       - MEDICATION INSTRUCTIONS -         PRN Meds  lidocaine 4%, lidocaine 4%, lidocaine (buffered or not buffered), lidocaine (buffered or not buffered), - MEDICATION INSTRUCTIONS -, - MEDICATION INSTRUCTIONS -, melatonin, sodium chloride (PF), sodium chloride (PF)    Allergies   Allergies   Allergen Reactions     Actonel [Bisphosphonates] Rash     Conjugated Estrogens Rash     Macrobid [Nitrofuran Derivatives] Rash     Nitrofurantoin Rash     Premarin Rash     Sulfa Drugs Rash     Hydrocodone Nausea and Vomiting     Oxycodone Nausea and Vomiting     Risedronate      leg pain     Family History   Family History   Problem Relation Age of Onset     Heart Disease Father 79        MI     Hypertension Mother      Social History   Social History     Tobacco Use     Smoking status: Never Smoker     Smokeless tobacco: Never Used     Tobacco comment: No second hand exposure.   Substance Use Topics     Alcohol use: No     Drug use: No       Review of Systems   Review of systems not obtained due to patient factors - confusion       PHYSICAL EXAMINATION   Temp:  [96.9  F (36.1  C)-97.8  F (36.6  C)] 97.8  F (36.6  C)  Pulse:  [63-83] 83  Resp:  [16-23] 16  BP: (109-145)/(59-77) 116/59  SpO2:  [92 %-96 %] 92 %    General:  patient lying in bed without any acute distress    HEENT:  normocephalic/atraumatic  Pulmonary:  no respiratory distress    Neurologic  Mental Status:  alert, oriented to self, chooses [facility] out of multiple choice of locations, knows age is  89, thinks month is March.   Cranial Nerves:  Face symmetric.  Motor:  no abnormal movements, arms antigravity. Mild fix of L arm with rapid arm rolling  Reflexes:  unable to test (telestroke)  Sensory:  deferred  Coordination:  deferred  Station/Gait:  unable to test due to telestroke      Dysphagia Screen  Per Nursing    Imaging  I personally reviewed all imaging; relevant findings per HPI.    Labs Data   CBC  Recent Labs   Lab 09/20/21  0602 09/19/21  1440   WBC 10.0 9.7   RBC 3.19* 3.01*   HGB 10.9* 10.5*   HCT 34.1* 32.9*    147*     Basic Metabolic Panel   Recent Labs   Lab 09/20/21  0815 09/20/21  0602 09/20/21  0159 09/19/21 2214 09/19/21 2125 09/19/21  1545   NA  --  144  --   --   --  141   POTASSIUM  --  4.1  --   --  4.1 4.3   CHLORIDE  --  112*  --   --   --  110*   CO2  --  23  --   --   --  25   BUN  --  23  --   --   --  27   CR  --  1.05*  --   --   --  1.05*   * 103* 118*   < >  --  95   TRANG  --  8.5  --   --   --  8.9    < > = values in this interval not displayed.     Liver Panel  No results for input(s): PROTTOTAL, ALBUMIN, BILITOTAL, ALKPHOS, AST, ALT, BILIDIRECT in the last 168 hours.  INR    Recent Labs   Lab Test 09/19/21  1440 01/08/20  1927   INR 1.11 1.06      Lipid Profile    Recent Labs   Lab Test 09/19/21 2125 12/04/19  1017 05/22/19  0845 07/18/13  1037   CHOL 116 208* 230* 176   HDL 40* 43* 46* 49*   LDL 49 137* 148* 98   TRIG 133 139 182* 146   CHOLHDLRATIO  --   --   --  3.6     A1C    Recent Labs   Lab Test 09/19/21 2125 12/04/19  1017   A1C 5.8* 6.0*     Troponin I    Recent Labs   Lab 09/19/21 2125 09/19/21  1440   TROPONIN <0.015 <0.015          Stroke Consult Data Data Telestroke Service Details  (for non-emergent stroke consult with tele)  Video start time 09/20/21   1615   Video end time 09/20/21   1638   Type of service telemedicine diagnostic assessment of acute neurological changes   Reason telemedicine is appropriate patient requires assessment with a  specialist for diagnosis and treatment of neurological symptoms   Mode of transmission secure interactive audio and video communication per [facility]   Originating site (patient location) [facility]    Distant site (provider location) [facility]       I have personally spent a total of 40 minutes providing care and consulting with this patient's medical providers today, with more than 50% of this time spent in consultation, coordination of care, and discussion with the patient and/or family regarding diagnostic results, prognosis, symptom management, risks and benefits of management options, and development of plan of care.

## 2021-09-20 NOTE — PROGRESS NOTES
Care Management Follow Up    Length of Stay (days): 1    Expected Discharge Date: 09/22/2021     Concerns to be Addressed:       Patient plan of care discussed at interdisciplinary rounds: Yes    Anticipated Discharge Disposition:  TBD. Poss ARU     Anticipated Discharge Services:  OT, PT  Anticipated Discharge DME:  TBD    Education Provided on the Discharge Plan:  No  Patient/Family in Agreement with the Plan:  TBD    Referrals Placed by CM/SW:  ARU to review  Private pay costs discussed: transportation costs    Additional Information:  CM paged ARU Liaison Linus to return call. CM to request he review patient's information for possible admission to ARU. Await return call.     CM will continue to follow patient until discharge for any additional needs.     Pat Segovia RN, BSN, CPHN, CM  Inpatient Care Coordination - [facility]  [phone]    Addendum 10:11am - Contacted ARU directly ([phone]). Linus does not work on Mondays. Spoke with No. Provided her with patient information for review. Await MRI results and ARU review.   adelina

## 2021-09-20 NOTE — PROGRESS NOTES
09/20/21 0929   Quick Adds   Type of Visit Initial PT Evaluation   Living Environment   People in home alone   Current Living Arrangements group home   Home Accessibility no concerns   Transportation Anticipated family or friend will provide   Living Environment Comments Per daughter: Group home with 6 residents, someone present at all times. No stairs, Walk in shower with shower chair.    Self-Care   Usual Activity Tolerance poor   Current Activity Tolerance poor   Regular Exercise No   Equipment Currently Used at Home commode chair;hospital bed;shower chair;wheelchair, manual   Activity/Exercise/Self-Care Comment Per daughter: Pt gets assist for dressing, showering, toileting, would complete SPT to wc at home with assist. Uses commode at night w/ assist. WC primary mobility, pt gets pushed around facility. Per daughter group home has bobby lift that could be used with pt if needed.    Disability/Function   Fall history within last six months no   General Information   Onset of Illness/Injury or Date of Surgery 09/19/21   Referring Physician Alisha Hernandes MD   Patient/Family Therapy Goals Statement (PT) return to group home   Pertinent History of Current Problem (include personal factors and/or comorbidities that impact the POC) Per chart: Candi Whatley is a 89 year old female with past medical history significant for hypertension, hyperlipidemia, CKD, CVA presented to the emergency room from group home with an episode of slurry speech confusion and generalized weakness since this morning. In the emergency room stroke protocol was carried out.  Initial Head CT negative.    Existing Precautions/Restrictions fall   Cognition   Cognitive Status Comments Pt with increased confusion from baseline per daughter, however good command following. Limited ability to answer questions about baseline.    Range of Motion (ROM)   ROM Comment B UE ROM limited to below 90 deg shoulder flexion 2/2 weakness.    Strength    Strength Comments Pt with generalized weakness, L more than R from prior stroke. Pt completes SLR B sides, but very limited range on L. UE weakness also present. Pt able to resist into PF and DF B ankles, increaed weakness on L.   Bed Mobility   Comment (Bed Mobility) Pt maxA at trunk for supine>sitting EOB, pt able to advance LEs, needs modA at L LE. Sit>supine maxAx2 and totalA for repositioning in bed.   Transfers   Transfer Safety Comments Pt Lauren-CGAx2 using Melany Steady for STS   Gait/Stairs (Locomotion)   Comment (Gait/Stairs) Pt unable to ambulate at this time, has not recently been ambulating at group home   Balance   Balance Comments Sitting EOB pt requires UE support and CGA-Lauren   Clinical Impression   Criteria for Skilled Therapeutic Intervention yes, treatment indicated   PT Diagnosis (PT) Impaired functional mobility   Influenced by the following impairments generalized weakness, decreased activity tolerance, impaired balance   Functional limitations due to impairments bed mobility, transfers, ambulation   Clinical Presentation Evolving/Changing   Clinical Presentation Rationale PMH, clinician impression   Clinical Decision Making (Complexity) low complexity   Therapy Frequency (PT) Daily   Predicted Duration of Therapy Intervention (days/wks) 3 days   Planned Therapy Interventions (PT) bed mobility training;home exercise program;neuromuscular re-education;patient/family education;strengthening;transfer training;progressive activity/exercise   Anticipated Equipment Needs at Discharge (PT)   (potential need for lift and/or Ax2)   Risk & Benefits of therapy have been explained evaluation/treatment results reviewed;care plan/treatment goals reviewed;risks/benefits reviewed;current/potential barriers reviewed;participants voiced agreement with care plan;participants included;patient;daughter   PT Discharge Planning    PT Discharge Recommendation (DC Rec) Transitional Care Facility   PT Rationale for DC  Rec Pt below baseline mobility, currently requiring Ax2 for transfers. If group home able to take pt back with this level of assist and this is pt and family desire, could be safe option, however increased therapy at TCU would be beneficial for increase strength and activity tolerance. Pt's daughter agreeable to this. Pt will also benefit from continued therapy during hospital stay.  If dc to group home, would recommend  PT.

## 2021-09-21 ENCOUNTER — APPOINTMENT (OUTPATIENT)
Dept: OCCUPATIONAL THERAPY | Facility: CLINIC | Age: 86
DRG: 690 | End: 2021-09-21
Attending: INTERNAL MEDICINE
Payer: COMMERCIAL

## 2021-09-21 LAB
BACTERIA UR CULT: ABNORMAL
BACTERIA UR CULT: ABNORMAL
GLUCOSE BLDC GLUCOMTR-MCNC: 96 MG/DL (ref 70–99)
MAGNESIUM SERPL-MCNC: 2.3 MG/DL (ref 1.6–2.3)
PHOSPHATE SERPL-MCNC: 2.8 MG/DL (ref 2.5–4.5)
POTASSIUM BLD-SCNC: 3.7 MMOL/L (ref 3.4–5.3)

## 2021-09-21 PROCEDURE — 97165 OT EVAL LOW COMPLEX 30 MIN: CPT | Mod: GO | Performed by: REHABILITATION PRACTITIONER

## 2021-09-21 PROCEDURE — 120N000001 HC R&B MED SURG/OB

## 2021-09-21 PROCEDURE — 84100 ASSAY OF PHOSPHORUS: CPT | Performed by: INTERNAL MEDICINE

## 2021-09-21 PROCEDURE — 99232 SBSQ HOSP IP/OBS MODERATE 35: CPT | Performed by: INTERNAL MEDICINE

## 2021-09-21 PROCEDURE — 97530 THERAPEUTIC ACTIVITIES: CPT | Mod: GO | Performed by: REHABILITATION PRACTITIONER

## 2021-09-21 PROCEDURE — 36415 COLL VENOUS BLD VENIPUNCTURE: CPT | Performed by: INTERNAL MEDICINE

## 2021-09-21 PROCEDURE — 250N000013 HC RX MED GY IP 250 OP 250 PS 637: Performed by: INTERNAL MEDICINE

## 2021-09-21 PROCEDURE — 83735 ASSAY OF MAGNESIUM: CPT | Performed by: INTERNAL MEDICINE

## 2021-09-21 PROCEDURE — 250N000013 HC RX MED GY IP 250 OP 250 PS 637: Performed by: PHYSICIAN ASSISTANT

## 2021-09-21 PROCEDURE — 250N000011 HC RX IP 250 OP 636: Performed by: INTERNAL MEDICINE

## 2021-09-21 PROCEDURE — 84132 ASSAY OF SERUM POTASSIUM: CPT | Performed by: INTERNAL MEDICINE

## 2021-09-21 RX ADMIN — CEFTRIAXONE 1 G: 1 INJECTION, POWDER, FOR SOLUTION INTRAMUSCULAR; INTRAVENOUS at 15:45

## 2021-09-21 RX ADMIN — ASPIRIN 81 MG: 81 TABLET, CHEWABLE ORAL at 08:07

## 2021-09-21 RX ADMIN — DORZOLAMIDE HYDROCHLORIDE AND TIMOLOL MALEATE 1 DROP: 22.3; 6.8 SOLUTION/ DROPS OPHTHALMIC at 08:09

## 2021-09-21 RX ADMIN — CARVEDILOL 6.25 MG: 6.25 TABLET, FILM COATED ORAL at 08:07

## 2021-09-21 RX ADMIN — ESCITALOPRAM OXALATE 10 MG: 10 TABLET ORAL at 15:45

## 2021-09-21 RX ADMIN — CARVEDILOL 6.25 MG: 6.25 TABLET, FILM COATED ORAL at 18:02

## 2021-09-21 RX ADMIN — ATORVASTATIN CALCIUM 40 MG: 40 TABLET, FILM COATED ORAL at 19:33

## 2021-09-21 RX ADMIN — DORZOLAMIDE HYDROCHLORIDE AND TIMOLOL MALEATE 1 DROP: 22.3; 6.8 SOLUTION/ DROPS OPHTHALMIC at 19:36

## 2021-09-21 RX ADMIN — ACETAMINOPHEN 1000 MG: 500 TABLET, FILM COATED ORAL at 23:32

## 2021-09-21 RX ADMIN — LATANOPROST 1 DROP: 50 SOLUTION OPHTHALMIC at 23:37

## 2021-09-21 RX ADMIN — Medication 1 MG: at 19:33

## 2021-09-21 RX ADMIN — ACETAMINOPHEN 1000 MG: 500 TABLET, FILM COATED ORAL at 08:07

## 2021-09-21 RX ADMIN — POLYETHYLENE GLYCOL 3350 17 G: 17 POWDER, FOR SOLUTION ORAL at 08:24

## 2021-09-21 RX ADMIN — ACETAMINOPHEN 1000 MG: 500 TABLET, FILM COATED ORAL at 15:45

## 2021-09-21 RX ADMIN — ESCITALOPRAM 5 MG: 5 TABLET, FILM COATED ORAL at 08:07

## 2021-09-21 ASSESSMENT — ACTIVITIES OF DAILY LIVING (ADL)
ADLS_ACUITY_SCORE: 23
ADLS_ACUITY_SCORE: 21
ADLS_ACUITY_SCORE: 23
ADLS_ACUITY_SCORE: 23

## 2021-09-21 NOTE — PLAN OF CARE
Pt oriented to self only, up heavy Ax2 to BSC.  BM x 2 this shift.  Pills whole one at a time.  Purewick in place.  Regular diet.  Pt becomes increasingly agitated when daughter Pat is not present.  Please call Pat should you need pt calmed down at any point.  IV rocephin administered.  Discharge disposition unsure at this time.

## 2021-09-21 NOTE — PROGRESS NOTES
[facility]  Hospitalist Progress Note  Medardo Ring MD, MD 09/21/2021  (Text Page)  Reason for Stay (Diagnosis): Generalized weakness, slurred speech, ruling out TIA versus CVA         Assessment and Plan:      Summary of Stay:   Candi Whatley is a 89 year old female with past medical history significant for hypertension, hyperlipidemia, CKD, CVA presented to the emergency room from group home with an episode of slurry speech confusion and generalized weakness since this morning.       In the emergency room stroke protocol was carried out.  Initial Head CT negative.  Stroke neuro was consulted recommends MRI and further evaluation.  Also in the emergency room UA was equivocal was started on treatment for UTI     Slurry speech generalized weakness-ruled out for CVA  -Patient with known history of CVAs  -MRI of the brain showed chronic infarcts with no new infarcts  -Appreciate input from stroke neurology  -Earlier slurring of speech has resolved  -PT/OT, SLP evaluation  -Will need social service for discharge disposition planning    -Continue aspirin        Altered mental status could be due to infectious encephalopathy secondary to underlying urinary tract infection with isolated E. coli with pending sensitivities  -UA equivocal  -Urine culture sent  -Started on antibiotics  -Continued on intravenous ceftriaxone  -Cultures being followed     Right lower quadrant abdominal tenderness on exam  -CT abdomen and pelvis in ED with no acute pathology     Hypertension  -Normotensive in the emergency room  -Possible concern for stroke stroke and TIA  -We will avoid sudden drops in blood pressure  -Currently blood pressure levels within acceptable ranges     Hyperlipidemia  -Continue statins    Patient's daughter had some concerns regarding coughing spells requesting for evaluation such as chest x-ray-  -chest x-ray showed negative findings    Physical deconditioning-patient's daughter had some concerns  "regarding patient's deconditioned state.  We will be awaiting further input from therapy services to assist in guidance with the discharge disposition as patient is currently lives in a assisted senior living facility set up           DVT Prophylaxis: Pneumatic Compression Devices  Code Status: DNR / DNI  as discussed earlier during admission process    Disposition:  Anticipating still needing hospitalization inpatient care at least in the next 24-36 hours                Interval History (Subjective):      Continuing service care for this a 89-year-old  lady who initially came in for.  Increasing generalized weakness and slurring of speech.  -Overnight no recurrence of any slurring of speech  -No reported obvious focal weakness  -This morning she is awake, alert, oriented was able to participate and was following simple verbal instructions  However for some episodes of agitation earlier  -No reported nausea, vomiting, diarrhea remain afebrile.  Not requiring any oxygen support.   # Pain Assessment:  Current Pain Score 9/20/2021   Patient currently in pain? denies   Pain score (0-10) -   Candi arambula pain level was assessed and she currently denies pain.                  Physical Exam:      Last Vital Signs:  /65 (BP Location: Right arm)   Pulse 75   Temp 97.1  F (36.2  C) (Temporal)   Resp 18   Ht 1.473 m (4' 10\")   Wt 54.4 kg (120 lb)   SpO2 95%   BMI 25.08 kg/m      I/O last 3 completed shifts:  In: 100 [P.O.:100]  Out: 455 [Urine:455]  Wt Readings from Last 1 Encounters:   09/19/21 54.4 kg (120 lb)     Vitals:    09/19/21 1443 09/19/21 2100   Weight: 55.3 kg (122 lb) 54.4 kg (120 lb)       Constitutional: Awake, alert, cooperative, no apparent distress   Respiratory: Clear to auscultation bilaterally, no crackles or wheezing   Cardiovascular: Regular rate and rhythm, normal S1 and S2, +1 pitting edema lower extremities bilateral ankle level    Abdomen: Normal bowel sounds, soft, non-distended, " non-tender   Skin: No rashes, no cyanosis, dry to touch   Neuro: Alert and oriented x2, no weakness, spontaneous and coherent speech   Extremities: No edema, normal range of motion   Other(s): Euthymic mood, not agitated       All other systems: Negative          Medications:      All current medications were reviewed with changes reflected in problem list.         Data:      All new lab and imaging data was reviewed.   Labs:  No results for input(s): CULT in the last 168 hours.  Recent Labs   Lab 09/20/21  0602 09/19/21  1440   WBC 10.0 9.7   HGB 10.9* 10.5*   HCT 34.1* 32.9*   * 109*    147*     Recent Labs   Lab 09/21/21  0602 09/21/21 0215 09/20/21  0815 09/20/21  0602 09/19/21  2214 09/19/21  2125 09/19/21  1545 09/19/21  1545   NA  --   --   --  144  --   --   --  141   POTASSIUM 3.7  --   --  4.1  --  4.1   < > 4.3   CHLORIDE  --   --   --  112*  --   --   --  110*   CO2  --   --   --  23  --   --   --  25   ANIONGAP  --   --   --  9  --   --   --  6   GLC  --  96 105* 103*   < >  --    < > 95   BUN  --   --   --  23  --   --   --  27   CR  --   --   --  1.05*  --   --   --  1.05*   GFRESTIMATED  --   --   --  44*  --   --   --  44*   TRANG  --   --   --  8.5  --   --   --  8.9   MAG 2.3  --   --  2.3  --  2.2  --   --    PHOS 2.8  --   --  3.5  --  3.8  --   --     < > = values in this interval not displayed.     No results for input(s): SED, CRP in the last 168 hours.  Recent Labs   Lab 09/21/21  0215 09/20/21  0815 09/20/21  0602 09/20/21  0159 09/19/21  2214   GLC 96 105* 103* 118* 100*     Recent Labs   Lab 09/19/21  1440   INR 1.11     Recent Labs   Lab 09/19/21  2125 09/19/21  1440   TROPONIN <0.015 <0.015     Recent Labs   Lab 09/19/21  1454   COLOR Light Yellow   APPEARANCE Slightly Cloudy*   URINEGLC Negative   URINEBILI Negative   URINEKETONE Negative   SG 1.023   UBLD Negative   URINEPH 8.0*   PROTEIN Negative   NITRITE Negative   LEUKEST Small*   RBCU 3*   WBCU 14*      Imaging:    Results for orders placed or performed during the hospital encounter of 09/19/21   CT Head Perfusion w Contrast    Narrative    EXAM: CTA  HEAD NECK WITH CONTRAST, CT HEAD PERFUSION WITH CONTRAST  LOCATION: [facility]  DATE/TIME: 9/19/2021 2:13 PM    INDICATION: Neuro deficit, acute, stroke suspected  COMPARISON: CTA head neck 01/09/2020  CONTRAST: 70mL Isovue-370 (accession [id number]), 50mL Isovue-370 (accession [id number])  TECHNIQUE:   Head and neck CT angiogram with IV contrast. Noncontrast head CT followed by axial helical CT images of the head and neck vessels obtained during the arterial phase of intravenous contrast administration. Axial 2D reconstructed images and multiplanar 3D   MIP reconstructed images of the head and neck vessels were performed by the technologist. Additional CT cerebral perfusion was performed utilizing a second contrast bolus. Perfusion data were post processed with generation of standard perfusion maps and   estimation of ischemic/infarcted volumes utilizing standard threshold values. Dose reduction techniques were used. All stenosis measurements made according to NASCET criteria unless otherwise specified.    FINDINGS:   HEAD CTA:  ANTERIOR CIRCULATION: Hypoplastic right A1 segment. Unchanged severe focal stenosis of a distal branch of the posterior division of the M2 segment of the right MCA (image 358, series 5). Unchanged moderate stenosis of an adjacent M2 branch (image 354,   series 5).     POSTERIOR CIRCULATION: Unchanged moderate stenosis of the origin of the P1 segment of the right PCA. Unchanged severe stenosis of the P3-P4 junction of the right PCA (image 323, series 5).     DURAL VENOUS SINUSES: Expected enhancement of the major dural venous sinuses.    NECK CTA:  RIGHT CAROTID: No measurable stenosis or dissection.    LEFT CAROTID: No measurable stenosis or dissection.    VERTEBRAL ARTERIES: No focal stenosis or dissection. Balanced vertebral  arteries.    AORTIC ARCH: Classic aortic arch anatomy with no significant stenosis at the origin of the great vessels.    NONVASCULAR STRUCTURES: Multiple lobulated low-density lesions arising from the left glenohumeral joint, likely related to either synovitis or synovial osteochondromatosis..    CT PERFUSION:  PERFUSION MAPS: Symmetrical cerebral perfusion. No focal deficits in cerebral blood flow or volume to suggest ischemia/oligemia.    RAPID ANALYSIS:  CBF<30%: 0 mL  Tmax>6sec: 0 mL  Mismatch volume: 0 mL  Mismatch ratio: None      Impression    IMPRESSION:   HEAD CTA:   1.  When compared to the 01/09/2020 study, there is unchanged severe stenosis of a distal branch of the posterior division of the M2 segment of the right MCA. There is unchanged moderate stenosis of an adjacent M2 branch.    2.  There is unchanged moderate stenosis of the origin of the P1 segment of the right PCA. Unchanged severe stenosis of the P3-P4 junction of the right PCA.    3.  No aneurysm.    NECK CTA:  1.  Patent cervical arterial vasculature without flow-limiting stenosis.    CT PERFUSION:  1.  Normal cerebral perfusion.    CTA and perfusion results discussed with Dr. Antunez on 09/19/2021 at 1449   CTA Head Neck with Contrast    Narrative    EXAM: CTA  HEAD NECK WITH CONTRAST, CT HEAD PERFUSION WITH CONTRAST  LOCATION: [facility]  DATE/TIME: 9/19/2021 2:13 PM    INDICATION: Neuro deficit, acute, stroke suspected  COMPARISON: CTA head neck 01/09/2020  CONTRAST: 70mL Isovue-370 (accession [id number]), 50mL Isovue-370 (accession [id number])  TECHNIQUE:   Head and neck CT angiogram with IV contrast. Noncontrast head CT followed by axial helical CT images of the head and neck vessels obtained during the arterial phase of intravenous contrast administration. Axial 2D reconstructed images and multiplanar 3D   MIP reconstructed images of the head and neck vessels were performed by the technologist. Additional CT cerebral  perfusion was performed utilizing a second contrast bolus. Perfusion data were post processed with generation of standard perfusion maps and   estimation of ischemic/infarcted volumes utilizing standard threshold values. Dose reduction techniques were used. All stenosis measurements made according to NASCET criteria unless otherwise specified.    FINDINGS:   HEAD CTA:  ANTERIOR CIRCULATION: Hypoplastic right A1 segment. Unchanged severe focal stenosis of a distal branch of the posterior division of the M2 segment of the right MCA (image 358, series 5). Unchanged moderate stenosis of an adjacent M2 branch (image 354,   series 5).     POSTERIOR CIRCULATION: Unchanged moderate stenosis of the origin of the P1 segment of the right PCA. Unchanged severe stenosis of the P3-P4 junction of the right PCA (image 323, series 5).     DURAL VENOUS SINUSES: Expected enhancement of the major dural venous sinuses.    NECK CTA:  RIGHT CAROTID: No measurable stenosis or dissection.    LEFT CAROTID: No measurable stenosis or dissection.    VERTEBRAL ARTERIES: No focal stenosis or dissection. Balanced vertebral arteries.    AORTIC ARCH: Classic aortic arch anatomy with no significant stenosis at the origin of the great vessels.    NONVASCULAR STRUCTURES: Multiple lobulated low-density lesions arising from the left glenohumeral joint, likely related to either synovitis or synovial osteochondromatosis..    CT PERFUSION:  PERFUSION MAPS: Symmetrical cerebral perfusion. No focal deficits in cerebral blood flow or volume to suggest ischemia/oligemia.    RAPID ANALYSIS:  CBF<30%: 0 mL  Tmax>6sec: 0 mL  Mismatch volume: 0 mL  Mismatch ratio: None      Impression    IMPRESSION:   HEAD CTA:   1.  When compared to the 01/09/2020 study, there is unchanged severe stenosis of a distal branch of the posterior division of the M2 segment of the right MCA. There is unchanged moderate stenosis of an adjacent M2 branch.    2.  There is unchanged moderate  stenosis of the origin of the P1 segment of the right PCA. Unchanged severe stenosis of the P3-P4 junction of the right PCA.    3.  No aneurysm.    NECK CTA:  1.  Patent cervical arterial vasculature without flow-limiting stenosis.    CT PERFUSION:  1.  Normal cerebral perfusion.    CTA and perfusion results discussed with Dr. Antunez on 09/19/2021 at 1449   CT Head w/o Contrast    Narrative    EXAM: CT HEAD W/O CONTRAST  LOCATION: [facility]  DATE/TIME: 9/19/2021 2:17 PM    INDICATION: Neuro deficit, acute, stroke suspected  COMPARISON: None.  TECHNIQUE: Routine CT Head without IV contrast. Multiplanar reformats. Dose reduction techniques were used.    FINDINGS:  INTRACRANIAL CONTENTS: No acute intracranial hemorrhage. No extra-axial fluid collection. No mass effect or midline shift. Chronic infarct involving the posterior right putamen and corona radiata. Moderate presumed chronic small vessel ischemic changes.   Moderate generalized volume loss. No hydrocephalus.     VISUALIZED ORBITS/SINUSES/MASTOIDS: Prior bilateral cataract surgery. Visualized portions of the orbits are otherwise unremarkable. Opacified bilateral sphenoid sinuses. No middle ear or mastoid effusion.    BONES/SOFT TISSUES: No acute abnormality.      Impression    IMPRESSION:  1.  No acute intracranial hemorrhage.  2.  Chronic infarct involving the posterior right putamen and corona radiata.  3.  Moderate burden presume sequela of chronic small vessel ischemic disease.    Results discussed with Jame Antunez on 9/19/2021 2:27 PM.   CT Abdomen Pelvis w/o Contrast    Narrative    EXAM: CT ABDOMEN PELVIS W/O CONTRAST  LOCATION: [facility]  DATE/TIME: 9/19/2021 8:03 PM    INDICATION: Lower abdominal pain.   COMPARISON: None.  TECHNIQUE: CT scan of the abdomen and pelvis was performed without IV contrast. Multiplanar reformats were obtained. Dose reduction techniques were used.  CONTRAST: None.    FINDINGS:    LOWER CHEST: Coronary artery disease. Thoracic aorta tortuosity. Mild scarring and bronchiectasis at the lung bases.    HEPATOBILIARY: Normal.    PANCREAS: Normal.    SPLEEN: Normal.    ADRENAL GLANDS: Normal.    KIDNEYS/BLADDER: Simple cortical and parapelvic renal cysts do not require follow-up. No hydronephrosis or hydroureter.     BOWEL: There is colonic diverticulosis. Normal appendix.     LYMPH NODES: Normal.    VASCULATURE: Atherosclerotic disease with an aortobiiliac stent graft.     PELVIC ORGANS: Hysterectomy. No adnexal masses.     MUSCULOSKELETAL: Osteopenia. Severe degenerative changes at the right and left hips. There is multilevel degenerative change in the spine.       Impression    IMPRESSION:   1.  Colonic diverticulosis. No diverticulitis.   2.  Atherosclerotic disease with an aortobiiliac stent graft.

## 2021-09-21 NOTE — CONSULTS
Care Management Initial Consult    General Information  Assessment completed with: Patient, Family, Caregiver, Candi Pat Brayan  Type of CM/SW Visit: Offer D/C Planning    Primary Care Provider verified and updated as needed:     Readmission within the last 30 days:        Reason for Consult: discharge planning  Advance Care Planning:            Communication Assessment  Patient's communication style: spoken language (English or Bilingual)    Hearing Difficulty or Deaf: yes   Wear Glasses or Blind: no    Cognitive  Cognitive/Neuro/Behavioral: .WDL except  Level of Consciousness: confused  Arousal Level: opens eyes spontaneously  Orientation: disoriented to, place, time, situation  Mood/Behavior: calm, cooperative  Best Language: 0 - No aphasia  Speech: clear    Living Environment:   People in home: facility resident     Current living Arrangements: group home      Able to return to prior arrangements: yes       Family/Social Support:  Care provided by:    Provides care for:    Marital Status:              Description of Support System: Supportive, Involved    Support Assessment: Adequate family and caregiver support    Current Resources:   Patient receiving home care services: No     Community Resources:    Equipment currently used at home: wheelchair, manual  Supplies currently used at home:      Employment/Financial:  Employment Status:          Financial Concerns:             Lifestyle & Psychosocial Needs:  Social Determinants of Health     Tobacco Use: Low Risk      Smoking Tobacco Use: Never Smoker     Smokeless Tobacco Use: Never Used   Alcohol Use:      Frequency of Alcohol Consumption:      Average Number of Drinks:      Frequency of Binge Drinking:    Financial Resource Strain: Low Risk      Difficulty of Paying Living Expenses: Not very hard   Food Insecurity: No Food Insecurity     Worried About Running Out of Food in the Last Year: Never true     Ran Out of Food in the Last Year: Never true    Transportation Needs: No Transportation Needs     Lack of Transportation (Medical): No     Lack of Transportation (Non-Medical): No   Physical Activity: Inactive     Days of Exercise per Week: 0 days     Minutes of Exercise per Session: 0 min   Stress: No Stress Concern Present     Feeling of Stress : Only a little   Social Connections: Moderately Integrated     Frequency of Communication with Friends and Family: More than three times a week     Frequency of Social Gatherings with Friends and Family: Three times a week     Attends [employer] Services: More than 4 times per year     Active Member of Clubs or Organizations: Yes     Attends Club or Organization Meetings: 1 to 4 times per year     Marital Status:    Intimate Partner Violence: Not At Risk     Fear of Current or Ex-Partner: No     Emotionally Abused: No     Physically Abused: No     Sexually Abused: No   Depression: Not at risk     PHQ-2 Score: 2   Housing Stability:      Unable to Pay for Housing in the Last Year:      Number of Places Lived in the Last Year:      Unstable Housing in the Last Year:                Additional Information:  Pt admitted with slurred speech, comes from [facility] in [place]. PITER spoke with pts daughter, Pat and the RN at the group home, Brayan [phone]. Barriers to pt returning according to Brayan are pt would need to be assist of 1 to stand pivot to the wheelchair, which is her baseline. The group home does have a bobby lift for one of the other residents they could use if needed. Pt gets assistance with dressing, showers, transfers, medication management.    RN CC/SW is following/consulted for discharge planning. Per chart review, pt resides in an Assisted Living Facility. Spoke to Pat (pt/family member/guardian) to verify pt s residence at [facility]. Verbal permission was received to speak to the facility to verify services and to discuss the discharge plan of care. A call was  placed to [facility] (facility) and services were verified.   [facility] contact (name/number): No [facility]  [phone]. Brayan RN at the [facility] [phone]  Barriers to pt returning: needs to be assist of 1 to stand pivot to chair, [facility] has one staff member on.  Baseline mobility: Ax1 to stand pivot to wheelchair  Weekend returns: yes  Time of preferred return: anytime  New meds/prescriptions: [facility]  Fax #: discharge orders to [facility] [phone]  Other:     RN CC/SW will continue to follow for discharge planning and return to facility.    Radha Ramirez RN, BSN CTS  Care Coordinator  [facility]   [phone]

## 2021-09-21 NOTE — PROGRESS NOTES
09/21/21 1413   Quick Adds   Type of Visit Initial Occupational Therapy Evaluation   Living Environment   People in home facility resident   Current Living Arrangements group home   Transportation Anticipated health plan transportation   Living Environment Comments per pt questionable historian d/t confusion states that she is an A x1 for toileting, showering, and transfer. per chart - Per daughter: Group home with 6 residents, someone present at all times. No stairs, Walk in shower with shower chair.   Self-Care   Equipment Currently Used at Home wheelchair, manual   Activity/Exercise/Self-Care Comment per chart - Per daughter: Pt gets assist for dressing, showering, toileting, would complete SPT to wc at home with assist. Uses commode at night w/ assist. WC primary mobility, pt gets pushed around facility. Per daughter group home has bobby lift that could be used with pt if needed.   Instrumental Activities of Daily Living (IADL)   IADL Comments A x 1 for all IADLs/ ADLs. pt reports someone makes her food.    Disability/Function   Fall history within last six months no   Change in Functional Status Since Onset of Current Illness/Injury yes   General Information   Onset of Illness/Injury or Date of Surgery 09/19/21   Referring Physician Alisha Hernandes MD   Patient/Family Therapy Goal Statement (OT) not stated at this time   Additional Occupational Profile Info/Pertinent History of Current Problem Per chart - past medical history significant for hypertension, hyperlipidemia, CKD, CVA presented to the emergency room from group home with an episode of slurry speech confusion and generalized weakness since this morning.     Performance Patterns (Routines, Roles, Habits) pt reports she was an Ax1 for all ADLs prior   Cognitive Status Examination   Affect/Mental Status (Cognitive) confused   Follows Commands delayed response/completion;verbal cues/prompting required;repetition of directions required   Memory  Deficit immediate recall   Attention Deficit concentration;focused/sustained attention   Cognitive Status Comments pt presents with confusion as to where she is and why   Pain Assessment   Patient Currently in Pain No  (when asked pt reports no pain)   Strength Comprehensive (MMT)   General Manual Muscle Testing (MMT) Assessment upper extremity strength deficits identified   Comment, General Manual Muscle Testing (MMT) Assessment generalized weakness in B UE, unable to effectively use B UE for bed mobility   Bed Mobility   Bed Mobility rolling left;scooting/bridging;supine-sit;sit-supine   Rolling Left [place] (Bed Mobility) verbal cues;minimum assist (75% patient effort)   Scooting/Bridging [place] (Bed Mobility) maximum assist (25% patient effort)   Supine-Sit [place] (Bed Mobility) maximum assist (25% patient effort)   Sit-Supine [place] (Bed Mobility) maximum assist (25% patient effort)   Assistive Device (Bed Mobility) bed rails;draw sheet   Balance   Balance Assessment sitting balance: static   Sitting Balance: Static mild impairment   Clinical Impression   Criteria for Skilled Therapeutic Interventions Met (OT) yes;meets criteria   OT Diagnosis Decreased ADLs   OT Problem List-Impairments impacting ADL problems related to;activity tolerance impaired;balance;cognition;strength;range of motion (ROM)   Assessment of Occupational Performance 1-3 Performance Deficits   Identified Performance Deficits transfers, feeding    Planned Therapy Interventions (OT) ADL retraining;progressive activity/exercise   Clinical Decision Making Complexity (OT) low complexity   Therapy Frequency (OT) 5x/week   Predicted Duration of Therapy 1 week   Risk & Benefits of therapy have been explained evaluation/treatment results reviewed;participants included;patient   OT Discharge Planning    OT Discharge Recommendation (DC Rec) Transitional Care Facility   OT Rationale for DC Rec OT: Recommend TCU for now based off  of previous day transfer status with  PT, and limited activity tolerance during OT eval today. Suspect pt is functioning below baseline for transfers. Pt unable to perform transfer assessment today d/t fatigue. Pt would benefit from skilled OT while IP for progression with ADLs and transfers, will continue to assess. If treatment appears to be overlapping with PT, may defer to further training with PT.   Total Evaluation Time (Minutes)   Total Evaluation Time (Minutes) 8

## 2021-09-21 NOTE — PLAN OF CARE
"Patient is very confuse and forgetful. Not using call light instead hollers. Patient is ambulating A-2 with tin  steady. Purewick in place. LS clear with some infrequent cough. Saline locked. Denies pain. Neuro check Q 4 hours. 2 a.m. Blood sugar is  96.  Continues on antibiotics Rocephin. Will dhruv to monitor.    Blood pressure 114/58, pulse 65, temperature 96.9  F (36.1  C), temperature source Temporal, resp. rate 18, height 1.473 m (4' 10\"), weight 54.4 kg (120 lb), SpO2 90 %.    "

## 2021-09-21 NOTE — CONSULTS
Patient did not have new stroke per neurology PA . [facility] stroke education not needed.

## 2021-09-22 VITALS
SYSTOLIC BLOOD PRESSURE: 139 MMHG | HEIGHT: 58 IN | RESPIRATION RATE: 16 BRPM | TEMPERATURE: 97 F | WEIGHT: 120 LBS | BODY MASS INDEX: 25.19 KG/M2 | HEART RATE: 65 BPM | OXYGEN SATURATION: 96 % | DIASTOLIC BLOOD PRESSURE: 67 MMHG

## 2021-09-22 LAB
MAGNESIUM SERPL-MCNC: 2.6 MG/DL (ref 1.6–2.3)
PHOSPHATE SERPL-MCNC: 3.6 MG/DL (ref 2.5–4.5)
POTASSIUM BLD-SCNC: 3.7 MMOL/L (ref 3.4–5.3)

## 2021-09-22 PROCEDURE — 84100 ASSAY OF PHOSPHORUS: CPT | Performed by: INTERNAL MEDICINE

## 2021-09-22 PROCEDURE — 99239 HOSP IP/OBS DSCHRG MGMT >30: CPT | Performed by: INTERNAL MEDICINE

## 2021-09-22 PROCEDURE — 36415 COLL VENOUS BLD VENIPUNCTURE: CPT | Performed by: INTERNAL MEDICINE

## 2021-09-22 PROCEDURE — 84132 ASSAY OF SERUM POTASSIUM: CPT | Performed by: INTERNAL MEDICINE

## 2021-09-22 PROCEDURE — 250N000013 HC RX MED GY IP 250 OP 250 PS 637: Performed by: INTERNAL MEDICINE

## 2021-09-22 PROCEDURE — 250N000013 HC RX MED GY IP 250 OP 250 PS 637: Performed by: PHYSICIAN ASSISTANT

## 2021-09-22 PROCEDURE — 83735 ASSAY OF MAGNESIUM: CPT | Performed by: INTERNAL MEDICINE

## 2021-09-22 RX ORDER — NALOXONE HYDROCHLORIDE 0.4 MG/ML
0.2 INJECTION, SOLUTION INTRAMUSCULAR; INTRAVENOUS; SUBCUTANEOUS
Status: DISCONTINUED | OUTPATIENT
Start: 2021-09-22 | End: 2021-09-22 | Stop reason: HOSPADM

## 2021-09-22 RX ORDER — CIPROFLOXACIN 250 MG/1
250 TABLET, FILM COATED ORAL EVERY 24 HOURS
Qty: 4 TABLET | Refills: 0 | Status: SHIPPED | OUTPATIENT
Start: 2021-09-23 | End: 2021-09-27

## 2021-09-22 RX ORDER — NALOXONE HYDROCHLORIDE 0.4 MG/ML
0.4 INJECTION, SOLUTION INTRAMUSCULAR; INTRAVENOUS; SUBCUTANEOUS
Status: DISCONTINUED | OUTPATIENT
Start: 2021-09-22 | End: 2021-09-22 | Stop reason: HOSPADM

## 2021-09-22 RX ORDER — CIPROFLOXACIN 250 MG/1
250 TABLET, FILM COATED ORAL EVERY 24 HOURS
Status: DISCONTINUED | OUTPATIENT
Start: 2021-09-22 | End: 2021-09-22 | Stop reason: HOSPADM

## 2021-09-22 RX ORDER — TRAMADOL HYDROCHLORIDE 50 MG/1
25 TABLET ORAL AT BEDTIME
Qty: 4 TABLET | Refills: 0 | Status: ON HOLD | OUTPATIENT
Start: 2021-09-22 | End: 2022-02-20

## 2021-09-22 RX ORDER — VITAMIN B COMPLEX
50 TABLET ORAL DAILY
Status: DISCONTINUED | OUTPATIENT
Start: 2021-09-22 | End: 2021-09-22 | Stop reason: HOSPADM

## 2021-09-22 RX ORDER — AMOXICILLIN 250 MG
1 CAPSULE ORAL AT BEDTIME
Status: DISCONTINUED | OUTPATIENT
Start: 2021-09-22 | End: 2021-09-22 | Stop reason: HOSPADM

## 2021-09-22 RX ADMIN — ESCITALOPRAM OXALATE 10 MG: 10 TABLET ORAL at 16:05

## 2021-09-22 RX ADMIN — CARVEDILOL 6.25 MG: 6.25 TABLET, FILM COATED ORAL at 08:58

## 2021-09-22 RX ADMIN — CARVEDILOL 6.25 MG: 6.25 TABLET, FILM COATED ORAL at 17:20

## 2021-09-22 RX ADMIN — DORZOLAMIDE HYDROCHLORIDE AND TIMOLOL MALEATE 1 DROP: 22.3; 6.8 SOLUTION/ DROPS OPHTHALMIC at 08:59

## 2021-09-22 RX ADMIN — ACETAMINOPHEN 1000 MG: 500 TABLET, FILM COATED ORAL at 08:58

## 2021-09-22 RX ADMIN — ESCITALOPRAM 5 MG: 5 TABLET, FILM COATED ORAL at 08:57

## 2021-09-22 RX ADMIN — POLYETHYLENE GLYCOL 3350 17 G: 17 POWDER, FOR SOLUTION ORAL at 08:57

## 2021-09-22 RX ADMIN — ASPIRIN 81 MG: 81 TABLET, CHEWABLE ORAL at 08:58

## 2021-09-22 RX ADMIN — CIPROFLOXACIN HYDROCHLORIDE 250 MG: 250 TABLET, FILM COATED ORAL at 12:00

## 2021-09-22 RX ADMIN — Medication 50 MCG: at 12:00

## 2021-09-22 RX ADMIN — ACETAMINOPHEN 1000 MG: 500 TABLET, FILM COATED ORAL at 16:05

## 2021-09-22 ASSESSMENT — ACTIVITIES OF DAILY LIVING (ADL)
ADLS_ACUITY_SCORE: 27
ADLS_ACUITY_SCORE: 23
ADLS_ACUITY_SCORE: 27
ADLS_ACUITY_SCORE: 27
ADLS_ACUITY_SCORE: 23
ADLS_ACUITY_SCORE: 23

## 2021-09-22 NOTE — PLAN OF CARE
Nurse from [id number]  Pt alert to self and only able to report her birthday month and day and stated the wrong year. Pt got very agitated and restless on days when her dgt went home so we had a sitter in the patients room around 1940 when her dgt went home. Pts dgt called and asked why she did not have her tramadol at hs. Medication not ordered this staff paged admit pager around 2030 requesting it to be resumed per the dgt. Dgt requested if the medication does not get resumed that she wants to talk to the doctor and if it does not get resumed that staff would be dealing with her all night. Pt also noted to be hallucinating and reported items to the sitter about seeing a fireplace in the room and kitchen cabinets in the room. Pt calm and redirectable with the sitter. Pt quiet and asleep around 2100 so staff did not wake for scheduled eye drops or tylenol since patient has known to not sleep the last 2 nights. Left sticky note for rounder about dgt requesting tramadol. Plan is group home vs rehab on discharge.

## 2021-09-22 NOTE — PLAN OF CARE
Pt oriented to self only, able to carry on a conversation and be redirected today.  PSC discontinued around 11am.  Daughter present in room and feels pt greatly improved as well.  Regular diet.  Up Ax2 to BSC.  Purewick in place.  Remote tele.  Daughter feels pt appropriate to discharge back to group home with therapy services.  Stretcher transport preferred by daughter Pat and currently set up for 1930.

## 2021-09-22 NOTE — PROGRESS NOTES
"Pt and RN completed a \"trial\" of a one person pivot transfer prior to transferring back to her group home.  Pt was able to safely stand and pivot into the wheelchair using Ax1.  "

## 2021-09-22 NOTE — DISCHARGE SUMMARY
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   suspecting mild cognitive impairment versus undiagnosed dementia  Ruled out for acute CVA  History of prior CVA as seen also on most recent MRI  Hypertension  Dyslipidemia  Physical deconditioning     Other Diagnosis:  Past Medical History:   Diagnosis Date     Abdominal aortic aneurysm (H) 2001    had a stent placed 2009     AK (actinic keratosis) 11/11/2009     Cataract 2/22/2011     Compression fractures      Compression Fractures of Lumbar Vertebra 2/4/2010     CVA (cerebral vascular accident) (H)     Dec 2019 and Jan 2020     Dementia (H)      DJD (degenerative joint disease)      Generalised Weakness and Fatigue 3/3/2011     Glaucoma (increased eye pressure) 2009    Dr. 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     Strain of shoulder, left 3/3/2011     Urinary incontinence 11/10/2009          Discharge Disposition:  Discharged to home     Allergies:  Allergies   Allergen Reactions     Actonel [Bisphosphonates] Rash     Conjugated Estrogens Rash     Macrobid [Nitrofuran Derivatives] Rash     Nitrofurantoin Rash     Premarin Rash     Sulfa Drugs Rash     Hydrocodone Nausea and Vomiting     Oxycodone Nausea and Vomiting     Risedronate      leg pain        Discharge Medications:   Current Discharge Medication List      START taking these medications     Details   ciprofloxacin (CIPRO) 250 MG tablet Take 1 tablet (250 mg) by mouth every 24 hours for 4 days  Qty: 4 tablet, Refills: 0    Associated Diagnoses: E. coli urinary tract infection         CONTINUE these medications which have CHANGED    Details   traMADol (ULTRAM) 50 MG tablet Take 0.5 tablets (25 mg) by mouth At Bedtime  Qty: 4 tablet, Refills: 0    Associated Diagnoses: Pain         CONTINUE these medications which have NOT CHANGED    Details   acetaminophen (TYLENOL) 500 MG tablet Take 1,000 mg by mouth 3 times daily 8am, 2pm, 8pm      amLODIPine (NORVASC) 5 MG tablet Take 1 tablet (5 mg) by mouth daily  Qty: 30 tablet, Refills: 0    Associated Diagnoses: Hypertension goal BP (blood pressure) < 130/80      aspirin (ASA) 325 MG tablet Take 1 tablet (325 mg) by mouth daily  Qty: 30 tablet, Refills: 0    Associated Diagnoses: 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  Associated Diagnoses: Pseudoexfoliation glaucoma, moderate stage      !! escitalopram (LEXAPRO) 10 MG tablet Take 10 mg by mouth daily At 1400      !! escitalopram (LEXAPRO) 5 MG tablet Take 5 mg by mouth daily      latanoprost (XALATAN) 0.005 % ophthalmic solution Place 1 drop into both eyes At Bedtime Both Eyes  Qty: 3 Bottle, Refills: 4    Associated Diagnoses: Pseudoexfoliation glaucoma, moderate stage      melatonin 3 MG tablet Take 1 tablet (3 mg) by mouth nightly as needed for sleep    Associated Diagnoses: Insomnia,  "unspecified type      polyethylene glycol (MIRALAX) 17 g packet Take 1 packet by mouth daily      senna-docusate (SENOKOT-S/PERICOLACE) 8.6-50 MG tablet Take 1 tablet by mouth At Bedtime       Multiple Vitamins-Minerals (CERTAVITE/ANTIOXIDANTS PO) Take 1 tablet by mouth daily       !! - Potential duplicate medications found. Please discuss with provider.           Condition on Discharge:  Discharge condition: Stable   Discharge vitals: Blood pressure 132/63, pulse 69, temperature 96.9  F (36.1  C), temperature source Oral, resp. rate 20, height 1.473 m (4' 10\"), weight 54.4 kg (120 lb), SpO2 93 %.   Code status on discharge: DNR / DNI     History of Present Illness:  See detailed admission note for full details.        Significant Physical Exam Findings Day of Discharge:  HEENT; Atraumatic, normocephalic, pinkish conjuctiva, pupils bilateral reactive   Skin: warm and moist, no rashes  Lungs: equal chest expansion, clear to auscultation, no wheezes, no stridor, no crackles,   Heart: normal rate, normal rhythm, no rubs or gallops.   Abdomen: normal bowel sounds, no tenderness, no peritoneal signs, no guarding  Extremities: no deformities, no edema   Neuro; follow commands, alert and oriented x2, spontaneous speech, coherent, moves all extremities spontaneously  Psych; no hallucination, euthymic mood, not agitated        Procedures other than Imaging:  none     Imaging:  Results for orders placed or performed during the hospital encounter of 09/19/21   CT Head Perfusion w Contrast    Narrative    EXAM: CTA  HEAD NECK WITH CONTRAST, CT HEAD PERFUSION WITH CONTRAST  LOCATION: [facility]  DATE/TIME: 9/19/2021 2:13 PM    INDICATION: Neuro deficit, acute, stroke suspected  COMPARISON: CTA head neck 01/09/2020  CONTRAST: 70mL Isovue-370 (accession [id number]), 50mL Isovue-370 (accession [id number])  TECHNIQUE:   Head and neck CT angiogram with IV contrast. Noncontrast head CT followed by axial helical CT " images of the head and neck vessels obtained during the arterial phase of intravenous contrast administration. Axial 2D reconstructed images and multiplanar 3D   MIP reconstructed images of the head and neck vessels were performed by the technologist. Additional CT cerebral perfusion was performed utilizing a second contrast bolus. Perfusion data were post processed with generation of standard perfusion maps and   estimation of ischemic/infarcted volumes utilizing standard threshold values. Dose reduction techniques were used. All stenosis measurements made according to NASCET criteria unless otherwise specified.    FINDINGS:   HEAD CTA:  ANTERIOR CIRCULATION: Hypoplastic right A1 segment. Unchanged severe focal stenosis of a distal branch of the posterior division of the M2 segment of the right MCA (image 358, series 5). Unchanged moderate stenosis of an adjacent M2 branch (image 354,   series 5).     POSTERIOR CIRCULATION: Unchanged moderate stenosis of the origin of the P1 segment of the right PCA. Unchanged severe stenosis of the P3-P4 junction of the right PCA (image 323, series 5).     DURAL VENOUS SINUSES: Expected enhancement of the major dural venous sinuses.    NECK CTA:  RIGHT CAROTID: No measurable stenosis or dissection.    LEFT CAROTID: No measurable stenosis or dissection.    VERTEBRAL ARTERIES: No focal stenosis or dissection. Balanced vertebral arteries.    AORTIC ARCH: Classic aortic arch anatomy with no significant stenosis at the origin of the great vessels.    NONVASCULAR STRUCTURES: Multiple lobulated low-density lesions arising from the left glenohumeral joint, likely related to either synovitis or synovial osteochondromatosis..    CT PERFUSION:  PERFUSION MAPS: Symmetrical cerebral perfusion. No focal deficits in cerebral blood flow or volume to suggest ischemia/oligemia.    RAPID ANALYSIS:  CBF<30%: 0 mL  Tmax>6sec: 0 mL  Mismatch volume: 0 mL  Mismatch ratio: None      Impression     IMPRESSION:   HEAD CTA:   1.  When compared to the 01/09/2020 study, there is unchanged severe stenosis of a distal branch of the posterior division of the M2 segment of the right MCA. There is unchanged moderate stenosis of an adjacent M2 branch.    2.  There is unchanged moderate stenosis of the origin of the P1 segment of the right PCA. Unchanged severe stenosis of the P3-P4 junction of the right PCA.    3.  No aneurysm.    NECK CTA:  1.  Patent cervical arterial vasculature without flow-limiting stenosis.    CT PERFUSION:  1.  Normal cerebral perfusion.    CTA and perfusion results discussed with Dr. Antunez on 09/19/2021 at 1449   CTA Head Neck with Contrast    Narrative    EXAM: CTA  HEAD NECK WITH CONTRAST, CT HEAD PERFUSION WITH CONTRAST  LOCATION: [facility]  DATE/TIME: 9/19/2021 2:13 PM    INDICATION: Neuro deficit, acute, stroke suspected  COMPARISON: CTA head neck 01/09/2020  CONTRAST: 70mL Isovue-370 (accession [id number]), 50mL Isovue-370 (accession [id number])  TECHNIQUE:   Head and neck CT angiogram with IV contrast. Noncontrast head CT followed by axial helical CT images of the head and neck vessels obtained during the arterial phase of intravenous contrast administration. Axial 2D reconstructed images and multiplanar 3D   MIP reconstructed images of the head and neck vessels were performed by the technologist. Additional CT cerebral perfusion was performed utilizing a second contrast bolus. Perfusion data were post processed with generation of standard perfusion maps and   estimation of ischemic/infarcted volumes utilizing standard threshold values. Dose reduction techniques were used. All stenosis measurements made according to NASCET criteria unless otherwise specified.    FINDINGS:   HEAD CTA:  ANTERIOR CIRCULATION: Hypoplastic right A1 segment. Unchanged severe focal stenosis of a distal branch of the posterior division of the M2 segment of the right MCA (image 358, series  5). Unchanged moderate stenosis of an adjacent M2 branch (image 354,   series 5).     POSTERIOR CIRCULATION: Unchanged moderate stenosis of the origin of the P1 segment of the right PCA. Unchanged severe stenosis of the P3-P4 junction of the right PCA (image 323, series 5).     DURAL VENOUS SINUSES: Expected enhancement of the major dural venous sinuses.    NECK CTA:  RIGHT CAROTID: No measurable stenosis or dissection.    LEFT CAROTID: No measurable stenosis or dissection.    VERTEBRAL ARTERIES: No focal stenosis or dissection. Balanced vertebral arteries.    AORTIC ARCH: Classic aortic arch anatomy with no significant stenosis at the origin of the great vessels.    NONVASCULAR STRUCTURES: Multiple lobulated low-density lesions arising from the left glenohumeral joint, likely related to either synovitis or synovial osteochondromatosis..    CT PERFUSION:  PERFUSION MAPS: Symmetrical cerebral perfusion. No focal deficits in cerebral blood flow or volume to suggest ischemia/oligemia.    RAPID ANALYSIS:  CBF<30%: 0 mL  Tmax>6sec: 0 mL  Mismatch volume: 0 mL  Mismatch ratio: None      Impression    IMPRESSION:   HEAD CTA:   1.  When compared to the 01/09/2020 study, there is unchanged severe stenosis of a distal branch of the posterior division of the M2 segment of the right MCA. There is unchanged moderate stenosis of an adjacent M2 branch.    2.  There is unchanged moderate stenosis of the origin of the P1 segment of the right PCA. Unchanged severe stenosis of the P3-P4 junction of the right PCA.    3.  No aneurysm.    NECK CTA:  1.  Patent cervical arterial vasculature without flow-limiting stenosis.    CT PERFUSION:  1.  Normal cerebral perfusion.    CTA and perfusion results discussed with Dr. Antunez on 09/19/2021 at 1449   CT Head w/o Contrast    Narrative    EXAM: CT HEAD W/O CONTRAST  LOCATION: [facility]  DATE/TIME: 9/19/2021 2:17 PM    INDICATION: Neuro deficit, acute, stroke  suspected  COMPARISON: None.  TECHNIQUE: Routine CT Head without IV contrast. Multiplanar reformats. Dose reduction techniques were used.    FINDINGS:  INTRACRANIAL CONTENTS: No acute intracranial hemorrhage. No extra-axial fluid collection. No mass effect or midline shift. Chronic infarct involving the posterior right putamen and corona radiata. Moderate presumed chronic small vessel ischemic changes.   Moderate generalized volume loss. No hydrocephalus.     VISUALIZED ORBITS/SINUSES/MASTOIDS: Prior bilateral cataract surgery. Visualized portions of the orbits are otherwise unremarkable. Opacified bilateral sphenoid sinuses. No middle ear or mastoid effusion.    BONES/SOFT TISSUES: No acute abnormality.      Impression    IMPRESSION:  1.  No acute intracranial hemorrhage.  2.  Chronic infarct involving the posterior right putamen and corona radiata.  3.  Moderate burden presume sequela of chronic small vessel ischemic disease.    Results discussed with Jame Antunez on 9/19/2021 2:27 PM.   CT Abdomen Pelvis w/o Contrast    Narrative    EXAM: CT ABDOMEN PELVIS W/O CONTRAST  LOCATION: [facility]  DATE/TIME: 9/19/2021 8:03 PM    INDICATION: Lower abdominal pain.   COMPARISON: None.  TECHNIQUE: CT scan of the abdomen and pelvis was performed without IV contrast. Multiplanar reformats were obtained. Dose reduction techniques were used.  CONTRAST: None.    FINDINGS:   LOWER CHEST: Coronary artery disease. Thoracic aorta tortuosity. Mild scarring and bronchiectasis at the lung bases.    HEPATOBILIARY: Normal.    PANCREAS: Normal.    SPLEEN: Normal.    ADRENAL GLANDS: Normal.    KIDNEYS/BLADDER: Simple cortical and parapelvic renal cysts do not require follow-up. No hydronephrosis or hydroureter.     BOWEL: There is colonic diverticulosis. Normal appendix.     LYMPH NODES: Normal.    VASCULATURE: Atherosclerotic disease with an aortobiiliac stent graft.     PELVIC ORGANS: Hysterectomy. No adnexal  masses.     MUSCULOSKELETAL: Osteopenia. Severe degenerative changes at the right and left hips. There is multilevel degenerative change in the spine.       Impression    IMPRESSION:   1.  Colonic diverticulosis. No diverticulitis.   2.  Atherosclerotic disease with an aortobiiliac stent graft.     MR Brain w/o Contrast    Narrative    MRI BRAIN WITHOUT CONTRAST  9/20/2021 12:01 PM    HISTORY:  Stroke, follow-up. Neurologic deficit, acute, stroke  suspected. Acute ischemic stroke.    TECHNIQUE:  Multiplanar, multisequence MRI of the brain without  gadolinium IV contrast material.      COMPARISON:  Head CT 9/19/2021, head MRI 12/4/2019    FINDINGS:   Moderate volume loss is present. Patchy and confluent white matter T2  hyperintensities likely represent advanced chronic small vessel  ischemic change. Old infarct involving the right posterior basal  ganglia/centrum semiovale. Small old left cerebellar infarct,  unchanged. No evidence of acute ischemia, hemorrhage, mass, mass  effect, or hydrocephalus. Punctate areas of susceptibility related  signal loss along the frontal horn of the left lateral ventricle which  may represent areas of chronic microhemorrhage.    Marrow signal is within normal limits. Paranasal sinus mucosal  thickening with presumed inspissated secretions/debris filling the  bilateral sphenoid sinuses. Trace left mastoid cavity opacification.  Bilateral lens replacements.      Impression    IMPRESSION:    1. No evidence of acute ischemia.  2. Old infarcts involving the right basal ganglia and left cerebellum.    UMBERTO REYES MD         SYSTEM ID:  [id number]   XR Chest Port 1 View    Narrative    CHEST ONE VIEW  9/20/2021 1:33 PM     HISTORY: Coughing spells    COMPARISON: December 7, 2019      Impression    IMPRESSION: No acute disease.    IVAN VEGA MD         SYSTEM ID:  [id number]        Consultations:  Consultation during this admission received from stroke neurology.     Recent Lab  Results:  Recent Labs   Lab 09/20/21  0602 09/19/21  1440   WBC 10.0 9.7   HGB 10.9* 10.5*   HCT 34.1* 32.9*   * 109*    147*     No results for input(s): CULT in the last 168 hours.  Recent Labs   Lab 09/22/21  0553 09/21/21  0602 09/21/21  0215 09/20/21  0815 09/20/21  0602 09/19/21 2125 09/19/21  1545   NA  --   --   --   --  144  --  141   POTASSIUM 3.7 3.7  --   --  4.1   < > 4.3   CHLORIDE  --   --   --   --  112*  --  110*   CO2  --   --   --   --  23  --  25   ANIONGAP  --   --   --   --  9  --  6   GLC  --   --  96 105* 103*   < > 95   BUN  --   --   --   --  23  --  27   CR  --   --   --   --  1.05*  --  1.05*   GFRESTIMATED  --   --   --   --  44*  --  44*   TRANG  --   --   --   --  8.5  --  8.9   MAG 2.6* 2.3  --   --  2.3   < >  --    PHOS 3.6 2.8  --   --  3.5   < >  --     < > = values in this interval not displayed.     Recent Labs   Lab 09/21/21 0215 09/20/21  0815 09/20/21  0602 09/20/21  0159 09/19/21  2214   GLC 96 105* 103* 118* 100*     No results for input(s): LACT in the last 168 hours.  Recent Labs   Lab 09/19/21 2125 09/19/21  1440   TROPONIN <0.015 <0.015     Recent Labs   Lab 09/19/21  1454   COLOR Light Yellow   APPEARANCE Slightly Cloudy*   URINEGLC Negative   URINEBILI Negative   URINEKETONE Negative   SG 1.023   UBLD Negative   URINEPH 8.0*   PROTEIN Negative   NITRITE Negative   LEUKEST Small*   RBCU 3*   WBCU 14*          Pending Results:    Unresulted Labs Ordered in the Past 30 Days of this Admission     Date and Time Order Name Status Description    9/19/2021  5:45 PM Blood Culture Hand, Right Preliminary     9/19/2021  5:45 PM Blood Culture Arm, Right Preliminary            Discharge Instructions and Follow-Up:   Discharge diet: Orders Placed This Encounter      Combination Diet Regular Diet Adult      Advance Diet as Tolerated      Diet     Discharge activity: Activity as tolerated   Discharge follow-up: 1-2 weeks with PCP   Outpatient therapy:  Going back to  prior living arrangements with home PT/OT   Other instructions: None      Hospital Course:  Sanjana is a very pleasant 89-year-old  lady apparently lives in a group home setting and presented here with mental status changes as patient's daughter stated with accompanying concerns for slurring of speech and increasing generalized weakness.  Eventually was ruled out for acute CVA with MRI of the brain and did show prior known CVA in the past at right basal ganglia and left cerebellar area.  Investigation pursued and showed abnormal urine analysis and urine cultures grew E. coli which is sensitive to quinolone.  She was treated with IV ceftriaxone while here in the hospital and fortunately had a good response from it as patient is a daughter who is currently with her at bedside stated that her mental state appears to be significantly improved and almost at baseline levels.  She was seen by PT/OT services here with initial recommendations going to TCU setting.  However I was informed by nursing service today that patient will be better off being discharged back to group home setting with continuation of home PT/OT.  We will continue oral antibiotics upon discharge  I wrote a new prescription for tramadol earlier as per initial plan of going to a TCU setting but since she is going to go to her prior living arrangement she does not need any new prescriptions for tramadol and can continue her prior home regimen.       Total time spent in face to face contact with the patient and coordinating discharge was:  > 30 Minutes.

## 2021-09-22 NOTE — PROGRESS NOTES
Care Management Discharge Note    Discharge Date: 09/22/2021       Discharge Disposition: Group Home    Discharge Services: Transportation Services    Discharge DME:      Discharge Transportation: health plan transportation    Private pay costs discussed: transportation costs    Reviewed out of pocket cost for [facility] stretcher transport, $1042.75 for base rate and $25 per mile to the destination. Pt/family expressed understanding and are agreeable to this.      Patient requires stretcher transportation due to prior strokes and weakness    Stretcher transportation has been arranged for Wednesday 9/22 @ 1930. Patient and bedside nurse notified of transportation time.    Ambulance PCS form completed and placed in patient chart. Ambulance PCS form should be given to transportation team.        Additional Information:  Pt discharging back to the group home today at 1930. Orders faxed to the group [facility] they also request a packet be sent with the pt.     Radha Ramirez RN, BSN CTS  Care Coordinator  [facility]   [phone]            Angie Ramirez, RN

## 2021-09-22 NOTE — DISCHARGE INSTRUCTIONS
Your home care referral was sent to [facility]  If you haven't heard from them within the next 24-48 hours,  Please call them at [phone]

## 2021-09-22 NOTE — PLAN OF CARE
Pt oriented to self only, up heavy Ax2 to BSC. Purewick in place. Possible aphasia?! Regular diet.  Sitter at bedside. Slept most of the night.  Labs: K, P,  Mgn levels within normal range. Recheck 9/22 6 am,     Uneventful night.

## 2021-09-23 NOTE — PLAN OF CARE
Physical Therapy Discharge Summary    Reason for therapy discharge:    Discharged to home with home therapy.    Progress towards therapy goal(s). See goals on Care Plan in [facility] electronic health record for goal details.  Goals not met.  Barriers to achieving goals:   discharge from facility.    Therapy recommendation(s):    Continued therapy is recommended.  Rationale/Recommendations:  TCU recommended, decided to return to group home so HHPT recommended.

## 2021-09-23 NOTE — PLAN OF CARE
Occupational Therapy Discharge Summary    Reason for therapy discharge:    Discharged to home to group home    Progress towards therapy goal(s). See goals on Care Plan in [facility] electronic health record for goal details.  Goals not met.  Barriers to achieving goals:   limited tolerance for therapy.    Therapy recommendation(s):    Continued therapy is recommended.  Rationale/Recommendations:  TCU was recommended based on OT eval functioning, howver pt opted to return to group home.        **Pt not seen by discharging therapist on this date, note written based on previous treating therapist's notes and recommendations

## 2021-09-25 LAB
BACTERIA BLD CULT: NO GROWTH
BACTERIA BLD CULT: NO GROWTH

## 2022-01-01 ENCOUNTER — NURSING HOME VISIT (OUTPATIENT)
Dept: GERIATRICS | Facility: CLINIC | Age: 87
End: 2022-01-01
Payer: COMMERCIAL

## 2022-01-01 ENCOUNTER — TELEPHONE (OUTPATIENT)
Dept: GERIATRICS | Facility: CLINIC | Age: 87
End: 2022-01-01

## 2022-01-01 ENCOUNTER — LAB REQUISITION (OUTPATIENT)
Dept: LAB | Facility: CLINIC | Age: 87
End: 2022-01-01
Payer: COMMERCIAL

## 2022-01-01 ENCOUNTER — DOCUMENTATION ONLY (OUTPATIENT)
Dept: GERIATRICS | Facility: CLINIC | Age: 87
End: 2022-01-01

## 2022-01-01 ENCOUNTER — HOSPITAL ENCOUNTER (OUTPATIENT)
Facility: CLINIC | Age: 87
Setting detail: OBSERVATION
Discharge: NURSING, REHAB OR LONG-TERM CARE | End: 2022-12-08
Attending: EMERGENCY MEDICINE | Admitting: INTERNAL MEDICINE
Payer: COMMERCIAL

## 2022-01-01 ENCOUNTER — DOCUMENTATION ONLY (OUTPATIENT)
Dept: OTHER | Facility: CLINIC | Age: 87
End: 2022-01-01

## 2022-01-01 ENCOUNTER — APPOINTMENT (OUTPATIENT)
Dept: GENERAL RADIOLOGY | Facility: CLINIC | Age: 87
End: 2022-01-01
Attending: EMERGENCY MEDICINE
Payer: COMMERCIAL

## 2022-01-01 ENCOUNTER — APPOINTMENT (OUTPATIENT)
Dept: PHYSICAL THERAPY | Facility: CLINIC | Age: 87
End: 2022-01-01
Attending: NURSE PRACTITIONER
Payer: COMMERCIAL

## 2022-01-01 ENCOUNTER — TRANSITIONAL CARE UNIT VISIT (OUTPATIENT)
Dept: GERIATRICS | Facility: CLINIC | Age: 87
End: 2022-01-01
Payer: COMMERCIAL

## 2022-01-01 ENCOUNTER — PATIENT OUTREACH (OUTPATIENT)
Dept: GERIATRIC MEDICINE | Facility: CLINIC | Age: 87
End: 2022-01-01

## 2022-01-01 ENCOUNTER — APPOINTMENT (OUTPATIENT)
Dept: CT IMAGING | Facility: CLINIC | Age: 87
End: 2022-01-01
Attending: EMERGENCY MEDICINE
Payer: COMMERCIAL

## 2022-01-01 ENCOUNTER — NURSE TRIAGE (OUTPATIENT)
Dept: NURSING | Facility: CLINIC | Age: 87
End: 2022-01-01

## 2022-01-01 ENCOUNTER — HOSPITAL ENCOUNTER (EMERGENCY)
Facility: CLINIC | Age: 87
Discharge: NURSING, REHAB OR LONG-TERM CARE | End: 2022-11-18
Attending: EMERGENCY MEDICINE | Admitting: EMERGENCY MEDICINE
Payer: COMMERCIAL

## 2022-01-01 VITALS
OXYGEN SATURATION: 94 % | TEMPERATURE: 97.7 F | DIASTOLIC BLOOD PRESSURE: 91 MMHG | HEART RATE: 63 BPM | SYSTOLIC BLOOD PRESSURE: 169 MMHG | RESPIRATION RATE: 20 BRPM

## 2022-01-01 VITALS
RESPIRATION RATE: 18 BRPM | WEIGHT: 128.4 LBS | OXYGEN SATURATION: 96 % | TEMPERATURE: 98 F | SYSTOLIC BLOOD PRESSURE: 155 MMHG | DIASTOLIC BLOOD PRESSURE: 98 MMHG | HEART RATE: 74 BPM | BODY MASS INDEX: 25.21 KG/M2 | HEIGHT: 60 IN

## 2022-01-01 VITALS
WEIGHT: 132.1 LBS | RESPIRATION RATE: 16 BRPM | SYSTOLIC BLOOD PRESSURE: 128 MMHG | OXYGEN SATURATION: 97 % | HEART RATE: 73 BPM | DIASTOLIC BLOOD PRESSURE: 84 MMHG | HEIGHT: 60 IN | BODY MASS INDEX: 25.93 KG/M2 | TEMPERATURE: 98 F

## 2022-01-01 VITALS
RESPIRATION RATE: 18 BRPM | HEIGHT: 60 IN | TEMPERATURE: 97 F | OXYGEN SATURATION: 95 % | DIASTOLIC BLOOD PRESSURE: 84 MMHG | BODY MASS INDEX: 25.52 KG/M2 | HEART RATE: 74 BPM | SYSTOLIC BLOOD PRESSURE: 140 MMHG | WEIGHT: 130 LBS

## 2022-01-01 VITALS
RESPIRATION RATE: 16 BRPM | HEART RATE: 68 BPM | OXYGEN SATURATION: 97 % | BODY MASS INDEX: 24.73 KG/M2 | TEMPERATURE: 97.8 F | WEIGHT: 131 LBS | SYSTOLIC BLOOD PRESSURE: 130 MMHG | DIASTOLIC BLOOD PRESSURE: 72 MMHG | HEIGHT: 61 IN

## 2022-01-01 VITALS
WEIGHT: 153 LBS | HEART RATE: 66 BPM | OXYGEN SATURATION: 95 % | HEIGHT: 60 IN | BODY MASS INDEX: 30.04 KG/M2 | RESPIRATION RATE: 16 BRPM | DIASTOLIC BLOOD PRESSURE: 88 MMHG | TEMPERATURE: 97.8 F | SYSTOLIC BLOOD PRESSURE: 161 MMHG

## 2022-01-01 VITALS
TEMPERATURE: 97.6 F | OXYGEN SATURATION: 93 % | DIASTOLIC BLOOD PRESSURE: 91 MMHG | SYSTOLIC BLOOD PRESSURE: 164 MMHG | RESPIRATION RATE: 26 BRPM | HEART RATE: 78 BPM

## 2022-01-01 VITALS
DIASTOLIC BLOOD PRESSURE: 79 MMHG | BODY MASS INDEX: 25.13 KG/M2 | HEIGHT: 60 IN | OXYGEN SATURATION: 93 % | WEIGHT: 128 LBS | HEART RATE: 110 BPM | SYSTOLIC BLOOD PRESSURE: 116 MMHG | RESPIRATION RATE: 18 BRPM | TEMPERATURE: 98.2 F

## 2022-01-01 VITALS
HEART RATE: 66 BPM | DIASTOLIC BLOOD PRESSURE: 88 MMHG | TEMPERATURE: 97.8 F | RESPIRATION RATE: 16 BRPM | OXYGEN SATURATION: 95 % | WEIGHT: 153 LBS | BODY MASS INDEX: 30.04 KG/M2 | SYSTOLIC BLOOD PRESSURE: 161 MMHG | HEIGHT: 60 IN

## 2022-01-01 DIAGNOSIS — F32.A DEPRESSION, UNSPECIFIED DEPRESSION TYPE: ICD-10-CM

## 2022-01-01 DIAGNOSIS — Z71.89 ADVANCED DIRECTIVES, COUNSELING/DISCUSSION: ICD-10-CM

## 2022-01-01 DIAGNOSIS — M48.061 SPINAL STENOSIS OF LUMBAR REGION WITHOUT NEUROGENIC CLAUDICATION: ICD-10-CM

## 2022-01-01 DIAGNOSIS — I15.9 SECONDARY HYPERTENSION: ICD-10-CM

## 2022-01-01 DIAGNOSIS — J98.09 BRONCHOMALACIA: ICD-10-CM

## 2022-01-01 DIAGNOSIS — I71.40 ABDOMINAL AORTIC ANEURYSM (AAA) WITHOUT RUPTURE (H): ICD-10-CM

## 2022-01-01 DIAGNOSIS — M19.012 PRIMARY OSTEOARTHRITIS OF BOTH SHOULDERS: ICD-10-CM

## 2022-01-01 DIAGNOSIS — I10 ESSENTIAL HYPERTENSION: ICD-10-CM

## 2022-01-01 DIAGNOSIS — K59.00 CONSTIPATION, UNSPECIFIED CONSTIPATION TYPE: ICD-10-CM

## 2022-01-01 DIAGNOSIS — R14.0 ABDOMINAL DISTENTION: ICD-10-CM

## 2022-01-01 DIAGNOSIS — Z86.73 H/O: CVA (CEREBROVASCULAR ACCIDENT): ICD-10-CM

## 2022-01-01 DIAGNOSIS — R11.2 NAUSEA AND VOMITING, UNSPECIFIED VOMITING TYPE: ICD-10-CM

## 2022-01-01 DIAGNOSIS — F41.9 ANXIETY AND DEPRESSION: ICD-10-CM

## 2022-01-01 DIAGNOSIS — R53.83 OTHER FATIGUE: ICD-10-CM

## 2022-01-01 DIAGNOSIS — R05.9 COUGH: Primary | ICD-10-CM

## 2022-01-01 DIAGNOSIS — Z76.89 HEALTH CARE HOME: ICD-10-CM

## 2022-01-01 DIAGNOSIS — M81.0 OSTEOPOROSIS, UNSPECIFIED OSTEOPOROSIS TYPE, UNSPECIFIED PATHOLOGICAL FRACTURE PRESENCE: ICD-10-CM

## 2022-01-01 DIAGNOSIS — J20.9 ACUTE BRONCHITIS WITH WHEEZING: ICD-10-CM

## 2022-01-01 DIAGNOSIS — N28.89 RENAL MASS: ICD-10-CM

## 2022-01-01 DIAGNOSIS — J20.5 RSV BRONCHITIS: Primary | ICD-10-CM

## 2022-01-01 DIAGNOSIS — R19.5 LOOSE STOOLS: ICD-10-CM

## 2022-01-01 DIAGNOSIS — M79.621 PAIN OF RIGHT UPPER ARM: Primary | ICD-10-CM

## 2022-01-01 DIAGNOSIS — J40 BRONCHITIS: ICD-10-CM

## 2022-01-01 DIAGNOSIS — N18.31 STAGE 3A CHRONIC KIDNEY DISEASE (H): ICD-10-CM

## 2022-01-01 DIAGNOSIS — N20.0 NEPHROLITHIASIS: ICD-10-CM

## 2022-01-01 DIAGNOSIS — F03.90 DEMENTIA WITHOUT BEHAVIORAL DISTURBANCE (H): ICD-10-CM

## 2022-01-01 DIAGNOSIS — G89.29 OTHER CHRONIC PAIN: Primary | ICD-10-CM

## 2022-01-01 DIAGNOSIS — Z86.73 H/O: CVA (CEREBROVASCULAR ACCIDENT): Primary | ICD-10-CM

## 2022-01-01 DIAGNOSIS — B33.8 RSV INFECTION: ICD-10-CM

## 2022-01-01 DIAGNOSIS — N39.0 URINARY TRACT INFECTION, SITE NOT SPECIFIED: ICD-10-CM

## 2022-01-01 DIAGNOSIS — M48.061 SPINAL STENOSIS OF LUMBAR REGION, UNSPECIFIED WHETHER NEUROGENIC CLAUDICATION PRESENT: ICD-10-CM

## 2022-01-01 DIAGNOSIS — F32.A ANXIETY AND DEPRESSION: ICD-10-CM

## 2022-01-01 DIAGNOSIS — N28.89 LEFT RENAL MASS: ICD-10-CM

## 2022-01-01 DIAGNOSIS — I10 ESSENTIAL HYPERTENSION: Primary | ICD-10-CM

## 2022-01-01 DIAGNOSIS — G89.29 CHRONIC BACK PAIN, UNSPECIFIED BACK LOCATION, UNSPECIFIED BACK PAIN LATERALITY: ICD-10-CM

## 2022-01-01 DIAGNOSIS — M54.9 CHRONIC BACK PAIN, UNSPECIFIED BACK LOCATION, UNSPECIFIED BACK PAIN LATERALITY: ICD-10-CM

## 2022-01-01 DIAGNOSIS — R05.1 ACUTE COUGH: Primary | ICD-10-CM

## 2022-01-01 DIAGNOSIS — M19.011 PRIMARY OSTEOARTHRITIS OF BOTH SHOULDERS: ICD-10-CM

## 2022-01-01 DIAGNOSIS — Z71.89 ADVANCED DIRECTIVES, COUNSELING/DISCUSSION: Primary | ICD-10-CM

## 2022-01-01 DIAGNOSIS — F03.90 DEMENTIA WITHOUT BEHAVIORAL DISTURBANCE, UNSPECIFIED DEMENTIA TYPE: ICD-10-CM

## 2022-01-01 DIAGNOSIS — J20.9 ACUTE BRONCHITIS, UNSPECIFIED ORGANISM: ICD-10-CM

## 2022-01-01 DIAGNOSIS — G89.29 OTHER CHRONIC PAIN: ICD-10-CM

## 2022-01-01 LAB
ALBUMIN UR-MCNC: 100 MG/DL
ALBUMIN UR-MCNC: 20 MG/DL
ANION GAP SERPL CALCULATED.3IONS-SCNC: 11 MMOL/L (ref 7–15)
ANION GAP SERPL CALCULATED.3IONS-SCNC: 11 MMOL/L (ref 7–15)
ANION GAP SERPL CALCULATED.3IONS-SCNC: 13 MMOL/L (ref 7–15)
APPEARANCE UR: ABNORMAL
APPEARANCE UR: ABNORMAL
ATRIAL RATE - MUSE: 112 BPM
BACTERIA #/AREA URNS HPF: ABNORMAL /HPF
BACTERIA #/AREA URNS HPF: ABNORMAL /HPF
BACTERIA BLD CULT: NO GROWTH
BACTERIA BLD CULT: NO GROWTH
BACTERIA UR CULT: NORMAL
BACTERIA UR CULT: NORMAL
BASE EXCESS BLDV CALC-SCNC: 0.4 MMOL/L (ref -7.7–1.9)
BASOPHILS # BLD AUTO: 0 10E3/UL (ref 0–0.2)
BASOPHILS NFR BLD AUTO: 0 %
BILIRUB UR QL STRIP: NEGATIVE
BILIRUB UR QL STRIP: NEGATIVE
BUN SERPL-MCNC: 15.3 MG/DL (ref 8–23)
BUN SERPL-MCNC: 18.6 MG/DL (ref 8–23)
BUN SERPL-MCNC: 23.2 MG/DL (ref 8–23)
CALCIUM SERPL-MCNC: 8.8 MG/DL (ref 8.2–9.6)
CALCIUM SERPL-MCNC: 9 MG/DL (ref 8.2–9.6)
CALCIUM SERPL-MCNC: 9 MG/DL (ref 8.2–9.6)
CAOX CRY #/AREA URNS HPF: ABNORMAL /HPF
CHLORIDE SERPL-SCNC: 104 MMOL/L (ref 98–107)
CHLORIDE SERPL-SCNC: 106 MMOL/L (ref 98–107)
CHLORIDE SERPL-SCNC: 107 MMOL/L (ref 98–107)
COLOR UR AUTO: ABNORMAL
COLOR UR AUTO: YELLOW
CREAT SERPL-MCNC: 0.7 MG/DL (ref 0.51–0.95)
CREAT SERPL-MCNC: 0.7 MG/DL (ref 0.51–0.95)
CREAT SERPL-MCNC: 0.88 MG/DL (ref 0.51–0.95)
D DIMER PPP FEU-MCNC: 3.35 UG/ML FEU (ref 0–0.5)
DEPRECATED HCO3 PLAS-SCNC: 21 MMOL/L (ref 22–29)
DEPRECATED HCO3 PLAS-SCNC: 22 MMOL/L (ref 22–29)
DEPRECATED HCO3 PLAS-SCNC: 25 MMOL/L (ref 22–29)
DIASTOLIC BLOOD PRESSURE - MUSE: NORMAL MMHG
EOSINOPHIL # BLD AUTO: 0 10E3/UL (ref 0–0.7)
EOSINOPHIL # BLD AUTO: 0.1 10E3/UL (ref 0–0.7)
EOSINOPHIL # BLD AUTO: 0.2 10E3/UL (ref 0–0.7)
EOSINOPHIL NFR BLD AUTO: 0 %
EOSINOPHIL NFR BLD AUTO: 0 %
EOSINOPHIL NFR BLD AUTO: 1 %
ERYTHROCYTE [DISTWIDTH] IN BLOOD BY AUTOMATED COUNT: 13.5 % (ref 10–15)
ERYTHROCYTE [DISTWIDTH] IN BLOOD BY AUTOMATED COUNT: 14.2 % (ref 10–15)
ERYTHROCYTE [DISTWIDTH] IN BLOOD BY AUTOMATED COUNT: 14.3 % (ref 10–15)
FLUAV RNA SPEC QL NAA+PROBE: NEGATIVE
FLUAV RNA SPEC QL NAA+PROBE: NEGATIVE
FLUBV RNA RESP QL NAA+PROBE: NEGATIVE
FLUBV RNA RESP QL NAA+PROBE: NEGATIVE
GFR SERPL CREATININE-BSD FRML MDRD: 59 ML/MIN/1.73M2
GFR SERPL CREATININE-BSD FRML MDRD: 77 ML/MIN/1.73M2
GFR SERPL CREATININE-BSD FRML MDRD: 77 ML/MIN/1.73M2
GLUCOSE SERPL-MCNC: 116 MG/DL (ref 70–99)
GLUCOSE SERPL-MCNC: 160 MG/DL (ref 70–99)
GLUCOSE SERPL-MCNC: 184 MG/DL (ref 70–99)
GLUCOSE UR STRIP-MCNC: NEGATIVE MG/DL
GLUCOSE UR STRIP-MCNC: NEGATIVE MG/DL
HCO3 BLDV-SCNC: 25 MMOL/L (ref 21–28)
HCT VFR BLD AUTO: 36.1 % (ref 35–47)
HCT VFR BLD AUTO: 36.2 % (ref 35–47)
HCT VFR BLD AUTO: 36.7 % (ref 35–47)
HGB BLD-MCNC: 11.4 G/DL (ref 11.7–15.7)
HGB BLD-MCNC: 11.6 G/DL (ref 11.7–15.7)
HGB BLD-MCNC: 11.6 G/DL (ref 11.7–15.7)
HGB UR QL STRIP: ABNORMAL
HGB UR QL STRIP: ABNORMAL
HOLD SPECIMEN: NORMAL
IMM GRANULOCYTES # BLD: 0 10E3/UL
IMM GRANULOCYTES # BLD: 0.1 10E3/UL
IMM GRANULOCYTES # BLD: 0.1 10E3/UL
IMM GRANULOCYTES NFR BLD: 0 %
IMM GRANULOCYTES NFR BLD: 1 %
IMM GRANULOCYTES NFR BLD: 1 %
INTERPRETATION ECG - MUSE: NORMAL
KETONES UR STRIP-MCNC: ABNORMAL MG/DL
KETONES UR STRIP-MCNC: NEGATIVE MG/DL
LACTATE SERPL-SCNC: 1.1 MMOL/L (ref 0.7–2)
LACTATE SERPL-SCNC: 1.4 MMOL/L (ref 0.7–2)
LEUKOCYTE ESTERASE UR QL STRIP: ABNORMAL
LEUKOCYTE ESTERASE UR QL STRIP: ABNORMAL
LYMPHOCYTES # BLD AUTO: 0.6 10E3/UL (ref 0.8–5.3)
LYMPHOCYTES # BLD AUTO: 1.4 10E3/UL (ref 0.8–5.3)
LYMPHOCYTES # BLD AUTO: 1.6 10E3/UL (ref 0.8–5.3)
LYMPHOCYTES NFR BLD AUTO: 12 %
LYMPHOCYTES NFR BLD AUTO: 14 %
LYMPHOCYTES NFR BLD AUTO: 6 %
MCH RBC QN AUTO: 33.2 PG (ref 26.5–33)
MCH RBC QN AUTO: 33.4 PG (ref 26.5–33)
MCH RBC QN AUTO: 34 PG (ref 26.5–33)
MCHC RBC AUTO-ENTMCNC: 31.1 G/DL (ref 31.5–36.5)
MCHC RBC AUTO-ENTMCNC: 32 G/DL (ref 31.5–36.5)
MCHC RBC AUTO-ENTMCNC: 32.1 G/DL (ref 31.5–36.5)
MCV RBC AUTO: 104 FL (ref 78–100)
MCV RBC AUTO: 106 FL (ref 78–100)
MCV RBC AUTO: 107 FL (ref 78–100)
MONOCYTES # BLD AUTO: 0.1 10E3/UL (ref 0–1.3)
MONOCYTES # BLD AUTO: 0.9 10E3/UL (ref 0–1.3)
MONOCYTES # BLD AUTO: 0.9 10E3/UL (ref 0–1.3)
MONOCYTES NFR BLD AUTO: 1 %
MONOCYTES NFR BLD AUTO: 7 %
MONOCYTES NFR BLD AUTO: 8 %
MUCOUS THREADS #/AREA URNS LPF: PRESENT /LPF
MUCOUS THREADS #/AREA URNS LPF: PRESENT /LPF
NEUTROPHILS # BLD AUTO: 10.7 10E3/UL (ref 1.6–8.3)
NEUTROPHILS # BLD AUTO: 8 10E3/UL (ref 1.6–8.3)
NEUTROPHILS # BLD AUTO: 9.9 10E3/UL (ref 1.6–8.3)
NEUTROPHILS NFR BLD AUTO: 77 %
NEUTROPHILS NFR BLD AUTO: 80 %
NEUTROPHILS NFR BLD AUTO: 92 %
NITRATE UR QL: NEGATIVE
NITRATE UR QL: NEGATIVE
NRBC # BLD AUTO: 0 10E3/UL
NRBC BLD AUTO-RTO: 0 /100
NT-PROBNP SERPL-MCNC: 317 PG/ML (ref 0–1800)
NT-PROBNP SERPL-MCNC: 450 PG/ML (ref 0–1800)
O2/TOTAL GAS SETTING VFR VENT: 0 %
P AXIS - MUSE: 11 DEGREES
PCO2 BLDV: 37 MM HG (ref 40–50)
PH BLDV: 7.43 [PH] (ref 7.32–7.43)
PH UR STRIP: 7 [PH] (ref 5–7)
PH UR STRIP: 7.5 [PH] (ref 5–7)
PLATELET # BLD AUTO: 162 10E3/UL (ref 150–450)
PLATELET # BLD AUTO: 169 10E3/UL (ref 150–450)
PLATELET # BLD AUTO: 191 10E3/UL (ref 150–450)
PO2 BLDV: 49 MM HG (ref 25–47)
POTASSIUM SERPL-SCNC: 3.9 MMOL/L (ref 3.4–5.3)
POTASSIUM SERPL-SCNC: 3.9 MMOL/L (ref 3.4–5.3)
POTASSIUM SERPL-SCNC: 4.1 MMOL/L (ref 3.4–5.3)
PR INTERVAL - MUSE: 188 MS
PROCALCITONIN SERPL IA-MCNC: 0.13 NG/ML
QRS DURATION - MUSE: 66 MS
QT - MUSE: 318 MS
QTC - MUSE: 434 MS
R AXIS - MUSE: -12 DEGREES
RADIOLOGIST FLAGS: NORMAL
RBC # BLD AUTO: 3.41 10E6/UL (ref 3.8–5.2)
RBC # BLD AUTO: 3.43 10E6/UL (ref 3.8–5.2)
RBC # BLD AUTO: 3.47 10E6/UL (ref 3.8–5.2)
RBC URINE: 40 /HPF
RBC URINE: >182 /HPF
RSV RNA SPEC NAA+PROBE: NEGATIVE
RSV RNA SPEC NAA+PROBE: POSITIVE
SARS-COV-2 RNA RESP QL NAA+PROBE: NEGATIVE
SARS-COV-2 RNA RESP QL NAA+PROBE: NEGATIVE
SODIUM SERPL-SCNC: 140 MMOL/L (ref 136–145)
SP GR UR STRIP: 1.01 (ref 1–1.03)
SP GR UR STRIP: 1.02 (ref 1–1.03)
SQUAMOUS EPITHELIAL: 4 /HPF
SYSTOLIC BLOOD PRESSURE - MUSE: NORMAL MMHG
T AXIS - MUSE: 268 DEGREES
TROPONIN T SERPL HS-MCNC: 14 NG/L
TROPONIN T SERPL HS-MCNC: 16 NG/L
UROBILINOGEN UR STRIP-MCNC: NORMAL MG/DL
UROBILINOGEN UR STRIP-MCNC: NORMAL MG/DL
VENTRICULAR RATE- MUSE: 112 BPM
WBC # BLD AUTO: 10.5 10E3/UL (ref 4–11)
WBC # BLD AUTO: 10.7 10E3/UL (ref 4–11)
WBC # BLD AUTO: 13.4 10E3/UL (ref 4–11)
WBC CLUMPS #/AREA URNS HPF: PRESENT /HPF
WBC URINE: 35 /HPF
WBC URINE: 58 /HPF

## 2022-01-01 PROCEDURE — 80048 BASIC METABOLIC PNL TOTAL CA: CPT | Performed by: EMERGENCY MEDICINE

## 2022-01-01 PROCEDURE — 250N000009 HC RX 250: Performed by: INTERNAL MEDICINE

## 2022-01-01 PROCEDURE — 258N000003 HC RX IP 258 OP 636: Performed by: INTERNAL MEDICINE

## 2022-01-01 PROCEDURE — C9803 HOPD COVID-19 SPEC COLLECT: HCPCS

## 2022-01-01 PROCEDURE — 250N000013 HC RX MED GY IP 250 OP 250 PS 637: Performed by: PHYSICIAN ASSISTANT

## 2022-01-01 PROCEDURE — 85025 COMPLETE CBC W/AUTO DIFF WBC: CPT | Performed by: INTERNAL MEDICINE

## 2022-01-01 PROCEDURE — 81001 URINALYSIS AUTO W/SCOPE: CPT | Mod: ORL | Performed by: PHYSICIAN ASSISTANT

## 2022-01-01 PROCEDURE — 96367 TX/PROPH/DG ADDL SEQ IV INF: CPT

## 2022-01-01 PROCEDURE — 250N000011 HC RX IP 250 OP 636: Performed by: EMERGENCY MEDICINE

## 2022-01-01 PROCEDURE — 83605 ASSAY OF LACTIC ACID: CPT | Performed by: EMERGENCY MEDICINE

## 2022-01-01 PROCEDURE — 85025 COMPLETE CBC W/AUTO DIFF WBC: CPT | Performed by: EMERGENCY MEDICINE

## 2022-01-01 PROCEDURE — G0378 HOSPITAL OBSERVATION PER HR: HCPCS

## 2022-01-01 PROCEDURE — 250N000012 HC RX MED GY IP 250 OP 636 PS 637: Performed by: EMERGENCY MEDICINE

## 2022-01-01 PROCEDURE — 93005 ELECTROCARDIOGRAM TRACING: CPT

## 2022-01-01 PROCEDURE — 97161 PT EVAL LOW COMPLEX 20 MIN: CPT | Mod: GP | Performed by: PHYSICAL THERAPIST

## 2022-01-01 PROCEDURE — 84484 ASSAY OF TROPONIN QUANT: CPT | Performed by: EMERGENCY MEDICINE

## 2022-01-01 PROCEDURE — 36415 COLL VENOUS BLD VENIPUNCTURE: CPT | Performed by: EMERGENCY MEDICINE

## 2022-01-01 PROCEDURE — 87086 URINE CULTURE/COLONY COUNT: CPT | Performed by: INTERNAL MEDICINE

## 2022-01-01 PROCEDURE — G0463 HOSPITAL OUTPT CLINIC VISIT: HCPCS

## 2022-01-01 PROCEDURE — 250N000013 HC RX MED GY IP 250 OP 250 PS 637: Performed by: INTERNAL MEDICINE

## 2022-01-01 PROCEDURE — 999N000156 HC STATISTIC RCP CONSULT EA 30 MIN

## 2022-01-01 PROCEDURE — 99226 PR SUBSEQUENT OBSERVATION CARE,LEVEL III: CPT | Performed by: NURSE PRACTITIONER

## 2022-01-01 PROCEDURE — 74177 CT ABD & PELVIS W/CONTRAST: CPT

## 2022-01-01 PROCEDURE — 99310 SBSQ NF CARE HIGH MDM 45: CPT | Performed by: PHYSICIAN ASSISTANT

## 2022-01-01 PROCEDURE — 87637 SARSCOV2&INF A&B&RSV AMP PRB: CPT | Performed by: EMERGENCY MEDICINE

## 2022-01-01 PROCEDURE — 99285 EMERGENCY DEPT VISIT HI MDM: CPT | Mod: 25

## 2022-01-01 PROCEDURE — G0378 HOSPITAL OBSERVATION PER HR: HCPCS | Mod: CS

## 2022-01-01 PROCEDURE — 85379 FIBRIN DEGRADATION QUANT: CPT | Performed by: EMERGENCY MEDICINE

## 2022-01-01 PROCEDURE — 71275 CT ANGIOGRAPHY CHEST: CPT

## 2022-01-01 PROCEDURE — 250N000013 HC RX MED GY IP 250 OP 250 PS 637: Performed by: EMERGENCY MEDICINE

## 2022-01-01 PROCEDURE — 96376 TX/PRO/DX INJ SAME DRUG ADON: CPT

## 2022-01-01 PROCEDURE — 250N000013 HC RX MED GY IP 250 OP 250 PS 637: Performed by: NURSE PRACTITIONER

## 2022-01-01 PROCEDURE — 80048 BASIC METABOLIC PNL TOTAL CA: CPT | Performed by: INTERNAL MEDICINE

## 2022-01-01 PROCEDURE — 250N000012 HC RX MED GY IP 250 OP 636 PS 637: Performed by: INTERNAL MEDICINE

## 2022-01-01 PROCEDURE — 97530 THERAPEUTIC ACTIVITIES: CPT | Mod: GP | Performed by: PHYSICAL THERAPIST

## 2022-01-01 PROCEDURE — 82803 BLOOD GASES ANY COMBINATION: CPT | Performed by: EMERGENCY MEDICINE

## 2022-01-01 PROCEDURE — 94640 AIRWAY INHALATION TREATMENT: CPT

## 2022-01-01 PROCEDURE — 84145 PROCALCITONIN (PCT): CPT | Performed by: INTERNAL MEDICINE

## 2022-01-01 PROCEDURE — 36415 COLL VENOUS BLD VENIPUNCTURE: CPT | Performed by: INTERNAL MEDICINE

## 2022-01-01 PROCEDURE — 999N000157 HC STATISTIC RCP TIME EA 10 MIN

## 2022-01-01 PROCEDURE — 99226 PR SUBSEQUENT OBSERVATION CARE,LEVEL III: CPT | Performed by: INTERNAL MEDICINE

## 2022-01-01 PROCEDURE — 96366 THER/PROPH/DIAG IV INF ADDON: CPT

## 2022-01-01 PROCEDURE — 87637 SARSCOV2&INF A&B&RSV AMP PRB: CPT | Mod: ORL | Performed by: INTERNAL MEDICINE

## 2022-01-01 PROCEDURE — 99309 SBSQ NF CARE MODERATE MDM 30: CPT | Performed by: INTERNAL MEDICINE

## 2022-01-01 PROCEDURE — 99310 SBSQ NF CARE HIGH MDM 45: CPT | Performed by: INTERNAL MEDICINE

## 2022-01-01 PROCEDURE — 250N000009 HC RX 250: Performed by: EMERGENCY MEDICINE

## 2022-01-01 PROCEDURE — 96375 TX/PRO/DX INJ NEW DRUG ADDON: CPT

## 2022-01-01 PROCEDURE — 83880 ASSAY OF NATRIURETIC PEPTIDE: CPT | Performed by: EMERGENCY MEDICINE

## 2022-01-01 PROCEDURE — 87040 BLOOD CULTURE FOR BACTERIA: CPT | Performed by: EMERGENCY MEDICINE

## 2022-01-01 PROCEDURE — 81001 URINALYSIS AUTO W/SCOPE: CPT | Performed by: INTERNAL MEDICINE

## 2022-01-01 PROCEDURE — 250N000011 HC RX IP 250 OP 636: Performed by: INTERNAL MEDICINE

## 2022-01-01 PROCEDURE — 97110 THERAPEUTIC EXERCISES: CPT | Mod: GP | Performed by: PHYSICAL THERAPIST

## 2022-01-01 PROCEDURE — 96365 THER/PROPH/DIAG IV INF INIT: CPT | Mod: 59

## 2022-01-01 PROCEDURE — 71045 X-RAY EXAM CHEST 1 VIEW: CPT

## 2022-01-01 PROCEDURE — 87086 URINE CULTURE/COLONY COUNT: CPT | Mod: ORL | Performed by: PHYSICIAN ASSISTANT

## 2022-01-01 PROCEDURE — 99220 PR INITIAL OBSERVATION CARE,LEVEL III: CPT | Performed by: INTERNAL MEDICINE

## 2022-01-01 RX ORDER — DORZOLAMIDE HYDROCHLORIDE AND TIMOLOL MALEATE 20; 5 MG/ML; MG/ML
1 SOLUTION/ DROPS OPHTHALMIC 2 TIMES DAILY
Status: DISCONTINUED | OUTPATIENT
Start: 2022-01-01 | End: 2022-01-01 | Stop reason: HOSPADM

## 2022-01-01 RX ORDER — ACETAMINOPHEN 500 MG
1000 TABLET ORAL 2 TIMES DAILY
COMMUNITY
Start: 2022-01-01 | End: 2023-01-01

## 2022-01-01 RX ORDER — GUAIFENESIN/DEXTROMETHORPHAN 100-10MG/5
10 SYRUP ORAL EVERY 4 HOURS PRN
Status: DISCONTINUED | OUTPATIENT
Start: 2022-01-01 | End: 2022-01-01 | Stop reason: HOSPADM

## 2022-01-01 RX ORDER — ALBUTEROL SULFATE 0.83 MG/ML
2.5 SOLUTION RESPIRATORY (INHALATION) 4 TIMES DAILY
Status: DISCONTINUED | OUTPATIENT
Start: 2022-01-01 | End: 2022-01-01 | Stop reason: HOSPADM

## 2022-01-01 RX ORDER — TRAMADOL HYDROCHLORIDE 50 MG/1
25 TABLET ORAL DAILY
Qty: 45 TABLET | Refills: 0 | Status: SHIPPED | OUTPATIENT
Start: 2022-01-01 | End: 2023-01-01

## 2022-01-01 RX ORDER — ACETAMINOPHEN 325 MG/1
650 TABLET ORAL EVERY 6 HOURS PRN
Status: DISCONTINUED | OUTPATIENT
Start: 2022-01-01 | End: 2022-01-01 | Stop reason: HOSPADM

## 2022-01-01 RX ORDER — METHYLPREDNISOLONE SODIUM SUCCINATE 125 MG/2ML
125 INJECTION, POWDER, LYOPHILIZED, FOR SOLUTION INTRAMUSCULAR; INTRAVENOUS ONCE
Status: COMPLETED | OUTPATIENT
Start: 2022-01-01 | End: 2022-01-01

## 2022-01-01 RX ORDER — ESCITALOPRAM OXALATE 5 MG/1
10 TABLET ORAL DAILY
Status: DISCONTINUED | OUTPATIENT
Start: 2022-01-01 | End: 2022-01-01 | Stop reason: HOSPADM

## 2022-01-01 RX ORDER — GUAIFENESIN 200 MG/10ML
400 LIQUID ORAL EVERY 4 HOURS PRN
Start: 2022-01-01 | End: 2023-01-01

## 2022-01-01 RX ORDER — NALOXONE HYDROCHLORIDE 0.4 MG/ML
0.4 INJECTION, SOLUTION INTRAMUSCULAR; INTRAVENOUS; SUBCUTANEOUS
Status: DISCONTINUED | OUTPATIENT
Start: 2022-01-01 | End: 2022-01-01 | Stop reason: HOSPADM

## 2022-01-01 RX ORDER — SODIUM CHLORIDE 9 MG/ML
INJECTION, SOLUTION INTRAVENOUS CONTINUOUS
Status: ACTIVE | OUTPATIENT
Start: 2022-01-01 | End: 2022-01-01

## 2022-01-01 RX ORDER — AZITHROMYCIN 250 MG/1
500 TABLET, FILM COATED ORAL ONCE
Status: COMPLETED | OUTPATIENT
Start: 2022-01-01 | End: 2022-01-01

## 2022-01-01 RX ORDER — BENZONATATE 100 MG/1
100 CAPSULE ORAL 3 TIMES DAILY PRN
Start: 2022-01-01 | End: 2023-01-01

## 2022-01-01 RX ORDER — AMOXICILLIN AND CLAVULANATE POTASSIUM 400; 57 MG/5ML; MG/5ML
5 POWDER, FOR SUSPENSION ORAL EVERY 12 HOURS SCHEDULED
Status: DISCONTINUED | OUTPATIENT
Start: 2022-01-01 | End: 2022-01-01 | Stop reason: HOSPADM

## 2022-01-01 RX ORDER — GINSENG 100 MG
CAPSULE ORAL AT BEDTIME
COMMUNITY
End: 2022-01-01

## 2022-01-01 RX ORDER — ONDANSETRON 4 MG/1
4 TABLET, ORALLY DISINTEGRATING ORAL EVERY 6 HOURS PRN
Start: 2022-01-01

## 2022-01-01 RX ORDER — BENZONATATE 100 MG/1
100 CAPSULE ORAL 3 TIMES DAILY PRN
Status: DISCONTINUED | OUTPATIENT
Start: 2022-01-01 | End: 2022-01-01 | Stop reason: HOSPADM

## 2022-01-01 RX ORDER — TRAMADOL HYDROCHLORIDE 50 MG/1
25 TABLET ORAL DAILY
Qty: 30 TABLET | Refills: 0 | Status: SHIPPED | OUTPATIENT
Start: 2022-01-01 | End: 2022-01-01

## 2022-01-01 RX ORDER — ATORVASTATIN CALCIUM 40 MG/1
40 TABLET, FILM COATED ORAL EVERY EVENING
Status: DISCONTINUED | OUTPATIENT
Start: 2022-01-01 | End: 2022-01-01 | Stop reason: HOSPADM

## 2022-01-01 RX ORDER — LEVALBUTEROL INHALATION SOLUTION 1.25 MG/3ML
1.25 SOLUTION RESPIRATORY (INHALATION) ONCE
Status: COMPLETED | OUTPATIENT
Start: 2022-01-01 | End: 2022-01-01

## 2022-01-01 RX ORDER — LIDOCAINE 4 G/G
1 PATCH TOPICAL EVERY 24 HOURS
Start: 2022-01-01 | End: 2022-01-01

## 2022-01-01 RX ORDER — CARVEDILOL 6.25 MG/1
6.25 TABLET ORAL 2 TIMES DAILY WITH MEALS
Status: DISCONTINUED | OUTPATIENT
Start: 2022-01-01 | End: 2022-01-01 | Stop reason: HOSPADM

## 2022-01-01 RX ORDER — DOXYCYCLINE 100 MG/10ML
100 INJECTION, POWDER, LYOPHILIZED, FOR SOLUTION INTRAVENOUS EVERY 12 HOURS
Status: DISCONTINUED | OUTPATIENT
Start: 2022-01-01 | End: 2022-01-01

## 2022-01-01 RX ORDER — ONDANSETRON 2 MG/ML
4 INJECTION INTRAMUSCULAR; INTRAVENOUS EVERY 6 HOURS PRN
Status: DISCONTINUED | OUTPATIENT
Start: 2022-01-01 | End: 2022-01-01 | Stop reason: HOSPADM

## 2022-01-01 RX ORDER — METHYLPREDNISOLONE 4 MG
TABLET, DOSE PACK ORAL
Qty: 21 TABLET | Refills: 0
Start: 2022-01-01 | End: 2022-01-01

## 2022-01-01 RX ORDER — AMOXICILLIN 250 MG
2 CAPSULE ORAL 2 TIMES DAILY
Status: DISCONTINUED | OUTPATIENT
Start: 2022-01-01 | End: 2022-01-01

## 2022-01-01 RX ORDER — AZITHROMYCIN 250 MG/1
250 TABLET, FILM COATED ORAL DAILY
Qty: 4 TABLET | Refills: 0 | Status: SHIPPED | OUTPATIENT
Start: 2022-01-01 | End: 2022-01-01

## 2022-01-01 RX ORDER — ALBUTEROL SULFATE 0.83 MG/ML
2.5 SOLUTION RESPIRATORY (INHALATION) EVERY 4 HOURS PRN
Status: DISCONTINUED | OUTPATIENT
Start: 2022-01-01 | End: 2022-01-01 | Stop reason: HOSPADM

## 2022-01-01 RX ORDER — ALBUTEROL SULFATE 5 MG/ML
2.5 SOLUTION RESPIRATORY (INHALATION)
Status: DISCONTINUED | OUTPATIENT
Start: 2022-01-01 | End: 2022-01-01 | Stop reason: ALTCHOICE

## 2022-01-01 RX ORDER — AMLODIPINE BESYLATE 5 MG/1
5 TABLET ORAL DAILY
Start: 2022-01-01 | End: 2023-01-01

## 2022-01-01 RX ORDER — ONDANSETRON 4 MG/1
4 TABLET, ORALLY DISINTEGRATING ORAL EVERY 6 HOURS PRN
Status: DISCONTINUED | OUTPATIENT
Start: 2022-01-01 | End: 2022-01-01 | Stop reason: HOSPADM

## 2022-01-01 RX ORDER — AMOXICILLIN AND CLAVULANATE POTASSIUM 400; 57 MG/5ML; MG/5ML
5 POWDER, FOR SUSPENSION ORAL EVERY 12 HOURS
Qty: 40 ML | Refills: 0
Start: 2022-01-01 | End: 2022-01-01

## 2022-01-01 RX ORDER — ASPIRIN 81 MG/1
81 TABLET, CHEWABLE ORAL DAILY
Status: DISCONTINUED | OUTPATIENT
Start: 2022-01-01 | End: 2022-01-01 | Stop reason: HOSPADM

## 2022-01-01 RX ORDER — ACETAMINOPHEN 650 MG/1
650 SUPPOSITORY RECTAL EVERY 6 HOURS PRN
Status: DISCONTINUED | OUTPATIENT
Start: 2022-01-01 | End: 2022-01-01 | Stop reason: HOSPADM

## 2022-01-01 RX ORDER — NALOXONE HYDROCHLORIDE 0.4 MG/ML
0.2 INJECTION, SOLUTION INTRAMUSCULAR; INTRAVENOUS; SUBCUTANEOUS
Status: DISCONTINUED | OUTPATIENT
Start: 2022-01-01 | End: 2022-01-01 | Stop reason: HOSPADM

## 2022-01-01 RX ORDER — BENZONATATE 100 MG/1
100 CAPSULE ORAL ONCE
Status: COMPLETED | OUTPATIENT
Start: 2022-01-01 | End: 2022-01-01

## 2022-01-01 RX ORDER — METHYLPREDNISOLONE SODIUM SUCCINATE 125 MG/2ML
60 INJECTION, POWDER, LYOPHILIZED, FOR SOLUTION INTRAMUSCULAR; INTRAVENOUS EVERY 12 HOURS
Status: DISCONTINUED | OUTPATIENT
Start: 2022-01-01 | End: 2022-01-01

## 2022-01-01 RX ORDER — PREDNISONE 20 MG/1
40 TABLET ORAL DAILY
Qty: 8 TABLET | Refills: 0 | Status: SHIPPED | OUTPATIENT
Start: 2022-01-01 | End: 2022-01-01

## 2022-01-01 RX ORDER — VITAMIN B COMPLEX
50 TABLET ORAL DAILY
Status: DISCONTINUED | OUTPATIENT
Start: 2022-01-01 | End: 2022-01-01 | Stop reason: HOSPADM

## 2022-01-01 RX ORDER — AMLODIPINE BESYLATE 5 MG/1
5 TABLET ORAL DAILY
Status: DISCONTINUED | OUTPATIENT
Start: 2022-01-01 | End: 2022-01-01 | Stop reason: HOSPADM

## 2022-01-01 RX ORDER — PREDNISONE 20 MG/1
40 TABLET ORAL ONCE
Status: COMPLETED | OUTPATIENT
Start: 2022-01-01 | End: 2022-01-01

## 2022-01-01 RX ORDER — IOPAMIDOL 755 MG/ML
500 INJECTION, SOLUTION INTRAVASCULAR ONCE
Status: COMPLETED | OUTPATIENT
Start: 2022-01-01 | End: 2022-01-01

## 2022-01-01 RX ORDER — ALBUTEROL SULFATE 0.83 MG/ML
2.5 SOLUTION RESPIRATORY (INHALATION) EVERY 4 HOURS PRN
Qty: 90 ML
Start: 2022-01-01 | End: 2022-01-01

## 2022-01-01 RX ORDER — AMOXICILLIN 250 MG
1 CAPSULE ORAL 2 TIMES DAILY
Status: DISCONTINUED | OUTPATIENT
Start: 2022-01-01 | End: 2022-01-01

## 2022-01-01 RX ORDER — LATANOPROST 50 UG/ML
1 SOLUTION/ DROPS OPHTHALMIC DAILY
COMMUNITY

## 2022-01-01 RX ORDER — POLYETHYLENE GLYCOL 3350 17 G/17G
17 POWDER, FOR SOLUTION ORAL DAILY PRN
Status: DISCONTINUED | OUTPATIENT
Start: 2022-01-01 | End: 2022-01-01 | Stop reason: HOSPADM

## 2022-01-01 RX ORDER — LATANOPROST 50 UG/ML
1 SOLUTION/ DROPS OPHTHALMIC DAILY
Status: DISCONTINUED | OUTPATIENT
Start: 2022-01-01 | End: 2022-01-01 | Stop reason: HOSPADM

## 2022-01-01 RX ORDER — PREDNISONE 20 MG/1
40 TABLET ORAL DAILY
Status: DISCONTINUED | OUTPATIENT
Start: 2022-01-01 | End: 2022-01-01 | Stop reason: HOSPADM

## 2022-01-01 RX ORDER — AMOXICILLIN 250 MG
1 CAPSULE ORAL EVERY OTHER DAY
COMMUNITY
End: 2022-01-01

## 2022-01-01 RX ADMIN — ALBUTEROL SULFATE 2.5 MG: 2.5 SOLUTION RESPIRATORY (INHALATION) at 20:47

## 2022-01-01 RX ADMIN — AMOXICILLIN AND CLAVULANATE POTASSIUM 5 ML: 400; 57 POWDER, FOR SUSPENSION ORAL at 09:41

## 2022-01-01 RX ADMIN — DORZOLAMIDE HYDROCHLORIDE AND TIMOLOL MALEATE 1 DROP: 20; 5 SOLUTION OPHTHALMIC at 18:51

## 2022-01-01 RX ADMIN — CARVEDILOL 6.25 MG: 6.25 TABLET, FILM COATED ORAL at 09:45

## 2022-01-01 RX ADMIN — IOPAMIDOL 52 ML: 755 INJECTION, SOLUTION INTRAVENOUS at 21:48

## 2022-01-01 RX ADMIN — CARVEDILOL 6.25 MG: 6.25 TABLET, FILM COATED ORAL at 18:45

## 2022-01-01 RX ADMIN — SODIUM CHLORIDE 57 ML: 9 INJECTION, SOLUTION INTRAVENOUS at 20:00

## 2022-01-01 RX ADMIN — GUAIFENESIN AND DEXTROMETHORPHAN 10 ML: 100; 10 SYRUP ORAL at 13:53

## 2022-01-01 RX ADMIN — TRAMADOL HYDROCHLORIDE 25 MG: 50 TABLET ORAL at 17:26

## 2022-01-01 RX ADMIN — PREDNISONE 40 MG: 20 TABLET ORAL at 09:45

## 2022-01-01 RX ADMIN — DORZOLAMIDE HYDROCHLORIDE AND TIMOLOL MALEATE 1 DROP: 20; 5 SOLUTION OPHTHALMIC at 09:42

## 2022-01-01 RX ADMIN — ALBUTEROL SULFATE 2.5 MG: 2.5 SOLUTION RESPIRATORY (INHALATION) at 16:58

## 2022-01-01 RX ADMIN — LEVALBUTEROL HYDROCHLORIDE 1.25 MG: 1.25 SOLUTION RESPIRATORY (INHALATION) at 20:38

## 2022-01-01 RX ADMIN — CARVEDILOL 6.25 MG: 6.25 TABLET, FILM COATED ORAL at 18:26

## 2022-01-01 RX ADMIN — BENZONATATE 100 MG: 100 CAPSULE ORAL at 11:36

## 2022-01-01 RX ADMIN — TRAMADOL HYDROCHLORIDE 25 MG: 50 TABLET ORAL at 18:45

## 2022-01-01 RX ADMIN — TAZOBACTAM SODIUM AND PIPERACILLIN SODIUM 4.5 G: 500; 4 INJECTION, SOLUTION INTRAVENOUS at 20:36

## 2022-01-01 RX ADMIN — Medication 50 MCG: at 09:11

## 2022-01-01 RX ADMIN — BENZOCAINE 6 MG-MENTHOL 10 MG LOZENGES 1 LOZENGE: at 17:26

## 2022-01-01 RX ADMIN — Medication 50 MCG: at 09:44

## 2022-01-01 RX ADMIN — ALBUTEROL SULFATE 2.5 MG: 2.5 SOLUTION RESPIRATORY (INHALATION) at 08:52

## 2022-01-01 RX ADMIN — ACETAMINOPHEN 650 MG: 325 TABLET, FILM COATED ORAL at 13:47

## 2022-01-01 RX ADMIN — BENZONATATE 100 MG: 100 CAPSULE ORAL at 20:53

## 2022-01-01 RX ADMIN — ACETAMINOPHEN 650 MG: 325 TABLET, FILM COATED ORAL at 18:45

## 2022-01-01 RX ADMIN — ASPIRIN 81 MG CHEWABLE TABLET 81 MG: 81 TABLET CHEWABLE at 15:41

## 2022-01-01 RX ADMIN — ALBUTEROL SULFATE 2.5 MG: 2.5 SOLUTION RESPIRATORY (INHALATION) at 15:45

## 2022-01-01 RX ADMIN — LATANOPROST 1 DROP: 50 SOLUTION/ DROPS OPHTHALMIC at 13:44

## 2022-01-01 RX ADMIN — ATORVASTATIN CALCIUM 40 MG: 40 TABLET, FILM COATED ORAL at 17:26

## 2022-01-01 RX ADMIN — ATORVASTATIN CALCIUM 40 MG: 40 TABLET, FILM COATED ORAL at 18:45

## 2022-01-01 RX ADMIN — ATORVASTATIN CALCIUM 40 MG: 40 TABLET, FILM COATED ORAL at 20:32

## 2022-01-01 RX ADMIN — Medication 50 MCG: at 15:41

## 2022-01-01 RX ADMIN — GUAIFENESIN AND DEXTROMETHORPHAN 10 ML: 100; 10 SYRUP ORAL at 09:42

## 2022-01-01 RX ADMIN — ACETAMINOPHEN 650 MG: 325 TABLET, FILM COATED ORAL at 17:29

## 2022-01-01 RX ADMIN — Medication 1 MG: at 20:51

## 2022-01-01 RX ADMIN — ASPIRIN 81 MG CHEWABLE TABLET 81 MG: 81 TABLET CHEWABLE at 09:06

## 2022-01-01 RX ADMIN — ESCITALOPRAM 10 MG: 5 TABLET, FILM COATED ORAL at 09:44

## 2022-01-01 RX ADMIN — PREDNISONE 40 MG: 20 TABLET ORAL at 09:06

## 2022-01-01 RX ADMIN — ACETAMINOPHEN 650 MG: 325 TABLET, FILM COATED ORAL at 10:03

## 2022-01-01 RX ADMIN — SODIUM CHLORIDE 73 ML: 9 INJECTION, SOLUTION INTRAVENOUS at 21:48

## 2022-01-01 RX ADMIN — LATANOPROST 1 DROP: 50 SOLUTION/ DROPS OPHTHALMIC at 15:39

## 2022-01-01 RX ADMIN — CARVEDILOL 6.25 MG: 6.25 TABLET, FILM COATED ORAL at 17:26

## 2022-01-01 RX ADMIN — AMOXICILLIN AND CLAVULANATE POTASSIUM 5 ML: 400; 57 POWDER, FOR SUSPENSION ORAL at 20:47

## 2022-01-01 RX ADMIN — ESCITALOPRAM 10 MG: 5 TABLET, FILM COATED ORAL at 15:41

## 2022-01-01 RX ADMIN — IOPAMIDOL 60 ML: 755 INJECTION, SOLUTION INTRAVENOUS at 19:59

## 2022-01-01 RX ADMIN — TRAMADOL HYDROCHLORIDE 25 MG: 50 TABLET ORAL at 15:41

## 2022-01-01 RX ADMIN — ESCITALOPRAM 10 MG: 5 TABLET, FILM COATED ORAL at 09:06

## 2022-01-01 RX ADMIN — SODIUM CHLORIDE: 900 INJECTION INTRAVENOUS at 01:13

## 2022-01-01 RX ADMIN — ASPIRIN 81 MG CHEWABLE TABLET 81 MG: 81 TABLET CHEWABLE at 09:45

## 2022-01-01 RX ADMIN — ALBUTEROL SULFATE 2.5 MG: 2.5 SOLUTION RESPIRATORY (INHALATION) at 08:35

## 2022-01-01 RX ADMIN — AMLODIPINE BESYLATE 5 MG: 5 TABLET ORAL at 11:36

## 2022-01-01 RX ADMIN — AZITHROMYCIN MONOHYDRATE 500 MG: 250 TABLET ORAL at 21:01

## 2022-01-01 RX ADMIN — Medication 1 MG: at 01:13

## 2022-01-01 RX ADMIN — DORZOLAMIDE HYDROCHLORIDE AND TIMOLOL MALEATE 1 DROP: 20; 5 SOLUTION OPHTHALMIC at 20:52

## 2022-01-01 RX ADMIN — POLYETHYLENE GLYCOL 3350 17 G: 17 POWDER, FOR SOLUTION ORAL at 09:52

## 2022-01-01 RX ADMIN — ALBUTEROL SULFATE 2.5 MG: 2.5 SOLUTION RESPIRATORY (INHALATION) at 12:40

## 2022-01-01 RX ADMIN — AMOXICILLIN AND CLAVULANATE POTASSIUM 5 ML: 400; 57 POWDER, FOR SUSPENSION ORAL at 10:19

## 2022-01-01 RX ADMIN — SENNOSIDES AND DOCUSATE SODIUM 1 TABLET: 50; 8.6 TABLET ORAL at 08:52

## 2022-01-01 RX ADMIN — LATANOPROST 1 DROP: 50 SOLUTION/ DROPS OPHTHALMIC at 15:40

## 2022-01-01 RX ADMIN — DORZOLAMIDE HYDROCHLORIDE AND TIMOLOL MALEATE 1 DROP: 20; 5 SOLUTION OPHTHALMIC at 09:11

## 2022-01-01 RX ADMIN — GUAIFENESIN AND DEXTROMETHORPHAN 10 ML: 100; 10 SYRUP ORAL at 17:09

## 2022-01-01 RX ADMIN — BENZOCAINE 6 MG-MENTHOL 10 MG LOZENGES 1 LOZENGE: at 11:36

## 2022-01-01 RX ADMIN — ALBUTEROL SULFATE 2.5 MG: 2.5 SOLUTION RESPIRATORY (INHALATION) at 15:54

## 2022-01-01 RX ADMIN — METHYLPREDNISOLONE SODIUM SUCCINATE 125 MG: 125 INJECTION, POWDER, FOR SOLUTION INTRAMUSCULAR; INTRAVENOUS at 20:38

## 2022-01-01 RX ADMIN — ALBUTEROL SULFATE 2.5 MG: 2.5 SOLUTION RESPIRATORY (INHALATION) at 11:34

## 2022-01-01 RX ADMIN — METHYLPREDNISOLONE SODIUM SUCCINATE 62.5 MG: 125 INJECTION, POWDER, FOR SOLUTION INTRAMUSCULAR; INTRAVENOUS at 08:52

## 2022-01-01 RX ADMIN — DOXYCYCLINE 100 MG: 100 INJECTION, POWDER, LYOPHILIZED, FOR SOLUTION INTRAVENOUS at 22:03

## 2022-01-01 RX ADMIN — ALBUTEROL SULFATE 2.5 MG: 2.5 SOLUTION RESPIRATORY (INHALATION) at 12:03

## 2022-01-01 RX ADMIN — AMOXICILLIN AND CLAVULANATE POTASSIUM 5 ML: 400; 57 POWDER, FOR SUSPENSION ORAL at 20:32

## 2022-01-01 RX ADMIN — CARVEDILOL 6.25 MG: 6.25 TABLET, FILM COATED ORAL at 09:06

## 2022-01-01 RX ADMIN — PREDNISONE 40 MG: 20 TABLET ORAL at 20:53

## 2022-01-01 RX ADMIN — ALBUTEROL SULFATE 2.5 MG: 2.5 SOLUTION RESPIRATORY (INHALATION) at 20:32

## 2022-01-01 ASSESSMENT — ACTIVITIES OF DAILY LIVING (ADL)
ADLS_ACUITY_SCORE: 33
ADLS_ACUITY_SCORE: 37
ADLS_ACUITY_SCORE: 35
ADLS_ACUITY_SCORE: 39
ADLS_ACUITY_SCORE: 37
ADLS_ACUITY_SCORE: 35
ADLS_ACUITY_SCORE: 41
ADLS_ACUITY_SCORE: 35
ADLS_ACUITY_SCORE: 35
ADLS_ACUITY_SCORE: 33
ADLS_ACUITY_SCORE: 37
ADLS_ACUITY_SCORE: 37
ADLS_ACUITY_SCORE: 35
ADLS_ACUITY_SCORE: 39
ADLS_ACUITY_SCORE: 33
ADLS_ACUITY_SCORE: 35
ADLS_ACUITY_SCORE: 35
ADLS_ACUITY_SCORE: 41
ADLS_ACUITY_SCORE: 35
DEPENDENT_IADLS:: CLEANING;COOKING;LAUNDRY;SHOPPING;MEAL PREPARATION;MEDICATION MANAGEMENT;MONEY MANAGEMENT;TRANSPORTATION;INCONTINENCE
ADLS_ACUITY_SCORE: 35
ADLS_ACUITY_SCORE: 39
ADLS_ACUITY_SCORE: 35
ADLS_ACUITY_SCORE: 37
ADLS_ACUITY_SCORE: 35
ADLS_ACUITY_SCORE: 37
ADLS_ACUITY_SCORE: 35
ADLS_ACUITY_SCORE: 37
ADLS_ACUITY_SCORE: 37
ADLS_ACUITY_SCORE: 41
ADLS_ACUITY_SCORE: 37
ADLS_ACUITY_SCORE: 35

## 2022-01-01 ASSESSMENT — ENCOUNTER SYMPTOMS
CONSTIPATION: 1
CHEST TIGHTNESS: 0
COUGH: 1
ABDOMINAL PAIN: 1
MYALGIAS: 0
DYSURIA: 0
SHORTNESS OF BREATH: 1
ARTHRALGIAS: 0
ABDOMINAL PAIN: 0
COUGH: 1
FEVER: 0
CHILLS: 0
FEVER: 0

## 2022-02-14 NOTE — TELEPHONE ENCOUNTER
Call placed to daughter Arlen. She would like to repeat CT in 3 months with follow up visit with me.  If area increases in size, or there are new concerns then would have her be seen by Dr. Hook.  Order placed. Arlen will call to schedule CT and follow up visit for May.  Will have Dr. Gant sign and fax new Rx to [facility] at [phone] for Cosopt with directions.

## 2022-02-15 ENCOUNTER — HOSPITAL ENCOUNTER (INPATIENT)
Facility: CLINIC | Age: 87
LOS: 25 days | Discharge: HOME HEALTH | DRG: 481 | End: 2022-03-12
Attending: EMERGENCY MEDICINE | Admitting: INTERNAL MEDICINE
Payer: COMMERCIAL

## 2022-02-15 ENCOUNTER — APPOINTMENT (OUTPATIENT)
Dept: GENERAL RADIOLOGY | Facility: CLINIC | Age: 87
DRG: 481 | End: 2022-02-15
Attending: EMERGENCY MEDICINE
Payer: COMMERCIAL

## 2022-02-15 ENCOUNTER — APPOINTMENT (OUTPATIENT)
Dept: CT IMAGING | Facility: CLINIC | Age: 87
DRG: 481 | End: 2022-02-15
Attending: EMERGENCY MEDICINE
Payer: COMMERCIAL

## 2022-02-15 DIAGNOSIS — I10 HYPERTENSION GOAL BP (BLOOD PRESSURE) < 130/80: ICD-10-CM

## 2022-02-15 DIAGNOSIS — F03.90 DEMENTIA WITHOUT BEHAVIORAL DISTURBANCE, UNSPECIFIED DEMENTIA TYPE: ICD-10-CM

## 2022-02-15 DIAGNOSIS — S72.045S: ICD-10-CM

## 2022-02-15 DIAGNOSIS — I63.9 CEREBROVASCULAR ACCIDENT (CVA), UNSPECIFIED MECHANISM (H): ICD-10-CM

## 2022-02-15 DIAGNOSIS — M62.81 GENERALIZED MUSCLE WEAKNESS: Primary | ICD-10-CM

## 2022-02-15 DIAGNOSIS — N30.00 ACUTE CYSTITIS WITHOUT HEMATURIA: ICD-10-CM

## 2022-02-15 LAB
ABO/RH(D): NORMAL
ANION GAP SERPL CALCULATED.3IONS-SCNC: 6 MMOL/L (ref 3–14)
ANTIBODY SCREEN: NEGATIVE
BASOPHILS # BLD AUTO: 0 10E3/UL (ref 0–0.2)
BASOPHILS NFR BLD AUTO: 0 %
BUN SERPL-MCNC: 31 MG/DL (ref 7–30)
CALCIUM SERPL-MCNC: 9.3 MG/DL (ref 8.5–10.1)
CHLORIDE BLD-SCNC: 109 MMOL/L (ref 94–109)
CO2 SERPL-SCNC: 25 MMOL/L (ref 20–32)
CREAT SERPL-MCNC: 1 MG/DL (ref 0.52–1.04)
EOSINOPHIL # BLD AUTO: 0.2 10E3/UL (ref 0–0.7)
EOSINOPHIL NFR BLD AUTO: 2 %
ERYTHROCYTE [DISTWIDTH] IN BLOOD BY AUTOMATED COUNT: 13.8 % (ref 10–15)
GFR SERPL CREATININE-BSD FRML MDRD: 51 ML/MIN/1.73M2
GLUCOSE BLD-MCNC: 114 MG/DL (ref 70–99)
HCT VFR BLD AUTO: 37.9 % (ref 35–47)
HGB BLD-MCNC: 12 G/DL (ref 11.7–15.7)
IMM GRANULOCYTES # BLD: 0.1 10E3/UL
IMM GRANULOCYTES NFR BLD: 1 %
LYMPHOCYTES # BLD AUTO: 1.3 10E3/UL (ref 0.8–5.3)
LYMPHOCYTES NFR BLD AUTO: 10 %
MCH RBC QN AUTO: 34 PG (ref 26.5–33)
MCHC RBC AUTO-ENTMCNC: 31.7 G/DL (ref 31.5–36.5)
MCV RBC AUTO: 107 FL (ref 78–100)
MONOCYTES # BLD AUTO: 0.7 10E3/UL (ref 0–1.3)
MONOCYTES NFR BLD AUTO: 6 %
NEUTROPHILS # BLD AUTO: 10.3 10E3/UL (ref 1.6–8.3)
NEUTROPHILS NFR BLD AUTO: 81 %
NRBC # BLD AUTO: 0 10E3/UL
NRBC BLD AUTO-RTO: 0 /100
PLATELET # BLD AUTO: 163 10E3/UL (ref 150–450)
POTASSIUM BLD-SCNC: 4.3 MMOL/L (ref 3.4–5.3)
RBC # BLD AUTO: 3.53 10E6/UL (ref 3.8–5.2)
SARS-COV-2 RNA RESP QL NAA+PROBE: NEGATIVE
SODIUM SERPL-SCNC: 140 MMOL/L (ref 133–144)
SPECIMEN EXPIRATION DATE: NORMAL
WBC # BLD AUTO: 12.6 10E3/UL (ref 4–11)

## 2022-02-15 PROCEDURE — 99222 1ST HOSP IP/OBS MODERATE 55: CPT | Performed by: PHYSICIAN ASSISTANT

## 2022-02-15 PROCEDURE — 86850 RBC ANTIBODY SCREEN: CPT | Performed by: PHYSICIAN ASSISTANT

## 2022-02-15 PROCEDURE — 36415 COLL VENOUS BLD VENIPUNCTURE: CPT | Performed by: PHYSICIAN ASSISTANT

## 2022-02-15 PROCEDURE — 73700 CT LOWER EXTREMITY W/O DYE: CPT | Mod: 50

## 2022-02-15 PROCEDURE — C9803 HOPD COVID-19 SPEC COLLECT: HCPCS

## 2022-02-15 PROCEDURE — 85025 COMPLETE CBC W/AUTO DIFF WBC: CPT | Performed by: EMERGENCY MEDICINE

## 2022-02-15 PROCEDURE — 82310 ASSAY OF CALCIUM: CPT | Performed by: EMERGENCY MEDICINE

## 2022-02-15 PROCEDURE — 87635 SARS-COV-2 COVID-19 AMP PRB: CPT | Performed by: EMERGENCY MEDICINE

## 2022-02-15 PROCEDURE — 250N000013 HC RX MED GY IP 250 OP 250 PS 637: Performed by: PHYSICIAN ASSISTANT

## 2022-02-15 PROCEDURE — 99285 EMERGENCY DEPT VISIT HI MDM: CPT | Mod: 25

## 2022-02-15 PROCEDURE — 93005 ELECTROCARDIOGRAM TRACING: CPT

## 2022-02-15 PROCEDURE — 36415 COLL VENOUS BLD VENIPUNCTURE: CPT | Performed by: EMERGENCY MEDICINE

## 2022-02-15 PROCEDURE — 71045 X-RAY EXAM CHEST 1 VIEW: CPT

## 2022-02-15 PROCEDURE — 73502 X-RAY EXAM HIP UNI 2-3 VIEWS: CPT

## 2022-02-15 PROCEDURE — 120N000001 HC R&B MED SURG/OB

## 2022-02-15 RX ORDER — AMOXICILLIN 250 MG
2 CAPSULE ORAL 2 TIMES DAILY PRN
Status: DISCONTINUED | OUTPATIENT
Start: 2022-02-15 | End: 2022-02-28

## 2022-02-15 RX ORDER — ATORVASTATIN CALCIUM 40 MG/1
40 TABLET, FILM COATED ORAL EVERY EVENING
Status: DISCONTINUED | OUTPATIENT
Start: 2022-02-15 | End: 2022-03-12 | Stop reason: HOSPADM

## 2022-02-15 RX ORDER — NALOXONE HYDROCHLORIDE 0.4 MG/ML
0.4 INJECTION, SOLUTION INTRAMUSCULAR; INTRAVENOUS; SUBCUTANEOUS
Status: DISCONTINUED | OUTPATIENT
Start: 2022-02-15 | End: 2022-03-12 | Stop reason: HOSPADM

## 2022-02-15 RX ORDER — POLYETHYLENE GLYCOL 3350 17 G/17G
17 POWDER, FOR SOLUTION ORAL DAILY
Status: DISCONTINUED | OUTPATIENT
Start: 2022-02-17 | End: 2022-02-16

## 2022-02-15 RX ORDER — DORZOLAMIDE HYDROCHLORIDE AND TIMOLOL MALEATE 20; 5 MG/ML; MG/ML
1 SOLUTION/ DROPS OPHTHALMIC 2 TIMES DAILY
Status: DISCONTINUED | OUTPATIENT
Start: 2022-02-15 | End: 2022-03-12 | Stop reason: HOSPADM

## 2022-02-15 RX ORDER — CARVEDILOL 6.25 MG/1
6.25 TABLET ORAL 2 TIMES DAILY WITH MEALS
Status: DISCONTINUED | OUTPATIENT
Start: 2022-02-15 | End: 2022-03-12 | Stop reason: HOSPADM

## 2022-02-15 RX ORDER — HYDROMORPHONE HCL IN WATER/PF 6 MG/30 ML
0.2 PATIENT CONTROLLED ANALGESIA SYRINGE INTRAVENOUS
Status: DISCONTINUED | OUTPATIENT
Start: 2022-02-15 | End: 2022-02-15

## 2022-02-15 RX ORDER — ONDANSETRON 2 MG/ML
4 INJECTION INTRAMUSCULAR; INTRAVENOUS EVERY 6 HOURS PRN
Status: DISCONTINUED | OUTPATIENT
Start: 2022-02-15 | End: 2022-02-16

## 2022-02-15 RX ORDER — AMOXICILLIN 250 MG
1 CAPSULE ORAL AT BEDTIME
Status: DISCONTINUED | OUTPATIENT
Start: 2022-02-15 | End: 2022-02-16

## 2022-02-15 RX ORDER — ESCITALOPRAM OXALATE 10 MG/1
10 TABLET ORAL DAILY
Status: DISCONTINUED | OUTPATIENT
Start: 2022-02-15 | End: 2022-03-12 | Stop reason: HOSPADM

## 2022-02-15 RX ORDER — LATANOPROST 50 UG/ML
1 SOLUTION/ DROPS OPHTHALMIC AT BEDTIME
Status: DISCONTINUED | OUTPATIENT
Start: 2022-02-15 | End: 2022-03-12 | Stop reason: HOSPADM

## 2022-02-15 RX ORDER — NALOXONE HYDROCHLORIDE 0.4 MG/ML
0.2 INJECTION, SOLUTION INTRAMUSCULAR; INTRAVENOUS; SUBCUTANEOUS
Status: DISCONTINUED | OUTPATIENT
Start: 2022-02-15 | End: 2022-03-12 | Stop reason: HOSPADM

## 2022-02-15 RX ORDER — AMLODIPINE BESYLATE 5 MG/1
5 TABLET ORAL DAILY
Status: DISCONTINUED | OUTPATIENT
Start: 2022-02-16 | End: 2022-02-17

## 2022-02-15 RX ORDER — LIDOCAINE 40 MG/G
CREAM TOPICAL
Status: DISCONTINUED | OUTPATIENT
Start: 2022-02-15 | End: 2022-02-26

## 2022-02-15 RX ORDER — HYDROMORPHONE HCL IN WATER/PF 6 MG/30 ML
0.2 PATIENT CONTROLLED ANALGESIA SYRINGE INTRAVENOUS
Status: DISCONTINUED | OUTPATIENT
Start: 2022-02-15 | End: 2022-02-16

## 2022-02-15 RX ORDER — ACETAMINOPHEN 325 MG/1
975 TABLET ORAL EVERY 8 HOURS
Status: DISCONTINUED | OUTPATIENT
Start: 2022-02-15 | End: 2022-02-16

## 2022-02-15 RX ORDER — AMOXICILLIN 250 MG
1 CAPSULE ORAL 2 TIMES DAILY PRN
Status: DISCONTINUED | OUTPATIENT
Start: 2022-02-15 | End: 2022-02-28

## 2022-02-15 RX ORDER — FAMOTIDINE 20 MG/1
20 TABLET, FILM COATED ORAL 2 TIMES DAILY
Status: DISCONTINUED | OUTPATIENT
Start: 2022-02-15 | End: 2022-02-28

## 2022-02-15 RX ORDER — ONDANSETRON 4 MG/1
4 TABLET, ORALLY DISINTEGRATING ORAL EVERY 6 HOURS PRN
Status: DISCONTINUED | OUTPATIENT
Start: 2022-02-15 | End: 2022-02-16

## 2022-02-15 RX ADMIN — ATORVASTATIN CALCIUM 40 MG: 40 TABLET, FILM COATED ORAL at 20:49

## 2022-02-15 RX ADMIN — ESCITALOPRAM OXALATE 10 MG: 10 TABLET ORAL at 17:26

## 2022-02-15 RX ADMIN — ACETAMINOPHEN 975 MG: 325 TABLET, FILM COATED ORAL at 17:26

## 2022-02-15 RX ADMIN — Medication 1 MG: at 23:10

## 2022-02-15 RX ADMIN — DORZOLAMIDE HYDROCHLORIDE AND TIMOLOL MALEATE 1 DROP: 22.3; 6.8 SOLUTION/ DROPS OPHTHALMIC at 20:53

## 2022-02-15 RX ADMIN — CARVEDILOL 6.25 MG: 6.25 TABLET, FILM COATED ORAL at 17:26

## 2022-02-15 RX ADMIN — LATANOPROST 1 DROP: 50 SOLUTION OPHTHALMIC at 23:13

## 2022-02-15 RX ADMIN — FAMOTIDINE 20 MG: 20 TABLET ORAL at 20:49

## 2022-02-15 RX ADMIN — SENNOSIDES AND DOCUSATE SODIUM 1 TABLET: 50; 8.6 TABLET ORAL at 23:10

## 2022-02-15 RX ADMIN — TRAMADOL HYDROCHLORIDE 25 MG: 50 TABLET ORAL at 20:49

## 2022-02-15 RX ADMIN — ACETAMINOPHEN 975 MG: 325 TABLET, FILM COATED ORAL at 23:32

## 2022-02-15 ASSESSMENT — ACTIVITIES OF DAILY LIVING (ADL)
ADLS_ACUITY_SCORE: 12
ADLS_ACUITY_SCORE: 28
ADLS_ACUITY_SCORE: 12
ADLS_ACUITY_SCORE: 24
ADLS_ACUITY_SCORE: 28
ADLS_ACUITY_SCORE: 12
ADLS_ACUITY_SCORE: 28
ADLS_ACUITY_SCORE: 12
ADLS_ACUITY_SCORE: 12
ADLS_ACUITY_SCORE: 28

## 2022-02-15 ASSESSMENT — ENCOUNTER SYMPTOMS
VOMITING: 0
FEVER: 0
ARTHRALGIAS: 1

## 2022-02-15 NOTE — ED NOTES
Walked past pt room and noticed pt had O2 sats of 83%. Writer advised pt to take some deep breaths. Pt still had O2 sats fo 83%. Placed pt on 3L of O2 via mask. Pt now has O2 sats of 94%. RN and MD notified.

## 2022-02-15 NOTE — ED PROVIDER NOTES
History   Chief Complaint:  Fall     HPI The history is limited due to the patient's dementia.      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 Yesterday evening around 1800 the patient was at her group home transferring off the toilet when she had a witnessed fall landing on the ground on her buttocks. She did not strike her head or lose consciousness in the fall. Following the fall there was concern that she could have injured her left hip, and due to this EMS was called to bring her into the ED this morning. Here in the ED she denies any specific pain but she has not ambulated since the fall.     Review of Systems   Constitutional: Negative for fever.   Gastrointestinal: Negative for vomiting.   Musculoskeletal: Positive for arthralgias.   Skin: Negative for rash.   All other systems reviewed and are negative.      Allergies:  Actonel   Conjugated Estrogens  Macrobid   Nitrofurantoin  Premarin  Sulfa Drugs  Hydrocodone  Oxycodone  Risedronate    Medications:  Tylenol  Amlodipine  Aspirin  Lipitor  Carvedilol  Lexapro  Melatonin  Miralax   Senna-docusate   Tramadol     Past Medical History:     Abdominal aortic aneurysm   Actinic keratosis   Cataract   CVA   Dementia  Degenerative joint disease   Glaucoma   Hypertension  Hyperlipidemia   Lumbar spinal stenosis  Osteoporosis   Chronic kidney disease   Hemiparesis of left side     Past Surgical History:    AAA repair  Cataract iol   Extracapsular cataract extraction with intraocular lens implant  Hysterectomy  Laser selective trabeculoplasty  Appendectomy   Anterior vesicourethropexy      Family History:    Heart disease - Father   Hypertension - Mother     Social History:  The patient presents to the ED via EMS.   The patient lives in a group home.     Physical Exam     Patient Vitals for the past 24 hrs:   BP Temp Pulse Resp SpO2   02/15/22 1300  113/81 -- 69 -- 96 %   02/15/22 1245 127/71 -- 70 -- 95 %   02/15/22 1238 -- -- -- -- 97 %   02/15/22 1230 120/79 -- 71 -- (!) 89 %   02/15/22 1200 121/66 -- 82 -- 94 %   02/15/22 1100 112/64 -- 75 -- --   02/15/22 1042 107/50 97.2  F (36.2  C) 76 16 90 %       Physical Exam    GENERAL:  Pleasant, age appropriate.   HEENT:   No scalp hematoma or defect to the bony calvarium.      Adams's and Racoon's sign negative.      Oropharynx is dry  EYES:  Conjunctiva normal  NECK:   C-spine non-tender    No bony step-off to cervical spine.   CV:    Regular rate and rhythm.     No murmurs, rubs or gallops.    PULM:  Clear to auscultation bilateral.      No respiratory distress.    ABD:   Soft, non-tender, non-distended.      No pulsatile masses.  No rebound or guarding.  MSK:    No focal bony tenderness to the upper extremities.      Upper extremities and RLE taken through full ROM without significant pain or limited ROM.    LLE:     No focal bony tenderness     No deformity     Limited ROM of left hip secondary to pain     Pelvis stable  LYMPH:  No cervical lymphadenopathy.  NEURO:  Alert and oriented to person and place    Mild dysarthria    No tremor  SKIN:   Warm, dry and intact.    PSYCH:   Mood is good and affect is appropriate.      Emergency Department Course   ECG  ECG taken at 13:50:45, ECG read at 1354  Normal sinus rhythm, Nonspecific T wave abnormality, Abnormal ECG    No significant change as compared to prior, dated 9/19/21.  Rate 67 bpm. KY interval 188 ms. QRS duration 68 ms. QT/QTc 426/450 ms. P-R-T axes 15 -8 20.      Imaging:  CT Hip Bilateral w/o Contrast   Final Result   IMPRESSION:      1.  Acute nondisplaced midcervical fracture of the left femoral neck.      2.  No other acute fracture identified in the pelvis or hips. Bones   are severely demineralized.      3.  Severe degenerative arthritic changes in the right hip.      4.  Chronic, well healed fractures of the bilateral superior and   inferior  pubic rami.      5.  Age-indeterminate nondisplaced deformity of the left sacral ala,   and of the mid sacrum (transversely oriented). These may represent   posttraumatic or insufficiency fractures. There is relative absence of   soft tissue and bone marrow edema, raising a question of whether these   may be old. Clinical correlation recommended with sacral pain and   tenderness. MRI could evaluate for bone marrow edema if clinically   indicated.      6.  Severe atherosclerosis, with changes of bilateral aortic and iliac   artery stenting.      7.  No acute myotendinous or other soft tissue process.      8.  Hysterectomy.      ALTAF ORDONEZ MD            SYSTEM ID:  [id number]      XR Pelvis and Hip Left 1 View   Final Result   Impression:      1.  Evaluation of both hips is limited by external rotation of the   legs, resulting in femoral head-neck junctions viewed en face. There   is no displaced left or right hip fracture. Both hip joints remain   normally aligned. Bones are severely demineralized. No acute pelvic   ring fracture identified. Old bilateral superior and inferior pubic   rami fractures are healed. There are severe arthritic changes in the   right hip. Mild arthritic changes are present in the left hip.   Extensive aortic and iliac stent graft material is in place. Surgical   clips are on the left and right groins.      ALTAF ORDONEZ MD            SYSTEM ID:  [id number]      Report per radiology    Laboratory:  Labs Ordered and Resulted from Time of ED Arrival to Time of ED Departure - No data to display     Emergency Department Course:     Reviewed:  I reviewed nursing notes, vitals and past medical history    Assessments:  1040: I obtained history and examined the patient as noted above.     1155: I updated and reassessed the patient.     1345: I updated and reassessed the patient.     Consults:  I spoke with Yulissa Slater of the hospitalist service regarding patient's presentation,  findings, and plan of care.       Disposition:  The patient was admitted to Dr. Penn.     Impression & Plan     Medical Decision Makin-year-old female seen in the ED with witnessed mechanical fall and subsequent left hip pain.  X-rays were unremarkable but limited secondary to internal rotation and demineralization.  CT scan performed to evaluate for occult fracture.  Unfortunately patient has a nondisplaced left femoral neck fracture.  Orthopedic surgery was consulted who plans with likely operative intervention tomorrow and patient will be transferred to medical bed.    Diagnosis:    ICD-10-CM    1. Nondisplaced fracture of base of neck of left femur, sequela  S72.045S        Discharge Medications:  New Prescriptions    No medications on file       Scribe Disclosure:  I, Jonnathan Mcnally, am serving as a scribe at 10:40 AM on 2/15/2022 to document services personally performed by Kendall Way MD based on my observations and the provider's statements to me.           Kendall Way MD  02/15/22 1417

## 2022-02-15 NOTE — ED TRIAGE NOTES
Pt arrives via EMS after a fall that occurred last night while transferring to toilet. Pt comes from a group home in [place]. Daughter states concerns of increased pain today and requested pt be seen. Pt does not verbalize pain for EMS.ABC intact and VSS.

## 2022-02-15 NOTE — PROGRESS NOTES
Full consult to follow.  Patient is 89 year female, wheelchair bound, who fell while toileting 2/14.  I have reviewed the imaging and she has a non-displaced left femoral neck fracture.  It is questionable if this would need surgical treatment.  Will discuss with the treatment team and family in the morning.
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
## 2022-02-15 NOTE — ED NOTES
[facility]  ED Nurse Handoff Report    Candi Whatley is a 89 year old female   ED Chief complaint: Fall  . ED Diagnosis:   Final diagnoses:   Nondisplaced fracture of base of neck of left femur, sequela     Allergies:   Allergies   Allergen Reactions     Actonel [Bisphosphonates] Rash     Conjugated Estrogens Rash     Macrobid [Nitrofuran Derivatives] Rash     Nitrofurantoin Rash     Premarin Rash     Sulfa Drugs Rash     Hydrocodone Nausea and Vomiting     Oxycodone Nausea and Vomiting     Risedronate      leg pain       Code Status: PRIOR  Activity level - Baseline/Home:  Assist X 1. Activity Level - Current:   Assist X 2. Lift room needed: No. Bariatric: No   Needed: No   Isolation: No. Infection: Not Applicable.     Vital Signs:   Vitals:    02/15/22 1245 02/15/22 1300 02/15/22 1330 02/15/22 1345   BP: 127/71 113/81 127/66 134/68   Pulse: 70 69 70 71   Resp:       Temp:       SpO2: 95% 96%         Cardiac Rhythm:  ,      Pain level:    Patient confused: Yes. Patient Falls Risk: Yes.   Elimination Status: Has voided   Patient Report - Initial Complaint: Fall. Focused Assessment: Pt arrives via EMS after a fall that occurred last night while transferring to toilet. Pt comes from a group home in [place]. Daughter states concerns of increased pain today and requested pt be seen. Pt does not verbalize pain for EMS.ABC intact and VSS.   Tests Performed: labs, imaging. Abnormal Results:   Labs Ordered and Resulted from Time of ED Arrival to Time of ED Departure   CBC WITH PLATELETS AND DIFFERENTIAL - Abnormal       Result Value    WBC Count 12.6 (*)     RBC Count 3.53 (*)     Hemoglobin 12.0      Hematocrit 37.9       (*)     MCH 34.0 (*)     MCHC 31.7      RDW 13.8      Platelet Count 163      % Neutrophils 81      % Lymphocytes 10      % Monocytes 6      % Eosinophils 2      % Basophils 0      % Immature Granulocytes 1      NRBCs per 100 WBC 0      Absolute Neutrophils 10.3 (*)      Absolute Lymphocytes 1.3      Absolute Monocytes 0.7      Absolute Eosinophils 0.2      Absolute Basophils 0.0      Absolute Immature Granulocytes 0.1      Absolute NRBCs 0.0     BASIC METABOLIC PANEL   COVID-19 VIRUS (CORONAVIRUS) BY PCR     CT Hip Bilateral w/o Contrast   Final Result   IMPRESSION:      1.  Acute nondisplaced midcervical fracture of the left femoral neck.      2.  No other acute fracture identified in the pelvis or hips. Bones   are severely demineralized.      3.  Severe degenerative arthritic changes in the right hip.      4.  Chronic, well healed fractures of the bilateral superior and   inferior pubic rami.      5.  Age-indeterminate nondisplaced deformity of the left sacral ala,   and of the mid sacrum (transversely oriented). These may represent   posttraumatic or insufficiency fractures. There is relative absence of   soft tissue and bone marrow edema, raising a question of whether these   may be old. Clinical correlation recommended with sacral pain and   tenderness. MRI could evaluate for bone marrow edema if clinically   indicated.      6.  Severe atherosclerosis, with changes of bilateral aortic and iliac   artery stenting.      7.  No acute myotendinous or other soft tissue process.      8.  Hysterectomy.      ALTAF ORDONEZ MD            SYSTEM ID:  [id number]      XR Pelvis and Hip Left 1 View   Final Result   Impression:      1.  Evaluation of both hips is limited by external rotation of the   legs, resulting in femoral head-neck junctions viewed en face. There   is no displaced left or right hip fracture. Both hip joints remain   normally aligned. Bones are severely demineralized. No acute pelvic   ring fracture identified. Old bilateral superior and inferior pubic   rami fractures are healed. There are severe arthritic changes in the   right hip. Mild arthritic changes are present in the left hip.   Extensive aortic and iliac stent graft material is in place. Surgical    clips are on the left and right groins.      ALTAF ORDONEZ MD            SYSTEM ID:  [id number]      XR Chest Port 1 View    (Results Pending)      Treatments provided: See MAR   Family Comments: family is at bedside and will update others.  OBS brochure/video discussed/provided to patient:  No  ED Medications:   Medications   HYDROmorphone (DILAUDID) injection 0.2 mg (has no administration in time range)     Drips infusing:  No  For the majority of the shift, the patient's behavior Green. Interventions performed were N/A.    Sepsis treatment initiated: No     Patient tested for COVID 19 prior to admission: YES    ED Nurse Name/Phone Number: Cece Mcmahon RN,   2:17 PM  RECEIVING UNIT ED HANDOFF REVIEW    Above ED Nurse Handoff Report was reviewed: Yes  Reviewed by: Mirtha Lawson RN on February 15, 2022 at 3:17 PM

## 2022-02-15 NOTE — PHARMACY-ADMISSION MEDICATION HISTORY
Admission medication history interview status for this patient is complete. See [facility] admission navigator for allergy information, prior to admission medications and immunization status.     Medication history interview source(s):facility MAR, CHRISSY kelseyew [phone]  Medication history resources (including written lists, pill bottles, clinic record):see above  Primary pharmacy:----    Changes made to PTA medication list:  Added: -----  Deleted: MVI (lesley reports pt not taking), melatonin 3mg at bedtime prn (on facility med list as at bedtime but per lesley, pt is not taking-pt daughter request med not be given)  Changed: -----    Actions taken by pharmacist (provider contacted, etc):None     Additional medication history information:None    Medication reconciliation/reorder completed by provider prior to medication history? No      For patients on insulin therapy:N    Prior to Admission medications    Medication Sig Last Dose Taking? Auth Provider   acetaminophen (TYLENOL) 500 MG tablet Take 1,000 mg by mouth 3 times daily 8am, 2pm, 8pm 2/15/2022 at 0800 Yes Unknown, Entered By History   amLODIPine (NORVASC) 5 MG tablet Take 1 tablet (5 mg) by mouth daily 2/15/2022 at 0800 Yes William Clarke MD   aspirin (ASA) 325 MG tablet Take 1 tablet (325 mg) by mouth daily 2/15/2022 at 0800 Yes Sara Britt PA-C   atorvastatin (LIPITOR) 40 MG tablet Take 40 mg by mouth every evening 2/14/2022 at hs Yes Reported, Patient   carvedilol (COREG) 6.25 MG tablet Take 6.25 mg by mouth 2 times daily (with meals) 2/15/2022 at 0800 Yes Reported, Patient   Cholecalciferol (VITAMIN D3) 50 MCG (2000 UT) TABS Take 2,000 Units by mouth daily 2/15/2022 at am Yes Unknown, Entered By History   dorzolamide-timolol (COSOPT) 2-0.5 % ophthalmic solution Place 1 drop into both eyes 2 times daily 2/15/2022 at am Yes Prakash Gant MD   escitalopram (LEXAPRO) 10 MG tablet Take 10 mg by mouth daily At 1400 2/14/2022 at 1400 Yes  Reported, Patient   escitalopram (LEXAPRO) 5 MG tablet Take 5 mg by mouth daily 2/15/2022 at am Yes Reported, Patient   latanoprost (XALATAN) 0.005 % ophthalmic solution Place 1 drop into both eyes At Bedtime Both Eyes 2/14/2022 at hs Yes Prakash Gant MD   polyethylene glycol (MIRALAX) 17 g packet Take 1 packet by mouth daily 2/15/2022 at am Yes Reported, Patient   senna-docusate (SENOKOT-S/PERICOLACE) 8.6-50 MG tablet Take 1 tablet by mouth At Bedtime  2/14/2022 at hs Yes Reported, Patient   traMADol (ULTRAM) 50 MG tablet Take 0.5 tablets (25 mg) by mouth At Bedtime 2/14/2022 at hs Yes Medardo Ring MD

## 2022-02-16 ENCOUNTER — ANESTHESIA (OUTPATIENT)
Dept: SURGERY | Facility: CLINIC | Age: 87
DRG: 481 | End: 2022-02-16
Payer: COMMERCIAL

## 2022-02-16 ENCOUNTER — APPOINTMENT (OUTPATIENT)
Dept: GENERAL RADIOLOGY | Facility: CLINIC | Age: 87
DRG: 481 | End: 2022-02-16
Attending: INTERNAL MEDICINE
Payer: COMMERCIAL

## 2022-02-16 ENCOUNTER — ANESTHESIA EVENT (OUTPATIENT)
Dept: SURGERY | Facility: CLINIC | Age: 87
DRG: 481 | End: 2022-02-16
Payer: COMMERCIAL

## 2022-02-16 LAB
ALBUMIN UR-MCNC: 100 MG/DL
AMORPH CRY #/AREA URNS HPF: ABNORMAL /HPF
APPEARANCE UR: ABNORMAL
ATRIAL RATE - MUSE: 67 BPM
BACTERIA #/AREA URNS HPF: ABNORMAL /HPF
BILIRUB UR QL STRIP: NEGATIVE
CAOX CRY #/AREA URNS HPF: ABNORMAL /HPF
COLOR UR AUTO: YELLOW
CREAT SERPL-MCNC: 0.94 MG/DL (ref 0.52–1.04)
DIASTOLIC BLOOD PRESSURE - MUSE: NORMAL MMHG
GFR SERPL CREATININE-BSD FRML MDRD: 55 ML/MIN/1.73M2
GLUCOSE UR STRIP-MCNC: NEGATIVE MG/DL
HGB UR QL STRIP: ABNORMAL
INTERPRETATION ECG - MUSE: NORMAL
KETONES UR STRIP-MCNC: NEGATIVE MG/DL
LEUKOCYTE ESTERASE UR QL STRIP: ABNORMAL
NITRATE UR QL: NEGATIVE
P AXIS - MUSE: 15 DEGREES
PH UR STRIP: 8 [PH] (ref 5–7)
PR INTERVAL - MUSE: 188 MS
QRS DURATION - MUSE: 68 MS
QT - MUSE: 426 MS
QTC - MUSE: 450 MS
R AXIS - MUSE: -8 DEGREES
RBC URINE: 0 /HPF
SP GR UR STRIP: 1.03 (ref 1–1.03)
SQUAMOUS EPITHELIAL: 1 /HPF
SYSTOLIC BLOOD PRESSURE - MUSE: NORMAL MMHG
T AXIS - MUSE: 20 DEGREES
TRI-PHOS CRY #/AREA URNS HPF: ABNORMAL /HPF
UROBILINOGEN UR STRIP-MCNC: NORMAL MG/DL
VENTRICULAR RATE- MUSE: 67 BPM
WBC URINE: 45 /HPF
YEAST #/AREA URNS HPF: ABNORMAL /HPF

## 2022-02-16 PROCEDURE — 258N000003 HC RX IP 258 OP 636: Performed by: NURSE ANESTHETIST, CERTIFIED REGISTERED

## 2022-02-16 PROCEDURE — 710N000009 HC RECOVERY PHASE 1, LEVEL 1, PER MIN: Performed by: ORTHOPAEDIC SURGERY

## 2022-02-16 PROCEDURE — 250N000009 HC RX 250: Performed by: NURSE ANESTHETIST, CERTIFIED REGISTERED

## 2022-02-16 PROCEDURE — 250N000013 HC RX MED GY IP 250 OP 250 PS 637: Performed by: PHYSICIAN ASSISTANT

## 2022-02-16 PROCEDURE — 370N000017 HC ANESTHESIA TECHNICAL FEE, PER MIN: Performed by: ORTHOPAEDIC SURGERY

## 2022-02-16 PROCEDURE — 87088 URINE BACTERIA CULTURE: CPT | Performed by: PHYSICIAN ASSISTANT

## 2022-02-16 PROCEDURE — 82565 ASSAY OF CREATININE: CPT | Performed by: INTERNAL MEDICINE

## 2022-02-16 PROCEDURE — 99207 PR CDG-MDM COMPONENT: MEETS LOW - DOWN CODED: CPT | Performed by: INTERNAL MEDICINE

## 2022-02-16 PROCEDURE — 250N000011 HC RX IP 250 OP 636: Performed by: PHYSICIAN ASSISTANT

## 2022-02-16 PROCEDURE — 250N000011 HC RX IP 250 OP 636: Performed by: NURSE ANESTHETIST, CERTIFIED REGISTERED

## 2022-02-16 PROCEDURE — 360N000083 HC SURGERY LEVEL 3 W/ FLUORO, PER MIN: Performed by: ORTHOPAEDIC SURGERY

## 2022-02-16 PROCEDURE — 120N000001 HC R&B MED SURG/OB

## 2022-02-16 PROCEDURE — 0QS734Z REPOSITION LEFT UPPER FEMUR WITH INTERNAL FIXATION DEVICE, PERCUTANEOUS APPROACH: ICD-10-PCS | Performed by: ORTHOPAEDIC SURGERY

## 2022-02-16 PROCEDURE — 250N000011 HC RX IP 250 OP 636: Performed by: ORTHOPAEDIC SURGERY

## 2022-02-16 PROCEDURE — 999N000141 HC STATISTIC PRE-PROCEDURE NURSING ASSESSMENT: Performed by: ORTHOPAEDIC SURGERY

## 2022-02-16 PROCEDURE — 272N000001 HC OR GENERAL SUPPLY STERILE: Performed by: ORTHOPAEDIC SURGERY

## 2022-02-16 PROCEDURE — 36415 COLL VENOUS BLD VENIPUNCTURE: CPT | Performed by: INTERNAL MEDICINE

## 2022-02-16 PROCEDURE — 258N000003 HC RX IP 258 OP 636: Performed by: ANESTHESIOLOGY

## 2022-02-16 PROCEDURE — 999N000179 XR SURGERY CARM FLUORO LESS THAN 5 MIN W STILLS: Mod: TC

## 2022-02-16 PROCEDURE — 81001 URINALYSIS AUTO W/SCOPE: CPT | Performed by: PHYSICIAN ASSISTANT

## 2022-02-16 PROCEDURE — 258N000003 HC RX IP 258 OP 636: Performed by: PHYSICIAN ASSISTANT

## 2022-02-16 PROCEDURE — C1769 GUIDE WIRE: HCPCS | Performed by: ORTHOPAEDIC SURGERY

## 2022-02-16 PROCEDURE — 258N000003 HC RX IP 258 OP 636: Performed by: INTERNAL MEDICINE

## 2022-02-16 PROCEDURE — 250N000009 HC RX 250: Performed by: ORTHOPAEDIC SURGERY

## 2022-02-16 PROCEDURE — 250N000009 HC RX 250: Performed by: ANESTHESIOLOGY

## 2022-02-16 PROCEDURE — 99232 SBSQ HOSP IP/OBS MODERATE 35: CPT | Performed by: INTERNAL MEDICINE

## 2022-02-16 PROCEDURE — C1713 ANCHOR/SCREW BN/BN,TIS/BN: HCPCS | Performed by: ORTHOPAEDIC SURGERY

## 2022-02-16 DEVICE — IMPLANTABLE DEVICE: Type: IMPLANTABLE DEVICE | Site: HIP | Status: FUNCTIONAL

## 2022-02-16 DEVICE — IMP WASHER SYN 13.0MM TI 419.99: Type: IMPLANTABLE DEVICE | Site: HIP | Status: FUNCTIONAL

## 2022-02-16 RX ORDER — HYDROMORPHONE HCL IN WATER/PF 6 MG/30 ML
0.4 PATIENT CONTROLLED ANALGESIA SYRINGE INTRAVENOUS
Status: DISCONTINUED | OUTPATIENT
Start: 2022-02-16 | End: 2022-02-22

## 2022-02-16 RX ORDER — EPHEDRINE SULFATE 50 MG/ML
INJECTION, SOLUTION INTRAMUSCULAR; INTRAVENOUS; SUBCUTANEOUS PRN
Status: DISCONTINUED | OUTPATIENT
Start: 2022-02-16 | End: 2022-02-16

## 2022-02-16 RX ORDER — HYDROXYZINE HYDROCHLORIDE 10 MG/1
10 TABLET, FILM COATED ORAL EVERY 6 HOURS PRN
Status: DISCONTINUED | OUTPATIENT
Start: 2022-02-16 | End: 2022-03-12 | Stop reason: HOSPADM

## 2022-02-16 RX ORDER — BISACODYL 10 MG
10 SUPPOSITORY, RECTAL RECTAL DAILY PRN
Status: DISCONTINUED | OUTPATIENT
Start: 2022-02-16 | End: 2022-03-12 | Stop reason: HOSPADM

## 2022-02-16 RX ORDER — ONDANSETRON 2 MG/ML
4 INJECTION INTRAMUSCULAR; INTRAVENOUS EVERY 6 HOURS PRN
Status: DISCONTINUED | OUTPATIENT
Start: 2022-02-16 | End: 2022-03-12 | Stop reason: HOSPADM

## 2022-02-16 RX ORDER — ALBUTEROL SULFATE 0.83 MG/ML
2.5 SOLUTION RESPIRATORY (INHALATION) EVERY 4 HOURS PRN
Status: DISCONTINUED | OUTPATIENT
Start: 2022-02-16 | End: 2022-02-16 | Stop reason: HOSPADM

## 2022-02-16 RX ORDER — FENTANYL CITRATE 50 UG/ML
INJECTION, SOLUTION INTRAMUSCULAR; INTRAVENOUS PRN
Status: DISCONTINUED | OUTPATIENT
Start: 2022-02-16 | End: 2022-02-16

## 2022-02-16 RX ORDER — CEFAZOLIN SODIUM/WATER 2 G/20 ML
2 SYRINGE (ML) INTRAVENOUS SEE ADMIN INSTRUCTIONS
Status: DISCONTINUED | OUTPATIENT
Start: 2022-02-16 | End: 2022-02-16 | Stop reason: HOSPADM

## 2022-02-16 RX ORDER — ONDANSETRON 2 MG/ML
4 INJECTION INTRAMUSCULAR; INTRAVENOUS EVERY 30 MIN PRN
Status: DISCONTINUED | OUTPATIENT
Start: 2022-02-16 | End: 2022-02-16 | Stop reason: HOSPADM

## 2022-02-16 RX ORDER — AMOXICILLIN 250 MG
1 CAPSULE ORAL 2 TIMES DAILY
Status: DISCONTINUED | OUTPATIENT
Start: 2022-02-16 | End: 2022-02-28

## 2022-02-16 RX ORDER — ONDANSETRON 4 MG/1
4 TABLET, ORALLY DISINTEGRATING ORAL EVERY 30 MIN PRN
Status: DISCONTINUED | OUTPATIENT
Start: 2022-02-16 | End: 2022-02-16 | Stop reason: HOSPADM

## 2022-02-16 RX ORDER — TRANEXAMIC ACID 10 MG/ML
1 INJECTION, SOLUTION INTRAVENOUS ONCE
Status: DISCONTINUED | OUTPATIENT
Start: 2022-02-16 | End: 2022-02-16 | Stop reason: HOSPADM

## 2022-02-16 RX ORDER — LIDOCAINE 40 MG/G
CREAM TOPICAL
Status: DISCONTINUED | OUTPATIENT
Start: 2022-02-16 | End: 2022-02-16 | Stop reason: HOSPADM

## 2022-02-16 RX ORDER — LABETALOL HYDROCHLORIDE 5 MG/ML
10 INJECTION, SOLUTION INTRAVENOUS EVERY 10 MIN PRN
Status: DISCONTINUED | OUTPATIENT
Start: 2022-02-16 | End: 2022-02-16 | Stop reason: HOSPADM

## 2022-02-16 RX ORDER — OXYCODONE HYDROCHLORIDE 5 MG/1
5 TABLET ORAL EVERY 4 HOURS PRN
Status: DISCONTINUED | OUTPATIENT
Start: 2022-02-16 | End: 2022-02-16 | Stop reason: HOSPADM

## 2022-02-16 RX ORDER — ACETAMINOPHEN 325 MG/1
975 TABLET ORAL EVERY 8 HOURS
Status: DISPENSED | OUTPATIENT
Start: 2022-02-16 | End: 2022-02-19

## 2022-02-16 RX ORDER — POLYETHYLENE GLYCOL 3350 17 G/17G
17 POWDER, FOR SOLUTION ORAL DAILY
Status: DISCONTINUED | OUTPATIENT
Start: 2022-02-17 | End: 2022-02-28

## 2022-02-16 RX ORDER — DEXAMETHASONE SODIUM PHOSPHATE 4 MG/ML
4 INJECTION, SOLUTION INTRA-ARTICULAR; INTRALESIONAL; INTRAMUSCULAR; INTRAVENOUS; SOFT TISSUE
Status: DISCONTINUED | OUTPATIENT
Start: 2022-02-16 | End: 2022-02-16 | Stop reason: HOSPADM

## 2022-02-16 RX ORDER — SODIUM CHLORIDE, SODIUM LACTATE, POTASSIUM CHLORIDE, CALCIUM CHLORIDE 600; 310; 30; 20 MG/100ML; MG/100ML; MG/100ML; MG/100ML
INJECTION, SOLUTION INTRAVENOUS CONTINUOUS
Status: DISCONTINUED | OUTPATIENT
Start: 2022-02-16 | End: 2022-02-16 | Stop reason: HOSPADM

## 2022-02-16 RX ORDER — FENTANYL CITRATE 50 UG/ML
50 INJECTION, SOLUTION INTRAMUSCULAR; INTRAVENOUS EVERY 5 MIN PRN
Status: DISCONTINUED | OUTPATIENT
Start: 2022-02-16 | End: 2022-02-16 | Stop reason: HOSPADM

## 2022-02-16 RX ORDER — DEXAMETHASONE SODIUM PHOSPHATE 4 MG/ML
INJECTION, SOLUTION INTRA-ARTICULAR; INTRALESIONAL; INTRAMUSCULAR; INTRAVENOUS; SOFT TISSUE PRN
Status: DISCONTINUED | OUTPATIENT
Start: 2022-02-16 | End: 2022-02-16

## 2022-02-16 RX ORDER — CEFAZOLIN SODIUM 1 G/50ML
1 INJECTION, SOLUTION INTRAVENOUS EVERY 8 HOURS
Status: COMPLETED | OUTPATIENT
Start: 2022-02-16 | End: 2022-02-17

## 2022-02-16 RX ORDER — BUPIVACAINE HYDROCHLORIDE AND EPINEPHRINE 5; 5 MG/ML; UG/ML
INJECTION, SOLUTION PERINEURAL PRN
Status: DISCONTINUED | OUTPATIENT
Start: 2022-02-16 | End: 2022-02-16 | Stop reason: HOSPADM

## 2022-02-16 RX ORDER — SODIUM CHLORIDE 9 MG/ML
INJECTION, SOLUTION INTRAVENOUS CONTINUOUS
Status: DISCONTINUED | OUTPATIENT
Start: 2022-02-16 | End: 2022-02-17

## 2022-02-16 RX ORDER — HYDROMORPHONE HCL IN WATER/PF 6 MG/30 ML
0.4 PATIENT CONTROLLED ANALGESIA SYRINGE INTRAVENOUS EVERY 5 MIN PRN
Status: DISCONTINUED | OUTPATIENT
Start: 2022-02-16 | End: 2022-02-16 | Stop reason: HOSPADM

## 2022-02-16 RX ORDER — ONDANSETRON 4 MG/1
4 TABLET, ORALLY DISINTEGRATING ORAL EVERY 6 HOURS PRN
Status: DISCONTINUED | OUTPATIENT
Start: 2022-02-16 | End: 2022-03-12 | Stop reason: HOSPADM

## 2022-02-16 RX ORDER — HYDROMORPHONE HCL IN WATER/PF 6 MG/30 ML
0.2 PATIENT CONTROLLED ANALGESIA SYRINGE INTRAVENOUS
Status: DISCONTINUED | OUTPATIENT
Start: 2022-02-16 | End: 2022-02-22

## 2022-02-16 RX ORDER — ACETAMINOPHEN 325 MG/1
650 TABLET ORAL EVERY 4 HOURS PRN
Status: DISCONTINUED | OUTPATIENT
Start: 2022-02-19 | End: 2022-02-21

## 2022-02-16 RX ORDER — SODIUM CHLORIDE, SODIUM LACTATE, POTASSIUM CHLORIDE, CALCIUM CHLORIDE 600; 310; 30; 20 MG/100ML; MG/100ML; MG/100ML; MG/100ML
INJECTION, SOLUTION INTRAVENOUS CONTINUOUS
Status: DISCONTINUED | OUTPATIENT
Start: 2022-02-16 | End: 2022-02-17

## 2022-02-16 RX ORDER — LIDOCAINE 40 MG/G
CREAM TOPICAL
Status: DISCONTINUED | OUTPATIENT
Start: 2022-02-16 | End: 2022-03-12 | Stop reason: HOSPADM

## 2022-02-16 RX ORDER — ONDANSETRON 2 MG/ML
INJECTION INTRAMUSCULAR; INTRAVENOUS PRN
Status: DISCONTINUED | OUTPATIENT
Start: 2022-02-16 | End: 2022-02-16

## 2022-02-16 RX ORDER — TRAMADOL HYDROCHLORIDE 50 MG/1
50 TABLET ORAL EVERY 6 HOURS PRN
Status: DISCONTINUED | OUTPATIENT
Start: 2022-02-16 | End: 2022-02-16

## 2022-02-16 RX ORDER — PROPOFOL 10 MG/ML
INJECTION, EMULSION INTRAVENOUS PRN
Status: DISCONTINUED | OUTPATIENT
Start: 2022-02-16 | End: 2022-02-16

## 2022-02-16 RX ORDER — CEFAZOLIN SODIUM 2 G/100ML
2 INJECTION, SOLUTION INTRAVENOUS
Status: COMPLETED | OUTPATIENT
Start: 2022-02-16 | End: 2022-02-16

## 2022-02-16 RX ORDER — DIAZEPAM 10 MG/2ML
2.5 INJECTION, SOLUTION INTRAMUSCULAR; INTRAVENOUS
Status: DISCONTINUED | OUTPATIENT
Start: 2022-02-16 | End: 2022-02-16 | Stop reason: HOSPADM

## 2022-02-16 RX ORDER — PROCHLORPERAZINE MALEATE 5 MG
5 TABLET ORAL EVERY 6 HOURS PRN
Status: DISCONTINUED | OUTPATIENT
Start: 2022-02-16 | End: 2022-03-12 | Stop reason: HOSPADM

## 2022-02-16 RX ADMIN — TRAMADOL HYDROCHLORIDE 25 MG: 50 TABLET, FILM COATED ORAL at 19:24

## 2022-02-16 RX ADMIN — FENTANYL CITRATE 50 MCG: 50 INJECTION, SOLUTION INTRAMUSCULAR; INTRAVENOUS at 12:58

## 2022-02-16 RX ADMIN — ONDANSETRON HYDROCHLORIDE 4 MG: 2 INJECTION, SOLUTION INTRAVENOUS at 13:19

## 2022-02-16 RX ADMIN — ACETAMINOPHEN 975 MG: 325 TABLET, FILM COATED ORAL at 08:29

## 2022-02-16 RX ADMIN — LIDOCAINE HYDROCHLORIDE 50 MG: 10 INJECTION, SOLUTION EPIDURAL; INFILTRATION; INTRACAUDAL; PERINEURAL at 12:58

## 2022-02-16 RX ADMIN — DEXAMETHASONE SODIUM PHOSPHATE 4 MG: 4 INJECTION, SOLUTION INTRA-ARTICULAR; INTRALESIONAL; INTRAMUSCULAR; INTRAVENOUS; SOFT TISSUE at 12:59

## 2022-02-16 RX ADMIN — PHENYLEPHRINE HYDROCHLORIDE 150 MCG: 10 INJECTION INTRAVENOUS at 13:14

## 2022-02-16 RX ADMIN — HYDROMORPHONE HYDROCHLORIDE 0.2 MG: 0.2 INJECTION, SOLUTION INTRAMUSCULAR; INTRAVENOUS; SUBCUTANEOUS at 00:50

## 2022-02-16 RX ADMIN — CEFAZOLIN SODIUM 1 G: 1 INJECTION, SOLUTION INTRAVENOUS at 22:49

## 2022-02-16 RX ADMIN — HYDROMORPHONE HYDROCHLORIDE 0.2 MG: 0.2 INJECTION, SOLUTION INTRAMUSCULAR; INTRAVENOUS; SUBCUTANEOUS at 23:16

## 2022-02-16 RX ADMIN — CARVEDILOL 6.25 MG: 6.25 TABLET, FILM COATED ORAL at 08:29

## 2022-02-16 RX ADMIN — SODIUM CHLORIDE, POTASSIUM CHLORIDE, SODIUM LACTATE AND CALCIUM CHLORIDE: 600; 310; 30; 20 INJECTION, SOLUTION INTRAVENOUS at 17:19

## 2022-02-16 RX ADMIN — Medication 10 MG: at 13:15

## 2022-02-16 RX ADMIN — PROPOFOL 120 MG: 10 INJECTION, EMULSION INTRAVENOUS at 12:58

## 2022-02-16 RX ADMIN — ATORVASTATIN CALCIUM 40 MG: 40 TABLET, FILM COATED ORAL at 19:26

## 2022-02-16 RX ADMIN — ROCURONIUM BROMIDE 30 MG: 50 INJECTION, SOLUTION INTRAVENOUS at 12:59

## 2022-02-16 RX ADMIN — FAMOTIDINE 20 MG: 20 TABLET ORAL at 19:26

## 2022-02-16 RX ADMIN — DORZOLAMIDE HYDROCHLORIDE AND TIMOLOL MALEATE 1 DROP: 22.3; 6.8 SOLUTION/ DROPS OPHTHALMIC at 08:36

## 2022-02-16 RX ADMIN — SODIUM CHLORIDE, POTASSIUM CHLORIDE, SODIUM LACTATE AND CALCIUM CHLORIDE: 600; 310; 30; 20 INJECTION, SOLUTION INTRAVENOUS at 12:53

## 2022-02-16 RX ADMIN — DORZOLAMIDE HYDROCHLORIDE AND TIMOLOL MALEATE 1 DROP: 22.3; 6.8 SOLUTION/ DROPS OPHTHALMIC at 19:28

## 2022-02-16 RX ADMIN — AMLODIPINE BESYLATE 5 MG: 5 TABLET ORAL at 08:29

## 2022-02-16 RX ADMIN — CARVEDILOL 6.25 MG: 6.25 TABLET, FILM COATED ORAL at 19:26

## 2022-02-16 RX ADMIN — LATANOPROST 1 DROP: 50 SOLUTION OPHTHALMIC at 22:43

## 2022-02-16 RX ADMIN — FAMOTIDINE 20 MG: 20 TABLET ORAL at 08:29

## 2022-02-16 RX ADMIN — CEFAZOLIN SODIUM 2 G: 2 INJECTION, SOLUTION INTRAVENOUS at 12:53

## 2022-02-16 RX ADMIN — SODIUM CHLORIDE: 9 INJECTION, SOLUTION INTRAVENOUS at 09:57

## 2022-02-16 RX ADMIN — SUGAMMADEX 200 MG: 100 INJECTION, SOLUTION INTRAVENOUS at 13:36

## 2022-02-16 RX ADMIN — PHENYLEPHRINE HYDROCHLORIDE 150 MCG: 10 INJECTION INTRAVENOUS at 13:03

## 2022-02-16 ASSESSMENT — ACTIVITIES OF DAILY LIVING (ADL)
ADLS_ACUITY_SCORE: 24
ADLS_ACUITY_SCORE: 24
ADLS_ACUITY_SCORE: 32
ADLS_ACUITY_SCORE: 24
ADLS_ACUITY_SCORE: 32
ADLS_ACUITY_SCORE: 32
ADLS_ACUITY_SCORE: 24
ADLS_ACUITY_SCORE: 26
ADLS_ACUITY_SCORE: 24
ADLS_ACUITY_SCORE: 26
ADLS_ACUITY_SCORE: 24
ADLS_ACUITY_SCORE: 32
ADLS_ACUITY_SCORE: 24
ADLS_ACUITY_SCORE: 24
ADLS_ACUITY_SCORE: 32
ADLS_ACUITY_SCORE: 24
ADLS_ACUITY_SCORE: 32
ADLS_ACUITY_SCORE: 24
ADLS_ACUITY_SCORE: 24

## 2022-02-16 NOTE — PROGRESS NOTES
[facility] [place]    Medicine Progress Note - Hospitalist Service    Date of Admission:  2/15/2022    Assessment & Plan          Candi Whatley is a 89 year old female with a history of HLD, CVA, Left Hemiparesis, Dementia, Depression, CED, AAA, and HTN.  She presented to the hospital with left hip pain after a fall.  Found to have left femur fracture.    1.  Acute left femur fracture.  Status post traumatic fall.  -Orthopedic surgery following.  -Pain medications as needed.    -Use incentive spirometry.    2.  Hypertension.  -Continue carvedilol 6.25 mg twice a day.  -Had amlodipine 5 mg today.  Hold amlodipine dose for tomorrow until reevaluated.    3.  Hyperlipidemia.  -Continue atorvastatin.    4.  Depression.  -Continue Lexapro.    5.  Dementia.  Likely at baseline.    6.  Mildly abnormal urinalysis.  No dysuria.  Likely unreliable for symptoms of dysuria due to underlying dementia.  -Hold off on antibiotics for now.  -Await urine culture results.       Diet: NPO per Anesthesia Guidelines for Procedure/Surgery Except for: Meds    DVT Prophylaxis: Pneumatic Compression Devices  Clark Catheter: Not present  Central Lines: None  Cardiac Monitoring: None  Code Status: No CPR- Do NOT Intubate          Adrien Penn, DO  Hospitalist Service  [facility]  Securely message with the Vocera Web Console (learn more here)  Text page via [employer] Paging/Directory         Clinically Significant Risk Factors Present on Admission               # Platelet Defect: home medication list includes an antiplatelet medication       ______________________________________________________________________    Interval History   Having some left hip pain.  Denies chest pain, shortness of breath, fevers, chills, nausea, vomiting.    Data reviewed today: I reviewed all medications, new labs and imaging results over the last 24 hours.    Physical Exam   Vital Signs: Temp: 97  F (36.1  C) Temp src:  Temporal BP: 132/58 Pulse: 58   Resp: 18 SpO2: 98 % O2 Device: Nasal cannula Oxygen Delivery: 2 LPM  Weight: 0 lbs 0 oz  Gen:  NAD, A&Ox1 to self and family.  Trouble with place and time.  Eyes:  PERRL, sclera anicteric.  OP:  MMM, no lesions.  Neck:  Supple.  CV:  Regular, no loud murmurs.  Lung:  CTA b/l, normal effort.  Ab:  +BS, soft.  Skin:  Warm, dry to touch.  No rash.  Ext:  No pitting edema LE b/l.      Data   Recent Labs   Lab 02/15/22  1350   WBC 12.6*   HGB 12.0   *         POTASSIUM 4.3   CHLORIDE 109   CO2 25   BUN 31*   CR 1.00   ANIONGAP 6   TRANG 9.3   *

## 2022-02-16 NOTE — PROGRESS NOTES
7p-11p.Pt is fortiful/pleasantly confused,On 2L of O2 on bedrest,regular diet,npo after midnight. Doing Tramadol for pain.Pure wick in place, voiding, Possible surgery tomorrow, Ortho consult. Will continue to monitor.

## 2022-02-16 NOTE — ANESTHESIA PREPROCEDURE EVALUATION
Anesthesia Pre-Procedure Evaluation    Patient: Alejandrina Whatley   MRN: [id number] : 1923        Preoperative Diagnosis: Nondisplaced fracture of base of neck of left femur, sequela [S72.045S]    Procedure : Procedure(s):  CLOSED REDUCTION, HIP, WITH PERCUTANEOUS PINNING          Past Medical History:   Diagnosis Date     Abdominal aortic aneurysm (H) 2001    had a stent placed      AK (actinic keratosis) 2009     Cataract 2011     Compression Fractures of Lumbar Vertebra 2010     CVA (cerebral vascular accident) (H)     Dec 2019 and 2020     Dementia (H)      DJD (degenerative joint disease)      Generalised Weakness and Fatigue 2011     Glaucoma (increased eye pressure)     Dr. 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  Dr. Clark.     HYSTERECTOMY, CERVIX STATUS UNKNOWN       LASER SELECTIVE TRABECULOPLASTY  3/2010; 10/2015    left eye 1st Clark (notes show inf treatment); inf 180 (MARI)     LASER SELECTIVE TRABECULOPLASTY  2017    left eye sup 180     ZZC ANTER VESICOURETHROPEXY,SIMPLE  2008    Helped     ZZC APPENDECTOMY  1971      Allergies   Allergen Reactions     Actonel [Bisphosphonates] Rash     Conjugated Estrogens Rash     Macrobid [Nitrofuran Derivatives] Rash     Nitrofurantoin Rash     Premarin Rash     Sulfa Drugs Rash     Hydrocodone Nausea and Vomiting     Oxycodone Nausea and Vomiting     Risedronate      leg pain      Social History     Tobacco  Use     Smoking status: Never Smoker     Smokeless tobacco: Never Used     Tobacco comment: No second hand exposure.   Substance Use Topics     Alcohol use: No      Wt Readings from Last 1 Encounters:   09/19/21 54.4 kg (120 lb)        Anesthesia Evaluation   Pt has had prior anesthetic. Type: General.    No history of anesthetic complications       ROS/MED HX  ENT/Pulmonary:  - neg pulmonary ROS     Neurologic:     (+) dementia, CVA,     Cardiovascular:     (+) Dyslipidemia hypertension-----    METS/Exercise Tolerance:     Hematologic:  - neg hematologic  ROS     Musculoskeletal:   (+) arthritis,     GI/Hepatic:  - neg GI/hepatic ROS     Renal/Genitourinary:     (+) renal disease, type: CRI,     Endo:  - neg endo ROS     Psychiatric/Substance Use:  - neg psychiatric ROS     Infectious Disease:  - neg infectious disease ROS     Malignancy:  - neg malignancy ROS     Other:  - neg other ROS    (+) , H/O Chronic Pain,        Physical Exam    Airway         TM distance: > 3 FB   Neck ROM: full   Mouth opening: > 3 cm    Respiratory Devices and Support         Dental  no notable dental history         Cardiovascular   cardiovascular exam normal          Pulmonary   pulmonary exam normal                OUTSIDE LABS:  CBC:   Lab Results   Component Value Date    WBC 12.6 (H) 02/15/2022    WBC 10.0 09/20/2021    HGB 12.0 02/15/2022    HGB 10.9 (L) 09/20/2021    HCT 37.9 02/15/2022    HCT 34.1 (L) 09/20/2021     02/15/2022     09/20/2021     BMP:   Lab Results   Component Value Date     02/15/2022     09/20/2021    POTASSIUM 4.3 02/15/2022    POTASSIUM 3.7 09/22/2021    CHLORIDE 109 02/15/2022    CHLORIDE 112 (H) 09/20/2021    CO2 25 02/15/2022    CO2 23 09/20/2021    BUN 31 (H) 02/15/2022    BUN 23 09/20/2021    CR 0.94 02/16/2022    CR 1.00 02/15/2022     (H) 02/15/2022    GLC 96 09/21/2021     COAGS:   Lab Results   Component Value Date    PTT 29 09/19/2021    INR 1.11 09/19/2021     POC:    Lab Results   Component Value Date     (H) 01/09/2020     HEPATIC:   Lab Results   Component Value Date    ALBUMIN 3.0 (L) 12/07/2019    PROTTOTAL 6.9 12/07/2019    ALT 15 12/07/2019    AST 19 12/07/2019    ALKPHOS 69 12/07/2019    BILITOTAL 0.2 12/07/2019    BILIDIRECT 0.06 06/25/2010     OTHER:   Lab Results   Component Value Date    LACT 1.4 01/28/2020    A1C 5.8 (H) 09/19/2021    TRANG 9.3 02/15/2022    PHOS 3.6 09/22/2021    MAG 2.6 (H) 09/22/2021    TSH 2.55 05/05/2011    T4 0.96 05/05/2011    CRP 1.0 11/03/2011    SED 24 02/08/2012       Anesthesia Plan    ASA Status:  3   NPO Status:  NPO Appropriate    Anesthesia Type: General.     - Airway: ETT   Induction: Intravenous, Propofol.   Maintenance: Balanced.        Consents    Anesthesia Plan(s) and associated risks, benefits, and realistic alternatives discussed. Questions answered and patient/representative(s) expressed understanding.    - Discussed:     - Discussed with:  Patient         Postoperative Care    Pain management: IV analgesics, Oral pain medications, Multi-modal analgesia.   PONV prophylaxis: Ondansetron (or other 5HT-3), Dexamethasone or Solumedrol     Comments:                Jose Cameron MD

## 2022-02-16 NOTE — CONSULTS
[facility]    Orthopedics Consultation    Date of Admission:  2/15/2022    Assessment & Plan   Candi Whatley is a 89 year old female who was admitted on 2/15/2022. I was asked to see the patient for left hip pain.  She has a left nondisplaced femoral neck fracture and osteoporosis.    I had a long discussion with the patient and her daughter regarding her different surgical options.  This is a difficult clinical scenario and the patient has no significant strength on the left lower extremity due to a previous stroke.  Her 2 surgical options at this point would include a closed reduction percutaneous screw fixation versus a left hip hemiarthroplasty.  I think her best option at this point would be a closed reduction percutaneous screw fixation due to the fact that this is a less invasive surgery and the patient is 89 years old and relatively frail.  Additionally she only uses this limb for transfers.  We did discuss the risk of cut out, avascular necrosis, nonunion, malunion, or hardware symptoms which are high risk from this procedure versus an arthroplasty procedure.    We will bring her back to the operating room today for a closed reduction percutaneous screw fixation of a left femoral neck fracture.  She has been n.p.o. since midnight.  TXA and antibiotics have been ordered.  Consent will be obtained from her daughter.  Her hemoglobin is currently 12.0.  Covid negative.      Robby Razo MD    Code Status    No CPR- Do NOT Intubate    Reason for Consult   Reason for consult: Left hip pain    Primary Care Physician   Mayuri Roy MD    History of Present Illness   Candi Whatley is a 89 year old female who presents with a chief complaint of left hip pain.  She lives in a memory care unit due to her history of dementia.  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  She was brought into the emergency department via EMS after having a fall off the toilet.  She  landed directly onto the left hip.  It is unclear if she lost consciousness.  She complains of pain in the left hip and any movement of the left hip causes substantial pain.  She denies any numbness or tingling in the left lower extremity.  She does not take anticoagulants.  Most of the history was obtained through her daughter as the patient has a history of dementia.    MEDS:   No current outpatient medications on file.       PAST MEDICAL HISTORY:   Past Medical History:   Diagnosis Date     Abdominal aortic aneurysm (H) 2001    had a stent placed 2009     AK (actinic keratosis) 11/11/2009     Cataract 2/22/2011     Compression fractures      Compression Fractures of Lumbar Vertebra 2/4/2010     CVA (cerebral vascular accident) (H)     Dec 2019 and Jan 2020     Dementia (H)      DJD (degenerative joint disease)      Generalised Weakness and Fatigue 3/3/2011     Glaucoma (increased eye pressure) 2009    Dr. 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  Dr. Clark.     HYSTERECTOMY, CERVIX STATUS UNKNOWN  1971     LASER SELECTIVE TRABECULOPLASTY  3/2010; 10/2015    left eye 1st Clark (notes show inf treatment); inf 180 (MARI)     LASER SELECTIVE TRABECULOPLASTY  12/2017    left eye sup 180     ZZC ANTER VESICOURETHROPEXY,SIMPLE  2008    Helped     ZZC APPENDECTOMY  1971       FAMILY HISTORY:   Family History   Problem Relation Age of Onset     Heart Disease Father 79         MI     Hypertension Mother        SOCIAL HISTORY:   Social History     Tobacco Use     Smoking status: Never Smoker     Smokeless tobacco: Never Used     Tobacco comment: No second hand exposure.   Substance Use Topics     Alcohol use: No       ALLERGIES:    Allergies   Allergen Reactions     Actonel [Bisphosphonates] Rash     Conjugated Estrogens Rash     Macrobid [Nitrofuran Derivatives] Rash     Nitrofurantoin Rash     Premarin Rash     Sulfa Drugs Rash     Hydrocodone Nausea and Vomiting     Oxycodone Nausea and Vomiting     Risedronate      leg pain       ROS:  10 point ROS neg other than the symptoms noted above in the HPI.    Physical Exam   Temp: 97  F (36.1  C) Temp src: Temporal BP: 132/58 Pulse: 58   Resp: 18 SpO2: 98 % O2 Device: Nasal cannula Oxygen Delivery: 2 LPM  Vital Signs with Ranges  Temp:  [96.8  F (36  C)-97.9  F (36.6  C)] 97  F (36.1  C)  Pulse:  [58-79] 58  Resp:  [16-18] 18  BP: (113-140)/(52-82) 132/58  SpO2:  [89 %-98 %] 98 %  0 lbs 0 oz    Constitutional: Pleasant, alert, appropriate, following commands.  HEENT: Head atraumatic normocephalic. Pupils equal round and reactive to light.  Respiratory: Unlabored breathing no audible wheeze  Cardiovascular: Regular rate and rhythm  GI: Abdomen soft nontender nondistended.  Lymph/Hematologic: No lymphadenopathy in areas examined  Genitourinary:  No chapin  Skin: No rashes, no cyanosis, no edema.  Musculoskeletal: Focused examination of the left lower extremity demonstrates tenderness to palpation of the greater trochanter.  She has pain with logroll.  She is unable to flex and extend at the toes and the ankle.  She has good capillary refill of less than 2 seconds and a 2+ DP and PT pulses.  Is difficult to tell if she is sensation intact light touch is she is not cooperating with this portion of the exam.      She does have a negative pelvic compression test and no pain with logroll on the right lower extremity.  Neurologic: FERNANDO      Data    Results for orders placed or performed during the hospital encounter of 02/15/22 (from the past 24 hour(s))   CT Hip Bilateral w/o Contrast    Narrative    CT BILATERAL HIPS WITHOUT CONTRAST, 2/15/2022.    CLINICAL HISTORY: 89-year-old woman with bilateral hip pain, right  greater than left, and inability to bear weight after a fall.    TECHNIQUE: Multiple contiguous axial CT images were obtained of the  pelvis and both hips without IV contrast. Data was reformatted into  soft tissue and bone algorithms, and coronal and sagittal planes. Dose  reduction techniques were used.    FINDINGS:    Severe bone demineralization.    Acute nondisplaced mid cervical left femoral neck fracture (coronal  series 5 image 47). No fracture extension into the intratrochanteric  zone, or into the femoral head. The left hip joint remains normally  aligned. There are mild degenerative arthritic changes in the left  hip, including partial joint space narrowing and small marginal  osteophytes. There is minimally increased left hip joint fluid.    No fracture visualized in the right hip. Severe arthritic changes are  present in the right hip, including complete joint space loss,  bone-on-bone articulation, marginal osteophytes, and subchondral  sclerosis and cystic change. Small right joint effusion, within  expected limits for the degree of arthritic changes.    Old bilateral superior and inferior pubic rami fractures are present,  and are well healed with solid bridging bone.    There is a deformity of the left sacral ala, which is technically age  indeterminate, but I suspect to be chronic given an absence of clear  bone marrow or adjacent soft tissue edema. There is also subtle  deformity of the mid sacrum (S3 and S4 vertebral bodies) the lateral  view, which I also suspect to be chronic, although technically this is  also age indeterminate.     There is a chronic-appearing superior endplate compression fracture of  L4. The L5  vertebral body is intact.    Both hip joints remain normally aligned. Normal alignment of the lower  lumbar spine. The pubic symphysis and SI joints are intact.    Moderate chronic atrophy and fatty infiltration of the hip and pelvic  musculature. No acute intramuscular hematoma. No findings to indicate  an acute tendon tear. Chronic tendinopathy and hamstrings tendon  tearing is present.    Bilateral aortic and iliac stents are present. No periaortic  inflammatory changes or other hematoma.    Prior hysterectomy. No pelvic masses.    Moderate-severe sigmoid colon diverticulosis without evidence of  diverticulitis.      Impression    IMPRESSION:    1.  Acute nondisplaced midcervical fracture of the left femoral neck.    2.  No other acute fracture identified in the pelvis or hips. Bones  are severely demineralized.    3.  Severe degenerative arthritic changes in the right hip.    4.  Chronic, well healed fractures of the bilateral superior and  inferior pubic rami.    5.  Age-indeterminate nondisplaced deformity of the left sacral ala,  and of the mid sacrum (transversely oriented). These may represent  posttraumatic or insufficiency fractures. There is relative absence of  soft tissue and bone marrow edema, raising a question of whether these  may be old. Clinical correlation recommended with sacral pain and  tenderness. MRI could evaluate for bone marrow edema if clinically  indicated.    6.  Severe atherosclerosis, with changes of bilateral aortic and iliac  artery stenting.    7.  No acute myotendinous or other soft tissue process.    8.  Hysterectomy.    ALTAF ORDONEZ MD         SYSTEM ID:  [id number]   CBC with platelets differential    Narrative    The following orders were created for panel order CBC with platelets differential.  Procedure                               Abnormality         Status                     ---------                               -----------         ------                     CBC  with platelets and d...[[id number]]  Abnormal            Final result                 Please view results for these tests on the individual orders.   Basic metabolic panel   Result Value Ref Range    Sodium 140 133 - 144 mmol/L    Potassium 4.3 3.4 - 5.3 mmol/L    Chloride 109 94 - 109 mmol/L    Carbon Dioxide (CO2) 25 20 - 32 mmol/L    Anion Gap 6 3 - 14 mmol/L    Urea Nitrogen 31 (H) 7 - 30 mg/dL    Creatinine 1.00 0.52 - 1.04 mg/dL    Calcium 9.3 8.5 - 10.1 mg/dL    Glucose 114 (H) 70 - 99 mg/dL    GFR Estimate 51 (L) >60 mL/min/1.73m2   Asymptomatic COVID-19 Virus (Coronavirus) by PCR Nasopharyngeal    Specimen: Nasopharyngeal; Swab   Result Value Ref Range    SARS CoV2 PCR Negative Negative    Narrative    Testing was performed using the jack  SARS-CoV-2 & Influenza A/B Assay on the jack  Bertha  System.  This test should be ordered for the detection of SARS-COV-2 in individuals who meet SARS-CoV-2 clinical and/or epidemiological criteria. Test performance is unknown in asymptomatic patients.  This test is for in vitro diagnostic use under the FDA EUA for laboratories certified under CLIA to perform moderate and/or high complexity testing. This test has not been FDA cleared or approved.  A negative test does not rule out the presence of PCR inhibitors in the specimen or target RNA in concentration below the limit of detection for the assay. The possibility of a false negative should be considered if the patient's recent exposure or clinical presentation suggests COVID-19.  [facility] are certified under the Clinical Laboratory Improvement Amendments of 1988 (CLIA-88) as qualified to perform moderate and/or high complexity laboratory testing.   CBC with platelets and differential   Result Value Ref Range    WBC Count 12.6 (H) 4.0 - 11.0 10e3/uL    RBC Count 3.53 (L) 3.80 - 5.20 10e6/uL    Hemoglobin 12.0 11.7 - 15.7 g/dL    Hematocrit 37.9 35.0 - 47.0 %     (H) 78 - 100 fL    MCH 34.0  (H) 26.5 - 33.0 pg    MCHC 31.7 31.5 - 36.5 g/dL    RDW 13.8 10.0 - 15.0 %    Platelet Count 163 150 - 450 10e3/uL    % Neutrophils 81 %    % Lymphocytes 10 %    % Monocytes 6 %    % Eosinophils 2 %    % Basophils 0 %    % Immature Granulocytes 1 %    NRBCs per 100 WBC 0 <1 /100    Absolute Neutrophils 10.3 (H) 1.6 - 8.3 10e3/uL    Absolute Lymphocytes 1.3 0.8 - 5.3 10e3/uL    Absolute Monocytes 0.7 0.0 - 1.3 10e3/uL    Absolute Eosinophils 0.2 0.0 - 0.7 10e3/uL    Absolute Basophils 0.0 0.0 - 0.2 10e3/uL    Absolute Immature Granulocytes 0.1 <=0.4 10e3/uL    Absolute NRBCs 0.0 10e3/uL   EKG 12-lead, tracing only   Result Value Ref Range    Systolic Blood Pressure  mmHg    Diastolic Blood Pressure  mmHg    Ventricular Rate 67 BPM    Atrial Rate 67 BPM    MD Interval 188 ms    QRS Duration 68 ms     ms    QTc 450 ms    P Axis 15 degrees    R AXIS -8 degrees    T Axis 20 degrees    Interpretation ECG       Sinus rhythm  Nonspecific T wave abnormality  Abnormal ECG  When compared with ECG of 19-SEP-2021 14:37,  MD interval has decreased  Criteria for Septal infarct are no longer Present  QT has lengthened  Confirmed by - EMERGENCY ROOM, PHYSICIAN ([id number]),  BETO TAYLOR (Reggie) on 2/16/2022 7:11:13 AM     XR Chest Port 1 View    Narrative    CHEST ONE VIEW February 15, 2022 2:20 PM     HISTORY: Hypoxia.    COMPARISON: September 20, 2021.      Impression    IMPRESSION: No acute disease.    IVAN VEGA MD         SYSTEM ID:  [id number]   ABO/Rh type and screen *Canceled*    Narrative    The following orders were created for panel order ABO/Rh type and screen.  Procedure                               Abnormality         Status                     ---------                               -----------         ------                       Please view results for these tests on the individual orders.   ABO/Rh type and screen    Narrative    The following orders were created for panel order ABO/Rh type and  screen.  Procedure                               Abnormality         Status                     ---------                               -----------         ------                     Adult Type and Screen[[id number]]                            Edited Result - FINAL        Please view results for these tests on the individual orders.   Adult Type and Screen   Result Value Ref Range    ABO/RH(D) A POS     Antibody Screen Negative Negative    SPECIMEN EXPIRATION DATE [id number]    UA with Microscopic reflex to Culture    Specimen: Urine, Clean Catch   Result Value Ref Range    Color Urine Yellow Colorless, Straw, Light Yellow, Yellow    Appearance Urine Cloudy (A) Clear    Glucose Urine Negative Negative mg/dL    Bilirubin Urine Negative Negative    Ketones Urine Negative Negative mg/dL    Specific Gravity Urine 1.031 1.003 - 1.035    Blood Urine Trace (A) Negative    pH Urine 8.0 (H) 5.0 - 7.0    Protein Albumin Urine 100  (A) Negative mg/dL    Urobilinogen Urine Normal Normal, 2.0 mg/dL    Nitrite Urine Negative Negative    Leukocyte Esterase Urine Trace (A) Negative    Bacteria Urine Many (A) None Seen /HPF    Budding Yeast Urine Few (A) None Seen /HPF    Amorphous Crystals Urine Few (A) None Seen /HPF    Calcium Oxalate Crystals Urine Few (A) None Seen /HPF    Triple Phosphate Crystals Urine Few (A) None Seen /HPF    RBC Urine 0 <=2 /HPF    WBC Urine 45 (H) <=5 /HPF    Squamous Epithelials Urine 1 <=1 /HPF    Narrative    Urine Culture ordered based on laboratory criteria   Creatinine   Result Value Ref Range    Creatinine 0.94 0.52 - 1.04 mg/dL    GFR Estimate 55 (L) >60 mL/min/1.73m2

## 2022-02-16 NOTE — ANESTHESIA CARE TRANSFER NOTE
Patient: Alejandrina Whatley    Procedure: Procedure(s):  CLOSED REDUCTION, HIP, WITH PERCUTANEOUS PINNING       Diagnosis: Nondisplaced fracture of base of neck of left femur, sequela [S72.045S]  Diagnosis Additional Information: No value filed.    Anesthesia Type:   General     Note:    Oropharynx: oropharynx clear of all foreign objects  Level of Consciousness: drowsy  Oxygen Supplementation: face mask  Level of Supplemental Oxygen (L/min / FiO2): 6  Independent Airway: airway patency satisfactory and stable  Dentition: dentition unchanged  Vital Signs Stable: post-procedure vital signs reviewed and stable  Report to RN Given: handoff report given  Patient transferred to: PACU    Handoff Report: Identifed the Patient, Identified the Reponsible Provider, Reviewed the pertinent medical history, Discussed the surgical course, Reviewed Intra-OP anesthesia mangement and issues during anesthesia, Set expectations for post-procedure period and Allowed opportunity for questions and acknowledgement of understanding      Vitals:  Vitals Value Taken Time   /73 02/16/22 1345   Temp     Pulse 75 02/16/22 1349   Resp 14 02/16/22 1349   SpO2 98 % 02/16/22 1349   Vitals shown include unvalidated device data.    Electronically Signed By: CRESCENCIO Clarke CRNA  February 16, 2022  1:49 PM

## 2022-02-16 NOTE — OP NOTE
[facility]   Operative Note    Pre-operative diagnosis:  Left nondisplaced femoral neck fracture   Post-operative diagnosis  same   Procedure:  Closed reduction percutaneous screw fixation of a left nondisplaced femoral neck fracture   Surgeon(s): Surgeon(s) and Role:     * Robby Razo MD - Primary     * Abigail Wynn PA-C - Assisting, the PA was present for the entirety of the case and was essential for patient positioning, soft tissue retraction, wound closure, bandage placement, and patient transport.   Estimated blood loss: 50 mL    Specimens: * No specimens in log *   Findings:  Left femoral neck fracture     Indications: This is a 89-year-old female who has a history of hemiplegia on the left side who lives in an assisted living facility in the memory care unit due to advanced dementia and a previous stroke.  She was attempting to use the bathroom and fell directly onto the left hip.  She was eventually brought into the emergency department where she was found to have a nondisplaced left femoral neck fracture.  She was admitted to the hospitalist service.  I had a long discussion with the patient and her daughter regarding surgical and nonsurgical treatment options.  Certainly with her age and functional status nonsurgical treatment is an option for this patient.  The patient does use her left lower extremity for transfers and the fracture is currently nondisplaced.  If the fracture displaces she would need a hemiarthroplasty which is a large procedure with substantial complications associated with hemiplegia.  I think her best option at this point from a pain control and healing perspective would be to perform a closed reduction percutaneous screw fixation of the hip fracture while it is nondisplaced.  We could attempt nonsurgical treatment but I worry that if the fracture displaces she will be in substantial pain and will need a much larger surgery.  This was discussed  in detail with the patient and her daughter.  We did discuss the risks of the surgery.  Her most substantial risks include infection, malunion, nonunion, avascular necrosis, hardware symptoms, blood clots, blood loss, and anesthesia related complications.  There is even a risk of perioperative death.  They understand this and elected to move forward with surgical intervention.    Description of procedure:   The patient was met in the preoperative area the operative site, the left hip, was signed by myself.  Preoperative antibiotics were given.  Preoperative TXA was given.  The patient was brought back to the operating room and underwent general anesthesia on the hospital bed.  She was then transferred over to the [place] table after undergoing general anesthesia.  All bony prominences were padded at this time.  She was then prepped and draped in normal sterile fashion.  A timeout was then called ensuring we are operating on the correct site and the correct patient.  All staff concerns were addressed at this time.    Following the timeout an incision was made over the lateral aspect of the hip and dissection was carried down through the skin and subcutaneous tissue.  We then divided the IT band.  We then placed 3 guidewires into the femoral neck.  This was in an inverted triangle technique.  We confirmed the position of our guidewires on both AP and lateral views.  We had excellent spread across the femoral neck and we measured for each screw.  We then drilled the outer cortex and placed 3 screws across the fracture site.  We had excellent purchase and our screw heads were proximal to the inferior portion of the lesser trochanter.  We then removed all the guidewires and took final x-rays and it demonstrated screws in safe position and a nicely compressed fracture.  We then thoroughly irrigated out the wound and closed in a layered fashion using 0 Vicryl, 2-0 Vicryl, staples.  Local anesthetic was placed throughout the  wound and a sterile bandage was placed in the patient.  She was then transferred off of the operative table and brought back to postanesthesia care unit where she woke without any difficulty.    All counts were correct at the end of the case.    Postoperative plan: The patient will be weightbearing as tolerated on the left lower extremity.  She does use this limb for transfers only.  She will be on aspirin 325 twice daily for DVT prophylaxis.  She will be seen in my clinic in approximately 3 weeks for staple removal.  If she is still in a TCU she can have the staples removed by Yulissa Zeng our nurse practitioner.

## 2022-02-16 NOTE — PLAN OF CARE
Goal Outcome Evaluation:  RN - VSS. Remains on 1L supplemental oxygen. Pt denying pain this morning. A&O to self only. Pleasant and cooperative with cares. Neurologically at baseline (primarily left sided weakness). Pt NPO in anticipation of surgery today. Voiding x1 via purewick - pt does retain some urine. UA specimen sent. Daughter at bedside and supportive. Pt sent to surgery at 1240.    Plan of Care Reviewed With: patient, daughter

## 2022-02-16 NOTE — PLAN OF CARE
"Patient arrived to room 643 from ED at 1630.  Purewick applied, patient educated on use.  Daughter at bedside, RN educated daughter on hip precautions, as she was trying to get patient to \"sit at the edge of bed.\" Pt is alert to self.  Confused/forgetful of other orientation questions.  Required feeding for dinner.  Refused anything for pain.  \"I don't need it.\"  Skin intact.  Await ortho consult, CMS intact.   /82 (BP Location: Right arm)   Pulse 71   Temp 97.3  F (36.3  C) (Temporal)   Resp 16   SpO2 96%    "

## 2022-02-16 NOTE — ANESTHESIA PROCEDURE NOTES
Airway       Patient location during procedure: OR       Procedure Start/Stop Times: 2/16/2022 1:01 PM  Staff -        CRNA: Perry Rojas APRN CRNA       Performed By: CRNA  Consent for Airway        Urgency: elective  Indications and Patient Condition       Indications for airway management: tanya-procedural       Induction type:intravenous       Mask difficulty assessment: 1 - vent by mask    Final Airway Details       Final airway type: endotracheal airway       Successful airway: ETT - single and Oral  Endotracheal Airway Details        ETT size (mm): 7.0       Cuffed: yes       Successful intubation technique: direct laryngoscopy       DL Blade Type: Ann 2       Grade View of Cords: 1       Adjucts: stylet       Position: Right       Measured from: lips       Secured at (cm): 21       Bite block used: None    Post intubation assessment        Placement verified by: capnometry, equal breath sounds and chest rise        Number of attempts at approach: 1       Secured with: plastic tape       Ease of procedure: easy       Dentition: Intact and Unchanged

## 2022-02-16 NOTE — ANESTHESIA POSTPROCEDURE EVALUATION
Patient: Alejandrina Whatley    Procedure: Procedure(s):  CLOSED REDUCTION, HIP, WITH PERCUTANEOUS PINNING       Diagnosis:Nondisplaced fracture of base of neck of left femur, sequela [S72.045S]  Diagnosis Additional Information: No value filed.    Anesthesia Type:  General    Note:     Postop Pain Control: Uneventful            Sign Out: Well controlled pain   PONV: No   Neuro/Psych: Uneventful            Sign Out: Acceptable/Baseline neuro status   Airway/Respiratory: Uneventful            Sign Out: Acceptable/Baseline resp. status   CV/Hemodynamics: Uneventful            Sign Out: Acceptable CV status   Other NRE: NONE   DID A NON-ROUTINE EVENT OCCUR? No           Last vitals:  Vitals Value Taken Time   BP 91/74 02/16/22 1425   Temp 96.8  F (36  C) 02/16/22 1345   Pulse 78 02/16/22 1430   Resp 19 02/16/22 1430   SpO2 94 % 02/16/22 1430   Vitals shown include unvalidated device data.    Electronically Signed By: Jose Cameron MD  February 16, 2022  2:31 PM

## 2022-02-16 NOTE — PROGRESS NOTES
VSS. Patient alert to self, forgetful and confused. Surgical bath competed. 0.2 IV dilaudid administered. NPO since midnight.  Daughter called and had update on patient condition. 95 % with 2L of O2. Incontinent of bowel and bladder.  Will dhruv to monitor.

## 2022-02-17 ENCOUNTER — APPOINTMENT (OUTPATIENT)
Dept: PHYSICAL THERAPY | Facility: CLINIC | Age: 87
DRG: 481 | End: 2022-02-17
Attending: PHYSICIAN ASSISTANT
Payer: COMMERCIAL

## 2022-02-17 PROBLEM — N18.30 STAGE 3 CHRONIC KIDNEY DISEASE (H): Status: ACTIVE | Noted: 2020-07-17

## 2022-02-17 LAB
ANION GAP SERPL CALCULATED.3IONS-SCNC: 8 MMOL/L (ref 3–14)
BACTERIA UR CULT: ABNORMAL
BUN SERPL-MCNC: 28 MG/DL (ref 7–30)
CALCIUM SERPL-MCNC: 8.1 MG/DL (ref 8.5–10.1)
CHLORIDE BLD-SCNC: 110 MMOL/L (ref 94–109)
CO2 SERPL-SCNC: 22 MMOL/L (ref 20–32)
CREAT SERPL-MCNC: 0.89 MG/DL (ref 0.52–1.04)
ERYTHROCYTE [DISTWIDTH] IN BLOOD BY AUTOMATED COUNT: 13.7 % (ref 10–15)
GFR SERPL CREATININE-BSD FRML MDRD: 58 ML/MIN/1.73M2
GLUCOSE BLD-MCNC: 142 MG/DL (ref 70–99)
HCT VFR BLD AUTO: 32.7 % (ref 35–47)
HGB BLD-MCNC: 10 G/DL (ref 11.7–15.7)
IRON SATN MFR SERPL: 11 % (ref 15–46)
IRON SERPL-MCNC: 27 UG/DL (ref 35–180)
MCH RBC QN AUTO: 33.9 PG (ref 26.5–33)
MCHC RBC AUTO-ENTMCNC: 30.6 G/DL (ref 31.5–36.5)
MCV RBC AUTO: 111 FL (ref 78–100)
PLATELET # BLD AUTO: 140 10E3/UL (ref 150–450)
POTASSIUM BLD-SCNC: 4.1 MMOL/L (ref 3.4–5.3)
RBC # BLD AUTO: 2.95 10E6/UL (ref 3.8–5.2)
SODIUM SERPL-SCNC: 140 MMOL/L (ref 133–144)
TIBC SERPL-MCNC: 241 UG/DL (ref 240–430)
WBC # BLD AUTO: 11.3 10E3/UL (ref 4–11)

## 2022-02-17 PROCEDURE — 85027 COMPLETE CBC AUTOMATED: CPT | Performed by: PHYSICIAN ASSISTANT

## 2022-02-17 PROCEDURE — 120N000001 HC R&B MED SURG/OB

## 2022-02-17 PROCEDURE — 83550 IRON BINDING TEST: CPT | Performed by: INTERNAL MEDICINE

## 2022-02-17 PROCEDURE — 97530 THERAPEUTIC ACTIVITIES: CPT | Mod: GP | Performed by: PHYSICAL THERAPIST

## 2022-02-17 PROCEDURE — 250N000013 HC RX MED GY IP 250 OP 250 PS 637: Performed by: PHYSICIAN ASSISTANT

## 2022-02-17 PROCEDURE — 36415 COLL VENOUS BLD VENIPUNCTURE: CPT | Performed by: PHYSICIAN ASSISTANT

## 2022-02-17 PROCEDURE — 82310 ASSAY OF CALCIUM: CPT | Performed by: PHYSICIAN ASSISTANT

## 2022-02-17 PROCEDURE — 99207 PR CDG-MDM COMPONENT: MEETS LOW - DOWN CODED: CPT | Performed by: INTERNAL MEDICINE

## 2022-02-17 PROCEDURE — 250N000011 HC RX IP 250 OP 636: Performed by: HOSPITALIST

## 2022-02-17 PROCEDURE — 250N000011 HC RX IP 250 OP 636: Performed by: PHYSICIAN ASSISTANT

## 2022-02-17 PROCEDURE — 97161 PT EVAL LOW COMPLEX 20 MIN: CPT | Mod: GP | Performed by: PHYSICAL THERAPIST

## 2022-02-17 PROCEDURE — 99232 SBSQ HOSP IP/OBS MODERATE 35: CPT | Performed by: INTERNAL MEDICINE

## 2022-02-17 PROCEDURE — 250N000013 HC RX MED GY IP 250 OP 250 PS 637: Performed by: INTERNAL MEDICINE

## 2022-02-17 RX ORDER — HALOPERIDOL 5 MG/ML
2-4 INJECTION INTRAMUSCULAR EVERY 6 HOURS PRN
Status: DISCONTINUED | OUTPATIENT
Start: 2022-02-17 | End: 2022-02-17

## 2022-02-17 RX ORDER — AMLODIPINE BESYLATE 2.5 MG/1
2.5 TABLET ORAL DAILY
Status: DISCONTINUED | OUTPATIENT
Start: 2022-02-17 | End: 2022-03-12 | Stop reason: HOSPADM

## 2022-02-17 RX ORDER — HALOPERIDOL 5 MG/ML
1 INJECTION INTRAMUSCULAR EVERY 6 HOURS PRN
Status: DISCONTINUED | OUTPATIENT
Start: 2022-02-17 | End: 2022-03-12 | Stop reason: HOSPADM

## 2022-02-17 RX ORDER — LANOLIN ALCOHOL/MO/W.PET/CERES
3 CREAM (GRAM) TOPICAL AT BEDTIME
Status: DISCONTINUED | OUTPATIENT
Start: 2022-02-17 | End: 2022-03-12 | Stop reason: HOSPADM

## 2022-02-17 RX ORDER — HALOPERIDOL 5 MG/ML
1-2 INJECTION INTRAMUSCULAR EVERY 6 HOURS PRN
Status: DISCONTINUED | OUTPATIENT
Start: 2022-02-17 | End: 2022-02-17

## 2022-02-17 RX ADMIN — SENNOSIDES AND DOCUSATE SODIUM 1 TABLET: 50; 8.6 TABLET ORAL at 21:32

## 2022-02-17 RX ADMIN — Medication 3 MG: at 21:33

## 2022-02-17 RX ADMIN — TRAMADOL HYDROCHLORIDE 25 MG: 50 TABLET ORAL at 21:32

## 2022-02-17 RX ADMIN — CARVEDILOL 6.25 MG: 6.25 TABLET, FILM COATED ORAL at 08:48

## 2022-02-17 RX ADMIN — FAMOTIDINE 20 MG: 20 TABLET ORAL at 21:35

## 2022-02-17 RX ADMIN — DORZOLAMIDE HYDROCHLORIDE AND TIMOLOL MALEATE 1 DROP: 22.3; 6.8 SOLUTION/ DROPS OPHTHALMIC at 21:33

## 2022-02-17 RX ADMIN — CEFAZOLIN SODIUM 1 G: 1 INJECTION, SOLUTION INTRAVENOUS at 04:26

## 2022-02-17 RX ADMIN — ACETAMINOPHEN 975 MG: 325 TABLET, FILM COATED ORAL at 14:20

## 2022-02-17 RX ADMIN — ASPIRIN 325 MG: 325 TABLET, COATED ORAL at 08:48

## 2022-02-17 RX ADMIN — POLYETHYLENE GLYCOL 3350 17 G: 17 POWDER, FOR SOLUTION ORAL at 08:48

## 2022-02-17 RX ADMIN — DORZOLAMIDE HYDROCHLORIDE AND TIMOLOL MALEATE 1 DROP: 22.3; 6.8 SOLUTION/ DROPS OPHTHALMIC at 08:58

## 2022-02-17 RX ADMIN — CARVEDILOL 6.25 MG: 6.25 TABLET, FILM COATED ORAL at 18:17

## 2022-02-17 RX ADMIN — HALOPERIDOL LACTATE 2 MG: 5 INJECTION, SOLUTION INTRAMUSCULAR at 00:40

## 2022-02-17 RX ADMIN — HYDROMORPHONE HYDROCHLORIDE 0.4 MG: 0.2 INJECTION, SOLUTION INTRAMUSCULAR; INTRAVENOUS; SUBCUTANEOUS at 02:01

## 2022-02-17 RX ADMIN — ESCITALOPRAM 5 MG: 5 TABLET, FILM COATED ORAL at 08:47

## 2022-02-17 RX ADMIN — LATANOPROST 1 DROP: 50 SOLUTION OPHTHALMIC at 21:34

## 2022-02-17 RX ADMIN — SENNOSIDES AND DOCUSATE SODIUM 1 TABLET: 50; 8.6 TABLET ORAL at 08:48

## 2022-02-17 RX ADMIN — ESCITALOPRAM OXALATE 10 MG: 10 TABLET ORAL at 14:19

## 2022-02-17 RX ADMIN — FAMOTIDINE 20 MG: 20 TABLET ORAL at 08:47

## 2022-02-17 RX ADMIN — ASPIRIN 325 MG: 325 TABLET, COATED ORAL at 21:31

## 2022-02-17 RX ADMIN — AMLODIPINE BESYLATE 2.5 MG: 2.5 TABLET ORAL at 12:52

## 2022-02-17 RX ADMIN — ATORVASTATIN CALCIUM 40 MG: 40 TABLET, FILM COATED ORAL at 21:32

## 2022-02-17 RX ADMIN — ACETAMINOPHEN 975 MG: 325 TABLET, FILM COATED ORAL at 08:47

## 2022-02-17 RX ADMIN — ACETAMINOPHEN 975 MG: 325 TABLET, FILM COATED ORAL at 21:31

## 2022-02-17 ASSESSMENT — ACTIVITIES OF DAILY LIVING (ADL)
ADLS_ACUITY_SCORE: 20
ADLS_ACUITY_SCORE: 26
ADLS_ACUITY_SCORE: 26
ADLS_ACUITY_SCORE: 20
ADLS_ACUITY_SCORE: 28
ADLS_ACUITY_SCORE: 24
ADLS_ACUITY_SCORE: 20
ADLS_ACUITY_SCORE: 20
ADLS_ACUITY_SCORE: 26
ADLS_ACUITY_SCORE: 26
ADLS_ACUITY_SCORE: 20
ADLS_ACUITY_SCORE: 26
ADLS_ACUITY_SCORE: 26
ADLS_ACUITY_SCORE: 20
ADLS_ACUITY_SCORE: 26

## 2022-02-17 NOTE — PROGRESS NOTES
Patient vital signs are at baseline: Yes  Patient able to ambulate as they were prior to admission or with assist devices provided by therapies during their stay:  No,  Reason:  A-2 . Patient is W/C bound at baseline.   Patient MUST void prior to discharge:  Yes, purewick in place. Incontinent of bowel and bladder.   Patient able to tolerate oral intake:  Yes  Pain has adequate pain control using Oral analgesics:  Yes, IV diluadid and  Haldol administered for pain control.     Patient very restless overnight, at times difficult to reorient. It toke 3 staff members to change patient due to patient hitting and kicking staff. PRN IV haldol and Dilaudid administered. Patient refusing Oral medication. Pulled PIV on right hand. PIV on left forearm saline locked. Will dhruv to  monitor.

## 2022-02-17 NOTE — PROGRESS NOTES
XC    Pt agitated and demented. QTc 450, trial low dose IV Haldol.    Dominic Mondragon,   February 17, 2022

## 2022-02-17 NOTE — PROGRESS NOTES
02/17/22 1300   Quick Adds   Type of Visit Initial PT Evaluation   Living Environment   People in Home facility resident   Current Living Arrangements group home   Self-Care   Equipment Currently Used at Home walker, standard   Fall history within last six months yes   Number of times patient has fallen within last six months 1   Activity/Exercise/Self-Care Comment Per notes has A with ADLs, A with all transfers   General Information   Onset of Illness/Injury or Date of Surgery 02/16/22   Referring Physician Abigail Wynn, BONNIE   Patient/Family Therapy Goals Statement (PT) Per dtg not wanting pt to return to previous living environment   Pertinent History of Current Problem (include personal factors and/or comorbidities that impact the POC) Candi Whatley is a 89 year old female with a history of HLD, CVA, Left Hemiparesis, Dementia, Depression, CED, AAA, and HTN.  She presented to the hospital with left hip pain after a fall.  Found to have left femur fracture, Pt is now status post fixation   Existing Precautions/Restrictions fall   Weight-Bearing Status - LLE weight-bearing as tolerated   Cognition   Orientation Status (Cognition) disoriented to;place;situation;time   Cognitive Status Comments Following cues well   Pain Assessment   Patient Currently in Pain Yes, see Vital Sign flowsheet   Posture    Posture Forward head position;Protracted shoulders   Range of Motion (ROM)   Range of Motion ROM deficits secondary to surgical procedure;ROM deficits secondary to pain   Strength (Manual Muscle Testing)   Strength (Manual Muscle Testing) Deficits observed during functional mobility   Bed Mobility   Comment, (Bed Mobility) mod A   Transfers   Comment, (Transfers) mod A x 2   Gait/Stairs (Locomotion)   Comment, (Gait/Stairs) L LE giving out with attempts to step   Balance   Balance Comments poor    Clinical Impression   Criteria for Skilled Therapeutic Intervention Yes, treatment indicated   PT Diagnosis  (PT) decreased functional mobility   Influenced by the following impairments decreased ROM, strength, pain   Functional limitations due to impairments decreased bed mob, transfers   Clinical Presentation (PT Evaluation Complexity) Stable/Uncomplicated   Clinical Presentation Rationale improving   Clinical Decision Making (Complexity) low complexity   Planned Therapy Interventions (PT) balance training;bed mobility training;gait training;home exercise program;patient/family education;strengthening;transfer training   Risk & Benefits of therapy have been explained evaluation/treatment results reviewed;participants voiced agreement with care plan;participants included;patient;risks/benefits reviewed   PT Discharge Planning   PT Discharge Recommendation (DC Rec) home with assist;home with home care physical therapy;Transitional Care Facility   PT Rationale for DC Rec Pt currently needing A x 2 and tin steady for transfers, if her facility is able to perform this recommend return home with home therapies. If not recommend TCU.    PT Brief overview of current status Pt needing tin steady for transfers at this time.    Total Evaluation Time   Total Evaluation Time (Minutes) 5   Physical Therapy Goals   PT Frequency Daily   PT Predicated Duration/Target Date for Goal Attainment 02/20/22   PT Goals Bed Mobility;Transfers;Gait   PT: Bed Mobility Minimal assist;Supine to/from sit   PT: Transfers Minimal assist;Sit to/from stand;Bed to/from chair;Assistive device   PT: Gait Minimal assist;Rolling walker;10 feet  (For ability to participate in cares and transfers)

## 2022-02-17 NOTE — PROGRESS NOTES
Rudy MILLER requesting haldol or ativan due to patient refusing cares, yelling at staff at times and refusing oral medication. Patient daughter called and spoke with patient but was not able to calm patient down. Ipad in room to monitor patient.

## 2022-02-17 NOTE — PLAN OF CARE
End of shift summary: Pt oriented to self. VSS. On 1L oxygen. Denies pain. Assessing pain using dementia pain scale. Scheduled tylenol given. Pt got up to chair assist of 2 with tin steady and gait belt. CMS intact. Dressing clean, dry and intact with scant drainage. Incontinent of urine. Sitter in room. Continue monitoring pain. Waiting placement.     Plan of Care Reviewed With: patient, daughter          Outcome Evaluation: pt stable continue monitoring pain.

## 2022-02-17 NOTE — PLAN OF CARE
Goal Outcome Evaluation:    Plan of Care Reviewed With: patient, daughter          Outcome Evaluation: pt stable and ready for recovery    Vss afebrile. Oxygen 96% 2l nc. Daughter present and supportive.  Left hip drsg dry and intact with cms itact. Denies pain. Continues at baseline dementia per daughter and confused. Continuously reoriented throughout the shift. Only oriented to person,. No urine output yet, bladder scan for 0ml. Iv at 100ml/hr. No appetite but drinking water and few sips of coffee.

## 2022-02-17 NOTE — PROGRESS NOTES
[facility]    Medicine Progress Note - Hospitalist Service    Date of Admission:  2/15/2022    Assessment & Plan          Candi Whatley is a 89 year old female with a history of HLD, CVA, Left Hemiparesis, Dementia, Depression, CED, AAA, and HTN.  She presented to the hospital with left hip pain after a fall.  Found to have left femur fracture.     1.  Acute left femur fracture.  Status post traumatic fall.  -Status post repair by orthopedic surgery on 2/16.  -Pain medications as needed.    -Use incentive spirometry.     2.  Hypertension.  -Continue carvedilol 6.25 mg twice a day.  -Restart amlodipine at reduced dose of 2.5 mg today.     3.  Hyperlipidemia.  -Continue atorvastatin.     4.  Depression.  -Continue Lexapro.     5.  Dementia.  With reported agitation overnight.  -Schedule melatonin 3 mg at bedtime.  -IV haloperidol if needed.     6.  Mildly abnormal urinalysis.  No dysuria.  Likely unreliable for symptoms of dysuria due to underlying dementia.  -Hold off on antibiotics for now.  -Await urine culture results.    7.  Acute blood loss anemia.  Likely multifactorial due to trauma with acute fracture and need for above-noted surgical intervention.  Hemoglobin has decreased to 10 on 2/17.  -Recheck hemoglobin tomorrow.  -Check iron levels.       Diet: Advance Diet as Tolerated: Regular Diet Adult    DVT Prophylaxis: Aspirin, pneumatic compression devices  Clark Catheter: Not present  Central Lines: None  Cardiac Monitoring: None  Code Status: No CPR- Do NOT Intubate          Adrien Penn,   Hospitalist Service  [facility]  Securely message with the Vocera Web Console (learn more here)  Text page via [employer] Paging/Directory         Clinically Significant Risk Factors Present on Admission                 ______________________________________________________________________    Interval History   Denies hip pain today.  Denies chest pain, shortness of  breath, fevers, chills, nausea, or vomiting.    Data reviewed today: I reviewed all medications, new labs and imaging results over the last 24 hours.     Physical Exam   Vital Signs: Temp: 98.1  F (36.7  C) Temp src: Temporal BP: 134/57 Pulse: 79   Resp: 17 SpO2: 98 % O2 Device: Nasal cannula Oxygen Delivery: 3 LPM  Weight: 0 lbs 0 oz  Gen:  NAD, A&Ox1 to self.  Trouble with place and time.  Eyes:  PERRL, sclera anicteric.  OP:  MMM, no lesions.  Neck:  Supple.  CV:  Regular, no loud murmurs.  Lung:  CTA b/l, normal effort.  Ab:  +BS, soft.  Skin:  Warm, dry to touch.  No rash.  Ext:  No pitting edema LE b/l.      Data   Recent Labs   Lab 02/17/22  0651 02/16/22  1143 02/15/22  1350   WBC 11.3*  --  12.6*   HGB 10.0*  --  12.0   *  --  107*   *  --  163     --  140   POTASSIUM 4.1  --  4.3   CHLORIDE 110*  --  109   CO2 22  --  25   BUN 28  --  31*   CR 0.89 0.94 1.00   ANIONGAP 8  --  6   TRANG 8.1*  --  9.3   *  --  114*

## 2022-02-17 NOTE — PROGRESS NOTES
Orthopedic Surgery  Alejandrina Whatley  2022  Admit Date:  2/15/2022  POD 1 Day Post-Op  S/P Procedure(s):  Closed reduction percutaneous screw fixation of a left nondisplaced femoral neck fracture    Patient resting comfortably in bed.    Pain controlled.  Tolerating oral intake.    Denies nausea or vomiting  Denies chest pain or shortness of breath  No events overnight.     Alert and orient to person  Vital Sign Ranges  Temperature Temp  Av.3  F (36.3  C)  Min: 96.3  F (35.7  C)  Max: 98.7  F (37.1  C)   Blood pressure Systolic (24hrs), Av , Min:105 , Max:134        Diastolic (24hrs), Av, Min:41, Max:65      Pulse Pulse  Av.3  Min: 69  Max: 90   Respirations Resp  Av.5  Min: 14  Max: 25   Pulse oximetry SpO2  Av.5 %  Min: 93 %  Max: 98 %       Dressing is clean, dry, and intact.   Minimal erythema of the surrounding skin.   Bilateral calves are soft, non-tender.  Bilateral lower extremity is NVI.  Sensation intact bilateral lower extremities  5/5 motor with resisted dorsi and plantar flexion bilaterally  +Dp pulse    Labs:  Recent Labs   Lab Test 22  0651 02/15/22  1350 21  0553   POTASSIUM 4.1 4.3 3.7     Recent Labs   Lab Test 22  0651 02/15/22  1350 21  0602   HGB 10.0* 12.0 10.9*     Recent Labs   Lab Test 21  1440 20  1927   INR 1.11 1.06     Recent Labs   Lab Test 22  0651 02/15/22  1350 21  0602   * 163 178       A/P  1. S/p left femoral neck fracture with CRPP   Continue ASA for DVT prophylaxis.     Mobilize with PT/OT    WBAT with walker if able, patient typically uses WC   Leave dressing intact.     Continue current pain regiment.    2. Disposition   Anticipate d/c to TCU based on placement and medical clearance.    Yulissa Edwards PA-C

## 2022-02-17 NOTE — PROGRESS NOTES
Care Transitions Team: Following for CC, discharge planning, and disposition.       Per [facility] Trauma Liaison Handoff:   Writer spoke with daughter Pat via phone introduced myself and explained my role in her mother's plan of care. Discussed Alejandrina Noble's goals.  Daughter expressed concerns about the group home she lived in and reported she will not be moving her back there. She would like her mom to get therapy at TCU with goal of pivot transfers and weight bearing. She did report that Alejandrina Noble is not vaccinated and writer explained that patient will be quarantined for 14 days in her room, but daughter will be allowed to visit.     Living situation:   Support: Daughter (Pat)    Prior Function level:   Ambulation w/c bound - pivot transfers with Ax1   ADL's Ax1 with all cares    Current home care services: None    Family/patient discharge goal: Able to pivot transfer and bear weight.  Barriers to Discharge: Medically stable   Discharge Plan of care: TCU    TCU - Medicare compare TCU list provided - CM discussed Medicare compare website and star ratings. Backup plan if unable to discharge home:   1.[facility]    2. [facility]   3.[facility]   [facility]. [facility]  5.  [facility]     Orders needed for services: Post Op Plan of Care - TCU - PT/OT evaluate and treat.     Weight bearing status and Hip precautions: WBAT LLE  Transportation: Care coordination team will need to schedule transport.       Will follow in collaboration with [facility]  Jacquelyn Castillo -[phone] [facility] for discharge planning.

## 2022-02-17 NOTE — PLAN OF CARE
OT: Orders received. Chart reviewed and discussed with care team.  OT not indicated due to no IP OT needs at this time, pt admitted from memory care facility and will require TCU stay per PT recommendations.  Defer discharge recommendations to next level of care and current care team.  Will complete orders.

## 2022-02-18 ENCOUNTER — APPOINTMENT (OUTPATIENT)
Dept: PHYSICAL THERAPY | Facility: CLINIC | Age: 87
DRG: 481 | End: 2022-02-18
Payer: COMMERCIAL

## 2022-02-18 LAB
ANION GAP SERPL CALCULATED.3IONS-SCNC: 5 MMOL/L (ref 3–14)
BUN SERPL-MCNC: 35 MG/DL (ref 7–30)
CALCIUM SERPL-MCNC: 8.3 MG/DL (ref 8.5–10.1)
CHLORIDE BLD-SCNC: 111 MMOL/L (ref 94–109)
CO2 SERPL-SCNC: 25 MMOL/L (ref 20–32)
CREAT SERPL-MCNC: 0.94 MG/DL (ref 0.52–1.04)
ERYTHROCYTE [DISTWIDTH] IN BLOOD BY AUTOMATED COUNT: 13.9 % (ref 10–15)
GFR SERPL CREATININE-BSD FRML MDRD: 55 ML/MIN/1.73M2
GLUCOSE BLD-MCNC: 127 MG/DL (ref 70–99)
HCT VFR BLD AUTO: 29.3 % (ref 35–47)
HGB BLD-MCNC: 9.3 G/DL (ref 11.7–15.7)
MCH RBC QN AUTO: 34.3 PG (ref 26.5–33)
MCHC RBC AUTO-ENTMCNC: 31.7 G/DL (ref 31.5–36.5)
MCV RBC AUTO: 108 FL (ref 78–100)
PLATELET # BLD AUTO: 129 10E3/UL (ref 150–450)
POTASSIUM BLD-SCNC: 4.2 MMOL/L (ref 3.4–5.3)
RBC # BLD AUTO: 2.71 10E6/UL (ref 3.8–5.2)
SODIUM SERPL-SCNC: 141 MMOL/L (ref 133–144)
WBC # BLD AUTO: 11.8 10E3/UL (ref 4–11)

## 2022-02-18 PROCEDURE — 120N000001 HC R&B MED SURG/OB

## 2022-02-18 PROCEDURE — 250N000013 HC RX MED GY IP 250 OP 250 PS 637: Performed by: INTERNAL MEDICINE

## 2022-02-18 PROCEDURE — 99232 SBSQ HOSP IP/OBS MODERATE 35: CPT | Performed by: INTERNAL MEDICINE

## 2022-02-18 PROCEDURE — 250N000013 HC RX MED GY IP 250 OP 250 PS 637: Performed by: PHYSICIAN ASSISTANT

## 2022-02-18 PROCEDURE — 80048 BASIC METABOLIC PNL TOTAL CA: CPT | Performed by: INTERNAL MEDICINE

## 2022-02-18 PROCEDURE — 85014 HEMATOCRIT: CPT | Performed by: INTERNAL MEDICINE

## 2022-02-18 PROCEDURE — 97530 THERAPEUTIC ACTIVITIES: CPT | Mod: GP | Performed by: PHYSICAL THERAPIST

## 2022-02-18 PROCEDURE — 36415 COLL VENOUS BLD VENIPUNCTURE: CPT | Performed by: INTERNAL MEDICINE

## 2022-02-18 RX ORDER — CEFTRIAXONE 2 G/1
2 INJECTION, POWDER, FOR SOLUTION INTRAMUSCULAR; INTRAVENOUS EVERY 24 HOURS
Status: DISCONTINUED | OUTPATIENT
Start: 2022-02-18 | End: 2022-02-18

## 2022-02-18 RX ORDER — CEFDINIR 300 MG/1
300 CAPSULE ORAL 2 TIMES DAILY
Status: COMPLETED | OUTPATIENT
Start: 2022-02-18 | End: 2022-02-23

## 2022-02-18 RX ADMIN — GUAIFENESIN 10 ML: 200 SOLUTION ORAL at 22:13

## 2022-02-18 RX ADMIN — ASPIRIN 325 MG: 325 TABLET, COATED ORAL at 08:52

## 2022-02-18 RX ADMIN — DORZOLAMIDE HYDROCHLORIDE AND TIMOLOL MALEATE 1 DROP: 22.3; 6.8 SOLUTION/ DROPS OPHTHALMIC at 09:03

## 2022-02-18 RX ADMIN — ACETAMINOPHEN 975 MG: 325 TABLET, FILM COATED ORAL at 06:18

## 2022-02-18 RX ADMIN — FAMOTIDINE 20 MG: 20 TABLET ORAL at 08:57

## 2022-02-18 RX ADMIN — CEFDINIR 300 MG: 300 CAPSULE ORAL at 11:34

## 2022-02-18 RX ADMIN — POLYETHYLENE GLYCOL 3350 17 G: 17 POWDER, FOR SOLUTION ORAL at 08:52

## 2022-02-18 RX ADMIN — ATORVASTATIN CALCIUM 40 MG: 40 TABLET, FILM COATED ORAL at 20:21

## 2022-02-18 RX ADMIN — CEFDINIR 300 MG: 300 CAPSULE ORAL at 20:16

## 2022-02-18 RX ADMIN — AMLODIPINE BESYLATE 2.5 MG: 2.5 TABLET ORAL at 08:57

## 2022-02-18 RX ADMIN — FAMOTIDINE 20 MG: 20 TABLET ORAL at 20:21

## 2022-02-18 RX ADMIN — SENNOSIDES AND DOCUSATE SODIUM 1 TABLET: 50; 8.6 TABLET ORAL at 08:55

## 2022-02-18 RX ADMIN — ESCITALOPRAM 5 MG: 5 TABLET, FILM COATED ORAL at 08:52

## 2022-02-18 RX ADMIN — DORZOLAMIDE HYDROCHLORIDE AND TIMOLOL MALEATE 1 DROP: 22.3; 6.8 SOLUTION/ DROPS OPHTHALMIC at 20:23

## 2022-02-18 RX ADMIN — ESCITALOPRAM OXALATE 10 MG: 10 TABLET ORAL at 15:03

## 2022-02-18 RX ADMIN — ACETAMINOPHEN 975 MG: 325 TABLET, FILM COATED ORAL at 22:13

## 2022-02-18 RX ADMIN — SENNOSIDES AND DOCUSATE SODIUM 1 TABLET: 50; 8.6 TABLET ORAL at 20:20

## 2022-02-18 RX ADMIN — TRAMADOL HYDROCHLORIDE 25 MG: 50 TABLET ORAL at 22:13

## 2022-02-18 RX ADMIN — ASPIRIN 325 MG: 325 TABLET, COATED ORAL at 20:16

## 2022-02-18 RX ADMIN — ACETAMINOPHEN 975 MG: 325 TABLET, FILM COATED ORAL at 15:04

## 2022-02-18 RX ADMIN — GUAIFENESIN 10 ML: 200 SOLUTION ORAL at 15:04

## 2022-02-18 ASSESSMENT — ACTIVITIES OF DAILY LIVING (ADL)
ADLS_ACUITY_SCORE: 26

## 2022-02-18 NOTE — PLAN OF CARE
Goal Outcome Evaluation:    Plan of Care Reviewed With: patient, daughter          Outcome Evaluation: pt stable and ready for recovery    Patient disoriented to time and situation. VS stable, a febrile. PO scheduled Tylenol for pain management. CMS intact, dressing is clean dry and intact. A x 2 with transfers using Melany stady. Voiding good amount using pure wick . On regular diet and tolerating it well. Plan is to TCU at discharge.

## 2022-02-18 NOTE — PLAN OF CARE
Goal Outcome Evaluation:  Vital signs stable, pt alert to  self.  Transfers from bed to chair using gait belt and tin steady with 2 assist.  CMS intact, has baseline residual weakness to left upper and lower extremities.  Dressing intact to left hip.  Voiding, purewick in use.  Pain controlled with scheduled tylenol and ultram.  Pt's daughter requested chest x ray for pt infrequent non productive cough.Web paged MD.  Plan of Care Reviewed With: patient, daughter          Outcome Evaluation: pt stable and ready for recovery

## 2022-02-18 NOTE — PROGRESS NOTES
Orthopedic Surgery  Alejandrina Whatley  2022  Admit Date:  2/15/2022  POD 2 Days Post-Op  S/P Procedure(s):  Closed reduction percutaneous screw fixation of a left nondisplaced femoral neck fracture    Patient resting comfortably in bed    Pain controlled.    Tolerating oral intake.    No events overnight.   Denies chest pain or shortness of breath   Alert and orient to person, place, and time.    Vital Sign Ranges  Temperature Temp  Av.4  F (36.3  C)  Min: 96.9  F (36.1  C)  Max: 98.9  F (37.2  C)   Blood pressure Systolic (24hrs), Av , Min:103 , Max:130        Diastolic (24hrs), Av, Min:49, Max:65      Pulse Pulse  Av.1  Min: 60  Max: 79   Respirations Resp  Av  Min: 16  Max: 16   Pulse oximetry SpO2  Av.5 %  Min: 85 %  Max: 97 %       Unlabored breathing without audible wheeze   Dressing is clean, dry, and intact.   Minimal erythema of the surrounding skin.   Bilateral calves are soft, non-tender.  Bilateral lower extremity is NVI.  SILT bilateral lower extremities  5/5 motor with resisted dorsi and plantar flexion bilaterally  +Dp pulse    Labs:  Recent Labs   Lab Test 22  0706 22  0651 02/15/22  1350   POTASSIUM 4.2 4.1 4.3     Recent Labs   Lab Test 22  0706 22  0651 02/15/22  1350   HGB 9.3* 10.0* 12.0     Recent Labs   Lab Test 21  1440 20  1927   INR 1.11 1.06     Recent Labs   Lab Test 22  0706 22  0651 02/15/22  1350   * 140* 163       A/P  1. S/p left femoral neck fracture with CRPP              Continue ASA for DVT prophylaxis.                Mobilize with PT/OT               WBAT with walker if able, patient typically uses WC              Leave dressing intact.                Continue current pain regiment.     2. Disposition              Anticipate d/c to TCU based on placement and medical clearance.    Glen Dias PA-C

## 2022-02-18 NOTE — PLAN OF CARE
Goal Outcome Evaluation:    Plan of Care Reviewed With: patient, daughter     Overall Patient Progress: improving    Outcome Evaluation: ALPESH Ahmadi plan to discharge to TCU when medically ready.    Patient vital signs are at baseline: No,  Reason:  Pt intermittently requiring O2-lung sounds: expiratory wheezes, diminished. Robitussin given for congested, productive cough.   Patient able to ambulate as they were prior to admission or with assist devices provided by therapies during their stay:  Yes-Pt @ baseline mobility. Ax1, using gait belt, and tin steady.   Patient MUST void prior to discharge:  Yes-using purewick.  Patient able to tolerate oral intake:  Yes  Pain has adequate pain control using Oral analgesics:  Yes-PO scheduled tylenol.    Pt disorientated to time and situation-forgetful. CMS intact. Dressing CDI.

## 2022-02-18 NOTE — PLAN OF CARE
/59 (BP Location: Right arm)   Pulse 64   Temp 97  F (36.1  C) (Temporal)   Resp 16   SpO2 96%   Neuro: alert to self only. confused  Cardiac: WDL  Lungs: coarse, loose cough   GI: incontinent  : purwick in place  Pain: denies pain   IV: SL  Meds: tylenol, ultram prn  Labs/tests:   Diet: reg  Activity: tin steady x2 assist   Misc: O2 NC 2L  Plan: will need rehab, tcu pending     Pt rested comfortable through the night with no problems. Denies pain. Will continue monitoring

## 2022-02-18 NOTE — CONSULTS
Care Management Note    Discharge Date: 02/19/2022       Discharge Disposition: Transitional Care, Skilled Nursing Facilty    Discharge Services:      Discharge DME:      Discharge Transportation:  [employer] wheelchair     Private pay costs discussed: transportation costs    PAS Confirmation Code:    Patient/family educated on Medicare website which has current facility and service quality ratings: yes    Education Provided on the Discharge Plan:    Persons Notified of Discharge Plans:   Patient/Family in Agreement with the Plan: yes    Handoff Referral Completed: No    Additional Information:  Care Transitions Team: Following for CC, discharge planning, and disposition.    Per [facility] Trauma Liaison Handoff:      Prior Function level:              Ambulation w/c bound - pivot transfers with Ax1              ADL's Ax1 with all cares     Current home care services: None     Family/patient discharge goal: Able to pivot transfer and bear weight.  Barriers to Discharge: Medically stable   Discharge Plan of care: TCU    TCU - Medicare compare TCU list provided - CM discussed Medicare compare website and star ratings. Backup plan if unable to discharge home:   1.[facility]    2. [facility]   3.[facility]   [facility]. [facility]  5.  [facility]      Orders needed for services: Post Op Plan of Care - TCU - PT/OT evaluate and treat.      Weight bearing status and Hip precautions: WBAT LLE  Transportation: Care coordination team will need to schedule transport.    TCU referrals sent 2/18 for discharge on 2/19, if medically ready.     Will follow in collaboration with [facility] CM  Jacquelyn HER -[phone] [facility] for discharge planning.    Radha Ramirez RN, BSN CTS  Care Coordinator  [facility]   [phone]                      Angie Ramirez, RN

## 2022-02-19 ENCOUNTER — APPOINTMENT (OUTPATIENT)
Dept: PHYSICAL THERAPY | Facility: CLINIC | Age: 87
DRG: 481 | End: 2022-02-19
Payer: COMMERCIAL

## 2022-02-19 PROCEDURE — 250N000013 HC RX MED GY IP 250 OP 250 PS 637: Performed by: INTERNAL MEDICINE

## 2022-02-19 PROCEDURE — 120N000001 HC R&B MED SURG/OB

## 2022-02-19 PROCEDURE — 250N000013 HC RX MED GY IP 250 OP 250 PS 637: Performed by: PHYSICIAN ASSISTANT

## 2022-02-19 PROCEDURE — 97530 THERAPEUTIC ACTIVITIES: CPT | Mod: GP

## 2022-02-19 PROCEDURE — 99232 SBSQ HOSP IP/OBS MODERATE 35: CPT | Performed by: INTERNAL MEDICINE

## 2022-02-19 RX ORDER — ASPIRIN 325 MG
325 TABLET, DELAYED RELEASE (ENTERIC COATED) ORAL 2 TIMES DAILY
Qty: 60 TABLET | Refills: 0 | Status: SHIPPED | OUTPATIENT
Start: 2022-02-19 | End: 2022-07-21

## 2022-02-19 RX ORDER — ACETAMINOPHEN 325 MG/1
975 TABLET ORAL EVERY 8 HOURS
Qty: 40 TABLET | Refills: 0 | Status: SHIPPED | OUTPATIENT
Start: 2022-02-19 | End: 2022-07-21

## 2022-02-19 RX ORDER — AMOXICILLIN 250 MG
1 CAPSULE ORAL 2 TIMES DAILY PRN
Qty: 40 TABLET | Refills: 0 | Status: SHIPPED | OUTPATIENT
Start: 2022-02-19 | End: 2022-07-21

## 2022-02-19 RX ADMIN — BISACODYL 10 MG: 10 SUPPOSITORY RECTAL at 11:03

## 2022-02-19 RX ADMIN — ASPIRIN 325 MG: 325 TABLET, COATED ORAL at 18:39

## 2022-02-19 RX ADMIN — AMLODIPINE BESYLATE 2.5 MG: 2.5 TABLET ORAL at 09:40

## 2022-02-19 RX ADMIN — SENNOSIDES AND DOCUSATE SODIUM 1 TABLET: 50; 8.6 TABLET ORAL at 18:38

## 2022-02-19 RX ADMIN — GUAIFENESIN 10 ML: 200 SOLUTION ORAL at 18:41

## 2022-02-19 RX ADMIN — ACETAMINOPHEN 975 MG: 325 TABLET, FILM COATED ORAL at 05:40

## 2022-02-19 RX ADMIN — ACETAMINOPHEN 975 MG: 325 TABLET, FILM COATED ORAL at 14:10

## 2022-02-19 RX ADMIN — CARVEDILOL 6.25 MG: 6.25 TABLET, FILM COATED ORAL at 09:41

## 2022-02-19 RX ADMIN — FAMOTIDINE 20 MG: 20 TABLET ORAL at 18:40

## 2022-02-19 RX ADMIN — CEFDINIR 300 MG: 300 CAPSULE ORAL at 18:40

## 2022-02-19 RX ADMIN — SENNOSIDES AND DOCUSATE SODIUM 1 TABLET: 50; 8.6 TABLET ORAL at 09:41

## 2022-02-19 RX ADMIN — CEFDINIR 300 MG: 300 CAPSULE ORAL at 09:39

## 2022-02-19 RX ADMIN — ESCITALOPRAM 5 MG: 5 TABLET, FILM COATED ORAL at 09:40

## 2022-02-19 RX ADMIN — TRAMADOL HYDROCHLORIDE 25 MG: 50 TABLET ORAL at 18:39

## 2022-02-19 RX ADMIN — DORZOLAMIDE HYDROCHLORIDE AND TIMOLOL MALEATE 1 DROP: 22.3; 6.8 SOLUTION/ DROPS OPHTHALMIC at 09:42

## 2022-02-19 RX ADMIN — ESCITALOPRAM OXALATE 10 MG: 10 TABLET ORAL at 14:11

## 2022-02-19 RX ADMIN — ACETAMINOPHEN 650 MG: 325 TABLET, FILM COATED ORAL at 18:41

## 2022-02-19 RX ADMIN — DORZOLAMIDE HYDROCHLORIDE AND TIMOLOL MALEATE 1 DROP: 22.3; 6.8 SOLUTION/ DROPS OPHTHALMIC at 16:54

## 2022-02-19 RX ADMIN — LATANOPROST 1 DROP: 50 SOLUTION OPHTHALMIC at 18:45

## 2022-02-19 RX ADMIN — ASPIRIN 325 MG: 325 TABLET, COATED ORAL at 09:40

## 2022-02-19 RX ADMIN — ATORVASTATIN CALCIUM 40 MG: 40 TABLET, FILM COATED ORAL at 18:39

## 2022-02-19 RX ADMIN — FAMOTIDINE 20 MG: 20 TABLET ORAL at 09:41

## 2022-02-19 RX ADMIN — POLYETHYLENE GLYCOL 3350 17 G: 17 POWDER, FOR SOLUTION ORAL at 09:39

## 2022-02-19 ASSESSMENT — ACTIVITIES OF DAILY LIVING (ADL)
ADLS_ACUITY_SCORE: 20
ADLS_ACUITY_SCORE: 24
ADLS_ACUITY_SCORE: 26
ADLS_ACUITY_SCORE: 22
ADLS_ACUITY_SCORE: 22
ADLS_ACUITY_SCORE: 24
ADLS_ACUITY_SCORE: 24
ADLS_ACUITY_SCORE: 22
ADLS_ACUITY_SCORE: 24
ADLS_ACUITY_SCORE: 22
ADLS_ACUITY_SCORE: 26
ADLS_ACUITY_SCORE: 22
ADLS_ACUITY_SCORE: 26
ADLS_ACUITY_SCORE: 20
ADLS_ACUITY_SCORE: 26
ADLS_ACUITY_SCORE: 22
ADLS_ACUITY_SCORE: 20
ADLS_ACUITY_SCORE: 22
ADLS_ACUITY_SCORE: 24
ADLS_ACUITY_SCORE: 24

## 2022-02-19 NOTE — PROGRESS NOTES
[facility]  Hospitalist Progress Note  Jerome Nur MD 02/19/2022    Reason for Stay (Diagnosis): L femur fx, s/p repair         Assessment and Plan:      Summary of Stay: Alejandrina Whatley is a 89 year old female with a history of HLD, CVA, Left Hemiparesis, Dementia, Depression, CED, AAA, and HTN.  She presented to the hospital with left hip pain after a fall.  imaging demonstrated a left femur fracture.  The patient underwent percutaneous screw fixation    SW is assisting with placement in TCU.  Pt is medically stable for discharge when safe disposition plan is in place.       1.  Acute left femur fracture.  Status post traumatic fall.  -Status post repair by orthopedic surgery on 2/16.  -Pain medications as needed.    -Use incentive spirometry.     2.  Hypertension.  -Continue carvedilol and amlodpine     3.  Hyperlipidemia.  -Continue atorvastatin.     4.  Depression.  -Continue Lexapro.     5.  Dementia complicated by nocturnal agitation.  -Schedule melatonin 3 mg at bedtime.  -IV haloperidol if needed.     6.  Mildly abnormal urinalysis with possible UTI.    - urine cx positive for proteus  - given poor historian and inability to provide accurate hx combined with persistent leukocytosis will treat for presumed UTI with PO cephalosporin (allergies noted).  Anticipate brief course        7.  Acute blood loss anemia.  Likely multifactorial due to trauma with acute fracture and need for above-noted surgical intervention.    - no need for transfusion         Diet: Advance Diet as Tolerated: Regular Diet Adult    DVT Prophylaxis: Aspirin, pneumatic compression devices  Clark Catheter: Not present  Central Lines: None  Cardiac Monitoring: None  Code Status: No CPR- Do NOT Intubate    DISPO:  medically stable for discharge when safe disposition found.  SW assisting with TCU placement        Interval History (Subjective):      Poor historian.  Has no complaints currently                  Physical  Exam:      Last Vital Signs:  /58 (BP Location: Right arm)   Pulse 70   Temp 97.3  F (36.3  C) (Temporal)   Resp 16   SpO2 92%       Intake/Output Summary (Last 24 hours) at 2/19/2022 1631  Last data filed at 2/19/2022 1145  Gross per 24 hour   Intake 340 ml   Output 250 ml   Net 90 ml       Constitutional: Awake, alert, cooperative, no apparent distress   Respiratory: Clear to auscultation bilaterally, no crackles or wheezing   Cardiovascular: Regular rate and rhythm, normal S1 and S2, and no murmur noted   Abdomen: Normal bowel sounds, soft, non-distended, non-tender   Skin: No rashes, no cyanosis, dry to touch   Neuro: Alert and awake; confused c/w dementia, no weakness, numbness, ++memory loss   Extremities: No edema, normal range of motion   Other(s):        All other systems: Negative          Medications:      All current medications were reviewed with changes reflected in problem list.         Data:      All new lab and imaging data was reviewed.   Labs:  Recent Labs   Lab 02/18/22  0706 02/17/22  0651 02/16/22  1143 02/15/22  1350    140  --  140   POTASSIUM 4.2 4.1  --  4.3   CHLORIDE 111* 110*  --  109   CO2 25 22  --  25   ANIONGAP 5 8  --  6   * 142*  --  114*   BUN 35* 28  --  31*   CR 0.94 0.89 0.94 1.00   GFRESTIMATED 55* 58* 55* 51*   TRANG 8.3* 8.1*  --  9.3     No results for input(s): NTBNPI, NTBNP in the last 168 hours.  Recent Labs   Lab 02/18/22  0706   WBC 11.8*   HGB 9.3*   HCT 29.3*   *   *      Imaging:   No results found for this or any previous visit (from the past 24 hour(s)).

## 2022-02-19 NOTE — PROGRESS NOTES
Care Management Follow Up    Length of Stay (days): 4    Expected Discharge Date: 02/19/2022     Concerns to be Addressed:    TCU vs return to group home   Patient plan of care discussed at interdisciplinary rounds: Yes    Anticipated Discharge Disposition: Transitional Care, Skilled Nursing Facilty  Disposition Comments: [facility] Tr[facility] care plan to dischage to TCU when medically ready.  Anticipated Discharge Services:  TCU vs group home  Anticipated Discharge DME:  tbd    Patient/family educated on Medicare website which has current facility and service quality ratings: yes  Education Provided on the Discharge Plan:  yes  Patient/Family in Agreement with the Plan: yes    Referrals Placed by CM/SW: Transportation, Post Acute Facilities  Private pay costs discussed: private room/amenity fees and transportation costs    Additional Information:  SW met with patients daughter and patient. Informed them That all the TCU's have declined here, reason unknown. Explained that if she was back to her baseline she could return home (per PT). Layne became upset, refused to give out group home number and said she was not returning there. SW asked what she had done to find alternative placement as that was something the hospital would not assist with. SW also explained we could not hold her mom here until she found a different placement. Kevin she was medically ready, which the provider determines, then patient would need to discharge.     She said she would call the [facility] line on Monday. She also reported that Jacquelyn at [facility] had a list of 19 facilities. HARI explained that I dont have that list and I would make more TCU referrals.   Patient is unvaccinated so placement will be hard to fine.       RACQUEL Pradhan [facility]  [phone]  [address]   [facility]. [id number]  [facility]         RACQUEL Mcgill

## 2022-02-19 NOTE — PLAN OF CARE
Patient vital signs are at baseline: No,  Reason:  On 1L of O2 overnight.  Patient able to ambulate as they were prior to admission or with assist devices provided by therapies during their stay:  No,  Reason:  Ax1 with the tin steady and gait belt.  Patient MUST void prior to discharge:  Yes; purewick in place overnight; only voided 100mL; up to bedside commode; voided on the floor and 150mL in the hat.  Patient able to tolerate oral intake:  Yes; tolerating a regular diet; needs assistance with eating.   Pain has adequate pain control using Oral analgesics:  Yes; taking scheduled pain meds.    Oriented to self only; confused; disoriented to place, time and situation. No IV access. Lungs clear. Generalized bruises. CMS intact; aquacel CDI. Bowel sounds active.  Plan: Waiting for TCU placement.

## 2022-02-19 NOTE — PROGRESS NOTES
Orthopedic Surgery  Alejandrina Whatley  2/19/2022  Admit Date:  2/15/2022  POD 3 Days Post-Op  S/P Procedure(s):  Closed reduction percutaneous screw fixation of a left nondisplaced femoral neck fracture    Patient attempting a bowel movement after suppository, family member present  Pain appears controlled.    Tolerating oral intake.    No events overnight.   Denies chest pain or shortness of breath   Alert and orient to person only    Vital Sign Ranges  BP (!) 141/64 (BP Location: Right arm)   Pulse 63   Temp 96.9  F (36.1  C) (Temporal)   Resp 16   SpO2 97%     Unlabored breathing without audible wheeze   Dressing is clean, dry, and intact.   Minimal erythema of the surrounding skin.   Bilateral calves are soft, non-tender.  Bilateral lower extremity is NVI.  SILT bilateral lower extremities  5/5 motor with resisted dorsi and plantar flexion bilaterally  +Dp pulse    Labs:  Hemoglobin   Date Value Ref Range Status   02/18/2022 9.3 (L) 11.7 - 15.7 g/dL Final   03/03/2020 10.8 (L) 12.0 - 16.0 g/dL Final     Platelet Count   Date Value Ref Range Status   02/18/2022 129 (L) 150 - 450 10e3/uL Final   03/03/2020 267 140 - 440 thou/uL Final         A/P  1. S/p left femoral neck fracture with CRPP              Continue ASA for DVT prophylaxis.                Mobilize with PT/OT               WBAT with walker if able, patient typically uses WC              Leave dressing intact.                Continue current pain regimen.     2. Disposition              Anticipate d/c to TCU based on placement and medical clearance.    Kjerstin Foss PA-C [facility]

## 2022-02-20 ENCOUNTER — APPOINTMENT (OUTPATIENT)
Dept: PHYSICAL THERAPY | Facility: CLINIC | Age: 87
DRG: 481 | End: 2022-02-20
Payer: COMMERCIAL

## 2022-02-20 ENCOUNTER — APPOINTMENT (OUTPATIENT)
Dept: GENERAL RADIOLOGY | Facility: CLINIC | Age: 87
DRG: 481 | End: 2022-02-20
Attending: INTERNAL MEDICINE
Payer: COMMERCIAL

## 2022-02-20 PROCEDURE — 250N000013 HC RX MED GY IP 250 OP 250 PS 637: Performed by: INTERNAL MEDICINE

## 2022-02-20 PROCEDURE — 97530 THERAPEUTIC ACTIVITIES: CPT | Mod: GP | Performed by: PHYSICAL THERAPIST

## 2022-02-20 PROCEDURE — 71045 X-RAY EXAM CHEST 1 VIEW: CPT

## 2022-02-20 PROCEDURE — 99232 SBSQ HOSP IP/OBS MODERATE 35: CPT | Performed by: INTERNAL MEDICINE

## 2022-02-20 PROCEDURE — 250N000013 HC RX MED GY IP 250 OP 250 PS 637: Performed by: PHYSICIAN ASSISTANT

## 2022-02-20 PROCEDURE — 120N000001 HC R&B MED SURG/OB

## 2022-02-20 RX ORDER — ALBUTEROL SULFATE 5 MG/ML
2.5 SOLUTION RESPIRATORY (INHALATION)
Status: DISCONTINUED | OUTPATIENT
Start: 2022-02-20 | End: 2022-03-12 | Stop reason: HOSPADM

## 2022-02-20 RX ORDER — HYDROXYZINE HYDROCHLORIDE 10 MG/1
10 TABLET, FILM COATED ORAL EVERY 6 HOURS PRN
Qty: 30 TABLET | Refills: 0 | Status: SHIPPED | OUTPATIENT
Start: 2022-02-20 | End: 2022-07-21

## 2022-02-20 RX ORDER — TRAMADOL HYDROCHLORIDE 50 MG/1
25 TABLET ORAL EVERY 8 HOURS PRN
Qty: 30 TABLET | Refills: 0 | Status: SHIPPED | OUTPATIENT
Start: 2022-02-20 | End: 2022-07-21

## 2022-02-20 RX ORDER — GUAIFENESIN 600 MG/1
600 TABLET, EXTENDED RELEASE ORAL 2 TIMES DAILY
Status: DISCONTINUED | OUTPATIENT
Start: 2022-02-20 | End: 2022-03-12 | Stop reason: HOSPADM

## 2022-02-20 RX ORDER — ONDANSETRON 4 MG/1
4 TABLET, ORALLY DISINTEGRATING ORAL EVERY 6 HOURS PRN
Qty: 30 TABLET | Refills: 0 | Status: SHIPPED | OUTPATIENT
Start: 2022-02-20 | End: 2022-07-21

## 2022-02-20 RX ADMIN — DORZOLAMIDE HYDROCHLORIDE AND TIMOLOL MALEATE 1 DROP: 22.3; 6.8 SOLUTION/ DROPS OPHTHALMIC at 08:16

## 2022-02-20 RX ADMIN — FAMOTIDINE 20 MG: 20 TABLET ORAL at 08:15

## 2022-02-20 RX ADMIN — ESCITALOPRAM 5 MG: 5 TABLET, FILM COATED ORAL at 08:15

## 2022-02-20 RX ADMIN — TRAMADOL HYDROCHLORIDE 25 MG: 50 TABLET ORAL at 21:39

## 2022-02-20 RX ADMIN — CEFDINIR 300 MG: 300 CAPSULE ORAL at 08:15

## 2022-02-20 RX ADMIN — CARVEDILOL 6.25 MG: 6.25 TABLET, FILM COATED ORAL at 08:15

## 2022-02-20 RX ADMIN — ESCITALOPRAM OXALATE 10 MG: 10 TABLET ORAL at 14:08

## 2022-02-20 RX ADMIN — GUAIFENESIN 600 MG: 600 TABLET, EXTENDED RELEASE ORAL at 21:39

## 2022-02-20 RX ADMIN — SENNOSIDES AND DOCUSATE SODIUM 1 TABLET: 50; 8.6 TABLET ORAL at 08:15

## 2022-02-20 RX ADMIN — DORZOLAMIDE HYDROCHLORIDE AND TIMOLOL MALEATE 1 DROP: 22.3; 6.8 SOLUTION/ DROPS OPHTHALMIC at 22:01

## 2022-02-20 RX ADMIN — POLYETHYLENE GLYCOL 3350 17 G: 17 POWDER, FOR SOLUTION ORAL at 08:16

## 2022-02-20 RX ADMIN — ASPIRIN 325 MG: 325 TABLET, COATED ORAL at 08:15

## 2022-02-20 RX ADMIN — GUAIFENESIN 10 ML: 200 SOLUTION ORAL at 06:41

## 2022-02-20 RX ADMIN — GUAIFENESIN 10 ML: 200 SOLUTION ORAL at 21:40

## 2022-02-20 RX ADMIN — ATORVASTATIN CALCIUM 40 MG: 40 TABLET, FILM COATED ORAL at 21:39

## 2022-02-20 RX ADMIN — CARVEDILOL 6.25 MG: 6.25 TABLET, FILM COATED ORAL at 18:46

## 2022-02-20 RX ADMIN — ACETAMINOPHEN 650 MG: 325 TABLET, FILM COATED ORAL at 06:41

## 2022-02-20 RX ADMIN — LATANOPROST 1 DROP: 50 SOLUTION OPHTHALMIC at 22:02

## 2022-02-20 RX ADMIN — FAMOTIDINE 20 MG: 20 TABLET ORAL at 21:39

## 2022-02-20 RX ADMIN — CEFDINIR 300 MG: 300 CAPSULE ORAL at 21:39

## 2022-02-20 RX ADMIN — ASPIRIN 325 MG: 325 TABLET, COATED ORAL at 21:39

## 2022-02-20 RX ADMIN — GUAIFENESIN 600 MG: 600 TABLET, EXTENDED RELEASE ORAL at 09:39

## 2022-02-20 RX ADMIN — AMLODIPINE BESYLATE 2.5 MG: 2.5 TABLET ORAL at 08:15

## 2022-02-20 RX ADMIN — SENNOSIDES AND DOCUSATE SODIUM 1 TABLET: 50; 8.6 TABLET ORAL at 21:39

## 2022-02-20 ASSESSMENT — ACTIVITIES OF DAILY LIVING (ADL)
ADLS_ACUITY_SCORE: 20
ADLS_ACUITY_SCORE: 21
ADLS_ACUITY_SCORE: 19
ADLS_ACUITY_SCORE: 23
ADLS_ACUITY_SCORE: 23
ADLS_ACUITY_SCORE: 21
ADLS_ACUITY_SCORE: 23
ADLS_ACUITY_SCORE: 21
ADLS_ACUITY_SCORE: 23
ADLS_ACUITY_SCORE: 20
ADLS_ACUITY_SCORE: 21
ADLS_ACUITY_SCORE: 20
ADLS_ACUITY_SCORE: 21
ADLS_ACUITY_SCORE: 19
ADLS_ACUITY_SCORE: 21
ADLS_ACUITY_SCORE: 21
ADLS_ACUITY_SCORE: 23
ADLS_ACUITY_SCORE: 20
ADLS_ACUITY_SCORE: 21
ADLS_ACUITY_SCORE: 24
ADLS_ACUITY_SCORE: 23
ADLS_ACUITY_SCORE: 19

## 2022-02-20 NOTE — PROGRESS NOTES
Orthopedic Surgery  Alejandrina Whatley  2/20/2022  Admit Date:  2/15/2022  POD 4 Days Post-Op  S/P Procedure(s):  Closed reduction percutaneous screw fixation of a left nondisplaced femoral neck fracture    Patient attempting a bowel movement after suppository, family member present  Pain appears controlled.    Tolerating oral intake.    No events overnight.   Denies chest pain or shortness of breath   Alert and orient to person only    Vital Sign Ranges  /67 (BP Location: Right arm)   Pulse 76   Temp 97.2  F (36.2  C) (Temporal)   Resp 16   SpO2 94%     Unlabored breathing without audible wheeze   Dressing is clean, dry, and intact.   Minimal erythema of the surrounding skin.   Bilateral calves are soft, non-tender.  Bilateral lower extremity is NVI.  SILT bilateral lower extremities  5/5 motor with resisted dorsi and plantar flexion bilaterally  +Dp pulse    Labs:  Hemoglobin   Date Value Ref Range Status   02/18/2022 9.3 (L) 11.7 - 15.7 g/dL Final   03/03/2020 10.8 (L) 12.0 - 16.0 g/dL Final     Platelet Count   Date Value Ref Range Status   02/18/2022 129 (L) 150 - 450 10e3/uL Final   03/03/2020 267 140 - 440 thou/uL Final         A/P  1. S/p left femoral neck fracture with CRPP              Continue ASA for DVT prophylaxis.                Mobilize with PT/OT               WBAT with walker if able, patient typically uses WC              Leave dressing intact.                Continue current pain regimen.     2. Disposition              Anticipate d/c to TCU based on placement and medical clearance.    Florentino Esparza PA-C

## 2022-02-20 NOTE — PROGRESS NOTES
02/20/22 0824   Signing Clinician's Name / Credentials   Signing clinician's name / credentials Alejandrina Biggs DPT   Quick Adds   Rehab Discipline PT   Therapeutic Activity   Minutes of Treatment 15 minutes   Symptoms Noted During/After Treatment Fatigue;Increased pain   Treatment Detail Pt was supine in bed upon entry into room. Pt agreeable to working with PT on OOB activity. Pt was cued for supine to sit, needing min A for transition, pt did use the bed rail for ease of transition. Pt was cued for scooting forward, able to perform well this am. Pt was cued for sit to stand with FWW, cues for hand placement on bed for standing. Pt cued for pre-gait activity, needing reminders and manual cue at L knee/quad area for improving stability to LE with R LE wt bearing. Pt was cued for 5 steps forward with manual cues needed at the L knee throughout. Pt was cued for transition into seated position. Pt was able to perform with min A. Pt up in chair at end of session. PT did call nutrition to be sure breakfast was ordered and it was already scheduled. Pt RN updated that pt was A x 1 with walker with manual cues for L LE stabilization.    PT Discharge Planning   PT Discharge Recommendation (DC Rec) home with assist;home with home care physical therapy;Transitional Care Facility   PT Rationale for DC Rec Pt was A x 1 for transfer to recliner this am. Yesterday did need A x 2 for toileting just for improving safety. If group home/[facility] able to provide this pt could return to previous living environment. If facility unable to provide this level of assist would require TCU.    PT Brief overview of current status Pt A x 1 with FWW for bed mob and transfer to recliner at this time.    Additional Documentation   PT Plan PT: practice transfers, improved weight bearing/weight shift   Total Session Time   Total Session Time (minutes) 15 minutes

## 2022-02-20 NOTE — PROGRESS NOTES
[facility]  Hospitalist Progress Note  Jerome Nur MD 02/20/2022    Reason for Stay (Diagnosis): femur fx         Assessment and Plan:      Summary of Stay: Alejandrina Whatley is a 89 year old female with a history of HLD, CVA, Left Hemiparesis, Dementia, Depression, CED, AAA, and HTN.  She presented to the hospital with left hip pain after a fall.  imaging demonstrated a left femur fracture.  The patient underwent percutaneous screw fixation     SW is assisting with placement in TCU.  Pt is medically stable for discharge when safe disposition plan is in place.      Plans today:  - pt with cough today.  No fever or hypoxia.  Is on oral cephalosporin for UTI since 2/18  - ordered Mucinex  - CXR for completeness     1.  Acute left femur fracture.  Status post traumatic fall.  -Status post repair by orthopedic surgery on 2/16.  -Pain medications as needed.    -Use incentive spirometry.     2.  Hypertension.  -Continue carvedilol and amlodpine     3.  Hyperlipidemia.  -Continue atorvastatin.     4.  Depression.  -Continue Lexapro.     5.  Dementia complicated by nocturnal agitation.  -Schedule melatonin 3 mg at bedtime.  -IV haloperidol if needed.     6.  Mildly abnormal urinalysis with possible UTI.    - urine cx positive for proteus  - given poor historian and inability to provide accurate hx combined with persistent leukocytosis will treat for presumed UTI with PO cephalosporin (allergies noted).  Anticipate brief course; antibiotic started 2/18        7.  Acute blood loss anemia.  Likely multifactorial due to trauma with acute fracture and need for above-noted surgical intervention.    - no need for transfusion     8.  Cough  - pt with cough today.  No fever or hypoxia.  Is on oral cephalosporin for UTI  - ordered Mucinex  - CXR for completeness        Diet: Advance Diet as Tolerated: Regular Diet Adult    DVT Prophylaxis: per orthopedic service.  Aspirin, pneumatic compression devices  Clark  Catheter: Not present  Central Lines: None  Cardiac Monitoring: None  Code Status: No CPR- Do NOT Intubate    DISPO:  medically stable for discharge when safe disposition found.  SW assisting with TCU placement           Interval History (Subjective):      C/o intermittent cough.  Pain controlled.  No fever. Not hypoxic.   Await TCU placement.                    Physical Exam:      Last Vital Signs:  /67 (BP Location: Right arm)   Pulse 76   Temp 97.2  F (36.2  C) (Temporal)   Resp 16   SpO2 94%       Intake/Output Summary (Last 24 hours) at 2/20/2022 1202  Last data filed at 2/20/2022 0900  Gross per 24 hour   Intake 360 ml   Output 200 ml   Net 160 ml       Constitutional: Awake, alert, cooperative, no apparent distress. Sitting in bedside chair eating breakfast with help of a nursing student   Respiratory: Clear to auscultation bilaterally, no crackles or wheezing   Cardiovascular: Regular rate and rhythm, normal S1 and S2, and no murmur noted   Abdomen: Normal bowel sounds, soft, non-distended, non-tender   Skin: No rashes, no cyanosis, dry to touch   Neuro: Alert and oriented x3, no weakness, numbness, memory loss   Extremities: No edema, normal range of motion   Other(s):        All other systems: Negative          Medications:      All current medications were reviewed with changes reflected in problem list.         Data:      All new lab and imaging data was reviewed.   Labs:  Recent Labs   Lab 02/18/22  0706 02/17/22  0651 02/16/22  1143 02/15/22  1350    140  --  140   POTASSIUM 4.2 4.1  --  4.3   CHLORIDE 111* 110*  --  109   CO2 25 22  --  25   ANIONGAP 5 8  --  6   * 142*  --  114*   BUN 35* 28  --  31*   CR 0.94 0.89 0.94 1.00   GFRESTIMATED 55* 58* 55* 51*   TRANG 8.3* 8.1*  --  9.3     Recent Labs   Lab 02/18/22  0706   WBC 11.8*   HGB 9.3*   HCT 29.3*   *   *      Imaging:   No results found for this or any previous visit (from the past 24 hour(s)).

## 2022-02-20 NOTE — PLAN OF CARE
Goal Outcome Evaluation:    Plan of Care Reviewed With: patient     Overall Patient Progress: improving    Outcome Evaluation: [facility] plan to discharge to TCU when medically ready.        Patient alert and oriented this shift. Able to ambulate short distances with assist of 1 with gbaw. Needs manual cues for left knee/thigh stability. Frequent productive cough present. Expiratory wheeze noted in upper lobes. New order for guaifenesin obtained. Pedal pulses strong. Denies pain. VSS. Purewick in place. Aquacel on hip is CDI.

## 2022-02-20 NOTE — PLAN OF CARE
Patient vital signs are at baseline: Yes.  Patient able to ambulate as they were prior to admission or with assist devices provided by therapies during their stay:  No,  Reason:  Ax2 with the tin steady and gait belt.  Patient MUST void prior to discharge:  Yes; purewick in place overnight.  Patient able to tolerate oral intake:  Yes; tolerating a regular diet; needs assistance with eating.   Pain has adequate pain control using Oral analgesics:  Yes; taking scheduled pain meds.    Oriented to self only; confused; disoriented to place, time and situation. No IV access. Lungs clear. Generalized bruises. CMS intact; aquacel CDI. Bowel sounds active.  Plan: Waiting for TCU placement. See social work note for more details.

## 2022-02-20 NOTE — PLAN OF CARE
Evening RN    Patient vital signs are at baseline: Yes  Patient able to ambulate as they were prior to admission or with assist devices provided by therapies during their stay:  No,  Reason:  A2 with tin steady.  Patient MUST void prior to discharge:  Yes  Patient able to tolerate oral intake:  Yes  Pt has adequate pain control using Oral analgesics:  Yes    Pt A/O x self.  Pleasant and cooperative this shift, has not been impulsive.  VSS and afebrile.  Pain managed adequately with prn PO Tylenol and scheduled PO tramadol at HS.  Pt has a frequent congested cough, however lung sounds clear - IS encouraged and Robitussin given x1.  CMS intact.  Dressing CDI.  Up A2 with Tin Steady and gait belt.  Voiding adequately - purewick in place d/t baseline incontinence.  Tolerating regular diet well.  Plan is TCU vs return to group home on discharge.  Will continue to monitor.

## 2022-02-21 ENCOUNTER — APPOINTMENT (OUTPATIENT)
Dept: PHYSICAL THERAPY | Facility: CLINIC | Age: 87
DRG: 481 | End: 2022-02-21
Payer: COMMERCIAL

## 2022-02-21 PROCEDURE — 250N000013 HC RX MED GY IP 250 OP 250 PS 637: Performed by: PHYSICIAN ASSISTANT

## 2022-02-21 PROCEDURE — 97530 THERAPEUTIC ACTIVITIES: CPT | Mod: GP | Performed by: PHYSICAL THERAPIST

## 2022-02-21 PROCEDURE — 250N000013 HC RX MED GY IP 250 OP 250 PS 637: Performed by: INTERNAL MEDICINE

## 2022-02-21 PROCEDURE — 120N000001 HC R&B MED SURG/OB

## 2022-02-21 PROCEDURE — 99232 SBSQ HOSP IP/OBS MODERATE 35: CPT | Performed by: INTERNAL MEDICINE

## 2022-02-21 RX ORDER — ACETAMINOPHEN 325 MG/1
975 TABLET ORAL 3 TIMES DAILY
Status: DISCONTINUED | OUTPATIENT
Start: 2022-02-21 | End: 2022-02-28

## 2022-02-21 RX ADMIN — Medication 3 MG: at 21:12

## 2022-02-21 RX ADMIN — GUAIFENESIN 600 MG: 600 TABLET, EXTENDED RELEASE ORAL at 08:34

## 2022-02-21 RX ADMIN — ESCITALOPRAM OXALATE 10 MG: 10 TABLET ORAL at 13:21

## 2022-02-21 RX ADMIN — POLYETHYLENE GLYCOL 3350 17 G: 17 POWDER, FOR SOLUTION ORAL at 08:32

## 2022-02-21 RX ADMIN — LATANOPROST 1 DROP: 50 SOLUTION OPHTHALMIC at 21:11

## 2022-02-21 RX ADMIN — ACETAMINOPHEN 975 MG: 325 TABLET, FILM COATED ORAL at 13:21

## 2022-02-21 RX ADMIN — GUAIFENESIN 10 ML: 200 SOLUTION ORAL at 02:13

## 2022-02-21 RX ADMIN — ACETAMINOPHEN 975 MG: 325 TABLET, FILM COATED ORAL at 20:45

## 2022-02-21 RX ADMIN — CARVEDILOL 6.25 MG: 6.25 TABLET, FILM COATED ORAL at 08:33

## 2022-02-21 RX ADMIN — SENNOSIDES AND DOCUSATE SODIUM 1 TABLET: 50; 8.6 TABLET ORAL at 08:33

## 2022-02-21 RX ADMIN — ASPIRIN 325 MG: 325 TABLET, COATED ORAL at 20:45

## 2022-02-21 RX ADMIN — ESCITALOPRAM 5 MG: 5 TABLET, FILM COATED ORAL at 08:33

## 2022-02-21 RX ADMIN — ASPIRIN 325 MG: 325 TABLET, COATED ORAL at 08:32

## 2022-02-21 RX ADMIN — CEFDINIR 300 MG: 300 CAPSULE ORAL at 20:45

## 2022-02-21 RX ADMIN — TRAMADOL HYDROCHLORIDE 25 MG: 50 TABLET ORAL at 21:11

## 2022-02-21 RX ADMIN — AMLODIPINE BESYLATE 2.5 MG: 2.5 TABLET ORAL at 08:34

## 2022-02-21 RX ADMIN — ATORVASTATIN CALCIUM 40 MG: 40 TABLET, FILM COATED ORAL at 20:45

## 2022-02-21 RX ADMIN — ACETAMINOPHEN 650 MG: 325 TABLET, FILM COATED ORAL at 02:13

## 2022-02-21 RX ADMIN — CEFDINIR 300 MG: 300 CAPSULE ORAL at 08:33

## 2022-02-21 RX ADMIN — FAMOTIDINE 20 MG: 20 TABLET ORAL at 20:45

## 2022-02-21 RX ADMIN — DORZOLAMIDE HYDROCHLORIDE AND TIMOLOL MALEATE 1 DROP: 22.3; 6.8 SOLUTION/ DROPS OPHTHALMIC at 21:08

## 2022-02-21 RX ADMIN — GUAIFENESIN 600 MG: 600 TABLET, EXTENDED RELEASE ORAL at 20:45

## 2022-02-21 RX ADMIN — SENNOSIDES AND DOCUSATE SODIUM 1 TABLET: 50; 8.6 TABLET ORAL at 20:45

## 2022-02-21 RX ADMIN — FAMOTIDINE 20 MG: 20 TABLET ORAL at 08:33

## 2022-02-21 ASSESSMENT — ACTIVITIES OF DAILY LIVING (ADL)
ADLS_ACUITY_SCORE: 20

## 2022-02-21 NOTE — PROGRESS NOTES
Care Management Follow Up    Length of Stay (days): 6    Expected Discharge Date: 02/22/2022     Concerns to be Addressed:       Patient plan of care discussed at interdisciplinary rounds: Yes    Anticipated Discharge Disposition: Transitional Care, Skilled Nursing Facilty  Disposition Comments: [facility] care plan to dischage to TCU when medically ready.  Anticipated Discharge Services:  OT, PT  Anticipated Discharge DME: None      Patient/family educated on Medicare website which has current facility and service quality ratings: yes  Education Provided on the Discharge Plan: Yes   Patient/Family in Agreement with the Plan: yes    Referrals Placed by CM/SW: Transportation, Post Acute Facilities  Private pay costs discussed: private room/amenity fees and transportation costs    Additional Information:  CM followed up on referrals already sent:   [facility] with their Admissions departmnt at [phone]  [facility] - telephone number invalid. Does not show on search.     New referrals sent to:   [facility], [facility], [facility].   Unable to pull up [facility] in [facility]. Looked up on Medicare website. Called & left message with Admissions for fax number to send referral to. Await return call.     RN CC will continue to follow for discharge planning.    Pat Segovia RN, BSN, CPHN, CM  Inpatient Care Coordination - [facility]  [phone]    Addendum 3:39pm - CM received return call from [facility] ([facility] in [place]. They provided CM with their fax number. Faxed initial SNF referral to [phone]. Await response.   adelina

## 2022-02-21 NOTE — PLAN OF CARE
Patient vital signs are at baseline: Yes.  Patient able to ambulate as they were prior to admission or with assist devices provided by therapies during their stay:  No,  Reason:  Ax2 with the tin steady and gait belt.  Patient MUST void prior to discharge:  Yes; purewick in place overnight.  Patient able to tolerate oral intake:  Yes; tolerating a regular diet; needs assistance with eating.   Pain has adequate pain control using Oral analgesics:  Yes; taking tylenol and scheduled tramadol.    Oriented to self only; confused; disoriented to place, time and situation. No IV access. Lungs rhonchi and expiratory wheezes. Congested, productive cough with clear sputum. Toaking PRN robitussin. Generalized bruises. CMS intact; aquacel CDI. Bowel sounds active.  Plan: Waiting for TCU placement. Daughter is refusing to have her mom discharge back to her group home. See social work note for more details.

## 2022-02-21 NOTE — PLAN OF CARE
[id number]    Goal Outcome Evaluation:    Plan of Care Reviewed With: patient     Overall Patient Progress: improving    Outcome Evaluation: ALPESH Ahmadi plan to discharge to TCU when medically ready.    Patient vital signs are at baseline: Yes afebrile. RA  Patient able to ambulate as they were prior to admission or with assist devices provided by therapies during their stay:  No,  Reason:  pt WBAT to pivot to reclincer or BSC but daughter said pt can not do it and and only wants assist of 2 gait belt and tin steady.  Patient MUST void prior to discharge:  Yes  Patient able to tolerate oral intake:  Yes  Pain has adequate pain control using Oral analgesics:  Yes pt has new order for scheduled Tylenol TID and Ultram at HS. Pt denies pain  LS expiratory wheezing at times. Clear productive cough at times. Scheduled mucinex. PO Omnicef continued. Tolerated regular diet. +bs/flatus. Dressing c/d/I. Cms intact. Denies  nausea. NO IV site. Daughter at bedside. Social work/care coordinator working on TCU placement. Will continue to monitor    [facility] will come do a 1:! assesment tomorrow if they give the ok and accept her.  pt will discharge there.

## 2022-02-21 NOTE — PROGRESS NOTES
[facility]  Hospitalist Progress Note  Jerome Nur MD 02/21/2022    Reason for Stay (Diagnosis): femur fx         Assessment and Plan:      Summary of Stay: Alejandrina Whatley is a 89 year old female with a history of HLD, CVA, Left Hemiparesis, Dementia, Depression, CED, AAA, and HTN.  She presented to the hospital with left hip pain after a fall.  imaging demonstrated a left femur fracture.  The patient underwent percutaneous screw fixation     SW is assisting with placement in TCU.  Pt is medically stable for discharge when safe disposition plan is in place.       Plans today:  - await disposition     1.  Acute left femur fracture.  Status post traumatic fall.  -Status post repair by orthopedic surgery on 2/16.  -Pain medications as needed.    -Use incentive spirometry.     2.  Hypertension.  -Continue carvedilol and amlodpine     3.  Hyperlipidemia.  -Continue atorvastatin.     4.  Depression.  -Continue Lexapro.     5.  Dementia  -Schedule melatonin 3 mg at bedtime.  -IV haloperidol if needed.  - had some agitation earlier in hospital stay; none recently     6.  Mildly abnormal urinalysis with possible UTI.    - urine cx positive for proteus  - given poor historian and inability to provide accurate hx combined with persistent leukocytosis will treat for presumed UTI with PO cephalosporin (allergies noted).  Anticipate brief course; antibiotic started 2/18        7.  Acute blood loss anemia.  Likely multifactorial due to trauma with acute fracture and need for above-noted surgical intervention.    - no need for transfusion      8.  Cough  - improved/resolved  - not hypoxic  - no fever  - ordered Mucinex  - CXR 2/20 showed clear lungs, no infiltrate        Diet: Advance Diet as Tolerated: Regular Diet Adult    DVT Prophylaxis: per orthopedic service.  Aspirin, pneumatic compression devices  Clark Catheter: Not present  Central Lines: None  Cardiac Monitoring: None  Code Status: No CPR- Do  NOT Intubate    DISPO:  medically stable for discharge when safe disposition found.  SW assisting with TCU placement        Interval History (Subjective):      Poor historian.  Feels well this AM.  Cough improved.  Reviewed CXR results with her.  Await placement                  Physical Exam:      Last Vital Signs:  /59   Pulse 72   Temp 97.2  F (36.2  C) (Temporal)   Resp 18   SpO2 92%       Intake/Output Summary (Last 24 hours) at 2/21/2022 1254  Last data filed at 2/21/2022 0900  Gross per 24 hour   Intake 250 ml   Output 450 ml   Net -200 ml       Constitutional: Awake, alert, cooperative, no apparent distress   Respiratory: Clear to auscultation bilaterally, no crackles or wheezing   Cardiovascular: Regular rate and rhythm, normal S1 and S2, and no murmur noted   Abdomen: Normal bowel sounds, soft, non-distended, non-tender   Skin: No rashes, no cyanosis, dry to touch   Neuro: Alert and awake, some confusion c/w dementia hx, no weakness, numbness, + memory loss   Extremities: No edema, normal range of motion   Other(s): L hip incision covered with dressing.  No redness or induration; no TTP       All other systems: Negative          Medications:      All current medications were reviewed with changes reflected in problem list.         Data:      All new lab and imaging data was reviewed.   Labs:  Recent Labs   Lab 02/18/22  0706 02/17/22  0651 02/16/22  1143 02/15/22  1350    140  --  140   POTASSIUM 4.2 4.1  --  4.3   CHLORIDE 111* 110*  --  109   CO2 25 22  --  25   ANIONGAP 5 8  --  6   * 142*  --  114*   BUN 35* 28  --  31*   CR 0.94 0.89 0.94 1.00   GFRESTIMATED 55* 58* 55* 51*   TRANG 8.3* 8.1*  --  9.3     No results for input(s): NTBNPI, NTBNP in the last 168 hours.  Recent Labs   Lab 02/18/22  0706   WBC 11.8*   HGB 9.3*   HCT 29.3*   *   *      Imaging:   Recent Results (from the past 24 hour(s))   XR Chest Port 1 View    Narrative    EXAM: XR CHEST PORT 1  VIEW  LOCATION: [facility]  DATE/TIME: 2/20/2022 4:05 PM    INDICATION: cough.  compare to previous film 2 15 22  COMPARISON: 02/15/2022, 09/20/2021 and older studies        Impression    IMPRESSION: Slightly better inspiration. Fine interstitial opacities noted on the prior study have cleared. No signs of pneumonia or failure. Heart and pulmonary vascularity are normal. 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   Severe degenerative changes are noted in both shoulders.

## 2022-02-21 NOTE — PROGRESS NOTES
[id number]     Pt was pleasant during shift. Ate little of dinner. BM & voided. CXR done per daughter request - negative - results told to patient and family.     Pt daughter in room and caused trouble with staff for the short shift. Accused writer of rolling eyes. Claimed patient almost fell when patient transferred from bed to chair. Writer, GREGOR Contreras, and Charge nurse Brooke present in room and witnessed transfer of A1 with gait and walker - per therapy note and report. Pt tolerated well enough per writers judgement. Transferred patient back from chair with S.S. per family request. Pt was in more pain transferring with the S.S. then with A1 with gait belt. Charge nurse aware    Blood pressure (!) 144/62, pulse 79, temperature 97.1  F (36.2  C), temperature source Temporal, resp. rate 16, SpO2 93 %.

## 2022-02-21 NOTE — PROGRESS NOTES
[facility] RN Trauma Coordinator:    Daryl Stewart called with some additional choices for TCU:    1. [facility]  2. [facility]   3. [facility]  4.[facility]  5. [facility].    Writer would appreciate if care coordination team would send referrals and  follow up with daryl Stewart on bed availability.    Jacquelyn Castillo RN   Trauma Coordinator     [facility]  [place]  [address]   [place] [address]  T: [phone]  C. [phone]  F: [phone]  E: [email or web address]    [email or web address]

## 2022-02-21 NOTE — PROGRESS NOTES
Care Management Follow Up    Length of Stay (days): 6    Expected Discharge Date: 02/22/2022     Concerns to be Addressed: discharge planning  Patient plan of care discussed at interdisciplinary rounds: Yes    Anticipated Discharge Disposition: TCU vs return to [facility]   Anticipated Discharge Services:    Anticipated Discharge DME:      Patient/family educated on Medicare website which has current facility and service quality ratings: yes  Education Provided on the Discharge Plan:    Patient/Family in Agreement with the Plan: yes    Referrals Placed by CM/SW: Skilled nursing facilities  Private pay costs discussed: transportation costs    Additional Information:  Reviewed pt's status and discussed in rounds.     SW met with pt/pt's dtr aPt to discuss discharge planning. SW explained barrier to TCU placement is that pt is not vaccinated and will need a private room. Writer inquired about possibility of pt returning to [facility]  if they are able to accommodate pt back. Pat stated she is not against pt returning there however she feels they are not able to manage pt at this time. Pat provided writer with contact to the  (No p:[phone]) and gave permission to writer to call them. Again discussed TCU and likely need to expand search. Then discussed with TCU comes working with therapy daily and making improvements. Pat did share she was not sure how much her mom would improve in TCU setting. Briefly discussed palliative care and how their team can assist with goals of care discussions. Pat stated she would be in agreement with a palliative care consult.     Discussed with MD who is in agreement with palliative care consult.     HARI plans to call [facility]  tomorrow after pt works with PT.    Social work will continue to follow and assist with discharge planning as needed.    DONN HanksW, LSW  Inpatient Care Coordination  Ortho Unit,   [facility]  [phone]    DONN RamosW

## 2022-02-21 NOTE — PROGRESS NOTES
Orthopedic Surgery  Alejandrina Whatley  2022  Admit Date:  2/15/2022  POD: 5 Days Post-Op   Procedure(s):  Closed reduction percutaneous screw fixation of a left nondisplaced femoral neck fracture    Patient resting comfortably in bed.    Pain controlled.  Tolerating oral intake.      Vital Sign Ranges  Temperature Temp  Av.5  F (36.4  C)  Min: 97.1  F (36.2  C)  Max: 98.2  F (36.8  C)   Blood pressure Systolic (24hrs), Av , Min:122 , Max:144        Diastolic (24hrs), Av, Min:57, Max:67      Pulse Pulse  Av  Min: 69  Max: 79   Respirations Resp  Av.5  Min: 16  Max: 18   Pulse oximetry SpO2  Av.3 %  Min: 92 %  Max: 93 %       Dressing is clean, dry, and intact. Scant drainage.   Minimal erythema of the surrounding skin.   Bilateral calves are soft, non-tender.  Left lower extremity is NVI.  Sensation intact bilateral lower extremities  Weakness with dorsi flexion on the left (chronic since stroke)  +Dp pulse    Labs:  Recent Labs   Lab Test 22  0706 22  0651 02/15/22  1350   WBC 11.8* 11.3* 12.6*     Recent Labs   Lab Test 22  0706 22  0651 02/15/22  1350   HGB 9.3* 10.0* 12.0     Recent Labs   Lab Test 21  1440 20  1927   INR 1.11 1.06     Recent Labs   Lab Test 22  0706 22  0651 02/15/22  1350   * 140* 163     A/P  1. S/p left femoral neck fracture with CRPP              Continue ASA for DVT prophylaxis.                Mobilize with PT/OT               WBAT with walker for pivot transfers (primarily wheelchair bound from previous stroke)                Leave dressing intact.                Continue current pain regimen.     2. Disposition              Anticipate d/c to TCU based on placement and medical clearance.  Ortho stable.     Kiersten Valdes PA-C

## 2022-02-22 ENCOUNTER — APPOINTMENT (OUTPATIENT)
Dept: PHYSICAL THERAPY | Facility: CLINIC | Age: 87
DRG: 481 | End: 2022-02-22
Payer: COMMERCIAL

## 2022-02-22 PROCEDURE — 99207 PR CONSULT E&M CHANGED TO INITIAL LEVEL: CPT | Performed by: NURSE PRACTITIONER

## 2022-02-22 PROCEDURE — 250N000013 HC RX MED GY IP 250 OP 250 PS 637: Performed by: NURSE PRACTITIONER

## 2022-02-22 PROCEDURE — 250N000013 HC RX MED GY IP 250 OP 250 PS 637: Performed by: INTERNAL MEDICINE

## 2022-02-22 PROCEDURE — 250N000013 HC RX MED GY IP 250 OP 250 PS 637: Performed by: PHYSICIAN ASSISTANT

## 2022-02-22 PROCEDURE — 99232 SBSQ HOSP IP/OBS MODERATE 35: CPT | Performed by: INTERNAL MEDICINE

## 2022-02-22 PROCEDURE — 120N000001 HC R&B MED SURG/OB

## 2022-02-22 PROCEDURE — 99223 1ST HOSP IP/OBS HIGH 75: CPT | Performed by: NURSE PRACTITIONER

## 2022-02-22 PROCEDURE — 97530 THERAPEUTIC ACTIVITIES: CPT | Mod: GP | Performed by: PHYSICAL THERAPIST

## 2022-02-22 RX ORDER — HYDROMORPHONE HCL IN WATER/PF 6 MG/30 ML
0.2 PATIENT CONTROLLED ANALGESIA SYRINGE INTRAVENOUS
Status: DISCONTINUED | OUTPATIENT
Start: 2022-02-22 | End: 2022-03-12 | Stop reason: HOSPADM

## 2022-02-22 RX ORDER — HYDROMORPHONE HCL IN WATER/PF 6 MG/30 ML
0.1 PATIENT CONTROLLED ANALGESIA SYRINGE INTRAVENOUS
Status: DISCONTINUED | OUTPATIENT
Start: 2022-02-22 | End: 2022-03-12 | Stop reason: HOSPADM

## 2022-02-22 RX ORDER — LIDOCAINE 4 G/G
1 PATCH TOPICAL
Status: DISCONTINUED | OUTPATIENT
Start: 2022-02-22 | End: 2022-02-28

## 2022-02-22 RX ADMIN — POLYETHYLENE GLYCOL 3350 17 G: 17 POWDER, FOR SOLUTION ORAL at 09:11

## 2022-02-22 RX ADMIN — DICLOFENAC SODIUM 2 G: 10 GEL TOPICAL at 20:30

## 2022-02-22 RX ADMIN — AMLODIPINE BESYLATE 2.5 MG: 2.5 TABLET ORAL at 09:11

## 2022-02-22 RX ADMIN — Medication 3 MG: at 21:31

## 2022-02-22 RX ADMIN — CEFDINIR 300 MG: 300 CAPSULE ORAL at 20:25

## 2022-02-22 RX ADMIN — LIDOCAINE 1 PATCH: 246 PATCH TOPICAL at 20:25

## 2022-02-22 RX ADMIN — FAMOTIDINE 20 MG: 20 TABLET ORAL at 09:11

## 2022-02-22 RX ADMIN — DORZOLAMIDE HYDROCHLORIDE AND TIMOLOL MALEATE 1 DROP: 22.3; 6.8 SOLUTION/ DROPS OPHTHALMIC at 09:12

## 2022-02-22 RX ADMIN — ACETAMINOPHEN 975 MG: 325 TABLET, FILM COATED ORAL at 09:11

## 2022-02-22 RX ADMIN — ACETAMINOPHEN 975 MG: 325 TABLET, FILM COATED ORAL at 20:27

## 2022-02-22 RX ADMIN — DORZOLAMIDE HYDROCHLORIDE AND TIMOLOL MALEATE 1 DROP: 22.3; 6.8 SOLUTION/ DROPS OPHTHALMIC at 20:30

## 2022-02-22 RX ADMIN — ASPIRIN 325 MG: 325 TABLET, COATED ORAL at 20:25

## 2022-02-22 RX ADMIN — FAMOTIDINE 20 MG: 20 TABLET ORAL at 20:25

## 2022-02-22 RX ADMIN — GUAIFENESIN 600 MG: 600 TABLET, EXTENDED RELEASE ORAL at 21:30

## 2022-02-22 RX ADMIN — LATANOPROST 1 DROP: 50 SOLUTION OPHTHALMIC at 21:33

## 2022-02-22 RX ADMIN — DICLOFENAC SODIUM 2 G: 10 GEL TOPICAL at 12:59

## 2022-02-22 RX ADMIN — ESCITALOPRAM OXALATE 10 MG: 10 TABLET ORAL at 14:09

## 2022-02-22 RX ADMIN — ATORVASTATIN CALCIUM 40 MG: 40 TABLET, FILM COATED ORAL at 20:25

## 2022-02-22 RX ADMIN — TRAMADOL HYDROCHLORIDE 25 MG: 50 TABLET ORAL at 21:30

## 2022-02-22 RX ADMIN — CARVEDILOL 6.25 MG: 6.25 TABLET, FILM COATED ORAL at 09:10

## 2022-02-22 RX ADMIN — ESCITALOPRAM 5 MG: 5 TABLET, FILM COATED ORAL at 09:10

## 2022-02-22 RX ADMIN — GUAIFENESIN 600 MG: 600 TABLET, EXTENDED RELEASE ORAL at 09:10

## 2022-02-22 RX ADMIN — CEFDINIR 300 MG: 300 CAPSULE ORAL at 09:10

## 2022-02-22 RX ADMIN — DICLOFENAC SODIUM 2 G: 10 GEL TOPICAL at 16:12

## 2022-02-22 RX ADMIN — ASPIRIN 325 MG: 325 TABLET, COATED ORAL at 09:10

## 2022-02-22 RX ADMIN — SENNOSIDES AND DOCUSATE SODIUM 1 TABLET: 50; 8.6 TABLET ORAL at 09:10

## 2022-02-22 RX ADMIN — ACETAMINOPHEN 975 MG: 325 TABLET, FILM COATED ORAL at 14:09

## 2022-02-22 ASSESSMENT — ACTIVITIES OF DAILY LIVING (ADL)
ADLS_ACUITY_SCORE: 22
ADLS_ACUITY_SCORE: 20
ADLS_ACUITY_SCORE: 22
ADLS_ACUITY_SCORE: 20
ADLS_ACUITY_SCORE: 22
ADLS_ACUITY_SCORE: 20
ADLS_ACUITY_SCORE: 22
ADLS_ACUITY_SCORE: 20
ADLS_ACUITY_SCORE: 20
ADLS_ACUITY_SCORE: 22
ADLS_ACUITY_SCORE: 20
ADLS_ACUITY_SCORE: 22
ADLS_ACUITY_SCORE: 20
ADLS_ACUITY_SCORE: 22
ADLS_ACUITY_SCORE: 20
ADLS_ACUITY_SCORE: 22
ADLS_ACUITY_SCORE: 20
ADLS_ACUITY_SCORE: 22
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
## 2022-02-22 NOTE — PLAN OF CARE
Goal Outcome Evaluation:    Plan of Care Reviewed With: patient, daughter     Overall Patient Progress: improving    Outcome Evaluation: [facility]CO [facility] plan to discharge to TCU when medically ready.    Afeb, vss, cms intact, aquacel with scant dried drainage. Up to the chair for meals with tin steady. Had large loose BM today incontinent before staff could get pt to the commode. Pure wick in place but pt seems to void around it having been wet twice with out much urine in the cannister. Daughter met with palliative care today to discuss options, some pain meds added to her regimen. Still waiting for TCU bed to become available.

## 2022-02-22 NOTE — PROGRESS NOTES
Orthopedic Surgery  Alejandrina Whatley  2022  Admit Date:  2/15/2022  POD: 6 Days Post-Op   Procedure(s):  Closed reduction percutaneous screw fixation of a left nondisplaced femoral neck fracture    Patient resting comfortably in bed.    Pain controlled.  Tolerating oral intake.      Vital Sign Ranges  Temperature Temp  Av  F (36.1  C)  Min: 96.8  F (36  C)  Max: 97.2  F (36.2  C)   Blood pressure Systolic (24hrs), Av , Min:100 , Max:133        Diastolic (24hrs), Av, Min:49, Max:64      Pulse Pulse  Av.5  Min: 66  Max: 71   Respirations Resp  Av  Min: 18  Max: 18   Pulse oximetry SpO2  Av %  Min: 93 %  Max: 97 %     Dressing is clean, dry, and intact. Scant drainage.   Minimal erythema of the surrounding skin.   Bilateral calves are soft, non-tender.  Left lower extremity is NVI.  Sensation intact bilateral lower extremities  Weakness with dorsi flexion on the left (chronic since stroke)  +Dp pulse    Labs:  Recent Labs   Lab Test 22  0706 22  0651 02/15/22  1350   WBC 11.8* 11.3* 12.6*     Recent Labs   Lab Test 22  0706 22  0651 02/15/22  1350   HGB 9.3* 10.0* 12.0     Recent Labs   Lab Test 21  1440 20  1927   INR 1.11 1.06     Recent Labs   Lab Test 22  0706 22  0651 02/15/22  1350   * 140* 163     A/P  1. S/p left femoral neck fracture with CRPP              Continue ASA for DVT prophylaxis.                Mobilize with PT/OT               WBAT with assist for pivot transfers (primarily wheelchair bound from previous stroke)                Leave dressing intact.                Continue current pain regimen.     2. Disposition              Anticipate d/c to care facility based on placement and medical clearance.  Ortho stable.     Kiersten Valdes PA-C

## 2022-02-22 NOTE — PROGRESS NOTES
[facility]  Hospitalist Progress Note  Jerome Nur MD 02/22/2022    Reason for Stay (Diagnosis): femur fx, s/p operative repair.  Await placement         Assessment and Plan:      Summary of Stay: Alejandrina Whatley is a 89 year old female with a history of HLD, CVA, Left Hemiparesis, Dementia, Depression, CED, AAA, and HTN.  She presented to the hospital with left hip pain after a fall.  imaging demonstrated a left femur fracture.  The patient underwent percutaneous screw fixation     SW is assisting with disposition.  Pt remains medically stable for discharge when safe disposition plan is in place.       Plans today:  - await disposition  - palliative care consulted to review goals of care with family (daughter)     1.  Acute left femur fracture.  Status post traumatic fall.  -Status post repair by orthopedic surgery on 2/16.  -Pain medications as needed.  pain appears controlled  -Use incentive spirometry.     2.  Hypertension.  -Continue carvedilol and amlodpine     3.  Hyperlipidemia.  -Continue atorvastatin.     4.  Depression.  -Continue Lexapro.     5.  Dementia  -Schedule melatonin 3 mg at bedtime.  -IV haloperidol if needed.  - had some agitation earlier in hospital stay; none recently  - appreciate palliative care consult to review goals of care with family (daughter) and they are interested in facility with hospice enrollment on discharge     6.  Mildly abnormal urinalysis with possible UTI.    - urine cx positive for proteus  - given poor historian and inability to provide accurate hx combined with persistent leukocytosis will treat for presumed UTI with PO cephalosporin (allergies noted).    - Anticipate brief antibiotic course; antibiotic started 2/18 and will order to stop on 2/23     7.  Acute blood loss anemia.  Likely multifactorial due to trauma with acute fracture and need for above-noted surgical intervention.    - no need for transfusion      8.  Cough  -  improved/resolved  - not hypoxic  - no fever  - ordered Mucinex  - CXR 2/20 showed clear lungs, no infiltrate        Diet: Advance Diet as Tolerated: Regular Diet Adult    DVT Prophylaxis: per orthopedic service.  Aspirin, pneumatic compression devices  Clark Catheter: Not present  Central Lines: None  Cardiac Monitoring: None  Code Status: No CPR- Do NOT Intubate    DISPO:  medically stable for discharge when safe disposition found.  SW assisting with disposition.  After meeting with palliative care, family (daughter) interested in facility with hospice enrollment on discharge        Interval History (Subjective):      Poor historian.  Denies pain.  Denies SOB.  Says she feels well today and has no complaints                  Physical Exam:      Last Vital Signs:  /58   Pulse 68   Temp 98  F (36.7  C) (Temporal)   Resp 14   SpO2 97%       Intake/Output Summary (Last 24 hours) at 2/22/2022 1247  Last data filed at 2/22/2022 0800  Gross per 24 hour   Intake 1320 ml   Output --   Net 1320 ml       Constitutional: Awake, alert, cooperative, no apparent distress   Respiratory: Clear to auscultation bilaterally, no crackles or wheezing   Cardiovascular: Regular rate and rhythm, normal S1 and S2, and no murmur noted   Abdomen: Normal bowel sounds, soft, non-distended, non-tender   Skin: No rashes, no cyanosis, dry to touch   Neuro: Alert and awake, no weakness, numbness, ++memory loss   Extremities: No LE edema, normal range of motion   Other(s): L hip incision site covered with dressing; no induration, TTP, or surrounding erythema       All other systems: Negative          Medications:      All current medications were reviewed with changes reflected in problem list.         Data:      All new lab and imaging data was reviewed.   Labs:  Recent Labs   Lab 02/18/22  0706 02/17/22  0651 02/16/22  1143 02/15/22  1350    140  --  140   POTASSIUM 4.2 4.1  --  4.3   CHLORIDE 111* 110*  --  109   CO2 25 22  --   25   ANIONGAP 5 8  --  6   * 142*  --  114*   BUN 35* 28  --  31*   CR 0.94 0.89 0.94 1.00   GFRESTIMATED 55* 58* 55* 51*   TRANG 8.3* 8.1*  --  9.3     Recent Labs   Lab 02/18/22  0706   WBC 11.8*   HGB 9.3*   HCT 29.3*   *   *      Imaging:   No results found for this or any previous visit (from the past 24 hour(s)).

## 2022-02-22 NOTE — PROGRESS NOTES
CLINICAL NUTRITION SERVICES  -  ASSESSMENT NOTE    Recommendations Ordered by Registered Dietitian (RD):   Diet per MD   Ordered Ensure Enlive PRN   Ordered new weight   Malnutrition:   % Weight Loss: unable to determine   % Intake:  Decreased intake does not meet criteria for malnutrition - possibly <75% PTA?  Subcutaneous Fat Loss:  Upper arm region mild depletion --> will not use given only one indicator   Muscle Loss:  Clavicle bone region mild depletion, Acromion bone region mild depletion, Scapular bone region mild depletion, Anterior thigh region mild depletion and Posterior calf region mild depletion --> in part due to age related sarcopenia?  Fluid Retention:  None noted    Malnutrition Diagnosis: Unable to determine due to lack of weight history      REASON FOR ASSESSMENT  Alejandrina Whatley is a 89 year old female seen by Registered Dietitian for LOS    NUTRITION HISTORY  - Information obtained from chart and patients daughter, of note pt is confused and disoriented to time, place and situation.   - History of HLD, CVA, Left Hemiparesis, Dementia, Depression, CED, AAA, HTN   - Pt admitted for acute Left Femoral Neck Fracture and age-indeterminate nondisplaced deformity of the left sacral ala and of the mid sacrum  - Typical food/fluid intake PTA: pt's daughter mentions that the patient has meals made for her at her group home T[facility], however the meals described are poor in nature per daughter (burnt pancakes, hot dogs) and the patient does not enjoy them. She will often only eat bites of food and has been doing so for 11 months.   - Supplements: none   - Food Allergies: NKFA    CURRENT NUTRITION ORDERS  Diet Order:     ADAT: Regular Diet     Current Intake/Tolerance:  Upon review of the flowsheets, pt is consuming % of meals ordered. Ordering measl TID as able with diet order.     NUTRITION FOCUSED PHYSICAL ASSESSMENT FOR DIAGNOSING MALNUTRITION)  Yes           Observed:    Muscle wasting (refer to  "documentation in Malnutrition section)    Obtained from Chart/Interdisciplinary Team:  - pt underwent \" Closed reduction percutaneous screw fixation of a left nondisplaced femoral neck fracture\" on 2/16  - palliative following    ANTHROPOMETRICS  Height: 4'10\" --> taken on 9/19/2021  Weight: 0 lbs 0 oz --> last weight 54.4 kg take on 9/19/2021  There is no height or weight on file to calculate BMI.  Weight Status:  Normal BMI  Weight History: No new weight to comment on - ordered. Daughter suspects usual body weight around 125 lbs.  Wt Readings from Last 10 Encounters:   09/19/21 54.4 kg (120 lb)   09/29/20 58.4 kg (128 lb 12.8 oz)   09/14/20 58.7 kg (129 lb 6.6 oz)   07/16/20 58.7 kg (129 lb 8 oz)   05/22/20 59.2 kg (130 lb 9.6 oz)   04/30/20 59.1 kg (130 lb 4.8 oz)   03/02/20 58.7 kg (129 lb 6.4 oz)   02/04/20 58.4 kg (128 lb 12.8 oz)   01/30/20 58.4 kg (128 lb 12.8 oz)   01/28/20 58.4 kg (128 lb 12.8 oz)     LABS  Labs reviewed    Labs:  Electrolytes  Potassium (mmol/L)   Date Value   02/18/2022 4.2   02/17/2022 4.1   02/15/2022 4.3   03/03/2020 3.9   02/05/2020 4.3   01/28/2020 4.2     Phosphorus (mg/dL)   Date Value   09/22/2021 3.6   09/21/2021 2.8   09/20/2021 3.5   09/19/2021 3.8   05/05/2011 3.4   01/11/2010 3.6   11/04/2009 4.0    Blood Glucose  Glucose (mg/dL)   Date Value   02/18/2022 127 (H)   02/17/2022 142 (H)   02/15/2022 114 (H)   09/20/2021 103 (H)   09/19/2021 95   03/03/2020 97   02/05/2020 88   01/28/2020 149 (H)   01/08/2020 154 (H)   12/07/2019 112 (H)     GLUCOSE BY METER POCT (mg/dL)   Date Value   09/21/2021 96   09/20/2021 105 (H)   09/20/2021 118 (H)   09/19/2021 100 (H)   09/19/2021 101 (H)     Hemoglobin A1C POCT (%)   Date Value   12/04/2019 6.0 (H)     Hemoglobin A1C (%)   Date Value   09/19/2021 5.8 (H)    Inflammatory Markers  CRP Cardiac Risk (mg/L)   Date Value   11/03/2011 1.0   05/05/2011 0.5   05/04/2009 2.5     WBC   Date Value   03/03/2020 10.3 thou/uL   02/05/2020 9.2 " thou/uL   01/28/2020 15.5 10e9/L (H)     WBC Count (10e3/uL)   Date Value   02/18/2022 11.8 (H)   02/17/2022 11.3 (H)   02/15/2022 12.6 (H)     Albumin (g/dL)   Date Value   12/07/2019 3.0 (L)   05/22/2019 3.4 (L)   10/02/2018 3.4 (L)   05/02/2018 3.1 (L)   10/26/2011 3.9      Magnesium (mg/dL)   Date Value   09/22/2021 2.6 (H)   09/21/2021 2.3   09/20/2021 2.3   05/05/2011 2.3   11/04/2009 2.3     Sodium (mmol/L)   Date Value   02/18/2022 141   02/17/2022 140   02/15/2022 140   03/03/2020 141   02/05/2020 138   01/28/2020 140    Renal  Urea Nitrogen (mg/dL)   Date Value   02/18/2022 35 (H)   02/17/2022 28   02/15/2022 31 (H)   03/03/2020 19   02/05/2020 19   01/28/2020 29     Creatinine (mg/dL)   Date Value   02/18/2022 0.94   02/17/2022 0.89   02/16/2022 0.94   03/03/2020 0.77   02/05/2020 0.78   01/28/2020 0.94     Additional  Triglycerides (mg/dL)   Date Value   09/19/2021 133   12/04/2019 139   05/22/2019 182 (H)   07/18/2013 146   10/26/2011 142     Ketones Urine (mg/dL)   Date Value   02/16/2022 Negative   12/07/2019 Negative        MEDICATIONS  Medications reviewed      acetaminophen  975 mg Oral TID     amLODIPine  2.5 mg Oral Daily     aspirin  325 mg Oral BID     atorvastatin  40 mg Oral QPM     carvedilol  6.25 mg Oral BID w/meals     cefdinir  300 mg Oral BID     dorzolamide-timolol  1 drop Both Eyes BID     escitalopram  5 mg Oral Daily     escitalopram  10 mg Oral Daily     famotidine  20 mg Oral BID     guaiFENesin  600 mg Oral BID     latanoprost  1 drop Both Eyes At Bedtime     melatonin  3 mg Oral At Bedtime     polyethylene glycol  17 g Oral Daily     senna-docusate  1 tablet Oral BID     traMADol  25 mg Oral At Bedtime         albuterol, sore throat lozenge, bisacodyl, guaiFENesin, haloperidol lactate, HYDROmorphone **OR** HYDROmorphone, hydrOXYzine, lidocaine 4%, lidocaine 4%, lidocaine (buffered or not buffered), lidocaine (buffered or not buffered), magnesium hydroxide, naloxone **OR**  naloxone **OR** naloxone **OR** naloxone, ondansetron **OR** ondansetron, prochlorperazine **OR** prochlorperazine, senna-docusate **OR** senna-docusate, sodium chloride (PF), sodium chloride (PF), traMADol     ASSESSED NUTRITION NEEDS PER APPROVED PRACTICE GUIDELINES:    Dosing Weight 54.4 kg --> last known weight, ordered  Estimated Energy Needs: 9424-8136 kcals (25-30 Kcal/Kg)  Justification: maintenance  Estimated Protein Needs: 65-82 grams protein (1.2-1.5 g pro/Kg)  Justification: post-op and preservation of lean body mass  Estimated Fluid Needs: per MD     MALNUTRITION:  % Weight Loss: unable to determine   % Intake:  Decreased intake does not meet criteria for malnutrition - possibly <75% PTA?  Subcutaneous Fat Loss:  Upper arm region mild depletion --> will not use given only one indicator   Muscle Loss:  Clavicle bone region mild depletion, Acromion bone region mild depletion, Scapular bone region mild depletion, Anterior thigh region mild depletion and Posterior calf region mild depletion --> in part due to age related sarcopenia?  Fluid Retention:  None noted    Malnutrition Diagnosis: Unable to determine due to lack of weight history     NUTRITION DIAGNOSIS:  Predicted inadequate nutrient intake (protein/energy) related to potential for PO intake to decline during admit pending clinical course     NUTRITION INTERVENTIONS  Recommendations / Nutrition Prescription  Diet per MD   Ordered Ensure Enlive PRN   Ordered new weight    Implementation  Nutrition education: Provided education on the importance of protein post-op   Medical Food Supplement: as above    Nutrition Goals  Pt to consume >/=75% of meals or oral nutritional supplements ordered TID     MONITORING AND EVALUATION:  Progress towards goals will be monitored and evaluated per protocol and Practice Guidelines    Mariza Krueger, RD, LD  Clinical Dietitian

## 2022-02-22 NOTE — PLAN OF CARE
Goal Outcome Evaluation:    Plan of Care Reviewed With: patient     Overall Patient Progress: improving    Outcome Evaluation: [facility] plan to discharge to TCU when medically ready.    Patient vital signs are at baseline: Yes  Patient able to ambulate as they were prior to admission or with assist devices provided by therapies during their stay:  No,  Reason:  Pt did not get up during night shift, per hand off report, pt is Ax2 with belt and Melany Steady. Pt turned during the night shift.   Patient MUST void prior to discharge:  Yes, pt did not void during the night, bladder scan 160 mL. Pt encouraged to drink more fluids.   Patient able to tolerate oral intake:  Yes, pt takes PO medications one at a time.   Pain has adequate pain control using Oral analgesics:  Yes, denies pain.     Plan is to discharge to TCU, but daughter is not sure that pt will improve in TCU setting, placement pending.  Daughter agreed to Palliative care consult.

## 2022-02-23 LAB — SARS-COV-2 RNA RESP QL NAA+PROBE: NEGATIVE

## 2022-02-23 PROCEDURE — 250N000013 HC RX MED GY IP 250 OP 250 PS 637: Performed by: NURSE PRACTITIONER

## 2022-02-23 PROCEDURE — 250N000013 HC RX MED GY IP 250 OP 250 PS 637: Performed by: INTERNAL MEDICINE

## 2022-02-23 PROCEDURE — 250N000013 HC RX MED GY IP 250 OP 250 PS 637: Performed by: PHYSICIAN ASSISTANT

## 2022-02-23 PROCEDURE — 99233 SBSQ HOSP IP/OBS HIGH 50: CPT | Performed by: NURSE PRACTITIONER

## 2022-02-23 PROCEDURE — 87635 SARS-COV-2 COVID-19 AMP PRB: CPT | Performed by: HOSPITALIST

## 2022-02-23 PROCEDURE — 120N000001 HC R&B MED SURG/OB

## 2022-02-23 PROCEDURE — 99232 SBSQ HOSP IP/OBS MODERATE 35: CPT | Performed by: HOSPITALIST

## 2022-02-23 RX ADMIN — ACETAMINOPHEN 975 MG: 325 TABLET, FILM COATED ORAL at 13:26

## 2022-02-23 RX ADMIN — ASPIRIN 325 MG: 325 TABLET, COATED ORAL at 09:05

## 2022-02-23 RX ADMIN — AMLODIPINE BESYLATE 2.5 MG: 2.5 TABLET ORAL at 09:05

## 2022-02-23 RX ADMIN — ESCITALOPRAM OXALATE 10 MG: 10 TABLET ORAL at 13:26

## 2022-02-23 RX ADMIN — CEFDINIR 300 MG: 300 CAPSULE ORAL at 09:05

## 2022-02-23 RX ADMIN — CEFDINIR 300 MG: 300 CAPSULE ORAL at 21:09

## 2022-02-23 RX ADMIN — DICLOFENAC SODIUM 2 G: 10 GEL TOPICAL at 09:08

## 2022-02-23 RX ADMIN — ACETAMINOPHEN 975 MG: 325 TABLET, FILM COATED ORAL at 09:05

## 2022-02-23 RX ADMIN — FAMOTIDINE 20 MG: 20 TABLET ORAL at 09:05

## 2022-02-23 RX ADMIN — GUAIFENESIN 600 MG: 600 TABLET, EXTENDED RELEASE ORAL at 09:05

## 2022-02-23 RX ADMIN — POLYETHYLENE GLYCOL 3350 17 G: 17 POWDER, FOR SOLUTION ORAL at 09:05

## 2022-02-23 RX ADMIN — ATORVASTATIN CALCIUM 40 MG: 40 TABLET, FILM COATED ORAL at 21:07

## 2022-02-23 RX ADMIN — ASPIRIN 325 MG: 325 TABLET, COATED ORAL at 21:08

## 2022-02-23 RX ADMIN — SENNOSIDES AND DOCUSATE SODIUM 1 TABLET: 50; 8.6 TABLET ORAL at 21:08

## 2022-02-23 RX ADMIN — GUAIFENESIN 600 MG: 600 TABLET, EXTENDED RELEASE ORAL at 21:08

## 2022-02-23 RX ADMIN — CARVEDILOL 6.25 MG: 6.25 TABLET, FILM COATED ORAL at 09:05

## 2022-02-23 RX ADMIN — ACETAMINOPHEN 975 MG: 325 TABLET, FILM COATED ORAL at 21:07

## 2022-02-23 RX ADMIN — DORZOLAMIDE HYDROCHLORIDE AND TIMOLOL MALEATE 1 DROP: 22.3; 6.8 SOLUTION/ DROPS OPHTHALMIC at 09:07

## 2022-02-23 RX ADMIN — LIDOCAINE 1 PATCH: 246 PATCH TOPICAL at 21:07

## 2022-02-23 RX ADMIN — Medication 3 MG: at 21:09

## 2022-02-23 RX ADMIN — DORZOLAMIDE HYDROCHLORIDE AND TIMOLOL MALEATE 1 DROP: 22.3; 6.8 SOLUTION/ DROPS OPHTHALMIC at 21:09

## 2022-02-23 RX ADMIN — DICLOFENAC SODIUM 2 G: 10 GEL TOPICAL at 16:50

## 2022-02-23 RX ADMIN — DICLOFENAC SODIUM 2 G: 10 GEL TOPICAL at 21:09

## 2022-02-23 RX ADMIN — DICLOFENAC SODIUM 2 G: 10 GEL TOPICAL at 12:55

## 2022-02-23 RX ADMIN — FAMOTIDINE 20 MG: 20 TABLET ORAL at 21:08

## 2022-02-23 RX ADMIN — LATANOPROST 1 DROP: 50 SOLUTION OPHTHALMIC at 21:22

## 2022-02-23 RX ADMIN — ESCITALOPRAM 5 MG: 5 TABLET, FILM COATED ORAL at 09:05

## 2022-02-23 RX ADMIN — TRAMADOL HYDROCHLORIDE 25 MG: 50 TABLET ORAL at 21:08

## 2022-02-23 ASSESSMENT — ACTIVITIES OF DAILY LIVING (ADL)
ADLS_ACUITY_SCORE: 22
ADLS_ACUITY_SCORE: 22
ADLS_ACUITY_SCORE: 28
ADLS_ACUITY_SCORE: 28
ADLS_ACUITY_SCORE: 24
ADLS_ACUITY_SCORE: 28
ADLS_ACUITY_SCORE: 22
ADLS_ACUITY_SCORE: 24
ADLS_ACUITY_SCORE: 28
ADLS_ACUITY_SCORE: 22
ADLS_ACUITY_SCORE: 28
ADLS_ACUITY_SCORE: 22
ADLS_ACUITY_SCORE: 28
ADLS_ACUITY_SCORE: 22
ADLS_ACUITY_SCORE: 22
ADLS_ACUITY_SCORE: 28
ADLS_ACUITY_SCORE: 22
ADLS_ACUITY_SCORE: 22
ADLS_ACUITY_SCORE: 28
ADLS_ACUITY_SCORE: 28
ADLS_ACUITY_SCORE: 22
ADLS_ACUITY_SCORE: 24

## 2022-02-23 NOTE — PLAN OF CARE
Goal Outcome Evaluation:    Plan of Care Reviewed With: patient, daughter     Overall Patient Progress: no change    Outcome Evaluation: [facility] plan to discharge to TCU when medically ready.    Awaiting a discharge plan, no change in pt's physical status.

## 2022-02-23 NOTE — PROGRESS NOTES
Hospitalist Progress Note    Date of Service (when I saw the patient): 02/23/2022  Provider:  All Clark MD   Text Page  7am - 6PM       Assessment & Plan   Summary of Stay: Alejandrina Whatley is a 89 year old female with a history of HLD, CVA, Left Hemiparesis, Dementia, Depression, CED, AAA, and HTN.  She presented to the hospital with left hip pain after a fall.  imaging demonstrated a left femur fracture.  The patient underwent percutaneous screw fixation     SW is assisting with disposition.  Pt remains medically stable for discharge when safe disposition plan is in place.       Plans today:  - await disposition  - palliative care consulted to review goals of care with family (daughter)     1.  Acute left femur fracture.  Status post traumatic fall.  -Status post repair by orthopedic surgery on 2/16.  -Pain medications as needed.  pain appears controlled  -Use incentive spirometry.     2.  Hypertension.  -Continue carvedilol and amlodpine     3.  Hyperlipidemia.  -Continue atorvastatin.     4.  Depression.  -Continue Lexapro.     5.  Dementia  -Schedule melatonin 3 mg at bedtime.  -IV haloperidol if needed.  - had some agitation earlier in hospital stay; none recently  - appreciate palliative care consult to review goals of care with family (daughter) and they are interested in facility with hospice enrollment on discharge     6.  Mildly abnormal urinalysis with possible UTI.    - urine cx positive for proteus  - given poor historian and inability to provide accurate hx combined with persistent leukocytosis will treat for presumed UTI with PO cephalosporin (allergies noted).    - Anticipate brief antibiotic course; antibiotic started 2/18 and will order to stop on 2/23     7.  Acute blood loss anemia.  Likely multifactorial due to trauma with acute fracture and need for above-noted surgical intervention.    - no need for transfusion      8.  Cough  - improved/resolved  -  Addended by: Haris Negro on: 8/31/2020 04:49 PM     Modules accepted: Orders not hypoxic  - no fever  - ordered Mucinex  - CXR 2/20 showed clear lungs, no infiltrate        Diet: Advance Diet as Tolerated: Regular Diet Adult    DVT Prophylaxis: Pneumatic Compression Devices  Code Status: No CPR- Do NOT Intubate    Disposition: Expected discharge, family (daughter) interested in facility with hospice enrollment on discharge     Interval History   Patient is pleasant and cooperative. Not symptomatic, daughter in the room, updated    -Data reviewed today: I reviewed all new labs and imaging results over the last 24 hours.      Physical Exam   Temp: 97.3  F (36.3  C) Temp src: Temporal BP: 123/58 Pulse: 69   Resp: 16 SpO2: 93 % O2 Device: None (Room air)    Vitals:    02/23/22 1300   Weight: 49.9 kg (110 lb)     Vital Signs with Ranges  Temp:  [96.8  F (36  C)-97.3  F (36.3  C)] 97.3  F (36.3  C)  Pulse:  [69-74] 69  Resp:  [16-18] 16  BP: (105-123)/(45-58) 123/58  SpO2:  [93 %-94 %] 93 %  I/O last 3 completed shifts:  In: 1080 [P.O.:1080]  Out: 100 [Urine:100]    GEN:  Alert, not oriented x 3, appears comfortable, NAD.  HEENT:  Normocephalic/atraumatic, no scleral icterus, no nasal discharge, mouth moist.  CV:  Regular rate and rhythm, no murmur or JVD.  S1 + S2 noted, no S3 or S4.  LUNGS:  Clear to auscultation bilaterally without rales/rhonchi/wheezing/retractions.  Symmetric chest rise on inhalation noted.  ABD:  Active bowel sounds, soft, non-tender/non-distended.  No rebound/guarding/rigidity.  EXT:  No edema or cyanosis.  No joint synovitis noted.  SKIN:  Dry to touch, no exanthems noted in the visualized areas.       Medications       acetaminophen  975 mg Oral TID     amLODIPine  2.5 mg Oral Daily     aspirin  325 mg Oral BID     atorvastatin  40 mg Oral QPM     carvedilol  6.25 mg Oral BID w/meals     cefdinir  300 mg Oral BID     diclofenac  2 g Topical 4x Daily     dorzolamide-timolol  1 drop Both Eyes BID     escitalopram  5 mg Oral Daily     escitalopram  10 mg Oral Daily      famotidine  20 mg Oral BID     guaiFENesin  600 mg Oral BID     latanoprost  1 drop Both Eyes At Bedtime     lidocaine  1 patch Transdermal Q24h    And     lidocaine   Transdermal Q8H     melatonin  3 mg Oral At Bedtime     polyethylene glycol  17 g Oral Daily     senna-docusate  1 tablet Oral BID     traMADol  25 mg Oral At Bedtime       Data   Recent Labs   Lab 02/18/22  0706 02/17/22  0651   WBC 11.8* 11.3*   HGB 9.3* 10.0*   * 111*   * 140*    140   POTASSIUM 4.2 4.1   CHLORIDE 111* 110*   CO2 25 22   BUN 35* 28   CR 0.94 0.89   ANIONGAP 5 8   TRANG 8.3* 8.1*   * 142*       No results found for this or any previous visit (from the past 24 hour(s)).          Disclaimer: This note consists of symbols derived from keyboarding, dictation and/or voice recognition software. As a result, there may be errors in the script that have gone undetected. Please consider this when interpreting information found in this chart.

## 2022-02-23 NOTE — PROGRESS NOTES
[facility]  Palliative Care Progress Note  Text Page     Assessment & Plan   Recommendations:  1. Goals of Care- No CPR- Do NOT Intubate  Hospitalization goals discussed with daughter.  Goal for placement into new facility with hospice enrollment.  Confirmed DNR/DNI code status.  Decisional Capacity- Unreliable. Patient has an advance directive dated 06/09/2017.  Consents and decision making should be done by  Pat Zamora (daughter), the primary Health Care Agent.   POLST on file, dated 12/20/2019, DNR and comfort measures.  - goal for placement in new facility  - Hospice enrollment  - continue symptom management as primary goal for care.     2. Pain   S/P left hip fixation, chief complaint of mechanical fall and fracture.  Baseline pain in bilateral shoulders due to degenerative disease.  History of thoracolumbar vertebroplasty due to compression fracture.  Baseline dose of Tramadol at bedtime.  Patient's opioid use in past 24 hours: 25 mg Tramadol = 2.5 mg Daily Morphine Equivalent  [place] Board  Pharmacy Data Base Reviewed:    YES; As expected, no concern for misuse/abuse of controlled medications based on this report.  - Tramadol 25 mg PO bedtime, and every 8 hours prn  - hydromorphone 0.1-0.2 every 2 hours prn breakthrough pain.  - Acetaminophen 975 mg three times daily  - lidocaine patch/voltaren gel to perioperative site.  - ice/repositioning for comfort  - physical therapy as ordered per primary team     3. Decreased Mobility  Baseline wheelchair bound secondary to left sided hemiparesis.  Now with new left sided surgery and associated post operative pain.  - ongoing work with Physical therapy  - consideration of safe level of assistance and resources needed at discharge.     4. Decreased PO intake  Intermittent decreased PO intake while inpatient.  Subjective report of decreased PO intake prior to admission.  No significant weight loss in EMR.  - appreciate dietician  "consult/recommendations  - Room service needed as patient with altered mental status at times  - meal supplements per dietician recommendations.     5. Spiritual Care  Oriented to Spiritual Health as part of Palliative Care team. Spiritual Health Services declined at this time.  Spiritual Background: Hinduism     6. Care Planning  Appreciate Care of Bell DERREK Winter.  - Preference for discharge to facility  - Hospice enrollment at time of discharge     Medical Decision Making and Goals of Care:  Discussed on February 23, 2022 with Ashlyn PRATHER CNP:   Met with patient and daughter Pat at the bedside.  Reviewed current status and symptoms.  Alejandrina states she is comfortable, denies pain and states that she \"ate a good breakfast\".      Pat reviewed her understanding of the plan of care.  Discussed need for more observation at a nursing home and the hospice enrollment process.  She reviewed questions and they were addressed to her satisfaction.      Discussed the typical follow up plan with Orthopedics including clinic visit and regularly scheduled X-rays. Educated the philosophy of care and symptom management on Hospice and the utility of ongoing ortho care.  Pat stated that no further surgeries were recommended if any findings were seen at follow up appointments, and so she felt as though they were not necessary.    Reviewed plan for placement and hospice enrollment at discharge.  No further questions or concerns were brought forward.      Ashlyn PRATHER CNP  Pain Management and Palliative Care  [facility]  Pgr: [phone]      Time Spent on this Encounter   Total unit/floor time 35 minutes, time consisted of the following, examination of the patient, reviewing the record and completing documentation. >50% of time spent in counseling and coordination of care, Bedside Nurse Kandy, Hospitalist Dr. Clark and  Bell.  Time spend counseling with " patient and family consisted of the following topics, goals of care, care planning for discharge and symptom management.    Review of Systems    CONSTITUTIONAL: NEGATIVE for fever, chills, change in weight  ENT/MOUTH: NEGATIVE for ear, mouth and throat problems  RESP: NEGATIVE for significant cough or SOB  CV: NEGATIVE for chest pain, palpitations or peripheral edema    Physical Exam   Temp:  [96.8  F (36  C)-98  F (36.7  C)] 97.3  F (36.3  C)  Pulse:  [68-74] 69  Resp:  [14-18] 16  BP: (105-123)/(45-58) 123/58  SpO2:  [93 %-97 %] 93 %  0 lbs 0 oz  Exam:  GEN:  Sitting up in chair, Alert, oriented to self, appears comfortable, NAD.  HEENT:  Normocephalic/atraumatic, no scleral icterus, no nasal discharge, mouth moist.  CV:  RRR, S1, S2; no murmurs or other irregularities noted.  +3 DP/PT pulses bilatererally; no edema BLE.  RESP:  Clear to auscultation bilaterally without rales/rhonchi/wheezing/retractions.  Symmetric chest rise on inhalation noted.  Normal respiratory effort.  ABD:  Rounded, soft, non-tender/non-distended.  +BS  EXT:  Edema & pulses as noted above.  CMS intact x 4.     M/S:   Tender to palpation left hip.    SKIN:  Dry to touch, no exanthems noted in the visualized areas.    NEURO: left sided weakness,  Confused, states she is at a hotel.  PAIN BEHAVIOR: Cooperative  Psych:  Normal affect.  Calm, cooperative, confused conversation.       Medications       acetaminophen  975 mg Oral TID     amLODIPine  2.5 mg Oral Daily     aspirin  325 mg Oral BID     atorvastatin  40 mg Oral QPM     carvedilol  6.25 mg Oral BID w/meals     cefdinir  300 mg Oral BID     diclofenac  2 g Topical 4x Daily     dorzolamide-timolol  1 drop Both Eyes BID     escitalopram  5 mg Oral Daily     escitalopram  10 mg Oral Daily     famotidine  20 mg Oral BID     guaiFENesin  600 mg Oral BID     latanoprost  1 drop Both Eyes At Bedtime     lidocaine  1 patch Transdermal Q24h    And     lidocaine   Transdermal Q8H     melatonin   3 mg Oral At Bedtime     polyethylene glycol  17 g Oral Daily     senna-docusate  1 tablet Oral BID     traMADol  25 mg Oral At Bedtime       Data   No results found for this or any previous visit (from the past 24 hour(s)).

## 2022-02-23 NOTE — PROGRESS NOTES
SPIRITUAL HEALTH SERVICES Progress Note  RH/ Ortho Spine 6    Saw pt Alejandrina Whatley per length of stay.  Daughter Pat present.  Alejandrina Noble is very sweet and in good spirits today, but said she was very tired.      Illness Narrative -   o Pat stated that Alejandrina Noble sleeps about 18 hours a day.  She also stated that Alejandrina Noble speaks frequently about how she is ready to die.  Pat stated that she understands this because Alejandrina Noble's quality of life has deteriorated considerably since her stroke a couple of years ago. Pat had been very active before her stroke.  Pat stated that she would like to consult with Palliative Care to learn more about hospice and goals of care.      Distress -   o Pat expressed some frustration with the care Alejandrina Noble has received both at her group home and here at the hospital.  She stated that she feels that the care Alejandrina Noble has received the past few days has been much better.  o Pat also stated that she does not have much social support - her two brothers are estranged and her daughters are local and concerned about their grandmother, but are very busy.       Coping -   o Alejandrina Noble talked about her jill in God and her involvement in a [employer] for 60 years.  She said she is ready to go to [facility]. She welcomed a time of prayer.  o Alejandrina Noble also named Pat as someone who always makes sure she gets the care that she needs.  o Pat attends [employer], but has not been able to find much community there.  o Pat still has a good relationship with her ex- and they talk almost daily.       Meaning-Making -   nii Stewart reflected a bit on caring for her grandchildren while their parents work.  She stated that she didn't want them to have to go to day care for long periods every day and wanted to teach them the [place] jill.       Plan - [facility] services remains available if needed.     CRISTOPHER Rogers   Intern  Pager: [phone]

## 2022-02-23 NOTE — PLAN OF CARE
Goal Outcome Evaluation:     Plan of Care Reviewed With: patient, daughter     Overall Patient Progress: improving    Outcome Evaluation: [facility] plan to discharge to TCU when medically ready.    Pt is POD 7. Patient alert and oriented to self only. VSS, lidocaine patch on left hip. Dressing on left leg is C/D/I. Declined PRN pain medication scheduled, just ice to left leg. Eating well, had a BM on 2/22, and voiding. Up with assist of 2 and tin varghese. Discharge plan includes TCU and possibly enroll in hospice. Daughter involved in discharge plans,  SW and palliative following.

## 2022-02-23 NOTE — PLAN OF CARE
3pm-11pm RN    Patient VS are at baseline: Yes  Patient able to ambulate as they were prior to admission or with assist devices provided by therapy during their stay: No is using the tin steady  Patient must void prior to discharge: Yes using pure wick  Patient able to tolerate oral intake: Yes  Patient has adequate pain control using oral analgesics: Yes    Patient forgetful sometimes. Up on chair for dinner, denied pain. CMS intact, dressing on left hip CDI. Plan is to discharge to TCU.

## 2022-02-23 NOTE — PROGRESS NOTES
Care Management Follow Up    Length of Stay (days): 8    Expected Discharge Date: 02/23/2022     Concerns to be Addressed: discharge planning  Patient plan of care discussed at interdisciplinary rounds: Yes    Anticipated Discharge Disposition: New placement with hospice support  Anticipated Discharge Services: hospice  Anticipated Discharge DME:      Patient/family educated on Medicare website which has current facility and service quality ratings: yes  Education Provided on the Discharge Plan:    Patient/Family in Agreement with the Plan: yes    Referrals Placed by CM/SW:   Private pay costs discussed: Not applicable    Additional Information:  Reviewed pt's status and discussed in rounds.     SW placed phone call to pt's Clarisa Arambula p:[phone] and provided update on pt's discharge plan. Writer inquired if Tyesha had any ideas for new placement as pt's dtr Pat feels pt's current [facility] is not safe for pt to return. Tyesha directed SW to look into [facility] to inquire if they have any availability as she feels this facility may be a good fit.    Placed phone call to [facility] and spoke with Natasha regarding availability. Per Natasha, they do have availability and requested writer fax over a referral to f:[phone] attention Estela/Gaby.     HARI met with pt and pt's dtr Pat. SW updated Pat on conversation with Tyesha regarding [facility]. Pat is open to writer sending referral.     Referral faxed to [facility].     Social work will continue to follow and assist with discharge planning as needed.    RYAN Hanks, W  Inpatient Care Coordination  [facility], [facility]  [phone]    RYAN Ramos

## 2022-02-24 ENCOUNTER — APPOINTMENT (OUTPATIENT)
Dept: PHYSICAL THERAPY | Facility: CLINIC | Age: 87
DRG: 481 | End: 2022-02-24
Payer: COMMERCIAL

## 2022-02-24 PROCEDURE — 250N000013 HC RX MED GY IP 250 OP 250 PS 637: Performed by: PHYSICIAN ASSISTANT

## 2022-02-24 PROCEDURE — 97530 THERAPEUTIC ACTIVITIES: CPT | Mod: GP | Performed by: PHYSICAL THERAPIST

## 2022-02-24 PROCEDURE — 250N000013 HC RX MED GY IP 250 OP 250 PS 637: Performed by: INTERNAL MEDICINE

## 2022-02-24 PROCEDURE — 99232 SBSQ HOSP IP/OBS MODERATE 35: CPT | Performed by: HOSPITALIST

## 2022-02-24 PROCEDURE — 120N000001 HC R&B MED SURG/OB

## 2022-02-24 PROCEDURE — 99232 SBSQ HOSP IP/OBS MODERATE 35: CPT | Performed by: NURSE PRACTITIONER

## 2022-02-24 RX ADMIN — ESCITALOPRAM OXALATE 10 MG: 10 TABLET ORAL at 13:09

## 2022-02-24 RX ADMIN — DORZOLAMIDE HYDROCHLORIDE AND TIMOLOL MALEATE 1 DROP: 22.3; 6.8 SOLUTION/ DROPS OPHTHALMIC at 09:29

## 2022-02-24 RX ADMIN — FAMOTIDINE 20 MG: 20 TABLET ORAL at 20:37

## 2022-02-24 RX ADMIN — CARVEDILOL 6.25 MG: 6.25 TABLET, FILM COATED ORAL at 09:27

## 2022-02-24 RX ADMIN — GUAIFENESIN 600 MG: 600 TABLET, EXTENDED RELEASE ORAL at 20:37

## 2022-02-24 RX ADMIN — LATANOPROST 1 DROP: 50 SOLUTION OPHTHALMIC at 21:17

## 2022-02-24 RX ADMIN — Medication 3 MG: at 21:17

## 2022-02-24 RX ADMIN — ACETAMINOPHEN 975 MG: 325 TABLET, FILM COATED ORAL at 09:28

## 2022-02-24 RX ADMIN — FAMOTIDINE 20 MG: 20 TABLET ORAL at 09:28

## 2022-02-24 RX ADMIN — DICLOFENAC SODIUM 2 G: 10 GEL TOPICAL at 16:40

## 2022-02-24 RX ADMIN — ASPIRIN 325 MG: 325 TABLET, COATED ORAL at 09:27

## 2022-02-24 RX ADMIN — SENNOSIDES AND DOCUSATE SODIUM 1 TABLET: 50; 8.6 TABLET ORAL at 20:37

## 2022-02-24 RX ADMIN — POLYETHYLENE GLYCOL 3350 17 G: 17 POWDER, FOR SOLUTION ORAL at 09:28

## 2022-02-24 RX ADMIN — ESCITALOPRAM 5 MG: 5 TABLET, FILM COATED ORAL at 09:28

## 2022-02-24 RX ADMIN — ACETAMINOPHEN 975 MG: 325 TABLET, FILM COATED ORAL at 13:09

## 2022-02-24 RX ADMIN — DICLOFENAC SODIUM 2 G: 10 GEL TOPICAL at 09:29

## 2022-02-24 RX ADMIN — GUAIFENESIN 600 MG: 600 TABLET, EXTENDED RELEASE ORAL at 09:29

## 2022-02-24 RX ADMIN — AMLODIPINE BESYLATE 2.5 MG: 2.5 TABLET ORAL at 09:27

## 2022-02-24 RX ADMIN — TRAMADOL HYDROCHLORIDE 25 MG: 50 TABLET ORAL at 21:17

## 2022-02-24 RX ADMIN — ASPIRIN 325 MG: 325 TABLET, COATED ORAL at 20:37

## 2022-02-24 RX ADMIN — DORZOLAMIDE HYDROCHLORIDE AND TIMOLOL MALEATE 1 DROP: 22.3; 6.8 SOLUTION/ DROPS OPHTHALMIC at 20:38

## 2022-02-24 RX ADMIN — ATORVASTATIN CALCIUM 40 MG: 40 TABLET, FILM COATED ORAL at 20:37

## 2022-02-24 ASSESSMENT — ACTIVITIES OF DAILY LIVING (ADL)
ADLS_ACUITY_SCORE: 22
ADLS_ACUITY_SCORE: 22
ADLS_ACUITY_SCORE: 26
ADLS_ACUITY_SCORE: 29
ADLS_ACUITY_SCORE: 22
ADLS_ACUITY_SCORE: 22
ADLS_ACUITY_SCORE: 29
ADLS_ACUITY_SCORE: 22
ADLS_ACUITY_SCORE: 26
ADLS_ACUITY_SCORE: 22
ADLS_ACUITY_SCORE: 26
ADLS_ACUITY_SCORE: 29
ADLS_ACUITY_SCORE: 22
ADLS_ACUITY_SCORE: 27
ADLS_ACUITY_SCORE: 22
ADLS_ACUITY_SCORE: 29
ADLS_ACUITY_SCORE: 26
ADLS_ACUITY_SCORE: 27
ADLS_ACUITY_SCORE: 22
ADLS_ACUITY_SCORE: 26
ADLS_ACUITY_SCORE: 26
ADLS_ACUITY_SCORE: 22
ADLS_ACUITY_SCORE: 26
ADLS_ACUITY_SCORE: 29

## 2022-02-24 NOTE — PROGRESS NOTES
Patient here for right hip fracture.  Plan for operative repair later this morning.  Bladder scan for 623 mL and unable to void.  -Place Clark catheter

## 2022-02-24 NOTE — PLAN OF CARE
Patient vital signs are at baseline: Yes  Patient able to ambulate as they were prior to admission or with assist devices provided by therapies during their stay:  Yes  Patient MUST void prior to discharge:  Yes  Patient able to tolerate oral intake:  Yes  Pain has adequate pain control using Oral analgesics:  Yes    Pt is alert and oriented, assist of 2 and tin steady for transfers, no pain, pt is on scheduled Tylenol and Tramadol, voided 150 ml, no place for discharge yet.

## 2022-02-24 NOTE — PLAN OF CARE
Goal Outcome Evaluation:    Plan of Care Reviewed With: patient     Overall Patient Progress: no change    Outcome Evaluation: [facility] plan to discharge to TCU when medically ready.    No change, pt awaiting placement. Dressing changed and incision looked great.

## 2022-02-24 NOTE — PROGRESS NOTES
Care Management Follow Up    Length of Stay (days): 9    Expected Discharge Date: 02/24/2022     Concerns to be Addressed: discharge planning  Patient plan of care discussed at interdisciplinary rounds: Yes    Anticipated Discharge Disposition: discharge planning  Anticipated Discharge Services: hospice  Anticipated Discharge DME:      Patient/family educated on Medicare website which has current facility and service quality ratings: yes  Education Provided on the Discharge Plan:    Patient/Family in Agreement with the Plan: yes    Referrals Placed by CM/SW: [facility]  Private pay costs discussed: Not applicable    Additional Information:  Reviewed pt's status and discussed in rounds.     SW left VM for Natasha at [facility] p:[phone] to confirm they received referral that was sent yesterday and inquired an update with a return call.     Addendum:  Received return call from Natasha [facility] who stated they do not have capacity to take on a new EW pt.     Met with pt's dtr Pat to provide update. Pat is in agreement for writer to look into new placement at any / setting that takes EW.     SW made the following calls:    [facility] - [facility] openings  [facility] - no openings in  or LTC  [facility] ([place]) - no openings  [facility] - no openings  [facility] ([place]) - no EW openings  [facility] ([place]) - no openings  [facility] ([place]) - Left VM requesting return call   [facility] - Left VM requesting return call  [facility] - Left Vm requesting return call  [id number] - Left VM requesting return call    [facility] ([place]) - Spoke with Malik who stated they have an opening at their [place] location and he plans to have his RN Bk coelho writer a call to see if the pt would be a good fit    Placed phone call to [facility] and spoke with Lillian. Per Lillian, they do not have availability in  their LTC at this time.     Placed referral to [facility] for their LTC and referral was declined via epic.     Placed referral to [facility]    Social work will continue to follow and assist with discharge planning as needed.    RYAN Hanks, W  Inpatient Care Coordination  [facility], [facility]  [phone]    RYAN Ramos

## 2022-02-24 NOTE — PLAN OF CARE
Goal Outcome Evaluation:  Vital signs stable, occasional non productive cough, lungs diminished.  Encouraged to sit up in chair for meals, transfers with tin steady, gait belt and 2 assist.  CMS intact, dressing intact to left hip.  Pain controlled with lidocaine patch, tylenol, ultram.    Plan of Care Reviewed With: patient     Overall Patient Progress: no change    Outcome Evaluation: ALPESH Ahmadi plan to discharge to TCU when medically ready.

## 2022-02-24 NOTE — PROGRESS NOTES
[facility]  Palliative Care Progress Note  Text Page     Assessment & Plan   Recommendations:  1. Goals of Care- No CPR- Do NOT Intubate  Hospitalization goals discussed with daughter.  Goal for placement into new facility with hospice enrollment.  Confirmed DNR/DNI code status.  Decisional Capacity- Unreliable. Patient has an advance directive dated 06/09/2017.  Consents and decision making should be done by  Pat Zamora (daughter), the primary Health Care Agent.   POLST on file, dated 12/20/2019, DNR and comfort measures.  - goal for placement in new facility  - Hospice enrollment  - continue symptom management as primary goal for care.     2. Pain   S/P left hip fixation, chief complaint of mechanical fall and fracture.  Baseline pain in bilateral shoulders due to degenerative disease.  History of thoracolumbar vertebroplasty due to compression fracture.  Baseline dose of Tramadol at bedtime.  Patient's opioid use in past 24 hours: 25 mg Tramadol = 2.5 mg Daily Morphine Equivalent  [place] Board  Pharmacy Data Base Reviewed:    YES; As expected, no concern for misuse/abuse of controlled medications based on this report.  - Tramadol 25 mg PO bedtime, and every 8 hours prn  - hydromorphone 0.1-0.2 every 2 hours prn breakthrough pain.  - Acetaminophen 975 mg three times daily  - lidocaine patch/voltaren gel to perioperative site.  - ice/repositioning for comfort  - physical therapy as ordered per primary team     3. Decreased Mobility  Baseline wheelchair bound secondary to left sided hemiparesis.  Now with new left sided surgery and associated post operative pain.  - ongoing work with Physical therapy  - consideration of safe level of assistance and resources needed at discharge.     4. Decreased PO intake  Intermittent decreased PO intake while inpatient.  Subjective report of decreased PO intake prior to admission.  No significant weight loss in EMR.  - appreciate dietician  consult/recommendations  - Room service needed as patient with altered mental status at times  - meal supplements per dietician recommendations.     5. Spiritual Care  Oriented to Spiritual Health as part of Palliative Care team. Spiritual Health Services declined at this time.  Spiritual Background: Sikhism     6. Care Planning  Appreciate Care of DERREK Hanks.  - Preference for discharge to facility  - Hospice enrollment at time of discharge     Medical Decision Making and Goals of Care:  Discussed on February 24, 2022 with Ashlyn PRATHER CNP:   Met with Alejandrina and Pat at the bedside.  Alejandrina denied symptoms of discomfort.  Pleasantly confused. Pat verbalized distress over disposition process.  Reviewed plan for safe discharge to NH with hospice support.  Pat reviewed support from social work staff and denied questions or other concerns.      Ashlyn PRATHER CNP  Pain Management and Palliative Care  [facility]  Pgr: [phone]      Time Spent on this Encounter   Total unit/floor time 25 minutes, time consisted of the following, examination of the patient, reviewing the record and completing documentation. >50% of time spent in counseling and coordination of care, Bedside Nurse Kandy, Hospitalist Dr. Clark and  Bell.  Time spend counseling with patient and family consisted of the following topics, goals of care, care planning for discharge and symptom management.    Review of Systems    CONSTITUTIONAL: NEGATIVE for fever, chills, change in weight  ENT/MOUTH: NEGATIVE for ear, mouth and throat problems  RESP: NEGATIVE for significant cough or SOB  CV: NEGATIVE for chest pain, palpitations or peripheral edema    Physical Exam   Temp:  [96.7  F (35.9  C)-97.8  F (36.6  C)] 96.7  F (35.9  C)  Pulse:  [66-71] 67  Resp:  [14-20] 16  BP: (117-125)/(53-59) 120/54  SpO2:  [94 %-95 %] 95 %  110 lbs 0 oz  Exam:  GEN:  Alert, oriented to self, appears  comfortable, NAD.  HEENT:  Normocephalic/atraumatic, no scleral icterus, no nasal discharge, mouth moist.  CV:  RRR, S1, S2; no murmurs or other irregularities noted.  RESP: Symmetric chest rise on inhalation noted.  Normal respiratory effort.  ABD:  non-tender/non-distended.  +BS  EXT:  Edema & pulses as noted above.  CMS intact x 4.     M/S:   Tender to palpation left hip.    SKIN:  Dry to touch, no exanthems noted in the visualized areas.    NEURO: left sided weakness,  Confused, states she is at a hotel.  PAIN BEHAVIOR: Cooperative  Psych:  Normal affect.  Calm, cooperative, confused conversation.       Medications       acetaminophen  975 mg Oral TID     amLODIPine  2.5 mg Oral Daily     aspirin  325 mg Oral BID     atorvastatin  40 mg Oral QPM     carvedilol  6.25 mg Oral BID w/meals     diclofenac  2 g Topical 4x Daily     dorzolamide-timolol  1 drop Both Eyes BID     escitalopram  5 mg Oral Daily     escitalopram  10 mg Oral Daily     famotidine  20 mg Oral BID     guaiFENesin  600 mg Oral BID     latanoprost  1 drop Both Eyes At Bedtime     lidocaine  1 patch Transdermal Q24h    And     lidocaine   Transdermal Q8H     melatonin  3 mg Oral At Bedtime     polyethylene glycol  17 g Oral Daily     senna-docusate  1 tablet Oral BID     traMADol  25 mg Oral At Bedtime       Data   Results for orders placed or performed during the hospital encounter of 02/15/22 (from the past 24 hour(s))   Asymptomatic COVID-19 Virus (Coronavirus) by PCR Nasopharyngeal    Specimen: Nasopharyngeal; Swab   Result Value Ref Range    SARS CoV2 PCR Negative Negative    Narrative    Testing was performed using the jack  SARS-CoV-2 & Influenza A/B Assay on the jack  [employer]  System.  This test should be ordered for the detection of SARS-COV-2 in individuals who meet SARS-CoV-2 clinical and/or epidemiological criteria. Test performance is unknown in asymptomatic patients.  This test is for in vitro diagnostic use under the FDA EUA for  laboratories certified under CLIA to perform moderate and/or high complexity testing. This test has not been FDA cleared or approved.  A negative test does not rule out the presence of PCR inhibitors in the specimen or target RNA in concentration below the limit of detection for the assay. The possibility of a false negative should be considered if the patient's recent exposure or clinical presentation suggests COVID-19.  [facility] are certified under the Clinical Laboratory Improvement Amendments of 1988 (CLIA-88) as qualified to perform moderate and/or high complexity laboratory testing.

## 2022-02-24 NOTE — PROGRESS NOTES
[facility]    Hospitalist Progress Note    Date of Service (when I saw the patient): 02/24/2022  Provider:  All Clark MD   Text Page  7am - 6PM       Assessment & Plan   Summary of Stay: Alejandrina Whatley is a 89 year old female with a history of HLD, CVA, Left Hemiparesis, Dementia, Depression, CED, AAA, and HTN.  She presented to the hospital with left hip pain after a fall.  imaging demonstrated a left femur fracture.  The patient underwent percutaneous screw fixation     SW is assisting with disposition.  Pt remains medically stable for discharge when safe disposition plan is in place.       Plans today:  - await disposition  - palliative care consulted to review goals of care with family (daughter)     1.  Acute left femur fracture.  Status post traumatic fall.  -Status post repair by orthopedic surgery on 2/16.  -Pain medications as needed.  pain appears controlled  -Use incentive spirometry.     2.  Hypertension.  -Continue carvedilol and amlodpine     3.  Hyperlipidemia.  -Continue atorvastatin.     4.  Depression.  -Continue Lexapro.     5.  Dementia  -Schedule melatonin 3 mg at bedtime.  -IV haloperidol if needed.  - had some agitation earlier in hospital stay; none recently  - appreciate palliative care consult to review goals of care with family (daughter) and they are interested in facility with hospice enrollment on discharge     6.  Mildly abnormal urinalysis with possible UTI.    - urine cx positive for proteus  - given poor historian and inability to provide accurate hx combined with persistent leukocytosis will treat for presumed UTI with PO cephalosporin (allergies noted).    - Anticipate brief antibiotic course; antibiotic started 2/18 and will order to stop on 2/23     7.  Acute blood loss anemia.  Likely multifactorial due to trauma with acute fracture and need for above-noted surgical intervention.    - no need for transfusion      8.  Cough  - improved/resolved  -  not hypoxic  - no fever  - ordered Mucinex  - CXR 2/20 showed clear lungs, no infiltrate     Diet: Advance Diet as Tolerated: Regular Diet Adult    DVT Prophylaxis: Pneumatic Compression Devices  Code Status: No CPR- Do NOT Intubate    Disposition: Expected discharge, family (daughter) interested in facility with hospice enrollment on discharge     Interval History   NO change since yesrterday. Patient is pleasant and cooperative. Not symptomatic, daughter in the room, updated    -Data reviewed today: I reviewed all new labs and imaging results over the last 24 hours.      Physical Exam   Temp: (Abnormal) 96.7  F (35.9  C) Temp src: Temporal BP: 120/54 Pulse: 67   Resp: 16 SpO2: 95 % O2 Device: None (Room air)    Vitals:    02/23/22 1300   Weight: 49.9 kg (110 lb)     Vital Signs with Ranges  Temp:  [96.7  F (35.9  C)-97.8  F (36.6  C)] 96.7  F (35.9  C)  Pulse:  [66-71] 67  Resp:  [14-20] 16  BP: (117-125)/(53-59) 120/54  SpO2:  [94 %-95 %] 95 %  I/O last 3 completed shifts:  In: 560 [P.O.:560]  Out: 450 [Urine:450]    GEN:  Alert, not oriented x 3, appears comfortable, NAD.  HEENT:  Normocephalic/atraumatic, no scleral icterus, no nasal discharge, mouth moist.  CV:  Regular rate and rhythm, no murmur or JVD.  S1 + S2 noted, no S3 or S4.  LUNGS:  Clear to auscultation bilaterally without rales/rhonchi/wheezing/retractions.  Symmetric chest rise on inhalation noted.  ABD:  Active bowel sounds, soft, non-tender/non-distended.  No rebound/guarding/rigidity.  EXT:  No edema or cyanosis.  No joint synovitis noted.  SKIN:  Dry to touch, no exanthems noted in the visualized areas.       Medications       acetaminophen  975 mg Oral TID     amLODIPine  2.5 mg Oral Daily     aspirin  325 mg Oral BID     atorvastatin  40 mg Oral QPM     carvedilol  6.25 mg Oral BID w/meals     diclofenac  2 g Topical 4x Daily     dorzolamide-timolol  1 drop Both Eyes BID     escitalopram  5 mg Oral Daily     escitalopram  10 mg Oral Daily      famotidine  20 mg Oral BID     guaiFENesin  600 mg Oral BID     latanoprost  1 drop Both Eyes At Bedtime     lidocaine  1 patch Transdermal Q24h    And     lidocaine   Transdermal Q8H     melatonin  3 mg Oral At Bedtime     polyethylene glycol  17 g Oral Daily     senna-docusate  1 tablet Oral BID     traMADol  25 mg Oral At Bedtime       Data   Recent Labs   Lab 02/18/22  0706   WBC 11.8*   HGB 9.3*   *   *      POTASSIUM 4.2   CHLORIDE 111*   CO2 25   BUN 35*   CR 0.94   ANIONGAP 5   TRANG 8.3*   *       No results found for this or any previous visit (from the past 24 hour(s)).    Disclaimer: This note consists of symbols derived from keyboarding, dictation and/or voice recognition software. As a result, there may be errors in the script that have gone undetected. Please consider this when interpreting information found in this chart.

## 2022-02-25 PROCEDURE — 120N000001 HC R&B MED SURG/OB

## 2022-02-25 PROCEDURE — 250N000013 HC RX MED GY IP 250 OP 250 PS 637: Performed by: PHYSICIAN ASSISTANT

## 2022-02-25 PROCEDURE — 250N000013 HC RX MED GY IP 250 OP 250 PS 637: Performed by: INTERNAL MEDICINE

## 2022-02-25 PROCEDURE — 250N000013 HC RX MED GY IP 250 OP 250 PS 637: Performed by: NURSE PRACTITIONER

## 2022-02-25 PROCEDURE — 99232 SBSQ HOSP IP/OBS MODERATE 35: CPT | Performed by: HOSPITALIST

## 2022-02-25 RX ADMIN — DORZOLAMIDE HYDROCHLORIDE AND TIMOLOL MALEATE 1 DROP: 22.3; 6.8 SOLUTION/ DROPS OPHTHALMIC at 19:32

## 2022-02-25 RX ADMIN — LATANOPROST 1 DROP: 50 SOLUTION OPHTHALMIC at 21:11

## 2022-02-25 RX ADMIN — AMLODIPINE BESYLATE 2.5 MG: 2.5 TABLET ORAL at 09:34

## 2022-02-25 RX ADMIN — FAMOTIDINE 20 MG: 20 TABLET ORAL at 19:26

## 2022-02-25 RX ADMIN — TRAMADOL HYDROCHLORIDE 25 MG: 50 TABLET ORAL at 21:11

## 2022-02-25 RX ADMIN — ACETAMINOPHEN 975 MG: 325 TABLET, FILM COATED ORAL at 13:42

## 2022-02-25 RX ADMIN — ESCITALOPRAM OXALATE 10 MG: 10 TABLET ORAL at 13:42

## 2022-02-25 RX ADMIN — POLYETHYLENE GLYCOL 3350 17 G: 17 POWDER, FOR SOLUTION ORAL at 09:32

## 2022-02-25 RX ADMIN — Medication 3 MG: at 21:11

## 2022-02-25 RX ADMIN — DICLOFENAC SODIUM 2 G: 10 GEL TOPICAL at 19:32

## 2022-02-25 RX ADMIN — FAMOTIDINE 20 MG: 20 TABLET ORAL at 09:34

## 2022-02-25 RX ADMIN — CARVEDILOL 6.25 MG: 6.25 TABLET, FILM COATED ORAL at 09:33

## 2022-02-25 RX ADMIN — LIDOCAINE 1 PATCH: 246 PATCH TOPICAL at 19:29

## 2022-02-25 RX ADMIN — ACETAMINOPHEN 975 MG: 325 TABLET, FILM COATED ORAL at 19:26

## 2022-02-25 RX ADMIN — DICLOFENAC SODIUM 2 G: 10 GEL TOPICAL at 13:09

## 2022-02-25 RX ADMIN — GUAIFENESIN 600 MG: 600 TABLET, EXTENDED RELEASE ORAL at 19:26

## 2022-02-25 RX ADMIN — DICLOFENAC SODIUM 2 G: 10 GEL TOPICAL at 09:37

## 2022-02-25 RX ADMIN — ESCITALOPRAM 5 MG: 5 TABLET, FILM COATED ORAL at 09:34

## 2022-02-25 RX ADMIN — ASPIRIN 325 MG: 325 TABLET, COATED ORAL at 19:26

## 2022-02-25 RX ADMIN — GUAIFENESIN 600 MG: 600 TABLET, EXTENDED RELEASE ORAL at 09:34

## 2022-02-25 RX ADMIN — ATORVASTATIN CALCIUM 40 MG: 40 TABLET, FILM COATED ORAL at 19:26

## 2022-02-25 RX ADMIN — DICLOFENAC SODIUM 2 G: 10 GEL TOPICAL at 16:31

## 2022-02-25 RX ADMIN — ASPIRIN 325 MG: 325 TABLET, COATED ORAL at 09:34

## 2022-02-25 RX ADMIN — DORZOLAMIDE HYDROCHLORIDE AND TIMOLOL MALEATE 1 DROP: 22.3; 6.8 SOLUTION/ DROPS OPHTHALMIC at 09:36

## 2022-02-25 RX ADMIN — ACETAMINOPHEN 975 MG: 325 TABLET, FILM COATED ORAL at 09:34

## 2022-02-25 ASSESSMENT — ACTIVITIES OF DAILY LIVING (ADL)
ADLS_ACUITY_SCORE: 29
ADLS_ACUITY_SCORE: 25
ADLS_ACUITY_SCORE: 29
ADLS_ACUITY_SCORE: 29
ADLS_ACUITY_SCORE: 27
ADLS_ACUITY_SCORE: 29
ADLS_ACUITY_SCORE: 25
ADLS_ACUITY_SCORE: 29
ADLS_ACUITY_SCORE: 25
ADLS_ACUITY_SCORE: 29

## 2022-02-25 NOTE — PROGRESS NOTES
[facility]    Hospitalist Progress Note    Date of Service (when I saw the patient): 02/25/2022  Provider:  All Clark MD   Text Page  7am - 6PM       Assessment & Plan   Summary of Stay: Alejandrina Whatley is a 89 year old female with a history of HLD, CVA, Left Hemiparesis, Dementia, Depression, CED, AAA, and HTN.  She presented to the hospital with left hip pain after a fall.  imaging demonstrated a left femur fracture.  The patient underwent percutaneous screw fixation     SW is assisting with disposition.  Pt remains medically stable for discharge when safe disposition plan is in place.       Plans today:  - await disposition  - palliative care consulted to review goals of care with family (daughter)     1.  Acute left femur fracture.  Status post traumatic fall.  -Status post repair by orthopedic surgery on 2/16.  -Pain medications as needed.  pain appears controlled  -Use incentive spirometry.     2.  Hypertension.  -Continue carvedilol and amlodpine     3.  Hyperlipidemia.  -Continue atorvastatin.     4.  Depression.  -Continue Lexapro.     5.  Dementia  -Schedule melatonin 3 mg at bedtime.  -IV haloperidol if needed.  - had some agitation earlier in hospital stay; none recently  - appreciate palliative care consult to review goals of care with family (daughter) and they are interested in facility with hospice enrollment on discharge     6.  Mildly abnormal urinalysis with possible UTI.    - urine cx positive for proteus  - given poor historian and inability to provide accurate hx combined with persistent leukocytosis will treat for presumed UTI with PO cephalosporin (allergies noted).    - Anticipate brief antibiotic course; antibiotic started 2/18 and will order to stop on 2/23     7.  Acute blood loss anemia.  Likely multifactorial due to trauma with acute fracture and need for above-noted surgical intervention.    - no need for transfusion      8.  Cough  - improved/resolved  -  not hypoxic  - no fever  - ordered Mucinex  - CXR 2/20 showed clear lungs, no infiltrate     Diet: Advance Diet as Tolerated: Regular Diet Adult    DVT Prophylaxis: Pneumatic Compression Devices  Code Status: No CPR- Do NOT Intubate    Disposition: Expected discharge, family (daughter) interested in facility with hospice enrollment on discharge     Interval History   NO change in the last 3 days. Patient is pleasant and cooperative. Not symptomatic. SW still looking for placement.       -Data reviewed today: I reviewed all new labs and imaging results over the last 24 hours.      Physical Exam   Temp: 97.4  F (36.3  C) Temp src: Temporal BP: 114/52 Pulse: 66   Resp: 18 SpO2: 96 % O2 Device: None (Room air)    Vitals:    02/23/22 1300   Weight: 49.9 kg (110 lb)     Vital Signs with Ranges  Temp:  [96.7  F (35.9  C)-97.4  F (36.3  C)] 97.4  F (36.3  C)  Pulse:  [66-72] 66  Resp:  [18] 18  BP: (112-119)/(50-61) 114/52  SpO2:  [94 %-96 %] 96 %  I/O last 3 completed shifts:  In: 960 [P.O.:960]  Out: 300 [Urine:300]    GEN:  Alert, not oriented x 3, appears comfortable, NAD.  HEENT:  Normocephalic/atraumatic, no scleral icterus, no nasal discharge, mouth moist.  CV:  Regular rate and rhythm, no murmur or JVD.  S1 + S2 noted, no S3 or S4.  LUNGS:  Clear to auscultation bilaterally without rales/rhonchi/wheezing/retractions.  Symmetric chest rise on inhalation noted.  ABD:  Active bowel sounds, soft, non-tender/non-distended.  No rebound/guarding/rigidity.  EXT:  No edema or cyanosis.  No joint synovitis noted.  SKIN:  Dry to touch, no exanthems noted in the visualized areas.       Medications       acetaminophen  975 mg Oral TID     amLODIPine  2.5 mg Oral Daily     aspirin  325 mg Oral BID     atorvastatin  40 mg Oral QPM     carvedilol  6.25 mg Oral BID w/meals     diclofenac  2 g Topical 4x Daily     dorzolamide-timolol  1 drop Both Eyes BID     escitalopram  5 mg Oral Daily     escitalopram  10 mg Oral Daily      famotidine  20 mg Oral BID     guaiFENesin  600 mg Oral BID     latanoprost  1 drop Both Eyes At Bedtime     lidocaine  1 patch Transdermal Q24h    And     lidocaine   Transdermal Q8H     melatonin  3 mg Oral At Bedtime     polyethylene glycol  17 g Oral Daily     senna-docusate  1 tablet Oral BID     traMADol  25 mg Oral At Bedtime       Data   No lab results found in last 7 days.    No results found for this or any previous visit (from the past 24 hour(s)).    Disclaimer: This note consists of symbols derived from keyboarding, dictation and/or voice recognition software. As a result, there may be errors in the script that have gone undetected. Please consider this when interpreting information found in this chart.

## 2022-02-25 NOTE — PROGRESS NOTES
Pt up with assist, pt denies pain, pt A&O x2, dressing CDI, VSS, no acute changes, awaiting placement.

## 2022-02-25 NOTE — PROGRESS NOTES
Care Management Follow Up    Length of Stay (days): 10    Expected Discharge Date: 02/25/2022     Concerns to be Addressed: discharge planning  Patient plan of care discussed at interdisciplinary rounds: Yes    Anticipated Discharge Disposition: Memory care/LTC  Anticipated Discharge Services:  Hospice  Anticipated Discharge DME:      Patient/family educated on Medicare website which has current facility and service quality ratings: yes  Education Provided on the Discharge Plan:    Patient/Family in Agreement with the Plan: yes    Referrals Placed by CM/SW: memory care facilities/skilled nursing facilities  Private pay costs discussed: Not applicable    Additional Information:  Reviewed pt's status and discussed in rounds. SW continues to search for new placement for this pt that can accommodate MA/EW with hospice support.     Spoke with Chiquis in admissions at [facility] who stated they do not have any availability in their LTC at this time or in their memory care ([facility])    SW left VM for Malik with [facility] ([place] p:[phone] requesting return call.     HARI left VM for Rosenda p:[phone]  with Nathalia Erickson  ([facility]) requesting return call.     Spoke with Tali in admissions at [facility] p:[phone] who stated at this time they only have a shared room opening in their LTC however pt is not appropriate for a shared room due to not being fully vaccinated for covid. Tali stated an option could potentially be for pt to go into a private room in their TCU (they do not have any current private rooms open in their TCU but may have some discharges this weekend). SW to follow up on Monday with Tali.     Social work will continue to follow and assist with discharge planning as needed.    RYAN Hanks, LSW  Inpatient Care Coordination  [facility], [facility]  [phone]    Bell Winter,  BSW

## 2022-02-25 NOTE — PLAN OF CARE
Goal Outcome Evaluation:    Plan of Care Reviewed With: patient, daughter     Overall Patient Progress: no change    Outcome Evaluation: [facility] plan to discharge to TCU when medically ready.      No change physically, had BM today, still looking for placement

## 2022-02-25 NOTE — PLAN OF CARE
Patient vital signs are at baseline: Yes  Patient able to ambulate as they were prior to admission or with assist devices provided by therapies during their stay:  No,  Reason:  use sera-steady, gait belt, and 2 assist  Patient MUST void prior to discharge:  Yes  Patient able to tolerate oral intake:  Yes  Pain has adequate pain control using Oral analgesics:  Yes     Plan of Care Reviewed With: patient     Overall Patient Progress: no change  Incision is covered with dressing is CDI, has some pain only, with mobility, CMS intact, tolerated oral intake, slept last night.    Outcome Evaluation: ALPESH Ahmadi plan to discharge to TCU when medically ready.

## 2022-02-26 PROCEDURE — 250N000013 HC RX MED GY IP 250 OP 250 PS 637: Performed by: NURSE PRACTITIONER

## 2022-02-26 PROCEDURE — 99232 SBSQ HOSP IP/OBS MODERATE 35: CPT | Performed by: HOSPITALIST

## 2022-02-26 PROCEDURE — 250N000013 HC RX MED GY IP 250 OP 250 PS 637: Performed by: INTERNAL MEDICINE

## 2022-02-26 PROCEDURE — 250N000013 HC RX MED GY IP 250 OP 250 PS 637: Performed by: PHYSICIAN ASSISTANT

## 2022-02-26 PROCEDURE — 120N000001 HC R&B MED SURG/OB

## 2022-02-26 RX ADMIN — DICLOFENAC SODIUM 2 G: 10 GEL TOPICAL at 16:39

## 2022-02-26 RX ADMIN — AMLODIPINE BESYLATE 2.5 MG: 2.5 TABLET ORAL at 09:02

## 2022-02-26 RX ADMIN — TRAMADOL HYDROCHLORIDE 25 MG: 50 TABLET ORAL at 22:23

## 2022-02-26 RX ADMIN — DORZOLAMIDE HYDROCHLORIDE AND TIMOLOL MALEATE 1 DROP: 22.3; 6.8 SOLUTION/ DROPS OPHTHALMIC at 09:09

## 2022-02-26 RX ADMIN — FAMOTIDINE 20 MG: 20 TABLET ORAL at 21:13

## 2022-02-26 RX ADMIN — GUAIFENESIN 600 MG: 600 TABLET, EXTENDED RELEASE ORAL at 21:13

## 2022-02-26 RX ADMIN — DICLOFENAC SODIUM 2 G: 10 GEL TOPICAL at 21:19

## 2022-02-26 RX ADMIN — LATANOPROST 1 DROP: 50 SOLUTION OPHTHALMIC at 22:24

## 2022-02-26 RX ADMIN — ESCITALOPRAM OXALATE 10 MG: 10 TABLET ORAL at 14:32

## 2022-02-26 RX ADMIN — ATORVASTATIN CALCIUM 40 MG: 40 TABLET, FILM COATED ORAL at 21:12

## 2022-02-26 RX ADMIN — ASPIRIN 325 MG: 325 TABLET, COATED ORAL at 21:12

## 2022-02-26 RX ADMIN — CARVEDILOL 6.25 MG: 6.25 TABLET, FILM COATED ORAL at 09:02

## 2022-02-26 RX ADMIN — GUAIFENESIN 600 MG: 600 TABLET, EXTENDED RELEASE ORAL at 09:02

## 2022-02-26 RX ADMIN — ACETAMINOPHEN 975 MG: 325 TABLET, FILM COATED ORAL at 21:12

## 2022-02-26 RX ADMIN — ESCITALOPRAM 5 MG: 5 TABLET, FILM COATED ORAL at 09:02

## 2022-02-26 RX ADMIN — DICLOFENAC SODIUM 2 G: 10 GEL TOPICAL at 12:32

## 2022-02-26 RX ADMIN — ASPIRIN 325 MG: 325 TABLET, COATED ORAL at 09:02

## 2022-02-26 RX ADMIN — DICLOFENAC SODIUM 2 G: 10 GEL TOPICAL at 09:10

## 2022-02-26 RX ADMIN — ACETAMINOPHEN 975 MG: 325 TABLET, FILM COATED ORAL at 14:32

## 2022-02-26 RX ADMIN — Medication 3 MG: at 22:23

## 2022-02-26 RX ADMIN — LIDOCAINE 1 PATCH: 246 PATCH TOPICAL at 21:13

## 2022-02-26 RX ADMIN — ACETAMINOPHEN 975 MG: 325 TABLET, FILM COATED ORAL at 09:02

## 2022-02-26 RX ADMIN — DORZOLAMIDE HYDROCHLORIDE AND TIMOLOL MALEATE 1 DROP: 22.3; 6.8 SOLUTION/ DROPS OPHTHALMIC at 21:18

## 2022-02-26 RX ADMIN — FAMOTIDINE 20 MG: 20 TABLET ORAL at 09:02

## 2022-02-26 ASSESSMENT — ACTIVITIES OF DAILY LIVING (ADL)
ADLS_ACUITY_SCORE: 28
ADLS_ACUITY_SCORE: 26
ADLS_ACUITY_SCORE: 28
ADLS_ACUITY_SCORE: 26
ADLS_ACUITY_SCORE: 28
ADLS_ACUITY_SCORE: 26
ADLS_ACUITY_SCORE: 28
ADLS_ACUITY_SCORE: 28
ADLS_ACUITY_SCORE: 26
ADLS_ACUITY_SCORE: 28
ADLS_ACUITY_SCORE: 26
ADLS_ACUITY_SCORE: 28
ADLS_ACUITY_SCORE: 28
ADLS_ACUITY_SCORE: 26
ADLS_ACUITY_SCORE: 29
ADLS_ACUITY_SCORE: 26
ADLS_ACUITY_SCORE: 28
ADLS_ACUITY_SCORE: 28
ADLS_ACUITY_SCORE: 24
ADLS_ACUITY_SCORE: 26
ADLS_ACUITY_SCORE: 28

## 2022-02-26 NOTE — PLAN OF CARE
Patient vital signs are at baseline: Yes  Patient able to ambulate as they were prior to admission or with assist devices provided by therapies during their stay:  Yes  Patient MUST void prior to discharge:  Yes  Patient able to tolerate oral intake:  Yes  Pain has adequate pain control using Oral analgesics:  Yes     VSS. Denies pain, on scheduled tylenol and voltaren gel. Dressing CDI. CMS intact. Tolerating regular diet. Voiding and +BM. Up with A2 tin stedy gait belt. Plan for discharge to LTC hospice on Monday.

## 2022-02-26 NOTE — PLAN OF CARE
Patient vital signs are at baseline: Yes on room air  Patient able to ambulate as they were prior to admission or with assist devices provided by therapies during their stay:  Yes with A1-2 and tin steady  Patient MUST void prior to discharge:  Yes; purewick in place; minimal to no output overnight. Bladder scanned at 0545 for 330mL. Fluids encouraged  Patient able to tolerate oral intake:  Yes on regular diet  Pain has adequate pain control using Oral analgesics:  Yes with scheduled tylenol.     Pt orietned to self and location. CMS intact. Dressing to L hip CDI. Plans to discharge to care facility with hospice; pending placement.    Goal Outcome Evaluation:    Plan of Care Reviewed With: patient, daughter     Overall Patient Progress: no change    Outcome Evaluation: [facility] plan to discharge to TCU when medically ready.

## 2022-02-26 NOTE — PROGRESS NOTES
[facility]    Hospitalist Progress Note    Date of Service (when I saw the patient): 02/26/2022  Provider:  All Clark MD   Text Page  7am - 6PM       Assessment & Plan   Summary of Stay: Alejandrina Whatley is a 89 year old female with a history of HLD, CVA, Left Hemiparesis, Dementia, Depression, CED, AAA, and HTN.  She presented to the hospital with left hip pain after a fall.  imaging demonstrated a left femur fracture.  The patient underwent percutaneous screw fixation     SW is assisting with disposition.  Pt remains medically stable for discharge when safe disposition plan is in place.       Plans today:  - await disposition  - palliative care consulted to review goals of care with family (daughter)     1.  Acute left femur fracture.  Status post traumatic fall.  -Status post repair by orthopedic surgery on 2/16.  -Pain medications as needed.  pain appears controlled  -Use incentive spirometry.     2.  Hypertension.  -Continue carvedilol and amlodpine     3.  Hyperlipidemia.  -Continue atorvastatin.     4.  Depression.  -Continue Lexapro.     5.  Dementia  -Schedule melatonin 3 mg at bedtime.  -IV haloperidol if needed.  - had some agitation earlier in hospital stay; none recently  - appreciate palliative care consult to review goals of care with family (daughter) and they are interested in facility with hospice enrollment on discharge     6.  Mildly abnormal urinalysis with possible UTI.    - urine cx positive for proteus  - given poor historian and inability to provide accurate hx combined with persistent leukocytosis will treat for presumed UTI with PO cephalosporin (allergies noted).    - Anticipate brief antibiotic course; antibiotic started 2/18 and will order to stop on 2/23     7.  Acute blood loss anemia.  Likely multifactorial due to trauma with acute fracture and need for above-noted surgical intervention.    - no need for transfusion      8.  Cough  - improved/resolved  -  not hypoxic  - no fever  - ordered Mucinex  - CXR 2/20 showed clear lungs, no infiltrate     Diet: Advance Diet as Tolerated: Regular Diet Adult    DVT Prophylaxis: Pneumatic Compression Devices  Code Status: No CPR- Do NOT Intubate    Disposition: Expected discharge, family (daughter) interested in facility with hospice enrollment on discharge     Interval History   Stable. Patient is pleasant and cooperative. Not symptomatic. SW still looking for placement        -Data reviewed today: I reviewed all new labs and imaging results over the last 24 hours.      Physical Exam   Temp: 97.1  F (36.2  C) Temp src: Temporal BP: 123/56 Pulse: 62   Resp: 20 SpO2: 96 % O2 Device: None (Room air)    Vitals:    02/23/22 1300   Weight: 49.9 kg (110 lb)     Vital Signs with Ranges  Temp:  [96.9  F (36.1  C)-97.3  F (36.3  C)] 97.1  F (36.2  C)  Pulse:  [62-70] 62  Resp:  [18-20] 20  BP: (113-125)/(50-59) 123/56  SpO2:  [93 %-96 %] 96 %  I/O last 3 completed shifts:  In: 460 [P.O.:460]  Out: 100 [Urine:100]    GEN:  Alert, not oriented x 3, appears comfortable, NAD.  HEENT:  Normocephalic/atraumatic, no scleral icterus, no nasal discharge, mouth moist.  CV:  Regular rate and rhythm, no murmur or JVD.  S1 + S2 noted, no S3 or S4.  LUNGS:  Clear to auscultation bilaterally without rales/rhonchi/wheezing/retractions.  Symmetric chest rise on inhalation noted.  ABD:  Active bowel sounds, soft, non-tender/non-distended.  No rebound/guarding/rigidity.  EXT:  No edema or cyanosis.  No joint synovitis noted.  SKIN:  Dry to touch, no exanthems noted in the visualized areas.       Medications       acetaminophen  975 mg Oral TID     amLODIPine  2.5 mg Oral Daily     aspirin  325 mg Oral BID     atorvastatin  40 mg Oral QPM     carvedilol  6.25 mg Oral BID w/meals     diclofenac  2 g Topical 4x Daily     dorzolamide-timolol  1 drop Both Eyes BID     escitalopram  5 mg Oral Daily     escitalopram  10 mg Oral Daily     famotidine  20 mg Oral  BID     guaiFENesin  600 mg Oral BID     latanoprost  1 drop Both Eyes At Bedtime     lidocaine  1 patch Transdermal Q24h    And     lidocaine   Transdermal Q8H     melatonin  3 mg Oral At Bedtime     polyethylene glycol  17 g Oral Daily     senna-docusate  1 tablet Oral BID     traMADol  25 mg Oral At Bedtime       Data   No lab results found in last 7 days.    No results found for this or any previous visit (from the past 24 hour(s)).    Disclaimer: This note consists of symbols derived from keyboarding, dictation and/or voice recognition software. As a result, there may be errors in the script that have gone undetected. Please consider this when interpreting information found in this chart.

## 2022-02-26 NOTE — PLAN OF CARE
3pm-11pm RN    Patient VS are at baseline: Yes  Patient able to ambulate as they were prior to admission or with assist devices provided by therapy during their stay: No using a tin steady for transfers  Patient must void prior to discharge: Yes  Patient able to tolerate oral intake: Yes  Patient has adequate pain control using oral analgesics: Yes    CMS intact, dressing on left hip CDI. Plan is to discharge to TCU once one is available.    Goal Outcome Evaluation:    Plan of Care Reviewed With: patient, daughter     Overall Patient Progress: no change    Outcome Evaluation: [facility] plan to discharge to TCU when medically ready.

## 2022-02-27 LAB
CREAT SERPL-MCNC: 0.88 MG/DL (ref 0.52–1.04)
GFR SERPL CREATININE-BSD FRML MDRD: 59 ML/MIN/1.73M2
HOLD SPECIMEN: NORMAL
LACTATE SERPL-SCNC: 1.1 MMOL/L (ref 0.7–2)

## 2022-02-27 PROCEDURE — 250N000013 HC RX MED GY IP 250 OP 250 PS 637: Performed by: PHYSICIAN ASSISTANT

## 2022-02-27 PROCEDURE — 250N000013 HC RX MED GY IP 250 OP 250 PS 637: Performed by: INTERNAL MEDICINE

## 2022-02-27 PROCEDURE — 36415 COLL VENOUS BLD VENIPUNCTURE: CPT | Performed by: INTERNAL MEDICINE

## 2022-02-27 PROCEDURE — 99232 SBSQ HOSP IP/OBS MODERATE 35: CPT | Performed by: HOSPITALIST

## 2022-02-27 PROCEDURE — 83605 ASSAY OF LACTIC ACID: CPT | Performed by: INTERNAL MEDICINE

## 2022-02-27 PROCEDURE — 82565 ASSAY OF CREATININE: CPT | Performed by: INTERNAL MEDICINE

## 2022-02-27 PROCEDURE — 250N000013 HC RX MED GY IP 250 OP 250 PS 637: Performed by: NURSE PRACTITIONER

## 2022-02-27 PROCEDURE — 120N000001 HC R&B MED SURG/OB

## 2022-02-27 RX ADMIN — LIDOCAINE 1 PATCH: 246 PATCH TOPICAL at 19:32

## 2022-02-27 RX ADMIN — ACETAMINOPHEN 975 MG: 325 TABLET, FILM COATED ORAL at 10:20

## 2022-02-27 RX ADMIN — DORZOLAMIDE HYDROCHLORIDE AND TIMOLOL MALEATE 1 DROP: 22.3; 6.8 SOLUTION/ DROPS OPHTHALMIC at 10:47

## 2022-02-27 RX ADMIN — CARVEDILOL 6.25 MG: 6.25 TABLET, FILM COATED ORAL at 18:09

## 2022-02-27 RX ADMIN — DORZOLAMIDE HYDROCHLORIDE AND TIMOLOL MALEATE 1 DROP: 22.3; 6.8 SOLUTION/ DROPS OPHTHALMIC at 19:31

## 2022-02-27 RX ADMIN — SENNOSIDES AND DOCUSATE SODIUM 1 TABLET: 50; 8.6 TABLET ORAL at 19:24

## 2022-02-27 RX ADMIN — ATORVASTATIN CALCIUM 40 MG: 40 TABLET, FILM COATED ORAL at 19:25

## 2022-02-27 RX ADMIN — ACETAMINOPHEN 975 MG: 325 TABLET, FILM COATED ORAL at 15:35

## 2022-02-27 RX ADMIN — LATANOPROST 1 DROP: 50 SOLUTION OPHTHALMIC at 23:13

## 2022-02-27 RX ADMIN — GUAIFENESIN 600 MG: 600 TABLET, EXTENDED RELEASE ORAL at 10:21

## 2022-02-27 RX ADMIN — FAMOTIDINE 20 MG: 20 TABLET ORAL at 10:41

## 2022-02-27 RX ADMIN — Medication 3 MG: at 23:13

## 2022-02-27 RX ADMIN — ACETAMINOPHEN 975 MG: 325 TABLET, FILM COATED ORAL at 19:24

## 2022-02-27 RX ADMIN — ESCITALOPRAM OXALATE 10 MG: 10 TABLET ORAL at 15:35

## 2022-02-27 RX ADMIN — ASPIRIN 325 MG: 325 TABLET, COATED ORAL at 19:24

## 2022-02-27 RX ADMIN — FAMOTIDINE 20 MG: 20 TABLET ORAL at 19:25

## 2022-02-27 RX ADMIN — ASPIRIN 325 MG: 325 TABLET, COATED ORAL at 10:21

## 2022-02-27 RX ADMIN — ESCITALOPRAM 5 MG: 5 TABLET, FILM COATED ORAL at 10:20

## 2022-02-27 RX ADMIN — GUAIFENESIN 600 MG: 600 TABLET, EXTENDED RELEASE ORAL at 19:25

## 2022-02-27 RX ADMIN — TRAMADOL HYDROCHLORIDE 25 MG: 50 TABLET ORAL at 23:13

## 2022-02-27 RX ADMIN — CARVEDILOL 6.25 MG: 6.25 TABLET, FILM COATED ORAL at 10:21

## 2022-02-27 RX ADMIN — AMLODIPINE BESYLATE 2.5 MG: 2.5 TABLET ORAL at 10:21

## 2022-02-27 ASSESSMENT — ACTIVITIES OF DAILY LIVING (ADL)
ADLS_ACUITY_SCORE: 20
ADLS_ACUITY_SCORE: 18
ADLS_ACUITY_SCORE: 20
ADLS_ACUITY_SCORE: 20
ADLS_ACUITY_SCORE: 18
ADLS_ACUITY_SCORE: 24
ADLS_ACUITY_SCORE: 24
ADLS_ACUITY_SCORE: 18
ADLS_ACUITY_SCORE: 20
ADLS_ACUITY_SCORE: 24
ADLS_ACUITY_SCORE: 20
ADLS_ACUITY_SCORE: 20
ADLS_ACUITY_SCORE: 18
ADLS_ACUITY_SCORE: 18
ADLS_ACUITY_SCORE: 24
ADLS_ACUITY_SCORE: 20
ADLS_ACUITY_SCORE: 18
ADLS_ACUITY_SCORE: 24
ADLS_ACUITY_SCORE: 20
ADLS_ACUITY_SCORE: 20

## 2022-02-27 NOTE — PLAN OF CARE
Goal Outcome Evaluation:    Plan of Care Reviewed With: patient, daughter     Overall Patient Progress: no change    Outcome Evaluation: ALPES[facility] Ahmadi plan to discharge to TCU when medically ready.    Patient vital signs are at baseline: Yes  Patient able to ambulate as they were prior to admission or with assist devices provided by therapies during their stay:  No,  Reason:  A2 with tin steady  Patient MUST void prior to discharge:  Yes  Patient able to tolerate oral intake:  Yes  Pain has adequate pain control using Oral analgesics:  Yes      Pt alert and oriented to self and location only. Dressing to left hip CDI. Denies pain. CMS intact. Purewick in place overnight but pt did call to go to bathroom and purewick removed. Plan to discharge to TCU when bed available. Will continue to monitor.

## 2022-02-27 NOTE — PLAN OF CARE
Goal Outcome Evaluation:    Plan of Care Reviewed With: patient, daughter     Overall Patient Progress: no change    Outcome Evaluation: [facility] plan to discharge to TCU when medically ready.    3pm-11pm RN    Patient VS are at baseline: Yes  Patient able to ambulate as they were prior to admission or with assist devices provided by therapy during their stay: No is using a tin steady  Patient must void prior to discharge: Yes  Patient able to tolerate oral intake: Yes  Patient has adequate pain control using oral analgesics: Yes    Patient up using the tin steady with assist of two. CMS intact, dressing on left hip CDI. Plan is to discharge to TCU.

## 2022-02-27 NOTE — PROGRESS NOTES
[facility]    Hospitalist Progress Note    Date of Service (when I saw the patient): 02/27/2022  Provider:  All Clark MD   Text Page  7am - 6PM       Assessment & Plan   Summary of Stay: Alejandrina Whatley is a 89 year old female with a history of HLD, CVA, Left Hemiparesis, Dementia, Depression, CED, AAA, and HTN.  She presented to the hospital with left hip pain after a fall.  imaging demonstrated a left femur fracture.  The patient underwent percutaneous screw fixation     SW is assisting with disposition.  Pt remains medically stable for discharge when safe disposition plan is in place.       Plans today:  - await disposition  - palliative care consulted to review goals of care with family (daughter)     1.  Acute left femur fracture.  Status post traumatic fall.  -Status post repair by orthopedic surgery on 2/16.  -Pain medications as needed.  pain appears controlled  -Use incentive spirometry.     2.  Hypertension.  -Continue carvedilol and amlodpine     3.  Hyperlipidemia.  -Continue atorvastatin.     4.  Depression.  -Continue Lexapro.     5.  Dementia  -Schedule melatonin 3 mg at bedtime.  -IV haloperidol if needed.  - had some agitation earlier in hospital stay; none recently  - appreciate palliative care consult to review goals of care with family (daughter) and they are interested in facility with hospice enrollment on discharge     6.  Mildly abnormal urinalysis with possible UTI.    - urine cx positive for proteus  - given poor historian and inability to provide accurate hx combined with persistent leukocytosis will treat for presumed UTI with PO cephalosporin (allergies noted).    - Anticipate brief antibiotic course; antibiotic started 2/18 and will order to stop on 2/23     7.  Acute blood loss anemia.  Likely multifactorial due to trauma with acute fracture and need for above-noted surgical intervention.    - no need for transfusion      8.  Cough  - improved/resolved  -  not hypoxic  - no fever  - ordered Mucinex  - CXR 2/20 showed clear lungs, no infiltrate     Diet: Advance Diet as Tolerated: Regular Diet Adult    DVT Prophylaxis: Pneumatic Compression Devices  Code Status: No CPR- Do NOT Intubate    Disposition: Expected discharge, family (daughter) interested in facility with hospice enrollment on discharge     Interval History   Stable. Patient is pleasant and cooperative. Not symptomatic. SW still looking for placement        -Data reviewed today: I reviewed all new labs and imaging results over the last 24 hours.      Physical Exam   Temp: 97.5  F (36.4  C) Temp src: Temporal BP: 125/44 Pulse: 76   Resp: 16 SpO2: 94 % O2 Device: None (Room air)    Vitals:    02/23/22 1300   Weight: 49.9 kg (110 lb)     Vital Signs with Ranges  Temp:  [96.7  F (35.9  C)-97.5  F (36.4  C)] 97.5  F (36.4  C)  Pulse:  [64-76] 76  Resp:  [16-70] 16  BP: (112-128)/(44-58) 125/44  SpO2:  [92 %-95 %] 94 %  I/O last 3 completed shifts:  In: 240 [P.O.:240]  Out: -     GEN:  Alert, not oriented x 3, appears comfortable, NAD.  HEENT:  Normocephalic/atraumatic, no scleral icterus, no nasal discharge, mouth moist.  CV:  Regular rate and rhythm, no murmur or JVD.  S1 + S2 noted, no S3 or S4.  LUNGS:  Clear to auscultation bilaterally without rales/rhonchi/wheezing/retractions.  Symmetric chest rise on inhalation noted.  ABD:  Active bowel sounds, soft, non-tender/non-distended.  No rebound/guarding/rigidity.  EXT:  No edema or cyanosis.  No joint synovitis noted.  SKIN:  Dry to touch, no exanthems noted in the visualized areas.       Medications       acetaminophen  975 mg Oral TID     amLODIPine  2.5 mg Oral Daily     aspirin  325 mg Oral BID     atorvastatin  40 mg Oral QPM     carvedilol  6.25 mg Oral BID w/meals     diclofenac  2 g Topical 4x Daily     dorzolamide-timolol  1 drop Both Eyes BID     escitalopram  5 mg Oral Daily     escitalopram  10 mg Oral Daily     famotidine  20 mg Oral BID      guaiFENesin  600 mg Oral BID     latanoprost  1 drop Both Eyes At Bedtime     lidocaine  1 patch Transdermal Q24h    And     lidocaine   Transdermal Q8H     melatonin  3 mg Oral At Bedtime     polyethylene glycol  17 g Oral Daily     senna-docusate  1 tablet Oral BID     traMADol  25 mg Oral At Bedtime       Data   Recent Labs   Lab 02/27/22  0602   CR 0.88       No results found for this or any previous visit (from the past 24 hour(s)).    Disclaimer: This note consists of symbols derived from keyboarding, dictation and/or voice recognition software. As a result, there may be errors in the script that have gone undetected. Please consider this when interpreting information found in this chart.

## 2022-02-28 ENCOUNTER — APPOINTMENT (OUTPATIENT)
Dept: PHYSICAL THERAPY | Facility: CLINIC | Age: 87
DRG: 481 | End: 2022-02-28
Payer: COMMERCIAL

## 2022-02-28 PROCEDURE — 120N000001 HC R&B MED SURG/OB

## 2022-02-28 PROCEDURE — 97530 THERAPEUTIC ACTIVITIES: CPT | Mod: GP | Performed by: PHYSICAL THERAPIST

## 2022-02-28 PROCEDURE — 99232 SBSQ HOSP IP/OBS MODERATE 35: CPT | Performed by: HOSPITALIST

## 2022-02-28 PROCEDURE — 250N000013 HC RX MED GY IP 250 OP 250 PS 637: Performed by: INTERNAL MEDICINE

## 2022-02-28 PROCEDURE — 250N000013 HC RX MED GY IP 250 OP 250 PS 637: Performed by: PHYSICIAN ASSISTANT

## 2022-02-28 PROCEDURE — 250N000013 HC RX MED GY IP 250 OP 250 PS 637: Performed by: HOSPITALIST

## 2022-02-28 RX ORDER — AMOXICILLIN 250 MG
1 CAPSULE ORAL
Status: DISCONTINUED | OUTPATIENT
Start: 2022-02-28 | End: 2022-02-28

## 2022-02-28 RX ORDER — ACETAMINOPHEN 650 MG/1
650 SUPPOSITORY RECTAL 3 TIMES DAILY
Status: DISCONTINUED | OUTPATIENT
Start: 2022-02-28 | End: 2022-03-12 | Stop reason: HOSPADM

## 2022-02-28 RX ORDER — AMOXICILLIN 250 MG
1 CAPSULE ORAL AT BEDTIME
Status: DISCONTINUED | OUTPATIENT
Start: 2022-02-28 | End: 2022-03-12 | Stop reason: HOSPADM

## 2022-02-28 RX ORDER — POLYETHYLENE GLYCOL 3350 17 G/17G
17 POWDER, FOR SOLUTION ORAL EVERY OTHER DAY
Status: DISCONTINUED | OUTPATIENT
Start: 2022-03-01 | End: 2022-03-12 | Stop reason: HOSPADM

## 2022-02-28 RX ORDER — ACETAMINOPHEN 500 MG
1000 TABLET ORAL 3 TIMES DAILY
Status: DISCONTINUED | OUTPATIENT
Start: 2022-02-28 | End: 2022-03-12 | Stop reason: HOSPADM

## 2022-02-28 RX ORDER — AMOXICILLIN 250 MG
2 CAPSULE ORAL AT BEDTIME
Status: DISCONTINUED | OUTPATIENT
Start: 2022-02-28 | End: 2022-03-12 | Stop reason: HOSPADM

## 2022-02-28 RX ORDER — AMOXICILLIN 250 MG
2 CAPSULE ORAL
Status: DISCONTINUED | OUTPATIENT
Start: 2022-02-28 | End: 2022-02-28

## 2022-02-28 RX ORDER — ACETAMINOPHEN 500 MG
1000 TABLET ORAL 2 TIMES DAILY
Status: DISCONTINUED | OUTPATIENT
Start: 2022-02-28 | End: 2022-02-28

## 2022-02-28 RX ORDER — ACETAMINOPHEN 500 MG
1000 TABLET ORAL 3 TIMES DAILY
Status: DISCONTINUED | OUTPATIENT
Start: 2022-02-28 | End: 2022-02-28

## 2022-02-28 RX ADMIN — GUAIFENESIN 600 MG: 600 TABLET, EXTENDED RELEASE ORAL at 17:53

## 2022-02-28 RX ADMIN — SENNOSIDES AND DOCUSATE SODIUM 1 TABLET: 50; 8.6 TABLET ORAL at 08:42

## 2022-02-28 RX ADMIN — DICLOFENAC SODIUM 2 G: 10 GEL TOPICAL at 08:45

## 2022-02-28 RX ADMIN — LATANOPROST 1 DROP: 50 SOLUTION OPHTHALMIC at 13:08

## 2022-02-28 RX ADMIN — ACETAMINOPHEN 1000 MG: 500 TABLET, FILM COATED ORAL at 17:52

## 2022-02-28 RX ADMIN — CARVEDILOL 6.25 MG: 6.25 TABLET, FILM COATED ORAL at 17:52

## 2022-02-28 RX ADMIN — ESCITALOPRAM 5 MG: 5 TABLET, FILM COATED ORAL at 08:40

## 2022-02-28 RX ADMIN — ACETAMINOPHEN 1000 MG: 500 TABLET, FILM COATED ORAL at 13:06

## 2022-02-28 RX ADMIN — ACETAMINOPHEN 975 MG: 325 TABLET, FILM COATED ORAL at 08:41

## 2022-02-28 RX ADMIN — ATORVASTATIN CALCIUM 40 MG: 40 TABLET, FILM COATED ORAL at 13:06

## 2022-02-28 RX ADMIN — Medication 3 MG: at 21:17

## 2022-02-28 RX ADMIN — AMLODIPINE BESYLATE 2.5 MG: 2.5 TABLET ORAL at 08:41

## 2022-02-28 RX ADMIN — DORZOLAMIDE HYDROCHLORIDE AND TIMOLOL MALEATE 1 DROP: 22.3; 6.8 SOLUTION/ DROPS OPHTHALMIC at 08:45

## 2022-02-28 RX ADMIN — DORZOLAMIDE HYDROCHLORIDE AND TIMOLOL MALEATE 1 DROP: 22.3; 6.8 SOLUTION/ DROPS OPHTHALMIC at 17:57

## 2022-02-28 RX ADMIN — GUAIFENESIN 600 MG: 600 TABLET, EXTENDED RELEASE ORAL at 08:41

## 2022-02-28 RX ADMIN — CARVEDILOL 6.25 MG: 6.25 TABLET, FILM COATED ORAL at 08:40

## 2022-02-28 RX ADMIN — TRAMADOL HYDROCHLORIDE 25 MG: 50 TABLET ORAL at 17:52

## 2022-02-28 RX ADMIN — FAMOTIDINE 20 MG: 20 TABLET ORAL at 08:41

## 2022-02-28 RX ADMIN — ESCITALOPRAM OXALATE 10 MG: 10 TABLET ORAL at 17:52

## 2022-02-28 RX ADMIN — ASPIRIN 325 MG: 325 TABLET, COATED ORAL at 08:41

## 2022-02-28 ASSESSMENT — ACTIVITIES OF DAILY LIVING (ADL)
ADLS_ACUITY_SCORE: 20

## 2022-02-28 NOTE — PLAN OF CARE
Received report not to enter room or wake up pt from evening nurse per daughter request. No vitals or assessment completed. Safety checks provided. Pt snoring and in supine position.

## 2022-02-28 NOTE — PROGRESS NOTES
Care Management Follow Up    Length of Stay (days): 13    Expected Discharge Date: 02/28/2022     Concerns to be Addressed: discharge planning  Patient plan of care discussed at interdisciplinary rounds: Yes    Anticipated Discharge Disposition: Long Term Care, Hospice  Disposition Comments: Awaiting LTC placement with hospice support  Anticipated Discharge Services:  Hospice  Anticipated Discharge DME:      Patient/family educated on Medicare website which has current facility and service quality ratings: yes  Education Provided on the Discharge Plan:    Patient/Family in Agreement with the Plan: yes    Referrals Placed by CM/SW: Post Acute Facilities  Private pay costs discussed: Not applicable    Additional Information:  Reviewed pt's status and discussed in rounds. SW continues to follow for [facility]/LTC placement with hospice support. Barrier remains that pt is not fully vaccinated for covid and barrier for any  facility is that pt is on EW (would need to find facility with EW openings, please see HARI note on 2/24 for facilities that were contacted).     HARI left VM for Tali in admissions at [facility] requesting return call.     Left VM for Rosenda p:[phone]  with [facility] ([place], [place], [place], [facility]) requesting return call.    HARI sent additional LTC referral to [facility], [facility], [facility], and [facility]    Addendum @ 1500:   HARI left additional VM for Tali in admissions at [facility] p:[phone] requesting return call.     Received call back from Rosenda at [facility] who stated they had openings at their [place] location and requested referral be faxed to f:[phone]. Per Rosenda, they do not require covid vaccination and have both a shared room available and private room (with a additional fee of $323 per month). HARI faxed referral.     Pt's dtr requested referral be placed again to [facility]. HARI faxed referral. Received return call from Lillian in admissions at [place] stating they do not have any availability in their LTC.    Social work will continue to follow and assist with discharge planning as needed.    RYAN Hanks, W  Inpatient Care Coordination  [facility], [facility]  [phone]    DONN RamosW

## 2022-02-28 NOTE — PROGRESS NOTES
[facility]    Hospitalist Progress Note    Date of Service (when I saw the patient): 02/28/2022  Provider:  All Clark MD   Text Page  7am - 6PM       Assessment & Plan   Summary of Stay: Alejandrina Whatley is a 89 year old female with a history of HLD, CVA, Left Hemiparesis, Dementia, Depression, CED, AAA, and HTN.  She presented to the hospital with left hip pain after a fall.  imaging demonstrated a left femur fracture.  The patient underwent percutaneous screw fixation     SW is assisting with disposition.  Pt remains medically stable for discharge when safe disposition plan is in place.       Plans today:  - await disposition  - palliative care consulted to review goals of care with family (daughter)     1.  Acute left femur fracture.  Status post traumatic fall.  -Status post repair by orthopedic surgery on 2/16.  -Pain medications as needed.  pain appears controlled  -Use incentive spirometry.     2.  Hypertension.  -Continue carvedilol and amlodpine     3.  Hyperlipidemia.  -Continue atorvastatin.     4.  Depression.  -Continue Lexapro.     5.  Dementia  -Schedule melatonin 3 mg at bedtime.  -IV haloperidol if needed.  - had some agitation earlier in hospital stay; none recently  - appreciate palliative care consult to review goals of care with family (daughter) and they are interested in facility with hospice enrollment on discharge     6.  Mildly abnormal urinalysis with possible UTI.    - urine cx positive for proteus  - given poor historian and inability to provide accurate hx combined with persistent leukocytosis will treat for presumed UTI with PO cephalosporin (allergies noted).    - Anticipate brief antibiotic course; antibiotic started 2/18 and will order to stop on 2/23     7.  Acute blood loss anemia.  Likely multifactorial due to trauma with acute fracture and need for above-noted surgical intervention.    - no need for transfusion      8.  Cough  - improved/resolved  -  not hypoxic  - no fever  - ordered Mucinex  - CXR 2/20 showed clear lungs, no infiltrate     Diet: Advance Diet as Tolerated: Regular Diet Adult    DVT Prophylaxis: Pneumatic Compression Devices  Code Status: No CPR- Do NOT Intubate    Disposition: Expected discharge, family (daughter) interested in facility with hospice enrollment on discharge     Interval History   Some confusion last night, meds tailored at family request. Otherwise stable. Patient is pleasant and cooperative. Not symptomatic. SW still looking for placement      -Data reviewed today: I reviewed all new labs and imaging results over the last 24 hours.      Physical Exam   Temp: 97.1  F (36.2  C) Temp src: Temporal BP: (Abnormal) 143/61 Pulse: 64   Resp: 16 SpO2: 96 % O2 Device: None (Room air)    Vitals:    02/23/22 1300   Weight: 49.9 kg (110 lb)     Vital Signs with Ranges  Temp:  [97.1  F (36.2  C)-98.6  F (37  C)] 97.1  F (36.2  C)  Pulse:  [63-94] 64  Resp:  [16-18] 16  BP: (110-143)/(42-66) 143/61  SpO2:  [92 %-97 %] 96 %  I/O last 3 completed shifts:  In: 240 [P.O.:240]  Out: -     GEN:  Alert, not oriented x 3, appears comfortable, NAD.  HEENT:  Normocephalic/atraumatic, no scleral icterus, no nasal discharge, mouth moist.  CV:  Regular rate and rhythm, no murmur or JVD.  S1 + S2 noted, no S3 or S4.  LUNGS:  Clear to auscultation bilaterally without rales/rhonchi/wheezing/retractions.  Symmetric chest rise on inhalation noted.  ABD:  Active bowel sounds, soft, non-tender/non-distended.  No rebound/guarding/rigidity.  EXT:  No edema or cyanosis.  No joint synovitis noted.  SKIN:  Dry to touch, no exanthems noted in the visualized areas.       Medications       acetaminophen  1,000 mg Oral TID    Or     acetaminophen  650 mg Rectal TID     amLODIPine  2.5 mg Oral Daily     [START ON 3/1/2022] aspirin  325 mg Oral Daily     atorvastatin  40 mg Oral QPM     carvedilol  6.25 mg Oral BID w/meals     dorzolamide-timolol  1 drop Both Eyes BID      escitalopram  10 mg Oral Daily     guaiFENesin  600 mg Oral BID     latanoprost  1 drop Both Eyes At Bedtime     melatonin  3 mg Oral At Bedtime     [START ON 3/1/2022] polyethylene glycol  17 g Oral Every Other Day     senna-docusate  1 tablet Oral At Bedtime    Or     senna-docusate  2 tablet Oral At Bedtime     traMADol  25 mg Oral At Bedtime       Data   Recent Labs   Lab 02/27/22  0602   CR 0.88       No results found for this or any previous visit (from the past 24 hour(s)).    Disclaimer: This note consists of symbols derived from keyboarding, dictation and/or voice recognition software. As a result, there may be errors in the script that have gone undetected. Please consider this when interpreting information found in this chart.

## 2022-02-28 NOTE — PLAN OF CARE
Patient vital signs are at baseline: Yes  Patient able to ambulate as they were prior to admission or with assist devices provided by therapies during their stay:  Yes  Patient MUST void prior to discharge:  Yes  Patient able to tolerate oral intake:  Yes  Pain has adequate pain control using Oral analgesics:  Yes  Does patient have an identified : No-going to TCU, otherwise daughter Pat very involved  Has goal D/C date and time been discussed with patient:  No,  Reason:  Awaiting TCU bed    Alert to self and location.  Very pleasant and reorients easily.  Takes pills well whole. Dressing D/I to left hip. Transfers to chair for meals with Ax2 et Sarastedy.  Tolerating regular diet. Taking Tylenol for pain. Voiding well per allen. Awaiting TCU placement.

## 2022-02-28 NOTE — PLAN OF CARE
Goal Outcome Evaluation:    Plan of Care Reviewed With: patient, daughter     Overall Patient Progress: no change    Outcome Evaluation: [facility] plan to discharge to TCU when medically ready.    Pt alert to self, bday, place and but confused on why in the hospital. Pt up to chair for all meals and to the BR with tin steady and assist of 1. LS clear but pt has cough and staff can hear some nasal wheezing sounds this am. Better after her mucinex. Plan is TCU tomorrow.

## 2022-02-28 NOTE — PROGRESS NOTES
"[facility] Progress Note  RH/ Ortho Spine 6     Visit with pt Alejandrina Noble per length of stay follow up.  Daughter Pat present.  Alejandrina Noble reports no pain and in good spirits, stated that everyone is taking very good care of her.  Pat said Alejandrina Noble wakes up just to eat her meals.      Pat's daughter is also coming this afternoon, as visitor guidelines changed today.      Pat is hopeful to find a LTC placement with a \"year of hospice services\" within the next couple of days.  She has tried to get a motorized wheel chair for Alejandrina Noble, but insurance denied her request.     Pat stated that she is much more satisfied with her mother's care than she was at my last visit.     CRISTOPHER Rogers   Intern  Pager: [phone]    "

## 2022-02-28 NOTE — PROGRESS NOTES
"2000 meds given. At this time, pt noted she usually doesn't get this many medications and she was assured that all medications were correct. Upon brining in 2200 medications, patient refused to take any more medications stating she thought we were \"overdosing\" her. Asked to speak to her daughter, Layne, over the phone. Contacted daughter who talked to patient and requested to speak with writer afterwards. Pt's daughter extremely upset that her mother is not on the same medication schedule as she is in LTC. Also indicating confusion and frustration for certain medications being added without her knowing. Explained to daughter that many of those medications are routinely given when in the hospital and especially after surgery. Entire med rec completed including name, time, quantity and dosage. Upon requesting that almost all medication were either altered or removed, writer told daughter to speak to provider in the morning as writer does not have the authority to make these changes. Daughter also requested that patient only receive medications at 0900, 1300, and 1800; added to sticky note in chart. Stated numerous times that pt should not be bothered at all after 1800 meds so she can rest. Report given to next nurse not to enter room unless pt calls including skipping 0000 vitals and assessment per family request(s).    Pt triggered sepsis at 1945. VSS and lactic drawn at 1.1  "

## 2022-03-01 ENCOUNTER — APPOINTMENT (OUTPATIENT)
Dept: PHYSICAL THERAPY | Facility: CLINIC | Age: 87
DRG: 481 | End: 2022-03-01
Payer: COMMERCIAL

## 2022-03-01 PROCEDURE — 97530 THERAPEUTIC ACTIVITIES: CPT | Mod: GP | Performed by: PHYSICAL THERAPIST

## 2022-03-01 PROCEDURE — 250N000013 HC RX MED GY IP 250 OP 250 PS 637: Performed by: PHYSICIAN ASSISTANT

## 2022-03-01 PROCEDURE — 99232 SBSQ HOSP IP/OBS MODERATE 35: CPT | Performed by: HOSPITALIST

## 2022-03-01 PROCEDURE — 250N000013 HC RX MED GY IP 250 OP 250 PS 637: Performed by: INTERNAL MEDICINE

## 2022-03-01 PROCEDURE — 120N000001 HC R&B MED SURG/OB

## 2022-03-01 PROCEDURE — 250N000013 HC RX MED GY IP 250 OP 250 PS 637: Performed by: HOSPITALIST

## 2022-03-01 RX ADMIN — CARVEDILOL 6.25 MG: 6.25 TABLET, FILM COATED ORAL at 09:14

## 2022-03-01 RX ADMIN — CARVEDILOL 6.25 MG: 6.25 TABLET, FILM COATED ORAL at 17:31

## 2022-03-01 RX ADMIN — ACETAMINOPHEN 1000 MG: 500 TABLET, FILM COATED ORAL at 17:31

## 2022-03-01 RX ADMIN — GUAIFENESIN 600 MG: 600 TABLET, EXTENDED RELEASE ORAL at 17:31

## 2022-03-01 RX ADMIN — ESCITALOPRAM OXALATE 10 MG: 10 TABLET ORAL at 17:31

## 2022-03-01 RX ADMIN — GUAIFENESIN 600 MG: 600 TABLET, EXTENDED RELEASE ORAL at 09:14

## 2022-03-01 RX ADMIN — POLYETHYLENE GLYCOL 3350 17 G: 17 POWDER, FOR SOLUTION ORAL at 09:14

## 2022-03-01 RX ADMIN — ASPIRIN 325 MG: 325 TABLET, COATED ORAL at 09:14

## 2022-03-01 RX ADMIN — SENNOSIDES AND DOCUSATE SODIUM 1 TABLET: 50; 8.6 TABLET ORAL at 17:31

## 2022-03-01 RX ADMIN — DORZOLAMIDE HYDROCHLORIDE AND TIMOLOL MALEATE 1 DROP: 22.3; 6.8 SOLUTION/ DROPS OPHTHALMIC at 17:43

## 2022-03-01 RX ADMIN — DORZOLAMIDE HYDROCHLORIDE AND TIMOLOL MALEATE 1 DROP: 22.3; 6.8 SOLUTION/ DROPS OPHTHALMIC at 08:53

## 2022-03-01 RX ADMIN — LATANOPROST 1 DROP: 50 SOLUTION OPHTHALMIC at 12:23

## 2022-03-01 RX ADMIN — TRAMADOL HYDROCHLORIDE 25 MG: 50 TABLET ORAL at 17:31

## 2022-03-01 RX ADMIN — ACETAMINOPHEN 1000 MG: 500 TABLET, FILM COATED ORAL at 09:14

## 2022-03-01 RX ADMIN — AMLODIPINE BESYLATE 2.5 MG: 2.5 TABLET ORAL at 09:14

## 2022-03-01 RX ADMIN — ATORVASTATIN CALCIUM 40 MG: 40 TABLET, FILM COATED ORAL at 12:21

## 2022-03-01 RX ADMIN — ACETAMINOPHEN 1000 MG: 500 TABLET, FILM COATED ORAL at 12:21

## 2022-03-01 ASSESSMENT — ACTIVITIES OF DAILY LIVING (ADL)
ADLS_ACUITY_SCORE: 20
ADLS_ACUITY_SCORE: 18
ADLS_ACUITY_SCORE: 18
ADLS_ACUITY_SCORE: 24
ADLS_ACUITY_SCORE: 20
ADLS_ACUITY_SCORE: 18
ADLS_ACUITY_SCORE: 18
ADLS_ACUITY_SCORE: 24
ADLS_ACUITY_SCORE: 20
ADLS_ACUITY_SCORE: 18
ADLS_ACUITY_SCORE: 18
ADLS_ACUITY_SCORE: 20
ADLS_ACUITY_SCORE: 18
ADLS_ACUITY_SCORE: 18
ADLS_ACUITY_SCORE: 24
ADLS_ACUITY_SCORE: 24
ADLS_ACUITY_SCORE: 18
ADLS_ACUITY_SCORE: 20
ADLS_ACUITY_SCORE: 18
ADLS_ACUITY_SCORE: 24
ADLS_ACUITY_SCORE: 20
ADLS_ACUITY_SCORE: 18

## 2022-03-01 NOTE — PROGRESS NOTES
I have followed this patient with Argenis Ann, Student Nurse and have reviewed her charting and agree with same.  Jessy Mendieta RN on 3/1/2022 at 12:00 PM

## 2022-03-01 NOTE — PLAN OF CARE
Goal Outcome Evaluation:    Plan of Care Reviewed With: patient, daughter     Overall Patient Progress: no change     Physically stable, no change, awaiting TCU or SNF bed.

## 2022-03-01 NOTE — PROGRESS NOTES
[facility]    Hospitalist Progress Note    Date of Service (when I saw the patient): 03/01/2022  Provider:  All Clark MD   Text Page  7am - 6PM       Assessment & Plan   Summary of Stay: Alejandrina Whatley is a 89 year old female with a history of HLD, CVA, Left Hemiparesis, Dementia, Depression, CED, AAA, and HTN.  She presented to the hospital with left hip pain after a fall.  imaging demonstrated a left femur fracture.  The patient underwent percutaneous screw fixation     SW is assisting with disposition.  Pt remains medically stable for discharge when safe disposition plan is in place.       Plans today:  - await disposition  - palliative care consulted to review goals of care with family (daughter)     1.  Acute left femur fracture.  Status post traumatic fall.  -Status post repair by orthopedic surgery on 2/16.  -Pain medications as needed.  pain appears controlled  -Use incentive spirometry.     2.  Hypertension.  -Continue carvedilol and amlodpine     3.  Hyperlipidemia.  -Continue atorvastatin.     4.  Depression.  -Continue Lexapro.     5.  Dementia  -Schedule melatonin 3 mg at bedtime.  -IV haloperidol if needed.  - had some agitation earlier in hospital stay; none recently  - appreciate palliative care consult to review goals of care with family (daughter) and they are interested in facility with hospice enrollment on discharge     6.  Mildly abnormal urinalysis with possible UTI.    - urine cx positive for proteus  - given poor historian and inability to provide accurate hx combined with persistent leukocytosis will treat for presumed UTI with PO cephalosporin (allergies noted).    - Anticipate brief antibiotic course; antibiotic started 2/18 and will order to stop on 2/23     7.  Acute blood loss anemia.  Likely multifactorial due to trauma with acute fracture and need for above-noted surgical intervention.    - no need for transfusion      8.  Cough  - improved/resolved  -  not hypoxic  - no fever  - ordered Mucinex  - CXR 2/20 showed clear lungs, no infiltrate     Diet: Advance Diet as Tolerated: Regular Diet Adult    DVT Prophylaxis: Pneumatic Compression Devices  Code Status: No CPR- Do NOT Intubate    Disposition: Expected discharge, family (daughter) interested in facility with hospice enrollment on discharge     Interval History   No changes. No confusion overnight,  meds tailored at family request. Otherwise stable. Patient is pleasant and cooperative. Not symptomatic. SW still looking for placement      -Data reviewed today: I reviewed all new labs and imaging results over the last 24 hours.      Physical Exam   Temp: 97.3  F (36.3  C) Temp src: Temporal BP: 133/66 Pulse: 68   Resp: 16 SpO2: 94 % O2 Device: None (Room air)    Vitals:    02/23/22 1300   Weight: 49.9 kg (110 lb)     Vital Signs with Ranges  Temp:  [96.9  F (36.1  C)-97.3  F (36.3  C)] 97.3  F (36.3  C)  Pulse:  [68-83] 68  Resp:  [16-18] 16  BP: (123-137)/(59-66) 133/66  SpO2:  [94 %-96 %] 94 %  I/O last 3 completed shifts:  In: 480 [P.O.:480]  Out: 800 [Urine:800]    GEN:  Alert, not oriented x 3, appears comfortable, NAD.  HEENT:  Normocephalic/atraumatic, no scleral icterus, no nasal discharge, mouth moist.  CV:  Regular rate and rhythm, no murmur or JVD.  S1 + S2 noted, no S3 or S4.  LUNGS:  Clear to auscultation bilaterally without rales/rhonchi/wheezing/retractions.  Symmetric chest rise on inhalation noted.  ABD:  Active bowel sounds, soft, non-tender/non-distended.  No rebound/guarding/rigidity.  EXT:  No edema or cyanosis.  No joint synovitis noted.  SKIN:  Dry to touch, no exanthems noted in the visualized areas.       Medications       acetaminophen  1,000 mg Oral TID    Or     acetaminophen  650 mg Rectal TID     amLODIPine  2.5 mg Oral Daily     aspirin  325 mg Oral Daily     atorvastatin  40 mg Oral QPM     carvedilol  6.25 mg Oral BID w/meals     dorzolamide-timolol  1 drop Both Eyes BID      escitalopram  10 mg Oral Daily     guaiFENesin  600 mg Oral BID     latanoprost  1 drop Both Eyes At Bedtime     melatonin  3 mg Oral At Bedtime     polyethylene glycol  17 g Oral Every Other Day     senna-docusate  1 tablet Oral At Bedtime    Or     senna-docusate  2 tablet Oral At Bedtime     traMADol  25 mg Oral At Bedtime       Data   Recent Labs   Lab 02/27/22  0602   CR 0.88       No results found for this or any previous visit (from the past 24 hour(s)).    Disclaimer: This note consists of symbols derived from keyboarding, dictation and/or voice recognition software. As a result, there may be errors in the script that have gone undetected. Please consider this when interpreting information found in this chart.

## 2022-03-01 NOTE — PROGRESS NOTES
"Care Management Follow Up    Length of Stay (days): 14    Expected Discharge Date: 03/04/2022     Concerns to be Addressed:       Patient plan of care discussed at interdisciplinary rounds: Yes    Anticipated Discharge Disposition: Hospice, Long Term Care  Disposition Comments: Awaiting LTC placement with hospice support  Anticipated Discharge Services: None  Anticipated Discharge DME: None    Patient/family educated on Medicare website which has current facility and service quality ratings: yes  Education Provided on the Discharge Plan:    Patient/Family in Agreement with the Plan: unable to assess    Referrals Placed by CM/SW: Post Acute Facilities  Private pay costs discussed: Not applicable    Additional Information:  Per HARI note from 2/28/22 \"Received call back from Rosenda at [facility] who stated they had openings at their [place] location and requested referral be faxed to f:[phone]. Per Rosenda, they do not require covid vaccination and have both a shared room available and private room (with a additional fee of $323 per month). \"     HARI attempted to call Rosenda at [phone]. SW left  requesting a call back.     Addendum 1300: HARI received call from Nathalia Erickson who would like to speak with patient's daughter and then determine if an in person visit/ screening can happen.     Addendum 1311: Hari heard back from Rosenda at [facility]. She spoke with the patient;s daughter. [facility] reports the patients daughter believes the patient needs nursing home, not [facility]. [facility] believes she would be a good fit for them and will have daughter tour tomorrow at 0900.     Bianca Cool, MSW, LGSW  , ED Care Management         Bianca Cool      "

## 2022-03-01 NOTE — PLAN OF CARE
Patient vital signs are at baseline: Yes  Patient able to ambulate as they were prior to admission or with assist devices provided by therapies during their stay:  Yes  Patient MUST void prior to discharge:  Yes  Patient able to tolerate oral intake:  Yes  Patient has adequate pain control using Oral analgesics:  Yes  Does patient have an identified :  Yes  Has goal D/C date and time been discussed with patient:  No,  Reason:  Awaiting TCU placement.     Alert to self and location.  Pleasant and easily reoriented.  Dressing D/I to left hip. Up with Ax2 et Cadence.  Tolerating regular diet. Awaiting placement.

## 2022-03-01 NOTE — PLAN OF CARE
Physical Therapy Discharge Summary    Reason for therapy discharge:    No further expectations of functional progress.    Progress towards therapy goal(s). See goals on Care Plan in [facility] electronic health record for goal details.  Goals not met.  Barriers to achieving goals:   Pt with limited tolerance for therapy and family requesting only use of Ax2 with sarastedy for all mobility.    Therapy recommendation(s):    No further therapy is recommended.

## 2022-03-01 NOTE — PLAN OF CARE
"Patient vital signs are at baseline: Yes  Patient able to ambulate as they were prior to admission or with assist devices provided by therapies during their stay:  Yes  Patient MUST void prior to discharge:  Yes  Patient able to tolerate oral intake:  Yes  Pain has adequate pain control using Oral analgesics:  Yes  Does patient have an identified :  No,  Reason:  awieting for TCU placement.  Has goal D/C date and time been discussed with patient:  Yes   /59 (BP Location: Right arm)   Pulse 70   Temp 96.9  F (36.1  C) (Temporal)   Resp 18   Ht 1.549 m (5' 1\")   Wt 49.9 kg (110 lb)   SpO2 96%   BMI 20.78 kg/m      Pt. A&O to self and place, use sear-steady and 2 assist for mobility, use pure-wick, denied pain, CMS intact, incision on left hip is CDI, pt. Is awaiting to TCU bed.       "

## 2022-03-02 ENCOUNTER — APPOINTMENT (OUTPATIENT)
Dept: GENERAL RADIOLOGY | Facility: CLINIC | Age: 87
DRG: 481 | End: 2022-03-02
Attending: PHYSICIAN ASSISTANT
Payer: COMMERCIAL

## 2022-03-02 PROCEDURE — 99233 SBSQ HOSP IP/OBS HIGH 50: CPT | Performed by: NURSE PRACTITIONER

## 2022-03-02 PROCEDURE — 99232 SBSQ HOSP IP/OBS MODERATE 35: CPT | Performed by: INTERNAL MEDICINE

## 2022-03-02 PROCEDURE — 120N000001 HC R&B MED SURG/OB

## 2022-03-02 PROCEDURE — 73502 X-RAY EXAM HIP UNI 2-3 VIEWS: CPT

## 2022-03-02 PROCEDURE — 250N000013 HC RX MED GY IP 250 OP 250 PS 637: Performed by: INTERNAL MEDICINE

## 2022-03-02 PROCEDURE — 250N000013 HC RX MED GY IP 250 OP 250 PS 637: Performed by: PHYSICIAN ASSISTANT

## 2022-03-02 PROCEDURE — 250N000013 HC RX MED GY IP 250 OP 250 PS 637: Performed by: HOSPITALIST

## 2022-03-02 RX ORDER — AMLODIPINE BESYLATE 5 MG/1
2.5 TABLET ORAL DAILY
Qty: 30 TABLET | Refills: 0 | DISCHARGE
Start: 2022-03-02 | End: 2022-07-21

## 2022-03-02 RX ADMIN — GUAIFENESIN 600 MG: 600 TABLET, EXTENDED RELEASE ORAL at 08:39

## 2022-03-02 RX ADMIN — CARVEDILOL 6.25 MG: 6.25 TABLET, FILM COATED ORAL at 18:25

## 2022-03-02 RX ADMIN — ESCITALOPRAM OXALATE 10 MG: 10 TABLET ORAL at 18:24

## 2022-03-02 RX ADMIN — Medication 3 MG: at 19:15

## 2022-03-02 RX ADMIN — ACETAMINOPHEN 1000 MG: 500 TABLET, FILM COATED ORAL at 18:22

## 2022-03-02 RX ADMIN — DORZOLAMIDE HYDROCHLORIDE AND TIMOLOL MALEATE 1 DROP: 22.3; 6.8 SOLUTION/ DROPS OPHTHALMIC at 18:26

## 2022-03-02 RX ADMIN — ACETAMINOPHEN 1000 MG: 500 TABLET, FILM COATED ORAL at 13:03

## 2022-03-02 RX ADMIN — TRAMADOL HYDROCHLORIDE 25 MG: 50 TABLET ORAL at 18:23

## 2022-03-02 RX ADMIN — LATANOPROST 1 DROP: 50 SOLUTION OPHTHALMIC at 13:04

## 2022-03-02 RX ADMIN — AMLODIPINE BESYLATE 2.5 MG: 2.5 TABLET ORAL at 08:39

## 2022-03-02 RX ADMIN — CARVEDILOL 6.25 MG: 6.25 TABLET, FILM COATED ORAL at 08:39

## 2022-03-02 RX ADMIN — GUAIFENESIN 600 MG: 600 TABLET, EXTENDED RELEASE ORAL at 18:23

## 2022-03-02 RX ADMIN — ASPIRIN 325 MG: 325 TABLET, COATED ORAL at 08:39

## 2022-03-02 RX ADMIN — DORZOLAMIDE HYDROCHLORIDE AND TIMOLOL MALEATE 1 DROP: 22.3; 6.8 SOLUTION/ DROPS OPHTHALMIC at 08:42

## 2022-03-02 RX ADMIN — ACETAMINOPHEN 1000 MG: 500 TABLET, FILM COATED ORAL at 08:39

## 2022-03-02 RX ADMIN — ATORVASTATIN CALCIUM 40 MG: 40 TABLET, FILM COATED ORAL at 13:03

## 2022-03-02 ASSESSMENT — ACTIVITIES OF DAILY LIVING (ADL)
ADLS_ACUITY_SCORE: 22
ADLS_ACUITY_SCORE: 20
ADLS_ACUITY_SCORE: 22
ADLS_ACUITY_SCORE: 20
ADLS_ACUITY_SCORE: 18
ADLS_ACUITY_SCORE: 22
ADLS_ACUITY_SCORE: 18
ADLS_ACUITY_SCORE: 22
ADLS_ACUITY_SCORE: 20
ADLS_ACUITY_SCORE: 22
ADLS_ACUITY_SCORE: 18
ADLS_ACUITY_SCORE: 22
ADLS_ACUITY_SCORE: 20
ADLS_ACUITY_SCORE: 22
ADLS_ACUITY_SCORE: 22
ADLS_ACUITY_SCORE: 18
ADLS_ACUITY_SCORE: 22
ADLS_ACUITY_SCORE: 18
ADLS_ACUITY_SCORE: 22

## 2022-03-02 NOTE — PROGRESS NOTES
[facility]  Hospitalist Progress Note  Ana Gutierrez MD 03/02/22    Reason for Stay (Diagnosis): fall, left femur fracture         Assessment and Plan:      Summary of Stay: Summary of Stay: Alejandrina Whatley is a 89 year old female with a history of HLD, CVA, Left Hemiparesis, Dementia, Depression, CED, AAA, and HTN who has been living at a group home but has had multiple falls and was admitted on 2/15/2022 after a fall with resultant left femur fracture.  She is s/p surgical repair.  Awaiting discharge to LTC, potentially with hospice support.     Patient is medically ready for discharge, discharge orders have been placed.  SW working with daughter regarding disposition plan.     Acute left femur fracture s/p surgical repair on 2/16:  -Follow up with orthopedics upon discharge  -Pain medications as needed.  pain appears controlled  -Use incentive spirometry.     Hypertension.  -Continue carvedilol and amlodipine     Hyperlipidemia.  -Continue atorvastatin.     Depression.  -Continue Lexapro.     Dementia  -Schedule melatonin 3 mg at bedtime.  - had some agitation earlier in hospital stay; none recently  - appreciate palliative care consult to review goals of care with family (daughter) and they are interested in facility with hospice enrollment on discharge     Mildly abnormal urinalysis with possible UTI.    - urine cx positive for proteus  - given poor historian and inability to provide accurate hx combined with persistent leukocytosis she was treated for presumed UTI with PO cephalosporin (allergies noted) for total of 5 days (finished on 2/23)  - Anticipate brief antibiotic course; antibiotic started 2/18 and will order to stop on 2/23     Acute blood loss anemia.  Likely multifactorial due to trauma with acute fracture and need for above-noted surgical intervention.  Pre operative Hgb was 12 with baseline Hgb in the 10-11 range.  Decreased to 9.3.  - no need for transfusion      Cough -  "Resolved: CXR on 2/20 negative for infiltrate.    Discharge Plan: Patient is medically ready for discharge.  SW continuing to work with patient's daughter to find acceptable discharge plan.    Diet: Advance Diet as Tolerated: Regular Diet Adult  Snacks/Supplements Adult: Ensure Enlive; Between Meals  Diet    DVT Prophylaxis:  mg every day   Clark Catheter: Not present  Code Status: No CPR- Do NOT Intubate      Disposition Plan   Expected Discharge: 03/02/2022     Anticipated discharge location:  Awaiting care coordination huddle  Delays:     Placement - LTC         Entered: Ana Gutierrez MD 03/02/2022, 1:47 PM       The patient's care was discussed with the Bedside Nurse, Care Coordinator/ and Patient.    Hospitalist Service  [facility]          Interval History (Subjective):      Patient was seen with her daughter and bedside nurse this morning.  Patient states she is feeling good, no complaints.  Denies nausea, emesis, abdominal pain, CP or SOB.  Has some pain in her left leg but it is tolerable.  Reviewed care plan with daughter, discussed that we are just awaiting placement at this time and she is ready to discharge.                  Physical Exam:      Last Vital Signs:  /57 (BP Location: Right arm)   Pulse 72   Temp 96.9  F (36.1  C) (Temporal)   Resp 20   Ht 1.549 m (5' 1\")   Wt 49.9 kg (110 lb)   SpO2 96%   BMI 20.78 kg/m      General: Alert, awake, no acute distress.  HEENT: Normocephalic and atraumatic, eyes anicteric and without scleral injection, EOMI, face symmetric, MMM.  Cardiac: RRR, normal S1, S2. No m/g/r, no LE edema.  Pulmonary: Normal chest rise, normal work of breathing.  Lungs CTAB without crackles or wheezing.  Abdomen: soft, non-tender, non-distended.  Normoactive bowel sounds, no guarding or rebound tenderness.  Extremities: no deformities.  Warm, well perfused.  Skin: no rashes or lesions.  Warm and Dry. Dressing over left " upper/lateral thigh is c/d/i  Neuro: No focal deficits.  Speech clear.  Moving extremities in bed.  Psych: Alert and oriented x3. Appropriate affect.         Medications:      All current medications were reviewed with changes reflected in problem list.         Data:      All new lab and imaging data was reviewed.   Labs:  Recent Labs   Lab 02/27/22  0602   CR 0.88   GFRESTIMATED 59*     No results for input(s): WBC, HGB, HCT, MCV, PLT in the last 168 hours.   Imaging:   No results found for this or any previous visit (from the past 24 hour(s)).    Ana Gutierrez MD

## 2022-03-02 NOTE — PLAN OF CARE
Goal Outcome Evaluation:    Plan of Care Reviewed With: patient, daughter     Overall Patient Progress: no change     Vss, dressing clean and dry, assessments all unchanged. BM twice today, good po intake. Still awaiting transfer to long term bed.

## 2022-03-02 NOTE — PROGRESS NOTES
Patient vital signs are at baseline: Yes  Patient able to ambulate as they were prior to admission or with assist devices provided by therapies during their stay:  Yes  Patient MUST void prior to discharge:  Yes  Patient able to tolerate oral intake:  Yes. Tolerating regular diet.  Pain has adequate pain control using Oral analgesics:  Yes  Does patient have an identified :  No,  Reason:  daughter is working on finding a long term care facility.  Has goal D/C date and time been discussed with patient:  No,  Reason:  Awaiting placement.     Alert, not oriented x 3,   CV:  Regular rate and rhythm  LUNGS:  Clear, bilaterally   ABD:  Active bowel sounds  DSG: Dry and intact  Activity: Up with Ax2 et Sarsroni.

## 2022-03-02 NOTE — PLAN OF CARE
Patient vital signs are at baseline: Yes  Patient able to ambulate as they were prior to admission or with assist devices provided by therapies during their stay:  Yes; tranfers via tin steady with Ax2 with the gait belt; baseline L sided weakness from stroke.   Patient MUST void prior to discharge:  Yes; voiding in the bathroom.  Patient able to tolerate oral intake:  Yes; tolerating a regular diet.   Pain has adequate pain control using Oral analgesics:  Yes; denies pain.   Does patient have an identified :  No,  Reason:  Daughter is looking for Long term care placement.   Has goal D/C date and time been discussed with patient:  No,  Reason:  Waiting for placement.   Oriented to self only. No IV. Jayla CDI.

## 2022-03-02 NOTE — PROGRESS NOTES
[facility]  Palliative Care Progress Note  Text Page     Assessment & Plan   Recommendations:  1. Goals of Care- No CPR- Do NOT Intubate  Hospitalization goals discussed with daughter.  Goal for placement into new facility with hospice enrollment.  Confirmed DNR/DNI code status.  Decisional Capacity- Unreliable. Patient has an advance directive dated 06/09/2017.  Consents and decision making should be done by  Pat Zamora (daughter), the primary Health Care Agent.   POLST on file, dated 12/20/2019, DNR and comfort measures.  - goal for placement in new facility  - Hospice enrollment  - continue symptom management as primary goal for care.     2. Pain   S/P left hip fixation, chief complaint of mechanical fall and fracture.  Baseline pain in bilateral shoulders due to degenerative disease.  History of thoracolumbar vertebroplasty due to compression fracture.  Baseline dose of Tramadol at bedtime.  Patient's opioid use in past 24 hours: 25 mg Tramadol = 2.5 mg Daily Morphine Equivalent  [place] Board  Pharmacy Data Base Reviewed:    YES; As expected, no concern for misuse/abuse of controlled medications based on this report.  - Tramadol 25 mg PO bedtime, and every 8 hours prn  - hydromorphone 0.1-0.2 every 2 hours prn breakthrough pain.  - Acetaminophen 975 mg three times daily  - lidocaine patch/voltaren gel to perioperative site.  - ice/repositioning for comfort  - physical therapy as ordered per primary team     3. Decreased Mobility  Baseline wheelchair bound secondary to left sided hemiparesis.  Now with new left sided surgery and associated post operative pain.  - ongoing work with Physical therapy  - consideration of safe level of assistance and resources needed at discharge.     4. Decreased PO intake  Intermittent decreased PO intake while inpatient.  Subjective report of decreased PO intake prior to admission.  No significant weight loss in EMR.  - appreciate dietician  consult/recommendations  - Room service needed as patient with altered mental status at times  - meal supplements per dietician recommendations.     5. Spiritual Care  Oriented to Spiritual Health as part of Palliative Care team. Spiritual Health Services declined at this time.  Spiritual Background: Adventism     6. Care Planning  Appreciate Care of [facility].  - Preference for discharge to facility  - Hospice enrollment at time of discharge     Medical Decision Making and Goals of Care:  Discussed on March 2, 2022 with Ashlyn PRATHER, CNP:   Met with Alejandrina and Pat at the bedside.  Pat reviewed recent discussions around disposition. Stated that she was not certain if the [facility] is the right fit for her mother.  I deferred to social work regarding the criteria for placement and that there is a spectrum of care needs that various facilities meet.  I reassured that safety and meeting individual needs are at the center is the plan of care.  Reviewed symptoms and goals for care.  Pat reviewed plan for comfort and hospice enrollment at discharge.  Educated about the POLST form and hospice medications to be sent with discharge.  Reviewed previous POLST on file and it reflects DNR/Comfort measures.      Pat had no further palliative care or hospice questions or concerns.  Joined by HARI and left the conversation to continue with disposition planning.     Ashlyn PRATHER, CNP  Pain Management and Palliative Care  [facility]  Pgr: [phone]      Time Spent on this Encounter   Total unit/floor time 35 minutes, time consisted of the following, examination of the patient, reviewing the record and completing documentation. >50% of time spent in counseling and coordination of care, Bedside Nurse Kandy, Hospitalist Dr. Ana Gutierrez and  Alejandrina.  Time spend counseling with patient and family consisted of the following topics, care planning for discharge and symptom  management.    Review of Systems    CONSTITUTIONAL: NEGATIVE for fever, chills, change in weight  ENT/MOUTH: NEGATIVE for ear, mouth and throat problems  RESP: NEGATIVE for significant cough or SOB  CV: NEGATIVE for chest pain, palpitations or peripheral edema    Physical Exam   Temp:  [96.6  F (35.9  C)-97.7  F (36.5  C)] 96.9  F (36.1  C)  Pulse:  [63-72] 72  Resp:  [16-20] 20  BP: (119-133)/(54-66) 132/57  SpO2:  [94 %-97 %] 96 %  110 lbs 0 oz  Exam:  GEN:  Alert, oriented to self, appears comfortable, NAD.  HEENT:  Normocephalic/atraumatic, no scleral icterus, no nasal discharge, mouth moist.  CV:  RRR, S1, S2; no murmurs or other irregularities noted.  RESP: Symmetric chest rise on inhalation noted.  Normal respiratory effort.  ABD:  non-tender/non-distended.  +BS  EXT:  Edema & pulses as noted above.  CMS intact x 4.     M/S:   Tender to palpation left hip.    SKIN:  Dry to touch, no exanthems noted in the visualized areas.    NEURO: left sided weakness,  Confused, states she is at a hotel.  PAIN BEHAVIOR: Cooperative  Psych:  Normal affect.  Calm, cooperative, confused conversation.       Medications       acetaminophen  1,000 mg Oral TID    Or     acetaminophen  650 mg Rectal TID     amLODIPine  2.5 mg Oral Daily     aspirin  325 mg Oral Daily     atorvastatin  40 mg Oral QPM     carvedilol  6.25 mg Oral BID w/meals     dorzolamide-timolol  1 drop Both Eyes BID     escitalopram  10 mg Oral Daily     guaiFENesin  600 mg Oral BID     latanoprost  1 drop Both Eyes At Bedtime     melatonin  3 mg Oral At Bedtime     polyethylene glycol  17 g Oral Every Other Day     senna-docusate  1 tablet Oral At Bedtime    Or     senna-docusate  2 tablet Oral At Bedtime     traMADol  25 mg Oral At Bedtime       Data   No results found for this or any previous visit (from the past 24 hour(s)).

## 2022-03-02 NOTE — PROGRESS NOTES
CLINICAL NUTRITION SERVICES - REASSESSMENT NOTE    Recommendations Ordered by Registered Dietitian (RD):   Diet per MD   Ordered Ensure Enlive PRN    Malnutrition:   % Weight Loss:  Up to 10% in 6 months (moderate malnutrition)  % Intake:  Decreased intake does not meet criteria for malnutrition - possibly <75% PTA?  Subcutaneous Fat Loss:  Upper arm region mild depletion --> will not use given only one indicator   Muscle Loss:  Clavicle bone region mild depletion, Acromion bone region mild depletion, Scapular bone region mild depletion, Anterior thigh region mild depletion and Posterior calf region mild depletion --> in part due to age related sarcopenia?  Fluid Retention:  None noted    Malnutrition Diagnosis: Moderate malnutrition  In Context of:  Acute illness or injury  Chronic illness or disease     EVALUATION OF PROGRESS TOWARD GOALS   Diet: Regular Diet + Ensure Enlive PRN between meals     Intake/Tolerance: Upon review of the flowsheets, pt is consuming % of meals ordered with occasional meals at 25-50% throughout admission. However improving throughout stay. Ordering meals TID. Pt sleeping at attempted visit, daughter not present.     ASSESSED NUTRITION NEEDS:  Dosing Weight 49.9 kg   Estimated Energy Needs: 4962-3395+ kcals (25-30+ Kcal/Kg)  Justification: maintenance  Estimated Protein Needs: 60-75+ grams protein (1.2-1.5+ g pro/Kg)  Justification: post-op and preservation of lean body mass  Estimated Fluid Needs: per MD     NEW FINDINGS:   - palliative following --> possible hospice on discharge   - Weight: weight loss of 4.5 kg (8%) over the past 6 months.   Wt Readings from Last 10 Encounters:   02/23/22 49.9 kg (110 lb)   09/19/21 54.4 kg (120 lb)   09/29/20 58.4 kg (128 lb 12.8 oz)   09/14/20 58.7 kg (129 lb 6.6 oz)   07/16/20 58.7 kg (129 lb 8 oz)   05/22/20 59.2 kg (130 lb 9.6 oz)   04/30/20 59.1 kg (130 lb 4.8 oz)   03/02/20 58.7 kg (129 lb 6.4 oz)   02/04/20 58.4 kg (128 lb 12.8 oz)    01/30/20 58.4 kg (128 lb 12.8 oz)     Vitals:    02/23/22 1300   Weight: 49.9 kg (110 lb)     - Medications:    acetaminophen  1,000 mg Oral TID    Or     acetaminophen  650 mg Rectal TID     amLODIPine  2.5 mg Oral Daily     aspirin  325 mg Oral Daily     atorvastatin  40 mg Oral QPM     carvedilol  6.25 mg Oral BID w/meals     dorzolamide-timolol  1 drop Both Eyes BID     escitalopram  10 mg Oral Daily     guaiFENesin  600 mg Oral BID     latanoprost  1 drop Both Eyes At Bedtime     melatonin  3 mg Oral At Bedtime     polyethylene glycol  17 g Oral Every Other Day     senna-docusate  1 tablet Oral At Bedtime    Or     senna-docusate  2 tablet Oral At Bedtime     traMADol  25 mg Oral At Bedtime         albuterol, sore throat lozenge, bisacodyl, guaiFENesin, haloperidol lactate, HYDROmorphone **OR** HYDROmorphone, hydrOXYzine, lidocaine 4%, lidocaine (buffered or not buffered), magnesium hydroxide, naloxone **OR** naloxone **OR** naloxone **OR** naloxone, ondansetron **OR** ondansetron, prochlorperazine **OR** prochlorperazine, sodium chloride (PF), sodium chloride (PF)     - Labs:  Labs:  Electrolytes  Potassium (mmol/L)   Date Value   02/18/2022 4.2   02/17/2022 4.1   02/15/2022 4.3   03/03/2020 3.9   02/05/2020 4.3   01/28/2020 4.2     Phosphorus (mg/dL)   Date Value   09/22/2021 3.6   09/21/2021 2.8   09/20/2021 3.5   09/19/2021 3.8   05/05/2011 3.4   01/11/2010 3.6   11/04/2009 4.0    Blood Glucose  Glucose (mg/dL)   Date Value   02/18/2022 127 (H)   02/17/2022 142 (H)   02/15/2022 114 (H)   09/20/2021 103 (H)   09/19/2021 95   03/03/2020 97   02/05/2020 88   01/28/2020 149 (H)   01/08/2020 154 (H)   12/07/2019 112 (H)     GLUCOSE BY METER POCT (mg/dL)   Date Value   09/21/2021 96   09/20/2021 105 (H)   09/20/2021 118 (H)   09/19/2021 100 (H)   09/19/2021 101 (H)     Hemoglobin A1C POCT (%)   Date Value   12/04/2019 6.0 (H)     Hemoglobin A1C (%)   Date Value   09/19/2021 5.8 (H)    Inflammatory Markers  CRP  Cardiac Risk (mg/L)   Date Value   11/03/2011 1.0   05/05/2011 0.5   05/04/2009 2.5     WBC   Date Value   03/03/2020 10.3 thou/uL   02/05/2020 9.2 thou/uL   01/28/2020 15.5 10e9/L (H)     WBC Count (10e3/uL)   Date Value   02/18/2022 11.8 (H)   02/17/2022 11.3 (H)   02/15/2022 12.6 (H)     Albumin (g/dL)   Date Value   12/07/2019 3.0 (L)   05/22/2019 3.4 (L)   10/02/2018 3.4 (L)   05/02/2018 3.1 (L)   10/26/2011 3.9      Magnesium (mg/dL)   Date Value   09/22/2021 2.6 (H)   09/21/2021 2.3   09/20/2021 2.3   05/05/2011 2.3   11/04/2009 2.3     Sodium (mmol/L)   Date Value   02/18/2022 141   02/17/2022 140   02/15/2022 140   03/03/2020 141   02/05/2020 138   01/28/2020 140    Renal  Urea Nitrogen (mg/dL)   Date Value   02/18/2022 35 (H)   02/17/2022 28   02/15/2022 31 (H)   03/03/2020 19   02/05/2020 19   01/28/2020 29     Creatinine (mg/dL)   Date Value   02/27/2022 0.88   02/18/2022 0.94   02/17/2022 0.89   03/03/2020 0.77   02/05/2020 0.78   01/28/2020 0.94     Additional  Triglycerides (mg/dL)   Date Value   09/19/2021 133   12/04/2019 139   05/22/2019 182 (H)   07/18/2013 146   10/26/2011 142     Ketones Urine (mg/dL)   Date Value   02/16/2022 Negative   12/07/2019 Negative        Previous Goals:   Pt to consume >/=75% of meals or oral nutritional supplements ordered TID   Evaluation: Met    Previous Nutrition Diagnosis:   Predicted inadequate nutrient intake (protein/energy) related to potential for PO intake to decline during admit pending clinical course   Evaluation: No change    MALNUTRITION  % Weight Loss:  Up to 10% in 6 months (moderate malnutrition)  % Intake:  Decreased intake does not meet criteria for malnutrition - possibly <75% PTA?  Subcutaneous Fat Loss:  Upper arm region mild depletion --> will not use given only one indicator   Muscle Loss:  Clavicle bone region mild depletion, Acromion bone region mild depletion, Scapular bone region mild depletion, Anterior thigh region mild depletion and  Posterior calf region mild depletion --> in part due to age related sarcopenia?  Fluid Retention:  None noted    Malnutrition Diagnosis: Moderate malnutrition  In Context of:  Acute illness or injury  Chronic illness or disease    CURRENT NUTRITION DIAGNOSIS  Predicted inadequate nutrient intake (protein/energy) related to potential for PO intake to decline during admit pending clinical course     INTERVENTIONS  Recommendations / Nutrition Prescription  Diet per MD   Ordered Ensure Enlive PRN     Implementation  Medical Food Supplement: as above    Goals  Pt to consume >/=75% of meals or oral nutritional supplements ordered TID     MONITORING AND EVALUATION:  Progress towards goals will be monitored and evaluated per protocol and Practice Guidelines    Mariza Krueger, RD, LD  Clinical Dietitian

## 2022-03-02 NOTE — PROGRESS NOTES
Care Management Follow Up    Length of Stay (days): 15    Expected Discharge Date: 03/02/2022     Concerns to be Addressed:   Yes placement  Patient plan of care discussed at interdisciplinary rounds: Yes    Anticipated Discharge Disposition: Hospice, Long Term Care  Disposition Comments: Awaiting LTC placement with hospice support  Anticipated Discharge Services: None  Anticipated Discharge DME: None    Patient/family educated on Medicare website which has current facility and service quality ratings: yes  Education Provided on the Discharge Plan:    Patient/Family in Agreement with the Plan: unable to assess    Referrals Placed by CM/SW: Post Acute Facilities  Private pay costs discussed: private room/amenity fees and transportation costs    Additional Information:  Met with daughterPat today regarding disposition. Daughter reported that she had gone to see Nathalia Erickson today and she reports that it's a memory care and that patient doesn't need a memory care. She reported that this facility told her that we should look at [facility] suites for placement. Called [facility] and they do not have beds for weeks. Daughter also told me to call other facilities and we continue to not have an accepting facility. More referrals made and other facilities were called and still no bed.    HARI will continue to work on finding placement.    ELIZABETH Lima   Inpatient Care Coordination   Supervisor  [facility]  [phone]    ELIZABETH Monreal

## 2022-03-03 PROCEDURE — 250N000013 HC RX MED GY IP 250 OP 250 PS 637: Performed by: INTERNAL MEDICINE

## 2022-03-03 PROCEDURE — 120N000001 HC R&B MED SURG/OB

## 2022-03-03 PROCEDURE — 250N000013 HC RX MED GY IP 250 OP 250 PS 637: Performed by: PHYSICIAN ASSISTANT

## 2022-03-03 PROCEDURE — 99232 SBSQ HOSP IP/OBS MODERATE 35: CPT | Performed by: INTERNAL MEDICINE

## 2022-03-03 PROCEDURE — 250N000013 HC RX MED GY IP 250 OP 250 PS 637: Performed by: HOSPITALIST

## 2022-03-03 RX ORDER — DIPHENHYDRAMINE HCL 12.5MG/5ML
1 LIQUID (ML) ORAL
Status: DISCONTINUED | OUTPATIENT
Start: 2022-03-03 | End: 2022-03-12 | Stop reason: HOSPADM

## 2022-03-03 RX ORDER — DIPHENHYDRAMINE HYDROCHLORIDE 50 MG/ML
1 INJECTION INTRAMUSCULAR; INTRAVENOUS
Status: DISCONTINUED | OUTPATIENT
Start: 2022-03-03 | End: 2022-03-12 | Stop reason: HOSPADM

## 2022-03-03 RX ADMIN — CARVEDILOL 6.25 MG: 6.25 TABLET, FILM COATED ORAL at 18:16

## 2022-03-03 RX ADMIN — DORZOLAMIDE HYDROCHLORIDE AND TIMOLOL MALEATE 1 DROP: 22.3; 6.8 SOLUTION/ DROPS OPHTHALMIC at 18:17

## 2022-03-03 RX ADMIN — SENNOSIDES AND DOCUSATE SODIUM 1 TABLET: 50; 8.6 TABLET ORAL at 18:16

## 2022-03-03 RX ADMIN — Medication 3 MG: at 18:17

## 2022-03-03 RX ADMIN — ATORVASTATIN CALCIUM 40 MG: 40 TABLET, FILM COATED ORAL at 12:31

## 2022-03-03 RX ADMIN — AMLODIPINE BESYLATE 2.5 MG: 2.5 TABLET ORAL at 08:53

## 2022-03-03 RX ADMIN — GUAIFENESIN 600 MG: 600 TABLET, EXTENDED RELEASE ORAL at 08:52

## 2022-03-03 RX ADMIN — LATANOPROST 1 DROP: 50 SOLUTION OPHTHALMIC at 12:48

## 2022-03-03 RX ADMIN — TRAMADOL HYDROCHLORIDE 25 MG: 50 TABLET ORAL at 18:16

## 2022-03-03 RX ADMIN — CARVEDILOL 6.25 MG: 6.25 TABLET, FILM COATED ORAL at 08:52

## 2022-03-03 RX ADMIN — ASPIRIN 325 MG: 325 TABLET, COATED ORAL at 08:52

## 2022-03-03 RX ADMIN — ACETAMINOPHEN 1000 MG: 500 TABLET, FILM COATED ORAL at 12:31

## 2022-03-03 RX ADMIN — DORZOLAMIDE HYDROCHLORIDE AND TIMOLOL MALEATE 1 DROP: 22.3; 6.8 SOLUTION/ DROPS OPHTHALMIC at 08:44

## 2022-03-03 RX ADMIN — POLYETHYLENE GLYCOL 3350 17 G: 17 POWDER, FOR SOLUTION ORAL at 08:53

## 2022-03-03 RX ADMIN — ESCITALOPRAM OXALATE 10 MG: 10 TABLET ORAL at 16:57

## 2022-03-03 RX ADMIN — GUAIFENESIN 600 MG: 600 TABLET, EXTENDED RELEASE ORAL at 16:57

## 2022-03-03 RX ADMIN — ACETAMINOPHEN 1000 MG: 500 TABLET, FILM COATED ORAL at 16:57

## 2022-03-03 RX ADMIN — ACETAMINOPHEN 1000 MG: 500 TABLET, FILM COATED ORAL at 08:52

## 2022-03-03 ASSESSMENT — ACTIVITIES OF DAILY LIVING (ADL)
ADLS_ACUITY_SCORE: 24
ADLS_ACUITY_SCORE: 22
ADLS_ACUITY_SCORE: 24
ADLS_ACUITY_SCORE: 24
ADLS_ACUITY_SCORE: 23
ADLS_ACUITY_SCORE: 22
ADLS_ACUITY_SCORE: 23
ADLS_ACUITY_SCORE: 23
ADLS_ACUITY_SCORE: 20
ADLS_ACUITY_SCORE: 23
ADLS_ACUITY_SCORE: 20
ADLS_ACUITY_SCORE: 24
ADLS_ACUITY_SCORE: 22
ADLS_ACUITY_SCORE: 24
ADLS_ACUITY_SCORE: 23
ADLS_ACUITY_SCORE: 22
ADLS_ACUITY_SCORE: 24
ADLS_ACUITY_SCORE: 23
ADLS_ACUITY_SCORE: 24
ADLS_ACUITY_SCORE: 23
ADLS_ACUITY_SCORE: 20
ADLS_ACUITY_SCORE: 20

## 2022-03-03 NOTE — PLAN OF CARE
Goal Outcome Evaluation:  Pt alert and oriented , forgetful, needs reminder.  Vital signs stable.  Transfers with assist of 1 using gait belt ad tin steady.  Up in chair for meals.  Voided, bm this evening, senna tab was held.  X ray of left hip and pelvis done.  Pain controlled with scheduled tylenol and tramadol.  Falls risk prevention.  Awaiting tcu placement.    Plan of Care Reviewed With: patient, daughter

## 2022-03-03 NOTE — PROGRESS NOTES
Orthopedic Surgery  Alejandrina Whatley  3/3/2022  Admit Date:  2/15/2022  POD 15 Days Post-Op  S/P Procedure(s):  Closed reduction percutaneous screw fixation of a left nondisplaced femoral neck fracture    Patient resting comfortably in bed.    Pain controlled.  Tolerating oral intake.    Denies nausea or vomiting  Denies chest pain or shortness of breath  No events overnight.     Alert and orient to person  Vital Sign Ranges  Temperature Temp  Av.3  F (36.3  C)  Min: 97  F (36.1  C)  Max: 97.6  F (36.4  C)   Blood pressure Systolic (24hrs), Av , Min:117 , Max:138        Diastolic (24hrs), Av, Min:49, Max:58      Pulse Pulse  Av.5  Min: 62  Max: 92   Respirations Resp  Avg: 15.2  Min: 14  Max: 18   Pulse oximetry SpO2  Av %  Min: 95 %  Max: 97 %       Dressing is clean, dry, and intact.   Aquacel removed, incision well approximated and without areas of opening  6 staples removed without issue  Minimal erythema of the surrounding skin.   Bilateral calves are soft, non-tender.  Bilateral lower extremity is NVI.  Sensation intact bilateral lower extremities  5/5 motor with resisted dorsi and plantar flexion bilaterally  +Dp pulse      Labs:  Recent Labs   Lab Test 22  0706 22  0651 02/15/22  1350   POTASSIUM 4.2 4.1 4.3     Recent Labs   Lab Test 22  0706 22  0651 02/15/22  1350   HGB 9.3* 10.0* 12.0     Recent Labs   Lab Test 21  1440 20  1927   INR 1.11 1.06     Recent Labs   Lab Test 22  0706 22  0651 02/15/22  1350   * 140* 163       A/P  1. S/p left CRPP femoral neck fracture   Continue ASA for DVT prophylaxis.     Mobilize with PT/OT    WBAT with walker   Ok to leave left hip open to air.     Continue current pain regiment.    2. Disposition   Anticipate d/c to TCU based on placement.    Yulissa Edwards PA-C

## 2022-03-03 NOTE — PROGRESS NOTES
[facility]  Hospitalist Progress Note  Ana Gutierrez MD 03/03/22     Reason for Stay (Diagnosis): fall, left femur fracture         Assessment and Plan:      Summary of Stay: Summary of Stay: Alejandrina Whatley is a 89 year old female with a history of HLD, CVA, Left Hemiparesis, Dementia, Depression, CED, AAA, and HTN who has been living at a group home but has had multiple falls and was admitted on 2/15/2022 after a fall with resultant left femur fracture.  She is s/p surgical repair.  Awaiting discharge to LTC, potentially with hospice support.     Patient is medically ready for discharge, discharge orders have been placed.  SW working with daughter regarding disposition plan.     Acute left femur fracture s/p surgical repair on 2/16:  -Follow up with orthopedics upon discharge  -Pain medications as needed.  pain appears controlled  -Use incentive spirometry.     Hypertension.  -Continue carvedilol and amlodipine     Hyperlipidemia.  -Continue atorvastatin.     Depression.  -Continue Lexapro.     Dementia  -Schedule melatonin 3 mg at bedtime.  - had some agitation earlier in hospital stay; none recently  - appreciate palliative care consult to review goals of care with family (daughter) and they are interested in facility with hospice enrollment on discharge     Mildly abnormal urinalysis with possible UTI - Resolved.   - urine cx positive for proteus  - given poor historian and inability to provide accurate hx combined with persistent leukocytosis she was treated for presumed UTI with PO cephalosporin (allergies noted) for total of 5 days (finished on 2/23)  - Anticipate brief antibiotic course; antibiotic started 2/18 and will order to stop on 2/23     Acute blood loss anemia.  Likely multifactorial due to trauma with acute fracture and need for above-noted surgical intervention.  Pre operative Hgb was 12 with baseline Hgb in the 10-11 range.  Decreased to 9.3.  - no need for  "transfusion      Cough - Resolved: CXR on 2/20 negative for infiltrate.    Moderate Malnutrition: Nutrition following.    Discharge Plan: Patient is medically ready for discharge.  SW continuing to work with patient's daughter to find acceptable discharge plan.    COVID-19 status: Negative testing this admission.  Patient's daughter now agreeable to her receiving COVID vaccination.  This has been ordered so she should  her first dose today.    Diet: Advance Diet as Tolerated: Regular Diet Adult  Snacks/Supplements Adult: Ensure Enlive; Between Meals  Diet    DVT Prophylaxis:  mg every day   Clark Catheter: Not present  Code Status: No CPR- Do NOT Intubate      Disposition Plan   Expected Discharge: 03/03/2022     Anticipated discharge location:  Awaiting care coordination huddle  Delays:     Placement - LTC         Entered: Ana Gutierrez MD 03/03/2022, 9:32 AM       The patient's care was discussed with the Bedside Nurse, Care Coordinator/ and Patient.    Hospitalist Service  [facility]          Interval History (Subjective):      Patient was seen with her bedside nurse this morning.  She is sitting up in a chair eating breakfast.  She has no pain.  Understands that we are working on finding a place for her to discharge to.  Denies cough, SOB, nausea, emesis, abdominal pain.                  Physical Exam:      Last Vital Signs:  /58 (BP Location: Right arm)   Pulse 92   Temp 97.2  F (36.2  C) (Tympanic)   Resp 16   Ht 1.549 m (5' 1\")   Wt 49.9 kg (110 lb)   SpO2 95%   BMI 20.78 kg/m      General: Alert, awake, no acute distress. Sitting up in a chair eating breakfast.  HEENT: Normocephalic and atraumatic, eyes anicteric and without scleral injection, EOMI, face symmetric, MMM.  Cardiac: RRR, normal S1, S2. No m/g/r, no LE edema.  Pulmonary: Normal chest rise, normal work of breathing.  Lungs CTAB without crackles or wheezing.  Abdomen: soft, " non-tender, non-distended.  Normoactive bowel sounds, no guarding or rebound tenderness.  Extremities: no deformities.  Warm, well perfused.  Skin: no rashes or lesions.  Warm and Dry. Dressing over left upper/lateral thigh is c/d/i  Neuro: No focal deficits.  Speech clear.  Moving extremities in a chair.  Psych: Alert and oriented x3. Appropriate affect.         Medications:      All current medications were reviewed with changes reflected in problem list.         Data:      All new lab and imaging data was reviewed.   Labs:  Recent Labs   Lab 02/27/22  0602   CR 0.88   GFRESTIMATED 59*     No results for input(s): WBC, HGB, HCT, MCV, PLT in the last 168 hours.   Imaging:   Recent Results (from the past 24 hour(s))   XR Pelvis w Hip Left 1 View    Narrative    EXAM: XR PELVIS AND HIP LEFT 1 VIEW  LOCATION: [facility]  DATE/TIME: 3/2/2022 6:17 PM    INDICATION: 2 weeks post op hip fracture, left hip pain.  COMPARISON: 02/15/2022 radiographs and CT.      Impression    IMPRESSION: Multiple cannulated screw fixation across a healing fracture of the left femoral neck. No hardware loosening. Old healed fractures of the left and right superior and inferior pubic rami. Advanced degenerative changes in the right hip.   Osteopenia. Aortoiliac stent graft.       Ana Gutierrez MD

## 2022-03-03 NOTE — PROGRESS NOTES
SP[facility]UAL [facility] Progress Note  Ortho Spine 6     saw pt as a follow-up to length of stay.    Pt welcomed the  in with a smile, saying that she's been hoping to see me.  She shared about the delicious food [place]   Pt reported that she is doing well and her family and jill are a support for her. She indicates a son living in [facility], a son in [place] and her daugther living nearby who visits often.    Pt indicates she looks forward to seeing her  [place], dreams about him and talks to him. She indicated that she's ready for [facility] to take her.      PLAN: [facility] remains available and will follow-up.    BRENDA Macdonald.    Intern  Pager: [phone]

## 2022-03-03 NOTE — PLAN OF CARE
"A/Ox4 but forgetful. Ax2 with gb and tin steady. Tolerating regular diet; pt up to chair for each meal. Denied pain over night. VSS on RA.    /56 (BP Location: Right arm)   Pulse 65   Temp 97.6  F (36.4  C) (Temporal)   Resp 14   Ht 1.549 m (5' 1\")   Wt 49.9 kg (110 lb)   SpO2 97%   BMI 20.78 kg/m      Pts urine slightly cloudy this AM otherwise no other events. Pt awaiting placement. Will continue with plan of care.  "

## 2022-03-03 NOTE — PROGRESS NOTES
Care Management Follow Up    Length of Stay (days): 16    Expected Discharge Date: 03/03/2022     Concerns to be Addressed:       Patient plan of care discussed at interdisciplinary rounds: Yes    Anticipated Discharge Disposition: Hospice, Long Term Care  Disposition Comments: Awaiting LTC placement with hospice support  Anticipated Discharge Services: None  Anticipated Discharge DME: None    Patient/family educated on Medicare website which has current facility and service quality ratings: yes  Education Provided on the Discharge Plan:    Patient/Family in Agreement with the Plan: unable to assess    Referrals Placed by CM/SW: Post Acute Facilities  Private pay costs discussed: Not applicable    Additional Information:  Met with patient's daughter, Pat to review and update her regarding discharge planning. More LTC referrals have been sent and we continue to get rejections room facilities as they don't have LTC private room. Patient needs a private room because she is not vaccinated. Daughter asked if it would help to have the vaccinated as she is willing to have this done. She stated to me and to RNCM that she is willing to get her mother vaccinated. Updated hospitalist.     HARI will continue to assist with discharge planning.    More LTC referrals made today.    ELIZABETH Lima   Inpatient Care Coordination   Supervisor  [facility]  [phone]      ELIZABETH Monreal

## 2022-03-03 NOTE — PLAN OF CARE
Goal Outcome Evaluation:    Plan of Care Reviewed With: patient, daughter     Overall Patient Progress: no change     Vss, assessments all unchanged, did get her staples out and incision healed and open to air. Continues to await placement.

## 2022-03-04 PROCEDURE — 99232 SBSQ HOSP IP/OBS MODERATE 35: CPT | Performed by: INTERNAL MEDICINE

## 2022-03-04 PROCEDURE — 250N000013 HC RX MED GY IP 250 OP 250 PS 637: Performed by: INTERNAL MEDICINE

## 2022-03-04 PROCEDURE — 250N000013 HC RX MED GY IP 250 OP 250 PS 637: Performed by: PHYSICIAN ASSISTANT

## 2022-03-04 PROCEDURE — 250N000011 HC RX IP 250 OP 636: Performed by: INTERNAL MEDICINE

## 2022-03-04 PROCEDURE — 250N000013 HC RX MED GY IP 250 OP 250 PS 637: Performed by: HOSPITALIST

## 2022-03-04 PROCEDURE — 120N000001 HC R&B MED SURG/OB

## 2022-03-04 PROCEDURE — 0012A HC ADMIN COVID VAC MODERNA, 2ND DOSE: CPT | Performed by: INTERNAL MEDICINE

## 2022-03-04 PROCEDURE — 91301 HC RX IP 250 OP 636: CPT | Performed by: INTERNAL MEDICINE

## 2022-03-04 RX ADMIN — ACETAMINOPHEN 1000 MG: 500 TABLET, FILM COATED ORAL at 12:08

## 2022-03-04 RX ADMIN — CARVEDILOL 6.25 MG: 6.25 TABLET, FILM COATED ORAL at 08:14

## 2022-03-04 RX ADMIN — ESCITALOPRAM OXALATE 10 MG: 10 TABLET ORAL at 18:49

## 2022-03-04 RX ADMIN — AMLODIPINE BESYLATE 2.5 MG: 2.5 TABLET ORAL at 08:13

## 2022-03-04 RX ADMIN — Medication 3 MG: at 18:50

## 2022-03-04 RX ADMIN — GUAIFENESIN 600 MG: 600 TABLET, EXTENDED RELEASE ORAL at 18:49

## 2022-03-04 RX ADMIN — DORZOLAMIDE HYDROCHLORIDE AND TIMOLOL MALEATE 1 DROP: 22.3; 6.8 SOLUTION/ DROPS OPHTHALMIC at 18:51

## 2022-03-04 RX ADMIN — CX-024414 0.5 ML: 0.2 INJECTION, SUSPENSION INTRAMUSCULAR at 11:06

## 2022-03-04 RX ADMIN — ATORVASTATIN CALCIUM 40 MG: 40 TABLET, FILM COATED ORAL at 12:09

## 2022-03-04 RX ADMIN — ACETAMINOPHEN 1000 MG: 500 TABLET, FILM COATED ORAL at 18:49

## 2022-03-04 RX ADMIN — DORZOLAMIDE HYDROCHLORIDE AND TIMOLOL MALEATE 1 DROP: 22.3; 6.8 SOLUTION/ DROPS OPHTHALMIC at 08:14

## 2022-03-04 RX ADMIN — LATANOPROST 1 DROP: 50 SOLUTION OPHTHALMIC at 12:10

## 2022-03-04 RX ADMIN — ACETAMINOPHEN 1000 MG: 500 TABLET, FILM COATED ORAL at 08:13

## 2022-03-04 RX ADMIN — ASPIRIN 325 MG: 325 TABLET, COATED ORAL at 08:14

## 2022-03-04 RX ADMIN — TRAMADOL HYDROCHLORIDE 25 MG: 50 TABLET ORAL at 18:50

## 2022-03-04 RX ADMIN — GUAIFENESIN 600 MG: 600 TABLET, EXTENDED RELEASE ORAL at 08:14

## 2022-03-04 ASSESSMENT — ACTIVITIES OF DAILY LIVING (ADL)
ADLS_ACUITY_SCORE: 20
ADLS_ACUITY_SCORE: 24
ADLS_ACUITY_SCORE: 20
ADLS_ACUITY_SCORE: 24
ADLS_ACUITY_SCORE: 20
ADLS_ACUITY_SCORE: 23
ADLS_ACUITY_SCORE: 23
ADLS_ACUITY_SCORE: 20
ADLS_ACUITY_SCORE: 24
ADLS_ACUITY_SCORE: 20
ADLS_ACUITY_SCORE: 20
ADLS_ACUITY_SCORE: 23
ADLS_ACUITY_SCORE: 23
ADLS_ACUITY_SCORE: 20
ADLS_ACUITY_SCORE: 24
ADLS_ACUITY_SCORE: 20
ADLS_ACUITY_SCORE: 20
ADLS_ACUITY_SCORE: 23
ADLS_ACUITY_SCORE: 23
ADLS_ACUITY_SCORE: 20

## 2022-03-04 NOTE — PROGRESS NOTES
Goal Outcome Evaluation:     Plan of Care Reviewed With: patient, daughter      Overall Patient Progress: no change     Vss, assessments all unchanged, Had her staples out and incision healed and open to air. Continues to await placement.

## 2022-03-04 NOTE — PROGRESS NOTES
Care Management Follow Up    Length of Stay (days): 17    Expected Discharge Date: 03/07/2022     Concerns to be Addressed:       Patient plan of care discussed at interdisciplinary rounds: Yes    Anticipated Discharge Disposition: Hospice, Long Term Care  Disposition Comments: Awaiting LTC placement with hospice support  Anticipated Discharge Services: None  Anticipated Discharge DME: None    Patient/family educated on Medicare website which has current facility and service quality ratings: yes  Education Provided on the Discharge Plan:    Patient/Family in Agreement with the Plan: unable to assess    Referrals Placed by CM/SW: Post Acute Facilities  Private pay costs discussed: private room/amenity fees and transportation costs    Additional Information:  Made referrals to assisted living/memory care today:    [facility]= no bed  [facility]= no bed  [facility]= no bed  [facility] reviewing her  [facility]= no bed  [facility]= no bed  [facility]= no bed  [facility] Left message    Also followed up with several other LTC facilities.    SW will continue to assist with discharge placement.    ELIZABETH Lima   Inpatient Care Coordination   Supervisor  [facility]  [phone]      ELIZABETH Monreal

## 2022-03-04 NOTE — PLAN OF CARE
Goal Outcome Evaluation:    Plan of Care Reviewed With: patient, daughter     Overall Patient Progress: no change     Afeb, vss, cms intact, incision healed, denies pain. Got her moderna covid vaccine shot this am w/o reaction. Eating, drinking, voiding and had BM today. Awaiting placement.

## 2022-03-04 NOTE — PROGRESS NOTES
[facility]  Hospitalist Progress Note  Ana Gutierrez MD 03/04/22     Reason for Stay (Diagnosis): fall, left femur fracture         Assessment and Plan:      Summary of Stay: Summary of Stay: Alejandrina Whatley is a 89 year old female with a history of HLD, CVA, Left Hemiparesis, Dementia, Depression, CED, AAA, and HTN who has been living at a group home but has had multiple falls and was admitted on 2/15/2022 after a fall with resultant left femur fracture.  She is s/p surgical repair.  Awaiting discharge to LTC, potentially with hospice support.     Patient is medically ready for discharge, discharge orders have been placed.  SW working with daughter regarding disposition plan.     Acute left femur fracture s/p surgical repair on 2/16:  -Follow up with orthopedics upon discharge. Staples removed and incision site can be open to RA  -Pain medications as needed  -Use incentive spirometry.     Hypertension.  -Continue carvedilol and amlodipine     Hyperlipidemia.  -Continue atorvastatin.     Depression.  -Continue Lexapro.     Dementia  -Schedule melatonin 3 mg at bedtime.  - had some agitation earlier in hospital stay; none recently  - appreciate palliative care consult to review goals of care with family (daughter) and they are interested in facility with hospice enrollment on discharge     Mildly abnormal urinalysis with possible UTI - Resolved.   - urine cx positive for proteus  - given poor historian and inability to provide accurate hx combined with persistent leukocytosis she was treated for presumed UTI with PO cephalosporin (allergies noted) for total of 5 days (finished on 2/23)  - Anticipate brief antibiotic course; antibiotic started 2/18 and will order to stop on 2/23     Acute blood loss anemia.  Likely multifactorial due to trauma with acute fracture and need for above-noted surgical intervention.  Pre operative Hgb was 12 with baseline Hgb in the 10-11 range.  Decreased to 9.3.  - no  "need for transfusion      Cough - Resolved: CXR on 2/20 negative for infiltrate.    Moderate Malnutrition: Nutrition following.    Discharge Plan: Patient is medically ready for discharge.  SW continuing to work with patient's daughter to find acceptable discharge plan.    COVID-19 status: Negative testing this admission.  Patient's daughter now agreeable to her receiving COVID vaccination, patient also in agreement.  Patient will receive her first dose of Pfizer today.    Diet: Advance Diet as Tolerated: Regular Diet Adult  Snacks/Supplements Adult: Ensure Enlive; Between Meals  Diet    DVT Prophylaxis:  mg every day   Clark Catheter: Not present  Code Status: No CPR- Do NOT Intubate      Disposition Plan   Expected Discharge: 03/07/2022   PATIENT IS MEDICALLY READY FOR DISCHARGE  Anticipated discharge location:  Awaiting care coordination huddle  Delays:     Placement - LTC         Entered: Ana Gutierrez MD 03/04/2022, 9:00 AM       The patient's care was discussed with the Bedside Nurse, Care Coordinator/ and Patient.    Hospitalist Service  [facility]          Interval History (Subjective):      Patient was seen with her bedside nurse this morning.  She is feeling well today.  Currently denies any pain.  She is eating and drinking well without nausea, vomiting or abdominal pain.  Denies chest pain or shortness of breath.  She tells me that she is going to have the COVID-19 vaccination.  I did discuss possible side effects with her and explained that most people do have some sort of symptoms after the vaccine but this is a normal immune response.  All of her questions were answered.                  Physical Exam:      Last Vital Signs:  /54 (BP Location: Right arm)   Pulse 65   Temp 97.1  F (36.2  C) (Temporal)   Resp 18   Ht 1.549 m (5' 1\")   Wt 49.9 kg (110 lb)   SpO2 93%   BMI 20.78 kg/m      General: Alert, awake, no acute distress.   HEENT: " Normocephalic and atraumatic, eyes anicteric and without scleral injection, EOMI, face symmetric, MMM.  Cardiac: RRR, normal S1, S2. No m/g/r, no LE edema.  Pulmonary: Normal chest rise, normal work of breathing.  Lungs CTAB without crackles or wheezing.  Abdomen: soft, non-tender, non-distended.  Normoactive bowel sounds, no guarding or rebound tenderness.  Extremities: no deformities.  Warm, well perfused.  Skin: no rashes or lesions.  Warm and Dry. Staples have been removed from incision on left upper leg which is healing well without signs of infection  Neuro: No focal deficits.  Speech clear.  Moving extremities in bed  Psych: Alert and oriented x3. Appropriate affect.         Medications:      All current medications were reviewed with changes reflected in problem list.         Data:      All new lab and imaging data was reviewed.   Labs:  Recent Labs   Lab 02/27/22  0602   CR 0.88   GFRESTIMATED 59*     No results for input(s): WBC, HGB, HCT, MCV, PLT in the last 168 hours.   Imaging:   No results found for this or any previous visit (from the past 24 hour(s)).    Ana Gutierrez MD

## 2022-03-05 PROCEDURE — 250N000013 HC RX MED GY IP 250 OP 250 PS 637: Performed by: INTERNAL MEDICINE

## 2022-03-05 PROCEDURE — 99231 SBSQ HOSP IP/OBS SF/LOW 25: CPT | Performed by: INTERNAL MEDICINE

## 2022-03-05 PROCEDURE — 250N000013 HC RX MED GY IP 250 OP 250 PS 637: Performed by: HOSPITALIST

## 2022-03-05 PROCEDURE — 250N000013 HC RX MED GY IP 250 OP 250 PS 637: Performed by: PHYSICIAN ASSISTANT

## 2022-03-05 PROCEDURE — 120N000001 HC R&B MED SURG/OB

## 2022-03-05 RX ADMIN — ESCITALOPRAM OXALATE 10 MG: 10 TABLET ORAL at 17:51

## 2022-03-05 RX ADMIN — ATORVASTATIN CALCIUM 40 MG: 40 TABLET, FILM COATED ORAL at 14:03

## 2022-03-05 RX ADMIN — POLYETHYLENE GLYCOL 3350 17 G: 17 POWDER, FOR SOLUTION ORAL at 08:39

## 2022-03-05 RX ADMIN — AMLODIPINE BESYLATE 2.5 MG: 2.5 TABLET ORAL at 08:39

## 2022-03-05 RX ADMIN — ACETAMINOPHEN 1000 MG: 500 TABLET, FILM COATED ORAL at 14:03

## 2022-03-05 RX ADMIN — GUAIFENESIN 600 MG: 600 TABLET, EXTENDED RELEASE ORAL at 17:51

## 2022-03-05 RX ADMIN — LATANOPROST 1 DROP: 50 SOLUTION OPHTHALMIC at 14:03

## 2022-03-05 RX ADMIN — ACETAMINOPHEN 1000 MG: 500 TABLET, FILM COATED ORAL at 08:39

## 2022-03-05 RX ADMIN — DORZOLAMIDE HYDROCHLORIDE AND TIMOLOL MALEATE 1 DROP: 22.3; 6.8 SOLUTION/ DROPS OPHTHALMIC at 08:39

## 2022-03-05 RX ADMIN — TRAMADOL HYDROCHLORIDE 25 MG: 50 TABLET ORAL at 17:51

## 2022-03-05 RX ADMIN — GUAIFENESIN 600 MG: 600 TABLET, EXTENDED RELEASE ORAL at 08:39

## 2022-03-05 RX ADMIN — Medication 3 MG: at 17:51

## 2022-03-05 RX ADMIN — ACETAMINOPHEN 1000 MG: 500 TABLET, FILM COATED ORAL at 17:51

## 2022-03-05 RX ADMIN — DORZOLAMIDE HYDROCHLORIDE AND TIMOLOL MALEATE 1 DROP: 22.3; 6.8 SOLUTION/ DROPS OPHTHALMIC at 17:51

## 2022-03-05 RX ADMIN — ASPIRIN 325 MG: 325 TABLET, COATED ORAL at 10:23

## 2022-03-05 RX ADMIN — CARVEDILOL 6.25 MG: 6.25 TABLET, FILM COATED ORAL at 08:39

## 2022-03-05 ASSESSMENT — ACTIVITIES OF DAILY LIVING (ADL)
ADLS_ACUITY_SCORE: 23

## 2022-03-05 NOTE — PROGRESS NOTES
[facility]  Hospitalist Progress Note  Ana Gutierrez MD 03/05/22     Reason for Stay (Diagnosis): fall, left femur fracture         Assessment and Plan:      Summary of Stay: Summary of Stay: Alejandrina Whatley is a 89 year old female with a history of HLD, CVA, Left Hemiparesis, Dementia, Depression, CED, AAA, and HTN who has been living at a group home but has had multiple falls and was admitted on 2/15/2022 after a fall with resultant left femur fracture.  She is s/p surgical repair.  Awaiting discharge to LTC, potentially with hospice support.     Patient is medically ready for discharge, discharge orders have been placed.  SW working with daughter regarding disposition plan.     Acute left femur fracture s/p surgical repair on 2/16:  -Follow up with orthopedics upon discharge. Staples removed and incision site can be open to RA  -Pain medications as needed  -Use incentive spirometry.     Hypertension.  -Continue carvedilol and amlodipine     Hyperlipidemia.  -Continue atorvastatin.     Depression.  -Continue Lexapro.     Dementia  -Schedule melatonin 3 mg at bedtime.  - had some agitation earlier in hospital stay; none recently  - appreciate palliative care consult to review goals of care with family (daughter) and they are interested in facility with hospice enrollment on discharge     Mildly abnormal urinalysis with possible UTI - Resolved.   - urine cx positive for proteus  - given poor historian and inability to provide accurate hx combined with persistent leukocytosis she was treated for presumed UTI with PO cephalosporin (allergies noted) for total of 5 days (finished on 2/23)  - Anticipate brief antibiotic course; antibiotic started 2/18 and will order to stop on 2/23     Acute blood loss anemia.  Likely multifactorial due to trauma with acute fracture and need for above-noted surgical intervention.  Pre operative Hgb was 12 with baseline Hgb in the 10-11 range.  Decreased to 9.3.  - no  need for transfusion      Cough - Resolved: CXR on 2/20 negative for infiltrate.    Moderate Malnutrition: Nutrition following.    Discharge Plan: Patient is medically ready for discharge.  SW continuing to work with patient's daughter to find acceptable discharge plan.    COVID-19 status: Negative testing this admission.  Patient's daughter now agreeable to her receiving COVID vaccination, patient also in agreement.  Patient received her 2nd Moderna vaccination (first was 2/2021) on 3/4/2022.    Diet: Advance Diet as Tolerated: Regular Diet Adult  Snacks/Supplements Adult: Ensure Enlive; Between Meals  Diet    DVT Prophylaxis:  mg every day   Clark Catheter: Not present  Code Status: No CPR- Do NOT Intubate      Disposition Plan   Expected Discharge: 03/07/2022   PATIENT IS MEDICALLY READY FOR DISCHARGE  Anticipated discharge location:  Awaiting care coordination huddle  Delays:     Placement - LTC         Entered: Ana Gutierrez MD 03/05/2022, 9:09 AM       The patient's care was discussed with the Bedside Nurse, Care Coordinator/ and Patient.    Hospitalist Service  [facility]          Interval History (Subjective):      Patient was sleeping when I first saw her.  She was easily awoken.  She states she did not sleep very well last night.  She just states that it is not her normal environment and is somewhat loud.  She was not having pain or any other symptom that was keeping her awake.  Currently denies pain.  No nausea or vomiting.  She is eating and drinking well.  I discussed that social work is still working on finding her a place to discharge to.  I also explained that as soon as a spot is found she would be able to go.  All of her questions were answered.  She does not need anything right now.                  Physical Exam:      Last Vital Signs:  /60 (BP Location: Right arm, Patient Position: Chair)   Pulse 67   Temp 98.8  F (37.1  C) (Temporal)   Resp  "16   Ht 1.549 m (5' 1\")   Wt 49.9 kg (110 lb)   SpO2 96%   BMI 20.78 kg/m      General: Alert, awake, no acute distress.   HEENT: Normocephalic and atraumatic, eyes anicteric and without scleral injection, EOMI, face symmetric, MMM.  Cardiac: RRR, normal S1, S2. No m/g/r, no LE edema.  Pulmonary: Normal chest rise, normal work of breathing.  Lungs CTAB without crackles or wheezing.  Abdomen: soft, non-tender, non-distended.  Normoactive bowel sounds, no guarding or rebound tenderness.  Extremities: no deformities.  Warm, well perfused.  Skin: no rashes or lesions.  Warm and Dry. Staples have been removed from incision on left upper leg which is healing well without signs of infection  Neuro: No focal deficits.  Speech clear.  Moving extremities in bed  Psych: Alert and oriented x3. Appropriate affect.         Medications:      All current medications were reviewed with changes reflected in problem list.         Data:      All new lab and imaging data was reviewed.   Labs:  Recent Labs   Lab 02/27/22  0602   CR 0.88   GFRESTIMATED 59*     No results for input(s): WBC, HGB, HCT, MCV, PLT in the last 168 hours.   Imaging:   No results found for this or any previous visit (from the past 24 hour(s)).    Ana Gutierrez MD          "

## 2022-03-05 NOTE — PLAN OF CARE
Evening RN    Patient vital signs are at baseline: Yes  Patient able to ambulate as they were prior to admission or with assist devices provided by therapies during their stay:  No,  Reason:  A1-2 w/ tin steady.  Patient MUST void prior to discharge:  Yes  Patient able to tolerate oral intake:  Yes  Pain has adequate pain control using Oral analgesics:  Yes  Does patient have an identified :  Yes  Has goal D/C date and time been discussed with patient:  Yes    Pt A/O x self, confused but pleasant/cooperative.  Sleeps between most cares.  VSS and afebrile.  Pt denied pain.  CMS intact.  Skin wdl, surgical incision site open to air and healing well.  Up A1-2 with tin steady and gait belt.  Voiding adequately - incontinent of urine at times, purewick in place overnight.  Tolerating regular diet well.  Plan is TCU on discharge.  Will continue to monitor.

## 2022-03-05 NOTE — PLAN OF CARE
Goal Outcome Evaluation:    Patient alert to self only. Denies pain/discomfort. Lung sounds clear throughout. Regular heart rhythm. Abdomen is soft and non-tender. Patient slept well all night.

## 2022-03-05 NOTE — PLAN OF CARE
Goal Outcome Evaluation:    Plan of Care Reviewed With: patient     Pt resting comfortably, disoriented to time. Pain managed with scheduled tylenol.Transfers with Ax2 with tin steady. up in the chair for meals. AUO. BM today. Incision CDI open to air. CMS intact.

## 2022-03-06 LAB
ALBUMIN UR-MCNC: 50 MG/DL
APPEARANCE UR: ABNORMAL
BILIRUB UR QL STRIP: NEGATIVE
COLOR UR AUTO: YELLOW
GLUCOSE UR STRIP-MCNC: NEGATIVE MG/DL
HGB UR QL STRIP: ABNORMAL
KETONES UR STRIP-MCNC: ABNORMAL MG/DL
LACTATE SERPL-SCNC: 1 MMOL/L (ref 0.7–2)
LEUKOCYTE ESTERASE UR QL STRIP: ABNORMAL
MUCOUS THREADS #/AREA URNS LPF: PRESENT /LPF
NITRATE UR QL: NEGATIVE
PH UR STRIP: 7 [PH] (ref 5–7)
RBC URINE: 8 /HPF
SP GR UR STRIP: 1.03 (ref 1–1.03)
UROBILINOGEN UR STRIP-MCNC: NORMAL MG/DL
WBC URINE: >182 /HPF

## 2022-03-06 PROCEDURE — 250N000013 HC RX MED GY IP 250 OP 250 PS 637: Performed by: PHYSICIAN ASSISTANT

## 2022-03-06 PROCEDURE — 250N000013 HC RX MED GY IP 250 OP 250 PS 637: Performed by: INTERNAL MEDICINE

## 2022-03-06 PROCEDURE — 87086 URINE CULTURE/COLONY COUNT: CPT | Performed by: INTERNAL MEDICINE

## 2022-03-06 PROCEDURE — 120N000001 HC R&B MED SURG/OB

## 2022-03-06 PROCEDURE — 83605 ASSAY OF LACTIC ACID: CPT | Performed by: INTERNAL MEDICINE

## 2022-03-06 PROCEDURE — 250N000013 HC RX MED GY IP 250 OP 250 PS 637: Performed by: HOSPITALIST

## 2022-03-06 PROCEDURE — 36415 COLL VENOUS BLD VENIPUNCTURE: CPT | Performed by: INTERNAL MEDICINE

## 2022-03-06 PROCEDURE — 81001 URINALYSIS AUTO W/SCOPE: CPT | Performed by: INTERNAL MEDICINE

## 2022-03-06 PROCEDURE — 99231 SBSQ HOSP IP/OBS SF/LOW 25: CPT | Performed by: INTERNAL MEDICINE

## 2022-03-06 RX ORDER — CEFDINIR 300 MG/1
300 CAPSULE ORAL DAILY
Status: COMPLETED | OUTPATIENT
Start: 2022-03-06 | End: 2022-03-12

## 2022-03-06 RX ORDER — CEFTRIAXONE 1 G/1
1 INJECTION, POWDER, FOR SOLUTION INTRAMUSCULAR; INTRAVENOUS EVERY 24 HOURS
Status: DISCONTINUED | OUTPATIENT
Start: 2022-03-06 | End: 2022-03-06

## 2022-03-06 RX ADMIN — ACETAMINOPHEN 1000 MG: 500 TABLET, FILM COATED ORAL at 13:22

## 2022-03-06 RX ADMIN — CARVEDILOL 6.25 MG: 6.25 TABLET, FILM COATED ORAL at 17:38

## 2022-03-06 RX ADMIN — ASPIRIN 325 MG: 325 TABLET, COATED ORAL at 09:31

## 2022-03-06 RX ADMIN — SENNOSIDES AND DOCUSATE SODIUM 1 TABLET: 50; 8.6 TABLET ORAL at 17:39

## 2022-03-06 RX ADMIN — CARVEDILOL 6.25 MG: 6.25 TABLET, FILM COATED ORAL at 09:31

## 2022-03-06 RX ADMIN — CEFDINIR 300 MG: 300 CAPSULE ORAL at 17:40

## 2022-03-06 RX ADMIN — ACETAMINOPHEN 1000 MG: 500 TABLET, FILM COATED ORAL at 09:31

## 2022-03-06 RX ADMIN — ATORVASTATIN CALCIUM 40 MG: 40 TABLET, FILM COATED ORAL at 13:22

## 2022-03-06 RX ADMIN — DORZOLAMIDE HYDROCHLORIDE AND TIMOLOL MALEATE 1 DROP: 22.3; 6.8 SOLUTION/ DROPS OPHTHALMIC at 09:35

## 2022-03-06 RX ADMIN — ACETAMINOPHEN 1000 MG: 500 TABLET, FILM COATED ORAL at 17:39

## 2022-03-06 RX ADMIN — Medication 3 MG: at 17:39

## 2022-03-06 RX ADMIN — LATANOPROST 1 DROP: 50 SOLUTION OPHTHALMIC at 13:21

## 2022-03-06 RX ADMIN — TRAMADOL HYDROCHLORIDE 25 MG: 50 TABLET ORAL at 17:39

## 2022-03-06 RX ADMIN — DORZOLAMIDE HYDROCHLORIDE AND TIMOLOL MALEATE 1 DROP: 22.3; 6.8 SOLUTION/ DROPS OPHTHALMIC at 17:41

## 2022-03-06 RX ADMIN — AMLODIPINE BESYLATE 2.5 MG: 2.5 TABLET ORAL at 09:31

## 2022-03-06 RX ADMIN — GUAIFENESIN 600 MG: 600 TABLET, EXTENDED RELEASE ORAL at 09:31

## 2022-03-06 RX ADMIN — ESCITALOPRAM OXALATE 10 MG: 10 TABLET ORAL at 17:40

## 2022-03-06 RX ADMIN — GUAIFENESIN 600 MG: 600 TABLET, EXTENDED RELEASE ORAL at 17:39

## 2022-03-06 ASSESSMENT — ACTIVITIES OF DAILY LIVING (ADL)
ADLS_ACUITY_SCORE: 23

## 2022-03-06 NOTE — PROVIDER NOTIFICATION
MD Notification    Notified Person: MD    Notified Person Name: Dr. Gutierrez    Notification Date/Time: 3/6/2022 1119    Notification Interaction: Vocera page    Purpose of Notification: Patient c/o burning sensation with urination. Do you want to order UA?    Orders Received: MD to order UA.    Comments:

## 2022-03-06 NOTE — PROVIDER NOTIFICATION
MD Notification    Notified Person: MD    Notified Person Name: Dr. Gutierrez    Notification Date/Time: 3/6/2022 1252    Notification Interaction: Vocera page    Purpose of Notification: Patient with frequent, productive cough. LS clear. Getting scheduled mucinex. Can give PRN robitussin too. Continue to monitor?    Orders Received: RN to continue to monitor as long as patient not hypoxic. Can get PRN neb too if needed.    Comments: Patient does not appear SOB and O2 on RA has been in mid 90s.

## 2022-03-06 NOTE — PLAN OF CARE
Evening RN     Patient vital signs are at baseline: Yes  Patient able to ambulate as they were prior to admission or with assist devices provided by therapies during their stay:  No,  Reason:  A1 w/ tin steady.  Patient MUST void prior to discharge:  Yes  Patient able to tolerate oral intake:  Yes  Pain has adequate pain control using Oral analgesics:  Yes  Does patient have an identified :  Yes  Has goal D/C date and time been discussed with patient:  Yes     Pt A/O x self, confused but pleasant/cooperative.  Sleeps between most cares - up in chair for dinner.  VSS and afebrile.  Pt denied pain.  CMS intact.  Skin wdl, surgical incision site open to air and healing well.  Up A1 with tin steady and gait belt.  Voiding adequately - incontinent of urine at times, pt declined purewick tonight.  Tolerating regular diet well.  Plan is TCU on discharge.  Will continue to monitor.

## 2022-03-06 NOTE — PROGRESS NOTES
[facility]  Hospitalist Progress Note  Ana Gutierrez MD 03/06/22     Reason for Stay (Diagnosis): fall, left femur fracture         Assessment and Plan:      Summary of Stay: Summary of Stay: Alejandrina Whatley is a 89 year old female with a history of HLD, CVA, Left Hemiparesis, Dementia, Depression, CED, AAA, and HTN who has been living at a group home but has had multiple falls and was admitted on 2/15/2022 after a fall with resultant left femur fracture.  She is s/p surgical repair.  Awaiting discharge to LTC, potentially with hospice support.     Patient is medically ready for discharge, discharge orders have been placed.  SW working with daughter regarding disposition plan.     Acute left femur fracture s/p surgical repair on 2/16:  -Follow up with orthopedics upon discharge. Staples removed and incision site can be open to RA  -Pain medications as needed  -Use incentive spirometry.     Hypertension.  -Continue carvedilol and amlodipine     Hyperlipidemia.  -Continue atorvastatin.     Depression.  -Continue Lexapro.     Dementia  -Schedule melatonin 3 mg at bedtime.  - had some agitation earlier in hospital stay; none recently  - appreciate palliative care consult to review goals of care with family (daughter) and they are interested in facility with hospice enrollment on discharge     UTI - Recurrent:  - urine cx positive for proteus on 2/16. Treated for presumed UTI with PO cephalosporin (allergies noted) for total of 5 days (finished on 2/23)  - Patient complained of dysuria on 3/6 and has significant pyuria.  Start Ceftriaxone for presumed UTI    Addendum: Patient's daughter requests oral medications instead of IV.  Based on previous culture date starting Cefdinir, will follow up culture results and adjust if needed.     Acute blood loss anemia.  Likely multifactorial due to trauma with acute fracture and need for above-noted surgical intervention.  Pre operative Hgb was 12 with baseline Hgb  "in the 10-11 range.  Decreased to 9.3.  - no need for transfusion      Cough - Resolved: CXR on 2/20 negative for infiltrate.    Moderate Malnutrition: Nutrition following.    Discharge Plan: Patient is medically ready for discharge.  SW continuing to work with patient's daughter to find acceptable discharge plan.    COVID-19 status: Negative testing this admission.  Patient's daughter now agreeable to her receiving COVID vaccination, patient also in agreement.  Patient received her 2nd Moderna vaccination (first was 2/2021) on 3/4/2022.    Diet: Advance Diet as Tolerated: Regular Diet Adult  Snacks/Supplements Adult: Ensure Enlive; Between Meals  Diet    DVT Prophylaxis:  mg every day   Clark Catheter: Not present  Code Status: No CPR- Do NOT Intubate      Disposition Plan   Expected Discharge: 03/07/2022   PATIENT IS MEDICALLY READY FOR DISCHARGE  Anticipated discharge location:  Awaiting care coordination huddle  Delays:     Placement - LTC         Entered: Ana Gutierrez MD 03/06/2022, 9:22 AM       The patient's care was discussed with the Bedside Nurse, Care Coordinator/ and Patient.    Hospitalist Service  [facility]          Interval History (Subjective):      Patient is feeling good today.  Denies pain.  She has been eating and drinking well.  Understands that we are still working on finding placement for her.  Reviewed plan and answered her questions.                  Physical Exam:      Last Vital Signs:  /60 (BP Location: Right arm)   Pulse 96   Temp 96.9  F (36.1  C) (Temporal)   Resp 18   Ht 1.549 m (5' 1\")   Wt 49.9 kg (110 lb)   SpO2 95%   BMI 20.78 kg/m      General: Alert, awake, no acute distress.   HEENT: Normocephalic and atraumatic, eyes anicteric and without scleral injection, EOMI, face symmetric, MMM.  Cardiac: RRR, normal S1, S2. No m/g/r, no LE edema.  Pulmonary: Normal chest rise, normal work of breathing.  Lungs CTAB without " crackles or wheezing.  Abdomen: soft, non-tender, non-distended.  Normoactive bowel sounds, no guarding or rebound tenderness.  Extremities: no deformities.  Warm, well perfused.  Skin: no rashes or lesions.  Warm and Dry. Staples have been removed from incision on left upper leg which is healing well without signs of infection  Neuro: No focal deficits.  Speech clear.  Moving extremities in bed  Psych: Alert and oriented x3. Appropriate affect.         Medications:      All current medications were reviewed with changes reflected in problem list.         Data:      All new lab and imaging data was reviewed.   Labs:  No results for input(s): NA, POTASSIUM, CHLORIDE, CO2, ANIONGAP, GLC, BUN, CR, GFRESTIMATED, GFRESTBLACK, TRANG in the last 168 hours.  No results for input(s): WBC, HGB, HCT, MCV, PLT in the last 168 hours.   Imaging:   No results found for this or any previous visit (from the past 24 hour(s)).    Ana Gutierrez MD

## 2022-03-06 NOTE — PLAN OF CARE
Patient vital signs are at baseline: Yes  Patient able to ambulate as they were prior to admission or with assist devices provided by therapies during their stay:  Yes  Patient MUST void prior to discharge:  Yes  Patient able to tolerate oral intake:  Yes  Pain has adequate pain control using Oral analgesics:  Yes  Does patient have an identified :  Yes  Has goal D/C date and time been discussed with patient:  Yes    Pt is alert to self and place, confused to time per baseline, no pain reported or noted, pt is on scheduled Tylenol, assist of 2 with Melany Steady and gait belt for transfers, voided, pull ups were also wet, CMS intact, incision to L hip [place], healed, cooperative with cares, Plan to discharge to LTC when place will be confirmed.

## 2022-03-07 LAB — SARS-COV-2 RNA RESP QL NAA+PROBE: NEGATIVE

## 2022-03-07 PROCEDURE — 250N000013 HC RX MED GY IP 250 OP 250 PS 637: Performed by: PHYSICIAN ASSISTANT

## 2022-03-07 PROCEDURE — 250N000013 HC RX MED GY IP 250 OP 250 PS 637: Performed by: INTERNAL MEDICINE

## 2022-03-07 PROCEDURE — 250N000013 HC RX MED GY IP 250 OP 250 PS 637: Performed by: HOSPITALIST

## 2022-03-07 PROCEDURE — 120N000001 HC R&B MED SURG/OB

## 2022-03-07 PROCEDURE — 99232 SBSQ HOSP IP/OBS MODERATE 35: CPT | Performed by: INTERNAL MEDICINE

## 2022-03-07 PROCEDURE — 99231 SBSQ HOSP IP/OBS SF/LOW 25: CPT | Performed by: NURSE PRACTITIONER

## 2022-03-07 PROCEDURE — 87635 SARS-COV-2 COVID-19 AMP PRB: CPT | Performed by: INTERNAL MEDICINE

## 2022-03-07 RX ADMIN — CEFDINIR 300 MG: 300 CAPSULE ORAL at 08:17

## 2022-03-07 RX ADMIN — ACETAMINOPHEN 1000 MG: 500 TABLET, FILM COATED ORAL at 17:22

## 2022-03-07 RX ADMIN — GUAIFENESIN 600 MG: 600 TABLET, EXTENDED RELEASE ORAL at 17:23

## 2022-03-07 RX ADMIN — ASPIRIN 325 MG: 325 TABLET, COATED ORAL at 08:31

## 2022-03-07 RX ADMIN — LATANOPROST 1 DROP: 50 SOLUTION OPHTHALMIC at 13:00

## 2022-03-07 RX ADMIN — AMLODIPINE BESYLATE 2.5 MG: 2.5 TABLET ORAL at 08:18

## 2022-03-07 RX ADMIN — Medication 3 MG: at 17:23

## 2022-03-07 RX ADMIN — ATORVASTATIN CALCIUM 40 MG: 40 TABLET, FILM COATED ORAL at 17:22

## 2022-03-07 RX ADMIN — GUAIFENESIN 600 MG: 600 TABLET, EXTENDED RELEASE ORAL at 08:18

## 2022-03-07 RX ADMIN — DORZOLAMIDE HYDROCHLORIDE AND TIMOLOL MALEATE 1 DROP: 22.3; 6.8 SOLUTION/ DROPS OPHTHALMIC at 17:28

## 2022-03-07 RX ADMIN — TRAMADOL HYDROCHLORIDE 25 MG: 50 TABLET ORAL at 17:22

## 2022-03-07 RX ADMIN — ESCITALOPRAM OXALATE 10 MG: 10 TABLET ORAL at 17:23

## 2022-03-07 RX ADMIN — POLYETHYLENE GLYCOL 3350 17 G: 17 POWDER, FOR SOLUTION ORAL at 08:21

## 2022-03-07 RX ADMIN — ACETAMINOPHEN 1000 MG: 500 TABLET, FILM COATED ORAL at 08:35

## 2022-03-07 RX ADMIN — DORZOLAMIDE HYDROCHLORIDE AND TIMOLOL MALEATE 1 DROP: 22.3; 6.8 SOLUTION/ DROPS OPHTHALMIC at 08:33

## 2022-03-07 ASSESSMENT — ACTIVITIES OF DAILY LIVING (ADL)
ADLS_ACUITY_SCORE: 21
ADLS_ACUITY_SCORE: 19
ADLS_ACUITY_SCORE: 25
ADLS_ACUITY_SCORE: 19
ADLS_ACUITY_SCORE: 19
ADLS_ACUITY_SCORE: 21
ADLS_ACUITY_SCORE: 19
ADLS_ACUITY_SCORE: 25
ADLS_ACUITY_SCORE: 19
ADLS_ACUITY_SCORE: 21
ADLS_ACUITY_SCORE: 21
ADLS_ACUITY_SCORE: 19
ADLS_ACUITY_SCORE: 23
ADLS_ACUITY_SCORE: 21
ADLS_ACUITY_SCORE: 23
ADLS_ACUITY_SCORE: 21
ADLS_ACUITY_SCORE: 19
ADLS_ACUITY_SCORE: 21
ADLS_ACUITY_SCORE: 21

## 2022-03-07 NOTE — PROGRESS NOTES
[facility]  Palliative Care Progress Note  Text Page     Assessment & Plan   Recommendations:  1. Goals of Care- No CPR- Do NOT Intubate  Hospitalization goals discussed with daughter.  Goal for placement into new facility with hospice enrollment.  Confirmed DNR/DNI code status.  Decisional Capacity- Unreliable. Patient has an advance directive dated 06/09/2017.  Consents and decision making should be done by  Pat Noah (daughter), the primary Health Care Agent.   POLST on file, dated 12/20/2019, DNR and comfort measures.  - goal for placement in new facility  - continue symptom management as primary goal for care.  - May not be hospice candidate due to stabilization, reviewed with patient's daughter.     2. Pain   S/P left hip fixation, chief complaint of mechanical fall and fracture.  Baseline pain in bilateral shoulders due to degenerative disease.  History of thoracolumbar vertebroplasty due to compression fracture.  Baseline dose of Tramadol at bedtime.  Patient's opioid use in past 24 hours: 25 mg Tramadol = 2.5 mg Daily Morphine Equivalent  [place] Board  Pharmacy Data Base Reviewed:    YES; As expected, no concern for misuse/abuse of controlled medications based on this report.  - Tramadol 25 mg PO bedtime, and every 8 hours prn  - hydromorphone 0.1-0.2 every 2 hours prn breakthrough pain.  - Acetaminophen 975 mg three times daily  - lidocaine patch/voltaren gel to perioperative site.  - ice/repositioning for comfort  - physical therapy as ordered per primary team     3. Decreased Mobility  Baseline wheelchair bound secondary to left sided hemiparesis.  Now with new left sided surgery and associated post operative pain.  - ongoing work with Physical therapy  - consideration of safe level of assistance and resources needed at discharge.     4. Decreased PO intake  Intermittent decreased PO intake while inpatient.  Subjective report of decreased PO intake prior to admission.   No significant weight loss in EMR.  - appreciate dietician consult/recommendations  - Room service needed as patient with altered mental status at times  - meal supplements per dietician recommendations.     5. Spiritual Care  Oriented to Spiritual Health as part of Palliative Care team. Spiritual Health Services declined at this time.  Spiritual Background: Christianity     6. Care Planning  Appreciate Care of [facility] [place] DERREK.  - Preference for discharge to facility  - Hospice enrollment at time of discharge     Medical Decision Making and Goals of Care:  Discussed on March 7, 2022 with Ashlyn PRATHER CNP:   Met with Alejandrina Noble and Pat at the bedside.  Alejandrina Noble denies concerns or uncontrolled symptoms.  Pat had interest in further discussion with social work to determine further discharge details.   Denied further questions or concerns.    Ashlyn PRATHER CNP  Pain Management and Palliative Care  [facility]  Pgr: [phone]      Time Spent on this Encounter   Total unit/floor time 15 minutes, time consisted of the following, examination of the patient, reviewing the record and completing documentation. >50% of time spent in counseling and coordination of care, Bedside Nurse Kandy, Hospitalist Dr. Ana Gutierrez and  Alejandrina.  Time spend counseling with patient and family consisted of the following topics, care planning for discharge and symptom management.    Review of Systems    CONSTITUTIONAL: NEGATIVE for fever, chills, change in weight  ENT/MOUTH: NEGATIVE for ear, mouth and throat problems  RESP: NEGATIVE for significant cough or SOB  CV: NEGATIVE for chest pain, palpitations or peripheral edema    Physical Exam   Temp:  [96.9  F (36.1  C)-97.1  F (36.2  C)] 96.9  F (36.1  C)  Pulse:  [62-94] 70  Resp:  [14-18] 14  BP: (113-137)/(44-69) 113/69  SpO2:  [94 %-96 %] 96 %  110 lbs 0 oz  Exam:  GEN:  Alert, oriented to self, appears comfortable, NAD.  HEENT:   Normocephalic/atraumatic, no scleral icterus, no nasal discharge, mouth moist.  CV:  RRR, S1, S2; no murmurs or other irregularities noted.  RESP: Symmetric chest rise on inhalation noted.  Normal respiratory effort.  ABD:  non-tender/non-distended.  +BS  EXT:  Edema & pulses as noted above.  CMS intact x 4.     M/S:   Tender to palpation left hip.    SKIN:  Dry to touch, no exanthems noted in the visualized areas.    NEURO: left sided weakness,  Confused, states she is at a hotel.  PAIN BEHAVIOR: Cooperative  Psych:  Normal affect.  Calm, cooperative, confused conversation.       Medications       acetaminophen  1,000 mg Oral TID    Or     acetaminophen  650 mg Rectal TID     amLODIPine  2.5 mg Oral Daily     aspirin  325 mg Oral Daily     atorvastatin  40 mg Oral QPM     carvedilol  6.25 mg Oral BID w/meals     cefdinir  300 mg Oral Daily     dorzolamide-timolol  1 drop Both Eyes BID     escitalopram  10 mg Oral Daily     guaiFENesin  600 mg Oral BID     latanoprost  1 drop Both Eyes At Bedtime     melatonin  3 mg Oral At Bedtime     polyethylene glycol  17 g Oral Every Other Day     senna-docusate  1 tablet Oral At Bedtime    Or     senna-docusate  2 tablet Oral At Bedtime     traMADol  25 mg Oral At Bedtime       Data   Results for orders placed or performed during the hospital encounter of 02/15/22 (from the past 24 hour(s))   UA reflex to Microscopic and Culture    Specimen: Urine, Clean Catch   Result Value Ref Range    Color Urine Yellow Colorless, Straw, Light Yellow, Yellow    Appearance Urine Slightly Cloudy (A) Clear    Glucose Urine Negative Negative mg/dL    Bilirubin Urine Negative Negative    Ketones Urine Trace (A) Negative mg/dL    Specific Gravity Urine 1.026 1.003 - 1.035    Blood Urine Trace (A) Negative    pH Urine 7.0 5.0 - 7.0    Protein Albumin Urine 50  (A) Negative mg/dL    Urobilinogen Urine Normal Normal, 2.0 mg/dL    Nitrite Urine Negative Negative    Leukocyte Esterase Urine Large (A)  Negative    Mucus Urine Present (A) None Seen /LPF    RBC Urine 8 (H) <=2 /HPF    WBC Urine >182 (H) <=5 /HPF    Narrative    Urine Culture ordered based on laboratory criteria   Lactic Acid STAT   Result Value Ref Range    Lactic Acid 1.0 0.7 - 2.0 mmol/L   Asymptomatic COVID-19 Virus (Coronavirus) by PCR Nasopharyngeal    Specimen: Nasopharyngeal; Swab   Result Value Ref Range    SARS CoV2 PCR Negative Negative    Narrative    Testing was performed using the jack  SARS-CoV-2 & Influenza A/B Assay on the jack  [employer]  System.  This test should be ordered for the detection of SARS-COV-2 in individuals who meet SARS-CoV-2 clinical and/or epidemiological criteria. Test performance is unknown in asymptomatic patients.  This test is for in vitro diagnostic use under the FDA EUA for laboratories certified under CLIA to perform moderate and/or high complexity testing. This test has not been FDA cleared or approved.  A negative test does not rule out the presence of PCR inhibitors in the specimen or target RNA in concentration below the limit of detection for the assay. The possibility of a false negative should be considered if the patient's recent exposure or clinical presentation suggests COVID-19.  [facility] are certified under the Clinical Laboratory Improvement Amendments of 1988 (CLIA-88) as qualified to perform moderate and/or high complexity laboratory testing.

## 2022-03-07 NOTE — PROGRESS NOTES
[facility]  Hospitalist Progress Note  Ana Gutierrez MD 03/07/22     Reason for Stay (Diagnosis): fall, left femur fracture         Assessment and Plan:      Summary of Stay: Summary of Stay: Alejandrina Whatley is a 89 year old female with a history of HLD, CVA, Left Hemiparesis, Dementia, Depression, CED, AAA, and HTN who has been living at a group home but has had multiple falls and was admitted on 2/15/2022 after a fall with resultant left femur fracture.  She is s/p surgical repair.  Awaiting discharge to LTC, potentially with hospice support.     Patient is medically ready for discharge, discharge orders have been placed.  SW working with daughter regarding disposition plan.     Acute left femur fracture s/p surgical repair on 2/16:  -Follow up with orthopedics upon discharge. Staples removed and incision site can be open to RA  -Pain medications as needed  -Use incentive spirometry.    UTI - Recurrent:  - urine cx positive for proteus on 2/16. Treated for presumed UTI with PO cephalosporin (allergies noted) for total of 5 days (finished on 2/23)  - Patient complained of dysuria on 3/6 and has significant pyuria.  Started on Cefdinir based on prior culture data, would recommend treating for 7 days given recurrent UTI.  - Follow up UC    Hypertension.  -Continue carvedilol and amlodipine     Hyperlipidemia.  -Continue atorvastatin.     Depression.  -Continue Lexapro.     Dementia  -Schedule melatonin 3 mg at bedtime.  - had some agitation earlier in hospital stay; none recently  - appreciate palliative care consult to review goals of care with family (daughter) and they are interested in facility with hospice enrollment on discharge      Acute blood loss anemia.  Likely multifactorial due to trauma with acute fracture and need for above-noted surgical intervention.  Pre operative Hgb was 12 with baseline Hgb in the 10-11 range.  Decreased to 9.3.  - no need for transfusion      Cough: CXR on 2/20  "negative for infiltrate. Yesterday patient was coughing again, this morning denies any respiratory symptoms.  She is without hypoxia.  No indication for repeat imaging.    Moderate Malnutrition: Nutrition following.    Discharge Plan: Patient is medically ready for discharge.  SW continuing to work with patient's daughter to find acceptable discharge plan.    COVID-19 status: Most recent negative testing was on 3/6.  Patient's daughter now agreeable to her receiving COVID vaccination, patient also in agreement.  Patient received her 2nd Moderna vaccination (first was 2/2021) on 3/4/2022.    Diet: Advance Diet as Tolerated: Regular Diet Adult  Snacks/Supplements Adult: Ensure Enlive; Between Meals  Diet    DVT Prophylaxis:  mg every day   Clark Catheter: Not present  Code Status: No CPR- Do NOT Intubate      Disposition Plan   Expected Discharge: 03/08/2022   PATIENT IS MEDICALLY READY FOR DISCHARGE  Anticipated discharge location:  Awaiting care coordination huddle  Delays:     Placement - LTC         Entered: Ana Gutierrez MD 03/07/2022, 9:05 AM       The patient's care was discussed with the Bedside Nurse, Care Coordinator/ and Patient.    Hospitalist Service  [facility]          Interval History (Subjective):      Patient states today she is still having some dysuria.  I discussed that she has a UTI and she understands this.  I also explained that it can take a little bit of time for the antibiotics to kick in and the dysuria to resolve.  She denies chest pain, shortness of breath, cough, nausea, vomiting.  She is eating and drinking.  She understands that we are continuing to work on placement.                  Physical Exam:      Last Vital Signs:  /69 (BP Location: Right arm)   Pulse 70   Temp 96.9  F (36.1  C) (Temporal)   Resp 14   Ht 1.549 m (5' 1\")   Wt 49.9 kg (110 lb)   SpO2 96%   BMI 20.78 kg/m      General: Alert, awake, no acute distress. "   HEENT: Normocephalic and atraumatic, eyes anicteric and without scleral injection, EOMI, face symmetric, MMM.  Cardiac: RRR, normal S1, S2. No m/g/r, no LE edema.  Pulmonary: Normal chest rise, normal work of breathing.  Lungs CTAB without crackles or wheezing.  Abdomen: soft, non-tender, non-distended.  Normoactive bowel sounds, no guarding or rebound tenderness.  Extremities: no deformities.  Warm, well perfused.  Skin: no rashes or lesions.  Warm and Dry. Staples have been removed from incision on left upper leg which is healing well without signs of infection  Neuro: No focal deficits.  Speech clear.  Moving extremities in bed  Psych: Alert and oriented x3. Appropriate affect.         Medications:      All current medications were reviewed with changes reflected in problem list.         Data:      All new lab and imaging data was reviewed.   Labs:  No results for input(s): NA, POTASSIUM, CHLORIDE, CO2, ANIONGAP, GLC, BUN, CR, GFRESTIMATED, GFRESTBLACK, TRANG in the last 168 hours.  No results for input(s): WBC, HGB, HCT, MCV, PLT in the last 168 hours.   Imaging:   No results found for this or any previous visit (from the past 24 hour(s)).    Ana Gutierrez MD

## 2022-03-07 NOTE — PLAN OF CARE
Pt is alert and oriented to self and place, assist of 2 with Melany steady, mild pain to L hip, pt called for warm blanket and sip of water, reported dry nose and mouth, water given, warm blanket provided, VS checked, purewick in place, output 150 ml, yellow cloudy urine, c/o discomfort, pt slept most of the night.

## 2022-03-07 NOTE — PLAN OF CARE
Goal Outcome Evaluation:  A&Ox3, disoriented to time. VSS on RA. Denies pain. Regular diet, tolerating. Up with assist of 2, gait belt, and tin steady. Up to chair for meals. Sleeps in between cares. Voiding via BR during the day. Purewick in place at night. No hip precautions. CMS intact. +UA, started on PO abx. LS clear. Frequent, productive cough, MD aware. No IV access. Small, soft BM x 1. Nursing will continue to monitor.

## 2022-03-07 NOTE — PROGRESS NOTES
Daughter requesting we do not check patient vital signs or wake patient up during the night if patient is sleeping. NOC RN updated with this information.

## 2022-03-08 LAB — BACTERIA UR CULT: ABNORMAL

## 2022-03-08 PROCEDURE — 250N000013 HC RX MED GY IP 250 OP 250 PS 637: Performed by: INTERNAL MEDICINE

## 2022-03-08 PROCEDURE — 120N000001 HC R&B MED SURG/OB

## 2022-03-08 PROCEDURE — 250N000013 HC RX MED GY IP 250 OP 250 PS 637: Performed by: HOSPITALIST

## 2022-03-08 PROCEDURE — 250N000013 HC RX MED GY IP 250 OP 250 PS 637: Performed by: PHYSICIAN ASSISTANT

## 2022-03-08 PROCEDURE — 99232 SBSQ HOSP IP/OBS MODERATE 35: CPT | Performed by: INTERNAL MEDICINE

## 2022-03-08 RX ADMIN — LATANOPROST 1 DROP: 50 SOLUTION OPHTHALMIC at 12:57

## 2022-03-08 RX ADMIN — ATORVASTATIN CALCIUM 40 MG: 40 TABLET, FILM COATED ORAL at 17:27

## 2022-03-08 RX ADMIN — DORZOLAMIDE HYDROCHLORIDE AND TIMOLOL MALEATE 1 DROP: 22.3; 6.8 SOLUTION/ DROPS OPHTHALMIC at 17:30

## 2022-03-08 RX ADMIN — ACETAMINOPHEN 1000 MG: 500 TABLET, FILM COATED ORAL at 12:56

## 2022-03-08 RX ADMIN — CARVEDILOL 6.25 MG: 6.25 TABLET, FILM COATED ORAL at 17:28

## 2022-03-08 RX ADMIN — ACETAMINOPHEN 1000 MG: 500 TABLET, FILM COATED ORAL at 09:04

## 2022-03-08 RX ADMIN — GUAIFENESIN 600 MG: 600 TABLET, EXTENDED RELEASE ORAL at 17:28

## 2022-03-08 RX ADMIN — CARVEDILOL 6.25 MG: 6.25 TABLET, FILM COATED ORAL at 09:06

## 2022-03-08 RX ADMIN — TRAMADOL HYDROCHLORIDE 25 MG: 50 TABLET ORAL at 17:28

## 2022-03-08 RX ADMIN — ESCITALOPRAM OXALATE 10 MG: 10 TABLET ORAL at 17:27

## 2022-03-08 RX ADMIN — CEFDINIR 300 MG: 300 CAPSULE ORAL at 09:04

## 2022-03-08 RX ADMIN — Medication 3 MG: at 17:28

## 2022-03-08 RX ADMIN — GUAIFENESIN 600 MG: 600 TABLET, EXTENDED RELEASE ORAL at 09:03

## 2022-03-08 RX ADMIN — ASPIRIN 325 MG: 325 TABLET, COATED ORAL at 09:05

## 2022-03-08 RX ADMIN — DORZOLAMIDE HYDROCHLORIDE AND TIMOLOL MALEATE 1 DROP: 22.3; 6.8 SOLUTION/ DROPS OPHTHALMIC at 09:16

## 2022-03-08 RX ADMIN — AMLODIPINE BESYLATE 2.5 MG: 2.5 TABLET ORAL at 09:05

## 2022-03-08 RX ADMIN — ACETAMINOPHEN 1000 MG: 500 TABLET, FILM COATED ORAL at 17:27

## 2022-03-08 ASSESSMENT — ACTIVITIES OF DAILY LIVING (ADL)
ADLS_ACUITY_SCORE: 25
ADLS_ACUITY_SCORE: 26
ADLS_ACUITY_SCORE: 26
ADLS_ACUITY_SCORE: 25
ADLS_ACUITY_SCORE: 26
ADLS_ACUITY_SCORE: 25
ADLS_ACUITY_SCORE: 25
ADLS_ACUITY_SCORE: 26
ADLS_ACUITY_SCORE: 26
ADLS_ACUITY_SCORE: 25
ADLS_ACUITY_SCORE: 26
ADLS_ACUITY_SCORE: 26
ADLS_ACUITY_SCORE: 25
ADLS_ACUITY_SCORE: 26
ADLS_ACUITY_SCORE: 25
ADLS_ACUITY_SCORE: 25
ADLS_ACUITY_SCORE: 26
ADLS_ACUITY_SCORE: 26

## 2022-03-08 NOTE — PLAN OF CARE
Pleasantly confused, cooperative.  Denies pain.  No nausea,  LS clear bilaterally, RA, encouraged hourly CDB/IS.  Incision approximated no drainage, CAMILLE,  Up A1 belt + SS, sat up recliner for meals.  Voiding, LBM today.  DC to Long Term Care pending placement.

## 2022-03-08 NOTE — PLAN OF CARE
Patient vital signs are at baseline: yes  Patient able to ambulate as they were prior to admission or with assist devices provided by therapies during their stay: yes   Patient MUST void prior to discharge:  yes via pure wick   Patient able to tolerate oral intake:  yes  Pain has adequate pain control using Oral analgesics: yes, denies pain during shift.   Does patient have an identified :  yes   Has goal D/C date and time been discussed with patient:  yes.    Alert so self. Pleasantly confused. Able to communicate needs well. VSS on RA. Denies chest pain, SOB, N/V or diarrhea. Reports no pain. Surgical site open to air. A1 with tin steady. Regular diet. Waiting for placement. Uneventful shift. Slept most of the night.

## 2022-03-08 NOTE — PROGRESS NOTES
[facility]  Hospitalist Progress Note  Ana Gutierrez MD 03/08/22     Reason for Stay (Diagnosis): fall, left femur fracture         Assessment and Plan:      Summary of Stay: Summary of Stay: Alejandrina Whatley is a 89 year old female with a history of HLD, CVA, Left Hemiparesis, Dementia, Depression, CED, AAA, and HTN who has been living at a group home but has had multiple falls and was admitted on 2/15/2022 after a fall with resultant left femur fracture.  She is s/p surgical repair.  Awaiting discharge to LTC, potentially with hospice support.     Patient is medically ready for discharge, discharge orders have been placed.  SW working with daughter regarding disposition plan.     Acute left femur fracture s/p surgical repair on 2/16:  -Follow up with orthopedics upon discharge. Staples removed and incision site can be open to RA  -Pain medications as needed  -Use incentive spirometry.    UTI - Recurrent:  - urine cx positive for proteus on 2/16. Treated for presumed UTI with PO cephalosporin (allergies noted) for total of 5 days (finished on 2/23)  - Patient complained of dysuria on 3/6 and has significant pyuria.  Started on Cefdinir based on prior culture data, culture again shows Proteus, would recommend treating for 7 days given recurrent UTI.    Hypertension.  -Continue carvedilol and amlodipine     Hyperlipidemia.  -Continue atorvastatin.     Depression.  -Continue Lexapro.     Dementia  -Schedule melatonin 3 mg at bedtime.  - had some agitation earlier in hospital stay; none recently  - appreciate palliative care consult to review goals of care with family (daughter) and they are interested in facility with hospice enrollment on discharge      Acute blood loss anemia.  Likely multifactorial due to trauma with acute fracture and need for above-noted surgical intervention.  Pre operative Hgb was 12 with baseline Hgb in the 10-11 range.  Decreased to 9.3.  - no need for transfusion      Cough -  "Resolved: CXR on 2/20 negative for infiltrate. Occasionally will have a cough but nothing persistent.  She is without hypoxia.  No indication for repeat imaging.    Moderate Malnutrition: Nutrition following.    Discharge Plan: Patient is medically ready for discharge.  SW continuing to work with patient's daughter to find acceptable discharge plan.    COVID-19 status: Most recent negative testing was on 3/6.  Patient's daughter now agreeable to her receiving COVID vaccination, patient also in agreement.  Patient received her 2nd Moderna vaccination (first was 2/2021) on 3/4/2022.    Diet: Advance Diet as Tolerated: Regular Diet Adult  Snacks/Supplements Adult: Ensure Enlive; Between Meals  Diet    DVT Prophylaxis:  mg every day   Clark Catheter: Not present  Code Status: No CPR- Do NOT Intubate      Disposition Plan   Expected Discharge: 03/08/2022   PATIENT IS MEDICALLY READY FOR DISCHARGE  Anticipated discharge location:  Awaiting care coordination huddle  Delays:     Placement - LTC         Entered: Ana Gutierrez MD 03/08/2022, 8:30 AM       The patient's care was discussed with the Bedside Nurse, Care Coordinator/ and Patient.    Hospitalist Service  [facility]          Interval History (Subjective):      Patient states that her dysuria has resolved.  She denies abdominal pain, chest pain, shortness of breath, nausea or vomiting.  She is eating and drinking well.  She is having virtually no pain in her left hip.  She understands that we are continuing to work on placement options.  She states occasionally she will have a cough but it is nothing persistent or bothersome.                  Physical Exam:      Last Vital Signs:  /57 (BP Location: Right arm)   Pulse 58   Temp (!) 96.7  F (35.9  C) (Temporal)   Resp 18   Ht 1.549 m (5' 1\")   Wt 49.9 kg (110 lb)   SpO2 95%   BMI 20.78 kg/m      General: Alert, awake, no acute distress.   HEENT: Normocephalic " and atraumatic, eyes anicteric and without scleral injection, EOMI, face symmetric, MMM.  Cardiac: RRR, normal S1, S2. No m/g/r, no LE edema.  Pulmonary: Normal chest rise, normal work of breathing.  Lungs CTAB without crackles or wheezing.  Abdomen: soft, non-tender, non-distended.  Normoactive bowel sounds, no guarding or rebound tenderness.  Extremities: no deformities.  Warm, well perfused.  Skin: no rashes or lesions.  Warm and Dry. Staples have been removed from incision on left upper leg which is healing well without signs of infection  Neuro: No focal deficits.  Speech clear.  Moving extremities in bed  Psych: Alert and oriented x3. Appropriate affect.         Medications:      All current medications were reviewed with changes reflected in problem list.         Data:      All new lab and imaging data was reviewed.   Labs:  No results for input(s): NA, POTASSIUM, CHLORIDE, CO2, ANIONGAP, GLC, BUN, CR, GFRESTIMATED, GFRESTBLACK, TRANG in the last 168 hours.  No results for input(s): WBC, HGB, HCT, MCV, PLT in the last 168 hours.   Imaging:   No results found for this or any previous visit (from the past 24 hour(s)).    Ana Gutierrez MD

## 2022-03-08 NOTE — PLAN OF CARE
Pleasantly confused, cooperative.  Denies pain.  No nausea,  LS clear bilaterally, RA, encouraged hourly CDB, unable do IS.  Incision approximated no drainage, CAMILLE,  Up A1 belt + SS, sat up recliner for meals.  Voiding, LBM today.  DC to Long Term Care pending placement.

## 2022-03-08 NOTE — PLAN OF CARE
Evening RN    Patient vital signs are at baseline: Yes  Patient able to ambulate as they were prior to admission or with assist devices provided by therapies during their stay:  Yes  Patient MUST void prior to discharge:  Yes  Patient able to tolerate oral intake:  Yes  Pain has adequate pain control using Oral analgesics:  Yes  Does patient have an identified :  Yes  Has goal D/C date and time been discussed with patient:  Yes     Pt A/O x2 disoriented to situation and time.  VSS and afebrile.  Patient denies pain.  CMS intact.  Incision open to air.  Up Ax1 with [place] and gait belt.  Voiding adequately.  Tolerating Regular diet well.  Plan is Discharge to TCU.  Will continue to monitor.

## 2022-03-09 PROCEDURE — 250N000013 HC RX MED GY IP 250 OP 250 PS 637: Performed by: PHYSICIAN ASSISTANT

## 2022-03-09 PROCEDURE — 250N000013 HC RX MED GY IP 250 OP 250 PS 637: Performed by: INTERNAL MEDICINE

## 2022-03-09 PROCEDURE — 250N000013 HC RX MED GY IP 250 OP 250 PS 637: Performed by: HOSPITALIST

## 2022-03-09 PROCEDURE — 120N000001 HC R&B MED SURG/OB

## 2022-03-09 PROCEDURE — 99231 SBSQ HOSP IP/OBS SF/LOW 25: CPT | Performed by: INTERNAL MEDICINE

## 2022-03-09 RX ADMIN — Medication 3 MG: at 17:09

## 2022-03-09 RX ADMIN — ESCITALOPRAM OXALATE 10 MG: 10 TABLET ORAL at 17:09

## 2022-03-09 RX ADMIN — ACETAMINOPHEN 1000 MG: 500 TABLET, FILM COATED ORAL at 09:13

## 2022-03-09 RX ADMIN — ASPIRIN 325 MG: 325 TABLET, COATED ORAL at 09:13

## 2022-03-09 RX ADMIN — POLYETHYLENE GLYCOL 3350 17 G: 17 POWDER, FOR SOLUTION ORAL at 09:12

## 2022-03-09 RX ADMIN — CEFDINIR 300 MG: 300 CAPSULE ORAL at 09:13

## 2022-03-09 RX ADMIN — ACETAMINOPHEN 1000 MG: 500 TABLET, FILM COATED ORAL at 17:09

## 2022-03-09 RX ADMIN — CARVEDILOL 6.25 MG: 6.25 TABLET, FILM COATED ORAL at 09:13

## 2022-03-09 RX ADMIN — AMLODIPINE BESYLATE 2.5 MG: 2.5 TABLET ORAL at 09:13

## 2022-03-09 RX ADMIN — TRAMADOL HYDROCHLORIDE 25 MG: 50 TABLET ORAL at 17:08

## 2022-03-09 RX ADMIN — ACETAMINOPHEN 1000 MG: 500 TABLET, FILM COATED ORAL at 13:01

## 2022-03-09 RX ADMIN — CARVEDILOL 6.25 MG: 6.25 TABLET, FILM COATED ORAL at 17:09

## 2022-03-09 RX ADMIN — GUAIFENESIN 600 MG: 600 TABLET, EXTENDED RELEASE ORAL at 17:09

## 2022-03-09 RX ADMIN — DORZOLAMIDE HYDROCHLORIDE AND TIMOLOL MALEATE 1 DROP: 22.3; 6.8 SOLUTION/ DROPS OPHTHALMIC at 17:10

## 2022-03-09 RX ADMIN — ATORVASTATIN CALCIUM 40 MG: 40 TABLET, FILM COATED ORAL at 17:08

## 2022-03-09 RX ADMIN — LATANOPROST 1 DROP: 50 SOLUTION OPHTHALMIC at 13:01

## 2022-03-09 RX ADMIN — SENNOSIDES AND DOCUSATE SODIUM 1 TABLET: 50; 8.6 TABLET ORAL at 17:09

## 2022-03-09 RX ADMIN — DORZOLAMIDE HYDROCHLORIDE AND TIMOLOL MALEATE 1 DROP: 22.3; 6.8 SOLUTION/ DROPS OPHTHALMIC at 09:32

## 2022-03-09 RX ADMIN — GUAIFENESIN 600 MG: 600 TABLET, EXTENDED RELEASE ORAL at 09:13

## 2022-03-09 ASSESSMENT — ACTIVITIES OF DAILY LIVING (ADL)
ADLS_ACUITY_SCORE: 25
ADLS_ACUITY_SCORE: 23
ADLS_ACUITY_SCORE: 25
ADLS_ACUITY_SCORE: 23
ADLS_ACUITY_SCORE: 25
ADLS_ACUITY_SCORE: 26
ADLS_ACUITY_SCORE: 25
ADLS_ACUITY_SCORE: 23
ADLS_ACUITY_SCORE: 25
ADLS_ACUITY_SCORE: 23
ADLS_ACUITY_SCORE: 25
ADLS_ACUITY_SCORE: 25

## 2022-03-09 NOTE — PROGRESS NOTES
CLINICAL NUTRITION SERVICES - REASSESSMENT NOTE      Recommendations:   Continue diet and supplements as ordered.  Appreciate any encouragement or assistance provided at meal times.    Ordered reweigh as able.     Malnutrition:   % Weight Loss:  Up to 10% in 6 months (moderate malnutrition) --> PTA, no reweighs for comparison during admit  % Intake: Decreased intake does not meet criteria   Subcutaneous Fat Loss:  Upper arm region mild depletion --> will not use given only one indicator   Muscle Loss:  Clavicle bone region mild depletion, Acromion bone region mild depletion, Scapular bone region mild depletion, Anterior thigh region mild depletion and Posterior calf region mild depletion --> in part due to age related sarcopenia?  Fluid Retention:  None noted     Malnutrition Diagnosis: Moderate malnutrition  In Context of:  Acute illness or injury with underlying chronic illness or disease         EVALUATION OF PROGRESS TOWARD GOALS   Diet:  Regular, Ensure prn    Intake/Tolerance:  % intakes since last RD follow-up, meals being ordered/offered TID (per meal system review and confirmed with RN).  Continues with prn Ensure order as an added calorie/protein source and for variety (if needed) given prolonged LOS (however has not been ordered/offered).  Has been medically ready for discharge, pending placement (refer to MD and LSW notes).      ASSESSED NUTRITION NEEDS:  Dosing Weight 49.9 kg   Estimated Energy Needs: 6625-8369+ kcals (25-30+ Kcal/Kg)  Justification: maintenance  Estimated Protein Needs: >/=60 grams protein (>/=1.2 g pro/Kg)Justification: preservation of lean body mass  Estimated Fluid Needs: per MD       NEW FINDINGS:   - Medications reviewed including:     acetaminophen  1,000 mg Oral TID    Or     acetaminophen  650 mg Rectal TID     amLODIPine  2.5 mg Oral Daily     aspirin  325 mg Oral Daily     atorvastatin  40 mg Oral QPM     carvedilol  6.25 mg Oral BID w/meals     cefdinir  300 mg Oral  Daily     dorzolamide-timolol  1 drop Both Eyes BID     escitalopram  10 mg Oral Daily     guaiFENesin  600 mg Oral BID     latanoprost  1 drop Both Eyes At Bedtime     melatonin  3 mg Oral At Bedtime     polyethylene glycol  17 g Oral Every Other Day     senna-docusate  1 tablet Oral At Bedtime    Or     senna-docusate  2 tablet Oral At Bedtime     traMADol  25 mg Oral At Bedtime         - Labs reviewed including:  Electrolytes  Potassium (mmol/L)   Date Value   02/18/2022 4.2   02/17/2022 4.1   02/15/2022 4.3   03/03/2020 3.9   02/05/2020 4.3   01/28/2020 4.2     Phosphorus (mg/dL)   Date Value   09/22/2021 3.6   09/21/2021 2.8   09/20/2021 3.5   09/19/2021 3.8   05/05/2011 3.4   01/11/2010 3.6   11/04/2009 4.0    Blood Glucose  Glucose (mg/dL)   Date Value   02/18/2022 127 (H)   02/17/2022 142 (H)   02/15/2022 114 (H)   09/20/2021 103 (H)   09/19/2021 95   03/03/2020 97   02/05/2020 88   01/28/2020 149 (H)   01/08/2020 154 (H)   12/07/2019 112 (H)     GLUCOSE BY METER POCT (mg/dL)   Date Value   09/21/2021 96   09/20/2021 105 (H)   09/20/2021 118 (H)   09/19/2021 100 (H)   09/19/2021 101 (H)     Hemoglobin A1C POCT (%)   Date Value   12/04/2019 6.0 (H)     Hemoglobin A1C (%)   Date Value   09/19/2021 5.8 (H)    Inflammatory Markers  CRP Cardiac Risk (mg/L)   Date Value   11/03/2011 1.0   05/05/2011 0.5   05/04/2009 2.5     WBC   Date Value   03/03/2020 10.3 thou/uL   02/05/2020 9.2 thou/uL   01/28/2020 15.5 10e9/L (H)     WBC Count (10e3/uL)   Date Value   02/18/2022 11.8 (H)   02/17/2022 11.3 (H)   02/15/2022 12.6 (H)     Albumin (g/dL)   Date Value   12/07/2019 3.0 (L)   05/22/2019 3.4 (L)   10/02/2018 3.4 (L)   05/02/2018 3.1 (L)   10/26/2011 3.9      Magnesium (mg/dL)   Date Value   09/22/2021 2.6 (H)   09/21/2021 2.3   09/20/2021 2.3   05/05/2011 2.3   11/04/2009 2.3     Sodium (mmol/L)   Date Value   02/18/2022 141   02/17/2022 140   02/15/2022 140   03/03/2020 141   02/05/2020 138   01/28/2020 140     Renal  Urea Nitrogen (mg/dL)   Date Value   02/18/2022 35 (H)   02/17/2022 28   02/15/2022 31 (H)   03/03/2020 19   02/05/2020 19   01/28/2020 29     Creatinine (mg/dL)   Date Value   02/27/2022 0.88   02/18/2022 0.94   02/17/2022 0.89   03/03/2020 0.77   02/05/2020 0.78   01/28/2020 0.94     Additional  Triglycerides (mg/dL)   Date Value   09/19/2021 133   12/04/2019 139   05/22/2019 182 (H)   07/18/2013 146   10/26/2011 142     Ketones Urine (mg/dL)   Date Value   03/06/2022 Trace (A)   12/07/2019 Negative        -  Weight trending reviewed and no updated weight throughout admit for comparison:  Vitals:    02/23/22 1300   Weight: 49.9 kg (110 lb)     - Stooling patterns reviewed.      Previous Goals:   Pt to consume >/=75% of meals or oral nutritional supplements ordered TID   Evaluation: Met (on average with % intakes).    Previous Nutrition Diagnosis:   Predicted inadequate nutrient intake (protein/energy) related to potential for PO intake to decline during admit pending clinical course   Evaluation: No change      CURRENT NUTRITION DIAGNOSIS  Predicted inadequate nutrient intake (protein/energy) related to potential for PO intake to decline during admit pending clinical course     INTERVENTIONS  Recommendations / Nutrition Prescription  Continue diet and supplements as ordered.  Appreciate any encouragement or assistance provided at meal times.    Ordered reweigh as able.    Implementation  Collaboration and Referral of Nutrition care: Discussed POC with Care Coordinator and PO trends with RN.      Goals  Patient to consume at least % of meals or supplements TID while acutely admitted.        MONITORING AND EVALUATION:  Progress towards goals will be monitored and evaluated per protocol and Practice Guidelines      Amanda Anton RDN, LD  Clinical Dietitian  3rd floor/ICU: [phone]  All other floors: [phone]  Weekend/holiday: [phone]  Office: [phone]

## 2022-03-09 NOTE — PLAN OF CARE
Patient vital signs are at baseline: Yes  Patient able to ambulate as they were prior to admission or with assist devices provided by therapies during their stay:  Yes  Patient MUST void prior to discharge:  Yes  Patient able to tolerate oral intake:  Yes  Pain has adequate pain control using Oral analgesics:  Yes  Does patient have an identified :  Yes  Has goal D/C date and time been discussed with patient:  Yes    Awaiting LTC placement. Aox2, pain controled with scheduled tylenol. A1 with tin steady. Voiding, passing flatus.

## 2022-03-09 NOTE — PLAN OF CARE
Goal Outcome Evaluation:    Pt confused at times. Denies pain. VSS, HERMILO. Ax1 with tin steady. Incontinent at times. BM daily. L incision approximated. Sleeping well throughout the night.  Awaiting placement for LTC.

## 2022-03-09 NOTE — PROGRESS NOTES
[facility]  Hospitalist Progress Note  Ana Gutierrez MD 03/09/22     Reason for Stay (Diagnosis): fall, left femur fracture         Assessment and Plan:      Summary of Stay: Summary of Stay: Alejandrina Whatley is a 89 year old female with a history of HLD, CVA, Left Hemiparesis, Dementia, Depression, CED, AAA, and HTN who has been living at a group home but has had multiple falls and was admitted on 2/15/2022 after a fall with resultant left femur fracture.  She is s/p surgical repair.  Awaiting discharge to LTC, potentially with hospice support.     Patient is medically ready for discharge, discharge orders have been placed.  SW working with daughter regarding disposition plan.     Acute left femur fracture s/p surgical repair on 2/16:  -Follow up with orthopedics upon discharge. Staples removed and incision site can be open to RA  -Pain medications as needed  -Use incentive spirometry.    UTI - Recurrent:  - urine cx positive for proteus on 2/16. Treated for presumed UTI with PO cephalosporin (allergies noted) for total of 5 days (finished on 2/23)  - Patient complained of dysuria on 3/6 and has significant pyuria.  Started on Cefdinir based on prior culture data, culture again shows Proteus, would recommend treating for 7 days given recurrent UTI.    Hypertension.  -Continue carvedilol and amlodipine     Hyperlipidemia.  -Continue atorvastatin.     Depression.  -Continue Lexapro.     Dementia  -Schedule melatonin 3 mg at bedtime.  - had some agitation earlier in hospital stay; none recently  - appreciate palliative care consult to review goals of care with family (daughter) and they are interested in facility with hospice enrollment on discharge      Acute blood loss anemia.  Likely multifactorial due to trauma with acute fracture and need for above-noted surgical intervention.  Pre operative Hgb was 12 with baseline Hgb in the 10-11 range.  Decreased to 9.3.  - no need for transfusion      Cough -  "Resolved: CXR on 2/20 negative for infiltrate. Occasionally will have a cough but nothing persistent.  She is without hypoxia.  No indication for repeat imaging.    Moderate Malnutrition: Nutrition following.    Discharge Plan: Patient is medically ready for discharge.  SW continuing to work with patient's daughter to find acceptable discharge plan.    COVID-19 status: Most recent negative testing was on 3/6.  Patient's daughter now agreeable to her receiving COVID vaccination, patient also in agreement.  Patient received her 2nd Moderna vaccination (first was 2/2021) on 3/4/2022.    Diet: Advance Diet as Tolerated: Regular Diet Adult  Snacks/Supplements Adult: Ensure Enlive; Between Meals  Diet    DVT Prophylaxis:  mg every day   Clark Catheter: Not present  Code Status: No CPR- Do NOT Intubate      Disposition Plan   Expected Discharge: 03/09/2022   PATIENT IS MEDICALLY READY FOR DISCHARGE  Anticipated discharge location:  Awaiting care coordination huddle  Delays:     Placement - LTC         Entered: Ana Gutierrez MD 03/09/2022, 8:17 AM       The patient's care was discussed with the Bedside Nurse, Care Coordinator/ and Patient.    Hospitalist Service  [facility]          Interval History (Subjective):      Patient states she just really feels worn out today. She denies pain.  No nausea, emesis.  She is eating fine.  Aware we are working on discharge planning.                  Physical Exam:      Last Vital Signs:  /54 (BP Location: Right arm)   Pulse 65   Temp 97.8  F (36.6  C) (Temporal)   Resp 20   Ht 1.549 m (5' 1\")   Wt 49.9 kg (110 lb)   SpO2 95%   BMI 20.78 kg/m      General: Alert, awake, no acute distress. Does look tired today.  HEENT: Normocephalic and atraumatic, eyes anicteric and without scleral injection, EOMI, face symmetric, MMM.  Cardiac: RRR, normal S1, S2. No m/g/r, no LE edema.  Pulmonary: Normal chest rise, normal work of breathing.  " Lungs CTAB without crackles or wheezing.  Abdomen: soft, non-tender, non-distended.  Normoactive bowel sounds, no guarding or rebound tenderness.  Extremities: no deformities.  Warm, well perfused.  Skin: no rashes or lesions.  Warm and Dry. Staples have been removed from incision on left upper leg which is healing well without signs of infection  Neuro: No focal deficits.  Speech clear.  Moving extremities in bed  Psych: Alert and oriented x3. Appropriate affect.         Medications:      All current medications were reviewed with changes reflected in problem list.         Data:      All new lab and imaging data was reviewed.   Labs:  No results for input(s): NA, POTASSIUM, CHLORIDE, CO2, ANIONGAP, GLC, BUN, CR, GFRESTIMATED, GFRESTBLACK, TRANG in the last 168 hours.  No results for input(s): WBC, HGB, HCT, MCV, PLT in the last 168 hours.   Imaging:   No results found for this or any previous visit (from the past 24 hour(s)).    Ana Gutierrez MD

## 2022-03-10 LAB — LACTATE SERPL-SCNC: 1.2 MMOL/L (ref 0.7–2)

## 2022-03-10 PROCEDURE — 250N000013 HC RX MED GY IP 250 OP 250 PS 637: Performed by: PHYSICIAN ASSISTANT

## 2022-03-10 PROCEDURE — 120N000001 HC R&B MED SURG/OB

## 2022-03-10 PROCEDURE — 250N000013 HC RX MED GY IP 250 OP 250 PS 637: Performed by: HOSPITALIST

## 2022-03-10 PROCEDURE — 250N000013 HC RX MED GY IP 250 OP 250 PS 637: Performed by: INTERNAL MEDICINE

## 2022-03-10 PROCEDURE — 83605 ASSAY OF LACTIC ACID: CPT | Performed by: INTERNAL MEDICINE

## 2022-03-10 PROCEDURE — 99232 SBSQ HOSP IP/OBS MODERATE 35: CPT | Performed by: INTERNAL MEDICINE

## 2022-03-10 PROCEDURE — 36415 COLL VENOUS BLD VENIPUNCTURE: CPT | Performed by: INTERNAL MEDICINE

## 2022-03-10 RX ORDER — CEFDINIR 300 MG/1
300 CAPSULE ORAL DAILY
Qty: 2 CAPSULE | Refills: 0 | Status: SHIPPED | OUTPATIENT
Start: 2022-03-11 | End: 2022-07-21

## 2022-03-10 RX ADMIN — CARVEDILOL 6.25 MG: 6.25 TABLET, FILM COATED ORAL at 08:34

## 2022-03-10 RX ADMIN — ACETAMINOPHEN 1000 MG: 500 TABLET, FILM COATED ORAL at 08:35

## 2022-03-10 RX ADMIN — CARVEDILOL 6.25 MG: 6.25 TABLET, FILM COATED ORAL at 17:38

## 2022-03-10 RX ADMIN — Medication 3 MG: at 19:13

## 2022-03-10 RX ADMIN — CEFDINIR 300 MG: 300 CAPSULE ORAL at 08:33

## 2022-03-10 RX ADMIN — TRAMADOL HYDROCHLORIDE 25 MG: 50 TABLET ORAL at 19:13

## 2022-03-10 RX ADMIN — ASPIRIN 325 MG: 325 TABLET, COATED ORAL at 08:36

## 2022-03-10 RX ADMIN — ESCITALOPRAM OXALATE 10 MG: 10 TABLET ORAL at 17:07

## 2022-03-10 RX ADMIN — AMLODIPINE BESYLATE 2.5 MG: 2.5 TABLET ORAL at 08:33

## 2022-03-10 RX ADMIN — GUAIFENESIN 600 MG: 600 TABLET, EXTENDED RELEASE ORAL at 17:07

## 2022-03-10 RX ADMIN — LATANOPROST 1 DROP: 50 SOLUTION OPHTHALMIC at 12:50

## 2022-03-10 RX ADMIN — DORZOLAMIDE HYDROCHLORIDE AND TIMOLOL MALEATE 1 DROP: 22.3; 6.8 SOLUTION/ DROPS OPHTHALMIC at 17:39

## 2022-03-10 RX ADMIN — ACETAMINOPHEN 1000 MG: 500 TABLET, FILM COATED ORAL at 17:38

## 2022-03-10 RX ADMIN — SENNOSIDES AND DOCUSATE SODIUM 1 TABLET: 50; 8.6 TABLET ORAL at 19:13

## 2022-03-10 RX ADMIN — ATORVASTATIN CALCIUM 40 MG: 40 TABLET, FILM COATED ORAL at 17:38

## 2022-03-10 RX ADMIN — DORZOLAMIDE HYDROCHLORIDE AND TIMOLOL MALEATE 1 DROP: 22.3; 6.8 SOLUTION/ DROPS OPHTHALMIC at 08:36

## 2022-03-10 RX ADMIN — GUAIFENESIN 600 MG: 600 TABLET, EXTENDED RELEASE ORAL at 08:34

## 2022-03-10 RX ADMIN — ACETAMINOPHEN 1000 MG: 500 TABLET, FILM COATED ORAL at 12:50

## 2022-03-10 ASSESSMENT — ACTIVITIES OF DAILY LIVING (ADL)
ADLS_ACUITY_SCORE: 23
ADLS_ACUITY_SCORE: 25
ADLS_ACUITY_SCORE: 25
ADLS_ACUITY_SCORE: 19
ADLS_ACUITY_SCORE: 25
ADLS_ACUITY_SCORE: 25
ADLS_ACUITY_SCORE: 23
ADLS_ACUITY_SCORE: 25
ADLS_ACUITY_SCORE: 25
ADLS_ACUITY_SCORE: 23
ADLS_ACUITY_SCORE: 23
ADLS_ACUITY_SCORE: 19
ADLS_ACUITY_SCORE: 19
ADLS_ACUITY_SCORE: 23
ADLS_ACUITY_SCORE: 23
ADLS_ACUITY_SCORE: 25
ADLS_ACUITY_SCORE: 23

## 2022-03-10 NOTE — PLAN OF CARE
Goal Outcome Evaluation:    Plan of Care Reviewed With: patient     Overall Patient Progress: no change       Patient vital signs are at baseline: Yes  Patient able to ambulate as they were prior to admission or with assist devices provided by therapies during their stay:  Yes  Patient MUST void prior to discharge:  Yes  Patient able to tolerate oral intake:  Yes  Pain has adequate pain control using Oral analgesics:  Yes  Does patient have an identified :  Yes  Has goal D/C date and time been discussed with patient:  Yes     Alert and oriented to person. Pain controlled with tylenol. Left hip/leg incision CAMILLE. Discharge plan includes LTC, placement pending.

## 2022-03-10 NOTE — PROGRESS NOTES
Care Management Follow Up    Length of Stay (days): 23    Expected Discharge Date: 03/10/2022     Concerns to be Addressed:       Patient plan of care discussed at interdisciplinary rounds: Yes    Anticipated Discharge Disposition: Hospice, Long Term Care  Disposition Comments: Awaiting LTC placement     Anticipated Discharge Services: None    Anticipated Discharge DME: None    Patient/family educated on Medicare website which has current facility and service quality ratings: yes  Education Provided on the Discharge Plan: yes    Patient/Family in Agreement with the Plan:yes with patient and daughter, Pat.    Referrals Placed by CM/SW: Post Acute Facilities  Private pay costs discussed: transportation costs    Additional Information:  Continue to work on discharge plan. More referral sent today to LTC facilities today. Patients daughter continues to look and call other facilities. Spoke with admissions at [facility] asking them to take another look at patient.    Patient will be fully vaccinated on Monday 3/14/22 which could give us more options for LTC facilities.    Also sent a referral to [facility] assisted living facilities today.    Addendum: spoke with Rajwinder [phone] Elderly Waiver . Patient's elderly waiver will  on Monday 3/14/22 as patient has been out of community for 30 days.     ELIZABETH Lima   Inpatient Care Coordination   Supervisor  [facility]  [phone]      ELIZABETH Monreal

## 2022-03-10 NOTE — PLAN OF CARE
Problem: Adult Inpatient Plan of Care  Goal: Optimal Comfort and Wellbeing  Outcome: Ongoing, Progressing     Problem: Adjustment to Injury (Hip Fracture Medical Management)  Goal: Optimal Coping with Change in Health Status  Outcome: Ongoing, Progressing     Problem: Bleeding (Hip Fracture Medical Management)  Goal: Absence of Bleeding  Outcome: Ongoing, Progressing     Pt is alert and oriented to self only. She denied pain this shift. Pt is on RA, sats are in the 90s, she denied SOB. She's assist of 1 with gait belt and sera steady. Left hip incision is dry and open to air. She is mostly continent but uses purwick at HS. Discharge plan is to LTC or memory care. Will continue to monitor

## 2022-03-10 NOTE — PLAN OF CARE
Goal Outcome Evaluation:  Patient vital signs are at baseline: Yes  Patient able to ambulate as they were prior to admission or with assist devices provided by therapies during their stay:  No,  Reason:  requires assistance with tin steady  Patient MUST void prior to discharge:  Yes  Patient able to tolerate oral intake:  Yes  Pain has adequate pain control using Oral analgesics:  Yes  Does patient have an identified :  Yes  Has goal D/C date and time been discussed with patient:  Yes'

## 2022-03-10 NOTE — PROGRESS NOTES
[facility]  Hospitalist Progress Note  Ana Gutierrez MD 03/10/22     Reason for Stay (Diagnosis): fall, left femur fracture         Assessment and Plan:      Summary of Stay: Summary of Stay: Alejandrina Whatley is a 89 year old female with a history of HLD, CVA, Left Hemiparesis, Dementia, Depression, CED, AAA, and HTN who has been living at a group home but has had multiple falls and was admitted on 2/15/2022 after a fall with resultant left femur fracture.  She is s/p surgical repair.  Awaiting discharge to LTC, potentially with hospice support.     Patient is medically ready for discharge, discharge orders have been placed.  Patient has a new group home, as soon as they are ready to take the patient she will be discharged.     Acute left femur fracture s/p surgical repair on 2/16:  -Follow up with orthopedics upon discharge. Staples removed and incision site can be open to RA  -Pain medications as needed    UTI - Recurrent:  - urine cx positive for proteus on 2/16. Treated for presumed UTI with PO cephalosporin (allergies noted) for total of 5 days (finished on 2/23)  - Patient complained of dysuria on 3/6 and has significant pyuria.  Started on Cefdinir based on prior culture data, culture again shows Proteus, would recommend treating for 7 days given recurrent UTI (course through 3/12 so currently 5/7 doses completed)  - For discharge orders I have ordered two tablets of Cefdinir (3/11 and 3/12), if discharge date changes this will need to be adjusted as well    Hypertension.  -Continue carvedilol and amlodipine     Hyperlipidemia.  -Continue atorvastatin.     Depression.  -Continue Lexapro.     Dementia  -Schedule melatonin 3 mg at bedtime.  - had some agitation earlier in hospital stay; none recently  - appreciate palliative care consult to review goals of care with family (daughter) and they are interested in facility with hospice enrollment on discharge      Acute blood loss anemia.  Likely  multifactorial due to trauma with acute fracture and need for above-noted surgical intervention.  Pre operative Hgb was 12 with baseline Hgb in the 10-11 range.  Decreased to 9.3.  - no need for transfusion      Cough - Resolved: CXR on 2/20 negative for infiltrate. Occasionally will have a cough but nothing persistent.  She is without hypoxia.  No indication for repeat imaging.    Moderate Malnutrition: Nutrition following.    Discharge Plan: Patient is medically ready for discharge.  SW continuing to work with patient's daughter to find acceptable discharge plan. SW and unit manager met with daughter and new group home staff on 3/9 for an extended period of time.  Patient should discharge to this group home as this is a safe discharge plan.  SW following.    COVID-19 status: Most recent negative testing was on 3/6.  Patient's daughter now agreeable to her receiving COVID vaccination, patient also in agreement.  Patient received her 2nd Moderna vaccination (first was 2/2021) on 3/4/2022.    Diet: Advance Diet as Tolerated: Regular Diet Adult  Snacks/Supplements Adult: Ensure Enlive; Between Meals  Diet    DVT Prophylaxis:  mg every day   Clark Catheter: Not present  Code Status: No CPR- Do NOT Intubate      Disposition Plan   Expected Discharge: 03/10/2022   PATIENT IS MEDICALLY READY FOR DISCHARGE  Anticipated discharge location:  Awaiting care coordination huddle  Delays:     Placement - LTC         Entered: Ana Gutierrez MD 03/10/2022, 8:56 AM       The patient's care was discussed with the Bedside Nurse, Care Coordinator/ and Patient.    Hospitalist Service  [facility]          Interval History (Subjective):      Patient states she is feeling fine today.  She currently denies pain, nausea, vomiting.  No shortness of breath or cough.  I discussed that we will likely have found a new group home for her to discharge to.  She states she needs to talk to Pat about that  "but I explained that Pat was present in the meeting yesterday with social work as well as the .  I explained social work will continue to follow-up.                  Physical Exam:      Last Vital Signs:  /58 (BP Location: Right arm)   Pulse 67   Temp 96.9  F (36.1  C) (Temporal)   Resp 20   Ht 1.549 m (5' 1\")   Wt 49.9 kg (110 lb)   SpO2 94%   BMI 20.78 kg/m      General: Alert, awake, no acute distress.   HEENT: Normocephalic and atraumatic, eyes anicteric and without scleral injection, EOMI, face symmetric, MMM.  Cardiac: RRR, normal S1, S2. No m/g/r, no LE edema.  Pulmonary: Normal chest rise, normal work of breathing.  Lungs CTAB without crackles or wheezing.  Abdomen: soft, non-tender, non-distended.  Normoactive bowel sounds, no guarding or rebound tenderness.  Extremities: no deformities.  Warm, well perfused.  Skin: no rashes or lesions.  Warm and Dry. Staples have been removed from incision on left upper leg which is healing well without signs of infection  Neuro: No focal deficits.  Speech clear.  Moving extremities in bed  Psych: Alert and oriented x3. Appropriate affect.         Medications:      All current medications were reviewed with changes reflected in problem list.         Data:      All new lab and imaging data was reviewed.   Labs:  No results for input(s): NA, POTASSIUM, CHLORIDE, CO2, ANIONGAP, GLC, BUN, CR, GFRESTIMATED, GFRESTBLACK, TRANG in the last 168 hours.  No results for input(s): WBC, HGB, HCT, MCV, PLT in the last 168 hours.   Imaging:   No results found for this or any previous visit (from the past 24 hour(s)).    Ana Gutierrez MD          "

## 2022-03-10 NOTE — PROGRESS NOTES
SPIRITUAL HEALTH SERVICES Progress Note  Ortho Spine 6    Saw pt Alejandrina Noble per follow-up and request from IDT/RN.  Daughter, Layne present.      Illness Narrative -   Pt has a broken bone due to a fall from about 3 weeks ago. Goals of care include resting.    Dtr reported the team would like to transition her to another site for hospice care.  Dtr reports conflicting advice from different teams (I.e palliative recommendations v. Ortho recommendations).       Distress -   Dtr is highly concerned regarding the cares of her mom.  She visits various sites that may be suitable for her mom but expresses concern that the building 'isn't clean, smells, doesn't have wide enough doors, etc...'    When author asked the daughter what would be an ideal placement for her mom, dtr wasn't able to pin point a location.  Dtr mentioned the group home the pt came from might be the best, however the group home denied the request.  Dtr researched with an Ombudsman regarding the rights of the pt.    Author suggested having cares hired in and have the mom move to dtr's home.  Dtr indicated that this wouldn't work out per the size of the door frames, the limited size of her home, and the dtr's back issues.      Coping -   Dtr is having difficulty sleeping at night, is struggling with jaw pain due to stress and feels very anxious about leaving her mom.  She reported that she has concern for her mom's cares when she leaves.    Author verbally noticed the dtr in the 'caretaking' role and asked who was taking care of her, asking about dtr's self-care, nutritional needs, exercise, vitamins, medications, etc...      Meaning-Making -   Dtr continues to research locations for her mom to move to for hospice.      Plan -   [facility] remains available.    BRENDA Macdonald.   Intern  Pager: [phone]

## 2022-03-10 NOTE — PROGRESS NOTES
[facility]    Palliative Care Chart Check    This patient's most recent hospitalist note, medication profile and labs from the past 24 hours have been reviewed.  Disposition plan set with  and primary team.  Patient and family amenable to hospice when appropriate.  Recommendation:   It has been determined that no change is necessary to the current plan of care at this time.   Palliative care will sign off.  Please Re-consult if needs arise.  Time Spent 15 minutes, none in direct contact with patient or family. >50% spent in chart review, documentation and care coordination with  Bedside Nurse Mary Ellen and Hospitalist Dr. raphael      Thank you!    Ashlyn Vicente APRN, CNP:  Pain Management and Palliative Care  [facility]  Pgr: [phone]

## 2022-03-11 PROCEDURE — 250N000013 HC RX MED GY IP 250 OP 250 PS 637: Performed by: PHYSICIAN ASSISTANT

## 2022-03-11 PROCEDURE — 250N000013 HC RX MED GY IP 250 OP 250 PS 637: Performed by: HOSPITALIST

## 2022-03-11 PROCEDURE — 250N000013 HC RX MED GY IP 250 OP 250 PS 637: Performed by: INTERNAL MEDICINE

## 2022-03-11 PROCEDURE — 99231 SBSQ HOSP IP/OBS SF/LOW 25: CPT | Performed by: HOSPITALIST

## 2022-03-11 PROCEDURE — 120N000001 HC R&B MED SURG/OB

## 2022-03-11 RX ADMIN — DORZOLAMIDE HYDROCHLORIDE AND TIMOLOL MALEATE 1 DROP: 22.3; 6.8 SOLUTION/ DROPS OPHTHALMIC at 17:58

## 2022-03-11 RX ADMIN — CEFDINIR 300 MG: 300 CAPSULE ORAL at 09:13

## 2022-03-11 RX ADMIN — ATORVASTATIN CALCIUM 40 MG: 40 TABLET, FILM COATED ORAL at 17:56

## 2022-03-11 RX ADMIN — ESCITALOPRAM OXALATE 10 MG: 10 TABLET ORAL at 17:56

## 2022-03-11 RX ADMIN — GUAIFENESIN 600 MG: 600 TABLET, EXTENDED RELEASE ORAL at 09:14

## 2022-03-11 RX ADMIN — ASPIRIN 325 MG: 325 TABLET, COATED ORAL at 09:13

## 2022-03-11 RX ADMIN — CARVEDILOL 6.25 MG: 6.25 TABLET, FILM COATED ORAL at 17:56

## 2022-03-11 RX ADMIN — DORZOLAMIDE HYDROCHLORIDE AND TIMOLOL MALEATE 1 DROP: 22.3; 6.8 SOLUTION/ DROPS OPHTHALMIC at 09:15

## 2022-03-11 RX ADMIN — POLYETHYLENE GLYCOL 3350 17 G: 17 POWDER, FOR SOLUTION ORAL at 09:13

## 2022-03-11 RX ADMIN — Medication 3 MG: at 17:56

## 2022-03-11 RX ADMIN — ACETAMINOPHEN 1000 MG: 500 TABLET, FILM COATED ORAL at 17:56

## 2022-03-11 RX ADMIN — ACETAMINOPHEN 1000 MG: 500 TABLET, FILM COATED ORAL at 13:11

## 2022-03-11 RX ADMIN — GUAIFENESIN 600 MG: 600 TABLET, EXTENDED RELEASE ORAL at 17:56

## 2022-03-11 RX ADMIN — ACETAMINOPHEN 1000 MG: 500 TABLET, FILM COATED ORAL at 09:14

## 2022-03-11 RX ADMIN — LATANOPROST 1 DROP: 50 SOLUTION OPHTHALMIC at 13:12

## 2022-03-11 RX ADMIN — SENNOSIDES AND DOCUSATE SODIUM 1 TABLET: 50; 8.6 TABLET ORAL at 17:56

## 2022-03-11 RX ADMIN — TRAMADOL HYDROCHLORIDE 25 MG: 50 TABLET ORAL at 17:56

## 2022-03-11 RX ADMIN — CARVEDILOL 6.25 MG: 6.25 TABLET, FILM COATED ORAL at 09:14

## 2022-03-11 RX ADMIN — AMLODIPINE BESYLATE 2.5 MG: 2.5 TABLET ORAL at 09:14

## 2022-03-11 ASSESSMENT — ACTIVITIES OF DAILY LIVING (ADL)
ADLS_ACUITY_SCORE: 19

## 2022-03-11 NOTE — PLAN OF CARE

## 2022-03-11 NOTE — PLAN OF CARE
Problem: Adult Inpatient Plan of Care  Goal: Patient-Specific Goal (Individualized)  Outcome: Ongoing, Progressing  Goal: Absence of Hospital-Acquired Illness or Injury  Outcome: Ongoing, Progressing  Intervention: Identify and Manage Fall Risk  Recent Flowsheet Documentation  Taken 3/11/2022 0110 by Purnima Marcus RN  Safety Promotion/Fall Prevention:   bed alarm on   activity supervised   assistive device/personal items within reach   fall prevention program maintained   nonskid shoes/slippers when out of bed  Intervention: Prevent Skin Injury  Recent Flowsheet Documentation  Taken 3/11/2022 0110 by Purnima Marcus RN  Body Position: position changed independently  Intervention: Prevent and Manage VTE (Venous Thromboembolism) Risk  Recent Flowsheet Documentation  Taken 3/11/2022 0110 by Purnima Marcus RN  VTE Prevention/Management: SCDs (sequential compression devices) off  Goal: Optimal Comfort and Wellbeing  Outcome: Ongoing, Progressing  Goal: Readiness for Transition of Care  Outcome: Ongoing, Progressing     Problem: Adjustment to Injury (Hip Fracture Medical Management)  Goal: Optimal Coping with Change in Health Status  Outcome: Ongoing, Progressing     Problem: Bleeding (Hip Fracture Medical Management)  Goal: Absence of Bleeding  Outcome: Ongoing, Progressing     Problem: Bowel Elimination Impaired (Hip Fracture Medical Management)  Goal: Effective Bowel Elimination  Outcome: Ongoing, Progressing     Problem: Cognitive Decline Risk (Hip Fracture Medical Management)  Goal: Baseline Cognitive Function Maintained  Outcome: Ongoing, Progressing     Problem: Embolism (Hip Fracture Medical Management)  Goal: Absence of Embolism  Outcome: Ongoing, Progressing  Intervention: Prevent or Manage Embolism Risk  Recent Flowsheet Documentation  Taken 3/11/2022 0110 by Purnima Marcus RN  VTE Prevention/Management: SCDs (sequential compression devices) off     Problem: Fracture Stabilization and Management (Hip  Fracture Medical Management)  Goal: Fracture Stability  Outcome: Ongoing, Progressing     Problem: Functional Ability Impaired (Hip Fracture Medical Management)  Goal: Optimal Functional Performance  Outcome: Ongoing, Progressing     Problem: Pain (Hip Fracture Medical Management)  Goal: Acceptable Pain Level  Outcome: Ongoing, Progressing     Problem: Urinary Elimination Impaired (Hip Fracture Medical Management)  Goal: Effective Urinary Elimination  Outcome: Ongoing, Progressing     Problem: Violence Risk or Actual  Goal: Anger and Impulse Control  Outcome: Ongoing, Progressing   Goal Outcome Evaluation:    Plan of Care Reviewed With: patient     Overall Patient Progress: improving     Pt resting comfortably in bed. Easily aroused. No complaint voiced. Condition remains stable. Awaiting placement. Care continues.

## 2022-03-11 NOTE — PROGRESS NOTES
[facility]    Hospitalist Progress Note      Assessment & Plan   Alejandrina Whatley is a 89 year old female with a history of HLD, CVA, Left Hemiparesis, Dementia, Depression, CED, AAA, and HTN who has been living at a group home but has had multiple falls and was admitted on 2/15/2022 after a fall with resultant left femur fracture.  She is s/p surgical repair.  Awaiting discharge placement. SW assisting.      #Acute left femur fracture s/p surgical repair on 2/16:  -Follow up with orthopedics upon discharge. Staples removed and incision site can be open to RA  -Pain medications as needed     #UTI - Recurrent: urine cx positive for proteus on 2/16. Treated for presumed UTI with PO cephalosporin (allergies noted) for total of 5 days (finished on 2/23).  Patient complained of dysuria on 3/6 and has significant pyuria.  Started on Cefdinir based on prior culture data, culture again shows Proteus, would recommend treating for 7 days given recurrent UTI (course through 3/12 so currently 5/7 doses completed).  She will complete her course on 3/12.       #Hypertension: Continue carvedilol and amlodipine     #Hyperlipidemia: Continue atorvastatin.     #Depression: Continue Lexapro.     #Dementia: Schedule melatonin 3 mg at bedtime.  - had some agitation earlier in hospital stay; none recently  - appreciate palliative care consult to review goals of care with family (daughter) and they are interested in facility with hospice enrollment on discharge      #Acute blood loss anemia.  Likely multifactorial due to trauma with acute fracture and need for above-noted surgical intervention.  Pre operative Hgb was 12 with baseline Hgb in the 10-11 range.  Decreased to 9.3.  - no need for transfusion      #Cough - Resolved: CXR on 2/20 negative for infiltrate. Occasionally will have a cough but nothing persistent.  She is without hypoxia.  No indication for repeat imaging.     #Moderate Malnutrition: Nutrition  following.     #COVID-19 status: Most recent negative testing was on 3/6.  Patient's daughter now agreeable to her receiving COVID vaccination, patient also in agreement.  Patient received her 2nd Moderna vaccination (first was 2/2021) on 3/4/2022.     Dispo: Pending placement to group home. Appreciate SW assistance. Medically ready for discharge.   DVT Prophylaxis:  mg every day   Code Status: No CPR- Do NOT Intubate      Jerome Castañeda MD  Text Page    Interval History   No events. Medically stable and feels well.  Denies any worsening pain. No CP, SOB, fevers/chills, AP, n/v.  Eating bfast.      -Data reviewed today: I reviewed all new labs and imaging results over the last 24 hours.   Physical Exam   Temp: (!) 96.7  F (35.9  C) Temp src: Temporal BP: 120/56 Pulse: 71   Resp: 16 SpO2: 94 % O2 Device: None (Room air)    Vitals:    02/23/22 1300   Weight: 49.9 kg (110 lb)     Vital Signs with Ranges  Temp:  [96.6  F (35.9  C)-98.6  F (37  C)] 96.7  F (35.9  C)  Pulse:  [65-71] 71  Resp:  [16-21] 16  BP: (114-140)/(52-62) 120/56  SpO2:  [91 %-96 %] 94 %  I/O last 3 completed shifts:  In: 640 [P.O.:640]  Out: -     General: Alert, awake, no acute distress.   HEENT: Normocephalic and atraumatic, MMM  Cardiac: RRR, normal S1, S2. No m/g/r, no LE edema.  Pulmonary: Normal chest rise, normal work of breathing.  Lungs CTAB without crackles or wheezing.  Abdomen: soft, non-tender, non-distended.  Normoactive bowel sounds, no guarding or rebound tenderness.  Extremities: no deformities.  Warm, well perfused.  Skin: no rashes or lesions.  Warm and Dry. Incision clean.   Neuro: No focal deficits.  Speech clear.  Moving extremities in bed  Psych: Alert and oriented x3. Appropriate affect.    Medications       acetaminophen  1,000 mg Oral TID    Or     acetaminophen  650 mg Rectal TID     amLODIPine  2.5 mg Oral Daily     aspirin  325 mg Oral Daily     atorvastatin  40 mg Oral QPM     carvedilol  6.25 mg Oral BID w/meals      cefdinir  300 mg Oral Daily     dorzolamide-timolol  1 drop Both Eyes BID     escitalopram  10 mg Oral Daily     guaiFENesin  600 mg Oral BID     latanoprost  1 drop Both Eyes At Bedtime     melatonin  3 mg Oral At Bedtime     polyethylene glycol  17 g Oral Every Other Day     senna-docusate  1 tablet Oral At Bedtime    Or     senna-docusate  2 tablet Oral At Bedtime     traMADol  25 mg Oral At Bedtime       Data   No lab results found in last 7 days.    No results found for this or any previous visit (from the past 24 hour(s)).     6

## 2022-03-11 NOTE — PROGRESS NOTES
Care Management Discharge Note    Discharge Date: 03/10/2022       Discharge Disposition:  Long Term Care    Discharge Services: None    Discharge DME: None    Discharge Transportation: family or friend will provide    Private pay costs discussed: Not applicable    Additional Information:  Pt [facility] another [facility] as her current residence is getting new windows. She will be discharging to [address] [facility], ramp into the house and phone # to home is [phone].    [facility] wheelchair transport scheduled for 1645 3/11.    Radha Ramirez RN, BSN CTS  Care Coordinator  [facility]   [phone]

## 2022-03-11 NOTE — DISCHARGE SUMMARY
[facility]    Discharge Summary  Hospitalist    Date of Admission:  2/15/2022  Date of Discharge:  3/11/2022  Discharging Provider: Jerome Castañeda MD  Date of Service (when I saw the patient): 03/11/22    Discharge Diagnoses   #Acute left femur fracture s/p surgical repair on 2/16:  #UTI - Recurrent  #Hypertension  #Hyperlipidemia  #Depression  #Dementia  #Acute blood loss anemia  #Cough - Resolved  #Moderate Malnutrition    Hospital Course   Alejandrina Whatley is a 89 year old female with a history of HLD, CVA, Left Hemiparesis, Dementia, Depression, CED, AAA, and HTN who has been living at a group home but has had multiple falls and was admitted on 2/15/2022 after a fall with resultant left femur fracture.  She is s/p surgical repair.     #Acute left femur fracture s/p surgical repair on 2/16:  -Follow up with orthopedics upon discharge. Staples removed and incision site can be open to RA  -Pain medications as needed     #UTI - Recurrent: urine cx positive for proteus on 2/16. Treated for presumed UTI with PO cephalosporin (allergies noted) for total of 5 days (finished on 2/23).  Patient complained of dysuria on 3/6 and has significant pyuria.  Started on Cefdinir based on prior culture data, culture again shows Proteus, would recommend treating for 7 days given recurrent UTI (course through 3/12 so currently 5/7 doses completed).  She will complete her course on 3/12.       #Hypertension: Continue carvedilol and amlodipine     #Hyperlipidemia: Continue atorvastatin.     #Depression: Continue Lexapro.     #Dementia: Schedule melatonin 3 mg at bedtime.  - had some agitation earlier in hospital stay; none recently  - appreciate palliative care consult to review goals of care with family (daughter) and they are interested in facility with hospice enrollment on discharge      #Acute blood loss anemia.  Likely multifactorial due to trauma with acute fracture and need for above-noted surgical  intervention.  Pre operative Hgb was 12 with baseline Hgb in the 10-11 range.       #Cough - Resolved: CXR on 2/20 negative for infiltrate. Occasionally will have a cough but nothing persistent.  She is without hypoxia.  No indication for repeat imaging.     #Moderate Malnutrition: Nutrition following.     #COVID-19 status: Most recent negative testing was on 3/6.  Patient's daughter now agreeable to her receiving COVID vaccination, patient also in agreement.  Patient received her 2nd Moderna vaccination (first was 2/2021) on 3/4/2022.    Jerome Castañeda MD    Code Status   DNR / DNI       Primary Care Physician   Mayuri Roy MD    Physical Exam   Temp: (!) 96.7  F (35.9  C) Temp src: Temporal BP: 120/56 Pulse: 71   Resp: 16 SpO2: 94 % O2 Device: None (Room air)    Vitals:    02/23/22 1300   Weight: 49.9 kg (110 lb)     Vital Signs with Ranges  Temp:  [96.6  F (35.9  C)-98.6  F (37  C)] 96.7  F (35.9  C)  Pulse:  [65-71] 71  Resp:  [16-21] 16  BP: (114-140)/(52-62) 120/56  SpO2:  [91 %-96 %] 94 %  I/O last 3 completed shifts:  In: 300 [P.O.:300]  Out: -     General: Alert, awake, no acute distress.   HEENT: Normocephalic and atraumatic, MMM  Cardiac: RRR, normal S1, S2. No m/g/r, no LE edema.  Pulmonary: Normal chest rise, normal work of breathing.  Lungs CTAB without crackles or wheezing.  Abdomen: soft, non-tender, non-distended.  Normoactive bowel sounds, no guarding or rebound tenderness.  Extremities: no deformities.  Warm, well perfused.  Skin: no rashes or lesions.  Warm and Dry. Incision clean.   Neuro: No focal deficits.  Speech clear.  Moving extremities in bed  Psych: Alert and oriented x3. Appropriate affect.    Discharge Disposition   Discharged to long-term care facility  Condition at discharge: Stable    Consultations This Hospital Stay   ORTHOPEDIC SURGERY IP CONSULT  PHYSICAL THERAPY ADULT IP CONSULT  OCCUPATIONAL THERAPY ADULT IP CONSULT  CARE MANAGEMENT / SOCIAL WORK IP CONSULT  PHYSICAL THERAPY  ADULT IP CONSULT  OCCUPATIONAL THERAPY ADULT IP CONSULT  PALLIATIVE CARE ADULT IP CONSULT    Time Spent on this Encounter   IJerome MD, personally saw the patient today and spent greater than 30 minutes discharging this patient.    Discharge Orders      Hospice Referral      Follow Up and recommended labs and tests    Follow up with Dr. Razo's clinic in 2 weeks.  No follow up labs or test are needed.    [phone]     Activity - Up with nursing assistance     Weight bearing status    WBAT to bilateral lower extremities but patient is NWB at baseline     Reason for your hospital stay    You were hospitalized after a fall which resulted in left femur fracture.  This was surgically repaired.  You will have a hospice referral upon discharge and can determine if you would like to sign up for this after you meet with hospice.     No CPR- Do NOT Intubate     Physical Therapy Adult Consult    Evaluate and treat as clinically indicated.    Reason:  s/p CRPP left hip     Occupational Therapy Adult Consult    Evaluate and treat as clinically indicated.    Reason:  s/p CRPP left hip     Fall precautions     [facility]    DME Documentation: Describe the reason for need to support medical necessity: Impaired gait status post hip surgery. I, the undersigned, certify that the above prescribed supplies are medically necessary for this patient and is both reasonable and necessary in reference to accepted standards of medical practice in the treatment of this patient's condition and is not prescribed as a convenience.     [facility]    : DME Documentation: Describe the reason for need to support medical necessity: Impaired gait status post hip surgery. I, the undersigned, certify that the above prescribed supplies are medically necessary for this patient and is both reasonable and necessary in reference to accepted standards of medical practice in the treatment of this patient's condition and is not prescribed as a  convenience.     Diet    Follow this diet upon discharge: Orders Placed This Encounter      Snacks/Supplements Adult: Ensure Enlive; Between Meals      Advance Diet as Tolerated: Regular Diet Adult     Discharge Medications   Current Discharge Medication List      START taking these medications    Details   aspirin (ASA) 325 MG EC tablet Take 1 tablet (325 mg) by mouth 2 times daily  Qty: 60 tablet, Refills: 0    Associated Diagnoses: Nondisplaced fracture of base of neck of left femur, sequela      cefdinir (OMNICEF) 300 MG capsule Take 1 capsule (300 mg) by mouth daily  Qty: 2 capsule, Refills: 0    Associated Diagnoses: Acute cystitis without hematuria      hydrOXYzine (ATARAX) 10 MG tablet Take 1 tablet (10 mg) by mouth every 6 hours as needed for other (adjuvant pain)  Qty: 30 tablet, Refills: 0    Associated Diagnoses: Nondisplaced fracture of base of neck of left femur, sequela      ondansetron (ZOFRAN-ODT) 4 MG ODT tab Take 1 tablet (4 mg) by mouth every 6 hours as needed for nausea or vomiting  Qty: 30 tablet, Refills: 0    Associated Diagnoses: Nondisplaced fracture of base of neck of left femur, sequela         CONTINUE these medications which have CHANGED    Details   acetaminophen (TYLENOL) 325 MG tablet Take 3 tablets (975 mg) by mouth every 8 hours  Qty: 40 tablet, Refills: 0    Associated Diagnoses: Nondisplaced fracture of base of neck of left femur, sequela      amLODIPine (NORVASC) 5 MG tablet Take 0.5 tablets (2.5 mg) by mouth daily  Qty: 30 tablet, Refills: 0    Associated Diagnoses: Hypertension goal BP (blood pressure) < 130/80      senna-docusate (SENOKOT-S/PERICOLACE) 8.6-50 MG tablet Take 1 tablet by mouth 2 times daily as needed for constipation  Qty: 40 tablet, Refills: 0    Associated Diagnoses: Nondisplaced fracture of base of neck of left femur, sequela      traMADol (ULTRAM) 50 MG tablet Take 0.5 tablets (25 mg) by mouth every 8 hours as needed for moderate pain  Qty: 30 tablet,  Refills: 0    Associated Diagnoses: Nondisplaced fracture of base of neck of left femur, sequela         CONTINUE these medications which have NOT CHANGED    Details   atorvastatin (LIPITOR) 40 MG tablet Take 40 mg by mouth every evening      carvedilol (COREG) 6.25 MG tablet Take 6.25 mg by mouth 2 times daily (with meals)      Cholecalciferol (VITAMIN D3) 50 MCG (2000 UT) TABS Take 2,000 Units by mouth daily      dorzolamide-timolol (COSOPT) 2-0.5 % ophthalmic solution Place 1 drop into both eyes 2 times daily  Qty: 1 Bottle, Refills: 11    Comments: May be substituted for Timolol and Dorzolamide separately if needed due to long term backorder of Cosopt.  Associated Diagnoses: Pseudoexfoliation glaucoma, moderate stage      escitalopram (LEXAPRO) 10 MG tablet Take 10 mg by mouth daily At 1400      latanoprost (XALATAN) 0.005 % ophthalmic solution Place 1 drop into both eyes At Bedtime Both Eyes  Qty: 3 Bottle, Refills: 4    Associated Diagnoses: Pseudoexfoliation glaucoma, moderate stage      polyethylene glycol (MIRALAX) 17 g packet Take 1 packet by mouth daily         STOP taking these medications       aspirin (ASA) 325 MG tablet Comments:   Reason for Stopping:             Allergies   Allergies   Allergen Reactions     Actonel [Bisphosphonates] Rash     Conjugated Estrogens Rash     Macrobid [Nitrofuran Derivatives] Rash     Nitrofurantoin Rash     Premarin Rash     Sulfa Drugs Rash     Hydrocodone Nausea and Vomiting     Oxycodone Nausea and Vomiting     Risedronate      leg pain     Data   Most Recent 3 CBC's:Recent Labs   Lab Test 02/18/22  0706 02/17/22  0651 02/15/22  1350   WBC 11.8* 11.3* 12.6*   HGB 9.3* 10.0* 12.0   * 111* 107*   * 140* 163      Most Recent 3 BMP's:  Recent Labs   Lab Test 02/27/22  0602 02/18/22  0706 02/17/22  0651 02/16/22  1143 02/15/22  1350   NA  --  141 140  --  140   POTASSIUM  --  4.2 4.1  --  4.3   CHLORIDE  --  111* 110*  --  109   CO2  --  25 22  --  25   BUN   --  35* 28  --  31*   CR 0.88 0.94 0.89   < > 1.00   ANIONGAP  --  5 8  --  6   TRANG  --  8.3* 8.1*  --  9.3   GLC  --  127* 142*  --  114*    < > = values in this interval not displayed.     Most Recent 2 LFT's:  Recent Labs   Lab Test 12/07/19  1227 05/22/19  0845   AST 19 17   ALT 15 <9   ALKPHOS 69 94   BILITOTAL 0.2 0.3     Most Recent INR's and Anticoagulation Dosing History:  Anticoagulation Dose History     Recent Dosing and Labs Latest Ref Rng & Units 1/8/2020 9/19/2021    INR 0.85 - 1.15 1.06 1.11        Most Recent 3 Troponin's:  Recent Labs   Lab Test 09/19/21 2125 09/19/21  1440 01/09/20  0618 01/08/20  2239 12/07/19  1227 12/03/19  1435 05/02/18  1401   TROPI  --   --  <0.015 <0.015 <0.015   < >  --    TROPONIN <0.015 <0.015  --   --   --   --  0.00    < > = values in this interval not displayed.     Most Recent Cholesterol Panel:  Recent Labs   Lab Test 09/19/21 2125   CHOL 116   LDL 49   HDL 40*   TRIG 133     Most Recent 6 Bacteria Isolates From Any Culture (See EPIC Reports for Culture Details):No lab results found.  Most Recent TSH, T4 and A1c Labs:  Recent Labs   Lab Test 09/19/21 2125   A1C 5.8*

## 2022-03-11 NOTE — PROGRESS NOTES
"Care Management Follow Up    Length of Stay (days): 24    Expected Discharge Date: 03/11/2022     Concerns to be Addressed:      Patient plan of care discussed at interdisciplinary rounds: yes    Anticipated Discharge Disposition: Hospice, Long Term Care  Disposition Comments: Awaiting LTC placement with hospice support  Anticipated Discharge Services: None  Anticipated Discharge DME: None    Patient/family educated on Medicare website which has current facility and service quality ratings: yes  Education Provided on the Discharge Plan:  yes  Patient/Family in Agreement with the Plan: unable to assess    Referrals Placed by CM/SW: Post Acute Facilities  Private pay costs discussed: private room/amenity fees and transportation costs    Additional Information:  Several phone calls and hours of coordination spent on this patients case.     HARI called patients daughter Pat to inform her a discharge order has been placed and the hospital's expectation is patient will discharge today or else run the risk of private paying the hospital bill. Informed her that in addition to [facility] and [facility] was looking at her referrals. Both [facility] and [facility] said they could not make this happen before Monday. HARI told Pat this and she said \"I won't go to [place] or [place] because they de-funded the police.\"    HARI received a phone call from Susie Antonio ([facility] p: [phone]).  She said she has been working with daughter Pat about the discharge notice [facility] provided. She reports the discharge notice is illegal as [facility] did not follow their discharge procedure. She will work with Pat and Alejandrina Noble and  to file an appeal. Susie wants her Elderly Waiver CM to come assess for AL vs LTC. HARI left message with Rajwinder (elderly waiver CM). Rajwinder was off today so HARI called coverage worker Cliff and left message. HARI never heard a response.  Waiting to her confirmation " on this as well.     Pat came to  area and ask that I fax a referral to [facility]. HARI spoke with Cortez ([phone]) and faxed referral to [phone]. Cortez said the process is to review and have the AL manager come to hospital to do an assessment. This would not happen before Monday.     HARI called [facility] and spoke to Pablo. He reports patient can return but she would have to go to another facility [facility] facility as daughter came and packed up all her things. The house is currently replacing the windows in Alejandrina Noble's room. Pt discharging to another [facility] group home as her current residence is getting new windows. Safe discharge secured and HARI arranged for a HE wheelchair ride at 4:45. She will be discharging to [address] [facility] ramp into the house and phone # to home is [phone].  HARI updated Pat on the discharge and HARI provided daughter Pat an IMM, which she refused to sign. Tiffany said [facility] would need to  her things SW updated her that they won't do that and it is her responsibility to get her mothers things.      Daughter informed HARI she has appealed the discharge. HARI spoke with Valery. Case Number: [id number], [facility] ID: [id number]. Daughter Pat was given notice of the appeal. Pat asked for her mothers medical records. HARI explained she would need to do a RASHAUN and that medical records would send the information to the patient.  HARI canceled the ride and updated Margarita ([phone]) the [facility] supervisor. She said she did not think patient was coming because they did not have an Easy Stand. HARI said there was an Melany Steady at the old house so they better secure it or have her move back to her old house as the hospital anticipates she will be ready for discharge on Sunday.      Pat said someone she knew called [facility] and they have an open bed. She asked that I fax the referral. Explained to Pat that patient is not appropriate for TCU  and just because they have an open bed does not mean you will be given it. An assessment needs to happen for appropriateness and financial information will need to be secured. HARI called Linda Denys from [facility] and she said there are NO rooms at TCU, LTC or Assisted Living. Linda said she read the grid wrong.     Several prompts to Pat about just walking into the SW/CC/  area. Explained we had private confidential things and she could not just come back here. Told her the process needs to be stop at nursing desk and ask to speak to HARI or Donnie. They will come get us and we will come out. She is NOT to just come back here.       RACQUEL Pradhan [facility]  [phone]  [address]   [facility]. [id number]  [facility]       RACQUEL Mcgill

## 2022-03-11 NOTE — PROGRESS NOTES
Patient vital signs are at baseline: Yes  Patient able to ambulate as they were prior to admission or with assist devices provided by therapies during their stay:  Yes  Patient MUST void prior to discharge:  Yes  Patient able to tolerate oral intake:  Yes  Pain has adequate pain control using Oral analgesics:  Yes  Does patient have an identified :  Yes  Has goal D/C date and time been discussed with patient:  Yes     Pt A/O but forgetful. Up with A1 and tin varghese. Left hip incision is healed. Purewick in place when in bed. VSS. SW assisting with discharge plans.

## 2022-03-12 VITALS
WEIGHT: 110 LBS | HEIGHT: 61 IN | OXYGEN SATURATION: 93 % | HEART RATE: 64 BPM | BODY MASS INDEX: 20.77 KG/M2 | DIASTOLIC BLOOD PRESSURE: 63 MMHG | RESPIRATION RATE: 16 BRPM | SYSTOLIC BLOOD PRESSURE: 137 MMHG | TEMPERATURE: 97.1 F

## 2022-03-12 PROCEDURE — 250N000013 HC RX MED GY IP 250 OP 250 PS 637: Performed by: PHYSICIAN ASSISTANT

## 2022-03-12 PROCEDURE — 250N000013 HC RX MED GY IP 250 OP 250 PS 637: Performed by: INTERNAL MEDICINE

## 2022-03-12 PROCEDURE — 99207 PR NO CHARGE LOS: CPT | Performed by: HOSPITALIST

## 2022-03-12 RX ADMIN — ACETAMINOPHEN 1000 MG: 500 TABLET, FILM COATED ORAL at 12:15

## 2022-03-12 RX ADMIN — GUAIFENESIN 600 MG: 600 TABLET, EXTENDED RELEASE ORAL at 08:38

## 2022-03-12 RX ADMIN — ASPIRIN 325 MG: 325 TABLET, COATED ORAL at 08:38

## 2022-03-12 RX ADMIN — MAGNESIUM HYDROXIDE 30 ML: 400 SUSPENSION ORAL at 08:41

## 2022-03-12 RX ADMIN — AMLODIPINE BESYLATE 2.5 MG: 2.5 TABLET ORAL at 08:38

## 2022-03-12 RX ADMIN — DORZOLAMIDE HYDROCHLORIDE AND TIMOLOL MALEATE 1 DROP: 22.3; 6.8 SOLUTION/ DROPS OPHTHALMIC at 08:40

## 2022-03-12 RX ADMIN — ACETAMINOPHEN 1000 MG: 500 TABLET, FILM COATED ORAL at 08:38

## 2022-03-12 RX ADMIN — CEFDINIR 300 MG: 300 CAPSULE ORAL at 08:38

## 2022-03-12 RX ADMIN — LATANOPROST 1 DROP: 50 SOLUTION OPHTHALMIC at 12:15

## 2022-03-12 RX ADMIN — CARVEDILOL 6.25 MG: 6.25 TABLET, FILM COATED ORAL at 08:38

## 2022-03-12 ASSESSMENT — ACTIVITIES OF DAILY LIVING (ADL)
ADLS_ACUITY_SCORE: 19

## 2022-03-12 NOTE — PROGRESS NOTES
CM: took PC from Linda at [facility] admissions.. She apologized that they do NOT have any open beds at this time in TCU/LTC or in their assisted living facility..  SW explained that someone connected to the pt called [facility] and was told they have possible bed,that family were upset about being told there was a bed and having our staff tell them otherwise.   This writer is not sure whom family spoke to or the miss communication .      SW updated Co worker covering unit that [facility] had called and declined referral for placement.

## 2022-03-12 NOTE — PROGRESS NOTES
Patient's After Visit Summary was reviewed with patient and Daughter.   Patient verbalized understanding of After Visit Summary, recommended follow up and was given an opportunity to ask questions.   Discharge medications sent home with patient/family: YES   Discharged with transport tech to [facility]

## 2022-03-12 NOTE — PROGRESS NOTES
Brief Progress Note:    Pt has been discharge ready. Please see discharge summary from 3/11. Pt seen and evaluated. Pt feels well. Just waking up.  Denies any worsening pain. States she is hungry and will be eating bfast.    Appreciate SW assistance with placement.  Currently working with daughter on finding placement.     Jerome Castañeda MD

## 2022-03-12 NOTE — PROGRESS NOTES
Care Management Discharge Note    Discharge Date: 03/12/2022       Discharge Disposition: Hospice, Long Term Care    Discharge Services: None    Discharge DME: None    Discharge Transportation: family or friend will provide    Private pay costs discussed: private room/amenity fees and transportation costs    PAS Confirmation Code:  na  Patient/family educated on Medicare website which has current facility and service quality ratings: yes    Education Provided on the Discharge Plan:  yes  Persons Notified of Discharge Plans: [facility] group home owner, bedside nurse and charge nurse  Patient/Family in Agreement with the Plan: unable to assess    Handoff Referral Completed: No    Additional Information:  HARI was called saying patient wanted to discharge today.     HARI spoke with Pat who said she wanted her mom to discharge ASAP! She reports she talked with [facility] the director. She says patient will discharge back to her original group home  ([address] [place], [place]).     HARI called [facility] and ordered a wheelchair ride for patient. Pat became upset said she should go on a stretcher. HARI explained what the process was for a stretcher that if patient can safely sit in a chair, she can transport in a wheelchair.  Pat and I called [employer] and they said to fill out a PCS form and they could try to run it through insurance. After the call with [employer] Pat became upset yelling at HARI telling me I left out the the diagnosis. HARI left the room without a response.     HARI called and left another message with [facility] letting him know what time the ride is coming.      Home care referral sent [facility] for PT/OT.     [facility] called and said patients appeal has been denied and patient has until noon on 3/13/2022 to discharge.     RACQUEL Pradhan [facility]  [phone]  [address]   [facility]. [address]  [facility]              RACQUEL Mcgill

## 2022-03-12 NOTE — DISCHARGE INSTRUCTIONS
The  has arranged home care,  for you after you return home from the hospital.   The Home Care Agency Arranged is [facility]  The telephone number is [phone]  You will be contacted by phone within a few days after your hospital stay by the Home Care Agency to set up your first visit.     If you have not heard from the Home Care Agency within 1-2  Days after discharge from the hospital, please contact the number provided above.

## 2022-03-12 NOTE — PLAN OF CARE
Patient vital signs are at baseline: Yes  Patient able to ambulate as they were prior to admission or with assist devices provided by therapies during their stay:  Yes; transfers with Ax1 using the tin steady and gait belt.   Patient MUST void prior to discharge:  Yes  Patient able to tolerate oral intake:  Yes  Pain has adequate pain control using Oral analgesics:  Yes  Does patient have an identified :  No,  Reason:  Discharge disposition pending.   Has goal D/C date and time been discussed with patient:  Yes; discharge disposition continues to be pending. See SW note for details.

## 2022-03-13 NOTE — PROGRESS NOTES
HARI heard that [facility] was not able to accept the patient as they were at capacity.     HARI made several phone calls today to secure home care for patient.     [facility]- no capacity  [facility] no capacity  [facility]- does not accept the [facility] program    HARI called [facility] and spoke with [facility] and they are able to accept the referral. HARI faxed referral and discharge orders. So confirmed with daughter Pat that she does indeed want home care.         RACQUEL Pradhan [facility]  [phone]  [address]   [facility]. [address]  [facility]

## 2022-03-15 ENCOUNTER — DOCUMENTATION ONLY (OUTPATIENT)
Dept: OTHER | Facility: CLINIC | Age: 87
End: 2022-03-15
Payer: COMMERCIAL

## 2022-03-23 NOTE — LETTER
6th Floor social workers [phone]    Currently patient is on Elderly Waiver.  
DONN HanksW, LSW  Inpatient Care Coordination  Ortho Unit, [facility]  [phone]    
Looking for an EW placement. Patient would be on hospice.    ELIZABETH Lima   Inpatient Care Coordination   Supervisor  [facility]  [phone]  
This pt will discharge with hospice support, is on EW and not fully vaccinated for covid. Please review for a shared room at the [place] location and contact HARI White at [phone]    Thanks!    RYAN Hanks, LSW  Inpatient Care Coordination  Ortho Unit, [facility]  [phone]    
show

## 2022-04-21 ENCOUNTER — LAB REQUISITION (OUTPATIENT)
Dept: LAB | Facility: CLINIC | Age: 87
End: 2022-04-21
Payer: COMMERCIAL

## 2022-04-21 DIAGNOSIS — Z11.1 ENCOUNTER FOR SCREENING FOR RESPIRATORY TUBERCULOSIS: ICD-10-CM

## 2022-04-22 PROCEDURE — 36415 COLL VENOUS BLD VENIPUNCTURE: CPT | Mod: ORL | Performed by: FAMILY MEDICINE

## 2022-04-22 PROCEDURE — P9603 ONE-WAY ALLOW PRORATED MILES: HCPCS | Mod: ORL | Performed by: FAMILY MEDICINE

## 2022-04-22 PROCEDURE — 86481 TB AG RESPONSE T-CELL SUSP: CPT | Mod: ORL | Performed by: FAMILY MEDICINE

## 2022-04-25 LAB
GAMMA INTERFERON BACKGROUND BLD IA-ACNC: 0.15 IU/ML
M TB IFN-G BLD-IMP: NEGATIVE
M TB IFN-G CD4+ BCKGRND COR BLD-ACNC: 2.34 IU/ML
MITOGEN IGNF BCKGRD COR BLD-ACNC: -0.01 IU/ML
MITOGEN IGNF BCKGRD COR BLD-ACNC: 0 IU/ML
QUANTIFERON MITOGEN: 2.49 IU/ML
QUANTIFERON NIL TUBE: 0.15 IU/ML
QUANTIFERON TB1 TUBE: 0.14 IU/ML
QUANTIFERON TB2 TUBE: 0.15

## 2022-04-27 ENCOUNTER — LAB REQUISITION (OUTPATIENT)
Dept: LAB | Facility: CLINIC | Age: 87
End: 2022-04-27
Payer: COMMERCIAL

## 2022-04-27 DIAGNOSIS — Z86.19 PERSONAL HISTORY OF OTHER INFECTIOUS AND PARASITIC DISEASES: ICD-10-CM

## 2022-04-27 PROCEDURE — U0003 INFECTIOUS AGENT DETECTION BY NUCLEIC ACID (DNA OR RNA); SEVERE ACUTE RESPIRATORY SYNDROME CORONAVIRUS 2 (SARS-COV-2) (CORONAVIRUS DISEASE [COVID-19]), AMPLIFIED PROBE TECHNIQUE, MAKING USE OF HIGH THROUGHPUT TECHNOLOGIES AS DESCRIBED BY CMS-2020-01-R: HCPCS | Mod: ORL | Performed by: FAMILY MEDICINE

## 2022-04-28 LAB — SARS-COV-2 RNA RESP QL NAA+PROBE: POSITIVE

## 2022-07-03 ENCOUNTER — HOSPITAL ENCOUNTER (EMERGENCY)
Facility: CLINIC | Age: 87
Discharge: HOME | End: 2022-07-03
Attending: EMERGENCY MEDICINE | Admitting: EMERGENCY MEDICINE
Payer: COMMERCIAL

## 2022-07-03 ENCOUNTER — APPOINTMENT (OUTPATIENT)
Dept: GENERAL RADIOLOGY | Facility: CLINIC | Age: 87
End: 2022-07-03
Attending: EMERGENCY MEDICINE
Payer: COMMERCIAL

## 2022-07-03 VITALS
TEMPERATURE: 98.7 F | OXYGEN SATURATION: 97 % | RESPIRATION RATE: 18 BRPM | SYSTOLIC BLOOD PRESSURE: 138 MMHG | HEART RATE: 72 BPM | DIASTOLIC BLOOD PRESSURE: 64 MMHG

## 2022-07-03 DIAGNOSIS — J40 BRONCHITIS: ICD-10-CM

## 2022-07-03 DIAGNOSIS — U09.9 POST-COVID-19 SYNDROME MANIFESTING AS CHRONIC COUGH: ICD-10-CM

## 2022-07-03 DIAGNOSIS — R05.3 POST-COVID-19 SYNDROME MANIFESTING AS CHRONIC COUGH: ICD-10-CM

## 2022-07-03 LAB
ANION GAP SERPL CALCULATED.3IONS-SCNC: 8 MMOL/L (ref 3–14)
BASOPHILS # BLD AUTO: 0 10E3/UL (ref 0–0.2)
BASOPHILS NFR BLD AUTO: 0 %
BUN SERPL-MCNC: 21 MG/DL (ref 7–30)
CALCIUM SERPL-MCNC: 8.9 MG/DL (ref 8.5–10.1)
CHLORIDE BLD-SCNC: 109 MMOL/L (ref 94–109)
CO2 SERPL-SCNC: 28 MMOL/L (ref 20–32)
CREAT SERPL-MCNC: 0.93 MG/DL (ref 0.52–1.04)
EOSINOPHIL # BLD AUTO: 0.2 10E3/UL (ref 0–0.7)
EOSINOPHIL NFR BLD AUTO: 3 %
ERYTHROCYTE [DISTWIDTH] IN BLOOD BY AUTOMATED COUNT: 15.1 % (ref 10–15)
GFR SERPL CREATININE-BSD FRML MDRD: 55 ML/MIN/1.73M2
GLUCOSE BLD-MCNC: 127 MG/DL (ref 70–99)
HCT VFR BLD AUTO: 35.4 % (ref 35–47)
HGB BLD-MCNC: 10.9 G/DL (ref 11.7–15.7)
HOLD SPECIMEN: NORMAL
HOLD SPECIMEN: NORMAL
IMM GRANULOCYTES # BLD: 0 10E3/UL
IMM GRANULOCYTES NFR BLD: 0 %
LYMPHOCYTES # BLD AUTO: 1.7 10E3/UL (ref 0.8–5.3)
LYMPHOCYTES NFR BLD AUTO: 19 %
MCH RBC QN AUTO: 32.7 PG (ref 26.5–33)
MCHC RBC AUTO-ENTMCNC: 30.8 G/DL (ref 31.5–36.5)
MCV RBC AUTO: 106 FL (ref 78–100)
MONOCYTES # BLD AUTO: 0.8 10E3/UL (ref 0–1.3)
MONOCYTES NFR BLD AUTO: 9 %
NEUTROPHILS # BLD AUTO: 6.1 10E3/UL (ref 1.6–8.3)
NEUTROPHILS NFR BLD AUTO: 69 %
NRBC # BLD AUTO: 0 10E3/UL
NRBC BLD AUTO-RTO: 0 /100
PLATELET # BLD AUTO: 206 10E3/UL (ref 150–450)
POTASSIUM BLD-SCNC: 4.1 MMOL/L (ref 3.4–5.3)
RBC # BLD AUTO: 3.33 10E6/UL (ref 3.8–5.2)
SODIUM SERPL-SCNC: 145 MMOL/L (ref 133–144)
WBC # BLD AUTO: 9 10E3/UL (ref 4–11)

## 2022-07-03 PROCEDURE — 99285 EMERGENCY DEPT VISIT HI MDM: CPT | Mod: 25

## 2022-07-03 PROCEDURE — 93005 ELECTROCARDIOGRAM TRACING: CPT

## 2022-07-03 PROCEDURE — 82310 ASSAY OF CALCIUM: CPT | Performed by: EMERGENCY MEDICINE

## 2022-07-03 PROCEDURE — 85025 COMPLETE CBC W/AUTO DIFF WBC: CPT | Performed by: EMERGENCY MEDICINE

## 2022-07-03 PROCEDURE — 36415 COLL VENOUS BLD VENIPUNCTURE: CPT | Performed by: EMERGENCY MEDICINE

## 2022-07-03 PROCEDURE — 71045 X-RAY EXAM CHEST 1 VIEW: CPT

## 2022-07-03 RX ORDER — AZITHROMYCIN 250 MG/1
TABLET, FILM COATED ORAL
Qty: 6 TABLET | Refills: 0 | Status: SHIPPED | OUTPATIENT
Start: 2022-07-03 | End: 2022-07-08

## 2022-07-03 RX ORDER — PREDNISONE 20 MG/1
TABLET ORAL
Qty: 10 TABLET | Refills: 0 | Status: SHIPPED | OUTPATIENT
Start: 2022-07-03 | End: 2022-07-21

## 2022-07-03 ASSESSMENT — ENCOUNTER SYMPTOMS
FATIGUE: 1
SHORTNESS OF BREATH: 0
FEVER: 0
WHEEZING: 1
DIARRHEA: 0
ABDOMINAL PAIN: 0
VOMITING: 0
COUGH: 1

## 2022-07-03 NOTE — ED PROVIDER NOTES
History     Chief Complaint:  Cough      HPI   Alejandrina Whatley is a 89 year old female who presents with productive cough since April 22.  Patient was diagnosed with COVID on April 26.  Her cough has persisted ever since then.  Her daughter reports that she had a chest x-ray done 2 weeks ago she was told was normal.  They have been treating her with nebulizers and Mucinex at the nursing home, however her daughter feels that her symptoms have worsened since they started doing this.  Patient reports coughing of yellowish sputum, but no hemoptysis.  She denies any fevers, chest pain, shortness of breath, leg swelling.  She has no previous history of any heart or lung disease.  She was a non-smoker.    Review of Systems   Constitutional: Positive for fatigue. Negative for fever.   Respiratory: Positive for cough and wheezing. Negative for shortness of breath.    Cardiovascular: Negative for chest pain and leg swelling.   Gastrointestinal: Negative for abdominal pain, diarrhea and vomiting.   All other systems reviewed and are negative.        Allergies:  Actonel [Bisphosphonates]  Conjugated Estrogens  Macrobid [Nitrofuran Derivatives]  Nitrofurantoin  Premarin  Sulfa Drugs  Hydrocodone  Oxycodone  Risedronate    Medications:    acetaminophen (TYLENOL) 325 MG tablet  amLODIPine (NORVASC) 5 MG tablet  aspirin (ASA) 325 MG EC tablet  atorvastatin (LIPITOR) 40 MG tablet  carvedilol (COREG) 6.25 MG tablet  cefdinir (OMNICEF) 300 MG capsule  Cholecalciferol (VITAMIN D3) 50 MCG (2000 UT) TABS  dorzolamide-timolol (COSOPT) 2-0.5 % ophthalmic solution  escitalopram (LEXAPRO) 10 MG tablet  hydrOXYzine (ATARAX) 10 MG tablet  latanoprost (XALATAN) 0.005 % ophthalmic solution  ondansetron (ZOFRAN-ODT) 4 MG ODT tab  polyethylene glycol (MIRALAX) 17 g packet  senna-docusate (SENOKOT-S/PERICOLACE) 8.6-50 MG tablet  traMADol (ULTRAM) 50 MG tablet         Past Medical History:   Past Surgical History:     Past Medical History:    Diagnosis Date     Abdominal aortic aneurysm (H) 2001     AK (actinic keratosis) 11/11/2009     Cataract 02/22/2011     Compression Fractures of Lumbar Vertebra 02/04/2010     CVA (cerebral vascular accident) (H)      Dementia (H)      DJD (degenerative joint disease)      Generalised Weakness and Fatigue 03/03/2011     Glaucoma (increased eye pressure) 2009     HTN (hypertension) 11/11/2009     Hyperlipidemia LDL goal <100 10/31/2010     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     Lumbar spinal stenosis 02/04/2010     Osteoporosis, post-menopausal 11/10/2009     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  Dr. Clark.     HYSTERECTOMY, CERVIX STATUS UNKNOWN  1971     LASER SELECTIVE TRABECULOPLASTY  3/2010; 10/2015    left eye 1st Clark (notes show inf treatment); inf 180 (MARI)     LASER SELECTIVE TRABECULOPLASTY  12/2017    left eye sup 180     ZZC ANTER VESICOURETHROPEXY,SIMPLE  2008    Helped     ZZC APPENDECTOMY  1971      Patient Active Problem List    Diagnosis Date Noted     Nondisplaced fracture of base of neck of left femur, sequela 02/15/2022     Priority: Medium     Slurred speech 09/19/2021     Priority: Medium     Generalized muscle weakness 09/19/2021     Priority: Medium     Abdominal aortic aneurysm (AAA) without rupture (H) 09/29/2020     Priority: Medium     History of recurrent UTI (urinary tract infection) 09/29/2020     Priority: Medium     Chronic back pain, unspecified back location,  unspecified back pain laterality 09/29/2020     Priority: Medium     History of 2019 novel coronavirus disease (COVID-19) 09/29/2020     Priority: Medium     H/O: CVA (cerebrovascular accident), Dec 19 and Jan 20 07/17/2020     Priority: Medium     Hemiparesis affecting left side as late effect of cerebrovascular accident (CVA) (H) 07/17/2020     Priority: Medium     Chronic kidney disease, stage 3 (moderate) 07/17/2020     Priority: Medium     IMO Regulatory Load OCT 2020       Depression, unspecified depression type 07/17/2020     Priority: Medium     Debility 07/17/2020     Priority: Medium     Spinal stenosis of lumbar region without neurogenic claudication 07/17/2020     Priority: Medium     Other chronic pain 07/17/2020     Priority: Medium     Anxiety and depression 05/23/2020     Priority: Medium     Dementia without behavioral disturbance, unspecified dementia type (H) 05/11/2020     Priority: Medium     Weakness 12/07/2019     Priority: Medium     CVA (cerebral vascular accident) (H) 12/03/2019     Priority: Medium     Periorbital dermatitis, ou  10/10/2017     Priority: Medium     Marginal corneal ulcer, right 08/25/2017     Priority: Medium     Pseudoexfoliation glaucoma, 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 Provider to review and confirm       Compression fracture:T6 and T7 09/07/2012     Priority: Medium     Constipation 09/07/2012     Priority:  Medium     Health Care Home 08/14/2012     Priority: Medium     Suraj ([place]) CHRISSY Mendieta,[phone]--[phone]   [facility] Care Coordinator /               [facility] guttata 06/06/2012     Priority: Medium     OSTEOARTHRITIS OF SHOULDER - left 09/27/2011     Priority: Medium     Hypercholesteremia 09/17/2011     Priority: Medium     Essential hypertension 05/10/2011     Priority: Medium     CKD (chronic kidney disease) stage 3, GFR 30-59 ml/min (H) 05/10/2011     Priority: Medium     Lumbar spinal stenosis 02/04/2010     Priority: Medium     DJD (degenerative joint disease)      Priority: Medium          Family History  Family History   Problem Relation Age of Onset     Heart Disease Father 79        MI     Hypertension Mother        Social History:  PCP: Mayuri Roy  Presents to the ED with daughter by EMS    Physical Exam     Patient Vitals for the past 24 hrs:   BP Temp Temp src Pulse Resp SpO2   07/03/22 1944 138/64 -- -- 72 18 97 %   07/03/22 1815 -- -- -- -- -- 94 %   07/03/22 1800 -- -- -- 68 -- 93 %   07/03/22 1745 -- -- -- -- -- 98 %   07/03/22 1730 135/52 -- -- 66 -- 94 %   07/03/22 1715 -- -- -- -- -- 96 %   07/03/22 1700 (!) 152/62 -- -- 80 -- --   07/03/22 1645 -- -- -- -- -- 94 %   07/03/22 1633 -- 98.7  F (37.1  C) Oral -- -- --   07/03/22 1630 (!) 147/81 -- -- -- -- 97 %   07/03/22 1629 (!) 147/81 -- -- 81 18 96 %       Physical Exam  Vitals and nursing note reviewed.   Constitutional:       General: She is not in acute distress.     Appearance: Normal appearance. She is not ill-appearing.   HENT:      Head: Normocephalic and atraumatic.      Right Ear: External ear normal.      Left Ear: External ear normal.      Nose: Nose normal.      Mouth/Throat:      Mouth: Mucous membranes are moist.   Eyes:      Extraocular Movements: Extraocular movements intact.      Conjunctiva/sclera: Conjunctivae normal.   Cardiovascular:      Rate and Rhythm: Normal rate and regular  rhythm.      Heart sounds: No murmur heard.  Pulmonary:      Effort: Pulmonary effort is normal. No respiratory distress.      Breath sounds: Wheezing (few faint scattered) present. No rhonchi or rales.   Abdominal:      General: Abdomen is flat. Bowel sounds are normal. There is no distension.      Palpations: Abdomen is soft.      Tenderness: There is no abdominal tenderness. There is no guarding or rebound.   Musculoskeletal:         General: No deformity or signs of injury.      Cervical back: Normal range of motion and neck supple.   Skin:     General: Skin is warm and dry.      Findings: No rash.   Neurological:      Mental Status: She is alert.   Psychiatric:         Behavior: Behavior normal.           Emergency Department Course       ECG results from 07/03/22   EKG 12-lead, tracing only     Value    Systolic Blood Pressure     Diastolic Blood Pressure     Ventricular Rate 70    Atrial Rate 70    MA Interval 214    QRS Duration 68        QTc 427    P Axis 41    R AXIS -9    T Axis 16    Interpretation ECG      Sinus rhythm with 1st degree A-V block  Otherwise normal ECG  When compared with ECG of 15-FEB-2022 13:50,  No significant change was found         Imaging:  XR Chest Port 1 View   Final Result   IMPRESSION: Patchy linear bibasilar atelectasis or scarring. No effusions or pneumothorax. Heart size is normal. Severe degenerative changes of bilateral shoulders. Old, healed right proximal humerus fracture. Vertebroplasty changes of the midthoracic    spine. Osteopenia.          Laboratory:  Labs Ordered and Resulted from Time of ED Arrival to Time of ED Departure   BASIC METABOLIC PANEL - Abnormal       Result Value    Sodium 145 (*)     Potassium 4.1      Chloride 109      Carbon Dioxide (CO2) 28      Anion Gap 8      Urea Nitrogen 21      Creatinine 0.93      Calcium 8.9      Glucose 127 (*)     GFR Estimate 55 (*)    CBC WITH PLATELETS AND DIFFERENTIAL - Abnormal    WBC Count 9.0      RBC Count  3.33 (*)     Hemoglobin 10.9 (*)     Hematocrit 35.4       (*)     MCH 32.7      MCHC 30.8 (*)     RDW 15.1 (*)     Platelet Count 206      % Neutrophils 69      % Lymphocytes 19      % Monocytes 9      % Eosinophils 3      % Basophils 0      % Immature Granulocytes 0      NRBCs per 100 WBC 0      Absolute Neutrophils 6.1      Absolute Lymphocytes 1.7      Absolute Monocytes 0.8      Absolute Eosinophils 0.2      Absolute Basophils 0.0      Absolute Immature Granulocytes 0.0      Absolute NRBCs 0.0             Emergency Department Course:           Reviewed:  I reviewed nursing notes, vitals, past history and care everywhere    Interventions:  Medications - No data to display    Disposition:  The patient was discharged to home.    Impression & Plan      CMS Diagnoses: None       Medical Decision Makin-year-old female with ongoing cough for the past 2 and half to 3 months.  Daughter feels that her symptoms are worsening recently.  Patient appears nontoxic and in no distress.  Sounds like symptoms started with COVID infection so suspect she has some COVID-related damage to her lungs and subsequent inflammatory bronchitis.  Will check chest x-ray and basic labs to further rule out pneumonia or other etiologies.    1900 labs are unremarkable, chest x-ray shows mild infiltrates at both bases which could be atelectasis.  However given the patient has had ongoing cough for months and now worsening with yellowish sputum we will cover with azithromycin per daughter's request.  We will also give prednisone and recommend continued nebulizer treatments at her nursing home.    Covid-19  Alejandrina Whatley was evaluated during a global COVID-19 pandemic, which necessitated consideration that the patient might be at risk for infection with the SARS-CoV-2 virus that causes COVID-19.  Applicable protocols for evaluation were followed during the patient's care. COVID-19 was considered as part of the patient's evaluation.        Diagnosis:    ICD-10-CM    1. 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 Bronchitis  J40        Discharge Medications:  Discharge Medication List as of 7/3/2022  6:30 PM      START taking these medications    Details   azithromycin (ZITHROMAX Z-ANDREA) 250 MG tablet Two tablets on the first day, then one tablet daily for the next 4 days, Disp-6 tablet, R-0, Local Print      predniSONE (DELTASONE) 20 MG tablet Take two tablets (= 40mg) each day for 5 (five) days, Disp-10 tablet, R-0, Local Print                    Molina Chapman MD  07/03/22 2381

## 2022-07-05 LAB
ATRIAL RATE - MUSE: 70 BPM
DIASTOLIC BLOOD PRESSURE - MUSE: NORMAL MMHG
INTERPRETATION ECG - MUSE: NORMAL
P AXIS - MUSE: 41 DEGREES
PR INTERVAL - MUSE: 214 MS
QRS DURATION - MUSE: 68 MS
QT - MUSE: 396 MS
QTC - MUSE: 427 MS
R AXIS - MUSE: -9 DEGREES
SYSTOLIC BLOOD PRESSURE - MUSE: NORMAL MMHG
T AXIS - MUSE: 16 DEGREES
VENTRICULAR RATE- MUSE: 70 BPM

## 2022-07-13 ENCOUNTER — LAB REQUISITION (OUTPATIENT)
Dept: LAB | Facility: CLINIC | Age: 87
End: 2022-07-13
Payer: COMMERCIAL

## 2022-07-13 DIAGNOSIS — R30.0 DYSURIA: ICD-10-CM

## 2022-07-13 LAB
ALBUMIN UR-MCNC: 10 MG/DL
APPEARANCE UR: ABNORMAL
BACTERIA #/AREA URNS HPF: ABNORMAL /HPF
BILIRUB UR QL STRIP: NEGATIVE
COLOR UR AUTO: ABNORMAL
GLUCOSE UR STRIP-MCNC: NEGATIVE MG/DL
HGB UR QL STRIP: NEGATIVE
KETONES UR STRIP-MCNC: NEGATIVE MG/DL
LEUKOCYTE ESTERASE UR QL STRIP: ABNORMAL
MUCOUS THREADS #/AREA URNS LPF: PRESENT /LPF
NITRATE UR QL: NEGATIVE
PH UR STRIP: 8 [PH] (ref 5–7)
RBC URINE: 1 /HPF
SP GR UR STRIP: 1.02 (ref 1–1.03)
SQUAMOUS EPITHELIAL: 2 /HPF
TRANSITIONAL EPI: <1 /HPF
UROBILINOGEN UR STRIP-MCNC: NORMAL MG/DL
WBC CLUMPS #/AREA URNS HPF: PRESENT /HPF
WBC URINE: 69 /HPF

## 2022-07-13 PROCEDURE — 87086 URINE CULTURE/COLONY COUNT: CPT | Mod: ORL | Performed by: NURSE PRACTITIONER

## 2022-07-13 PROCEDURE — 81001 URINALYSIS AUTO W/SCOPE: CPT | Mod: ORL | Performed by: NURSE PRACTITIONER

## 2022-07-15 LAB — BACTERIA UR CULT: NORMAL

## 2022-07-18 PROCEDURE — 87493 C DIFF AMPLIFIED PROBE: CPT | Mod: ORL | Performed by: FAMILY MEDICINE

## 2022-07-19 ENCOUNTER — LAB REQUISITION (OUTPATIENT)
Dept: LAB | Facility: CLINIC | Age: 87
End: 2022-07-19
Payer: COMMERCIAL

## 2022-07-19 DIAGNOSIS — R19.7 DIARRHEA, UNSPECIFIED: ICD-10-CM

## 2022-07-19 LAB — C DIFF TOX B STL QL: NEGATIVE

## 2022-07-20 ENCOUNTER — LAB REQUISITION (OUTPATIENT)
Dept: LAB | Facility: CLINIC | Age: 87
End: 2022-07-20
Payer: COMMERCIAL

## 2022-07-20 ENCOUNTER — HOSPITAL ENCOUNTER (OUTPATIENT)
Facility: CLINIC | Age: 87
Setting detail: OBSERVATION
Discharge: HOME | End: 2022-07-23
Attending: EMERGENCY MEDICINE | Admitting: INTERNAL MEDICINE
Payer: COMMERCIAL

## 2022-07-20 ENCOUNTER — APPOINTMENT (OUTPATIENT)
Dept: GENERAL RADIOLOGY | Facility: CLINIC | Age: 87
End: 2022-07-20
Attending: EMERGENCY MEDICINE
Payer: COMMERCIAL

## 2022-07-20 ENCOUNTER — MEDICAL CORRESPONDENCE (OUTPATIENT)
Dept: HEALTH INFORMATION MANAGEMENT | Facility: CLINIC | Age: 87
End: 2022-07-20

## 2022-07-20 DIAGNOSIS — R82.90 ABNORMAL URINALYSIS: ICD-10-CM

## 2022-07-20 DIAGNOSIS — R44.3 HALLUCINATIONS: ICD-10-CM

## 2022-07-20 DIAGNOSIS — N30.00 ACUTE CYSTITIS WITHOUT HEMATURIA: Primary | ICD-10-CM

## 2022-07-20 DIAGNOSIS — E63.9 NUTRITIONAL DEFICIENCY, UNSPECIFIED: ICD-10-CM

## 2022-07-20 LAB
ALBUMIN SERPL-MCNC: 3.2 G/DL (ref 3.4–5)
ALBUMIN UR-MCNC: 20 MG/DL
ALP SERPL-CCNC: 78 U/L (ref 40–150)
ALT SERPL W P-5'-P-CCNC: 14 U/L (ref 0–50)
AMMONIA PLAS-SCNC: 29 UMOL/L (ref 10–50)
ANION GAP SERPL CALCULATED.3IONS-SCNC: 4 MMOL/L (ref 3–14)
APPEARANCE UR: CLEAR
AST SERPL W P-5'-P-CCNC: 15 U/L (ref 0–45)
BILIRUB DIRECT SERPL-MCNC: <0.1 MG/DL (ref 0–0.2)
BILIRUB SERPL-MCNC: 0.2 MG/DL (ref 0.2–1.3)
BILIRUB UR QL STRIP: NEGATIVE
BUN SERPL-MCNC: 32 MG/DL (ref 7–30)
CALCIUM SERPL-MCNC: 8.8 MG/DL (ref 8.5–10.1)
CHLORIDE BLD-SCNC: 114 MMOL/L (ref 94–109)
CO2 SERPL-SCNC: 22 MMOL/L (ref 20–32)
COLOR UR AUTO: ABNORMAL
CREAT SERPL-MCNC: 1.11 MG/DL (ref 0.52–1.04)
ERYTHROCYTE [DISTWIDTH] IN BLOOD BY AUTOMATED COUNT: 15.7 % (ref 10–15)
FLUAV RNA SPEC QL NAA+PROBE: NEGATIVE
FLUBV RNA RESP QL NAA+PROBE: NEGATIVE
GFR SERPL CREATININE-BSD FRML MDRD: 45 ML/MIN/1.73M2
GLUCOSE BLD-MCNC: 138 MG/DL (ref 70–99)
GLUCOSE UR STRIP-MCNC: NEGATIVE MG/DL
HCT VFR BLD AUTO: 36.4 % (ref 35–47)
HGB BLD-MCNC: 11.3 G/DL (ref 11.7–15.7)
HGB UR QL STRIP: ABNORMAL
KETONES UR STRIP-MCNC: NEGATIVE MG/DL
LEUKOCYTE ESTERASE UR QL STRIP: ABNORMAL
MCH RBC QN AUTO: 33 PG (ref 26.5–33)
MCHC RBC AUTO-ENTMCNC: 31 G/DL (ref 31.5–36.5)
MCV RBC AUTO: 106 FL (ref 78–100)
MUCOUS THREADS #/AREA URNS LPF: PRESENT /LPF
NITRATE UR QL: NEGATIVE
PH UR STRIP: 5.5 [PH] (ref 5–7)
PLATELET # BLD AUTO: 234 10E3/UL (ref 150–450)
POTASSIUM BLD-SCNC: 4.2 MMOL/L (ref 3.4–5.3)
PROT SERPL-MCNC: 7.5 G/DL (ref 6.8–8.8)
RBC # BLD AUTO: 3.42 10E6/UL (ref 3.8–5.2)
RBC URINE: 14 /HPF
RSV RNA SPEC NAA+PROBE: NEGATIVE
SARS-COV-2 RNA RESP QL NAA+PROBE: NEGATIVE
SODIUM SERPL-SCNC: 140 MMOL/L (ref 133–144)
SP GR UR STRIP: 1.03 (ref 1–1.03)
SQUAMOUS EPITHELIAL: 1 /HPF
UROBILINOGEN UR STRIP-MCNC: NORMAL MG/DL
WBC # BLD AUTO: 8 10E3/UL (ref 4–11)
WBC URINE: 9 /HPF

## 2022-07-20 PROCEDURE — 250N000013 HC RX MED GY IP 250 OP 250 PS 637: Performed by: EMERGENCY MEDICINE

## 2022-07-20 PROCEDURE — 82248 BILIRUBIN DIRECT: CPT | Performed by: EMERGENCY MEDICINE

## 2022-07-20 PROCEDURE — 36415 COLL VENOUS BLD VENIPUNCTURE: CPT | Performed by: EMERGENCY MEDICINE

## 2022-07-20 PROCEDURE — 99285 EMERGENCY DEPT VISIT HI MDM: CPT | Mod: 25

## 2022-07-20 PROCEDURE — 81001 URINALYSIS AUTO W/SCOPE: CPT | Performed by: EMERGENCY MEDICINE

## 2022-07-20 PROCEDURE — 80053 COMPREHEN METABOLIC PANEL: CPT | Performed by: EMERGENCY MEDICINE

## 2022-07-20 PROCEDURE — 250N000011 HC RX IP 250 OP 636: Performed by: EMERGENCY MEDICINE

## 2022-07-20 PROCEDURE — 71046 X-RAY EXAM CHEST 2 VIEWS: CPT

## 2022-07-20 PROCEDURE — 85027 COMPLETE CBC AUTOMATED: CPT | Performed by: EMERGENCY MEDICINE

## 2022-07-20 PROCEDURE — 87086 URINE CULTURE/COLONY COUNT: CPT | Performed by: EMERGENCY MEDICINE

## 2022-07-20 PROCEDURE — 83735 ASSAY OF MAGNESIUM: CPT | Performed by: STUDENT IN AN ORGANIZED HEALTH CARE EDUCATION/TRAINING PROGRAM

## 2022-07-20 PROCEDURE — C9803 HOPD COVID-19 SPEC COLLECT: HCPCS

## 2022-07-20 PROCEDURE — 87637 SARSCOV2&INF A&B&RSV AMP PRB: CPT | Performed by: EMERGENCY MEDICINE

## 2022-07-20 PROCEDURE — 82140 ASSAY OF AMMONIA: CPT | Performed by: EMERGENCY MEDICINE

## 2022-07-20 PROCEDURE — 96365 THER/PROPH/DIAG IV INF INIT: CPT

## 2022-07-20 RX ORDER — IPRATROPIUM BROMIDE AND ALBUTEROL SULFATE 2.5; .5 MG/3ML; MG/3ML
3 SOLUTION RESPIRATORY (INHALATION) ONCE
Status: COMPLETED | OUTPATIENT
Start: 2022-07-21 | End: 2022-07-21

## 2022-07-20 RX ORDER — CEFTRIAXONE 1 G/1
1 INJECTION, POWDER, FOR SOLUTION INTRAMUSCULAR; INTRAVENOUS ONCE
Status: COMPLETED | OUTPATIENT
Start: 2022-07-20 | End: 2022-07-20

## 2022-07-20 RX ORDER — CEFTRIAXONE 1 G/1
1 INJECTION, POWDER, FOR SOLUTION INTRAMUSCULAR; INTRAVENOUS ONCE
Status: DISCONTINUED | OUTPATIENT
Start: 2022-07-20 | End: 2022-07-20

## 2022-07-20 RX ORDER — GRANULES FOR ORAL 3 G/1
3 POWDER ORAL ONCE
Status: COMPLETED | OUTPATIENT
Start: 2022-07-20 | End: 2022-07-20

## 2022-07-20 RX ADMIN — CEFTRIAXONE 1 G: 1 INJECTION, POWDER, FOR SOLUTION INTRAMUSCULAR; INTRAVENOUS at 21:52

## 2022-07-20 RX ADMIN — FOSFOMYCIN TROMETHAMINE 3 G: 3 POWDER ORAL at 22:11

## 2022-07-20 ASSESSMENT — ENCOUNTER SYMPTOMS
COUGH: 0
HEADACHES: 0
FEVER: 0
SHORTNESS OF BREATH: 0
HALLUCINATIONS: 1
DYSURIA: 1

## 2022-07-21 LAB — MAGNESIUM SERPL-MCNC: 2.2 MG/DL (ref 1.6–2.3)

## 2022-07-21 PROCEDURE — 250N000013 HC RX MED GY IP 250 OP 250 PS 637: Performed by: INTERNAL MEDICINE

## 2022-07-21 PROCEDURE — 250N000011 HC RX IP 250 OP 636: Performed by: INTERNAL MEDICINE

## 2022-07-21 PROCEDURE — 96376 TX/PRO/DX INJ SAME DRUG ADON: CPT

## 2022-07-21 PROCEDURE — 99220 PR INITIAL OBSERVATION CARE,LEVEL III: CPT | Performed by: INTERNAL MEDICINE

## 2022-07-21 PROCEDURE — G0378 HOSPITAL OBSERVATION PER HR: HCPCS

## 2022-07-21 PROCEDURE — 250N000013 HC RX MED GY IP 250 OP 250 PS 637: Performed by: STUDENT IN AN ORGANIZED HEALTH CARE EDUCATION/TRAINING PROGRAM

## 2022-07-21 PROCEDURE — 250N000009 HC RX 250: Performed by: EMERGENCY MEDICINE

## 2022-07-21 PROCEDURE — 94640 AIRWAY INHALATION TREATMENT: CPT

## 2022-07-21 RX ORDER — LANOLIN ALCOHOL/MO/W.PET/CERES
3 CREAM (GRAM) TOPICAL
COMMUNITY
End: 2023-01-01

## 2022-07-21 RX ORDER — ASPIRIN 81 MG/1
81 TABLET, CHEWABLE ORAL DAILY
Status: DISCONTINUED | OUTPATIENT
Start: 2022-07-21 | End: 2022-07-22

## 2022-07-21 RX ORDER — AMOXICILLIN 250 MG
1 CAPSULE ORAL 2 TIMES DAILY PRN
Status: DISCONTINUED | OUTPATIENT
Start: 2022-07-21 | End: 2022-07-23 | Stop reason: HOSPADM

## 2022-07-21 RX ORDER — ALBUTEROL SULFATE 90 UG/1
2 AEROSOL, METERED RESPIRATORY (INHALATION) EVERY 4 HOURS PRN
Status: DISCONTINUED | OUTPATIENT
Start: 2022-07-21 | End: 2022-07-23 | Stop reason: HOSPADM

## 2022-07-21 RX ORDER — CARVEDILOL 6.25 MG/1
6.25 TABLET ORAL 2 TIMES DAILY WITH MEALS
Status: DISCONTINUED | OUTPATIENT
Start: 2022-07-21 | End: 2022-07-23 | Stop reason: HOSPADM

## 2022-07-21 RX ORDER — ASPIRIN 81 MG/1
81 TABLET, CHEWABLE ORAL DAILY
COMMUNITY
End: 2023-01-01

## 2022-07-21 RX ORDER — OLANZAPINE 5 MG/1
5 TABLET, ORALLY DISINTEGRATING ORAL EVERY 6 HOURS PRN
Status: DISCONTINUED | OUTPATIENT
Start: 2022-07-21 | End: 2022-07-23 | Stop reason: HOSPADM

## 2022-07-21 RX ORDER — HYDROXYZINE HYDROCHLORIDE 10 MG/1
10 TABLET, FILM COATED ORAL EVERY 6 HOURS PRN
Status: DISCONTINUED | OUTPATIENT
Start: 2022-07-21 | End: 2022-07-22

## 2022-07-21 RX ORDER — LIDOCAINE 40 MG/G
CREAM TOPICAL
Status: DISCONTINUED | OUTPATIENT
Start: 2022-07-21 | End: 2022-07-23 | Stop reason: HOSPADM

## 2022-07-21 RX ORDER — ALBUTEROL SULFATE 0.83 MG/ML
2.5 SOLUTION RESPIRATORY (INHALATION) EVERY 4 HOURS PRN
COMMUNITY
End: 2023-01-01

## 2022-07-21 RX ORDER — TRAZODONE HYDROCHLORIDE 50 MG/1
50 TABLET, FILM COATED ORAL AT BEDTIME
Status: DISCONTINUED | OUTPATIENT
Start: 2022-07-21 | End: 2022-07-23 | Stop reason: HOSPADM

## 2022-07-21 RX ORDER — AMLODIPINE BESYLATE 2.5 MG/1
2.5 TABLET ORAL DAILY
Status: DISCONTINUED | OUTPATIENT
Start: 2022-07-21 | End: 2022-07-21

## 2022-07-21 RX ORDER — ACETAMINOPHEN 500 MG
1000 TABLET ORAL 3 TIMES DAILY
COMMUNITY
End: 2022-01-01

## 2022-07-21 RX ORDER — ATORVASTATIN CALCIUM 40 MG/1
40 TABLET, FILM COATED ORAL EVERY EVENING
Status: DISCONTINUED | OUTPATIENT
Start: 2022-07-21 | End: 2022-07-23 | Stop reason: HOSPADM

## 2022-07-21 RX ORDER — TRAMADOL HYDROCHLORIDE 50 MG/1
25 TABLET ORAL DAILY
COMMUNITY
End: 2022-01-01

## 2022-07-21 RX ORDER — ASPIRIN 81 MG/1
81 TABLET ORAL DAILY
COMMUNITY
End: 2022-07-21

## 2022-07-21 RX ORDER — ONDANSETRON 4 MG/1
4 TABLET, ORALLY DISINTEGRATING ORAL EVERY 6 HOURS PRN
Status: DISCONTINUED | OUTPATIENT
Start: 2022-07-21 | End: 2022-07-23 | Stop reason: HOSPADM

## 2022-07-21 RX ORDER — DORZOLAMIDE HYDROCHLORIDE AND TIMOLOL MALEATE 20; 5 MG/ML; MG/ML
1 SOLUTION/ DROPS OPHTHALMIC 2 TIMES DAILY
Status: DISCONTINUED | OUTPATIENT
Start: 2022-07-21 | End: 2022-07-23 | Stop reason: HOSPADM

## 2022-07-21 RX ORDER — CEFTRIAXONE 1 G/1
1 INJECTION, POWDER, FOR SOLUTION INTRAMUSCULAR; INTRAVENOUS EVERY 24 HOURS
Status: DISCONTINUED | OUTPATIENT
Start: 2022-07-21 | End: 2022-07-23 | Stop reason: HOSPADM

## 2022-07-21 RX ORDER — BENZONATATE 100 MG/1
100 CAPSULE ORAL 2 TIMES DAILY PRN
Status: ON HOLD | COMMUNITY
End: 2022-01-01

## 2022-07-21 RX ORDER — ESCITALOPRAM OXALATE 10 MG/1
10 TABLET ORAL DAILY
Status: DISCONTINUED | OUTPATIENT
Start: 2022-07-21 | End: 2022-07-23 | Stop reason: HOSPADM

## 2022-07-21 RX ORDER — POLYETHYLENE GLYCOL 3350 17 G/17G
17 POWDER, FOR SOLUTION ORAL DAILY
Status: DISCONTINUED | OUTPATIENT
Start: 2022-07-21 | End: 2022-07-23 | Stop reason: HOSPADM

## 2022-07-21 RX ORDER — LATANOPROST 50 UG/ML
1 SOLUTION/ DROPS OPHTHALMIC AT BEDTIME
Status: DISCONTINUED | OUTPATIENT
Start: 2022-07-21 | End: 2022-07-23 | Stop reason: HOSPADM

## 2022-07-21 RX ADMIN — IPRATROPIUM BROMIDE AND ALBUTEROL SULFATE 3 ML: .5; 3 SOLUTION RESPIRATORY (INHALATION) at 00:06

## 2022-07-21 RX ADMIN — DORZOLAMIDE HYDROCHLORIDE AND TIMOLOL MALEATE 1 DROP: 20; 5 SOLUTION/ DROPS OPHTHALMIC at 19:35

## 2022-07-21 RX ADMIN — OLANZAPINE 5 MG: 5 TABLET, ORALLY DISINTEGRATING ORAL at 11:03

## 2022-07-21 RX ADMIN — LATANOPROST 1 DROP: 50 SOLUTION/ DROPS OPHTHALMIC at 21:15

## 2022-07-21 RX ADMIN — TRAZODONE HYDROCHLORIDE 50 MG: 50 TABLET ORAL at 21:15

## 2022-07-21 RX ADMIN — CEFTRIAXONE 1 G: 1 INJECTION, POWDER, FOR SOLUTION INTRAMUSCULAR; INTRAVENOUS at 21:15

## 2022-07-21 RX ADMIN — ATORVASTATIN CALCIUM 40 MG: 40 TABLET, FILM COATED ORAL at 19:35

## 2022-07-21 RX ADMIN — TRAMADOL HYDROCHLORIDE 25 MG: 50 TABLET ORAL at 21:14

## 2022-07-21 NOTE — PLAN OF CARE
Goal Outcome Evaluation:    Plan of Care Reviewed With: patient, daughter     Overall Patient Progress: no change    PRIMARY DIAGNOSIS: GENERALIZED WEAKNESS    OUTPATIENT/OBSERVATION GOALS TO BE MET BEFORE DISCHARGE  1. Orthostatic performed: N/A    2. Tolerating PO medications:  Only took ODT zyprexa without issue, no home meds yet due not being verified and discussing with daughter to hold as patient hasn't slept    3. Return to near baseline physical activity:  YES    4. Cleared for discharge by consultants (if involved): N/A    Discharge Planner Nurse   Safe discharge environment identified: Yes  Barriers to discharge: Yes       Entered by: Dayana Salomon RN 07/21/2022 3:25 PM     Please review provider order for any additional goals.   Nurse to notify provider when observation goals have been met and patient is ready for discharge.    Patient's hallucinations worsened this AM as patient has not slept in 24 hours and was becoming more agitated, not redirectable, even daughter had trouble  ODT zyprexa given with good results, patient has been sleeping  Assist x1 pivot off bed to commode, used tin stedy to get back to bed with better results  Had BM and voided today  Declined eating breakfast  Will transfer to OBS unit for continued cares  Continuing IV rocephin tonight, PIV saline locked at other times

## 2022-07-21 NOTE — ED NOTES
Pt straight cathed for urine with another RN as witness. Pt tolerated procedure well. Sample collected. Pt's brief changed, pericare completed.

## 2022-07-21 NOTE — PLAN OF CARE
Pt is pleasantly confused. Still hallucinating, but redirectable. Denies pain. Incontinent of urine. Pt has not gotten up out of bed. IV SL. VSS. Sitter at bedside. Will continue to monitor.     /69   Pulse 73   Temp 97.6  F (36.4  C) (Oral)   Resp 20   SpO2 94%
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
## 2022-07-21 NOTE — UTILIZATION REVIEW
"  [facility] Utilization Review  Admission Status; Secondary Review Determination     Admission Date: 7/20/2022  7:05 PM      Under the authority of the Utilization Management Committee, the utilization review process indicated a secondary review on the above patient.  The review outcome is based on review of the medical records, discussions with staff, and applying clinical experience noted on the date of the review.        (X) Observation Status Appropriate - This patient does not meet hospital inpatient criteria and is placed in observation status. If this patient's primary payer is Medicare and was admitted as an inpatient, Condition Code 44 should be used and patient status changed to \"observation\".   () Observation Status concurrent Review           RATIONALE FOR DETERMINATION   89-year-old female with history of dementia, CVA, hypertension, hyperlipidemia, UTI, associated hallucinations admitted with intermittent hallucinations with possible UTI.  Patient is getting more agitated and hallucinations are more frequent, recent UTI treated with Omnicef through 7/18.  Patient does have bronchitis with intermittent wheezing and is already on antibiotics.  Limited work-up in the emergency room, patient's daughter does not want head CT.  Recent urine culture grew mixed ese, repeat cultures pending.  Currently on IV ceftriaxone.  Patient does not have any fevers or leukocytosis, received 1 dose of oral Zyprexa, does not meet criteria for inpatient stay, recommend continue observation status.      The severity of illness, intensity of service provided, expected LOS make the care appropriate for observation status at this time.        The information on this document is developed by the utilization review team in order for the business office to ensure compliance.  This only denotes the appropriateness of proper admission status and does not reflect the quality of care rendered.         The definitions of " Inpatient Status and Observation Status used in making the determination above are those provided in the CMS Coverage Manual, Chapter 1 and Chapter 6, section 70.4.      Sincerely,       Arthur Austin MD  Physician Advisor  Utilization Review-[place]    Phone: [phone]

## 2022-07-21 NOTE — PHARMACY-ADMISSION MEDICATION HISTORY
Admission medication history interview status for this patient is complete. See [facility] admission navigator for allergy information, prior to admission medications and immunization status.     Medication history interview done, indicate source(s): -    Medication history resources (including written lists, pill bottles, clinic record): med list from [facility]    Changes made to PTA medication list:    Added: albuterol neb, melatonin, benzonatate, Calmoseptine ointment  Changed: apap to 1,000 mg tid, asa 325 mg bid to 81 mg chewable daily, Miralax daily to every other day, tramadol q8h prn to daily scheduled at 1900.   Removed: amlodipine, cefdinir, hydroxyzine, ondansetron, prednisone, senna-docusate    Actions taken by pharmacist (provider contacted, etc): paged provider to review changes to med list    Additional medication history information:None    Medication reconciliation/reorder completed by provider prior to medication history?  Y  (Y/N)     Prior to Admission medications    Medication Sig Last Dose Taking? Auth Provider Long Term End Date   acetaminophen (TYLENOL) 500 MG tablet Take 1,000 mg by mouth 3 times daily 7/20/2022 Yes Unknown, Entered By History No    albuterol (PROVENTIL) (2.5 MG/3ML) 0.083% neb solution Take 2.5 mg by nebulization every 4 hours as needed for shortness of breath / dyspnea or wheezing  Yes Unknown, Entered By History     aspirin (ASA) 81 MG chewable tablet Take 81 mg by mouth daily 7/20/2022 Yes Unknown, Entered By History     atorvastatin (LIPITOR) 40 MG tablet Take 40 mg by mouth every evening 7/19/2022 Yes Reported, Patient Yes    benzonatate (TESSALON) 100 MG capsule Take 100 mg by mouth 2 times daily as needed for cough no recent use Yes Unknown, Entered By History     carvedilol (COREG) 6.25 MG tablet Take 6.25 mg by mouth 2 times daily (with meals) 7/20/2022 at am Yes Reported, Patient Yes    Cholecalciferol (VITAMIN D3) 50 MCG (2000 UT) TABS Take 2,000  Units by mouth daily 7/20/2022 Yes Unknown, Entered By History     dorzolamide-timolol (COSOPT) 2-0.5 % ophthalmic solution Place 1 drop into both eyes 2 times daily 7/20/2022 Yes Prakash Gant MD     escitalopram (LEXAPRO) 10 MG tablet Take 10 mg by mouth daily 7/20/2022 Yes Reported, Patient No    latanoprost (XALATAN) 0.005 % ophthalmic solution Place 1 drop into both eyes At Bedtime Both Eyes 7/20/2022 Yes Prakash Gant MD Yes    melatonin 3 MG tablet Take 1 mg by mouth nightly as needed for sleep 5/16/2022 Yes Unknown, Entered By History     menthol-zinc oxide (CALMOSEPTINE) 0.44-20.6 % OINT ointment Apply topically as needed  Yes Unknown, Entered By History     polyethylene glycol (MIRALAX) 17 g packet Take 1 packet by mouth every other day 7/19/2022 Yes Reported, Patient     traMADol (ULTRAM) 50 MG tablet Take 25 mg by mouth daily At 1900 7/19/2022 Yes Unknown, Entered By History

## 2022-07-21 NOTE — ED NOTES
[facility]  ED Nurse Handoff Report    Alejandrina Whatley is a 89 year old female   ED Chief complaint: Hallucinations  . ED Diagnosis:   Final diagnoses:   None     Allergies:   Allergies   Allergen Reactions     Actonel [Bisphosphonates] Rash     Conjugated Estrogens Rash     Macrobid [Nitrofuran Derivatives] Rash     Nitrofurantoin Rash     Premarin Rash     Sulfa Drugs Rash     Hydrocodone Nausea and Vomiting     Oxycodone Nausea and Vomiting     Risedronate      leg pain       Code Status: DNR / DNI  Activity level - Baseline/Home:  Assist X 2. Activity Level - Current:   Assist X 2. Lift room needed: No. Bariatric: No   Needed: No   Isolation: No. Infection: Not Applicable.     Vital Signs:   Vitals:    07/20/22 1911 07/20/22 2000 07/20/22 2245   BP: (!) 112/95     Pulse: 86     Resp: 20 20 18   Temp: 97.7  F (36.5  C)     TempSrc: Oral     SpO2: 94% 92%        Cardiac Rhythm:  ,      Pain level:    Patient confused: Yes. Patient Falls Risk: Yes.   Elimination Status: Has voided   Patient Report - Initial Complaint: Hallucinations. Focused Assessment:    Pt arrives to ED via EMS after being found to have hallucinations at her SNF. Pt was dx UTI earlier, urine came back clean and abx were stopped. Pt has hx hallucinating with UTIs. Pt has dementia, A&Ox2, ABCs intact. PT has L broken hip and baseline L sided weakness from a CVA.    Tests Performed:   Labs Ordered and Resulted from Time of ED Arrival to Time of ED Departure   BASIC METABOLIC PANEL - Abnormal       Result Value    Sodium 140      Potassium 4.2      Chloride 114 (*)     Carbon Dioxide (CO2) 22      Anion Gap 4      Urea Nitrogen 32 (*)     Creatinine 1.11 (*)     Calcium 8.8      Glucose 138 (*)     GFR Estimate 45 (*)    CBC WITH PLATELETS - Abnormal    WBC Count 8.0      RBC Count 3.42 (*)     Hemoglobin 11.3 (*)     Hematocrit 36.4       (*)     MCH 33.0      MCHC 31.0 (*)     RDW 15.7 (*)     Platelet Count 234      ROUTINE UA WITH MICROSCOPIC REFLEX TO CULTURE - Abnormal    Color Urine Light Yellow      Appearance Urine Clear      Glucose Urine Negative      Bilirubin Urine Negative      Ketones Urine Negative      Specific Gravity Urine 1.027      Blood Urine Trace (*)     pH Urine 5.5      Protein Albumin Urine 20  (*)     Urobilinogen Urine Normal      Nitrite Urine Negative      Leukocyte Esterase Urine Trace (*)     Mucus Urine Present (*)     RBC Urine 14 (*)     WBC Urine 9 (*)     Squamous Epithelials Urine 1     HEPATIC FUNCTION PANEL - Abnormal    Bilirubin Total 0.2      Bilirubin Direct <0.1      Protein Total 7.5      Albumin 3.2 (*)     Alkaline Phosphatase 78      AST 15      ALT 14     INFLUENZA A/B & SARS-COV2 PCR MULTIPLEX - Normal    Influenza A PCR Negative      Influenza B PCR Negative      RSV PCR Negative      SARS CoV2 PCR Negative     AMMONIA - Normal    Ammonia 29       XR Chest 2 Views   Final Result   IMPRESSION: No infiltrate, pleural effusion or pneumothorax. The cardiac and mediastinal silhouettes are normal.       Vertebroplasty changes in the midthoracic spine. Osteopenic bones. Few other wedge compression fractures in the visualized thoracic spine are similar to the radiograph on 05/02/2018. Aortobiiliac endograft. Severe degenerative changes shoulders.      CT Head w/o Contrast    (Results Pending)     . Abnormal Results: +UA.   Treatments provided: Abx  Family Comments: Dtr at bedside  OBS brochure/video discussed/provided to patient:  Yes  ED Medications:   Medications   fosfomycin (MONUROL) Packet 3 g (3 g Oral Given 7/20/22 2211)   cefTRIAXone (ROCEPHIN) 1 g vial to attach to  mL bag for ADULTS or NS 50 mL bag for PEDS (0 g Intravenous Stopped 7/20/22 2210)     Drips infusing:  No  For the majority of the shift, the patient's behavior Green. Interventions performed were none.    Sepsis treatment initiated: No     Patient tested for COVID 19 prior to admission: YES    ED Nurse  Name/Phone Number: Janice Alfaro, CHRISSY,   11:36 PM    RECEIVING UNIT ED HANDOFF REVIEW    Above ED Nurse Handoff Report was reviewed: Yes  Reviewed by: Lakesha Lanier RN on July 21, 2022 at 4:09 PM

## 2022-07-21 NOTE — ED TRIAGE NOTES
Pt arrives to ED via EMS after being found to have hallucinations at her SNF. Pt was dx UTI earlier, urine came back clean and abx were stopped. Pt has hx hallucinating with UTIs. Pt has dementia, A&Ox2, ABCs intact. PT has L broken hip and baseline L sided weakness from a CVA.

## 2022-07-21 NOTE — ED PROVIDER NOTES
History   Chief Complaint:  Hallucinations       HPI   Alejandrina Whatley is a 89 year old female with history of dementia, CVA, and HTN who presents with hallucinations. The patients daughter states that last night the patient began seeing ghosts in her room and was calling the aides in to look for a ghost. Then today during the day she was seeing cats and bugs under the bed. The patient is not currently seeing the hallucinations. The patient a week and a half ago she began having dysuria and was eventually put on bactrim last Friday. However the urine sample came back negative and she was taken off of it on Monday and she has been getting worse. The daughter states that the patient often gets hallucinations when she has a UTI and also had them before she had a stroke. She denies chest pain, shortness of breath, fever, headache, or a cough.    Review of Systems   Constitutional: Negative for fever.   Respiratory: Negative for cough and shortness of breath.    Cardiovascular: Negative for chest pain.   Genitourinary: Positive for dysuria.   Neurological: Negative for headaches.   Psychiatric/Behavioral: Positive for hallucinations.   All other systems reviewed and are negative.    Allergies:  Actonel   Conjugated Estrogens  Macrobid   Nitrofurantoin  Premarin  Sulfa Drugs  Hydrocodone  Oxycodone  Risedronate     Medications:  Tylenol  Amlodipine  Aspirin  Lipitor  Carvedilol  Lexapro  Melatonin  Miralax   Senna-docusate   Tramadol      Past Medical History:     Abdominal aortic aneurysm   Actinic keratosis   Cataract   CVA   Dementia  Degenerative joint disease   Glaucoma   Hypertension  Hyperlipidemia   Lumbar spinal stenosis  Osteoporosis   Chronic kidney disease   Hemiparesis of left side   Peripheral neuropathy  Hypercholesteremia     Past Surgical History:    AAA repair  Cataract iol   Extracapsular cataract extraction with intraocular lens implant  Hysterectomy  Laser selective trabeculoplasty  Appendectomy    Anterior vesicourethropexy       Family History:    Heart disease - Father   Hypertension - Mother     Social History:  The patient presents to the ED with her daughter.    Physical Exam     Patient Vitals for the past 24 hrs:   BP Temp Temp src Pulse Resp SpO2   07/20/22 2245 -- -- -- -- 18 --   07/20/22 2000 -- -- -- -- 20 92 %   07/20/22 1911 (!) 112/95 97.7  F (36.5  C) Oral 86 20 94 %       Physical Exam  Constitutional: Well developed, nontox appearance  Head: Atraumatic.   Mouth/Throat: Oropharynx is clear and moist.   Neck:  no stridor  Eyes: no scleral icterus  Cardiovascular: RRR, 2+ bilat radial pulses  Pulmonary/Chest: nml resp effort, Clear BS bilat, upper airway phonation  Abdominal: ND, soft, NT, no rebound or guarding   : no CVA tenderness bilat  Ext: Warm, well perfused, no edema  Neurological: A alert, symmetric facies, moves ext x4  Skin: Skin is warm and dry.   Psychiatric: Calm and cooperative, does not appear to be responding to internal stimuli  Nursing note and vitals reviewed.    Emergency Department Course     Imaging:  XR Chest 2 Views   Final Result   IMPRESSION: No infiltrate, pleural effusion or pneumothorax. The cardiac and mediastinal silhouettes are normal.       Vertebroplasty changes in the midthoracic spine. Osteopenic bones. Few other wedge compression fractures in the visualized thoracic spine are similar to the radiograph on 05/02/2018. Aortobiiliac endograft. Severe degenerative changes shoulders.      CT Head w/o Contrast    (Results Pending)     Report per radiology    Laboratory:  Labs Ordered and Resulted from Time of ED Arrival to Time of ED Departure   BASIC METABOLIC PANEL - Abnormal       Result Value    Sodium 140      Potassium 4.2      Chloride 114 (*)     Carbon Dioxide (CO2) 22      Anion Gap 4      Urea Nitrogen 32 (*)     Creatinine 1.11 (*)     Calcium 8.8      Glucose 138 (*)     GFR Estimate 45 (*)    CBC WITH PLATELETS - Abnormal    WBC Count 8.0       RBC Count 3.42 (*)     Hemoglobin 11.3 (*)     Hematocrit 36.4       (*)     MCH 33.0      MCHC 31.0 (*)     RDW 15.7 (*)     Platelet Count 234     ROUTINE UA WITH MICROSCOPIC REFLEX TO CULTURE - Abnormal    Color Urine Light Yellow      Appearance Urine Clear      Glucose Urine Negative      Bilirubin Urine Negative      Ketones Urine Negative      Specific Gravity Urine 1.027      Blood Urine Trace (*)     pH Urine 5.5      Protein Albumin Urine 20  (*)     Urobilinogen Urine Normal      Nitrite Urine Negative      Leukocyte Esterase Urine Trace (*)     Mucus Urine Present (*)     RBC Urine 14 (*)     WBC Urine 9 (*)     Squamous Epithelials Urine 1     HEPATIC FUNCTION PANEL - Abnormal    Bilirubin Total 0.2      Bilirubin Direct <0.1      Protein Total 7.5      Albumin 3.2 (*)     Alkaline Phosphatase 78      AST 15      ALT 14     INFLUENZA A/B & SARS-COV2 PCR MULTIPLEX - Normal    Influenza A PCR Negative      Influenza B PCR Negative      RSV PCR Negative      SARS CoV2 PCR Negative     AMMONIA - Normal    Ammonia 29     URINE CULTURE      Emergency Department Course:         Reviewed:  I reviewed nursing notes, vitals, past medical history and Care Everywhere    Assessments:  1935 I obtained history and examined the patient as noted above.   2324 I rechecked the patient and explained findings.     Consults:  2336 I talked to Dr Hernandes about patients symptoms and admittance.    Interventions:   Rocephin 1g IV  2211 Monurol 3g PO    Disposition:  The patient was admitted to the hospital under the care of Dr. Hernandes.     Impression & Plan     Medical Decision Makin year old female presenting w/ visual hallucinations     DDx includes hallucinations NOS, delirium, electrolyte abnormality, infection such as UTI or pneumonia, intracranial abnormality.  Doubt meningitis, encephalitis given history and physical exam the patient's daughter reports she has had hallucinations multiple times in the  past with previous infections.  Labs significant for somewhat equivocal UA concerning for possible infection.    Given for somewhat unimpressive UA, CT head ordered for further evaluation which the patient's daughter declined.  I explained that given the patient's urinalysis is not obviously infected despite there being signs of possible infection, will be reasonable to evaluate for structural abnormality given the patient's hallucinations.  Patient's daughter again declined stating that she would prefer patient to be treated for her urinary tract infection at which point she may be monitored for resolution of her hallucinations.  Patient subsequently admitted to the hospitalist service under observation status.    Patient and daughter counseled on all results, disposition and diagnosis.  They are understanding and agreeable to plan. Patient admitted in stable condition.      Diagnosis:    ICD-10-CM    1. Hallucinations  R44.3    2. Abnormal urinalysis  R82.90        Discharge Medications:  New Prescriptions    No medications on file       Scribe Disclosure:  Jalen UPTON, am serving as a scribe at 7:12 PM on 7/20/2022 to document services personally performed by Xavi Rhoades MD based on my observations and the provider's statements to me.            Xavi Rhoades MD  07/21/22 0010

## 2022-07-21 NOTE — PROGRESS NOTES
Patient becoming more agitated and hallucinations are more frequent. Unable to redirect even with daughter in room. She is more receptive to the daughter but not with staff. MD paged for possible need of PRN, patient has also not slept since previous night and usually sleeps around 18 hours a day per the daughter.

## 2022-07-22 LAB
ANION GAP SERPL CALCULATED.3IONS-SCNC: 5 MMOL/L (ref 3–14)
BASOPHILS # BLD AUTO: 0 10E3/UL (ref 0–0.2)
BASOPHILS NFR BLD AUTO: 0 %
BUN SERPL-MCNC: 26 MG/DL (ref 7–30)
CALCIUM SERPL-MCNC: 9.2 MG/DL (ref 8.5–10.1)
CHLORIDE BLD-SCNC: 116 MMOL/L (ref 94–109)
CO2 SERPL-SCNC: 23 MMOL/L (ref 20–32)
CREAT SERPL-MCNC: 0.91 MG/DL (ref 0.52–1.04)
EOSINOPHIL # BLD AUTO: 0.2 10E3/UL (ref 0–0.7)
EOSINOPHIL NFR BLD AUTO: 3 %
ERYTHROCYTE [DISTWIDTH] IN BLOOD BY AUTOMATED COUNT: 15.9 % (ref 10–15)
GFR SERPL CREATININE-BSD FRML MDRD: 57 ML/MIN/1.73M2
GLUCOSE BLD-MCNC: 120 MG/DL (ref 70–99)
HCT VFR BLD AUTO: 37.7 % (ref 35–47)
HGB BLD-MCNC: 11.6 G/DL (ref 11.7–15.7)
IMM GRANULOCYTES # BLD: 0 10E3/UL
IMM GRANULOCYTES NFR BLD: 0 %
LYMPHOCYTES # BLD AUTO: 0.9 10E3/UL (ref 0.8–5.3)
LYMPHOCYTES NFR BLD AUTO: 12 %
MAGNESIUM SERPL-MCNC: 2.5 MG/DL (ref 1.6–2.3)
MCH RBC QN AUTO: 32.7 PG (ref 26.5–33)
MCHC RBC AUTO-ENTMCNC: 30.8 G/DL (ref 31.5–36.5)
MCV RBC AUTO: 106 FL (ref 78–100)
MONOCYTES # BLD AUTO: 0.7 10E3/UL (ref 0–1.3)
MONOCYTES NFR BLD AUTO: 9 %
NEUTROPHILS # BLD AUTO: 6.1 10E3/UL (ref 1.6–8.3)
NEUTROPHILS NFR BLD AUTO: 76 %
NRBC # BLD AUTO: 0 10E3/UL
NRBC BLD AUTO-RTO: 0 /100
PLATELET # BLD AUTO: 205 10E3/UL (ref 150–450)
POTASSIUM BLD-SCNC: 4.3 MMOL/L (ref 3.4–5.3)
RBC # BLD AUTO: 3.55 10E6/UL (ref 3.8–5.2)
SODIUM SERPL-SCNC: 144 MMOL/L (ref 133–144)
WBC # BLD AUTO: 8.1 10E3/UL (ref 4–11)

## 2022-07-22 PROCEDURE — 250N000013 HC RX MED GY IP 250 OP 250 PS 637: Performed by: PHYSICIAN ASSISTANT

## 2022-07-22 PROCEDURE — 250N000011 HC RX IP 250 OP 636: Performed by: INTERNAL MEDICINE

## 2022-07-22 PROCEDURE — 250N000013 HC RX MED GY IP 250 OP 250 PS 637: Performed by: INTERNAL MEDICINE

## 2022-07-22 PROCEDURE — 83735 ASSAY OF MAGNESIUM: CPT | Performed by: STUDENT IN AN ORGANIZED HEALTH CARE EDUCATION/TRAINING PROGRAM

## 2022-07-22 PROCEDURE — G0378 HOSPITAL OBSERVATION PER HR: HCPCS | Mod: CS

## 2022-07-22 PROCEDURE — 36415 COLL VENOUS BLD VENIPUNCTURE: CPT | Performed by: STUDENT IN AN ORGANIZED HEALTH CARE EDUCATION/TRAINING PROGRAM

## 2022-07-22 PROCEDURE — 99226 PR SUBSEQUENT OBSERVATION CARE,LEVEL III: CPT | Performed by: PHYSICIAN ASSISTANT

## 2022-07-22 PROCEDURE — 250N000013 HC RX MED GY IP 250 OP 250 PS 637: Performed by: STUDENT IN AN ORGANIZED HEALTH CARE EDUCATION/TRAINING PROGRAM

## 2022-07-22 PROCEDURE — 85025 COMPLETE CBC W/AUTO DIFF WBC: CPT | Performed by: PHYSICIAN ASSISTANT

## 2022-07-22 PROCEDURE — 96376 TX/PRO/DX INJ SAME DRUG ADON: CPT

## 2022-07-22 PROCEDURE — 36415 COLL VENOUS BLD VENIPUNCTURE: CPT | Performed by: PHYSICIAN ASSISTANT

## 2022-07-22 PROCEDURE — 80048 BASIC METABOLIC PNL TOTAL CA: CPT | Performed by: PHYSICIAN ASSISTANT

## 2022-07-22 RX ORDER — NALOXONE HYDROCHLORIDE 0.4 MG/ML
0.2 INJECTION, SOLUTION INTRAMUSCULAR; INTRAVENOUS; SUBCUTANEOUS
Status: DISCONTINUED | OUTPATIENT
Start: 2022-07-22 | End: 2022-07-23 | Stop reason: HOSPADM

## 2022-07-22 RX ORDER — NALOXONE HYDROCHLORIDE 0.4 MG/ML
0.4 INJECTION, SOLUTION INTRAMUSCULAR; INTRAVENOUS; SUBCUTANEOUS
Status: DISCONTINUED | OUTPATIENT
Start: 2022-07-22 | End: 2022-07-23 | Stop reason: HOSPADM

## 2022-07-22 RX ORDER — ASPIRIN 81 MG/1
81 TABLET, CHEWABLE ORAL DAILY
Status: DISCONTINUED | OUTPATIENT
Start: 2022-07-22 | End: 2022-07-23 | Stop reason: HOSPADM

## 2022-07-22 RX ORDER — CEFDINIR 300 MG/1
300 CAPSULE ORAL 2 TIMES DAILY
Refills: 0 | DISCHARGE
Start: 2022-07-22 | End: 2022-07-24

## 2022-07-22 RX ORDER — ACETAMINOPHEN 500 MG
1000 TABLET ORAL 3 TIMES DAILY
Status: DISCONTINUED | OUTPATIENT
Start: 2022-07-22 | End: 2022-07-23 | Stop reason: HOSPADM

## 2022-07-22 RX ADMIN — DORZOLAMIDE HYDROCHLORIDE AND TIMOLOL MALEATE 1 DROP: 20; 5 SOLUTION/ DROPS OPHTHALMIC at 19:57

## 2022-07-22 RX ADMIN — CARVEDILOL 6.25 MG: 6.25 TABLET, FILM COATED ORAL at 08:58

## 2022-07-22 RX ADMIN — ACETAMINOPHEN 1000 MG: 500 TABLET, FILM COATED ORAL at 08:58

## 2022-07-22 RX ADMIN — CARVEDILOL 6.25 MG: 6.25 TABLET, FILM COATED ORAL at 18:57

## 2022-07-22 RX ADMIN — ACETAMINOPHEN 1000 MG: 500 TABLET, FILM COATED ORAL at 19:55

## 2022-07-22 RX ADMIN — ATORVASTATIN CALCIUM 40 MG: 40 TABLET, FILM COATED ORAL at 19:54

## 2022-07-22 RX ADMIN — CEFTRIAXONE 1 G: 1 INJECTION, POWDER, FOR SOLUTION INTRAMUSCULAR; INTRAVENOUS at 19:46

## 2022-07-22 RX ADMIN — ASPIRIN 81 MG CHEWABLE TABLET 81 MG: 81 TABLET CHEWABLE at 09:10

## 2022-07-22 RX ADMIN — ESCITALOPRAM OXALATE 10 MG: 10 TABLET ORAL at 08:58

## 2022-07-22 RX ADMIN — ACETAMINOPHEN 1000 MG: 500 TABLET, FILM COATED ORAL at 14:15

## 2022-07-22 RX ADMIN — POLYETHYLENE GLYCOL 3350 17 G: 17 POWDER, FOR SOLUTION ORAL at 09:00

## 2022-07-22 RX ADMIN — LATANOPROST 1 DROP: 50 SOLUTION/ DROPS OPHTHALMIC at 21:43

## 2022-07-22 RX ADMIN — TRAZODONE HYDROCHLORIDE 75 MG: 50 TABLET ORAL at 21:40

## 2022-07-22 RX ADMIN — DORZOLAMIDE HYDROCHLORIDE AND TIMOLOL MALEATE 1 DROP: 20; 5 SOLUTION/ DROPS OPHTHALMIC at 09:01

## 2022-07-22 NOTE — PLAN OF CARE
PRIMARY DIAGNOSIS: Hallucinations  OUTPATIENT/OBSERVATION GOALS TO BE MET BEFORE DISCHARGE:  ADLs back to baseline: No    Activity and level of assistance: Up with maximum assistance. Consider SW and/or PT evaluation.     Pain status: Pain free.    Return to near baseline physical activity: No     Discharge Planner Nurse   Safe discharge environment identified: Yes  Barriers to discharge: Yes       Entered by: Marybeth Solorio RN 07/22/2022 6:03 AM  Pt AO x1, orientated to self only. VSS on RA, LS clear, BS active. Tolerating regular diet. PIV SL. Up with A2. No hallucinations noted throughout the shift. Will continue to monitor.   BP (!) 160/83 (BP Location: Right arm)   Pulse 92   Temp 97.9  F (36.6  C) (Oral)   Resp 16   SpO2 91%   Please review provider order for any additional goals.   Nurse to notify provider when observation goals have been met and patient is ready for discharge.

## 2022-07-22 NOTE — PLAN OF CARE
PRIMARY DIAGNOSIS: Hallucinations   OUTPATIENT/OBSERVATION GOALS TO BE MET BEFORE DISCHARGE:  1. ADLs back to baseline: yes- EZ stand at baseline    2. Activity and level of assistance: Up with maximum assistance    3. Pain status: Pain free.    4. Return to near baseline physical activity: Yes     Discharge Planner Nurse   Safe discharge environment identified: Yes  Barriers to discharge:Yes- daughter states        Entered by: Dang Retana RN 07/22/2022 2:53 PM     Please review provider order for any additional goals.   Nurse to notify provider when observation goals have been met and patient is ready for discharge.    Disoriented to time and place, EZ stand at facility, tolerating diet, heart sounds- WNL, lungs- clear, bowels- active, incontinent urine, linen change, daughter at the bedside

## 2022-07-22 NOTE — PROGRESS NOTES
[facility]    Medicine Progress Note - Hospitalist Service    Date of Admission:  7/20/2022    Assessment & Plan        Alejandrina Whatley is a 89 year old female with past medical history significant for dementia, CVA, hypertension hyperlipidemia, history of UTIs and associated hallucinations presented with intermittent hallucinations.  Patient admitted for observation for possible UTI related hallucinations.    Acute hallucinations, resolved  Possible UTI (partially treated was treated with 3 days of Omnicef until 7/18)  Possible metabolic/infectious encephalopathy associated with UTI  -suspect underlying dementia contributing in setting of possible partially treated UTI as well insomnia  -UA equivocal, urine culture currently pending   -empirically started on antibiotics given patient's prior history of association of hallucination with acute infection/UTI, continue IV Rocephin (day 3), will discharge on two additional days of PO Omnicef   -Limited work-up in the emergency room.  Patient's daughter declined CT head per ED report     Intermittent wheezing  -not present on exam today, already on antibiotics as noted above  -PRN albuterol     Chronic medical problems  Hypertension: BP softer this afternoon, no longer on Amlodipine, continue PTA Carvedilol with parameters   Hyperlipidemia: Continue on Lipitor  History of constipation: PRN senna   Depression: Continue Lexapro  Chronic back pain: continue PTA Tylenol and Tramadol (per daughter patient takes this every evening)   History of CVA: Continue ASA    Due to timing of ride for discharge the patient will stay this evening with plans to discharge back to her LTC facility the morning of 7/22/2022.      Diet: Regular Diet Adult  Diet    DVT Prophylaxis: Low Risk/Ambulatory with no VTE prophylaxis indicated  Clark Catheter: Not present  Central Lines: None  Cardiac Monitoring: None  Code Status: No CPR- Do NOT Intubate      Disposition Plan       Expected Discharge Date: 07/22/2022,  6:00 PM    Destination: long-term care facility          The patient's care was discussed with the Bedside Nurse, Care Coordinator/, Patient and Patient's Family.    Bernice Tidwell PA-C  Hospitalist Service  [facility]    Clinically Significant Risk Factors Present on Admission                        ______________________________________________________________________    Interval History   Woke patient upon entering room. Denies hallucinations. No other focal complaints. Shortly after waking patient her daughter arrived to provide additional history.     Patient's daughter does express concern for patient's lower blood pressure this afternoon.     Data reviewed today: I reviewed all medications, new labs and imaging results over the last 24 hours.    Physical Exam   Vital Signs: Temp: 97.2  F (36.2  C) Temp src: Oral BP: 104/52 Pulse: 75   Resp: 16 SpO2: 99 % O2 Device: None (Room air)    Weight: 121 lbs 11.2 oz  GEN:  Alert, to self, no overt distress  HEENT:  Normocephalic/atraumatic, no scleral icterus, no nasal discharge, mouth dry  CV:  Regular rate and rhythm, soft systolic murmur.  S1 + S2 noted, no S3 or S4.  LUNGS: Symmetric chest rise on inhalation noted, CTAB  ABD:  Active bowel sounds, soft, non-tender/non-distended. No rebound/guarding/rigidity.  EXT:  No edema.  No cyanosis.  No joint synovitis noted.  SKIN:  Dry to touch, no exanthems noted in the visualized areas.  NEURO:  Symmetric muscle strength, no focal deficits appreciated  PSYCH: no hallucinations    Data   Results for orders placed or performed during the hospital encounter of 07/20/22   XR Chest 2 Views     Status: None    Narrative    EXAM: XR CHEST 2 VIEWS  LOCATION: [facility]  DATE/TIME: 7/20/2022 8:03 PM    INDICATION: difficulty breathing, hallucinations  COMPARISON: 07/03/2022, 5/2/18      Impression    IMPRESSION: No infiltrate,  pleural effusion or pneumothorax. The cardiac and mediastinal silhouettes are normal.     Vertebroplasty changes in the midthoracic spine. Osteopenic bones. Few other wedge compression fractures in the visualized thoracic spine are similar to the radiograph on 05/02/2018. Aortobiiliac endograft. Severe degenerative changes shoulders.   Extra Tube ([place] Draw) *Canceled*     Status: None ()    Narrative    The following orders were created for panel order Extra Tube ([place] Draw).  Procedure                               Abnormality         Status                     ---------                               -----------         ------                       Please view results for these tests on the individual orders.   Basic metabolic panel (BMP)     Status: Abnormal   Result Value Ref Range    Sodium 140 133 - 144 mmol/L    Potassium 4.2 3.4 - 5.3 mmol/L    Chloride 114 (H) 94 - 109 mmol/L    Carbon Dioxide (CO2) 22 20 - 32 mmol/L    Anion Gap 4 3 - 14 mmol/L    Urea Nitrogen 32 (H) 7 - 30 mg/dL    Creatinine 1.11 (H) 0.52 - 1.04 mg/dL    Calcium 8.8 8.5 - 10.1 mg/dL    Glucose 138 (H) 70 - 99 mg/dL    GFR Estimate 45 (L) >60 mL/min/1.73m2   CBC (platelets, no diff)     Status: Abnormal   Result Value Ref Range    WBC Count 8.0 4.0 - 11.0 10e3/uL    RBC Count 3.42 (L) 3.80 - 5.20 10e6/uL    Hemoglobin 11.3 (L) 11.7 - 15.7 g/dL    Hematocrit 36.4 35.0 - 47.0 %     (H) 78 - 100 fL    MCH 33.0 26.5 - 33.0 pg    MCHC 31.0 (L) 31.5 - 36.5 g/dL    RDW 15.7 (H) 10.0 - 15.0 %    Platelet Count 234 150 - 450 10e3/uL   UA with Microscopic reflex to Culture     Status: Abnormal    Specimen: Urine, Catheter   Result Value Ref Range    Color Urine Light Yellow Colorless, Straw, Light Yellow, Yellow    Appearance Urine Clear Clear    Glucose Urine Negative Negative mg/dL    Bilirubin Urine Negative Negative    Ketones Urine Negative Negative mg/dL    Specific Gravity Urine 1.027 1.003 - 1.035    Blood Urine Trace (A)  Negative    pH Urine 5.5 5.0 - 7.0    Protein Albumin Urine 20  (A) Negative mg/dL    Urobilinogen Urine Normal Normal, 2.0 mg/dL    Nitrite Urine Negative Negative    Leukocyte Esterase Urine Trace (A) Negative    Mucus Urine Present (A) None Seen /LPF    RBC Urine 14 (H) <=2 /HPF    WBC Urine 9 (H) <=5 /HPF    Squamous Epithelials Urine 1 <=1 /HPF    Narrative    Urine Culture not indicated   Symptomatic; Unknown Influenza A/B & SARS-CoV2 (COVID-19) Virus PCR Multiplex Nasopharyngeal     Status: Normal    Specimen: Nasopharyngeal; Swab   Result Value Ref Range    Influenza A PCR Negative Negative    Influenza B PCR Negative Negative    RSV PCR Negative Negative    SARS CoV2 PCR Negative Negative    Narrative    Testing was performed using the Xpert Xpress CoV2/Flu/RSV Assay on the [employer]. This test should be ordered for the detection of SARS-CoV-2 and influenza viruses in individuals who meet clinical and/or epidemiological criteria. Test performance is unknown in asymptomatic patients. This test is for in vitro diagnostic use under the FDA EUA for laboratories certified under CLIA to perform high or moderate complexity testing. This test has not been FDA cleared or approved. A negative result does not rule out the presence of PCR inhibitors in the specimen or target RNA in concentration below the limit of detection for the assay. If only one viral target is positive but coinfection with multiple targets is suspected, the sample should be re-tested with another FDA cleared, approved, or authorized test, if coinfection would change clinical management. This test was validated by the [facility]. These laboratories are certified under the Clinical  Laboratory Improvement Amendments of 1988 (CLIA-88) as qualified to perform high complexity laboratory testing.   Ammonia     Status: Normal   Result Value Ref Range    Ammonia 29 10 - 50 umol/L   Hepatic panel     Status: Abnormal    Result Value Ref Range    Bilirubin Total 0.2 0.2 - 1.3 mg/dL    Bilirubin Direct <0.1 0.0 - 0.2 mg/dL    Protein Total 7.5 6.8 - 8.8 g/dL    Albumin 3.2 (L) 3.4 - 5.0 g/dL    Alkaline Phosphatase 78 40 - 150 U/L    AST 15 0 - 45 U/L    ALT 14 0 - 50 U/L   Magnesium     Status: Normal   Result Value Ref Range    Magnesium 2.2 1.6 - 2.3 mg/dL   Basic metabolic panel     Status: Abnormal   Result Value Ref Range    Sodium 144 133 - 144 mmol/L    Potassium 4.3 3.4 - 5.3 mmol/L    Chloride 116 (H) 94 - 109 mmol/L    Carbon Dioxide (CO2) 23 20 - 32 mmol/L    Anion Gap 5 3 - 14 mmol/L    Urea Nitrogen 26 7 - 30 mg/dL    Creatinine 0.91 0.52 - 1.04 mg/dL    Calcium 9.2 8.5 - 10.1 mg/dL    Glucose 120 (H) 70 - 99 mg/dL    GFR Estimate 57 (L) >60 mL/min/1.73m2   CBC with platelets and differential     Status: Abnormal   Result Value Ref Range    WBC Count 8.1 4.0 - 11.0 10e3/uL    RBC Count 3.55 (L) 3.80 - 5.20 10e6/uL    Hemoglobin 11.6 (L) 11.7 - 15.7 g/dL    Hematocrit 37.7 35.0 - 47.0 %     (H) 78 - 100 fL    MCH 32.7 26.5 - 33.0 pg    MCHC 30.8 (L) 31.5 - 36.5 g/dL    RDW 15.9 (H) 10.0 - 15.0 %    Platelet Count 205 150 - 450 10e3/uL    % Neutrophils 76 %    % Lymphocytes 12 %    % Monocytes 9 %    % Eosinophils 3 %    % Basophils 0 %    % Immature Granulocytes 0 %    NRBCs per 100 WBC 0 <1 /100    Absolute Neutrophils 6.1 1.6 - 8.3 10e3/uL    Absolute Lymphocytes 0.9 0.8 - 5.3 10e3/uL    Absolute Monocytes 0.7 0.0 - 1.3 10e3/uL    Absolute Eosinophils 0.2 0.0 - 0.7 10e3/uL    Absolute Basophils 0.0 0.0 - 0.2 10e3/uL    Absolute Immature Granulocytes 0.0 <=0.4 10e3/uL    Absolute NRBCs 0.0 10e3/uL   Urine Culture     Status: None (Preliminary result)    Specimen: Urine, Catheter   Result Value Ref Range    Culture No growth after 1 day    CBC with Platelets & Differential     Status: Abnormal    Narrative    The following orders were created for panel order CBC with Platelets & Differential.  Procedure                                Abnormality         Status                     ---------                               -----------         ------                     CBC with platelets and d...[[id number]]  Abnormal            Final result                 Please view results for these tests on the individual orders.

## 2022-07-22 NOTE — PLAN OF CARE
PRIMARY DIAGNOSIS: Hallucinations   OUTPATIENT/OBSERVATION GOALS TO BE MET BEFORE DISCHARGE:  ADLs back to baseline: yes- EZ stand at baseline    Activity and level of assistance: Up with maximum assistance    Pain status: Pain free.    Return to near baseline physical activity: Yes     Discharge Planner Nurse   Safe discharge environment identified: Yes  Barriers to discharge:Yes- daughter states        Entered by: Dang Retana RN 07/22/2022 2:50 PM     Please review provider order for any additional goals.   Nurse to notify provider when observation goals have been met and patient is ready for discharge.    Disoriented to time and place, EZ stand at facility, tolerating diet, heart sounds- WNL, lungs- clear, bowels- active, incontinent urine, linen change, daughter at the bedside

## 2022-07-22 NOTE — PLAN OF CARE
PRIMARY DIAGNOSIS: Hallucinations  OUTPATIENT/OBSERVATION GOALS TO BE MET BEFORE DISCHARGE:  1. ADLs back to baseline: No    2. Activity and level of assistance: Up with maximum assistance. Consider SW and/or PT evaluation.     3. Pain status: Pain free.    4. Return to near baseline physical activity: No     Discharge Planner Nurse   Safe discharge environment identified: Yes  Barriers to discharge: Yes       Entered by: Marybeth Solorio RN 07/22/2022 5:52 AM  Pt AO x1, orientated to self only. VSS on RA, LS clear, BS active. Up with A2. Tolerating regular diet. PIV SL. No hallucinations noted throughout the shift. Will continue to monitor.   /79 (BP Location: Right arm)   Pulse 92   Temp 98.4  F (36.9  C) (Axillary)   Resp 18   SpO2 91%   Please review provider order for any additional goals.   Nurse to notify provider when observation goals have been met and patient is ready for discharge.

## 2022-07-22 NOTE — CONSULTS
Care Management Initial Consult    General Information  Assessment completed with: Children, Care Team Member, Spoke with pt's zaira Stewart via phone [phone]     Primary Care Provider verified and updated as needed: Yes   Readmission within the last 30 days: no previous admission in last 30 days      Reason for Consult: discharge planning    Communication Assessment  Patient's communication style: spoken language (English or Bilingual)        Cognitive  Cognitive/Neuro/Behavioral: .WDL except, orientation  Level of Consciousness: confused  Arousal Level: opens eyes spontaneously  Orientation: disoriented to, time, situation  Mood/Behavior: calm, cooperative  Best Language: 0 - No aphasia  Speech: clear, spontaneous    Living Environment:   People in home: facility resident     Current living Arrangements: extended care facility  Name of Facility: [facility]   Able to return to prior arrangements: yes    Family/Social Support:  Care provided by: other (see comments) (LTC staff)  Provides care for: no one, unable/limited ability to care for self     Children          Description of Support System: Supportive, Involved    Support Assessment: Adequate family and caregiver support    Current Resources:   Patient receiving home care services: No  Community Resources: None  Equipment currently used at home: wheelchair, manual, hospital bed    Lifestyle & Psychosocial Needs:  Social Determinants of Health     Tobacco Use: Low Risk      Smoking Tobacco Use: Never Smoker     Smokeless Tobacco Use: Never Used   Alcohol Use: Not on file   Financial Resource Strain: Low Risk      Difficulty of Paying Living Expenses: Not very hard   Food Insecurity: No Food Insecurity     Worried About Running Out of Food in the Last Year: Never true     Ran Out of Food in the Last Year: Never true   Transportation Needs: No Transportation Needs     Lack of Transportation (Medical): No     Lack of Transportation (Non-Medical): No    Physical Activity: Not on file   Stress: Not on file   Social Connections: Not on file   Intimate Partner Violence: Not At Risk     Fear of Current or Ex-Partner: No     Emotionally Abused: No     Physically Abused: No     Sexually Abused: No   Depression: Not at risk     PHQ-2 Score: 2   Housing Stability: Not on file       Additional Information:  CM consulted for discharge planning, spoke with dtr Pat via phone who confirms that pt resides at [facility] and is WC/bed bound at baseline. She is planning on having pt return to LTC at discharge. Called and confirmed bedhold with [facility] admissions.     Dtr is requesting stretcher transport at discharge. Reviewed out of pocket cost for [facility] stretcher transport, $1117.00 for base rate and $26.06 per mile to the destination. Pt/family expressed understanding and are agreeable to this.      Patient requires stretcher transportation due to being lift dependent. Will need PCS form completed at time of discharge.     CM/SW will coordinate pt's return to LTC once medically ready.     Update 1510: Per provider, plan for AM discharge on 7/23, stretcher ride arranged for 1000. Updated pt's dtr and LTC admissions. PCS form completed and placed in pt's hard chart.     CM/SW will need to call building charge in AM [phone] or Cell [phone] and send orders to [phone].     Cici Alvarado RN BSN   Inpatient Care Coordination  [facility]   Phone [phone]

## 2022-07-22 NOTE — PLAN OF CARE
PRIMARY DIAGNOSIS: Hallucinations   OUTPATIENT/OBSERVATION GOALS TO BE MET BEFORE DISCHARGE:  1. ADLs back to baseline: yes- EZ stand at baseline    2. Activity and level of assistance: Up with maximum assistance    3. Pain status: Pain free.    4. Return to near baseline physical activity: Yes     Discharge Planner Nurse   Safe discharge environment identified: Yes  Barriers to discharge:Yes- daughter states        Entered by: Dang Retana RN 07/22/2022 5:27 PM     Please review provider order for any additional goals.   Nurse to notify provider when observation goals have been met and patient is ready for discharge.    Disoriented to time and place, EZ stand at facility, tolerating diet, heart sounds- WNL, lungs- clear, bowels- active, incontinent urine- purewick in place, plan for AM discharge tomorrow

## 2022-07-22 NOTE — PLAN OF CARE
PRIMARY DIAGNOSIS: HALLUCINATIONS  OUTPATIENT/OBSERVATION GOALS TO BE MET BEFORE DISCHARGE:  1. ADLs back to baseline: No    2. Activity and level of assistance: Up with maximum assistance. Consider SW and/or PT evaluation.     3. Pain status: Improved-controlled with oral pain medications.    4. Return to near baseline physical activity: No     Discharge Planner Nurse   Safe discharge environment identified: Yes  Barriers to discharge: Yes       Entered by: Guy Lugo RN 07/21/2022 10:56 PM     Please review provider order for any additional goals.   Nurse to notify provider when observation goals have been met and patient is ready for discharge.    Pt disoriented x3. Alert to self only. Vss on RA. Pt daughter at bedside for majority of evening. Refused evening meds to allow pt to catch up on rest. Given rocephin x1. No agitation or hallucinations reported. Assist x2- pt is very weak. Will continue monitoring.

## 2022-07-23 VITALS
HEART RATE: 65 BPM | TEMPERATURE: 97.6 F | BODY MASS INDEX: 23 KG/M2 | WEIGHT: 121.7 LBS | DIASTOLIC BLOOD PRESSURE: 61 MMHG | OXYGEN SATURATION: 95 % | RESPIRATION RATE: 16 BRPM | SYSTOLIC BLOOD PRESSURE: 118 MMHG

## 2022-07-23 LAB
BACTERIA UR CULT: NO GROWTH
MAGNESIUM SERPL-MCNC: 2.4 MG/DL (ref 1.6–2.3)
POTASSIUM BLD-SCNC: 4.2 MMOL/L (ref 3.4–5.3)

## 2022-07-23 PROCEDURE — 250N000013 HC RX MED GY IP 250 OP 250 PS 637: Performed by: PHYSICIAN ASSISTANT

## 2022-07-23 PROCEDURE — 84132 ASSAY OF SERUM POTASSIUM: CPT | Performed by: STUDENT IN AN ORGANIZED HEALTH CARE EDUCATION/TRAINING PROGRAM

## 2022-07-23 PROCEDURE — 83735 ASSAY OF MAGNESIUM: CPT | Performed by: STUDENT IN AN ORGANIZED HEALTH CARE EDUCATION/TRAINING PROGRAM

## 2022-07-23 PROCEDURE — 250N000013 HC RX MED GY IP 250 OP 250 PS 637: Performed by: INTERNAL MEDICINE

## 2022-07-23 PROCEDURE — G0378 HOSPITAL OBSERVATION PER HR: HCPCS | Mod: CS

## 2022-07-23 PROCEDURE — 36415 COLL VENOUS BLD VENIPUNCTURE: CPT | Performed by: STUDENT IN AN ORGANIZED HEALTH CARE EDUCATION/TRAINING PROGRAM

## 2022-07-23 PROCEDURE — 99217 PR OBSERVATION CARE DISCHARGE: CPT | Performed by: STUDENT IN AN ORGANIZED HEALTH CARE EDUCATION/TRAINING PROGRAM

## 2022-07-23 RX ADMIN — POLYETHYLENE GLYCOL 3350 17 G: 17 POWDER, FOR SOLUTION ORAL at 08:16

## 2022-07-23 RX ADMIN — ESCITALOPRAM OXALATE 10 MG: 10 TABLET ORAL at 08:17

## 2022-07-23 RX ADMIN — DORZOLAMIDE HYDROCHLORIDE AND TIMOLOL MALEATE 1 DROP: 20; 5 SOLUTION/ DROPS OPHTHALMIC at 09:59

## 2022-07-23 RX ADMIN — ASPIRIN 81 MG CHEWABLE TABLET 81 MG: 81 TABLET CHEWABLE at 08:17

## 2022-07-23 RX ADMIN — CARVEDILOL 6.25 MG: 6.25 TABLET, FILM COATED ORAL at 08:16

## 2022-07-23 RX ADMIN — ACETAMINOPHEN 1000 MG: 500 TABLET, FILM COATED ORAL at 08:16

## 2022-07-23 NOTE — PLAN OF CARE
PRIMARY DIAGNOSIS: HALLUCINATIONS  OUTPATIENT/OBSERVATION GOALS TO BE MET BEFORE DISCHARGE:  1. ADLs back to baseline: No    2. Activity and level of assistance: Pt has not been out of bed    3. Pain status: Pain free.    4. Return to near baseline physical activity: No    Vitals are Temp: 97.5  F (36.4  C) Temp src: Oral BP: (!) 160/85 Pulse: 71   Resp: 18 SpO2: 95 %.  Patient is Disorientated to, Place, and Situation. Pt is 2 person assist with Lift.  Pt is a Regular diet.  Pt is denying pain.  Patient is Saline locked.pt sleeping. Pt has a pure wick in place.  Plan is to discharge to back to [facility] today at 1045.  Will cont to monitor and provide cares.      Discharge Planner Nurse   Safe discharge environment identified: Yes  Barriers to discharge: No       Entered by: Cris Stock RN 07/23/2022     Please review provider order for any additional goals.   Nurse to notify provider when observation goals have been met and patient is ready for discharge.

## 2022-07-23 NOTE — DISCHARGE SUMMARY
[facility]  Hospitalist Discharge Summary      Date of Admission:  7/20/2022  Date of Discharge:  7/23/2022  Discharging Provider: Kalin Aleman MD  Discharge Service: Hospitalist Service    Discharge Diagnoses   Acute Visual Hallucinations, resolved  Probable Acute Metabolic (Infectious) Encephalopathy  Probable Urinary Tract Infection  Intermittent Wheezing  Chronic Hypertension  Chronic Hyperlipidemia  Chronic Constipation  Chronic Depression  Chronic Back Pain  Hx of CVA    Follow-ups Needed After Discharge   Follow-up Appointments     Follow Up and recommended labs and tests      Follow up with primary care provider in 7-14 days for hospital follow up   and monitoring of hallucinations. No follow up labs or test are needed.           Discharge Disposition   Discharged to nursing home  Condition at discharge: Stable    Hospital Course   Alejandrina Whatley is a 89-year-old female with past medical history significant for dementia, CVA, hypertension, hyperlipidemia, history of recurrent UTIs with associated encephalopathy and hallucinations who presented to [facility] on 7/20/2022 with acute hallucinations and dysuria.  Daughter reports that the patient had been experiencing symptoms since Friday, July 15.  This apparently started with dysuria and quickly progressed to some confusion.  She had been treated with 3 days of Bactrim for urinary tract infection at her nursing facility, after which the antibiotics were reportedly stopped per the daughter.  The patient symptoms, however continued and progressively began worsening again.  She was admitted to [facility] on 7/20/2022 and started on ceftriaxone, with improvement of her mental status and dysuria.  She is now safe to discharge back to her nursing facility with 2 additional days of Omnicef.  Recommend continuing the full treatment course to ensure resolution of infection, and prevent against antibiotic resistance in  the setting of recent multiple antibiosis.    Consultations This Hospital Stay   CARE MANAGEMENT / SOCIAL WORK IP CONSULT    Code Status   No CPR- Do NOT Intubate    Time Spent on this Encounter   I, Kalin Aleman MD, personally saw the patient today and spent greater than 30 minutes discharging this patient.       Kalin Aleman MD  [facility] OBSERVATION DEPT  [address] [place] [address]  Phone: [phone]  ______________________________________________________________________    Physical Exam   Vital Signs: Temp: 97.6  F (36.4  C) Temp src: Oral BP: 129/73 Pulse: 69   Resp: 16 SpO2: 98 % O2 Device: None (Room air)    Weight: 121 lbs 11.2 oz    General: Pleasant elderly female resting comfortably in hospital bed.  Awake, alert, interactive.  Daughter at bedside.  HEENT: Normocephalic, atraumatic.  PERRL, EOMI.  Conjunctiva clear, sclera anicteric.  Cardiac: Regular rate and rhythm without murmur, gallop or rub.  No peripheral edema.  Respiratory: Clear to auscultation bilaterally without wheezes, rales, rhonchi.  Abdominal: Soft, nontender.  No suprapubic tenderness.  Musculoskeletal: Moving all extremities appropriately.  Skin: No rashes or abrasions on exposed skin.  Neurologic: Alert and oriented to person, place, situation, but not time.  Cranial nerves II through XII grossly intact.  Psychiatric: Appropriate mood and affect.       Primary Care Physician   Mayuri Roy MD    Discharge Orders      General info for SNF    Length of Stay Estimate: Long Term Care  Condition at Discharge: Stable  Level of care:board and care  Rehabilitation Potential: Fair  Admission H&P remains valid and up-to-date: Yes  Recent Chemotherapy: N/A  Use Nursing Home Standing Orders: Yes     Mantoux instructions    Give two-step Mantoux (PPD) Per Facility Policy Yes     Follow Up and recommended labs and tests    Follow up with primary care provider in 7-14 days for hospital follow up and monitoring of  hallucinations. No follow up labs or test are needed.     Activity - Up with assistive device     Activity - Up with nursing assistance     Reason for your hospital stay    You were admitted due to concerns for intermittent hallucinations with recent UTI.  You were recently treated for urinary tract infection with oral antibiotics for only 3 days.  There was concern for worsening hallucinations on admission.  Your urine did not appear grossly infected but question if you were not completely treated due to short antibiotic course.  You systemically appeared well with no fevers or elevated white count on your basic labs.  You were treated with IV antibiotics while admitted with improvement in hallucinations. As well you received medication to help you sleep which likely helped your cognition as well. You will continue 2 additional days of antibiotics at discharge to complete a total of 5 days.     Additional Discharge Instructions    1) Reposition patient in bed q2h to avoid worsening pressure sore on right hip.    2) Patient MUST continue two additional days of antibiotics after returning to nursing facility to fully treat infection and prevent against antibiotics resistance.     No CPR- Do NOT Intubate     Fall precautions     Diet    Follow this diet upon discharge: Orders Placed This Encounter      Regular Diet Adult       Significant Results and Procedures   Most Recent 3 CBC's:Recent Labs   Lab Test 07/22/22  0807 07/20/22 1916 07/03/22  1724   WBC 8.1 8.0 9.0   HGB 11.6* 11.3* 10.9*   * 106* 106*    234 206     Most Recent 3 BMP's:Recent Labs   Lab Test 07/23/22  0718 07/22/22  0807 07/20/22 1916 07/03/22  1724   NA  --  144 140 145*   POTASSIUM 4.2 4.3 4.2 4.1   CHLORIDE  --  116* 114* 109   CO2  --  23 22 28   BUN  --  26 32* 21   CR  --  0.91 1.11* 0.93   ANIONGAP  --  5 4 8   TRANG  --  9.2 8.8 8.9   GLC  --  120* 138* 127*     Most Recent 2 LFT's:Recent Labs   Lab Test 07/20/22 1916  12/07/19  1227   AST 15 19   ALT 14 15   ALKPHOS 78 69   BILITOTAL 0.2 0.2     Most Recent Urinalysis:Recent Labs   Lab Test 07/20/22  2043 09/19/21  1454 09/04/20  1030   COLOR Light Yellow   < > Yellow   APPEARANCE Clear   < > Clear   URINEGLC Negative   < > Negative   URINEBILI Negative   < > Negative   URINEKETONE Negative   < > Negative   SG 1.027   < > 1.012   UBLD Trace*   < > Negative   URINEPH 5.5   < > 6.5   PROTEIN 20 *   < > Negative   UROBILINOGEN  --   --  <2.0 E.U./dL   NITRITE Negative   < > Negative   LEUKEST Trace*   < > Large*   RBCU 14*   < > 0-2   WBCU 9*   < > 25-50*    < > = values in this interval not displayed.   ,   Results for orders placed or performed during the hospital encounter of 07/20/22   XR Chest 2 Views    Narrative    EXAM: XR CHEST 2 VIEWS  LOCATION: [facility]  DATE/TIME: 7/20/2022 8:03 PM    INDICATION: difficulty breathing, hallucinations  COMPARISON: 07/03/2022, 5/2/18      Impression    IMPRESSION: No infiltrate, pleural effusion or pneumothorax. The cardiac and mediastinal silhouettes are normal.     Vertebroplasty changes in the midthoracic spine. Osteopenic bones. Few other wedge compression fractures in the visualized thoracic spine are similar to the radiograph on 05/02/2018. Aortobiiliac endograft. Severe degenerative changes shoulders.       Discharge Medications   Current Discharge Medication List      START taking these medications    Details   cefdinir (OMNICEF) 300 MG capsule Take 1 capsule (300 mg) by mouth 2 times daily for 2 days  Refills: 0    Associated Diagnoses: Acute cystitis without hematuria         CONTINUE these medications which have NOT CHANGED    Details   acetaminophen (TYLENOL) 500 MG tablet Take 1,000 mg by mouth 3 times daily      albuterol (PROVENTIL) (2.5 MG/3ML) 0.083% neb solution Take 2.5 mg by nebulization every 4 hours as needed for shortness of breath / dyspnea or wheezing      aspirin (ASA) 81 MG chewable tablet  Take 81 mg by mouth daily      atorvastatin (LIPITOR) 40 MG tablet Take 40 mg by mouth every evening      benzonatate (TESSALON) 100 MG capsule Take 100 mg by mouth 2 times daily as needed for cough      carvedilol (COREG) 6.25 MG tablet Take 6.25 mg by mouth 2 times daily (with meals)      Cholecalciferol (VITAMIN D3) 50 MCG (2000 UT) TABS Take 2,000 Units by mouth daily      dorzolamide-timolol (COSOPT) 2-0.5 % ophthalmic solution Place 1 drop into both eyes 2 times daily  Qty: 1 Bottle, Refills: 11    Comments: May be substituted for Timolol and Dorzolamide separately if needed due to long term backorder of Cosopt.  Associated Diagnoses: Pseudoexfoliation glaucoma, moderate stage      escitalopram (LEXAPRO) 10 MG tablet Take 10 mg by mouth daily      latanoprost (XALATAN) 0.005 % ophthalmic solution Place 1 drop into both eyes At Bedtime Both Eyes  Qty: 3 Bottle, Refills: 4    Associated Diagnoses: Pseudoexfoliation glaucoma, moderate stage      melatonin 3 MG tablet Take 1 mg by mouth nightly as needed for sleep      menthol-zinc oxide (CALMOSEPTINE) 0.44-20.6 % OINT ointment Apply topically as needed      polyethylene glycol (MIRALAX) 17 g packet Take 1 packet by mouth every other day      traMADol (ULTRAM) 50 MG tablet Take 25 mg by mouth daily At 1900         STOP taking these medications       ondansetron (ZOFRAN-ODT) 4 MG ODT tab Comments:   Reason for Stopping:         senna-docusate (SENOKOT-S/PERICOLACE) 8.6-50 MG tablet Comments:   Reason for Stopping:             Allergies   Allergies   Allergen Reactions     Actonel [Bisphosphonates] Rash     Conjugated Estrogens Rash     Macrobid [Nitrofuran Derivatives] Rash     Nitrofurantoin Rash     Premarin Rash     Sulfa Drugs Rash     Hydrocodone Nausea and Vomiting     Oxycodone Nausea and Vomiting     Risedronate      leg pain

## 2022-07-23 NOTE — PLAN OF CARE
Patient's After Visit Summary was reviewed with patient and/or daughter.   Patient verbalized understanding of After Visit Summary, recommended follow up and was given an opportunity to ask questions.   Discharge medications sent home with patient/family: N, prescription to be filled at facility   Discharged with transport tech    OBSERVATION patient END time: 1400

## 2022-07-23 NOTE — PLAN OF CARE
PRIMARY DIAGNOSIS: GENERALIZED WEAKNESS    OUTPATIENT/OBSERVATION GOALS TO BE MET BEFORE DISCHARGE  1. Orthostatic performed: N/A    2. Tolerating PO medications: Yes    3. Return to near baseline physical activity: Yes    4. Cleared for discharge by consultants (if involved): Yes    Discharge Planner Nurse   Safe discharge environment identified: Yes  Barriers to discharge: No       Entered by: Dang Retana RN 07/23/2022 2:15 PM     Please review provider order for any additional goals.   Nurse to notify provider when observation goals have been met and patient is ready for discharge.

## 2022-07-23 NOTE — PROGRESS NOTES
Care Management Discharge Note    Discharge Date: 07/23/2022       Discharge Disposition: Long Term Care    Discharge Services: None    Discharge DME: None    Discharge Transportation: agency    Private pay costs discussed: Not applicable    PAS Confirmation Code:    Patient/family educated on Medicare website which has current facility and service quality ratings:  (NA)    Education Provided on the Discharge Plan:    Persons Notified of Discharge Plans: Facility and Dtr  Patient/Family in Agreement with the Plan: yes    Handoff Referral Completed: Yes    Additional Information:  [facility] transportation called and they are not able to pick patient up for discharge until 1300 today.  Spoke with Elo at [facility] and let her know, faxed orders to [phone] and [phone].    RACQUEL Seo  [phone]

## 2022-07-23 NOTE — PLAN OF CARE
PRIMARY DIAGNOSIS: GENERALIZED WEAKNESS    OUTPATIENT/OBSERVATION GOALS TO BE MET BEFORE DISCHARGE  1. Orthostatic performed: N/A    2. Tolerating PO medications: Yes    3. Return to near baseline physical activity: Yes    4. Cleared for discharge by consultants (if involved): Yes    Discharge Planner Nurse   Safe discharge environment identified: Yes  Barriers to discharge: No       Entered by: Dang Retana RN 07/23/2022 2:15 PM     Please review provider order for any additional goals.   Nurse to notify provider when observation goals have been met and patient is ready for discharge.      Disoriented x2, Lift assistance, uses EZ stand at facility, tolerating regular diet, total feed, heart sounds- WNL, lungs- clear, BM today, incontinent bowel and bladder, IV removed, discharging back to long term care facility via HE

## 2022-07-23 NOTE — PLAN OF CARE
PRIMARY DIAGNOSIS: HALLUCINATIONS  OUTPATIENT/OBSERVATION GOALS TO BE MET BEFORE DISCHARGE:  1. ADLs back to baseline: No    2. Activity and level of assistance: Pt has not been out of bed    3. Pain status: Pain free.    4. Return to near baseline physical activity: No    Vitals are Temp: 97.5  F (36.4  C) Temp src: Oral BP: 106/71 Pulse: 68   Resp: 18 SpO2: 93 %.  Patient is Disorientated to, Place, and Situation. Pt is 2 person assist with Lift.  Pt is a Regular diet.  Pt is denying pain.  Patient is Saline locked.pt sleeping. Pt has a pure wick in place.  Plan is to discharge to back to [facility] today at 1045.  Will cont to monitor and provide cares.      Discharge Planner Nurse   Safe discharge environment identified: Yes  Barriers to discharge: No       Entered by: Cris Stock RN 07/23/2022     Please review provider order for any additional goals.   Nurse to notify provider when observation goals have been met and patient is ready for discharge.

## 2022-07-23 NOTE — PLAN OF CARE
PRIMARY DIAGNOSIS: HALLUCINATIONS  OUTPATIENT/OBSERVATION GOALS TO BE MET BEFORE DISCHARGE:  ADLs back to baseline: No    Activity and level of assistance: Pt has not been out of bed    Pain status: Pain free.    Return to near baseline physical activity: No    Vitals are Temp: 97.5  F (36.4  C) Temp src: Oral BP: (!) 157/80 Pulse: 74   Resp: 18 SpO2: 94 %.  Patient is Disorientated to, Place, and Situation. Pt is 2 person assist with Lift.  Pt is a Regular diet.  Pt is denying pain.  Patient is Saline locked.      Discharge Planner Nurse   Safe discharge environment identified: Yes  Barriers to discharge: No       Entered by: Cris Stock RN 07/22/2022     Please review provider order for any additional goals.   Nurse to notify provider when observation goals have been met and patient is ready for discharge.

## 2022-07-25 ENCOUNTER — PATIENT OUTREACH (OUTPATIENT)
Dept: CARE COORDINATION | Facility: CLINIC | Age: 87
End: 2022-07-25

## 2022-07-25 DIAGNOSIS — Z71.89 OTHER SPECIFIED COUNSELING: ICD-10-CM

## 2022-07-25 NOTE — PROGRESS NOTES
[facility]    Background: Care Coordination referral placed from [facility] discharge report for reason of patient meeting criteria for a TCM outreach call by [facility] team.    Assessment: Upon chart review, [facility] Team member will cancel/close the referral for TCM outreach due to reason below:    Patient has discharged to a Group home, Memory Care or Nursing Home    Plan: Care Coordination referral for TCM outreach canceled.      Ailyn Burnham MA  [facility], [facility]    *Connected Care Resource Team does NOT follow patient ongoing. Referrals are identified based on internal discharge reports and the outreach is to ensure patient has an understanding of their discharge instructions.

## 2022-07-28 ENCOUNTER — DOCUMENTATION ONLY (OUTPATIENT)
Dept: OTHER | Facility: CLINIC | Age: 87
End: 2022-07-28

## 2022-08-30 PROBLEM — I69.354 HEMIPARESIS AFFECTING LEFT SIDE AS LATE EFFECT OF CEREBROVASCULAR ACCIDENT (CVA) (H): Status: RESOLVED | Noted: 2020-07-17 | Resolved: 2022-01-01

## 2022-08-30 NOTE — PROGRESS NOTES
[facility]    PRIMARY CARE PROVIDER AND CLINIC:  Sara Britt PA-C, [address] / [place] [address]  Chief Complaint   Patient presents with     [facility] Medical Record Number:  [id number]  Place of Service where encounter took place:  [facility] ([facility]) [[id number]]    Alejandrina Whatley  is a 89 year old  (8/22/1923), living in above facility since 4/21/22 and now choosing to change PCPs to [facility]. .   HPI:    History obtained from discussion with , facility director of nursing, orthopedics NP, previous attending MD and    Alejandrina Whatley 89-year-old female with a past medical history of previous CVA, previous left femur fracture, dementia, hypertension, hyperlipidemia, depression and recurrent UTI.  Previously admitted to the facility in early 2020 following a stroke and followed by [facility] team.  Ultimately discharged 9/2022 Group 1.  She unfortunately suffered a fall with associated left femur fracture status postsurgical repair 2/22.  Readmitted to long-term care and has been followed by the [facility] team.  Family requested transfer over to [facility] as of 8/2022    Today patient is seen in her room.  She is alert and oriented x2.  Denies pain.  No chest pain or shortness of breath.  States she is tired most of the time.  She had her nails trimmed by podiatry a couple weeks ago and developed some irritation of her right great toe.  Notes this is improving.  Denies dizziness or lightheadedness.    Additionally, spoke with daughter via phone. Introduced self. Daughter denies immediate concerns at this time    CODE STATUS/ADVANCE DIRECTIVES DISCUSSION:  No CPR- Do NOT Intubate  DNR / DNI  ALLERGIES:   Allergies   Allergen Reactions     Actonel [Bisphosphonates] Rash     Conjugated Estrogens Rash     Macrobid [Nitrofuran Derivatives] Rash     Nitrofurantoin Rash     Premarin Rash     Sulfa Drugs Rash     Hydrocodone Nausea  and Vomiting     Oxycodone Nausea and Vomiting     Risedronate      leg pain      PAST MEDICAL HISTORY:   Past Medical History:   Diagnosis Date     Abdominal aortic aneurysm (H) 2001    had a stent placed 2009     AK (actinic keratosis) 11/11/2009     Cataract 02/22/2011     Compression Fractures of Lumbar Vertebra 02/04/2010     CVA (cerebral vascular accident) (H)     Dec 2019 and Jan 2020     Dementia (H)      DJD (degenerative joint disease)      Generalised Weakness and Fatigue 03/03/2011     Glaucoma (increased eye pressure) 2009    Dr. 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  FAMILY HISTORY: family history includes Heart Disease (age of onset: 79) in her father; Hypertension in her mother.  SOCIAL HISTORY:   reports that she has never smoked. She has never used smokeless tobacco. She reports that she does not drink alcohol and does not use drugs.  Patient's living condition: lives in a skilled nursing facility    Post Discharge Medication Reconciliation Status:   Post Medication Reconciliation Status:         Current Outpatient Medications   Medication Sig     acetaminophen (TYLENOL) 500 MG tablet Take 1,000 mg by mouth 3 times daily     albuterol (PROVENTIL) (2.5  "MG/3ML) 0.083% neb solution Take 2.5 mg by nebulization every 4 hours as needed for shortness of breath / dyspnea or wheezing     aspirin (ASA) 81 MG chewable tablet Take 81 mg by mouth daily     atorvastatin (LIPITOR) 40 MG tablet Take 40 mg by mouth every evening     bacitracin 500 UNIT/GM OINT Apply topically At Bedtime     benzonatate (TESSALON) 100 MG capsule Take 100 mg by mouth 2 times daily as needed for cough     carvedilol (COREG) 6.25 MG tablet Take 6.25 mg by mouth 2 times daily (with meals)     Cholecalciferol (VITAMIN D3) 50 MCG (2000 UT) TABS Take 2,000 Units by mouth daily     dorzolamide-timolol (COSOPT) 2-0.5 % ophthalmic solution Place 1 drop into both eyes 2 times daily     escitalopram (LEXAPRO) 10 MG tablet Take 10 mg by mouth daily     latanoprost (XALATAN) 0.005 % ophthalmic solution Place 1 drop into both eyes At Bedtime Both Eyes     melatonin 3 MG tablet Take 1 mg by mouth nightly as needed for sleep     menthol-zinc oxide (CALMOSEPTINE) 0.44-20.6 % OINT ointment Apply topically as needed     polyethylene glycol (MIRALAX) 17 g packet Take 1 packet by mouth every other day     senna-docusate (SENOKOT-S/PERICOLACE) 8.6-50 MG tablet Take 1 tablet by mouth every other day     traMADol (ULTRAM) 50 MG tablet Take 25 mg by mouth daily At 1900     No current facility-administered medications for this visit.       ROS:  Limited secondary to cognitive impairment but today pt reports no chest pain or SOB    Vitals:  /72   Pulse 68   Temp 97.8  F (36.6  C)   Resp 16   Ht 1.549 m (5' 1\")   Wt 59.4 kg (131 lb)   SpO2 97%   BMI 24.75 kg/m    Exam:  Physical Exam  Vitals and nursing note reviewed.   Constitutional:       General: She is not in acute distress.     Appearance: Normal appearance.   HENT:      Head: Normocephalic and atraumatic.   Eyes:      General: No scleral icterus.  Cardiovascular:      Rate and Rhythm: Normal rate and regular rhythm.      Heart sounds: No murmur " heard.  Pulmonary:      Effort: Pulmonary effort is normal.   Abdominal:      General: Abdomen is flat.      Tenderness: There is no abdominal tenderness.   Musculoskeletal:      Right lower leg: No edema.      Left lower leg: No edema.   Skin:     General: Skin is warm and dry.      Findings: No rash.      Comments: Right great toe without erythema   Neurological:      General: No focal deficit present.      Mental Status: She is alert and oriented to person, place, and time.      Cranial Nerves: No cranial nerve deficit.   Psychiatric:         Mood and Affect: Mood normal.         Behavior: Behavior normal.           Lab/Diagnostic data:  Recent labs in [facility] reviewed by me today.  and   Most Recent 3 CBC's:  Recent Labs   Lab Test 07/22/22  0807 07/20/22  1916 07/03/22  1724   WBC 8.1 8.0 9.0   HGB 11.6* 11.3* 10.9*   * 106* 106*    234 206     Most Recent 3 BMP's:  Recent Labs   Lab Test 07/23/22  0718 07/22/22  0807 07/20/22 1916 07/03/22  1724   NA  --  144 140 145*   POTASSIUM 4.2 4.3 4.2 4.1   CHLORIDE  --  116* 114* 109   CO2  --  23 22 28   BUN  --  26 32* 21   CR  --  0.91 1.11* 0.93   ANIONGAP  --  5 4 8   TRANG  --  9.2 8.8 8.9   GLC  --  120* 138* 127*       ASSESSMENT/PLAN:  Irritation of right great toe: Had toes trimmed by podiatry and developed some irritation.  Previous provider ordered bacitracin x2 weeks and foot soaks.  Seems to be improving.  No significant erythema.  No pain    History of recurrent UTI: History of recurrent UTI.  Was on a short course of Bactrim at the facility but was ultimately discontinued when urine culture grew normal ese.  Daughter subsequently brought patient to the emergency department.  Urine analysis was abnormal and she was initiated on IV ceftriaxone but this was again discontinued after urine culture showed no growth.  - MOnitor    History of left femur fracture status postsurgical fixation: This occurred following a fall at her group home in  2/2022.  Followed by [facility] orthopedics. Discussed with [facility] NP today notes no concerns  -Continue scheduled Tylenol 3 times daily  - Continue tramadol 25 mg daily    Hypertension  H/o AAA without rupture s/p bypass graph: Well-controlled.  SBP's 120-130  - Continue Coreg 6.25 mg twice daily    History of CVA in Dec 2019 and Jan 2020  - Continue aspirin 81 mg daily and Lipitor    Depression: Most recent PHQ 1  - Continue Lexapro 10 mg daily    Constipation, unspecified  - Continue Senna-S 1 tablet every other day    CKD, stage 3: Baseline Cr 0.8-1  - Monitor periodically    Chronic Macrocytic Anemia: Baseline hemoglobin 10/11  - Monitor periodically    Lumbar Stenosis  Chronic Pain  - Continue scheduled tylenol and as needed tramadol    Dementia without behavioral disturbances: CPT in 2019 4.5. BIMS 7/2022 7/15  - No recent weight loss  - Spends much of her time in her room. Daughter visits daily        Orders:  NNO    Total time spent with patient visit at the skilled nursing facility was 35 min including patient visit, review of past records and phone call to patient contact. Greater than 50% of total time spent with counseling and coordinating care    Electronically signed by:  Sara Britt PA-C

## 2022-08-30 NOTE — LETTER
8/30/2022        RE: Alejandrina Whatley  [id number] [facility] [address]        [facility]    PRIMARY CARE PROVIDER AND CLINIC:  Sara Britt PA-C, [address] / [place] [address]  Chief Complaint   Patient presents with     [facility] Medical Record Number:  [id number]  Place of Service where encounter took place:  [facility] ([facility] [[id number]]    Alejandrina hWatley  is a 89 year old  (8/22/1923), living in above facility since 4/21/22 and now choosing to change PCPs to [facility]. .   HPI:    History obtained from discussion with , facility director of nursing, orthopedics NP, previous attending MD and    Alejandrina Whatley 89-year-old female with a past medical history of previous CVA, previous left femur fracture, dementia, hypertension, hyperlipidemia, depression and recurrent UTI.  Previously admitted to the facility in early 2020 following a stroke and followed by [facility] team.  Ultimately discharged 9/2022 Group 1.  She unfortunately suffered a fall with associated left femur fracture status postsurgical repair 2/22.  Readmitted to long-term care and has been followed by the [facility] team.  Family requested transfer over to [facility] as of 8/2022    Today patient is seen in her room.  She is alert and oriented x2.  Denies pain.  No chest pain or shortness of breath.  States she is tired most of the time.  She had her nails trimmed by podiatry a couple weeks ago and developed some irritation of her right great toe.  Notes this is improving.  Denies dizziness or lightheadedness.    Additionally, spoke with daughter via phone. Introduced self. Daughter denies immediate concerns at this time    CODE STATUS/ADVANCE DIRECTIVES DISCUSSION:  No CPR- Do NOT Intubate  DNR / DNI  ALLERGIES:   Allergies   Allergen Reactions     Actonel [Bisphosphonates] Rash     Conjugated Estrogens Rash     Macrobid [Nitrofuran Derivatives]  Rash     Nitrofurantoin Rash     Premarin Rash     Sulfa Drugs Rash     Hydrocodone Nausea and Vomiting     Oxycodone Nausea and Vomiting     Risedronate      leg pain      PAST MEDICAL HISTORY:   Past Medical History:   Diagnosis Date     Abdominal aortic aneurysm (H) 2001    had a stent placed 2009     AK (actinic keratosis) 11/11/2009     Cataract 02/22/2011     Compression Fractures of Lumbar Vertebra 02/04/2010     CVA (cerebral vascular accident) (H)     Dec 2019 and Jan 2020     Dementia (H)      DJD (degenerative joint disease)      Generalised Weakness and Fatigue 03/03/2011     Glaucoma (increased eye pressure) 2009    Dr. 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  FAMILY HISTORY: family history includes Heart Disease (age of onset: 79) in her father; Hypertension in her mother.  SOCIAL HISTORY:   reports that she has never smoked. She has never used smokeless tobacco. She reports that she does not drink alcohol and does not use drugs.  Patient's living condition: lives in a skilled nursing facility    Post Discharge Medication Reconciliation Status:   Post Medication Reconciliation Status:         Current Outpatient Medications   Medication Sig     acetaminophen  "(TYLENOL) 500 MG tablet Take 1,000 mg by mouth 3 times daily     albuterol (PROVENTIL) (2.5 MG/3ML) 0.083% neb solution Take 2.5 mg by nebulization every 4 hours as needed for shortness of breath / dyspnea or wheezing     aspirin (ASA) 81 MG chewable tablet Take 81 mg by mouth daily     atorvastatin (LIPITOR) 40 MG tablet Take 40 mg by mouth every evening     bacitracin 500 UNIT/GM OINT Apply topically At Bedtime     benzonatate (TESSALON) 100 MG capsule Take 100 mg by mouth 2 times daily as needed for cough     carvedilol (COREG) 6.25 MG tablet Take 6.25 mg by mouth 2 times daily (with meals)     Cholecalciferol (VITAMIN D3) 50 MCG (2000 UT) TABS Take 2,000 Units by mouth daily     dorzolamide-timolol (COSOPT) 2-0.5 % ophthalmic solution Place 1 drop into both eyes 2 times daily     escitalopram (LEXAPRO) 10 MG tablet Take 10 mg by mouth daily     latanoprost (XALATAN) 0.005 % ophthalmic solution Place 1 drop into both eyes At Bedtime Both Eyes     melatonin 3 MG tablet Take 1 mg by mouth nightly as needed for sleep     menthol-zinc oxide (CALMOSEPTINE) 0.44-20.6 % OINT ointment Apply topically as needed     polyethylene glycol (MIRALAX) 17 g packet Take 1 packet by mouth every other day     senna-docusate (SENOKOT-S/PERICOLACE) 8.6-50 MG tablet Take 1 tablet by mouth every other day     traMADol (ULTRAM) 50 MG tablet Take 25 mg by mouth daily At 1900     No current facility-administered medications for this visit.       ROS:  Limited secondary to cognitive impairment but today pt reports no chest pain or SOB    Vitals:  /72   Pulse 68   Temp 97.8  F (36.6  C)   Resp 16   Ht 1.549 m (5' 1\")   Wt 59.4 kg (131 lb)   SpO2 97%   BMI 24.75 kg/m    Exam:  Physical Exam  Vitals and nursing note reviewed.   Constitutional:       General: She is not in acute distress.     Appearance: Normal appearance.   HENT:      Head: Normocephalic and atraumatic.   Eyes:      General: No scleral icterus.  Cardiovascular:    "   Rate and Rhythm: Normal rate and regular rhythm.      Heart sounds: No murmur heard.  Pulmonary:      Effort: Pulmonary effort is normal.   Abdominal:      General: Abdomen is flat.      Tenderness: There is no abdominal tenderness.   Musculoskeletal:      Right lower leg: No edema.      Left lower leg: No edema.   Skin:     General: Skin is warm and dry.      Findings: No rash.      Comments: Right great toe without erythema   Neurological:      General: No focal deficit present.      Mental Status: She is alert and oriented to person, place, and time.      Cranial Nerves: No cranial nerve deficit.   Psychiatric:         Mood and Affect: Mood normal.         Behavior: Behavior normal.           Lab/Diagnostic data:  Recent labs in [facility] reviewed by me today.  and   Most Recent 3 CBC's:  Recent Labs   Lab Test 07/22/22  0807 07/20/22 1916 07/03/22  1724   WBC 8.1 8.0 9.0   HGB 11.6* 11.3* 10.9*   * 106* 106*    234 206     Most Recent 3 BMP's:  Recent Labs   Lab Test 07/23/22  0718 07/22/22  0807 07/20/22 1916 07/03/22  1724   NA  --  144 140 145*   POTASSIUM 4.2 4.3 4.2 4.1   CHLORIDE  --  116* 114* 109   CO2  --  23 22 28   BUN  --  26 32* 21   CR  --  0.91 1.11* 0.93   ANIONGAP  --  5 4 8   TRANG  --  9.2 8.8 8.9   GLC  --  120* 138* 127*       ASSESSMENT/PLAN:  Irritation of right great toe: Had toes trimmed by podiatry and developed some irritation.  Previous provider ordered bacitracin x2 weeks and foot soaks.  Seems to be improving.  No significant erythema.  No pain    History of recurrent UTI: History of recurrent UTI.  Was on a short course of Bactrim at the facility but was ultimately discontinued when urine culture grew normal ese.  Daughter subsequently brought patient to the emergency department.  Urine analysis was abnormal and she was initiated on IV ceftriaxone but this was again discontinued after urine culture showed no growth.  - MOnitor    History of left femur fracture status  postsurgical fixation: This occurred following a fall at her group home in 2/2022.  Followed by [facility] orthopedics. Discussed with [facility] NP today notes no concerns  -Continue scheduled Tylenol 3 times daily  - Continue tramadol 25 mg daily    Hypertension  H/o AAA without rupture s/p bypass graph: Well-controlled.  SBP's 120-130  - Continue Coreg 6.25 mg twice daily    History of CVA in Dec 2019 and Jan 2020  - Continue aspirin 81 mg daily and Lipitor    Depression: Most recent PHQ 1  - Continue Lexapro 10 mg daily    Constipation, unspecified  - Continue Senna-S 1 tablet every other day    CKD, stage 3: Baseline Cr 0.8-1  - Monitor periodically    Chronic Macrocytic Anemia: Baseline hemoglobin 10/11  - Monitor periodically    Lumbar Stenosis  Chronic Pain  - Continue scheduled tylenol and as needed tramadol    Dementia without behavioral disturbances: CPT in 2019 4.5. BIMS 7/2022 7/15  - No recent weight loss  - Spends much of her time in her room. Daughter visits daily        Orders:  NNO    Total time spent with patient visit at the skilled nursing facility was 35 min including patient visit, review of past records and phone call to patient contact. Greater than 50% of total time spent with counseling and coordinating care    Electronically signed by:  Sara Britt PA-C                       Sincerely,        Sara Britt PA-C

## 2022-09-30 NOTE — PROGRESS NOTES
"[facility]  PHYSICIAN NOTE    Chief Complaint   Patient presents with     [facility]       HPI:    Alejandrina Whatley is a 89 year old  (8/22/1923), who is being seen today for a federally mandated E/M visit at [facility]. Admitted to LTC in early 2022. She'd been previously followed by our [facility] when she resided on site in [phone] but then she moved to a group home. Now back to our LTC facility; had been followed recently by a different in house provider group but now family electing to switch back to [facility] team. My colleague, her PCP, Sara Britt PA-C did admission visit last month. Alejandrina Noble's daughter Pat is actively involved in her life and visits most days. Alejandrina Noble has h/o past CVAs, dementia, lumbar stenosis and osteoporosis with L hip fracture s/p repair in Feb 2022.    Alejandrina Noble is seen in her room today at 3:15 PM. She is resting in bed dozing but awakens fully to actively participate in visit and reports is glad to have me visit. Does perseverate on the time of day and wonders if she should get up for breakfast; redirected to afternoon. Had her blinds closed and welcomed my offer to open the blinds for her. She declined my offer to get help to have her get up out of bed to chair though. I ask re: mood and she says \"its ok once I get up and going\" but again doesn't decide she wants to get up. Is on Lexapro. I note that [facility] on site was recently offered to her daughter Pat but she ultimately decided to hold off on this referral at this time. I see Alejandrina Noble has recently enjoyed visits from spiritual health member. Denies pain or dyspnea nor vocalizes any acute concerns today and she thanks me for the visit. I spoke with nursing staff who note no concerns either at this time medically. Of note, her weight is up from 130s to 153 but this may be an outlier as its been so stable lately; I asked for nurse to re-weigh her when up " later on.    ALLERGIES: Actonel [bisphosphonates], Conjugated estrogens, Macrobid [nitrofuran derivatives], Nitrofurantoin, Premarin, Sulfa drugs, Hydrocodone, Oxycodone, and Risedronate    Past Medical History:   Diagnosis Date     Abdominal aortic aneurysm (H) 2001    had a stent placed 2009     AK (actinic keratosis) 11/11/2009     Cataract 02/22/2011     Compression Fractures of Lumbar Vertebra 02/04/2010     CVA (cerebral vascular accident) (H)     Dec 2019 and Jan 2020     Dementia (H)      DJD (degenerative joint disease)      Generalised Weakness and Fatigue 03/03/2011     Glaucoma (increased eye pressure) 2009    Dr. 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     Strain of shoulder, left 03/03/2011     Urinary incontinence 11/10/2009      CODE STATUS: DNR/I    MEDICATIONS: Reviewed and updated in [facility] according to facility MAR  Current Outpatient Medications   Medication Sig Dispense Refill     acetaminophen (TYLENOL) 500 MG tablet Take 1,000 mg by mouth 3 times daily       albuterol (PROVENTIL) (2.5 MG/3ML) 0.083% neb solution Take 2.5 mg by nebulization every 4 hours as needed for shortness of breath / dyspnea or wheezing       aspirin (ASA) 81 MG chewable tablet Take 81 mg by mouth daily       atorvastatin (LIPITOR) 40 MG tablet Take 40 mg by mouth every evening       benzonatate (TESSALON) 100 MG capsule Take 100 mg by mouth 2 times daily as needed for cough       carvedilol (COREG) 6.25 MG tablet Take 6.25 mg by mouth 2 times daily (with meals)       Cholecalciferol (VITAMIN D3) 50 MCG (2000 UT) TABS Take 2,000 Units by mouth daily       dorzolamide-timolol (COSOPT) 2-0.5 % ophthalmic solution Place 1 drop into both eyes 2 times daily 1 Bottle 11     escitalopram (LEXAPRO) 10 MG tablet  Take 10 mg by mouth daily       latanoprost (XALATAN) 0.005 % ophthalmic solution Place 1 drop into both eyes daily       melatonin 3 MG tablet Take 1 mg by mouth nightly as needed for sleep       menthol-zinc oxide (CALMOSEPTINE) 0.44-20.6 % OINT ointment Apply topically as needed       polyethylene glycol (MIRALAX) 17 g packet Take 1 packet by mouth every other day       senna-docusate (SENOKOT-S/PERICOLACE) 8.6-50 MG tablet Take 1 tablet by mouth every other day       traMADol (ULTRAM) 50 MG tablet Take 25 mg by mouth daily At 1900         ROS:  Limited secondary to cognitive impairment but today pt reports as above in HPI    Exam:  BP (!) 161/88   Pulse 66   Temp 97.8  F (36.6  C)   Resp 16   Ht 1.524 m (5')   Wt 69.4 kg (153 lb)   SpO2 95%   BMI 29.88 kg/m    Alert, resting in bed casually dressed in no acute distress  Heart tones regular  Breathing non-labored  No edema  Mood euthymic, engages in visit  Perseverates on time of day as noted above    Lab/Diagnostic Data:    July 2022: BMP unremarkable with Cr 0.91 and Hgb 11.6    ASSESSMENT/PLAN:  (Z86.73) H/O: CVA (cerebrovascular accident)  (primary encounter diagnosis)  Noted chronic comorbidity  On ASA and Lipitor with BP control as noted below    (I10) Essential hypertension  Most BP acceptable for age ranging 120-160/70-80s with HR 60-70s on Coreg alone  Avoid hypotension given advanced age    (F03.90) Dementia without behavioral disturbance (H)  (F41.9,  F32.A) Anxiety and depression  Benefiting from support of her daughter Pat and facility staff  August scores: BIMS: 7/15 and PHQ9c: 1  Remains on Lexapro and [facility] visits not currently desired by Pat per notes  She does enjoy visits from spiritual health and seemed to enjoy my visit today  At risk for delirium; redirection as needed  No behaviors of note    (M81.0) Osteoporosis, unspecified osteoporosis type, unspecified pathological fracture presence  H/o left hip fracture s/p repair  "in Feb 2022  On Vitamin D  Also on scheduled Tylenol with evening low dose Tramadol for pain (h/o lumbar stenosis as well)  Has \"rash\" as past s/e from Actonel bisphosphonate noted in her chart; unlikely to benefit from addition of further bisphosphonate trial at this age    Weight gain???  Of note, her weight is up from 130s to 153 but this may be an outlier as its been so stable lately; I asked for nurse to re-weigh her when up later on  Seems euvolemic on exam  Regular diet      Electronically signed by:  Yulissa Garcia DO  "

## 2022-10-06 NOTE — TELEPHONE ENCOUNTER
"[facility] Triage Nurse Telephone Encounter    Provider: Sara Britt PA-C  Facility: [facility] Type:  [facility]    Caller: Santiago  Call Back Number: [phone]    Allergies:    Allergies   Allergen Reactions     Actonel [Bisphosphonates] Rash     Conjugated Estrogens Rash     Macrobid [Nitrofuran Derivatives] Rash     Nitrofurantoin Rash     Premarin Rash     Sulfa Drugs Rash     Hydrocodone Nausea and Vomiting     Oxycodone Nausea and Vomiting     Risedronate      leg pain        Reason for call: Nurse is calling with some family requests.  Patient's family is requesting that patient's Tramadol be increased from 25mg Q HS to 50mg Q HS.  The family states she has right arm pain and are also requesting a MRI of her entire right arm.  The nurse reports that patient has generalized pain at baseline.  Staff is not reporting an increase in c/o pain.  Patient's family is also requesting that patient's Lexapro be doubled.  Currently receiving 10mg daily.  The patient's family states that she \"cries all the time\", however staff have not noticed any crying.      Verbal Order/Direction given by Provider: Give Tramadol 50mg x 1 dose tonight.  NP to see patient tomorrow and talk with her family.      Provider giving Order:  Sara Britt PA-C    Verbal Order given to: Santiago Bermeo RN      "
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
## 2022-10-07 NOTE — Clinical Note
Well I spoke too soon :)  Thankfully was able to find middle ground on most of Pat's requests. This is just FYI
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
## 2022-10-10 NOTE — TELEPHONE ENCOUNTER
[facility] Triage Nurse Telephone Encounter    Provider: Sara Britt PA-C  Facility: [facility]  Facility Type:  LTC    Caller: Elaine  Call Back Number: [phone]    Allergies:    Allergies   Allergen Reactions     Actonel [Bisphosphonates] Rash     Conjugated Estrogens Rash     Macrobid [Nitrofuran Derivatives] Rash     Nitrofurantoin Rash     Premarin Rash     Sulfa Drugs Rash     Hydrocodone Nausea and Vomiting     Oxycodone Nausea and Vomiting     Risedronate      leg pain        Reason for call: Nurse is calling on behalf of patient and facility request to obtain a UA/UC, based on the note from 10/7/22.  See note below.  Of note, patient is not having UTI symptoms today.  VSS.            Verbal Order/Direction given by Provider: Obtain a straight cath UA/UC.      Provider giving Order:  Sara Britt PA-C    Verbal Order given to: Elaine Bermeo RN

## 2022-10-11 NOTE — LETTER
October 11, 2022    JONY DONALDSON  [facility]  [address]  [facility] [address]    Dear Jony Noble,    Welcome to [facility]'s [facility] Options ([facility]) ([facility]) plan. My name is Alba Haines RN, PHN. I am your [facility] care coordinator. You are eligible for Care Coordination through [facility].    Here is how Care Coordination works:    I will meet with you to discuss your care needs and health goals.    I will work with your facility to ensure your care needs are being met.    I will review the facility's plan of care for you and help them meet your needs.    If you are discharged from the nursing home, I will help you return to the community.    Our goal is to provide services that will keep you as healthy and independent as possible.     [facility] combines the benefits you may already receive from Medical Assistance, Medicare, and the Prescription Drug Coverage Program. Soon you will receive a new [facility] member identification (ID) card from [facility]. When you receive it, please use this card whenever you get health services.    Being in the [facility] Health Options ([facility]) ([facility]) Care Coordination program is voluntary and offered to you at no cost.  If you ever wish to stop being in the Care Coordination program or have questions, call me at [phone]. If you reach my voice mail, leave a message and your phone number. If you are hearing impaired, call the [place] Relay at 389 or [phone] (xthdus-zs-oybvsx relay service).    Sincerely,      Alba Haines RN, PHN  [phone]  [email or web address] ([facility] SNP) is a health plan that contracts with both Medicare and the [place] Medical Assistance (Medicaid) program to provide benefits of both programs to enrollees. Enrollment in [facility] depends on contract renewal.    [id number] accepted  (12/2019)

## 2022-10-11 NOTE — TELEPHONE ENCOUNTER
[facility] Triage Nurse Telephone Encounter    Provider: Sara Britt PA-C  Facility: [facility]  Facility Type:  LTC    Caller: Amanda   Call Back Number: [phone]       Allergies:    Allergies   Allergen Reactions     Actonel [Bisphosphonates] Rash     Conjugated Estrogens Rash     Macrobid [Nitrofuran Derivatives] Rash     Nitrofurantoin Rash     Premarin Rash     Sulfa Drugs Rash     Hydrocodone Nausea and Vomiting     Oxycodone Nausea and Vomiting     Risedronate      leg pain        Reason for call:   Nursing is calling to report that the daughter is not wanting her Mother to have a UA/UC now as she is not having hallucinations and the bug on the wall she thought she saw is a small hole in the wall. VS stable.       Verbal Order/Direction given by Provider: Discontinue UA/UC    Provider giving Order:  Sara Britt PA-C    Verbal Order given to: Amanda Martinez RN

## 2022-10-11 NOTE — PROGRESS NOTES
[facility] Care Coordination Contact    Member became effective with  [facility] on 10/1/2022 with [facility].  Previous Health Plan: [facility]  Previous Care System: [facility]  MMIS visit date (and type): 06 REASSMT 10/18/21  UT received: No: Requested on 10/11/22 emailed [facility] for transfer docs.      WL sent to daryl Pat ayala SJ Barger  Care Management Specialist   [facility]   [phone]

## 2022-10-12 NOTE — Clinical Note
Sara Member was seen onn 10/12/22 she is the one whose daughter asked about the antibiotics not the one I sent you last week. Sorry about that. No current concerns about this member they just had a question about private room rate which I was able to refer them to [facility] about. Please let me knnow if I can be of any assistance  Alba Haines, RN, PHN Care Coordinator [facility] [phone]

## 2022-10-13 NOTE — PROGRESS NOTES
[facility] Care Coordination Contact    E[place] sent to  at facility to schedue assessment on 10/12/22    Alba Haines RN, PHN  Care Coordinator [facility]  [phone]

## 2022-10-17 NOTE — PROGRESS NOTES
[facility] Institutional Assessment     Institutional Assessment for Health Risk Assessment with Alejandrina Whatley completed on October 12, 2022 at [facility]    Type of residence:: Nursing home  Current living arrangement:: I live in a nursing home     Assessment completed with:: Patient, Care Team Member, Children      Mental/Behavioral Health   Depression Screening:   PHQ-2 Total Score (Adult) - Positive if 3 or more points; Administer PHQ-9 if positive: 2       Mental health DX:: Yes (diagnosis of depressiong,)   Mental health DX how managed:: Medication (Ecitalapram)    Falls Assessment:   Fallen 2 or more times in the past year?: No   Any fall with injury in the past year?: No    ADL/IADL Dependencies:   Dependent ADLs:: Bathing, Dressing, Eating, Grooming, Incontinence, Positioning, Transfers, Wheelchair-with assist, Toileting  Dependent IADLs:: Cleaning, Cooking, Laundry, Shopping, Meal Preparation, Medication Management, Money Management, Transportation, Incontinence      Care Plan & Recommendations: Met with Member and her Daughter Pat. Member appears well cared for and is clean. She states that she is basically content in her current setting and is happy to be in her current setting. Daughter states that she feels member is doing well in this setting but some staff can be difficult and pull on members arm. She has a recent hospital stay due to UTI which she says NP from [place] told her could not be treated in facility and that same NP made rude comments and as such they switched to [facility] NP and are much happier with care. Member does have some hearing loss but daughter says this is base line. Medications shazia being managed by facility. Members daughter did place a video camera in room and she is able to monitor cares and great member from this and she says it gives her some peace of mind being able to monitor moms cares from home. Daughter visits daily. Polst is in place and  indicates DNR/DNI which daughter and member are both still in agreement with   Discussed options/opportunities for transitions.    See Institutional Care Plan for detailed assessment information.    Obtained a copy of the facility care plan and MDS from facility electronic records. Requested of [facility] social worker to put this care coordinator on care conference attendee list.    Placed the Health Plan facility face sheet in the member's facility chart.    Follow-Up Plan: Member informed of future contact, plan to f/u with member with a 6 month assessment, attend 1 care conference annually, and will follow any hospitalizations or transitions. Care Coordinator contact information shared with member/family and facility, and encouraged to call this care coordinator with any questions or concerns at any time.     [place] care continuum providers: Please see Snapshot and Care Management Flowsheets for Specific details of care plan.    This CC note routed to PCP.    Alba Haines RN, PHN  Care Coordinator [facility]  [phone]
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
## 2022-10-27 NOTE — PROGRESS NOTES
"[facility] GERIATRICS  Chief Complaint   Patient presents with     penitentiary Regulatory     [facility] Medical Record Number:  [id number]  Place of Service where encounter took place:  [facility] ([facility]) [[id number]]    HPI:    Alejandrina Whatley 89-year-old female with a past medical history of previous CVA, previous left femur fracture, dementia, hypertension, hyperlipidemia, depression and recurrent UTI.  Previously admitted to the facility in early 2020 following a stroke and followed by [facility] team.  Ultimately discharged 9/2022 Group 1.  She unfortunately suffered a fall with associated left femur fracture status postsurgical repair 2/22.  Readmitted to long-term care.    Today visited with Alejandrina Noble and her daughter Pat for 25 mins.  Pat shared her experience with the provider that was following her mother previously and details as to why they elected to switch.  Pat continues to endorse concerns regarding her mom's arm pain.  Sanjana does confirm that her arm is sore but only when she moves it.  Had tried Lidoderm patches but both Pat and Sanjana did not find them helpful.  Pat continues to question if we need to do an MRI.  Reiterated my previous discussion that I do not feel as though an MRI is indicated.  It may provide a diagnosis but ultimately would not .  Pat does confirm that she would not want any surgery or procedures.  Continues to deny an x-ray, but admits she wants to \"know\" what is going on.     Review of nursing home EMR: SBP: 140s, some in the 160s. Weights table in 130s    ALLERGIES:Actonel [bisphosphonates], Conjugated estrogens, Macrobid [nitrofuran derivatives], Nitrofurantoin, Premarin, Sulfa drugs, Hydrocodone, Oxycodone, and Risedronate  PAST MEDICAL HISTORY:   Past Medical History:   Diagnosis Date     Abdominal aortic aneurysm 2001    had a stent placed 2009     AK (actinic keratosis) 11/11/2009     Cataract 02/22/2011     " Compression Fractures of Lumbar Vertebra 02/04/2010     CVA (cerebral vascular accident) (H)     Dec 2019 and Jan 2020     Dementia (H)      DJD (degenerative joint disease)      Generalised Weakness and Fatigue 03/03/2011     Glaucoma (increased eye pressure) 2009    Dr. 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  FAMILY HISTORY: family history includes Heart Disease (age of onset: 79) in her father; Hypertension in her mother.  SOCIAL HISTORY:  reports that she has never smoked. She has never used smokeless tobacco. She reports that she does not drink alcohol and does not use drugs.    MEDICATIONS:  MED REC REQUIRED  Post Medication Reconciliation Status: discharge medications reconciled and changed, per note/orders         Review of your medicines          Accurate as of October 27, 2022  4:22 PM. If you have any questions, ask your nurse or doctor.            START taking      Dose / Directions   diclofenac 1 % topical gel  Commonly known as: VOLTAREN  Used for: Pain of right upper arm  Started by: Sara Britt PA-C      Dose: 2 g  Apply 2 g topically 3 times daily  Refills: 0        CONTINUE these medicines which have NOT CHANGED      Dose / Directions   acetaminophen  500 MG tablet  Commonly known as: TYLENOL      Dose: 1,000 mg  Take 1,000 mg by mouth 3 times daily  Refills: 0     albuterol (2.5 MG/3ML) 0.083% neb solution  Commonly known as: PROVENTIL      Dose: 2.5 mg  Take 2.5 mg by nebulization every 4 hours as needed for shortness of breath / dyspnea or wheezing  Refills: 0     aspirin 81 MG chewable tablet  Commonly known as: ASA      Dose: 81 mg  Take 81 mg by mouth daily  Refills: 0     atorvastatin 40 MG tablet  Commonly known as: LIPITOR      Dose: 40 mg  Take 40 mg by mouth every evening  Refills: 0     benzonatate 100 MG capsule  Commonly known as: TESSALON      Dose: 100 mg  Take 100 mg by mouth 2 times daily as needed for cough  Refills: 0     carvedilol 6.25 MG tablet  Commonly known as: COREG      Dose: 6.25 mg  Take 6.25 mg by mouth 2 times daily (with meals)  Refills: 0     dorzolamide-timolol 2-0.5 % ophthalmic solution  Commonly known as: COSOPT  Used for: Pseudoexfoliation glaucoma, moderate stage      Dose: 1 drop  Place 1 drop into both eyes 2 times daily  Quantity: 1 Bottle  Refills: 11     escitalopram 10 MG tablet  Commonly known as: LEXAPRO      Dose: 10 mg  Take 10 mg by mouth daily  Refills: 0     latanoprost 0.005 % ophthalmic solution  Commonly known as: XALATAN      Dose: 1 drop  Place 1 drop into both eyes daily  Refills: 0     melatonin 3 MG tablet      Dose: 1 mg  Take 1 mg by mouth nightly as needed for sleep  Refills: 0     menthol-zinc oxide 0.44-20.6 % Oint ointment  Commonly known as: CALMOSEPTINE      Apply topically as needed  Refills: 0     polyethylene glycol 17 g packet  Commonly known as: MIRALAX      Dose: 1 packet  Take 1 packet by mouth every other day  Refills: 0     senna-docusate 8.6-50 MG tablet  Commonly known as: SENOKOT-S/PERICOLACE      Dose: 1 tablet  Take 1 tablet by mouth every other day  Refills: 0     traMADol 50 MG tablet  Commonly known as: ULTRAM  Used for: Other chronic pain      Dose: 25 mg  Take 0.5 tablets (25  mg) by mouth daily At 1900  Quantity: 30 tablet  Refills: 0     vitamin D3 50 mcg (2000 units) tablet  Commonly known as: CHOLECALCIFEROL      Dose: 2,000 Units  Take 2,000 Units by mouth daily  Refills: 0        STOP taking    Lidocaine 4 % Patch  Commonly known as: LIDOCARE  Stopped by: Sara Britt PA-C              Where to get your medicines      Information about where to get these medications is not yet available    Ask your nurse or doctor about these medications    diclofenac 1 % topical gel         Case Management:  I have reviewed the care plan and MDS and do agree with the plan. Patient's desire to return to the community is present, but is not able due to care needs . Information reviewed:  Medications, vital signs, orders, and nursing notes.    ROS:  4 point ROS including Respiratory, CV, GI and , other than that noted in the HPI,  is negative    Vitals:  BP (!) 140/84   Pulse 74   Temp 97  F (36.1  C)   Resp 18   Ht 1.524 m (5')   Wt 59 kg (130 lb)   SpO2 95%   BMI 25.39 kg/m    Body mass index is 25.39 kg/m .  Exam:  Physical Exam  Constitutional:       General: She is not in acute distress.     Appearance: Normal appearance.   HENT:      Head: Normocephalic and atraumatic.   Eyes:      General: No scleral icterus.  Cardiovascular:      Rate and Rhythm: Normal rate and regular rhythm.      Heart sounds: No murmur heard.  Pulmonary:      Effort: Pulmonary effort is normal.      Breath sounds: No wheezing.   Musculoskeletal:      Right lower leg: No edema.      Left lower leg: No edema.      Comments: PROM flex shoulder to 90, some pain with passive abduction.    Neurological:      General: No focal deficit present.      Mental Status: She is alert.      Cranial Nerves: No cranial nerve deficit.   Psychiatric:         Mood and Affect: Mood normal.           Lab/Diagnostic data:   Recent labs in [facility] reviewed by me today.  and   Most Recent 3 CBC's:  Recent Labs   Lab Test 07/22/22  0807  07/20/22 1916 07/03/22  1724   WBC 8.1 8.0 9.0   HGB 11.6* 11.3* 10.9*   * 106* 106*    234 206     Most Recent 3 BMP's:  Recent Labs   Lab Test 07/23/22  0718 07/22/22  0807 07/20/22 1916 07/03/22  1724   NA  --  144 140 145*   POTASSIUM 4.2 4.3 4.2 4.1   CHLORIDE  --  116* 114* 109   CO2  --  23 22 28   BUN  --  26 32* 21   CR  --  0.91 1.11* 0.93   ANIONGAP  --  5 4 8   TRANG  --  9.2 8.8 8.9   GLC  --  120* 138* 127*       ASSESSMENT/PLAN    Right Upper Arm Pain:Daughter remains concerned for soft tissue vs tendon injury from reports of being pulled on by nursing. Continue to recommend against MRI. Daughter declined xray but open to onsite Ortho  - Continue Tramdol 25 mg at hs  - D/c Lidoderm patch. Will reach out to Ortho to see they can eval on site for further recomendations    Depression: Previous discussed ACP referral which daughter was initially open to but now declined  - Continue Lexapro 10 mg/d    H/o Lumbar Stenosis  History of left femur fracture status postsurgical fixation: This occurred following a fall at her group home in 2/2022.   -Continue scheduled Tylenol 3 times daily  - Continue tramadol 25 mg daily    Hypertension  H/o AAA without rupture s/p bypass graph: Well-controlled.  SBPs primarily 140s, occasionally 160s  - Continue Coreg 6.25 mg twice daily    History of CVA in Dec 2019 and Jan 2020  - Continue aspirin 81 mg daily and Lipitor    Constipation, unspecified  - Continue Senna-S 1 tablet every other day    CKD, stage 3: Baseline Cr 0.8-1  - Monitor periodically,     Chronic Macrocytic Anemia: Baseline hemoglobin 10/11  - Monitor periodically      Dementia without behavioral disturbances: SLUMS 8/22 14/30  - No recent weight loss  - Spends much of her time in her room. Daughter visits daily      Orders  D/c Lidoderm  Diclofenac 1% gel tid    A total 50 min were spent on this regulatory visit. With > 50% spent on coordination of care including 25 min at bedside discussion  rational for avoiding MRI and treatment options for arm pain as well as reaching out [facility]. Further details as discussed above.      The health plan new enrollment has happened. I have reviewed the  MDS, the preventative needs,  and facility care plan. The level of care is appropriate. I have reviewed the code status/advanced directives.       Electronically signed by:  Sara Britt PA-C

## 2022-11-04 NOTE — TELEPHONE ENCOUNTER
[facility] Geriatrics Triage Nurse Telephone Encounter    Provider: Sara Britt PA-C  Facility: [facility] Type:  LTC    Caller: Amanda   Call Back Number: [phone]    Allergies:    Allergies   Allergen Reactions     Actonel [Bisphosphonates] Rash     Conjugated Estrogens Rash     Macrobid [Nitrofuran Derivatives] Rash     Nitrofurantoin Rash     Premarin Rash     Sulfa Drugs Rash     Hydrocodone Nausea and Vomiting     Oxycodone Nausea and Vomiting     Risedronate      leg pain        Reason for call: Nursing is calling d/t the pt's family is saying that the pt is having intermittent abdominal pain when she takes her Senna S. She takes Senna S 1 tab every other day and Miralax 17g every other day as well. Last BM was yesterday and then on 10/30. No abdominal distention or pain on palpation.   Family would like to place her Senna S on hold for 1 week to see if it is the cause of her abdominal pain.      Verbal Order/Direction given by Provider: Hold Senna S x1 week for possible abdominal pain. Update in 1 week    Provider giving Order:  Sara Britt PA-C    Verbal Order given to: Alber Martinez RN

## 2022-11-16 NOTE — TELEPHONE ENCOUNTER
[facility] Nurse Triage Telephone Note    Provider: Sara Britt PA-C  Facility: [facility]   Facility Type:  [facility]    Caller: Daysi  Call Back Number: [phone]    Allergies   Allergen Reactions     Actonel [Bisphosphonates] Rash     Conjugated Estrogens Rash     Macrobid [Nitrofuran Derivatives] Rash     Nitrofurantoin Rash     Premarin Rash     Sulfa Drugs Rash     Hydrocodone Nausea and Vomiting     Oxycodone Nausea and Vomiting     Risedronate      leg pain       Reason for call: Pt has a productive cough with audible wheezing that began yesterday. There is wheezing heard in the right lung as well. Nurse has been administering benzonatate PRN for the cough. Have not yet utilized the PRN Albuterol nebulizer. No dyspnea. Afebrile. Rapid COVID swab on 11/14 was negative.  VS: /70 HR 70 RR 18 O2 sat 96% on RA Temp 97.4      Verbal Order/Direction given by Provider:   - Guaifenesin 100mg/5mL liquid Give 20mL (400 mg) PO q 4 hours prn (expectorant)  - Administer PRN Albuterol neb  - Chest XR AP & Lateral STAT for cough and wheezing    Provider giving Order:  Sara Britt PA-C    Verbal Order given to: Daysi Dwyer RN

## 2022-11-17 NOTE — TELEPHONE ENCOUNTER
[facility] Geriatrics Lab Note     Provider: Sara Britt PA-C  Facility: [facility]  Facility Type:  LTC    Labs Reviewed by provider: Chest XR result on 11/17/22           Verbal Order/Direction given by Provider: NNO; PCP updated with result    Provider giving Order:  Sara Dwyer RN

## 2022-11-18 NOTE — ED PROVIDER NOTES
History   Chief Complaint:  Cough       The history is provided by the patient.      Alejandrina Whatley is a 89 year old female with history of dementia, CVA, hypertension, hyperlipidemia, CKD, and hypercholesteremia who presents with cough. Patient reports coughing more than normal over the past week. Patient denies fever, leg swelling, and pain in legs.     After daughter arrived, she expresses concern for 5 months of abdominal pain where pt complains that pants are too tight. Pt currently doesn't express any pain. She states pt does not have history of constipation, but then goes on to say that pt takes miralax multiple times/week.     Review of Systems   Constitutional: Negative for fever.   Respiratory: Positive for cough.    Cardiovascular: Negative for leg swelling.   Gastrointestinal: Positive for abdominal pain and constipation.   Musculoskeletal: Negative for arthralgias and myalgias.   All other systems reviewed and are negative.        Allergies:  Actonel [Bisphosphonates]  Conjugated Estrogens  Macrobid [Nitrofuran Derivatives]  Nitrofurantoin  Premarin  Sulfa Drugs  Hydrocodone  Oxycodone  Risedronate    Medications:  Tylenol  Amlodipine  Aspirin  Lipitor  Carvedilol  Lexapro  Melatonin  Miralax   Senna-docusate   Tramadol     Past Medical History:     Abdominal aortic aneurysm   Actinic keratosis   Cataract   CVA   Dementia  Degenerative joint disease   Glaucoma   Hypertension  Hyperlipidemia   Lumbar spinal stenosis  Osteoporosis   Chronic kidney disease   Hemiparesis of left side   Peripheral neuropathy  Hypercholesteremia    Past Surgical History:    AAA repair  Cataract iol   Extracapsular cataract extraction with intraocular lens implant  Hysterectomy  Laser selective trabeculoplasty  Appendectomy   Anterior vesicourethropexy     Family History:    Heart disease  Hypertension    Social History:  PCP: Sara Britt   Patient came from home.  Patient is unaccompanied in the ED.    Physical  Exam     Patient Vitals for the past 24 hrs:   BP Temp Temp src Pulse Resp SpO2   11/18/22 1557 134/81 97.6  F (36.4  C) Temporal 75 26 94 %       Physical Exam  Nursing note and vitals reviewed.  HENT:   Mouth/Throat: Moist mucous membranes.   Eyes: EOMI, nonicteric sclera  Cardiovascular: Normal rate, regular rhythm, no murmurs, rubs, or gallops  Pulmonary/Chest: Effort normal and breath sounds normal. No respiratory distress. No wheezes. No rales.   Abdominal: Soft. Nontender, nondistended, no guarding or rigidity.   Musculoskeletal: Normal range of motion.   Neurological: Alert. Moves all extremities spontaneously.   Skin: Skin is warm and dry. No rash noted.       Emergency Department Course   Imaging:  Abd/pelvis CT,  IV  contrast only TRAUMA / AAA   Final Result   IMPRESSION:    1.  No explanation for abdominal pain.   2.  A 4.0 cm hypodense lesion in the anterior midpole left kidney which is not consistent with a simple cyst. Recommend nonemergent renal ultrasound.   3.  Nephrolithiasis.         [Recommend Follow Up: Renal cyst]      This report will be copied to the [facility] to ensure a provider acknowledges the finding.          XR Chest 1 View   Final Result   IMPRESSION: Cardiac silhouette remains normal in size. Unchanged mediastinal interfaces. Normal hilar contours and lung vascularity.      Symmetric lung inflation. There are no new alveolar or interstitial opacities.      Diaphragm curvature is normal. No pleural effusion.      Advanced shoulder osteoarthrosis, left worse than right. Radiopaque cement in mid thoracic vertebra with compression deformities.      Imaging independently reviewed and agree with radiologist interpretation.     Report per radiology    Laboratory:  Labs Ordered and Resulted from Time of ED Arrival to Time of ED Departure   BASIC METABOLIC PANEL - Abnormal       Result Value    Sodium 140      Potassium 4.1      Chloride 104      Carbon Dioxide (CO2) 25       Anion Gap 11      Urea Nitrogen 23.2 (*)     Creatinine 0.88      Calcium 9.0      Glucose 116 (*)     GFR Estimate 59 (*)    CBC WITH PLATELETS AND DIFFERENTIAL - Abnormal    WBC Count 10.5      RBC Count 3.41 (*)     Hemoglobin 11.6 (*)     Hematocrit 36.1       (*)     MCH 34.0 (*)     MCHC 32.1      RDW 13.5      Platelet Count 162      % Neutrophils 77      % Lymphocytes 14      % Monocytes 8      % Eosinophils 1      % Basophils 0      % Immature Granulocytes 0      NRBCs per 100 WBC 0      Absolute Neutrophils 8.0      Absolute Lymphocytes 1.4      Absolute Monocytes 0.9      Absolute Eosinophils 0.2      Absolute Basophils 0.0      Absolute Immature Granulocytes 0.0      Absolute NRBCs 0.0     INFLUENZA A/B & SARS-COV2 PCR MULTIPLEX - Normal    Influenza A PCR Negative      Influenza B PCR Negative      RSV PCR Negative      SARS CoV2 PCR Negative     LACTIC ACID WHOLE BLOOD - Normal    Lactic Acid 1.1     NT PROBNP INPATIENT - Normal    N terminal Pro BNP Inpatient 317     TROPONIN T, HIGH SENSITIVITY - Normal    Troponin T, High Sensitivity 14        Emergency Department Course:           Reviewed:  I reviewed nursing notes, vitals, past medical history and Care Everywhere    Assessments:  1612 I obtained history and examined the patient as noted above.   1907 I rechecked the patient and explained findings.     Interventions:  2053 Tessalon 100 mg  PO  2053 Deltasone 40 mg  PO  2101 Zithromax 500 mg  PO    Disposition:  The patient was discharged to home.     Impression & Plan   Medical Decision Making:  Pt presents with CC cough x1 week. Pt with chronic cough and recently worse. Broad ddx considered. No evidence of pneumonia, ACS, heart failure, pleural effusion, or other emergent process. Labs/CXR unremarkable. Given pt's age and increase in sputum, will treat with prednisone/azithromycin for empiric treatment of bronchitis. At time of discharge, daughter at bedside, and is now requesting CT  abd/pel for 5 weeks of abdominal pain not currently present, but per her report is present near daily. Pt certainly at increased risk of malignancy, so CT obtained and is negative for acute process. Incidental nonobstructive kidney stone and renal mass noted and was shared with both pt and daughter. Discussed need for follow-up for additional imaging. Finally, daughter requesting observation admission. Discussed that there is no admitting diagnosis at this time and pt is safe for discharge back to her care facility. All questions answered.       Diagnosis:    ICD-10-CM    1. Acute bronchitis, unspecified organism  J20.9       2. Nephrolithiasis  N20.0       3. Renal mass  N28.89           Discharge Medications:  New Prescriptions    AZITHROMYCIN (ZITHROMAX) 250 MG TABLET    Take 1 tablet (250 mg) by mouth daily for 4 doses    PREDNISONE (DELTASONE) 20 MG TABLET    Take 2 tablets (40 mg) by mouth daily for 4 days       Scribe Disclosure:  Ted UPTON, am serving as a scribe at 4:20 PM on 11/18/2022 to document services personally performed by Timmy Cuello MD based on my observations and the provider's statements to me.            Timmy Cuello MD  11/19/22 2475

## 2022-11-18 NOTE — ED TRIAGE NOTES
Pt arrives from [facility] via EMS for possible URI. Per EMS report pt has chronic wheezing and dyspnea but has developed a productive cough this week.She test negative for covid however, her family would like her tested for influenza. Pt is poor historian, family is on their way here.      Triage Assessment     Row Name 11/18/22 1559       Triage Assessment (Adult)    Airway WDL WDL       Respiratory WDL    Respiratory WDL X;cough    Cough Frequency infrequent    Cough Type productive       Cardiac WDL    Cardiac WDL WDL

## 2022-11-18 NOTE — LETTER
"    11/18/2022        RE: Alejandrina Whatley  [facility]  [address] [address] [facility] [address]        [facility]  PHYSICIAN NOTE    Chief Complaint   Patient presents with     snf Regulatory       HPI:    Alejandrina Whatley is a 89 year old  (8/22/1923), who is being seen today for a federally mandated E/M visit at [facility]. Admitted to LT in early 2022 after a L hip fracture s/p repair with comorbid dementia and h/o CVAs.    She is seen in her room today accompanied by her devoted daughter, Pat, who visits daily often around the noon hour. Alejandrina Noble has had a cough for about a week and today she coughed so hard she had an episode of emesis at the lunchroom table. No known aspiration. Staff haven't seen this but Pat also reports her mom had loose stool. Her vitals have been stable, afebrile, sats fine. Pat is worried her mom is dehydrated and \"needs an IV\". She also fears her mom having another N/V episode and \"choking\" on it and thus requests transfer to the ED for closer monitoring as she can't stay 24-7 and staff isn't able to individually watch her 24-7 either. Of note, there are others on her unit sick with similar respiratory symptoms. Negative COVID testing thus far. Alejandrina Noble did receive her influenza vaccine.     In talking with Alejandrina Noble, she is resting in bed after lunch and is tired out as she often is at this time. Does sleep a majority of the day (about 18 hours per day per daughter) though gets out of bed for meals. Pat has a camera to watch her mom for when she wakes up in AM so she can come and visit as says her mom gets worried if she doesn't seen Pat. Alejandrina Noble was a bit irritable as she thought I'd be coming earlier in the day but she also lacks insight into her symptoms. Denies cough but then coughs several times. Denies on-going nausea. Denies abdominal pain (though Pat says her mom has been c/o pants feeling tight even though they " "have a very loose waist band and Pat is pushing for a CT of the abdomen). Alejandrina Noble says she is willing to go to the ED \"if Pat is there\".     Separately, Pat wants to talk about some conflicting emotions that she has. She brings up \"hospice\" but wonders if \"stopping all medications would be murder\". She says her mom is a [employer] and is anxiously awaiting the time to go to [facility] and is ready to die. But yet Pat struggles in instances like this acute illness as still feels her mom needs interventions such as IVs, CT scans, etc, for comfort. We agree she has some very good questions and we can discuss more at a future visit as Pat is clearly not ready to move to completely comfort based cares right now in this acute illness.    ALLERGIES: Actonel [bisphosphonates], Conjugated estrogens, Macrobid [nitrofuran derivatives], Nitrofurantoin, Premarin, Sulfa drugs, Hydrocodone, Oxycodone, and Risedronate    Past Medical History:   Diagnosis Date     Abdominal aortic aneurysm 2001    had a stent placed 2009     AK (actinic keratosis) 11/11/2009     Cataract 02/22/2011     Compression Fractures of Lumbar Vertebra 02/04/2010     CVA (cerebral vascular accident) (H)     Dec 2019 and Jan 2020     Dementia (H)      DJD (degenerative joint disease)      Generalised Weakness and Fatigue 03/03/2011     Glaucoma (increased eye pressure) 2009    Dr. 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     Strain of shoulder, left 03/03/2011     Urinary incontinence 11/10/2009      CODE STATUS: DNR/I    MEDICATIONS: Reviewed and updated in Epic according to facility MAR  Current Outpatient Medications   Medication Sig Dispense Refill     acetaminophen (TYLENOL) 500 MG tablet Take 1,000 mg by mouth 3 times daily       " albuterol (PROVENTIL) (2.5 MG/3ML) 0.083% neb solution Take 2.5 mg by nebulization every 4 hours as needed for shortness of breath / dyspnea or wheezing       aspirin (ASA) 81 MG chewable tablet Take 81 mg by mouth daily       atorvastatin (LIPITOR) 40 MG tablet Take 40 mg by mouth every evening       benzonatate (TESSALON) 100 MG capsule Take 100 mg by mouth 2 times daily as needed for cough       carvedilol (COREG) 6.25 MG tablet Take 6.25 mg by mouth 2 times daily (with meals)       Cholecalciferol (VITAMIN D3) 50 MCG (2000 UT) TABS Take 2,000 Units by mouth daily       dorzolamide-timolol (COSOPT) 2-0.5 % ophthalmic solution Place 1 drop into both eyes 2 times daily 1 Bottle 11     escitalopram (LEXAPRO) 10 MG tablet Take 10 mg by mouth daily       guaiFENesin (ROBITUSSIN) 20 mg/mL liquid Take 20 mLs (400 mg) by mouth every 4 hours as needed for cough       latanoprost (XALATAN) 0.005 % ophthalmic solution Place 1 drop into both eyes daily       melatonin 3 MG tablet Take 3 mg by mouth nightly as needed for sleep       menthol-zinc oxide (CALMOSEPTINE) 0.44-20.6 % OINT ointment Apply topically as needed       polyethylene glycol (MIRALAX) 17 g packet Take 1 packet by mouth every other day       traMADol (ULTRAM) 50 MG tablet Take 0.5 tablets (25 mg) by mouth daily At 1900 30 tablet 0       ROS:  Limited secondary to cognitive impairment but today pt reports as above in HPI    Exam:  /84   Pulse 73   Temp 98  F (36.7  C)   Resp 16   Ht 1.524 m (5')   Wt 59.9 kg (132 lb 1.6 oz)   SpO2 97%   BMI 25.80 kg/m    Resting in bed in NAD, appears non-toxic  Slightly dry oral mucosa  Not pale or diaphoretic nor jaundiced  Slightly irritable  Heart tones regular  Mild rhonchi L anterior, occasional cough during visit, breathing non-labored  Abdomen soft, NT, +BS, slightly overweight  No edema  Forgetful of recent symptoms (cough)    Lab/Diagnostic Data:    CXR 11/17/2022 Findings:  The chest is compared with  the previous of June 14, 2022.  The cardiac silhouette is upper limits of normal in size. There is no vascular congestion. There is mild ectasia and tortuosity of  the descending thoracic aorta. The lungs are expanded and clear. There have been mid thoracic spine vertebralplasties at two  adjacent levels. There is very advanced DJD of the left shoulder joint.  Impression:  1. Normal chest for age with no change since the previous exam.      ASSESSMENT/PLAN:  (R05.1) Acute cough  (primary encounter diagnosis)  (R11.2) Nausea and vomiting, unspecified vomiting type  (R19.5) Loose stools  (R14.0) Abdominal distention  Likely subacute viral bug that is being passed on unit; COVID testing has been negative  ?Influenza? - she did receive influenza vaccine documented 10/18/22  Hemodynamically stable but is symptomatic and daughter requests ED transfer d/t fear of further progression to instability and the potential for need of more acute care given recent N/V in the dining room today  Daughter is also worried about GI problem and abdominal distention / enlarging abdomen and wants CT scan  Weight is stable however and abdomen non-acute on my exam; Alejandrina Noble denies pain to me  Senna-S discontinued recently but remains on Miralax    (F03.90) Dementia without behavioral disturbance (H)  (Z86.73) H/O: CVA (cerebrovascular accident)  Known comorbidities that surely impact care  Alejandrina Noble is a limited historian due to this (ex: denies cough though is repeatedly coughing during our visit)  However, do need to include her in our visits obviously; daughter Pat also helps with history    (M19.011,  M19.012) Primary osteoarthritis of both shoulders  Appreciate ortho assistance and daughter aware of Alejandrina Noble's advanced DJD bilateral shoulders  Is on scheduled Tylenol and nightly Tramadol  Per daughter the topical Diclofenac wasn't helpful so she requested it be discontinued  They've considered bobby instead of EZ-stand to put less  "strain on shoulders but for now they're planning to continue with EZ-stand    (Z71.89) Advanced directives, counseling/discussion  Started to discuss big picture goals of care with daughter today, Alejandrina Noble is DNR/I but daughter Pat wonders about \"hospice\" but has a fear of \"murdering\" that could go along with this  Daughter and I agreed to talk more after this acute illness as she is set on having mom transfer to ED today  However daughter Pat acknowledges her mom's long standing goal to \"go to Heaven\" and is wondering when that is going to happen (Alejandrina Noble is sleeping during much of this part of the conversation)  Agreed to think of all medical decisions with a comfort plan going forward as that seems to be Alejandrina Noble's preferences though this will take some thoughtful conversations on repeat occasions with daughter Pat who is feeling very overwhelmed  Though comfort cares could be pursued, not sure she actually has hospice qualifying criteria at the moment          Total time spent with patient visit was 40 minutes, including patient visit (2:15 pm - 2:55 pm) and review of past records. Greater than 50% of total time spent with counseling and coordinating care, discussing with daughter/patient and nursing staff topics above including goals of care but current transfer to ED.    Electronically signed by:  Yulissa Garcia DO          Sincerely,        Yulissa Garcia,       "

## 2022-11-18 NOTE — PROGRESS NOTES
"[facility]  PHYSICIAN NOTE    Chief Complaint   Patient presents with     long-term Regulatory       HPI:    Alejandrina Whatley is a 89 year old  (8/22/1923), who is being seen today for a federally mandated E/M visit at [facility]. Admitted to LT in early 2022 after a L hip fracture s/p repair with comorbid dementia and h/o CVAs.    She is seen in her room today accompanied by her devoted daughter, Pat, who visits daily often around the noon hour. Alejandrina Noble has had a cough for about a week and today she coughed so hard she had an episode of emesis at the lunchroom table. No known aspiration. Staff haven't seen this but Pat also reports her mom had loose stool. Her vitals have been stable, afebrile, sats fine. Pat is worried her mom is dehydrated and \"needs an IV\". She also fears her mom having another N/V episode and \"choking\" on it and thus requests transfer to the ED for closer monitoring as she can't stay 24-7 and staff isn't able to individually watch her 24-7 either. Of note, there are others on her unit sick with similar respiratory symptoms. Negative COVID testing thus far. Alejandrina Noble did receive her influenza vaccine.     In talking with Alejandrina Noble, she is resting in bed after lunch and is tired out as she often is at this time. Does sleep a majority of the day (about 18 hours per day per daughter) though gets out of bed for meals. Pat has a camera to watch her mom for when she wakes up in AM so she can come and visit as says her mom gets worried if she doesn't seen Pat. Alejandrina Noble was a bit irritable as she thought I'd be coming earlier in the day but she also lacks insight into her symptoms. Denies cough but then coughs several times. Denies on-going nausea. Denies abdominal pain (though Pat says her mom has been c/o pants feeling tight even though they have a very loose waist band and Pat is pushing for a CT of the abdomen). Alejandrina Noble says she is willing to go to " "the ED \"if Pat is there\".     Separately, Pat wants to talk about some conflicting emotions that she has. She brings up \"hospice\" but wonders if \"stopping all medications would be murder\". She says her mom is a [employer]ism and is anxiously awaiting the time to go to [facility] and is ready to die. But yet Pat struggles in instances like this acute illness as still feels her mom needs interventions such as IVs, CT scans, etc, for comfort. We agree she has some very good questions and we can discuss more at a future visit as Pat is clearly not ready to move to completely comfort based cares right now in this acute illness.    ALLERGIES: Actonel [bisphosphonates], Conjugated estrogens, Macrobid [nitrofuran derivatives], Nitrofurantoin, Premarin, Sulfa drugs, Hydrocodone, Oxycodone, and Risedronate    Past Medical History:   Diagnosis Date     Abdominal aortic aneurysm 2001    had a stent placed 2009     AK (actinic keratosis) 11/11/2009     Cataract 02/22/2011     Compression Fractures of Lumbar Vertebra 02/04/2010     CVA (cerebral vascular accident) (H)     Dec 2019 and Jan 2020     Dementia (H)      DJD (degenerative joint disease)      Generalised Weakness and Fatigue 03/03/2011     Glaucoma (increased eye pressure) 2009    Dr. 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     Strain of shoulder, left 03/03/2011     Urinary incontinence 11/10/2009      CODE STATUS: DNR/I    MEDICATIONS: Reviewed and updated in [facility] according to facility MAR  Current Outpatient Medications   Medication Sig Dispense Refill     acetaminophen (TYLENOL) 500 MG tablet Take 1,000 mg by mouth 3 times daily       albuterol (PROVENTIL) (2.5 MG/3ML) 0.083% neb solution Take 2.5 mg by nebulization every 4 hours as needed for " shortness of breath / dyspnea or wheezing       aspirin (ASA) 81 MG chewable tablet Take 81 mg by mouth daily       atorvastatin (LIPITOR) 40 MG tablet Take 40 mg by mouth every evening       benzonatate (TESSALON) 100 MG capsule Take 100 mg by mouth 2 times daily as needed for cough       carvedilol (COREG) 6.25 MG tablet Take 6.25 mg by mouth 2 times daily (with meals)       Cholecalciferol (VITAMIN D3) 50 MCG (2000 UT) TABS Take 2,000 Units by mouth daily       dorzolamide-timolol (COSOPT) 2-0.5 % ophthalmic solution Place 1 drop into both eyes 2 times daily 1 Bottle 11     escitalopram (LEXAPRO) 10 MG tablet Take 10 mg by mouth daily       guaiFENesin (ROBITUSSIN) 20 mg/mL liquid Take 20 mLs (400 mg) by mouth every 4 hours as needed for cough       latanoprost (XALATAN) 0.005 % ophthalmic solution Place 1 drop into both eyes daily       melatonin 3 MG tablet Take 3 mg by mouth nightly as needed for sleep       menthol-zinc oxide (CALMOSEPTINE) 0.44-20.6 % OINT ointment Apply topically as needed       polyethylene glycol (MIRALAX) 17 g packet Take 1 packet by mouth every other day       traMADol (ULTRAM) 50 MG tablet Take 0.5 tablets (25 mg) by mouth daily At 1900 30 tablet 0       ROS:  Limited secondary to cognitive impairment but today pt reports as above in HPI    Exam:  /84   Pulse 73   Temp 98  F (36.7  C)   Resp 16   Ht 1.524 m (5')   Wt 59.9 kg (132 lb 1.6 oz)   SpO2 97%   BMI 25.80 kg/m    Resting in bed in NAD, appears non-toxic  Slightly dry oral mucosa  Not pale or diaphoretic nor jaundiced  Slightly irritable  Heart tones regular  Mild rhonchi L anterior, occasional cough during visit, breathing non-labored  Abdomen soft, NT, +BS, slightly overweight  No edema  Forgetful of recent symptoms (cough)    Lab/Diagnostic Data:    CXR 11/17/2022 Findings:  The chest is compared with the previous of June 14, 2022.  The cardiac silhouette is upper limits of normal in size. There is no vascular  congestion. There is mild ectasia and tortuosity of  the descending thoracic aorta. The lungs are expanded and clear. There have been mid thoracic spine vertebralplasties at two  adjacent levels. There is very advanced DJD of the left shoulder joint.  Impression:  1. Normal chest for age with no change since the previous exam.      ASSESSMENT/PLAN:  (R05.1) Acute cough  (primary encounter diagnosis)  (R11.2) Nausea and vomiting, unspecified vomiting type  (R19.5) Loose stools  (R14.0) Abdominal distention  Likely subacute viral bug that is being passed on unit; COVID testing has been negative  ?Influenza? - she did receive influenza vaccine documented 10/18/22  Hemodynamically stable but is symptomatic and daughter requests ED transfer d/t fear of further progression to instability and the potential for need of more acute care given recent N/V in the dining room today  Daughter is also worried about GI problem and abdominal distention / enlarging abdomen and wants CT scan  Weight is stable however and abdomen non-acute on my exam; Alejandrina Noble denies pain to me  Senna-S discontinued recently but remains on Miralax    (F03.90) Dementia without behavioral disturbance (H)  (Z86.73) H/O: CVA (cerebrovascular accident)  Known comorbidities that surely impact care  Alejandrina Noble is a limited historian due to this (ex: denies cough though is repeatedly coughing during our visit)  However, do need to include her in our visits obviously; daughter Pat also helps with history    (M19.011,  M19.012) Primary osteoarthritis of both shoulders  Appreciate ortho assistance and daughter aware of Alejandrina Noble's advanced DJD bilateral shoulders  Is on scheduled Tylenol and nightly Tramadol  Per daughter the topical Diclofenac wasn't helpful so she requested it be discontinued  They've considered bobby instead of EZ-stand to put less strain on shoulders but for now they're planning to continue with EZ-stand    (Z71.89) Advanced directives,  "counseling/discussion  Started to discuss big picture goals of care with daughter today, Alejandrina Noble is DNR/I but daughter Pat wonders about \"hospice\" but has a fear of \"murdering\" that could go along with this  Daughter and I agreed to talk more after this acute illness as she is set on having mom transfer to ED today  However daughter Pat acknowledges her mom's long standing goal to \"go to Heaven\" and is wondering when that is going to happen (Alejandrina Noble is sleeping during much of this part of the conversation)  Agreed to think of all medical decisions with a comfort plan going forward as that seems to be Alejandrina Noble's preferences though this will take some thoughtful conversations on repeat occasions with daughter Pat who is feeling very overwhelmed  Though comfort cares could be pursued, not sure she actually has hospice qualifying criteria at the moment          Total time spent with patient visit was 40 minutes, including patient visit (2:15 pm - 2:55 pm) and review of past records. Greater than 50% of total time spent with counseling and coordinating care, discussing with daughter/patient and nursing staff topics above including goals of care but current transfer to ED.    Electronically signed by:  Yulissa Garcia,     "

## 2022-11-19 NOTE — DISCHARGE INSTRUCTIONS
No signs of emergency.  We will treat her bronchitis with prednisone and azithromycin (z-pack).  CT does not show any obvious cause of her pain.   She was incidentally found to have kidney stones still in her kidney, as well as a renal cyst/mass. The kidney stones are not causing any current problems. The renal mass may need additional imaging on a non-emergent basis. Please discuss with her doctor early next week.

## 2022-11-22 NOTE — PROGRESS NOTES
"[facility]  PHYSICIAN NOTE    Chief Complaint   Patient presents with     Nursing Home Acute       HPI:    Alejandrina Whatley is a 89 year old  (8/22/1923), who is being seen today for an ED follow up visit at [facility] I saw her last week with her daughter Pat present in which she was subsequently sent to the ED per daughter's request. Anni was dx with bronchitis, started on a Zpak and short prednisone burst. Had CT of abdomen/pelvis too while she was there d/t daughter's concern that Anni would intermittently be c/o abdominal pain along her pants line now for several months. The CT found nothing acutely worrisome but there is a small L renal mass that isn't necessarily a cyst and f/u could be done if in line with goals of care.    I met Anni in her room today and she was lying in bed awake after just finishing lunch. She says that she is definitely \"better now\", denies cough, abdominal pain, N/V. Recalls being in the ED and it was \"fine\". I had previously discussed some goals of care topics with daughter Pat at last visit and we planned to revisit after Anni returned back to facility but unfortunately I missed Pat today and will call her later. In discussing with Anni she says that she \"looks forward to Heaven - I'm so old\".     I did then later call daryl Stewart on the phone. She too thinks her mom is overall better and had a good experience in the ED despite how busy they were. She felt the [facility] transport team though was a bit rough on her mom though and she was upset about that. She worries about recurrent cough and possibility of restarting prednisone if that is the case. She also recalls the CT scan report and is interested in a f/u US to characterize the renal mass. I did then get her permission to further discuss goals of care. She agrees on focusing on comfort and is able to voice her mom's wishes to \"be in Heaven\" but since she has seen her mom " "bounce back from several acute illnesses and tolerate hip fracture surgery repair earlier this year, it seems hard for her to let go of continuing to be very actively engaged in pursuing work-up, testing, treatment, etc, when clinical scenarios present as they have. She worries about any \"suffering\" her mother would endure. Does have a lot of gripe about staff that she is vocal about and shares and staff likewise try to work with her but I think its been a challenge at times. Pat says, \"I don't know why they don't want to give her special treatment\" and doesn't like how the aides can change from day - to - day. I discussed working together as a team, the staff is trying their best but also tried to be positive about Pat being an advocate for her mom. We talked about the renal lesion and that her mom would never be a good candidate for any therapies if it were a cancer and she seems to realize this but \"wants to know if it will spread\" regardless. However, says has some time to think about it and let me know. Discussed hospice and what that looks like though I actually don't think Anni has any acutely qualifying diagnoses at this time. Discussed Anni's dementia too and related short term memory loss though how she is also able to be an active person in conversations.    ALLERGIES: Actonel [bisphosphonates], Conjugated estrogens, Macrobid [nitrofuran derivatives], Nitrofurantoin, Premarin, Sulfa drugs, Hydrocodone, Oxycodone, and Risedronate    Past Medical History:   Diagnosis Date     Abdominal aortic aneurysm 2001    had a stent placed 2009     AK (actinic keratosis) 11/11/2009     Cataract 02/22/2011     Compression Fractures of Lumbar Vertebra 02/04/2010     CVA (cerebral vascular accident) (H)     Dec 2019 and Jan 2020     Dementia (H)      DJD (degenerative joint disease)      Generalised Weakness and Fatigue 03/03/2011     Glaucoma (increased eye pressure) 2009    Dr. 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     Strain of shoulder, left 03/03/2011     Urinary incontinence 11/10/2009      CODE STATUS: DNR/I    Post Medication Reconciliation Status:  Discharge medications reconciled and changed, see notes/orders       Current Outpatient Medications   Medication Sig Dispense Refill     acetaminophen (TYLENOL) 500 MG tablet Take 1,000 mg by mouth 3 times daily       albuterol (PROVENTIL) (2.5 MG/3ML) 0.083% neb solution Take 2.5 mg by nebulization every 4 hours as needed for shortness of breath / dyspnea or wheezing       aspirin (ASA) 81 MG chewable tablet Take 81 mg by mouth daily       atorvastatin (LIPITOR) 40 MG tablet Take 40 mg by mouth every evening       azithromycin (ZITHROMAX) 250 MG tablet Take 1 tablet (250 mg) by mouth daily for 4 doses 4 tablet 0     benzonatate (TESSALON) 100 MG capsule Take 100 mg by mouth 2 times daily as needed for cough       carvedilol (COREG) 6.25 MG tablet Take 6.25 mg by mouth 2 times daily (with meals)       Cholecalciferol (VITAMIN D3) 50 MCG (2000 UT) TABS Take 2,000 Units by mouth daily       dorzolamide-timolol (COSOPT) 2-0.5 % ophthalmic solution Place 1 drop into both eyes 2 times daily 1 Bottle 11     escitalopram (LEXAPRO) 10 MG tablet Take 10 mg by mouth daily       guaiFENesin (ROBITUSSIN) 20 mg/mL liquid Take 20 mLs (400 mg) by mouth every 4 hours as needed for cough       latanoprost (XALATAN) 0.005 % ophthalmic solution Place 1 drop into both eyes daily       melatonin 3 MG tablet Take 3 mg by mouth nightly as needed for sleep       menthol-zinc oxide (CALMOSEPTINE) 0.44-20.6 % OINT ointment Apply topically as needed       polyethylene glycol (MIRALAX) 17 g packet Take 1 packet by mouth every other day       predniSONE (DELTASONE) 20 MG  "tablet Take 2 tablets (40 mg) by mouth daily for 4 days 8 tablet 0     traMADol (ULTRAM) 50 MG tablet Take 0.5 tablets (25 mg) by mouth daily At 1900 30 tablet 0       ROS:  4 point ROS including Respiratory, CV, GI and , other than that noted in the HPI,  is negative    Exam:  BP (!) 155/98   Pulse 74   Temp 98  F (36.7  C)   Resp 18   Ht 1.524 m (5')   Wt 58.2 kg (128 lb 6.4 oz)   SpO2 96%   BMI 25.08 kg/m    Alert, resting in bed awake in no acute distress  Heart tones regular  Breathing non-labored, no cough, mild anterior expiratory wheezes  Abdomen soft  Sweet and calm disposition today    Lab/Diagnostic Data:    ED labs reviewed including negative influenza, covid and RSV testing; BMP and CBC unremarkable with Hgb stable from previous at 11.6    CXR unremarkable    CT abdomen/pelvis 11/18/22:  IMPRESSION:   1.  No explanation for abdominal pain.  2.  A 4.0 cm hypodense lesion in the anterior midpole left kidney which is not consistent with a simple cyst. Recommend nonemergent renal ultrasound.  3.  Nephrolithiasis.    ASSESSMENT/PLAN:  (Z71.89) Advanced directives, counseling/discussion  (primary encounter diagnosis)  See HPI above as well as visit notes from last week re: conversations I've had with daughter Pat Hutton is DNR/I and there is a sense that daughter Pat is in favor of focusing on comfort measures as well, but it seems ultimately that is hard for her to envision what that looks like and its hard for her to change from traditional aggressive eval/treatment that she is used to as she thinking that is trying to avoid any potential \"suffering\"  Pat also seems to get very detailed oriented and perseverate about all the details of her mom's care  This gets mixed in with some of her fears of the care staff are providing to her mother and Pat feels that she needs to visit daily and check-in on her mom often (and also has a camera in her mom's room)  Thus, think this will be some " "repeated conversations over time; nothing needs to change right now given current state of stability, however, keep goals of care in mind when discussing f/u on left renal mass over the coming weeks-months (see below)  Did some education about hospice but don't think Anni actually would qualify at this time  Anni voices she is very ready to pass when it is her time - \"I look forward to Heaven - I'm so old\"; would recommend to daughter that every treatment decision we make keeps this goal of Anni's in mind    (J40) Bronchitis  Improving clinically; on last day of Zpak and prednisone  Daughter wonders about adding more prednisone if she feels her mom's cough worsens off of it, I guess that would be reasonable  She visits daily and will update care team if concerns    (N28.89) Left renal mass  Noted incidentally on recent CT; would not account for Anni's vague abdominal complaints (not currently present at the time of my recent two visits though)  Discussed with daughter Pat who is going to think about this but is hedging towards wanting an US to \"know\" more details about this mass and \"if it would spread\" though I urged her to think about this and we can also discuss this more at a later time as it likely wouldn't  much (unless it was concerning for malignancy then could consider a discussion of hospice eval); she seems aware that any surgery, etc would not be in her mom's best interest      Electronically signed by:  Yulissa Garcia, DO  "

## 2022-11-22 NOTE — LETTER
"    11/22/2022        RE: Alejandrina Whatley  [facility]  [address] [facility] [address]        [place] [facility]  PHYSICIAN NOTE    Chief Complaint   Patient presents with     Nursing Home Acute       HPI:    Alejandrina Whatley is a 89 year old  (8/22/1923), who is being seen today for an ED follow up visit at [facility]. I saw her last week with her daughter Pat present in which she was subsequently sent to the ED per daughter's request. Anni was dx with bronchitis, started on a Zpak and short prednisone burst. Had CT of abdomen/pelvis too while she was there d/t daughter's concern that Anni would intermittently be c/o abdominal pain along her pants line now for several months. The CT found nothing acutely worrisome but there is a small L renal mass that isn't necessarily a cyst and f/u could be done if in line with goals of care.    I met Anni in her room today and she was lying in bed awake after just finishing lunch. She says that she is definitely \"better now\", denies cough, abdominal pain, N/V. Recalls being in the ED and it was \"fine\". I had previously discussed some goals of care topics with daughter Pat at last visit and we planned to revisit after Anni returned back to facility but unfortunately I missed Pat today and will call her later. In discussing with Anni she says that she \"looks forward to Heaven - I'm so old\".     I did then later call daryl Stewart on the phone. She too thinks her mom is overall better and had a good experience in the ED despite how busy they were. She felt the [facility] transport team though was a bit rough on her mom though and she was upset about that. She worries about recurrent cough and possibility of restarting prednisone if that is the case. She also recalls the CT scan report and is interested in a f/u US to characterize the renal mass. I did then get her permission to further discuss goals of care. She agrees " "on focusing on comfort and is able to voice her mom's wishes to \"be in Heaven\" but since she has seen her mom bounce back from several acute illnesses and tolerate hip fracture surgery repair earlier this year, it seems hard for her to let go of continuing to be very actively engaged in pursuing work-up, testing, treatment, etc, when clinical scenarios present as they have. She worries about any \"suffering\" her mother would endure. Does have a lot of gripe about staff that she is vocal about and shares and staff likewise try to work with her but I think its been a challenge at times. Pat says, \"I don't know why they don't want to give her special treatment\" and doesn't like how the aides can change from day - to - day. I discussed working together as a team, the staff is trying their best but also tried to be positive about Pat being an advocate for her mom. We talked about the renal lesion and that her mom would never be a good candidate for any therapies if it were a cancer and she seems to realize this but \"wants to know if it will spread\" regardless. However, says has some time to think about it and let me know. Discussed hospice and what that looks like though I actually don't think Anni has any acutely qualifying diagnoses at this time. Discussed Anni's dementia too and related short term memory loss though how she is also able to be an active person in conversations.    ALLERGIES: Actonel [bisphosphonates], Conjugated estrogens, Macrobid [nitrofuran derivatives], Nitrofurantoin, Premarin, Sulfa drugs, Hydrocodone, Oxycodone, and Risedronate    Past Medical History:   Diagnosis Date     Abdominal aortic aneurysm 2001    had a stent placed 2009     AK (actinic keratosis) 11/11/2009     Cataract 02/22/2011     Compression Fractures of Lumbar Vertebra 02/04/2010     CVA (cerebral vascular accident) (H)     Dec 2019 and Jan 2020     Dementia (H)      DJD (degenerative joint disease)      Generalised " Weakness and Fatigue 03/03/2011     Glaucoma (increased eye pressure) 2009    Dr. 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     Strain of shoulder, left 03/03/2011     Urinary incontinence 11/10/2009      CODE STATUS: DNR/I    Post Medication Reconciliation Status:  Discharge medications reconciled and changed, see notes/orders       Current Outpatient Medications   Medication Sig Dispense Refill     acetaminophen (TYLENOL) 500 MG tablet Take 1,000 mg by mouth 3 times daily       albuterol (PROVENTIL) (2.5 MG/3ML) 0.083% neb solution Take 2.5 mg by nebulization every 4 hours as needed for shortness of breath / dyspnea or wheezing       aspirin (ASA) 81 MG chewable tablet Take 81 mg by mouth daily       atorvastatin (LIPITOR) 40 MG tablet Take 40 mg by mouth every evening       azithromycin (ZITHROMAX) 250 MG tablet Take 1 tablet (250 mg) by mouth daily for 4 doses 4 tablet 0     benzonatate (TESSALON) 100 MG capsule Take 100 mg by mouth 2 times daily as needed for cough       carvedilol (COREG) 6.25 MG tablet Take 6.25 mg by mouth 2 times daily (with meals)       Cholecalciferol (VITAMIN D3) 50 MCG (2000 UT) TABS Take 2,000 Units by mouth daily       dorzolamide-timolol (COSOPT) 2-0.5 % ophthalmic solution Place 1 drop into both eyes 2 times daily 1 Bottle 11     escitalopram (LEXAPRO) 10 MG tablet Take 10 mg by mouth daily       guaiFENesin (ROBITUSSIN) 20 mg/mL liquid Take 20 mLs (400 mg) by mouth every 4 hours as needed for cough       latanoprost (XALATAN) 0.005 % ophthalmic solution Place 1 drop into both eyes daily       melatonin 3 MG tablet Take 3 mg by mouth nightly as needed for sleep       menthol-zinc oxide (CALMOSEPTINE) 0.44-20.6 % OINT ointment Apply topically as needed        "polyethylene glycol (MIRALAX) 17 g packet Take 1 packet by mouth every other day       predniSONE (DELTASONE) 20 MG tablet Take 2 tablets (40 mg) by mouth daily for 4 days 8 tablet 0     traMADol (ULTRAM) 50 MG tablet Take 0.5 tablets (25 mg) by mouth daily At 1900 30 tablet 0       ROS:  4 point ROS including Respiratory, CV, GI and , other than that noted in the HPI,  is negative    Exam:  BP (!) 155/98   Pulse 74   Temp 98  F (36.7  C)   Resp 18   Ht 1.524 m (5')   Wt 58.2 kg (128 lb 6.4 oz)   SpO2 96%   BMI 25.08 kg/m    Alert, resting in bed awake in no acute distress  Heart tones regular  Breathing non-labored, no cough, mild anterior expiratory wheezes  Abdomen soft  Sweet and calm disposition today    Lab/Diagnostic Data:    ED labs reviewed including negative influenza, covid and RSV testing; BMP and CBC unremarkable with Hgb stable from previous at 11.6    CXR unremarkable    CT abdomen/pelvis 11/18/22:  IMPRESSION:   1.  No explanation for abdominal pain.  2.  A 4.0 cm hypodense lesion in the anterior midpole left kidney which is not consistent with a simple cyst. Recommend nonemergent renal ultrasound.  3.  Nephrolithiasis.    ASSESSMENT/PLAN:  (Z71.89) Advanced directives, counseling/discussion  (primary encounter diagnosis)  See HPI above as well as visit notes from last week re: conversations I've had with daughter Pat Hutton is DNR/I and there is a sense that daughter Pat is in favor of focusing on comfort measures as well, but it seems ultimately that is hard for her to envision what that looks like and its hard for her to change from traditional aggressive eval/treatment that she is used to as she thinking that is trying to avoid any potential \"suffering\"  Pat also seems to get very detailed oriented and perseverate about all the details of her mom's care  This gets mixed in with some of her fears of the care staff are providing to her mother and Pat feels that she needs to visit " "daily and check-in on her mom often (and also has a camera in her mom's room)  Thus, think this will be some repeated conversations over time; nothing needs to change right now given current state of stability, however, keep goals of care in mind when discussing f/u on left renal mass over the coming weeks-months (see below)  Did some education about hospice but don't think Anni actually would qualify at this time  Anni voices she is very ready to pass when it is her time - \"I look forward to Heaven - I'm so old\"; would recommend to daughter that every treatment decision we make keeps this goal of Anni's in mind    (J40) Bronchitis  Improving clinically; on last day of Zpak and prednisone  Daughter wonders about adding more prednisone if she feels her mom's cough worsens off of it, I guess that would be reasonable  She visits daily and will update care team if concerns    (N28.89) Left renal mass  Noted incidentally on recent CT; would not account for Anni's vague abdominal complaints (not currently present at the time of my recent two visits though)  Discussed with daughter Pat who is going to think about this but is hedging towards wanting an US to \"know\" more details about this mass and \"if it would spread\" though I urged her to think about this and we can also discuss this more at a later time as it likely wouldn't  much (unless it was concerning for malignancy then could consider a discussion of hospice eval); she seems aware that any surgery, etc would not be in her mom's best interest      Electronically signed by:  Yulissa Garcia, DO        Sincerely,        Yulissa Garcia, DO      "

## 2022-11-22 NOTE — Clinical Note
FYI, daughter is going to think about follow up renal ultrasound and I'm sure you'll hear more about this as she ponders this

## 2022-12-05 NOTE — TELEPHONE ENCOUNTER
No consent to communicate on file for daughter.    Verbal consent to speak to her daughter.    Patient has been sick for 3 1/2 weeks.    She is coughing and having trouble breathing.    Per protocol patient advised to go to the ER. Daughter agrees with the plan.    Natasha Borden RN on 12/5/2022 at 5:29 PM      Reason for Disposition    [1] MODERATE difficulty breathing (e.g., speaks in phrases, SOB even at rest, pulse 100-120) AND [2] still present when not coughing    Additional Information    Negative: SEVERE difficulty breathing (e.g., struggling for each breath, speaks in single words)    Negative: Bluish (or gray) lips or face now    Negative: [1] Difficulty breathing AND [2] exposure to flames, smoke, or fumes    Negative: [1] Stridor AND [2] difficulty breathing    Negative: Sounds like a life-threatening emergency to the triager    Negative: [1] Previous asthma attacks AND [2] this feels like asthma attack    Negative: Dry cough (non-productive;  no sputum or minimal clear sputum)    Protocols used: COUGH - ACUTE [id number]

## 2022-12-06 NOTE — PHARMACY-ADMISSION MEDICATION HISTORY
Admission medication history interview status for this patient is complete. See [facility] admission navigator for allergy information, prior to admission medications and immunization status.     Medication history interview done, indicate source(s): N/A  Medication history resources (including written lists, pill bottles, clinic record): [facility]  Pharmacy: [facility] - [place], [place] - [address] [phone]    Changes made to PTA medication list: None      Actions taken by pharmacist (provider contacted, etc):None     Additional medication history information:None    Medication reconciliation/reorder completed by provider prior to medication history? No    Prior to Admission medications    Medication Sig Last Dose Taking? Auth Provider Long Term End Date   acetaminophen (TYLENOL) 500 MG tablet Take 1,000 mg by mouth 3 times daily 12/4/2022 at x3 Yes Unknown, Entered By History No    albuterol (PROVENTIL) (2.5 MG/3ML) 0.083% neb solution Take 2.5 mg by nebulization every 4 hours as needed for shortness of breath / dyspnea or wheezing  Yes Unknown, Entered By History     aspirin (ASA) 81 MG chewable tablet Take 81 mg by mouth daily 12/4/2022 at AM Yes Unknown, Entered By History     atorvastatin (LIPITOR) 40 MG tablet Take 40 mg by mouth every evening 12/4/2022 at 1800 Yes Reported, Patient Yes    benzonatate (TESSALON) 100 MG capsule Take 100 mg by mouth 2 times daily as needed for cough  Yes Unknown, Entered By History     carvedilol (COREG) 6.25 MG tablet Take 6.25 mg by mouth 2 times daily (with meals) 12/4/2022 at 1800 (dose held AM of 12/05) Yes Reported, Patient Yes    Cholecalciferol (VITAMIN D3) 50 MCG (2000 UT) TABS Take 2,000 Units by mouth daily 12/4/2022 at AM Yes Unknown, Entered By History     dorzolamide-timolol (COSOPT) 2-0.5 % ophthalmic solution Place 1 drop into both eyes 2 times daily 12/5/2022 at x1 Yes Prakash Gant MD     escitalopram (LEXAPRO) 10  MG tablet Take 10 mg by mouth daily 12/4/2022 at AM Yes Reported, Patient No    guaiFENesin (ROBITUSSIN) 20 mg/mL liquid Take 20 mLs (400 mg) by mouth every 4 hours as needed for cough  Yes Sara Britt PA-C     latanoprost (XALATAN) 0.005 % ophthalmic solution Place 1 drop into both eyes daily 12/5/2022 at 1300 Yes Reported, Patient Yes    melatonin 3 MG tablet Take 3 mg by mouth nightly as needed for sleep  Yes Unknown, Entered By History     menthol-zinc oxide (CALMOSEPTINE) 0.44-20.6 % OINT ointment Apply topically as needed  Yes Unknown, Entered By History     polyethylene glycol (MIRALAX) 17 g packet Take 1 packet by mouth every other day 12/3/2022 Yes Reported, Patient     traMADol (ULTRAM) 50 MG tablet Take 0.5 tablets (25 mg) by mouth daily At 1900 12/4/2022 at 1900 Yes Sara Britt PA-C

## 2022-12-06 NOTE — ED TRIAGE NOTES
"Presents to ED from [facility] for SOB. Daughter not giving albuterol inhaler because \"it makes her cough too much.\" Pt coarse on arrival, wheezing heard. EMS gave one DuoNeb. Hx dementia, at baseline. Here two weeks ago and diagnosed with bronchitis.      Triage Assessment     Row Name 12/05/22 [id number]       Triage Assessment (Adult)    Airway WDL X;airway symptoms    Additional Documentation Breath Sounds (Group)       Respiratory WDL    Respiratory WDL X;rhythm/pattern    Rhythm/Pattern, Respiratory shortness of breath       Breath Sounds    Breath Sounds All Fields    All Lung Fields Breath Sounds Posterior:;coarse       Skin Circulation/Temperature WDL    Skin Circulation/Temperature WDL WDL       Cardiac WDL    Cardiac WDL WDL       Peripheral/Neurovascular WDL    Peripheral Neurovascular WDL WDL       Cognitive/Neuro/Behavioral WDL    Cognitive/Neuro/Behavioral WDL WDL  baseline              "

## 2022-12-06 NOTE — ED PROVIDER NOTES
History     Chief Complaint:  Cough and Shortness of Breath       HPI   Alejandrina Whatley is a 89 year old female who presents for evaluation of dementia, CVA, HTN, Hyperlipidemia, CKD who presents with cough and SOB.  The patient was seen here in the ED on 11/18/22 for 1 week of cough and was discharged on Zithromax and Prednisone after she had a CXR which was negative for pneumonia and negative COVID-19 and Influenza testing.  She arrives today due to worsened cough and feeling SOB.  She arrives via paramedics from [facility].  Medics gave her a Duoneb and stated that her Oxygen Saturation on Room Air was 90%.  Her daughter arrived and states that her condition worsened a few days ago when she became short of breath and the wheezing worsened.  She reports that the patient complained of chest pain yesterday and then started vomiting this morning at 7 AM.  She denies any fevers or chills.  Denies any change in her abdominal pain.  The patient has had ongoing abdominal pain and had a CT scan of her abdomen/pelvis on 11/18/2022 which was unremarkable.  The patient denies painful urination or urinary frequency.    Review of Systems   Unable to perform ROS: Dementia   Constitutional: Negative for chills and fever.   Respiratory: Positive for cough and shortness of breath. Negative for chest tightness.    Cardiovascular: Negative for chest pain.   Gastrointestinal: Negative for abdominal pain.   Genitourinary: Negative for dysuria.     Allergies:  Actonel [Bisphosphonates]  Conjugated Estrogens  Macrobid [Nitrofuran Derivatives]  Nitrofurantoin  Premarin  Sulfa Drugs  Hydrocodone  Oxycodone  Risedronate     Medications:    acetaminophen (TYLENOL) 500 MG tablet  albuterol (PROVENTIL) (2.5 MG/3ML) 0.083% neb solution  aspirin (ASA) 81 MG chewable tablet  atorvastatin (LIPITOR) 40 MG tablet  benzonatate (TESSALON) 100 MG capsule  carvedilol (COREG) 6.25 MG tablet  Cholecalciferol (VITAMIN D3) 50 MCG (2000 UT)  TABS  dorzolamide-timolol (COSOPT) 2-0.5 % ophthalmic solution  escitalopram (LEXAPRO) 10 MG tablet  guaiFENesin (ROBITUSSIN) 20 mg/mL liquid  latanoprost (XALATAN) 0.005 % ophthalmic solution  melatonin 3 MG tablet  menthol-zinc oxide (CALMOSEPTINE) 0.44-20.6 % OINT ointment  polyethylene glycol (MIRALAX) 17 g packet  traMADol (ULTRAM) 50 MG tablet        Past Medical History:    Past Medical History:   Diagnosis Date     Abdominal aortic aneurysm 2001     AK (actinic keratosis) 11/11/2009     Cataract 02/22/2011     Compression Fractures of Lumbar Vertebra 02/04/2010     CVA (cerebral vascular accident) (H)      Dementia (H)      DJD (degenerative joint disease)      Generalised Weakness and Fatigue 03/03/2011     Glaucoma (increased eye pressure) 2009     HTN (hypertension) 11/11/2009     Hyperlipidemia LDL goal <100 10/31/2010     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     Lumbar spinal stenosis 02/04/2010     Osteoporosis, post-menopausal 11/10/2009     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  Dr. Clark.     HYSTERECTOMY, CERVIX STATUS UNKNOWN  1971     LASER SELECTIVE TRABECULOPLASTY  3/2010; 10/2015    left eye 1st Clark (notes show inf treatment); inf 180 (MARI)     LASER SELECTIVE TRABECULOPLASTY  12/2017    left eye sup 180     ZZC ANTER VESICOURETHROPEXY,SIMPLE  2008    Helped     ZZC APPENDECTOMY  1971        Family History:    family history includes Heart Disease (age  of onset: 79) in her father; Hypertension in her mother.    Social History:  Presents by Medics, daughter here    Physical Exam     Patient Vitals for the past 24 hrs:   BP Temp Temp src Pulse Resp SpO2   12/05/22 1843 (!) 190/118 99.2  F (37.3  C) Oral 117 (!) 36 93 %        Physical Exam  Nursing note and vitals reviewed.  Constitutional:  Appears well-developed and well-nourished. Agitated.  Productive sounding cough.  Audible wheezing  HENT:   Head:    Atraumatic.   Mouth/Throat:   Oropharynx is clear and moist. No oropharyngeal exudate.   Eyes:    Pupils are equal, round, and reactive to light.   Neck:    Normal range of motion. Neck supple.      No tracheal deviation present. No thyromegaly present.   Cardiovascular:  Normal rate, regular rhythm, no murmur   Pulmonary/Chest: Few bilateral crackles and expiratory wheezes.  Abdominal:   Soft. Bowel sounds are normal. Exhibits no distension and      no mass. There is no tenderness.      There is no rebound and no guarding.   Musculoskeletal:  Exhibits no edema.   Lymphadenopathy:  No cervical adenopathy.   Neurological:   Alert and oriented to person.  Skin:    Skin is warm and dry. No rash noted. No pallor.       Emergency Department Course     Results for orders placed or performed during the hospital encounter of 12/05/22 (from the past 24 hour(s))   CBC with platelets differential    Narrative    The following orders were created for panel order CBC with platelets differential.  Procedure                               Abnormality         Status                     ---------                               -----------         ------                     CBC with platelets and d...[[id number]]  Abnormal            Final result                 Please view results for these tests on the individual orders.   Blood gas venous   Result Value Ref Range    pH Venous 7.43 7.32 - 7.43    pCO2 Venous 37 (L) 40 - 50 mm Hg    pO2 Venous 49 (H) 25 - 47 mm Hg    Bicarbonate  Venous 25 21 - 28 mmol/L    Base Excess/Deficit (+/-) 0.4 -7.7 - 1.9 mmol/L    FIO2 0    Basic metabolic panel   Result Value Ref Range    Sodium 140 136 - 145 mmol/L    Potassium 3.9 3.4 - 5.3 mmol/L    Chloride 106 98 - 107 mmol/L    Carbon Dioxide (CO2) 21 (L) 22 - 29 mmol/L    Anion Gap 13 7 - 15 mmol/L    Urea Nitrogen 15.3 8.0 - 23.0 mg/dL    Creatinine 0.70 0.51 - 0.95 mg/dL    Calcium 9.0 8.2 - 9.6 mg/dL    Glucose 160 (H) 70 - 99 mg/dL    GFR Estimate 77 >60 mL/min/1.73m2   Lactic acid whole blood   Result Value Ref Range    Lactic Acid 1.4 0.7 - 2.0 mmol/L   CBC with platelets and differential   Result Value Ref Range    WBC Count 13.4 (H) 4.0 - 11.0 10e3/uL    RBC Count 3.47 (L) 3.80 - 5.20 10e6/uL    Hemoglobin 11.6 (L) 11.7 - 15.7 g/dL    Hematocrit 36.2 35.0 - 47.0 %     (H) 78 - 100 fL    MCH 33.4 (H) 26.5 - 33.0 pg    MCHC 32.0 31.5 - 36.5 g/dL    RDW 14.3 10.0 - 15.0 %    Platelet Count 191 150 - 450 10e3/uL    % Neutrophils 80 %    % Lymphocytes 12 %    % Monocytes 7 %    % Eosinophils 0 %    % Basophils 0 %    % Immature Granulocytes 1 %    NRBCs per 100 WBC 0 <1 /100    Absolute Neutrophils 10.7 (H) 1.6 - 8.3 10e3/uL    Absolute Lymphocytes 1.6 0.8 - 5.3 10e3/uL    Absolute Monocytes 0.9 0.0 - 1.3 10e3/uL    Absolute Eosinophils 0.1 0.0 - 0.7 10e3/uL    Absolute Basophils 0.0 0.0 - 0.2 10e3/uL    Absolute Immature Granulocytes 0.1 <=0.4 10e3/uL    Absolute NRBCs 0.0 10e3/uL   Extra Tube    Narrative    The following orders were created for panel order Extra Tube.  Procedure                               Abnormality         Status                     ---------                               -----------         ------                     Extra Green Top (Lithium...[[id number]]                      In process                   Please view results for these tests on the individual orders.   EKG 12-lead, tracing only   Result Value Ref Range    Systolic Blood Pressure  mmHg    Diastolic Blood  Pressure  mmHg    Ventricular Rate 112 BPM    Atrial Rate 112 BPM    MS Interval 188 ms    QRS Duration 66 ms     ms    QTc 434 ms    P Axis 11 degrees    R AXIS -12 degrees    T Axis 268 degrees    Interpretation ECG       Sinus tachycardia  Nonspecific ST and T wave abnormality  Abnormal ECG  When compared with ECG of 03-JUL-2022 17:25,  Vent. rate has increased BY  42 BPM  T wave inversion now evident in Lateral leads  ECG reviewed by Dr. Yin Stone at 19:50 on 12/5/22       CT Chest Pulmonary Embolism w Contrast   Final Result   IMPRESSION:   1.  No evidence for pulmonary embolism.    2.  Moderate bronchitis/bronchiolitis.   3.  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            Emergency Department Course:    Reviewed:  I reviewed nursing notes, vitals, and past medical history    Assessments:   I obtained history and examined the patient as noted above.     Interventions:  Medications   piperacillin-tazobactam (ZOSYN) intermittent infusion 4.5 g (0 g Intravenous Stopped 12/5/22 2132)   methylPREDNISolone sodium succinate (solu-MEDROL) injection 125 mg (125 mg Intravenous Given 12/5/22 2038)   levalbuterol (XOPENEX) neb solution 1.25 mg (1.25 mg Nebulization Given 12/5/22 2038)   CT Scan Flush (73 mLs Intravenous Given 12/5/22 2148)   iopamidol (ISOVUE-370) solution 500 mL (52 mLs Intravenous Given 12/5/22 2148)   Doxycycline 100 mg IV 0047    Disposition:  The patient was admitted to the hospital under the care of Dr. Nur.     Impression & Plan      Medical Decision Making:  I found this patient to have acute respiratory distress due to acute bronchitis with bronchiolitis due to RSV infection possibly combined with bacterial bronchitis.  She was given Zosyn and doxycycline for antibiotic coverage since she did vomit this morning and I had concern for also possible aspiration pneumonitis initially.  She given supplemental oxygen,  nebulizer treatments and steroids.  She was admitted to the care of the hospitalist  for further treatment.  There was no sign of pulmonary embolism, myocardial infarction or cardiac arrhythmia.  She was not having any symptoms concerning for serious intra-abdominal process or meningitis either.    Diagnosis:    ICD-10-CM    1. Acute bronchitis with wheezing  J20.9       2. RSV infection  B33.8           Discharge Medications:  New Prescriptions    No medications on file        12/5/2022   Yin Stone MD [place], Cheri Lee, MD  12/06/22 0135

## 2022-12-06 NOTE — ED NOTES
"[facility]  ED Nurse Handoff Report    Alejandrina Whatley is a 89 year old female   ED Chief complaint: Shortness of Breath  . ED Diagnosis:   Final diagnoses:   Acute bronchitis with wheezing   RSV infection     Allergies:   Allergies   Allergen Reactions     Actonel [Bisphosphonates] Rash     Conjugated Estrogens Rash     Macrobid [Nitrofuran Derivatives] Rash     Nitrofurantoin Rash     Premarin Rash     Sulfa Drugs Rash     Hydrocodone Nausea and Vomiting     Oxycodone Nausea and Vomiting     Risedronate      leg pain       Code Status: DNR / DNI  Activity level - Baseline/Home:  Assist X 1. Activity Level - Current:   Assist X 2. Lift room needed: No. Bariatric: No   Needed: No   Isolation: Yes. Infection: Not Applicable  Other .     Vital Signs:   Vitals:    12/05/22 2130 12/05/22 2200 12/05/22 2205 12/05/22 2210   BP: (!) 151/90 127/87     Pulse: 92  86 84   Resp:       Temp:       TempSrc:       SpO2: 96%  98% 97%       Cardiac Rhythm:  ,      Pain level:    Patient confused: Yes. Patient Falls Risk: Yes.   Elimination Status: Has voided   Patient Report - Initial Complaint: SOB. Focused Assessment: Respiratory (Adult) Airway WDL: WDL   Respiratory WDL Respiratory WDL: .WDL except; all; cough  (Pt c/o SOB and cough. Was seen here a couple weeks ago and diagnosed with bronchitis. Daughter has not been giving albuteral inhaler because it \"makes her cough\". 1 duoneb given en route via EMS and one albuterol neb given in ED.)  Rhythm/Pattern, Respiratory: dyspnea on exertion; shortness of breath  Cough Frequency: frequent  Cough Type: productive; congested    Tests Performed: Labs, imaging. Abnormal Results:   Labs Ordered and Resulted from Time of ED Arrival to Time of ED Departure   INFLUENZA A/B & SARS-COV2 PCR MULTIPLEX - Abnormal       Result Value    Influenza A PCR Negative      Influenza B PCR Negative      RSV PCR Positive (*)     SARS CoV2 PCR Negative     TROPONIN T, HIGH " SENSITIVITY - Abnormal    Troponin T, High Sensitivity 16 (*)    BLOOD GAS VENOUS - Abnormal    pH Venous 7.43      pCO2 Venous 37 (*)     pO2 Venous 49 (*)     Bicarbonate Venous 25      Base Excess/Deficit (+/-) 0.4      FIO2 0     BASIC METABOLIC PANEL - Abnormal    Sodium 140      Potassium 3.9      Chloride 106      Carbon Dioxide (CO2) 21 (*)     Anion Gap 13      Urea Nitrogen 15.3      Creatinine 0.70      Calcium 9.0      Glucose 160 (*)     GFR Estimate 77     CBC WITH PLATELETS AND DIFFERENTIAL - Abnormal    WBC Count 13.4 (*)     RBC Count 3.47 (*)     Hemoglobin 11.6 (*)     Hematocrit 36.2       (*)     MCH 33.4 (*)     MCHC 32.0      RDW 14.3      Platelet Count 191      % Neutrophils 80      % Lymphocytes 12      % Monocytes 7      % Eosinophils 0      % Basophils 0      % Immature Granulocytes 1      NRBCs per 100 WBC 0      Absolute Neutrophils 10.7 (*)     Absolute Lymphocytes 1.6      Absolute Monocytes 0.9      Absolute Eosinophils 0.1      Absolute Basophils 0.0      Absolute Immature Granulocytes 0.1      Absolute NRBCs 0.0     D DIMER QUANTITATIVE - Abnormal    D-Dimer Quantitative 3.35 (*)    NT PROBNP INPATIENT - Normal    N terminal Pro BNP Inpatient 450     LACTIC ACID WHOLE BLOOD - Normal    Lactic Acid 1.4     ROUTINE UA WITH MICROSCOPIC REFLEX TO CULTURE   BLOOD CULTURE   BLOOD CULTURE     CT Chest Pulmonary Embolism w Contrast   Final Result   IMPRESSION:   1.  No evidence for pulmonary embolism.    2.  Moderate bronchitis/bronchiolitis.   3.  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        .   Treatments provided: See MAR. Frequent monitoring of pt.  Family Comments: Daughter was present at bedside but went home for the night.  OBS brochure/video discussed/provided to patient:  Yes  ED Medications:   Medications   doxycycline (VIBRAMYCIN) 100 mg vial to attach to  mL bag (100 mg Intravenous New Bag 12/5/22  2203)   piperacillin-tazobactam (ZOSYN) intermittent infusion 4.5 g (0 g Intravenous Stopped 12/5/22 2132)   methylPREDNISolone sodium succinate (solu-MEDROL) injection 125 mg (125 mg Intravenous Given 12/5/22 2038)   levalbuterol (XOPENEX) neb solution 1.25 mg (1.25 mg Nebulization Given 12/5/22 2038)   CT Scan Flush (73 mLs Intravenous Given 12/5/22 2148)   iopamidol (ISOVUE-370) solution 500 mL (52 mLs Intravenous Given 12/5/22 2148)     Drips infusing:  No  For the majority of the shift, the patient's behavior Green. Interventions performed were N/a.    Sepsis treatment initiated: No     Patient tested for COVID 19 prior to admission: YES    ED Nurse Name/Phone Number: Nicol Ann RN,   10:26 PM    RECEIVING UNIT ED HANDOFF REVIEW    Above ED Nurse Handoff Report was reviewed: Yes  Reviewed by: Bell Saul RN on December 6, 2022 at 2:31 PM

## 2022-12-06 NOTE — PROGRESS NOTES
"[facility] RCAT     Date: 12/6/2022  Admission Dx: RSV  Pulmonary History: RAD  Home Nebulizer/MDI Use: Albuterol Q4prn  Home Oxygen: Room air  Acuity Level (RCAT flow sheet): 3  Aerosol Therapy initiated: Albuterol changed from Q4 hours wile awake to QID and Q4 hours prn.      Pulmonary Hygiene initiated: Deep breath and cough TID      Volume Expansion initiated: IS TID      Current Oxygen Requirements: 2 Lpm NC  Current SpO2: 2 Lpm    Re-evaluation date: 12/9/2022    Patient Education: Informed Pt of RCAT and the effects of albuterol.      See \"RT Assessments\" flow sheet for patient assessment scoring and Acuity Level Details.             "

## 2022-12-06 NOTE — ED NOTES
Bed: HW09  Expected date:   Expected time:   Means of arrival:   Comments:  [address]. 89F SOB VSS

## 2022-12-06 NOTE — PROGRESS NOTES
ROOM # 224    Living Situation (if not independent, order SW consult):  Facility name:  : [facility] [phone]    Activity level at baseline: Ax1  Activity level on admit: Ax2    Who will be transporting you at discharge: EMS    Patient registered to observation; given Patient Bill of Rights; given the opportunity to ask questions about observation status and their plan of care.  Patient has been oriented to the observation room, bathroom and call light is in place.    Discussed discharge goals and expectations with patient/family.

## 2022-12-06 NOTE — H&P
Admitted: 12/05/2022    CHIEF COMPLAINT:  Cough and shortness of breath.    HISTORY OF PRESENT ILLNESS:  Ms. Whatley is a 89-year-old female with a history of dementia, CVA, hypertension, and hyperlipidemia.  She resides in a long-term care facility at [facility].  She presented to the hospital today for the above concerns.  It sounds like she has been feeling ill for quite some time.  She had similar symptoms and was seen on 11/18/2022.  At that time, she was diagnosed with bronchitis and administered azithromycin course as well as prednisone course. During that evaluation, her workup included a PCR for influenza, RSV, and COVID-19, all of which were negative.  Chest x-ray done at that time showed no pneumonia.    It sounds like the patient has had some persistent coughing and shortness of breath since that time.  Symptoms worsened over the past several days, which prompted her appearance here in the Emergency Department this evening.    On arrival to the ER, vital signs included a blood pressure of 190/118 with a heart rate of 117.  Temperature 99.2 degrees.  Saturation 93% on room air.  Oxygen was applied and saturations are in the mid 90% range on 2 liters.    Lab workup included a white cell count of 13.4.  Hemoglobin 11.6.  VBG showed a normal pH at 7.43 with a pCO2 of 37.  Lactic acid was normal.  Blood cultures are obtained and pending.  BNP is 450.  Troponin is detectable at 16.  RSV PCR is positive.  PCR for COVID and influenza are both negative.  D-dimer is 3.35.      Imaging included chest CT scan with contrast, which showed no evidence of pulmonary embolism.  There is moderate bronchitis/bronchiolitis and severe mainstem bronchial narrowing compatible with excessive dynamic airway collapse or bronchomalacia.    I spoke to the ER provider.  ER provider did speak with the patient's daughter who was here earlier this evening.  Unfortunately, the patient's daughter has now left for the evening.  I  obtained history talking to the ER provider, reviewing medical records, talking to the patient. Plan is for admission observation.    PAST MEDICAL HISTORY:    1.  Dementia.  The patient resides in [facility].  2.  History of cerebrovascular accident.  3.  Hypertension.  4.  Hyperlipidemia.    MEDICATIONS:  Await pharmacy reconciliation medication list.    ALLERGIES:    1.  ACTONEL.  2.  CONJUGATED ESTROGENS.  3.  MACROBID.  4.  PREMARIN.  5.  SULFA.  6.  HYDROCODONE.  7.  OXYCODONE.  8.  RISEDRONATE.    FAMILY HISTORY:  Reviewed.  Nothing contributory to this admission.    SOCIAL HISTORY:  Patient resides in a long-term care center at [facility].  Does not smoke or drink alcohol.  Code status reviewed in the chart.  The patient has consistently been DNR/DNI during her admissions and appearances in the hospital this year.    REVIEW OF SYSTEMS:  Somewhat unreliable in this patient with chronic cognitive dysfunction.  As best as able, comprehensive greater than 10-point review of systems is otherwise negative besides that detailed above.    PHYSICAL EXAMINATION:    VITAL SIGNS:  Blood pressure is currently 150/90 with a heart rate of 90.  Temperature 99.2 degrees, saturation 91% on 2 liters of oxygen.  GENERAL:  The patient appears nontoxic.  She has some mild tachypnea and mild conversational dyspnea when I am seeing her.  Suboptimal historian.  She is able to provide some accurate information, but is unable to tell me a few basic things, including where she currently lives.  HEENT:  Head is atraumatic.  Sclerae white.  Eyelids normal.  Conjunctivae normal.  Extraocular movements are intact.  NECK:  Supple.  No cervical or supraclavicular lymphadenopathy.  CARDIOVASCULAR:  Heart is regular rate and rhythm.  No significant murmurs.  No lower extremity edema.  LUNGS:  Notable for diffuse wheezes with expiration.  She has some rhonchi present.  She has some mild conversational dyspnea.  Mild  tachypnea.  ABDOMEN:  Nontender and nondistended bruits.  Soft.  No masses.  No organomegaly.  EXTREMITIES:  Show no edema.  SKIN:  Reveals no rashes.  No jaundice.  Skin is dry to touch.  NEUROLOGIC:  Cranial nerves II-XII are intact.  Moves all extremities appropriately.  Sensation intact to light touch in the upper and lower extremities bilaterally.  PSYCHIATRIC:  The patient is awake and alert.  Mild to moderate cognitive dysfunction noted as above.  Not agitated or anxious.  I understand she will remain in the hospital this evening.    LABORATORY AND IMAGING DATA:  Reviewed above in HPI.    EKG done this evening.  I personally reviewed and shows a sinus tachycardia with a heart rate 112 beats per minute.  No acute ST changes or pathologic Q-waves.    IMPRESSION:  Ms. Whtaley is a 89-year-old female with a history of dementia, who resides in a long-term care facility.  She presented to the hospital today with concerns about cough and shortness of breath.  She describes yellow sputum.  She was seen in the ER on 11/18/2022 for similar symptoms.  At that time, her workup included PCR for influenza, COVID-19 and RSV that were all negative.  She was diagnosed with bronchitis and prescribed azithromycin and prednisone course.    This evening in the ER, vital signs notable for hypertension.  Workup included a PCR positive for RSV.  White cell count is elevated at 13.4, otherwise unremarkable labs.  Chest CT scan with contrast showed no PE.  There was moderate bronchitis present and severe mainstem bronchial narrowing compatible with excessive dynamic airway collapse or bronchomalacia.  1.  Acute RSV infection, suspect causing patient's current symptoms.  2.  Reactive airway disease exacerbation due to acute viral infection above.  Although she was not hypoxic on arrival, she does have wheezing on exam and has some conversational dyspnea.  3.  Detectable troponin level, suspect demand ischemia related to acute respiratory  issues above.  4.  Mild leukocytosis, likely due to work of breathing/viral infection.  No evidence of bacterial infection currently.  5.  Bronchitis noted on CT imaging consistent with RSV infection.  6.  Notable finding of severe mainstem bronchial narrowing either due to excessive dynamic airway collapse or bronchomalacia, clinical significance of this unclear.  If this is bronchomalacia, suspect she has had this chronically and unlikely to be a new finding.    PLAN:    1.  Admit to observation.  We will start with observation status for now, but would not be surprised if she is unable to discharge by tomorrow and needs conversion to inpatient status.  There is a possibility,however, she will turn around quickly with therapy.  2.  We will administer IV Solu-Medrol 60 mg every 12 hours, likely convert to oral prednisone by tomorrow assuming clinical improvement.  3.  Albuterol nebulizer scheduled every 4 hours while awake, and every 2 hours as needed.  4.  Check procalcitonin level for completeness, though I doubt a superimposed bacterial infection here and I suspect all of her symptoms can be explained by RSV infection.  5.  Repeat labs in the morning.  6.  Gentle IV fluids overnight.  7.  Code status:  DNR/DNI based on my review of previous code status orders earlier this year.  In addition, the patient does have a POLST form in the chart that was signed 2022 that is also consistent with a DNR/DNI.    Jerome Nur MD        D: 2022   T: 2022   MT: [id number]    Name:     JONY DONALDSON  MRN:      [id number]        Account:     [id number]   :      1923           Admitted:    2022       Document: [id number]

## 2022-12-06 NOTE — PROGRESS NOTES
"[facility]    Medicine Progress Note - Hospitalist Service    Date of Admission:  12/5/2022    Assessment & Plan   Alejandrina Whatley is a 89 year old female admitted on 12/5/2022.     Is a 89-year-old womanMary Aide Whatley with dementia who resides in long-term care at [facility] and came to attention the emergency department at [facility] on 12/5/2022 for shortness of breath and cough.  Recent history is notable for diagnosis of bronchitis in mid November.  Patient has not been able to use bronchodilator because it is her cough too much.  Jerome Nur MD admitted the patient to observation with a working diagnosis of RSV infection.  CT scan did indicate evidence of \"moderate bronchitis... And severe mainstem bronchial narrowing compatible with excessive dynamic airway collapse or bronchomalacia.\"    I spoke with the patient's daughter at some length regarding various etiologies as well as evaluation for bronchomalacia.  Certainly, it appears that the patient may be having episodes of cough that are related to that entity.  In any event, there is really nothing to offer in the way of further evaluation (bronchoscopy and dynamic CT scanning is not something she can tolerate) and certainly there is no therapy that would be desirable.  This should be considered a chronic condition patient and the family has to do their best to help her avoid exposure to pathogens.    Diagnoses  1.  Acute RSV infection. Bronchospasm without significant hypoxia.  2.  Incidental finding of bronchomalacia on CT scan.  Unclear significance of this finding, but if it is in fact reflective of a tendency to bronchomalacia, there is nothing to offer the patient.  It may put her at greater risk of having trouble with lower respiratory infections such as we are seeing at this time.  3.  Elevated troponin T not due to acute coronary syndrome.  4.  Mild leukocytosis.  5.  Bronchitis, unclear whether this is " bacterial and may benefit from empiric antibiotic treatment.    Plan:  1.  Patient has been initiated on steroids for the wheezing.  Change to prednisone 40 mg daily starting tomorrow  2.  Bronchodilators prn.  3.  Because the patient was exposed to azithromycin to cover atypicals organisms for a cough about 3 weeks ago, rather than using doxycycline, I think Augmentin may be a better choice.        Diet: Regular Diet Adult    DVT Prophylaxis: Pneumatic Compression Devices  Clark Catheter: Not present  Central Lines: None  Cardiac Monitoring: None  Code Status: No CPR- Do NOT Intubate      Disposition Plan      Expected Discharge Date: 12/07/2022                The patient's care was discussed with the Patient and Patient's Family.    Yifan Jama MD  Hospitalist Service  [facility]  Securely message with the Vocera Web Console (learn more here)  Text page via [email or web address] Paging/[employer]       Clinically Significant Risk Factors Present on Admission                    # Dementia: noted on problem list   # Overweight: Estimated body mass index is 25.08 kg/m  as calculated from the following:    Height as of 11/22/22: 1.524 m (5').    Weight as of 11/22/22: 58.2 kg (128 lb 6.4 oz).           ______________________________________________________________________    Interval History   Chart reviewed, pt interviewed.    Although the patient herself does not have many complaints, her daughter has a lot of questions and is very carefully monitoring the her mother's management.  As noted above, we had an extended conversation about what I could find out about bronchomalacia.  She defers on any type of further evaluation as well as any type of specific treatment for this condition.    Data reviewed today: I reviewed all medications, new labs and imaging results over the last 24 hours. I personally reviewed CT Chest PE protocol.    Physical Exam   Vital Signs: Temp: 99.2  F (37.3  C) Temp src: Oral BP:  (!) 148/79 Pulse: 63   Resp: (!) 36 SpO2: 97 % O2 Device: Nasal cannula Oxygen Delivery: 2 LPM  Weight: 0 lbs 0 oz  Constitutional: Ms. Puente is an elderly woman appears pale but resting calmly in bed.  Respiratory: No increased work of breathing, good air exchange, clear to auscultation bilaterally, scant peripheral crackles  Cardiovascular: regular rate and rhythm and no murmur noted  GI: normal bowel sounds, soft and non-tender  Skin: no bruising or bleeding and normal skin color, texture, turgor  Musculoskeletal: Perhaps trace pitting edema over the ankles bilaterally  Neurologic: Awake and alert.  Appears appropriate to the situation.  Also seems fairly passive and does not have    Data   Recent Labs   Lab 12/06/22  0850 12/05/22  1926   WBC 10.7 13.4*   HGB 11.4* 11.6*   * 104*    191    140   POTASSIUM 3.9 3.9   CHLORIDE 107 106   CO2 22 21*   BUN 18.6 15.3   CR 0.70 0.70   ANIONGAP 11 13   TRANG 8.8 9.0   * 160*     Recent Results (from the past 24 hour(s))   CT Chest Pulmonary Embolism w Contrast    Narrative    EXAM: CT CHEST PULMONARY EMBOLISM W CONTRAST  LOCATION: [facility]  DATE/TIME: 12/5/2022 9:55 PM    INDICATION: Chest pain, SOB, cough and elevated D dimer  COMPARISON: CT abdomen pelvis 11/18/2022  TECHNIQUE: CT chest pulmonary angiogram during arterial phase injection of IV contrast. Multiplanar reformats and MIP reconstructions were performed. Dose reduction techniques were used.   CONTRAST: 52mL Isovue 370    FINDINGS:  ANGIOGRAM CHEST: No pulmonary embolism. Pulmonary arteries normal in caliber. Thoracic aorta normal in caliber. No aortic dissection. Tortuous descending thoracic aorta.    HEART: Cardiac chambers within normal limits. No pericardial effusion. Severe coronary artery calcification.    MEDIASTINUM: No adenopathy or mass.    LUNGS AND PLEURA: Extensive patchy air trapping. Diffuse bronchial wall thickening. Severe mainstem bronchial  narrowing, presumably during expiration. No pulmonary mass, consolidation, or definite suspicious pulmonary nodule. Lungs partially obscured by   motion. No pleural effusion or pneumothorax.    LIMITED UPPER ABDOMEN: Please see report for recent CT abdomen pelvis from 11/18/2022 showing a staghorn calculus in the left kidney and moderate hydronephrosis. There are multiple nonobstructing stones in the right kidney.    MUSCULOSKELETAL: Diffuse osteopenia. Prior vertebroplasty at T6 and T7. Severe vertebral compression deformity at T10-T12.      Impression    IMPRESSION:  1.  No evidence for pulmonary embolism.   2.  Moderate bronchitis/bronchiolitis.  3.  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

## 2022-12-07 NOTE — PLAN OF CARE
PRIMARY DIAGNOSIS: RSV  OUTPATIENT/OBSERVATION GOALS TO BE MET BEFORE DISCHARGE:  ADLs back to baseline: No    Activity and level of assistance: Assist of 2.     Pain status: Pain free.    Return to near baseline physical activity: No     Discharge Planner Nurse   Safe discharge environment identified: Yes  Barriers to discharge: Yes       Entered by: Alberta Howard RN 12/06/2022 8:44 PM     Please review provider order for any additional goals.   Nurse to notify provider when observation goals have been met and patient is ready for discharge.    BP (!) 144/76 (BP Location: Left arm)   Pulse 68   Temp 98.1  F (36.7  C) (Oral)   Resp 16   SpO2 95%      Pt notes breathing has improved, scheduled neb given. Denies pain. Would like something for sleep, will give melatonin.

## 2022-12-07 NOTE — PLAN OF CARE
PRIMARY DIAGNOSIS: RSV  OUTPATIENT/OBSERVATION GOALS TO BE MET BEFORE DISCHARGE:  ADLs back to baseline: No     Activity and level of assistance: Assist of 2.      Pain status: Pain free.     Return to near baseline physical activity: No          Discharge Planner Nurse   Safe discharge environment identified: Yes  Barriers to discharge: Yes     Please review provider order for any additional goals.   Nurse to notify provider when observation goals have been met and patient is ready for discharge.

## 2022-12-07 NOTE — PLAN OF CARE
PRIMARY DIAGNOSIS: RSV  OUTPATIENT/OBSERVATION GOALS TO BE MET BEFORE DISCHARGE:  1. Dyspnea improved and O2 sats >88% on RA or back to baseline O2 levels: Yes, dyspnea on exertion   SpO2: 94 %, O2 Device: None (Room air)    2. Tolerating oral abx or appropriate plans made outpatient infusion: Yes, tolerating PO ABx    3. Vitals signs normal or return to baseline: Yes    4. Tolerate oral intake to maintain hydration: Yes, encouraging fluids    5. Return to near baseline physical activity: No, Ax2    Discharge Planner Nurse   Safe discharge environment identified: Yes  Barriers to discharge: Yes       Entered by: Radha Matta RN 12/07/2022    Pt alert to self and place. On RA. Congested cough, per pt cough is less compared to this morning. Given PRN Tylenol for pain  management and Robitussin.  Has PW in place, had BM. Takes PO med with water fine, given scheduled Neb. SW Following for placement.     Please review provider order for any additional goals. Nurse to notify provider when observation goals have been met and patient is ready for discharge.

## 2022-12-07 NOTE — PROGRESS NOTES
"[facility]    Medicine Progress Note - Hospitalist Service    Date of Admission:  12/5/2022    Assessment & Plan   Alejandrina Whatley is a 89 year old female admitted on 12/5/2022 with a PMH dementia who resides in long-term care at [facility] and came to the Emergency Department at [facility] on 12/5/2022 for shortness of breath and cough.  Recent history is notable for diagnosis of bronchitis in mid November.  Patient has not been able to use bronchodilator because it is her cough too much.  Jerome Nur MD admitted the patient to observation with a working diagnosis of RSV infection.  CT scan did indicate evidence of \"moderate bronchitis and severe mainstem bronchial narrowing compatible with excessive dynamic airway collapse or bronchomalacia.\"    Acute medical issues:  # Acute RSV infection.  Symptoms now ongoing 4 weeks.  # Bronchitis  # Weakness  -- Recently treated with azithromycin ~3 weeks ago for cough.  -- Started on Augmentin this stay.   -- Antitussives and bronchodilators. Prednisone 40 mg daily with taper.   -- Pulmonary toilet.   -- PT consult.     #Stress induced hyperglycemia. Given illness and on steroids.   -- Monitor.     # Mild leukocytosis peaked at 13.4, currently 10.7  -- No evidence of sepsis. Doing better. Continue to monitor.    #Incidental findning of bronchomalacia on CT   -- Unclear significance of this finding but is reflective of bronchomalacia.  Gestalt -- may increase her risk for lower respiratory infections.    # Elevated troponin HS T -- not due to ACS:  -- most likely secondary to supply/demand in the setting of illness.  EKG non ischemic.   -- On ASA and statin.      Chronic medical issues:  # Dementia: Higher risk for delirium.  Monitor.  # Glaucoma: Continue gtt  # HLD: Continue statin  # HTN: Continue carvedilol  # Overweight: Estimated body mass index is 25.08 kg/m  as calculated from the following:    Height as of 11/22/22: 1.524 m " (5').    Weight as of 11/22/22: 58.2 kg (128 lb 6.4 oz).           Diet: Regular Diet Adult    DVT Prophylaxis:   Clark Catheter: Not present  Central Lines: None  Cardiac Monitoring: None  Code Status: No CPR- Do NOT Intubate      Disposition Plan      Expected Discharge Date: 12/08/2022                The patient's care was discussed with the Bedside Nurse, Care Coordinator/, Patient and Patient's Family.    CRESCENCIO Jett [facility]  Hospitalist Service  [facility]  Securely message with the Vocera Web Console (learn more here)  Text page via [facility] Paging/Directory         ______________________________________________________________________    Interval History   Ms. Whatley was seen and examined. VSS  Still with cough.  Not requiring significant O2 support.    Needs to mobilize and do pulmonary toilet exercises.  No CP.  Family updated  PT evaluation pending.     Data reviewed today: I reviewed all medications, new labs and imaging results over the last 24 hours. I personally reviewed      Physical Examination:  Vital Signs: Temp: 97.5  F (36.4  C) Temp src: Oral BP: (!) 155/83 Pulse: 71   Resp: 16 SpO2: 94 % O2 Device: None (Room air) Oxygen Delivery: 2 LPM  Weight: 0 lbs 0 oz  GEN:   Alert, oriented x 1-2, appears comfortable, NAD.  NECK:   Supple ,no mass or thyromegaly   HEENT:  Normocephalic/atraumatic, no scleral icterus, no nasal discharge, mouth moist.  CV:   Regular rate and rhythm, no murmur or JVD.  S1 + S2 noted, no S3 or S4.  LUNGS:   Clear to auscultation bilaterally with mild expiratory wheezing.  Symmetric chest rise on inhalation noted. Decreased bases.  ABD:   Active bowel sounds, soft, non-tender/non-distended.  No rebound/guarding/rigidity.  EXT:   No edema.  No cyanosis.  No joint synovitis noted.  SKIN:   Dry to touch, no exanthems noted in the visualized areas.  Neurologic: Grossly intact,non focal.  Neuropsychiatric:  General: normal, calm and normal  eye contact  Level of consciousness: alert / normal  Affect: normal  Orientation: oriented to self, place, time and situation     Data   Recent Labs   Lab 12/06/22  0850 12/05/22  1926   WBC 10.7 13.4*   HGB 11.4* 11.6*   * 104*    191    140   POTASSIUM 3.9 3.9   CHLORIDE 107 106   CO2 22 21*   BUN 18.6 15.3   CR 0.70 0.70   ANIONGAP 11 13   TRANG 8.8 9.0   * 160*     No results found for this or any previous visit (from the past 24 hour(s)).  Medications       albuterol  2.5 mg Nebulization 4x Daily     amoxicillin-clavulanate  5 mL Oral Q12H NICK (08/20)     aspirin  81 mg Oral Daily     atorvastatin  40 mg Oral QPM     carvedilol  6.25 mg Oral BID w/meals     dorzolamide-timolol  1 drop Both Eyes BID     escitalopram  10 mg Oral Daily     latanoprost  1 drop Both Eyes Daily     predniSONE  40 mg Oral Daily     senna-docusate  1 tablet Oral BID     traMADol  25 mg Oral Daily     vitamin D3  50 mcg Oral Daily

## 2022-12-07 NOTE — PLAN OF CARE
PRIMARY DIAGNOSIS: RSV  OUTPATIENT/OBSERVATION GOALS TO BE MET BEFORE DISCHARGE:  ADLs back to baseline: No     Activity and level of assistance: Assist of 2.      Pain status: Pain free.     Return to near baseline physical activity: No          Discharge Planner Nurse   Safe discharge environment identified: Yes  Barriers to discharge: Yes    Please review provider order for any additional goals.   Nurse to notify provider when observation goals have been met and patient is ready for discharge.    Pt had small BM. Melatonin given before bed. Will continue to monitor.   /75 (BP Location: Left arm)   Pulse 63   Temp 97.5  F (36.4  C) (Oral)   Resp 16   SpO2 94%

## 2022-12-07 NOTE — PLAN OF CARE
PRIMARY DIAGNOSIS: RSV  OUTPATIENT/OBSERVATION GOALS TO BE MET BEFORE DISCHARGE:  1. Dyspnea improved and O2 sats >88% on RA or back to baseline O2 levels: Yes, dyspnea on exertion   SpO2: 94 %, O2 Device: RA    2. Tolerating oral abx or appropriate plans made outpatient infusion: Yes, tolerating PO ABx    3. Vitals signs normal or return to baseline: Yes    4. Tolerate oral intake to maintain hydration: Yes, encouraging fluids    5. Return to near baseline physical activity: No, Ax2    Discharge Planner Nurse   Safe discharge environment identified: Yes  Barriers to discharge: Yes       Entered by: Radha Matta RN 12/07/2022    Pt alert to self and place. On RA. Pt was able to take a nap during the day. Cough started back up after waking up. Given scheduled neb and PRN Robitussin. Has PW in place and encouraging PO fluids. SW/PT following. Daughter present at bedside.     Please review provider order for any additional goals. Nurse to notify provider when observation goals have been met and patient is ready for discharge.

## 2022-12-07 NOTE — PLAN OF CARE
PRIMARY DIAGNOSIS: RSV  OUTPATIENT/OBSERVATION GOALS TO BE MET BEFORE DISCHARGE:  1. Dyspnea improved and O2 sats >88% on RA or back to baseline O2 levels: Yes, dyspnea on exertion   SpO2: 94 %, O2 Device: None (Room air)    2. Tolerating oral abx or appropriate plans made outpatient infusion: Yes, tolerating PO ABx    3. Vitals signs normal or return to baseline: Yes    4. Short term supplemental O2 needed with activity at home: No    5. Tolerate oral intake to maintain hydration: Yes    6. Return to near baseline physical activity: No, Ax2    Discharge Planner Nurse   Safe discharge environment identified: Yes  Barriers to discharge: Yes       Entered by: Radha Matta RN 12/07/2022    Pt alert to self and place. On RA. Dementia at baseline can be [place] at times. Frequent congested cough. Unable to perform IS due to cough. Denies pain, but at baseline has chronic pain that is managed. Has PW in place. 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 Left side is weaker than right both Upper and lower extremities. Takes PO med with water fine.     Please review provider order for any additional goals. Nurse to notify provider when observation goals have been met and patient is ready for discharge.

## 2022-12-07 NOTE — UTILIZATION REVIEW
Concurrent stay review; Secondary Review Determination     Under the authority of the Utilization Management Committee, the utilization review process indicated a secondary review on the above patient.  The review outcome is based on review of the medical records, discussions with staff, and applying clinical experience noted on the date of the review.          (x) Observation Status Appropriate - Concurrent stay review    RATIONALE FOR DETERMINATION   89 yo female with dementia, hypertension who resides in long term care who presented with weakness and shortness of breath and was determined to have RSV infection. Supportive care only. No supplemental oxygen needed. Started on oral steroids for symptom management. Stable for discharge back to LTC when transport is arranged.     Patient is clinically improving and there is no clear indication to change patient's status to inpatient. The severity of illness, intensity of service provided, expected LOS and risk for adverse outcome make the care appropriate for observation.    This document was produced using voice recognition software     The information on this document is developed by the utilization review team in order for the business office to ensure compliance.  This only denotes the appropriateness of proper admission status and does not reflect the quality of care rendered.         The definitions of Inpatient Status and Observation Status used in making the determination above are those provided in the CMS Coverage Manual, Chapter 1 and Chapter 6, section 70.4.      Sincerely,   Deja Sterling MD  Utilization Review  Physician Advisor  [facility].

## 2022-12-07 NOTE — CONSULTS
Care Management Note    Expected Discharge Date: 12/08/2022     Concerns to be Addressed: Discharge planning      Patient plan of care discussed at interdisciplinary rounds: Yes    Anticipated Discharge Disposition:  [facility]     Patient/Family in Agreement with the Plan:  Yes    Referrals Placed by CM/SW:  None at this time  Private pay costs discussed: transportation costs    Additional Information:  SW consulted for discharge planning. SW spoke with [facility] admissions ([phone]) who confirms that patient resides in LT and has a bed hold. Patient is in a private room and can return with RSV/droplet precautions.    SW met with patient briefly, daughter was no longer in the room. SW placed call to daughter Pat ([phone]) to discuss discharge planning. Daughter reports she will be returning to the hospital around 1300. HARI will meet with pt and daughter this afternoon.     SW will continue to follow.     1355: SW met with daughter at bedside. Dtr confirms bed hold and plan to return to LTC at discharge. Reviewed GUILLEN letter with dtr, provided copy. Dtr states that patient will require stretcher transport for patient to return to LTC.     DERREK Cortez, [facility]   Inpatient Care Coordination  [facility]   [phone]

## 2022-12-08 NOTE — PLAN OF CARE
PRIMARY DIAGNOSIS: RSV OUTPATIENT/OBSERVATION GOALS TO BE MET BEFORE DISCHARGE:  1. Vital signs stable: Yes    2. Improvement of peak flow to greater than 70% sustained off nebulizer for 4 hours: Yes    3. Dyspnea improved and O2 sats >88% at RA or at prior home O2 therapy level: Yes      SpO2: 95 %, O2 Device: None (Room air)    4. Short term supplemental O2 needed for use with activity at home: No    5. Tolerating adequate PO diet and medications: Yes    6. Return to near baseline physical activity: No    Discharge Planner Nurse   Safe discharge environment identified: Yes  Barriers to discharge: Yes       Entered by: Marguerite Johnson RN 12/08/2022 2:15 PM     Please review provider order for any additional goals.   Nurse to notify provider when observation goals have been met and patient is ready for discharge.Goal Outcome Evaluation:

## 2022-12-08 NOTE — PLAN OF CARE
[facility]  OUTPATIENT PHYSICAL THERAPY EVALUATION  PLAN OF TREATMENT FOR OUTPATIENT REHABILITATION  (COMPLETE FOR INITIAL CLAIMS ONLY)  Patient's Last Name, First Name, M.I.  YOB: 1923  Alejandrina Whatley                        Provider's Name  [facility] Medical Record No.  [id number]                             Onset Date:  12/05/22   Start of Care Date:  12/08/22   Type:     _X_PT   ___OT   ___SLP Medical Diagnosis:  RSV, generalized weakness              PT Diagnosis:  impaired mobility Visits from SOC:  1     See note for plan of treatment, functional goals and certification details    I CERTIFY THE NEED FOR THESE SERVICES FURNISHED UNDER        THIS PLAN OF TREATMENT AND WHILE UNDER MY CARE     (Physician co-signature of this document indicates review and certification of the therapy plan).            \

## 2022-12-08 NOTE — PROGRESS NOTES
"   12/08/22 1030   Appointment Info   Signing Clinician's Name / Credentials (PT) Day Mueller, DPFLAVIO   Living Environment   People in Home facility resident   Current Living Arrangements extended care facility   Home Accessibility wheelchair accessible   Living Environment Comments Pt lives in LTC with facility assist and report spending most of her time in a wheelchair. Pt with hx of dementia, questionable historian.   Self-Care   Usual Activity Tolerance poor   Current Activity Tolerance poor   Regular Exercise No   Equipment Currently Used at Home hospital bed;commode chair;shower chair;wheelchair, manual   Fall history within last six months yes   Number of times patient has fallen within last six months   (unknown)   Activity/Exercise/Self-Care Comment Per notes has A with ADLs, A with all transfers. Per pt she spends most of her time in a wheelchair   General Information   Onset of Illness/Injury or Date of Surgery 12/05/22   Referring Physician Jerome Martin APRN CNP   Patient/Family Therapy Goals Statement (PT) none stated, per rounding patient daughter advocating for rehab to address pt's difficulty with mobility   Pertinent History of Current Problem (include personal factors and/or comorbidities that impact the POC) 89 year old female admitted on 12/5/2022 with a PMH dementia who resides in long-term care at [facility] and came to the Emergency Department at [facility] on 12/5/2022 for shortness of breath and cough.  Recent history is notable for diagnosis of bronchitis in mid November.  Patient has not been able to use bronchodilator because it is her cough too much.  Jerome Nur MD admitted the patient to observation with a working diagnosis of RSV infection.  CT scan did indicate evidence of \"moderate bronchitis and severe mainstem bronchial narrowing compatible with excessive dynamic airway collapse or bronchomalacia.\"   Existing Precautions/Restrictions fall   Weight-Bearing " "Status - LUE full weight-bearing   Weight-Bearing Status - RUE full weight-bearing   Weight-Bearing Status - LLE full weight-bearing   Weight-Bearing Status - RLE full weight-bearing   Cognition   Orientation Status (Cognition) oriented to;person;place  (knows her name and in the hospital, unable to state time or day of week but corrects states month. Pt remembers her \"cough\" is what brought her into the hospital)   Follows Commands (Cognition) WFL;follows multi-step commands   Cognitive Status Comments pt overall following commands and agreeable to PT.   Pain Assessment   Patient Currently in Pain   (reports chronic back pain and hip pain (previous broken hip per pt but she does not remember which hip, L LE weaker))   Posture    Posture Forward head position;Protracted shoulders;Kyphosis   Range of Motion (ROM)   Range of Motion ROM deficits secondary to weakness   Strength (Manual Muscle Testing)   Strength Comments R LE > L LE during exercises, moving all 4 extremities against gravity   Bed Mobility   Comment, (Bed Mobility) Min A supine <> sit (assist at trunk needing to get from sidelying to sitting)   Transfers   Comment, (Transfers) Mod A x 1 sit <> stand, Max A x 1 bed <> chair transfer with FWW; leg buckling noted   Gait/Stairs (Locomotion)   Clare Level (Gait) maximum assist (25% patient effort)   Assistive Device (Gait) walker, front-wheeled   Distance in Feet pivot steps bed <> chair, unsteady   Balance   Balance Comments high fall risk, good sitting balance, poor standing balance   Sensory Examination   Sensory Perception patient reports no sensory changes   Clinical Impression   Criteria for Skilled Therapeutic Intervention Yes, treatment indicated   PT Diagnosis (PT) impaired mobility   Influenced by the following impairments impaired balance, weakness, impaired endurance   Functional limitations due to impairments fall risk, decreased activity tolerance   Clinical Presentation (PT Evaluation " Complexity) Stable/Uncomplicated   Clinical Presentation Rationale clinical judgement   Clinical Decision Making (Complexity) low complexity   Planned Therapy Interventions (PT) balance training;bed mobility training;gait training;neuromuscular re-education;patient/family education;stair training;strengthening;transfer training;progressive activity/exercise   Anticipated Equipment Needs at Discharge (PT) walker, rolling;wheelchair   Risk & Benefits of therapy have been explained evaluation/treatment results reviewed;care plan/treatment goals reviewed;risks/benefits reviewed;current/potential barriers reviewed;participants voiced agreement with care plan;participants included;patient   PT Total Evaluation Time   PT Eval, Low Complexity Minutes (38768) 10   Physical Therapy Goals   PT Frequency 5x/week   PT Predicted Duration/Target Date for Goal Attainment 12/16/22   PT Goals Bed Mobility;Transfers;Gait   PT: Bed Mobility Supervision/stand-by assist;Supine to/from sit   PT: Transfers Supervision/stand-by assist;Bed to/from chair;Sit to/from stand;Assistive device   PT: Gait Rolling walker;10 feet;Minimal assist   PT Discharge Planning   PT Plan A x 2; standing tolerance, pre-gait activities, gait as able   PT Discharge Recommendation (DC Rec) Long term care facility;home with home care physical therapy;Transitional Care Facility   PT Rationale for DC Rec Pt from LTC and per her reports has been recently mostly wheelchair bound, has assist with all care. Family motivated for patient to regain some functional mobility independence. Pt currently requires Mod A for bed mob, sit <> stand and Max A for bed <> chair transfers d/t LE weakness and poor balance. Pending placement and family/patient goals patient would benefit from either return to LTC with HHPT to address functional mobility deficits vs. TCU. Pt following all commands and is agreeable to PT so would not be an unrealistic TCU candidate. Will continue to assess  progress with therapy.   PT Brief overview of current status Mod A bed mob, Max A bed <> chair transfers, high fall risk and recommend A x 2 for all OOB mobility   Total Session Time   Total Session Time (sum of timed and untimed services) 10

## 2022-12-08 NOTE — PLAN OF CARE
PRIMARY DIAGNOSIS: RSV  OUTPATIENT/OBSERVATION GOALS TO BE MET BEFORE DISCHARGE:  1. ADLs back to baseline: Yes    2. Activity and level of assistance: Up with standby assistance.    3. Pain status: Improved-controlled with oral pain medications.    4. Return to near baseline physical activity: Yes     Discharge Planner Nurse   Safe discharge environment identified: Yes  Barriers to discharge: No       Entered by: Marguerite Johnson RN 12/08/2022 5:48 PM     Please review provider order for any additional goals.   Nurse to notify provider when observation goals have been met and patient is ready for discharge.Goal Outcome Evaluation:         Pt discharged back to ltc unit.   Patient's After Visit Summary was reviewed with patient and/or family.   Patient verbalized understanding of After Visit Summary, recommended follow up and was given an opportunity to ask questions.   Discharge medications sent home with patient/family: No   Discharged with transport tech    OBSERVATION patient END time: 1750

## 2022-12-08 NOTE — PROGRESS NOTES
"[facility]    Medicine Progress Note - Hospitalist Service    Date of Admission:  12/5/2022  HD#3    Assessment & Plan   Alejandrina Whatley is a 89 year old female admitted on 12/5/2022 with a PMH dementia who resides in long-term care at [facility] and came to the Emergency Department at [facility] on 12/5/2022 for shortness of breath and cough.  Recent history is notable for diagnosis of bronchitis in mid November.  Patient has not been able to use bronchodilator because it is her cough too much.  Jerome Nur MD admitted the patient to observation with a working diagnosis of RSV infection.  CT scan did indicate evidence of \"moderate bronchitis and severe mainstem bronchial narrowing compatible with excessive dynamic airway collapse or bronchomalacia.\"    Interval events:  Medically stable for discharge.  Family raised concerns about mobility and facility that she is at.  PT ordered.      Acute medical issues:  # Acute RSV infection.  Symptoms now ongoing 4 weeks.  # Bronchitis  # Weakness  -- Recently treated with azithromycin ~3 weeks ago for cough.  -- Started on Augmentin this stay.   -- Antitussives and bronchodilators. Prednisone 40 mg daily with taper.   -- Pulmonary toilet.   -- PT consult.  Could benefit from TCU vs return to LTC with add on PT.     #Stress induced hyperglycemia. Given illness and on steroids.   -- Monitor.     # Mild leukocytosis peaked at 13.4, currently 10.7  -- No evidence of sepsis. Doing better. Continue to monitor.    #Incidental findning of bronchomalacia on CT   -- Unclear significance of this finding but is reflective of bronchomalacia.  May increase her risk for lower respiratory infections.    # Elevated troponin HS T -- not due to ACS:  -- most likely secondary to supply/demand in the setting of illness.  EKG non ischemic.   -- On ASA and statin.      Chronic medical issues:  # Dementia: Higher risk for delirium.  Monitor.  # Glaucoma: " Continue gtt  # HLD: Continue statin  # HTN: Continue carvedilol.  Previously on atenolol and amlodipine as well.  Unclear why amlodipine was discontinued.  Will resume as BP continues to trend up.  Follow up with PCP 1-2 weeks.   # Overweight: Estimated body mass index is 25.08 kg/m  as calculated from the following:    Height as of 11/22/22: 1.524 m (5').    Weight as of 11/22/22: 58.2 kg (128 lb 6.4 oz).           Diet: Regular Diet Adult    DVT Prophylaxis:   Clark Catheter: Not present  Central Lines: None  Cardiac Monitoring: None  Code Status: No CPR- Do NOT Intubate      Disposition Plan      Expected Discharge Date: 12/08/2022,  8:00 PM              The patient's care was discussed with the Bedside Nurse, Care Coordinator/, Patient and Patient's Family.    CR[facility]CENCIO [facility]  Hospitalist Service  [facility]  Securely message with the Vocera Web Console (learn more here)  Text page via [employer] Paging/Directory         ______________________________________________________________________    Interval History   Ms. Whatley was seen and examined. VSS  Still with cough but better. BP trending up.  Not requiring significant O2 support.    Needs to mobilize and do pulmonary toilet exercises.  No CP.  Family updated     Data reviewed today: I reviewed all medications, new labs and imaging results over the last 24 hours. I personally reviewed      Physical Examination:  Vital Signs: Temp: 97.8  F (36.6  C) Temp src: Oral BP: (!) 172/101 Pulse: 63   Resp: 20 SpO2: 95 % O2 Device: None (Room air)    Weight: 0 lbs 0 oz  GEN:   Alert, oriented x 1-2, appears comfortable, NAD.  NECK:   Supple ,no mass or thyromegaly   HEENT:  Normocephalic/atraumatic, no scleral icterus, no nasal discharge, mouth moist.  CV:   Regular rate and rhythm, no murmur or JVD.  S1 + S2 noted, no S3 or S4.  LUNGS:   Clear to auscultation bilaterally with mild expiratory wheezing.  Symmetric chest rise on  inhalation noted. Decreased bases.  ABD:   Active bowel sounds, soft, non-tender/non-distended.  No rebound/guarding/rigidity.  EXT:   No edema.  No cyanosis.  No joint synovitis noted.  SKIN:   Dry to touch, no exanthems noted in the visualized areas.  Neurologic: Grossly intact,non focal.  Neuropsychiatric:  General: normal, calm and normal eye contact  Level of consciousness: alert / normal  Affect: normal  Orientation: oriented to self, place, time and situation     Data   Recent Labs   Lab 12/06/22  0850 12/05/22  1926   WBC 10.7 13.4*   HGB 11.4* 11.6*   * 104*    191    140   POTASSIUM 3.9 3.9   CHLORIDE 107 106   CO2 22 21*   BUN 18.6 15.3   CR 0.70 0.70   ANIONGAP 11 13   TRANG 8.8 9.0   * 160*     No results found for this or any previous visit (from the past 24 hour(s)).  Medications       albuterol  2.5 mg Nebulization 4x Daily     amoxicillin-clavulanate  5 mL Oral Q12H [facility] (08/20)     aspirin  81 mg Oral Daily     atorvastatin  40 mg Oral QPM     carvedilol  6.25 mg Oral BID w/meals     dorzolamide-timolol  1 drop Both Eyes BID     escitalopram  10 mg Oral Daily     latanoprost  1 drop Both Eyes Daily     predniSONE  40 mg Oral Daily     traMADol  25 mg Oral Daily     vitamin D3  50 mcg Oral Daily

## 2022-12-08 NOTE — PROGRESS NOTES
Care Management Discharge Note    Discharge Date: 12/08/2022     Discharge Disposition:  [facility]    Discharge Transportation:  Stretcher    Private pay costs discussed: transportation costs    PAS Confirmation Code:  N/A-admitted from facility  Patient/family educated on Medicare website which has current facility and service quality ratings:  N/A-admitted from facility    Education Provided on the Discharge Plan:  Yes  Persons Notified of Discharge Plans: [facility] admissions  Patient/Family in Agreement with the Plan:  Yes    Handoff Referral Completed: Yes    Additional Information:  Per care team rounds, anticipate that pt will be medically ready for discharge later today.     Reviewed out of pocket cost for [facility] stretcher transport, $1117.00 for base rate and $26.06 per mile to the destination. Pt/family expressed understanding and are agreeable to this.      Patient requires stretcher transportation due to RSV, previous Hip Fx    Stretcher transportation has been arranged for 2000 (8pm) today-soonest available. Transport will call if they have any cancellations and can transport patient earlier. Patient and bedside nurse notified of transportation time.    Ambulance PCS form completed and placed in patient chart. Ambulance PCS form should be given to transportation team.    SW will continue to follow.     1300: Stretcher transport rescheduled for 1700 today.     DERREK Cortez, LGSW   Inpatient Care Coordination  [facility]   [phone]

## 2022-12-08 NOTE — DISCHARGE SUMMARY
"[facility]  Hospitalist Discharge Summary      Date of Admission:  12/5/2022  Date of Discharge:  12/8/2022  Discharging Provider: CRESCENCIO Jett CNP  Discharge Service: Hospitalist Service    Discharge Diagnoses   Acute RSV infection  Bronchitis (viral versus bacterial)  Weakness  Stress/steroid-induced hyperglycemia  Incidental finding of bronchial malacia on CT   Dementia  Glaucoma  Essential hypertension  Hyperlipidemia  Overweight    Follow-ups Needed After Discharge   Follow-up Appointments     Follow Up and recommended labs and tests      Follow up with Nursing home physician.  No follow up labs or test are   needed.          Added amlodipine to carvedilol for hypertension.    Unresulted Labs Ordered in the Past 30 Days of this Admission     Date and Time Order Name Status Description    12/5/2022  7:08 PM Blood Culture Wrist, Left Preliminary     12/5/2022  7:01 PM Blood Culture Peripheral Blood Preliminary       These results will be followed up by hospital medicine    Discharge Disposition   Discharged to long-term care facility  Condition at discharge: Stable      Hospital Course   Alejandrina Whatley is a 89 year old female admitted on 12/5/2022 with a [facility] dementia who resides in long-term care at [facility] and came to the Emergency Department at [facility] on 12/5/2022 for shortness of breath and cough.  Recent history is notable for diagnosis of bronchitis in mid November.  Patient has not been able to use bronchodilator because it is her cough too much.  Jerome Nur MD admitted the patient to observation with a working diagnosis of RSV infection.  CT scan did indicate evidence of \"moderate bronchitis and severe mainstem bronchial narrowing compatible with excessive dynamic airway collapse or bronchomalacia.\"    Acute medical issues:  # Acute RSV infection.  Symptoms now ongoing 4 weeks.  # Bronchitis  # Weakness  -- Recently treated with azithromycin ~3 " weeks ago for cough.  -- Started on Augmentin this stay.   -- Antitussives and bronchodilators. Prednisone 40 mg daily and discharge on medrol dose pack.  -- Pulmonary toilet.   -- PT consult.  If in alignment with goals of care could either do TCU or PT at [facility].  Daughter states that she has not been mobile several years.  She sits in a wheelchair for 2 hours at a time several times a day.  Patient's daughter would like to limit additional therapies at this time.  However patient would benefit being up out of bed into chair 3 times a day.  Patient would benefit from a course of therapy.  We will continue to evaluate and offer in the long-term care setting    #Stress induced hyperglycemia. Given illness and on steroids.   -- Monitor.     # Mild leukocytosis peaked at 13.4, currently 10.7  -- No evidence of sepsis. Doing better. Continue to monitor.    #Incidental findning of bronchomalacia on CT   -- Unclear significance of this finding but is reflective of bronchomalacia.  May increase her risk for lower respiratory infections.  However there is concerned that she would not tolerate further evaluation.  Per discussion with team there is really nothing to offer in the way of further evaluation such as a bronchoscopy or dynamic CT scanning as it is something she cannot tolerate.  Of note there is no therapy that would be desirable.  The goal plan should be to consider this to be a chronic condition and the patient and family she did just with family support to do their best to help her avoid exposure to unnecessary pathogens.  This was explained at length to family.    # Elevated troponin HS T -- not due to ACS: High-sensitivity troponin at 16 (reference range from normal is less than or equal to 14 ng/L). Most likely secondary to supply/demand in the setting of illness.  EKG non ischemic.  On ASA and statin.      Chronic medical issues:  # Dementia: Higher risk for delirium.  Monitor.  No evidence for delirium while  hospitalized.  # Glaucoma: Continue eye gtt  # HLD: Continue statin  # HTN, hx of CVA: Continue carvedilol.  Previously on atenolol and amlodipine as well.  Unclear why amlodipine was discontinued.  Will resume amlodipine. BP continues to trend up.  Follow up with PCP 1-2 weeks.   # Overweight: Estimated body mass index is 25.08 kg/m  as calculated from the following:    Height as of 11/22/22: 1.524 m (5').    Weight as of 11/22/22: 58.2 kg (128 lb 6.4 oz).          Consultations This Hospital Stay   CARE MANAGEMENT / SOCIAL WORK IP CONSULT  PHYSICAL THERAPY ADULT IP CONSULT  PHYSICAL THERAPY ADULT IP CONSULT    Code Status   No CPR- Do NOT Intubate    Time Spent on this Encounter   I, CRESCENCIO Jett CNP, personally saw the patient today and spent greater than 30 minutes discharging this patient.       CRESCENCIO Jett CNP  [facility] OBSERVATION DEPT  [address] [place] [address]  Phone: [phone]  ______________________________________________________________________    Physical Exam   Vital Signs: Temp: 97.7  F (36.5  C) Temp src: Oral BP: (!) 169/91 Pulse: 63   Resp: 20 SpO2: 94 % O2 Device: None (Room air)    Weight: 0 lbs 0 oz  GEN:   Alert, oriented x 1-2, appears comfortable, NAD.  NECK:   Supple ,no mass or thyromegaly   HEENT:  Normocephalic/atraumatic, no scleral icterus, no nasal discharge, mouth moist.  CV:   Regular rate and rhythm, no murmur or JVD.  S1 + S2 noted, no S3 or S4.  LUNGS:   Clear to auscultation bilaterally.  Symmetric chest rise on inhalation noted. Decreased bases.  ABD:   Active bowel sounds, soft, non-tender/non-distended.  No rebound/guarding/rigidity.  EXT:   No edema.  No cyanosis.  No joint synovitis noted.  SKIN:   Dry to touch, no exanthems noted in the visualized areas.  Neurologic: Grossly intact,non focal.  Neuropsychiatric:  General: normal, calm and normal eye contact  Level of consciousness: alert / normal  Affect:  normal  Orientation: oriented to self, place, time and situation           Primary Care Physician   Sara Britt    Discharge Orders      Primary Care - Care Coordination Referral      General info for SNF    Length of Stay Estimate: Long Term Care  Condition at Discharge: Stable  Level of care:skilled   Rehabilitation Potential: Fair  Admission H&P remains valid and up-to-date: Yes  Recent Chemotherapy: N/A  Use Nursing Home Standing Orders: Yes     Mantoux instructions    Give two-step Mantoux (PPD) Per Facility Policy Yes     Follow Up and recommended labs and tests    Follow up with Nursing home physician.  No follow up labs or test are needed.     Reason for your hospital stay    RSV  Bronchitis  Bronchomalacia  Elevated troponin, not ACS  Dementia     Activity - Up with nursing assistance     Physical Therapy Adult Consult    Evaluate and treat as clinically indicated.    Reason:  deconditioning     Droplet Isolation     Fall precautions     Diet    Follow this diet upon discharge: Orders Placed This Encounter      Regular Diet Adult       Significant Results and Procedures   Most Recent 3 CBC's:Recent Labs   Lab Test 12/06/22  0850 12/05/22  1926 11/18/22  1647   WBC 10.7 13.4* 10.5   HGB 11.4* 11.6* 11.6*   * 104* 106*    191 162     Most Recent 3 BMP's:Recent Labs   Lab Test 12/06/22  0850 12/05/22  1926 11/18/22  1647    140 140   POTASSIUM 3.9 3.9 4.1   CHLORIDE 107 106 104   CO2 22 21* 25   BUN 18.6 15.3 23.2*   CR 0.70 0.70 0.88   ANIONGAP 11 13 11   TRANG 8.8 9.0 9.0   * 160* 116*       7-Day Micro Results     Collected Updated Procedure Result Status      12/06/2022 0853 12/07/2022 1343 Urine Culture [[id number]]   Urine, Midstream    Final result Component Value   Culture 50,000-100,000 CFU/mL Mixture of urogenital ese               12/05/2022 1926 12/08/2022 0131 Blood Culture Peripheral Blood [[id number]]   Peripheral Blood    Preliminary result Component Value    Culture No growth after 2 days  [P]                12/05/2022 1925 12/05/2022 2018 Symptomatic; Unknown Influenza A/B & SARS-CoV2 (COVID-19) Virus PCR Multiplex Nasopharyngeal [[id number]]    (Abnormal)   Swab from Nasopharyngeal    Final result Component Value   Influenza A PCR Negative   Influenza B PCR Negative   RSV PCR Positive   SARS CoV2 PCR Negative   NEGATIVE: SARS-CoV-2 (COVID-19) RNA not detected, presumed negative.            12/05/2022 1924 12/08/2022 0131 Blood Culture Wrist, Left [[id number]]   Blood from Wrist, Left    Preliminary result Component Value   Culture No growth after 2 days  [P]                12/05/2022 1120 12/05/2022 2319 Influenza A/B & SARS-CoV2 (COVID-19) Virus PCR Multiplex Nasopharyngeal [[id number]]    (Abnormal)   Swab from Nasopharyngeal    Final result Component Value   Influenza A PCR Negative   Influenza B PCR Negative   RSV PCR Positive   SARS CoV2 PCR Negative   NEGATIVE: SARS-CoV-2 (COVID-19) RNA not detected, presumed negative.                Most Recent 6 glucoses:Recent Labs   Lab Test 12/06/22  0850 12/05/22  1926 11/18/22  1647 07/22/22  0807 07/20/22  1916 07/03/22  1724   * 160* 116* 120* 138* 127*     Most Recent Urinalysis:Recent Labs   Lab Test 12/06/22  0853 09/19/21  1454 09/04/20  1030   COLOR Yellow   < > Yellow   APPEARANCE Slightly Cloudy*   < > Clear   URINEGLC Negative   < > Negative   URINEBILI Negative   < > Negative   URINEKETONE Trace*   < > Negative   SG 1.010   < > 1.012   UBLD Large*   < > Negative   URINEPH 7.0   < > 6.5   PROTEIN 100*   < > Negative   UROBILINOGEN  --   --  <2.0 E.U./dL   NITRITE Negative   < > Negative   LEUKEST Trace*   < > Large*   RBCU >182*   < > 0-2   WBCU 35*   < > 25-50*    < > = values in this interval not displayed.   ,   Results for orders placed or performed during the hospital encounter of 12/05/22   CT Chest Pulmonary Embolism w Contrast    Narrative    EXAM: CT CHEST PULMONARY EMBOLISM W  CONTRAST  LOCATION: [facility]  DATE/TIME: 12/5/2022 9:55 PM    INDICATION: Chest pain, SOB, cough and elevated D dimer  COMPARISON: CT abdomen pelvis 11/18/2022  TECHNIQUE: CT chest pulmonary angiogram during arterial phase injection of IV contrast. Multiplanar reformats and MIP reconstructions were performed. Dose reduction techniques were used.   CONTRAST: 52mL Isovue 370    FINDINGS:  ANGIOGRAM CHEST: No pulmonary embolism. Pulmonary arteries normal in caliber. Thoracic aorta normal in caliber. No aortic dissection. Tortuous descending thoracic aorta.    HEART: Cardiac chambers within normal limits. No pericardial effusion. Severe coronary artery calcification.    MEDIASTINUM: No adenopathy or mass.    LUNGS AND PLEURA: Extensive patchy air trapping. Diffuse bronchial wall thickening. Severe mainstem bronchial narrowing, presumably during expiration. No pulmonary mass, consolidation, or definite suspicious pulmonary nodule. Lungs partially obscured by   motion. No pleural effusion or pneumothorax.    LIMITED UPPER ABDOMEN: Please see report for recent CT abdomen pelvis from 11/18/2022 showing a staghorn calculus in the left kidney and moderate hydronephrosis. There are multiple nonobstructing stones in the right kidney.    MUSCULOSKELETAL: Diffuse osteopenia. Prior vertebroplasty at T6 and T7. Severe vertebral compression deformity at T10-T12.      Impression    IMPRESSION:  1.  No evidence for pulmonary embolism.   2.  Moderate bronchitis/bronchiolitis.  3.  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       Discharge Medications   Current Discharge Medication List      START taking these medications    Details   !! albuterol (PROVENTIL) (2.5 MG/3ML) 0.083% neb solution Take 1 vial (2.5 mg) by nebulization every 4 hours as needed for wheezing  Qty: 90 mL    Associated Diagnoses: RSV infection      amLODIPine (NORVASC) 5 MG tablet  Take 1 tablet (5 mg) by mouth daily    Associated Diagnoses: Essential hypertension      amoxicillin-clavulanate (AUGMENTIN) 400-57 MG/5ML suspension Take 5 mLs by mouth every 12 hours for 4 days  Qty: 40 mL, Refills: 0    Associated Diagnoses: Acute bronchitis with wheezing      benzocaine-menthol (CHLORASEPTIC) 6-10 MG lozenge Place 1 lozenge inside cheek every hour as needed for sore throat  Qty: 20 lozenge, Refills: 0    Associated Diagnoses: Acute bronchitis with wheezing; RSV infection      methylPREDNISolone (MEDROL DOSEPAK) 4 MG tablet therapy pack Follow Package Directions  Qty: 21 tablet, Refills: 0    Associated Diagnoses: Acute bronchitis with wheezing; RSV infection      ondansetron (ZOFRAN ODT) 4 MG ODT tab Take 1 tablet (4 mg) by mouth every 6 hours as needed for nausea or vomiting    Associated Diagnoses: RSV infection       !! - Potential duplicate medications found. Please discuss with provider.      CONTINUE these medications which have CHANGED    Details   benzonatate (TESSALON) 100 MG capsule Take 1 capsule (100 mg) by mouth 3 times daily as needed for cough    Associated Diagnoses: RSV infection         CONTINUE these medications which have NOT CHANGED    Details   acetaminophen (TYLENOL) 500 MG tablet Take 1,000 mg by mouth 3 times daily      !! albuterol (PROVENTIL) (2.5 MG/3ML) 0.083% neb solution Take 2.5 mg by nebulization every 4 hours as needed for shortness of breath / dyspnea or wheezing      aspirin (ASA) 81 MG chewable tablet Take 81 mg by mouth daily      atorvastatin (LIPITOR) 40 MG tablet Take 40 mg by mouth every evening      carvedilol (COREG) 6.25 MG tablet Take 6.25 mg by mouth 2 times daily (with meals)      Cholecalciferol (VITAMIN D3) 50 MCG (2000 UT) TABS Take 2,000 Units by mouth daily      dorzolamide-timolol (COSOPT) 2-0.5 % ophthalmic solution Place 1 drop into both eyes 2 times daily  Qty: 1 Bottle, Refills: 11    Comments: May be substituted for Timolol and  Dorzolamide separately if needed due to long term backorder of Cosopt.  Associated Diagnoses: Pseudoexfoliation glaucoma, moderate stage      escitalopram (LEXAPRO) 10 MG tablet Take 10 mg by mouth daily      guaiFENesin (ROBITUSSIN) 20 mg/mL liquid Take 20 mLs (400 mg) by mouth every 4 hours as needed for cough    Associated Diagnoses: Cough      latanoprost (XALATAN) 0.005 % ophthalmic solution Place 1 drop into both eyes daily      melatonin 3 MG tablet Take 3 mg by mouth nightly as needed for sleep      menthol-zinc oxide (CALMOSEPTINE) 0.44-20.6 % OINT ointment Apply topically as needed      polyethylene glycol (MIRALAX) 17 g packet Take 1 packet by mouth every other day      traMADol (ULTRAM) 50 MG tablet Take 0.5 tablets (25 mg) by mouth daily At 1900  Qty: 30 tablet, Refills: 0    Associated Diagnoses: Other chronic pain       !! - Potential duplicate medications found. Please discuss with provider.        Allergies   Allergies   Allergen Reactions     Actonel [Bisphosphonates] Rash     Conjugated Estrogens Rash     Macrobid [Nitrofuran Derivatives] Rash     Nitrofurantoin Rash     Premarin Rash     Sulfa Drugs Rash     Hydrocodone Nausea and Vomiting     Oxycodone Nausea and Vomiting     Risedronate      leg pain

## 2022-12-08 NOTE — PLAN OF CARE
PRIMARY DIAGNOSIS: RSV OUTPATIENT/OBSERVATION GOALS TO BE MET BEFORE DISCHARGE:  1. Vital signs stable: Yes    2. Improvement of peak flow to greater than 70% sustained off nebulizer for 4 hours: Yes    3. Dyspnea improved and O2 sats >88% at RA or at prior home O2 therapy level: Yes      SpO2: 95 %, O2 Device: None (Room air)    4. Short term supplemental O2 needed for use with activity at home: No    5. Tolerating adequate PO diet and medications: Yes    6. Return to near baseline physical activity: No    Discharge Planner Nurse   Safe discharge environment identified: Yes  Barriers to discharge: Yes       Entered by: Marguerite Johnson RN 12/08/2022 10:13 AM     Please review provider order for any additional goals.   Nurse to notify provider when observation goals have been met and patient is ready for discharge.Goal Outcome Evaluation:

## 2022-12-08 NOTE — PLAN OF CARE
PRIMARY DIAGNOSIS: RSV  OUTPATIENT/OBSERVATION GOALS TO BE MET BEFORE DISCHARGE:  ADLs back to baseline: No     Activity and level of assistance: Assist of 2.      Pain status: Pain free.     Return to near baseline physical activity: No          Discharge Planner Nurse   Safe discharge environment identified: No  Barriers to discharge: Yes     Please review provider order for any additional goals.   Nurse to notify provider when observation goals have been met and patient is ready for discharge.    BP (!) 160/85 (BP Location: Right arm, Patient Position: Semi-Arreaga's, Cuff Size: Adult Regular)   Pulse 52   Temp 97.3  F (36.3  C) (Oral)   Resp 16   SpO2 95%      Pt Bp's elevated tonight. Not symptomatic. Denies pain. Warm blankets given. Will continue to monitor.

## 2022-12-08 NOTE — PROGRESS NOTES
[facility] Care Coordination Contact    [facility]  Ambulatory Care Coordination to Inpatient Care Management   Hand-In Communication    Date:  December 8, 2022  Name: Alejandrina Whatley is enrolled in [facility] Care Coordination program and I am the Lead Care Coordinator.  CC Contact Information:.   Payor Source: Payor: [facility] / Plan: [facility] DUAL / Product Type: HMO /   Current services in place:     Please see the CC Snaphot and Care Management Flowsheets for specific details of this Alejandrina Whatley care plan.     I will follow this admission in Epic. Please feel free to contact me with questions or for further collaboration in discharge planning.    RACQUEL Ga, [facility]  [phone]

## 2022-12-08 NOTE — PLAN OF CARE
PRIMARY DIAGNOSIS: RSV  OUTPATIENT/OBSERVATION GOALS TO BE MET BEFORE DISCHARGE:  ADLs back to baseline: No     Activity and level of assistance: Assist of 2.      Pain status: Pain free.     Return to near baseline physical activity: No          Discharge Planner Nurse   Safe discharge environment identified: No  Barriers to discharge: Yes     Please review provider order for any additional goals.   Nurse to notify provider when observation goals have been met and patient is ready for discharge.    BP (!) 146/75 (BP Location: Right arm, Patient Position: Semi-Arreaga's, Cuff Size: Adult Regular)   Pulse 67   Temp 98.1  F (36.7  C) (Oral)   Resp 18   SpO2 94%      Neb given. Room air otherwise, sat's 95%. Lungs coarse. Pt has a cough. Pt denies pain. Will continue to monitor.

## 2022-12-08 NOTE — PLAN OF CARE
PRIMARY DIAGNOSIS: RSV  OUTPATIENT/OBSERVATION GOALS TO BE MET BEFORE DISCHARGE:  ADLs back to baseline: No     Activity and level of assistance: Assist of 2.      Pain status: Pain free.     Return to near baseline physical activity: No          Discharge Planner Nurse   Safe discharge environment identified: No  Barriers to discharge: Yes     Please review provider order for any additional goals.   Nurse to notify provider when observation goals have been met and patient is ready for discharge.    BP (!) 146/75 (BP Location: Right arm, Patient Position: Semi-Arreaga's, Cuff Size: Adult Regular)   Pulse 67   Temp 98.1  F (36.7  C) (Oral)   Resp 18   SpO2 94%      Pt doing well. Denies pain at this time. Will continue to monitor.

## 2022-12-09 NOTE — PROGRESS NOTES
[facility] GERIATRICS    Chief Complaint   Patient presents with     Hospital F/U     HPI:  Alejandrina Whatley is a 89 year old  (8/22/1923), who is being seen today for an episodic care visit at: [facility] ([facility]) [[id number]].     Today's concern is:    Notes from hospitalization reviewed including H&P and D/c summary     Alejandrina Whatley is a 89 yo female with a with a PMH bronchitis, dementia, HTN and CVA who recently admitted to [facility] for acute bronchitis. Presented with acute respiratory distress. Found to have RSV. Admitted to Observation and treated with antibiotics, steroids and nebs. CT negative for infiltrate but incidental finding bronchomalacia. Discharged back to LTC with Augmentin and medrol dose back.    Today Alejandrina Noble is seen in her room. Resting. Denies SOB or pain. Cough is improving. Eating ok.     Spoke with daughter via phone x 13 mins and obtained additinonal history due to patient's advanced dementia. Pat is very thankful she took her mother to the hospital.  States she became very short of breath very quickly.  Feels as though she improved rapidly with steroids and antibiotics.  Now mostly catching up on sleep.  Did discuss briefly findings of bronchialmalacia on CT.  We will keep this in mind in the future as may contribute to respiratory distress in the setting of minor illnesses.  Discussed that hospital had added amlodipine due to elevated blood pressures.  Pat was not aware of this.  Suspect some elevation due to stress of hospitalization.  Historically her blood pressures here have been adequately controlled at home given age.  Parameters were placed on Norvasc today.  We will continue to monitor and de-escalate as able    Allergies, and PMH/PSH reviewed in [facility] today.  REVIEW OF SYSTEMS:  Limited secondary to cognitive impairment but today pt reports no shortness of breath, improving cough, no pain    Objective:   /79   Pulse 110   Temp  98.2  F (36.8  C)   Resp 18   Ht 1.524 m (5')   Wt 58.1 kg (128 lb)   SpO2 93%   BMI 25.00 kg/m    Physical Exam  Constitutional:       General: She is not in acute distress.     Appearance: Normal appearance.   HENT:      Head: Normocephalic and atraumatic.   Eyes:      General: No scleral icterus.  Cardiovascular:      Rate and Rhythm: Normal rate and regular rhythm.   Pulmonary:      Effort: No respiratory distress.      Breath sounds: No wheezing.   Musculoskeletal:      Right lower leg: No edema.      Left lower leg: No edema.   Skin:     General: Skin is warm and dry.   Neurological:      Mental Status: She is alert.           Recent labs in [facility] reviewed by me today.     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  Noted to be RSV positive in the setting of a facility outbreak.  Treated with antibiotics and steroids.  CT findings negative for infiltrate but did show evidence of bronchomalacia.  -Continue Augmentin to complete course 12/12.  Continue Medrol Dosepak  - Continue supportive cares with Tessalon, cough suppressants and albuterol nebulizer available as needed  - Continue to monitor clinically.  Continue isolation precautions per protocol    Hypertension: Historically maintained on Coreg 6.25 mg twice daily.  Blood pressure readings prior to hospital stay overall at goal 130s- 140s.  Apparently hypertensive during hospital stay and started on amlodipine 5 mg daily  - Continue Coreg 6.25 mg daily  - Parameters placed on amlodipine to hold for SBP less than 110.  Goal blood pressure for age less than 150. Taper Norvasc as indicated.  Plan discussed with daughter via phone    MED REC REQUIRED  Post Medication Reconciliation Status: discharge medications reconciled and changed, per note/orders      Orders:  Hold Norvasc for SBP less than 110    Electronically signed by: Sara Britt  BONNIE     A total 40 min were spent on this f/u visit. With > 50% spent on coordination of care including reviewing medical chart, visiting with patient and discussion of plan of care with daughter via phone separately due to patient's advanced dementia. Further details as discussed above.

## 2022-12-09 NOTE — PLAN OF CARE
Physical Therapy Discharge Summary    Reason for therapy discharge:    Discharged to long term care facility.    Progress towards therapy goal(s). See goals on Care Plan in [facility] electronic health record for goal details.  Goals not met.  Barriers to achieving goals:   discharge on same date as initial evaluation.    Therapy recommendation(s):    Home PT to maximize return to PLOF

## 2022-12-09 NOTE — LETTER
12/9/2022        RE: Alejandrina Whatley  [facility]  [address] [address] [address]        [facility] GERIATRICS    Chief Complaint   Patient presents with     Hospital F/U     HPI:  Alejandrina Whatley is a 89 year old  (8/22/1923), who is being seen today for an episodic care visit at: [facility] (S) [[id number]].     Today's concern is:    Notes from hospitalization reviewed including H&P and D/c summary     Alejandrina Whatley is a 89 yo female with a with a PMH bronchitis, dementia, HTN and CVA who recently admitted to [facility] for acute bronchitis. Presented with acute respiratory distress. Found to have RSV. Admitted to Observation and treated with antibiotics, steroids and nebs. CT negative for infiltrate but incidental finding bronchomalacia. Discharged back to LTC with Augmentin and medrol dose back.    Today Alejandrina Noble is seen in her room. Resting. Denies SOB or pain. Cough is improving. Eating ok.     Spoke with daughter via phone x 13 mins and obtained additinonal history due to patient's advanced dementia. Pat is very thankful she took her mother to the hospital.  States she became very short of breath very quickly.  Feels as though she improved rapidly with steroids and antibiotics.  Now mostly catching up on sleep.  Did discuss briefly findings of bronchialmalacia on CT.  We will keep this in mind in the future as may contribute to respiratory distress in the setting of minor illnesses.  Discussed that hospital had added amlodipine due to elevated blood pressures.  Pat was not aware of this.  Suspect some elevation due to stress of hospitalization.  Historically her blood pressures here have been adequately controlled at home given age.  Parameters were placed on Norvasc today.  We will continue to monitor and de-escalate as able    Allergies, and PMH/PSH reviewed in [facility] today.  REVIEW OF SYSTEMS:  Limited secondary to cognitive impairment but  today pt reports no shortness of breath, improving cough, no pain    Objective:   /79   Pulse 110   Temp 98.2  F (36.8  C)   Resp 18   Ht 1.524 m (5')   Wt 58.1 kg (128 lb)   SpO2 93%   BMI 25.00 kg/m    Physical Exam  Constitutional:       General: She is not in acute distress.     Appearance: Normal appearance.   HENT:      Head: Normocephalic and atraumatic.   Eyes:      General: No scleral icterus.  Cardiovascular:      Rate and Rhythm: Normal rate and regular rhythm.   Pulmonary:      Effort: No respiratory distress.      Breath sounds: No wheezing.   Musculoskeletal:      Right lower leg: No edema.      Left lower leg: No edema.   Skin:     General: Skin is warm and dry.   Neurological:      Mental Status: She is alert.           Recent labs in [facility] reviewed by me today.     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  Noted to be RSV positive in the setting of a facility outbreak.  Treated with antibiotics and steroids.  CT findings negative for infiltrate but did show evidence of bronchomalacia.  -Continue Augmentin to complete course 12/12.  Continue Medrol Dosepak  - Continue supportive cares with Tessalon, cough suppressants and albuterol nebulizer available as needed  - Continue to monitor clinically.  Continue isolation precautions per protocol    Hypertension: Historically maintained on Coreg 6.25 mg twice daily.  Blood pressure readings prior to hospital stay overall at goal 130s- 140s.  Apparently hypertensive during hospital stay and started on amlodipine 5 mg daily  - Continue Coreg 6.25 mg daily  - Parameters placed on amlodipine to hold for SBP less than 110.  Goal blood pressure for age less than 150. Taper Norvasc as indicated.  Plan discussed with daughter via phone    MED REC REQUIRED  Post Medication Reconciliation Status: discharge medications reconciled and changed,  per note/orders      Orders:  Hold Norvasc for SBP less than 110    Electronically signed by: Sara Britt PA-C     A total 40 min were spent on this f/u visit. With > 50% spent on coordination of care including reviewing medical chart, visiting with patient and discussion of plan of care with daughter via phone separately due to patient's advanced dementia. Further details as discussed above.          Sincerely,        Sara Britt PA-C

## 2022-12-21 NOTE — PROGRESS NOTES
Asked to see Alejandrina Noble briefly yesterday re already healing cold sore lesions on upper lip / nose. Not painful to Alejandrina Noble. Per daughter Pat these flare up when she has other acute illnesses and she is just getting over hospital stay with RSV. Since she already has some dysphagia and antiviral therapy pills can be large, plus, lesions already at least a week old and are scabbing over, discussed with Pat not adding additional therapy at this time. She also requested Tylenol reduced from 1000 mg TID to BID given lack of reports of on-going back pain and some mild pill dysphagia. Considering a different formulation and/or having meds crushed.    Yulissa Garcia, DO

## 2022-12-22 NOTE — TELEPHONE ENCOUNTER
[facility] Geriatrics Lab Note     Provider: Sara Britt PA-C  Facility: [facility]  Facility Type:  LTC    Allergies   Allergen Reactions     Actonel [Bisphosphonates] Rash     Conjugated Estrogens Rash     Macrobid [Nitrofuran Derivatives] Rash     Nitrofurantoin Rash     Premarin Rash     Sulfa Drugs Rash     Hydrocodone Nausea and Vomiting     Oxycodone Nausea and Vomiting     Risedronate      leg pain       Labs Reviewed by provider: EDDA     Verbal Order/Direction given by Provider:   Symptomatic with new dysuria and family requesting tx. Allergies to Macrobid and Sulfa. Recent cultures with normal ese but prior history of flouroquinolone resistant organisms.   - Cefdinir 300 mg bid x 7 days dx UTI       Provider giving Order:  Sara Britt PA-C    Verbal Order given to: Yelitza Martinez RN

## 2022-12-22 NOTE — TELEPHONE ENCOUNTER
Daughter called to express concerns that patient is symptomatic with UTI. Symptoms include dysuria. Aide apparently already collected clean catch in hat. Discussed would prefer straight cath and Pat understands but expresses urgency that patient treated as soon as possible      Spoke with nurse. Ok to send to clean catch to lab for UA/UC. Nursing to notify if systemic symptoms prior to results    Sara Britt PA-C

## 2023-01-01 ENCOUNTER — PATIENT OUTREACH (OUTPATIENT)
Dept: GERIATRIC MEDICINE | Facility: CLINIC | Age: 88
End: 2023-01-01
Payer: COMMERCIAL

## 2023-01-01 ENCOUNTER — TELEPHONE (OUTPATIENT)
Dept: GERIATRICS | Facility: CLINIC | Age: 88
End: 2023-01-01
Payer: COMMERCIAL

## 2023-01-01 ENCOUNTER — NURSING HOME VISIT (OUTPATIENT)
Dept: GERIATRICS | Facility: CLINIC | Age: 88
End: 2023-01-01
Payer: COMMERCIAL

## 2023-01-01 ENCOUNTER — HOSPITAL ENCOUNTER (OUTPATIENT)
Facility: CLINIC | Age: 88
Setting detail: OBSERVATION
Discharge: NURSING, REHAB OR LONG-TERM CARE | End: 2023-06-19
Attending: EMERGENCY MEDICINE | Admitting: INTERNAL MEDICINE
Payer: COMMERCIAL

## 2023-01-01 ENCOUNTER — LAB REQUISITION (OUTPATIENT)
Dept: LAB | Facility: CLINIC | Age: 88
End: 2023-01-01
Payer: COMMERCIAL

## 2023-01-01 ENCOUNTER — PATIENT OUTREACH (OUTPATIENT)
Dept: GERIATRIC MEDICINE | Facility: CLINIC | Age: 88
End: 2023-01-01

## 2023-01-01 ENCOUNTER — CLINICAL UPDATE (OUTPATIENT)
Dept: PHARMACY | Facility: CLINIC | Age: 88
End: 2023-01-01
Payer: COMMERCIAL

## 2023-01-01 ENCOUNTER — HOSPITAL ENCOUNTER (OUTPATIENT)
Facility: CLINIC | Age: 88
Setting detail: OBSERVATION
Discharge: HOME | End: 2023-07-31
Attending: EMERGENCY MEDICINE | Admitting: INTERNAL MEDICINE
Payer: COMMERCIAL

## 2023-01-01 ENCOUNTER — MEDICAL CORRESPONDENCE (OUTPATIENT)
Dept: HEALTH INFORMATION MANAGEMENT | Facility: CLINIC | Age: 88
End: 2023-01-01

## 2023-01-01 ENCOUNTER — APPOINTMENT (OUTPATIENT)
Dept: CT IMAGING | Facility: CLINIC | Age: 88
End: 2023-01-01
Attending: EMERGENCY MEDICINE
Payer: COMMERCIAL

## 2023-01-01 ENCOUNTER — TELEPHONE (OUTPATIENT)
Dept: GERIATRICS | Facility: CLINIC | Age: 88
End: 2023-01-01

## 2023-01-01 ENCOUNTER — APPOINTMENT (OUTPATIENT)
Dept: GENERAL RADIOLOGY | Facility: CLINIC | Age: 88
End: 2023-01-01
Attending: EMERGENCY MEDICINE
Payer: COMMERCIAL

## 2023-01-01 ENCOUNTER — TRANSFERRED RECORDS (OUTPATIENT)
Dept: HEALTH INFORMATION MANAGEMENT | Facility: CLINIC | Age: 88
End: 2023-01-01

## 2023-01-01 ENCOUNTER — DOCUMENTATION ONLY (OUTPATIENT)
Dept: OTHER | Facility: CLINIC | Age: 88
End: 2023-01-01

## 2023-01-01 VITALS
WEIGHT: 125.8 LBS | RESPIRATION RATE: 16 BRPM | HEART RATE: 75 BPM | SYSTOLIC BLOOD PRESSURE: 129 MMHG | TEMPERATURE: 98 F | DIASTOLIC BLOOD PRESSURE: 77 MMHG | BODY MASS INDEX: 24.7 KG/M2 | HEIGHT: 60 IN | OXYGEN SATURATION: 97 %

## 2023-01-01 VITALS
DIASTOLIC BLOOD PRESSURE: 75 MMHG | BODY MASS INDEX: 24.92 KG/M2 | HEIGHT: 61 IN | HEART RATE: 75 BPM | WEIGHT: 132 LBS | TEMPERATURE: 97.9 F | OXYGEN SATURATION: 94 % | RESPIRATION RATE: 17 BRPM | SYSTOLIC BLOOD PRESSURE: 136 MMHG

## 2023-01-01 VITALS
HEIGHT: 61 IN | TEMPERATURE: 98.5 F | SYSTOLIC BLOOD PRESSURE: 112 MMHG | WEIGHT: 138.67 LBS | BODY MASS INDEX: 26.18 KG/M2 | DIASTOLIC BLOOD PRESSURE: 63 MMHG | HEART RATE: 71 BPM | RESPIRATION RATE: 18 BRPM | OXYGEN SATURATION: 93 %

## 2023-01-01 VITALS
RESPIRATION RATE: 15 BRPM | TEMPERATURE: 98 F | BODY MASS INDEX: 24.73 KG/M2 | SYSTOLIC BLOOD PRESSURE: 132 MMHG | HEIGHT: 61 IN | WEIGHT: 131 LBS | DIASTOLIC BLOOD PRESSURE: 78 MMHG | OXYGEN SATURATION: 96 % | HEART RATE: 68 BPM

## 2023-01-01 VITALS
HEART RATE: 70 BPM | WEIGHT: 131.1 LBS | TEMPERATURE: 98 F | BODY MASS INDEX: 24.75 KG/M2 | SYSTOLIC BLOOD PRESSURE: 128 MMHG | HEIGHT: 61 IN | OXYGEN SATURATION: 99 % | RESPIRATION RATE: 17 BRPM | DIASTOLIC BLOOD PRESSURE: 78 MMHG

## 2023-01-01 VITALS
RESPIRATION RATE: 15 BRPM | BODY MASS INDEX: 24.73 KG/M2 | DIASTOLIC BLOOD PRESSURE: 78 MMHG | HEART RATE: 68 BPM | SYSTOLIC BLOOD PRESSURE: 132 MMHG | OXYGEN SATURATION: 96 % | HEIGHT: 61 IN | WEIGHT: 131 LBS | TEMPERATURE: 98 F

## 2023-01-01 VITALS
RESPIRATION RATE: 16 BRPM | SYSTOLIC BLOOD PRESSURE: 118 MMHG | HEART RATE: 68 BPM | TEMPERATURE: 97.7 F | WEIGHT: 132.4 LBS | HEIGHT: 60 IN | BODY MASS INDEX: 26 KG/M2 | OXYGEN SATURATION: 96 % | DIASTOLIC BLOOD PRESSURE: 72 MMHG

## 2023-01-01 VITALS
RESPIRATION RATE: 16 BRPM | SYSTOLIC BLOOD PRESSURE: 141 MMHG | OXYGEN SATURATION: 97 % | DIASTOLIC BLOOD PRESSURE: 69 MMHG | HEIGHT: 60 IN | WEIGHT: 125 LBS | BODY MASS INDEX: 24.54 KG/M2 | TEMPERATURE: 97.4 F | HEART RATE: 70 BPM

## 2023-01-01 VITALS
RESPIRATION RATE: 15 BRPM | SYSTOLIC BLOOD PRESSURE: 137 MMHG | BODY MASS INDEX: 25.13 KG/M2 | OXYGEN SATURATION: 93 % | TEMPERATURE: 98 F | WEIGHT: 128 LBS | DIASTOLIC BLOOD PRESSURE: 86 MMHG | HEIGHT: 60 IN | HEART RATE: 67 BPM

## 2023-01-01 VITALS
DIASTOLIC BLOOD PRESSURE: 79 MMHG | HEART RATE: 72 BPM | OXYGEN SATURATION: 96 % | TEMPERATURE: 97.8 F | RESPIRATION RATE: 16 BRPM | SYSTOLIC BLOOD PRESSURE: 136 MMHG

## 2023-01-01 VITALS
TEMPERATURE: 97.5 F | WEIGHT: 128 LBS | SYSTOLIC BLOOD PRESSURE: 121 MMHG | OXYGEN SATURATION: 93 % | DIASTOLIC BLOOD PRESSURE: 61 MMHG | HEART RATE: 67 BPM | RESPIRATION RATE: 15 BRPM | HEIGHT: 60 IN | BODY MASS INDEX: 25.13 KG/M2

## 2023-01-01 VITALS
BODY MASS INDEX: 25.32 KG/M2 | TEMPERATURE: 97.6 F | HEART RATE: 78 BPM | RESPIRATION RATE: 15 BRPM | DIASTOLIC BLOOD PRESSURE: 80 MMHG | SYSTOLIC BLOOD PRESSURE: 123 MMHG | WEIGHT: 129 LBS | HEIGHT: 60 IN | OXYGEN SATURATION: 96 %

## 2023-01-01 VITALS
SYSTOLIC BLOOD PRESSURE: 128 MMHG | DIASTOLIC BLOOD PRESSURE: 78 MMHG | TEMPERATURE: 98 F | BODY MASS INDEX: 24.55 KG/M2 | RESPIRATION RATE: 17 BRPM | OXYGEN SATURATION: 99 % | HEART RATE: 70 BPM | HEIGHT: 61 IN | WEIGHT: 130 LBS

## 2023-01-01 VITALS
SYSTOLIC BLOOD PRESSURE: 124 MMHG | DIASTOLIC BLOOD PRESSURE: 72 MMHG | RESPIRATION RATE: 18 BRPM | WEIGHT: 133.6 LBS | HEART RATE: 70 BPM | BODY MASS INDEX: 25.22 KG/M2 | TEMPERATURE: 97.2 F | HEIGHT: 61 IN | OXYGEN SATURATION: 98 %

## 2023-01-01 DIAGNOSIS — F32.A ANXIETY AND DEPRESSION: ICD-10-CM

## 2023-01-01 DIAGNOSIS — J98.09 BRONCHOMALACIA: ICD-10-CM

## 2023-01-01 DIAGNOSIS — R63.4 WEIGHT LOSS: ICD-10-CM

## 2023-01-01 DIAGNOSIS — G93.41 METABOLIC ENCEPHALOPATHY: ICD-10-CM

## 2023-01-01 DIAGNOSIS — N18.31 STAGE 3A CHRONIC KIDNEY DISEASE (H): ICD-10-CM

## 2023-01-01 DIAGNOSIS — N39.0 RECURRENT UTI: ICD-10-CM

## 2023-01-01 DIAGNOSIS — M48.061 SPINAL STENOSIS OF LUMBAR REGION, UNSPECIFIED WHETHER NEUROGENIC CLAUDICATION PRESENT: ICD-10-CM

## 2023-01-01 DIAGNOSIS — N30.00 ACUTE CYSTITIS WITHOUT HEMATURIA: ICD-10-CM

## 2023-01-01 DIAGNOSIS — G93.40 ENCEPHALOPATHY: ICD-10-CM

## 2023-01-01 DIAGNOSIS — M25.50 ARTHRALGIA, UNSPECIFIED JOINT: ICD-10-CM

## 2023-01-01 DIAGNOSIS — G89.29 OTHER CHRONIC PAIN: ICD-10-CM

## 2023-01-01 DIAGNOSIS — N39.0 URINARY TRACT INFECTION WITH HEMATURIA, SITE UNSPECIFIED: ICD-10-CM

## 2023-01-01 DIAGNOSIS — F41.9 ANXIETY AND DEPRESSION: ICD-10-CM

## 2023-01-01 DIAGNOSIS — N30.01 ACUTE CYSTITIS WITH HEMATURIA: Primary | ICD-10-CM

## 2023-01-01 DIAGNOSIS — F03.90 DEMENTIA WITHOUT BEHAVIORAL DISTURBANCE (H): ICD-10-CM

## 2023-01-01 DIAGNOSIS — K59.00 CONSTIPATION, UNSPECIFIED CONSTIPATION TYPE: Primary | ICD-10-CM

## 2023-01-01 DIAGNOSIS — R44.3 HALLUCINATIONS: ICD-10-CM

## 2023-01-01 DIAGNOSIS — M48.061 SPINAL STENOSIS OF LUMBAR REGION WITHOUT NEUROGENIC CLAUDICATION: Primary | ICD-10-CM

## 2023-01-01 DIAGNOSIS — Z86.73 H/O: CVA (CEREBROVASCULAR ACCIDENT): Primary | ICD-10-CM

## 2023-01-01 DIAGNOSIS — R05.1 ACUTE COUGH: ICD-10-CM

## 2023-01-01 DIAGNOSIS — N30.00 ACUTE CYSTITIS WITHOUT HEMATURIA: Primary | ICD-10-CM

## 2023-01-01 DIAGNOSIS — Z71.89 ADVANCED CARE PLANNING/COUNSELING DISCUSSION: Primary | ICD-10-CM

## 2023-01-01 DIAGNOSIS — F06.0 PSYCHOTIC DISORDER WITH HALLUCINATIONS DUE TO KNOWN PHYSIOLOGICAL CONDITION: ICD-10-CM

## 2023-01-01 DIAGNOSIS — R44.3 HALLUCINATIONS: Primary | ICD-10-CM

## 2023-01-01 DIAGNOSIS — I71.40 ABDOMINAL AORTIC ANEURYSM (AAA) WITHOUT RUPTURE, UNSPECIFIED PART (H): ICD-10-CM

## 2023-01-01 DIAGNOSIS — R05.1 ACUTE COUGH: Primary | ICD-10-CM

## 2023-01-01 DIAGNOSIS — S69.91XA HAND TRAUMA, RIGHT, INITIAL ENCOUNTER: ICD-10-CM

## 2023-01-01 DIAGNOSIS — M54.9 CHRONIC BACK PAIN, UNSPECIFIED BACK LOCATION, UNSPECIFIED BACK PAIN LATERALITY: ICD-10-CM

## 2023-01-01 DIAGNOSIS — D63.8 ANEMIA IN OTHER CHRONIC DISEASES CLASSIFIED ELSEWHERE: ICD-10-CM

## 2023-01-01 DIAGNOSIS — R31.9 URINARY TRACT INFECTION WITH HEMATURIA, SITE UNSPECIFIED: ICD-10-CM

## 2023-01-01 DIAGNOSIS — I10 ESSENTIAL HYPERTENSION: ICD-10-CM

## 2023-01-01 DIAGNOSIS — B96.89 UTI DUE TO KLEBSIELLA SPECIES: Primary | ICD-10-CM

## 2023-01-01 DIAGNOSIS — J20.9 ACUTE BRONCHITIS, UNSPECIFIED ORGANISM: ICD-10-CM

## 2023-01-01 DIAGNOSIS — I10 ESSENTIAL HYPERTENSION: Primary | ICD-10-CM

## 2023-01-01 DIAGNOSIS — M48.061 SPINAL STENOSIS OF LUMBAR REGION WITHOUT NEUROGENIC CLAUDICATION: ICD-10-CM

## 2023-01-01 DIAGNOSIS — F03.90 DEMENTIA WITHOUT BEHAVIORAL DISTURBANCE (H): Primary | ICD-10-CM

## 2023-01-01 DIAGNOSIS — Z86.59 HISTORY OF DEMENTIA: ICD-10-CM

## 2023-01-01 DIAGNOSIS — G89.29 CHRONIC BACK PAIN, UNSPECIFIED BACK LOCATION, UNSPECIFIED BACK PAIN LATERALITY: ICD-10-CM

## 2023-01-01 DIAGNOSIS — I63.9 CEREBROVASCULAR ACCIDENT (CVA), UNSPECIFIED MECHANISM (H): ICD-10-CM

## 2023-01-01 DIAGNOSIS — Z86.73 H/O: CVA (CEREBROVASCULAR ACCIDENT): ICD-10-CM

## 2023-01-01 DIAGNOSIS — F33.9 EPISODE OF RECURRENT MAJOR DEPRESSIVE DISORDER, UNSPECIFIED DEPRESSION EPISODE SEVERITY (H): ICD-10-CM

## 2023-01-01 DIAGNOSIS — N39.0 UTI DUE TO KLEBSIELLA SPECIES: Primary | ICD-10-CM

## 2023-01-01 DIAGNOSIS — N39.0 RECURRENT UTI: Primary | ICD-10-CM

## 2023-01-01 DIAGNOSIS — N28.9 RENAL LESION: ICD-10-CM

## 2023-01-01 DIAGNOSIS — Z51.5 HOSPICE CARE PATIENT: Primary | ICD-10-CM

## 2023-01-01 DIAGNOSIS — R54 FRAILTY: ICD-10-CM

## 2023-01-01 LAB
ALBUMIN UR-MCNC: 20 MG/DL
ALBUMIN UR-MCNC: 30 MG/DL
ANION GAP SERPL CALCULATED.3IONS-SCNC: 10 MMOL/L (ref 7–15)
ANION GAP SERPL CALCULATED.3IONS-SCNC: 12 MMOL/L (ref 7–15)
ANION GAP SERPL CALCULATED.3IONS-SCNC: 13 MMOL/L (ref 7–15)
ANION GAP SERPL CALCULATED.3IONS-SCNC: 14 MMOL/L (ref 7–15)
APPEARANCE UR: ABNORMAL
APPEARANCE UR: ABNORMAL
APPEARANCE UR: CLEAR
APPEARANCE UR: CLEAR
BACTERIA #/AREA URNS HPF: ABNORMAL /HPF
BACTERIA #/AREA URNS HPF: ABNORMAL /HPF
BACTERIA UR CULT: ABNORMAL
BACTERIA UR CULT: NO GROWTH
BASOPHILS # BLD AUTO: 0 10E3/UL (ref 0–0.2)
BASOPHILS # BLD AUTO: 0.1 10E3/UL (ref 0–0.2)
BASOPHILS NFR BLD AUTO: 0 %
BASOPHILS NFR BLD AUTO: 1 %
BILIRUB UR QL STRIP: NEGATIVE
BUN SERPL-MCNC: 19.3 MG/DL (ref 8–23)
BUN SERPL-MCNC: 22.1 MG/DL (ref 8–23)
BUN SERPL-MCNC: 23.3 MG/DL (ref 8–23)
BUN SERPL-MCNC: 27 MG/DL (ref 8–23)
CALCIUM SERPL-MCNC: 9 MG/DL (ref 8.2–9.6)
CALCIUM SERPL-MCNC: 9.1 MG/DL (ref 8.2–9.6)
CALCIUM SERPL-MCNC: 9.2 MG/DL (ref 8.2–9.6)
CALCIUM SERPL-MCNC: 9.5 MG/DL (ref 8.2–9.6)
CHLORIDE SERPL-SCNC: 107 MMOL/L (ref 98–107)
CHLORIDE SERPL-SCNC: 107 MMOL/L (ref 98–107)
CHLORIDE SERPL-SCNC: 109 MMOL/L (ref 98–107)
CHLORIDE SERPL-SCNC: 110 MMOL/L (ref 98–107)
COLOR UR AUTO: ABNORMAL
CREAT SERPL-MCNC: 0.71 MG/DL (ref 0.51–0.95)
CREAT SERPL-MCNC: 0.76 MG/DL (ref 0.51–0.95)
CREAT SERPL-MCNC: 0.85 MG/DL (ref 0.51–0.95)
CREAT SERPL-MCNC: 0.85 MG/DL (ref 0.51–0.95)
DEPRECATED HCO3 PLAS-SCNC: 19 MMOL/L (ref 22–29)
DEPRECATED HCO3 PLAS-SCNC: 23 MMOL/L (ref 22–29)
DEPRECATED HCO3 PLAS-SCNC: 23 MMOL/L (ref 22–29)
DEPRECATED HCO3 PLAS-SCNC: 25 MMOL/L (ref 22–29)
EOSINOPHIL # BLD AUTO: 0.1 10E3/UL (ref 0–0.7)
EOSINOPHIL # BLD AUTO: 0.1 10E3/UL (ref 0–0.7)
EOSINOPHIL NFR BLD AUTO: 1 %
EOSINOPHIL NFR BLD AUTO: 1 %
ERYTHROCYTE [DISTWIDTH] IN BLOOD BY AUTOMATED COUNT: 14.1 % (ref 10–15)
ERYTHROCYTE [DISTWIDTH] IN BLOOD BY AUTOMATED COUNT: 14.1 % (ref 10–15)
ERYTHROCYTE [DISTWIDTH] IN BLOOD BY AUTOMATED COUNT: 14.3 % (ref 10–15)
ERYTHROCYTE [DISTWIDTH] IN BLOOD BY AUTOMATED COUNT: 15.3 % (ref 10–15)
GFR SERPL CREATININE-BSD FRML MDRD: 61 ML/MIN/1.73M2
GFR SERPL CREATININE-BSD FRML MDRD: 61 ML/MIN/1.73M2
GFR SERPL CREATININE-BSD FRML MDRD: 70 ML/MIN/1.73M2
GFR SERPL CREATININE-BSD FRML MDRD: 76 ML/MIN/1.73M2
GLUCOSE SERPL-MCNC: 118 MG/DL (ref 70–99)
GLUCOSE SERPL-MCNC: 142 MG/DL (ref 70–99)
GLUCOSE SERPL-MCNC: 166 MG/DL (ref 70–99)
GLUCOSE SERPL-MCNC: 85 MG/DL (ref 70–99)
GLUCOSE UR STRIP-MCNC: NEGATIVE MG/DL
HCO3 BLDV-SCNC: 27 MMOL/L (ref 21–28)
HCT VFR BLD AUTO: 37 % (ref 35–47)
HCT VFR BLD AUTO: 37.2 % (ref 35–47)
HCT VFR BLD AUTO: 37.3 % (ref 35–47)
HCT VFR BLD AUTO: 37.4 % (ref 35–47)
HGB BLD-MCNC: 11.5 G/DL (ref 11.7–15.7)
HGB BLD-MCNC: 12 G/DL (ref 11.7–15.7)
HGB BLD-MCNC: 12.2 G/DL (ref 11.7–15.7)
HGB BLD-MCNC: 12.2 G/DL (ref 11.7–15.7)
HGB UR QL STRIP: ABNORMAL
HGB UR QL STRIP: NEGATIVE
HOLD SPECIMEN: NORMAL
IMM GRANULOCYTES # BLD: 0 10E3/UL
IMM GRANULOCYTES # BLD: 0.1 10E3/UL
IMM GRANULOCYTES NFR BLD: 0 %
IMM GRANULOCYTES NFR BLD: 1 %
KETONES UR STRIP-MCNC: NEGATIVE MG/DL
LACTATE BLD-SCNC: 1.9 MMOL/L
LEUKOCYTE ESTERASE UR QL STRIP: ABNORMAL
LYMPHOCYTES # BLD AUTO: 1.8 10E3/UL (ref 0.8–5.3)
LYMPHOCYTES # BLD AUTO: 2 10E3/UL (ref 0.8–5.3)
LYMPHOCYTES NFR BLD AUTO: 17 %
LYMPHOCYTES NFR BLD AUTO: 19 %
MCH RBC QN AUTO: 32.9 PG (ref 26.5–33)
MCH RBC QN AUTO: 33.1 PG (ref 26.5–33)
MCH RBC QN AUTO: 33.7 PG (ref 26.5–33)
MCH RBC QN AUTO: 33.8 PG (ref 26.5–33)
MCHC RBC AUTO-ENTMCNC: 30.9 G/DL (ref 31.5–36.5)
MCHC RBC AUTO-ENTMCNC: 32.4 G/DL (ref 31.5–36.5)
MCHC RBC AUTO-ENTMCNC: 32.6 G/DL (ref 31.5–36.5)
MCHC RBC AUTO-ENTMCNC: 32.7 G/DL (ref 31.5–36.5)
MCV RBC AUTO: 101 FL (ref 78–100)
MCV RBC AUTO: 102 FL (ref 78–100)
MCV RBC AUTO: 103 FL (ref 78–100)
MCV RBC AUTO: 109 FL (ref 78–100)
MONOCYTES # BLD AUTO: 0.7 10E3/UL (ref 0–1.3)
MONOCYTES # BLD AUTO: 0.9 10E3/UL (ref 0–1.3)
MONOCYTES NFR BLD AUTO: 7 %
MONOCYTES NFR BLD AUTO: 8 %
MUCOUS THREADS #/AREA URNS LPF: PRESENT /LPF
NEUTROPHILS # BLD AUTO: 7.5 10E3/UL (ref 1.6–8.3)
NEUTROPHILS # BLD AUTO: 7.7 10E3/UL (ref 1.6–8.3)
NEUTROPHILS NFR BLD AUTO: 71 %
NEUTROPHILS NFR BLD AUTO: 74 %
NITRATE UR QL: NEGATIVE
NRBC # BLD AUTO: 0 10E3/UL
NRBC # BLD AUTO: 0 10E3/UL
NRBC BLD AUTO-RTO: 0 /100
NRBC BLD AUTO-RTO: 0 /100
PCO2 BLDV: 48 MM HG (ref 40–50)
PH BLDV: 7.36 [PH] (ref 7.32–7.43)
PH UR STRIP: 6 [PH] (ref 5–7)
PH UR STRIP: 6.5 [PH] (ref 5–7)
PLATELET # BLD AUTO: 160 10E3/UL (ref 150–450)
PLATELET # BLD AUTO: 165 10E3/UL (ref 150–450)
PLATELET # BLD AUTO: 203 10E3/UL (ref 150–450)
PLATELET # BLD AUTO: 209 10E3/UL (ref 150–450)
PO2 BLDV: 31 MM HG (ref 25–47)
POTASSIUM SERPL-SCNC: 3.8 MMOL/L (ref 3.4–5.3)
POTASSIUM SERPL-SCNC: 3.8 MMOL/L (ref 3.4–5.3)
POTASSIUM SERPL-SCNC: 4 MMOL/L (ref 3.4–5.3)
POTASSIUM SERPL-SCNC: 4.5 MMOL/L (ref 3.4–5.3)
RBC # BLD AUTO: 3.41 10E6/UL (ref 3.8–5.2)
RBC # BLD AUTO: 3.61 10E6/UL (ref 3.8–5.2)
RBC # BLD AUTO: 3.63 10E6/UL (ref 3.8–5.2)
RBC # BLD AUTO: 3.71 10E6/UL (ref 3.8–5.2)
RBC URINE: 21 /HPF
RBC URINE: 28 /HPF
RBC URINE: 3 /HPF
RBC URINE: 94 /HPF
SAO2 % BLDV: 56 % (ref 94–100)
SODIUM SERPL-SCNC: 142 MMOL/L (ref 136–145)
SODIUM SERPL-SCNC: 143 MMOL/L (ref 136–145)
SODIUM SERPL-SCNC: 143 MMOL/L (ref 136–145)
SODIUM SERPL-SCNC: 144 MMOL/L (ref 136–145)
SP GR UR STRIP: 1.02 (ref 1–1.03)
SQUAMOUS EPITHELIAL: <1 /HPF
SQUAMOUS EPITHELIAL: <1 /HPF
UROBILINOGEN UR STRIP-MCNC: NORMAL MG/DL
WBC # BLD AUTO: 10.4 10E3/UL (ref 4–11)
WBC # BLD AUTO: 10.6 10E3/UL (ref 4–11)
WBC # BLD AUTO: 6.9 10E3/UL (ref 4–11)
WBC # BLD AUTO: 9.1 10E3/UL (ref 4–11)
WBC URINE: 104 /HPF
WBC URINE: 11 /HPF
WBC URINE: 30 /HPF
WBC URINE: 58 /HPF

## 2023-01-01 PROCEDURE — 36415 COLL VENOUS BLD VENIPUNCTURE: CPT | Performed by: EMERGENCY MEDICINE

## 2023-01-01 PROCEDURE — 99309 SBSQ NF CARE MODERATE MDM 30: CPT | Performed by: PHYSICIAN ASSISTANT

## 2023-01-01 PROCEDURE — 250N000013 HC RX MED GY IP 250 OP 250 PS 637: Performed by: PHYSICIAN ASSISTANT

## 2023-01-01 PROCEDURE — 99231 SBSQ HOSP IP/OBS SF/LOW 25: CPT | Performed by: INTERNAL MEDICINE

## 2023-01-01 PROCEDURE — 72170 X-RAY EXAM OF PELVIS: CPT

## 2023-01-01 PROCEDURE — 250N000011 HC RX IP 250 OP 636: Mod: JZ | Performed by: EMERGENCY MEDICINE

## 2023-01-01 PROCEDURE — G0378 HOSPITAL OBSERVATION PER HR: HCPCS

## 2023-01-01 PROCEDURE — 87086 URINE CULTURE/COLONY COUNT: CPT | Mod: ORL | Performed by: INTERNAL MEDICINE

## 2023-01-01 PROCEDURE — 250N000011 HC RX IP 250 OP 636: Performed by: INTERNAL MEDICINE

## 2023-01-01 PROCEDURE — 36415 COLL VENOUS BLD VENIPUNCTURE: CPT | Performed by: INTERNAL MEDICINE

## 2023-01-01 PROCEDURE — 250N000011 HC RX IP 250 OP 636: Mod: JZ | Performed by: PHYSICIAN ASSISTANT

## 2023-01-01 PROCEDURE — 258N000003 HC RX IP 258 OP 636: Performed by: PHYSICIAN ASSISTANT

## 2023-01-01 PROCEDURE — 96365 THER/PROPH/DIAG IV INF INIT: CPT

## 2023-01-01 PROCEDURE — 96372 THER/PROPH/DIAG INJ SC/IM: CPT | Performed by: EMERGENCY MEDICINE

## 2023-01-01 PROCEDURE — 99222 1ST HOSP IP/OBS MODERATE 55: CPT | Mod: AI | Performed by: PHYSICIAN ASSISTANT

## 2023-01-01 PROCEDURE — 96376 TX/PRO/DX INJ SAME DRUG ADON: CPT

## 2023-01-01 PROCEDURE — 250N000011 HC RX IP 250 OP 636: Performed by: EMERGENCY MEDICINE

## 2023-01-01 PROCEDURE — 99310 SBSQ NF CARE HIGH MDM 45: CPT | Performed by: PHYSICIAN ASSISTANT

## 2023-01-01 PROCEDURE — 99309 SBSQ NF CARE MODERATE MDM 30: CPT | Performed by: INTERNAL MEDICINE

## 2023-01-01 PROCEDURE — 80048 BASIC METABOLIC PNL TOTAL CA: CPT | Performed by: INTERNAL MEDICINE

## 2023-01-01 PROCEDURE — 81001 URINALYSIS AUTO W/SCOPE: CPT | Mod: ORL | Performed by: INTERNAL MEDICINE

## 2023-01-01 PROCEDURE — 80048 BASIC METABOLIC PNL TOTAL CA: CPT | Mod: ORL | Performed by: INTERNAL MEDICINE

## 2023-01-01 PROCEDURE — 250N000013 HC RX MED GY IP 250 OP 250 PS 637: Performed by: INTERNAL MEDICINE

## 2023-01-01 PROCEDURE — 70450 CT HEAD/BRAIN W/O DYE: CPT

## 2023-01-01 PROCEDURE — 99310 SBSQ NF CARE HIGH MDM 45: CPT | Performed by: INTERNAL MEDICINE

## 2023-01-01 PROCEDURE — 99207 PR NO CHARGE LOS: CPT | Performed by: PHARMACIST

## 2023-01-01 PROCEDURE — 85027 COMPLETE CBC AUTOMATED: CPT | Mod: ORL | Performed by: INTERNAL MEDICINE

## 2023-01-01 PROCEDURE — 85004 AUTOMATED DIFF WBC COUNT: CPT | Performed by: EMERGENCY MEDICINE

## 2023-01-01 PROCEDURE — 99232 SBSQ HOSP IP/OBS MODERATE 35: CPT | Performed by: INTERNAL MEDICINE

## 2023-01-01 PROCEDURE — 96375 TX/PRO/DX INJ NEW DRUG ADDON: CPT

## 2023-01-01 PROCEDURE — 82310 ASSAY OF CALCIUM: CPT | Performed by: EMERGENCY MEDICINE

## 2023-01-01 PROCEDURE — 96366 THER/PROPH/DIAG IV INF ADDON: CPT

## 2023-01-01 PROCEDURE — 99239 HOSP IP/OBS DSCHRG MGMT >30: CPT | Performed by: PHYSICIAN ASSISTANT

## 2023-01-01 PROCEDURE — 36415 COLL VENOUS BLD VENIPUNCTURE: CPT | Mod: ORL | Performed by: INTERNAL MEDICINE

## 2023-01-01 PROCEDURE — 82803 BLOOD GASES ANY COMBINATION: CPT

## 2023-01-01 PROCEDURE — 87088 URINE BACTERIA CULTURE: CPT | Mod: ORL | Performed by: INTERNAL MEDICINE

## 2023-01-01 PROCEDURE — 80048 BASIC METABOLIC PNL TOTAL CA: CPT | Performed by: EMERGENCY MEDICINE

## 2023-01-01 PROCEDURE — 96361 HYDRATE IV INFUSION ADD-ON: CPT

## 2023-01-01 PROCEDURE — 99285 EMERGENCY DEPT VISIT HI MDM: CPT | Mod: 25

## 2023-01-01 PROCEDURE — 87086 URINE CULTURE/COLONY COUNT: CPT | Performed by: EMERGENCY MEDICINE

## 2023-01-01 PROCEDURE — P9604 ONE-WAY ALLOW PRORATED TRIP: HCPCS | Mod: ORL | Performed by: INTERNAL MEDICINE

## 2023-01-01 PROCEDURE — 96372 THER/PROPH/DIAG INJ SC/IM: CPT | Performed by: INTERNAL MEDICINE

## 2023-01-01 PROCEDURE — 81001 URINALYSIS AUTO W/SCOPE: CPT | Performed by: EMERGENCY MEDICINE

## 2023-01-01 PROCEDURE — 99238 HOSP IP/OBS DSCHRG MGMT 30/<: CPT | Performed by: INTERNAL MEDICINE

## 2023-01-01 PROCEDURE — 85027 COMPLETE CBC AUTOMATED: CPT | Performed by: INTERNAL MEDICINE

## 2023-01-01 PROCEDURE — 83605 ASSAY OF LACTIC ACID: CPT

## 2023-01-01 PROCEDURE — 250N000011 HC RX IP 250 OP 636: Mod: JZ | Performed by: HOSPITALIST

## 2023-01-01 PROCEDURE — 99223 1ST HOSP IP/OBS HIGH 75: CPT | Mod: AI | Performed by: INTERNAL MEDICINE

## 2023-01-01 RX ORDER — IPRATROPIUM BROMIDE AND ALBUTEROL SULFATE 2.5; .5 MG/3ML; MG/3ML
1 SOLUTION RESPIRATORY (INHALATION) 2 TIMES DAILY
Qty: 90 ML
Start: 2023-01-01 | End: 2023-01-01

## 2023-01-01 RX ORDER — LATANOPROST 50 UG/ML
1 SOLUTION/ DROPS OPHTHALMIC DAILY
Status: DISCONTINUED | OUTPATIENT
Start: 2023-01-01 | End: 2023-01-01 | Stop reason: HOSPADM

## 2023-01-01 RX ORDER — ESCITALOPRAM OXALATE 10 MG/1
10 TABLET ORAL DAILY
Status: DISCONTINUED | OUTPATIENT
Start: 2023-01-01 | End: 2023-01-01 | Stop reason: HOSPADM

## 2023-01-01 RX ORDER — AMOXICILLIN 250 MG
1 CAPSULE ORAL 2 TIMES DAILY PRN
Status: DISCONTINUED | OUTPATIENT
Start: 2023-01-01 | End: 2023-01-01 | Stop reason: HOSPADM

## 2023-01-01 RX ORDER — CARVEDILOL 6.25 MG/1
6.25 TABLET ORAL 2 TIMES DAILY WITH MEALS
Status: DISCONTINUED | OUTPATIENT
Start: 2023-01-01 | End: 2023-01-01 | Stop reason: HOSPADM

## 2023-01-01 RX ORDER — QUETIAPINE FUMARATE 25 MG/1
25 TABLET, FILM COATED ORAL 2 TIMES DAILY PRN
DISCHARGE
Start: 2023-01-01

## 2023-01-01 RX ORDER — OLANZAPINE 2.5 MG/1
2.5 TABLET, FILM COATED ORAL EVERY 8 HOURS PRN
Status: DISCONTINUED | OUTPATIENT
Start: 2023-01-01 | End: 2023-01-01 | Stop reason: HOSPADM

## 2023-01-01 RX ORDER — NALOXONE HYDROCHLORIDE 0.4 MG/ML
0.4 INJECTION, SOLUTION INTRAMUSCULAR; INTRAVENOUS; SUBCUTANEOUS
Status: DISCONTINUED | OUTPATIENT
Start: 2023-01-01 | End: 2023-01-01 | Stop reason: HOSPADM

## 2023-01-01 RX ORDER — AMOXICILLIN 250 MG
2 CAPSULE ORAL 2 TIMES DAILY PRN
Status: DISCONTINUED | OUTPATIENT
Start: 2023-01-01 | End: 2023-01-01 | Stop reason: HOSPADM

## 2023-01-01 RX ORDER — ACETAMINOPHEN 160 MG/5ML
1000 SUSPENSION ORAL 2 TIMES DAILY
Start: 2023-01-01 | End: 2023-01-01

## 2023-01-01 RX ORDER — ACETAMINOPHEN 500 MG
1000 TABLET ORAL 2 TIMES DAILY
COMMUNITY
End: 2023-01-01

## 2023-01-01 RX ORDER — GUAIFENESIN 200 MG/10ML
200 LIQUID ORAL EVERY 4 HOURS PRN
Status: DISCONTINUED | OUTPATIENT
Start: 2023-01-01 | End: 2023-01-01 | Stop reason: HOSPADM

## 2023-01-01 RX ORDER — TRAMADOL HYDROCHLORIDE 50 MG/1
25 TABLET ORAL DAILY
Qty: 45 TABLET | Refills: 0 | Status: SHIPPED | OUTPATIENT
Start: 2023-01-01

## 2023-01-01 RX ORDER — TRAMADOL HYDROCHLORIDE 50 MG/1
25 TABLET ORAL DAILY
Qty: 45 TABLET | Refills: 0 | Status: SHIPPED | OUTPATIENT
Start: 2023-01-01 | End: 2023-01-01

## 2023-01-01 RX ORDER — ENOXAPARIN SODIUM 100 MG/ML
40 INJECTION SUBCUTANEOUS EVERY 24 HOURS
Status: DISCONTINUED | OUTPATIENT
Start: 2023-01-01 | End: 2023-01-01 | Stop reason: HOSPADM

## 2023-01-01 RX ORDER — CEFTRIAXONE 1 G/1
1 INJECTION, POWDER, FOR SOLUTION INTRAMUSCULAR; INTRAVENOUS ONCE
Status: COMPLETED | OUTPATIENT
Start: 2023-01-01 | End: 2023-01-01

## 2023-01-01 RX ORDER — AZITHROMYCIN 250 MG/1
TABLET, FILM COATED ORAL
Qty: 6 TABLET | Refills: 0
Start: 2023-01-01 | End: 2023-01-01

## 2023-01-01 RX ORDER — CEFTRIAXONE 2 G/1
2 INJECTION, POWDER, FOR SOLUTION INTRAMUSCULAR; INTRAVENOUS ONCE
Status: COMPLETED | OUTPATIENT
Start: 2023-01-01 | End: 2023-01-01

## 2023-01-01 RX ORDER — ATORVASTATIN CALCIUM 40 MG/1
40 TABLET, FILM COATED ORAL EVERY EVENING
Status: DISCONTINUED | OUTPATIENT
Start: 2023-01-01 | End: 2023-01-01 | Stop reason: HOSPADM

## 2023-01-01 RX ORDER — CEFTRIAXONE 1 G/1
1 INJECTION, POWDER, FOR SOLUTION INTRAMUSCULAR; INTRAVENOUS EVERY 24 HOURS
Status: DISCONTINUED | OUTPATIENT
Start: 2023-01-01 | End: 2023-01-01

## 2023-01-01 RX ORDER — DORZOLAMIDE HYDROCHLORIDE AND TIMOLOL MALEATE 20; 5 MG/ML; MG/ML
1 SOLUTION/ DROPS OPHTHALMIC 2 TIMES DAILY
Status: DISCONTINUED | OUTPATIENT
Start: 2023-01-01 | End: 2023-01-01 | Stop reason: HOSPADM

## 2023-01-01 RX ORDER — BENZONATATE 200 MG/1
200 CAPSULE ORAL 3 TIMES DAILY
Start: 2023-01-01 | End: 2023-01-01

## 2023-01-01 RX ORDER — ONDANSETRON 2 MG/ML
4 INJECTION INTRAMUSCULAR; INTRAVENOUS EVERY 6 HOURS PRN
Status: DISCONTINUED | OUTPATIENT
Start: 2023-01-01 | End: 2023-01-01 | Stop reason: HOSPADM

## 2023-01-01 RX ORDER — IPRATROPIUM BROMIDE AND ALBUTEROL SULFATE 2.5; .5 MG/3ML; MG/3ML
1 SOLUTION RESPIRATORY (INHALATION) 3 TIMES DAILY PRN
Qty: 90 ML
Start: 2023-01-01 | End: 2023-01-01

## 2023-01-01 RX ORDER — ACETAMINOPHEN 325 MG/1
975 TABLET ORAL EVERY 8 HOURS
Status: DISCONTINUED | OUTPATIENT
Start: 2023-01-01 | End: 2023-01-01 | Stop reason: HOSPADM

## 2023-01-01 RX ORDER — ACETAMINOPHEN 500 MG
1000 TABLET ORAL 2 TIMES DAILY
Status: DISCONTINUED | OUTPATIENT
Start: 2023-01-01 | End: 2023-01-01 | Stop reason: HOSPADM

## 2023-01-01 RX ORDER — IPRATROPIUM BROMIDE AND ALBUTEROL SULFATE 2.5; .5 MG/3ML; MG/3ML
1 SOLUTION RESPIRATORY (INHALATION) EVERY 4 HOURS PRN
Status: DISCONTINUED | OUTPATIENT
Start: 2023-01-01 | End: 2023-01-01 | Stop reason: HOSPADM

## 2023-01-01 RX ORDER — ONDANSETRON 4 MG/1
4 TABLET, ORALLY DISINTEGRATING ORAL EVERY 6 HOURS PRN
Status: DISCONTINUED | OUTPATIENT
Start: 2023-01-01 | End: 2023-01-01 | Stop reason: HOSPADM

## 2023-01-01 RX ORDER — POLYETHYLENE GLYCOL 3350 17 G/17G
1 POWDER, FOR SOLUTION ORAL DAILY PRN
DISCHARGE
Start: 2023-01-01 | End: 2023-01-01

## 2023-01-01 RX ORDER — GUAIFENESIN 200 MG/10ML
200 LIQUID ORAL EVERY 4 HOURS PRN
COMMUNITY
End: 2023-01-01

## 2023-01-01 RX ORDER — MORPHINE SULFATE 20 MG/ML
5 SOLUTION ORAL
COMMUNITY

## 2023-01-01 RX ORDER — HALOPERIDOL 5 MG/ML
2 INJECTION INTRAMUSCULAR EVERY 6 HOURS PRN
Status: DISCONTINUED | OUTPATIENT
Start: 2023-01-01 | End: 2023-01-01 | Stop reason: HOSPADM

## 2023-01-01 RX ORDER — OLANZAPINE 10 MG/2ML
2.5 INJECTION, POWDER, FOR SOLUTION INTRAMUSCULAR ONCE
Status: COMPLETED | OUTPATIENT
Start: 2023-01-01 | End: 2023-01-01

## 2023-01-01 RX ORDER — POLYETHYLENE GLYCOL 3350 17 G/17G
17 POWDER, FOR SOLUTION ORAL DAILY
Status: DISCONTINUED | OUTPATIENT
Start: 2023-01-01 | End: 2023-01-01 | Stop reason: HOSPADM

## 2023-01-01 RX ORDER — NALOXONE HYDROCHLORIDE 0.4 MG/ML
0.2 INJECTION, SOLUTION INTRAMUSCULAR; INTRAVENOUS; SUBCUTANEOUS
Status: DISCONTINUED | OUTPATIENT
Start: 2023-01-01 | End: 2023-01-01 | Stop reason: HOSPADM

## 2023-01-01 RX ORDER — AMOXICILLIN AND CLAVULANATE POTASSIUM 500; 125 MG/1; MG/1
1 TABLET, FILM COATED ORAL 2 TIMES DAILY
DISCHARGE
Start: 2023-01-01 | End: 2023-01-01

## 2023-01-01 RX ORDER — CEFTRIAXONE 1 G/1
1 INJECTION, POWDER, FOR SOLUTION INTRAMUSCULAR; INTRAVENOUS EVERY 24 HOURS
Status: DISCONTINUED | OUTPATIENT
Start: 2023-01-01 | End: 2023-01-01 | Stop reason: HOSPADM

## 2023-01-01 RX ORDER — BISACODYL 10 MG
10 SUPPOSITORY, RECTAL RECTAL DAILY PRN
Status: DISCONTINUED | OUTPATIENT
Start: 2023-01-01 | End: 2023-01-01 | Stop reason: HOSPADM

## 2023-01-01 RX ORDER — SODIUM CHLORIDE 9 MG/ML
INJECTION, SOLUTION INTRAVENOUS CONTINUOUS
Status: DISCONTINUED | OUTPATIENT
Start: 2023-01-01 | End: 2023-01-01

## 2023-01-01 RX ORDER — HYOSCYAMINE SULFATE 0.125 MG
0.12 TABLET,DISINTEGRATING ORAL EVERY 4 HOURS PRN
COMMUNITY

## 2023-01-01 RX ORDER — MORPHINE SULFATE 20 MG/ML
5 SOLUTION ORAL 3 TIMES DAILY
COMMUNITY

## 2023-01-01 RX ORDER — ASPIRIN 81 MG/1
81 TABLET, CHEWABLE ORAL DAILY
Status: DISCONTINUED | OUTPATIENT
Start: 2023-01-01 | End: 2023-01-01 | Stop reason: HOSPADM

## 2023-01-01 RX ORDER — LORAZEPAM 0.5 MG/1
0.5 TABLET ORAL 4 TIMES DAILY PRN
COMMUNITY

## 2023-01-01 RX ADMIN — CARVEDILOL 6.25 MG: 6.25 TABLET, FILM COATED ORAL at 07:47

## 2023-01-01 RX ADMIN — DORZOLAMIDE HYDROCHLORIDE AND TIMOLOL MALEATE 1 DROP: 20; 5 SOLUTION/ DROPS OPHTHALMIC at 19:56

## 2023-01-01 RX ADMIN — ACETAMINOPHEN 1000 MG: 500 TABLET, FILM COATED ORAL at 07:47

## 2023-01-01 RX ADMIN — SODIUM CHLORIDE: 9 INJECTION, SOLUTION INTRAVENOUS at 20:28

## 2023-01-01 RX ADMIN — ATORVASTATIN CALCIUM 40 MG: 40 TABLET, FILM COATED ORAL at 19:53

## 2023-01-01 RX ADMIN — ACETAMINOPHEN 1000 MG: 500 TABLET, FILM COATED ORAL at 19:55

## 2023-01-01 RX ADMIN — CEFTRIAXONE 1 G: 1 INJECTION, POWDER, FOR SOLUTION INTRAMUSCULAR; INTRAVENOUS at 15:57

## 2023-01-01 RX ADMIN — SENNOSIDES AND DOCUSATE SODIUM 2 TABLET: 50; 8.6 TABLET ORAL at 21:49

## 2023-01-01 RX ADMIN — CARVEDILOL 6.25 MG: 6.25 TABLET, FILM COATED ORAL at 08:32

## 2023-01-01 RX ADMIN — HALOPERIDOL LACTATE 2 MG: 5 INJECTION, SOLUTION INTRAMUSCULAR at 02:03

## 2023-01-01 RX ADMIN — CARVEDILOL 6.25 MG: 6.25 TABLET, FILM COATED ORAL at 21:51

## 2023-01-01 RX ADMIN — LATANOPROST 1 DROP: 50 SOLUTION/ DROPS OPHTHALMIC at 07:53

## 2023-01-01 RX ADMIN — ENOXAPARIN SODIUM 40 MG: 40 INJECTION SUBCUTANEOUS at 13:20

## 2023-01-01 RX ADMIN — CARVEDILOL 6.25 MG: 6.25 TABLET, FILM COATED ORAL at 08:46

## 2023-01-01 RX ADMIN — LATANOPROST 1 DROP: 50 SOLUTION/ DROPS OPHTHALMIC at 08:34

## 2023-01-01 RX ADMIN — DORZOLAMIDE HYDROCHLORIDE AND TIMOLOL MALEATE 1 DROP: 22.3; 6.8 SOLUTION/ DROPS OPHTHALMIC at 21:53

## 2023-01-01 RX ADMIN — TRAMADOL HYDROCHLORIDE 25 MG: 50 TABLET ORAL at 07:47

## 2023-01-01 RX ADMIN — BISACODYL 10 MG: 10 SUPPOSITORY RECTAL at 10:27

## 2023-01-01 RX ADMIN — ACETAMINOPHEN 975 MG: 325 TABLET, FILM COATED ORAL at 19:53

## 2023-01-01 RX ADMIN — ACETAMINOPHEN 975 MG: 325 TABLET, FILM COATED ORAL at 12:04

## 2023-01-01 RX ADMIN — ATORVASTATIN CALCIUM 40 MG: 40 TABLET, FILM COATED ORAL at 19:55

## 2023-01-01 RX ADMIN — DORZOLAMIDE HYDROCHLORIDE AND TIMOLOL MALEATE 1 DROP: 22.3; 6.8 SOLUTION/ DROPS OPHTHALMIC at 11:51

## 2023-01-01 RX ADMIN — TRAMADOL HYDROCHLORIDE 25 MG: 50 TABLET ORAL at 21:51

## 2023-01-01 RX ADMIN — Medication 1 MG: at 21:49

## 2023-01-01 RX ADMIN — DORZOLAMIDE HYDROCHLORIDE AND TIMOLOL MALEATE 1 DROP: 22.3; 6.8 SOLUTION/ DROPS OPHTHALMIC at 08:00

## 2023-01-01 RX ADMIN — DORZOLAMIDE HYDROCHLORIDE AND TIMOLOL MALEATE 1 DROP: 22.3; 6.8 SOLUTION/ DROPS OPHTHALMIC at 22:41

## 2023-01-01 RX ADMIN — DORZOLAMIDE HYDROCHLORIDE AND TIMOLOL MALEATE 1 DROP: 20; 5 SOLUTION/ DROPS OPHTHALMIC at 08:34

## 2023-01-01 RX ADMIN — ASPIRIN 81 MG: 81 TABLET, CHEWABLE ORAL at 08:44

## 2023-01-01 RX ADMIN — CARVEDILOL 6.25 MG: 6.25 TABLET, FILM COATED ORAL at 08:44

## 2023-01-01 RX ADMIN — Medication 1 MG: at 20:43

## 2023-01-01 RX ADMIN — HALOPERIDOL LACTATE 2 MG: 5 INJECTION, SOLUTION INTRAMUSCULAR at 22:10

## 2023-01-01 RX ADMIN — CARVEDILOL 6.25 MG: 6.25 TABLET, FILM COATED ORAL at 11:44

## 2023-01-01 RX ADMIN — CARVEDILOL 6.25 MG: 6.25 TABLET, FILM COATED ORAL at 18:23

## 2023-01-01 RX ADMIN — ACETAMINOPHEN 975 MG: 325 TABLET, FILM COATED ORAL at 20:42

## 2023-01-01 RX ADMIN — ESCITALOPRAM OXALATE 10 MG: 10 TABLET, FILM COATED ORAL at 08:46

## 2023-01-01 RX ADMIN — ACETAMINOPHEN 975 MG: 325 TABLET, FILM COATED ORAL at 04:35

## 2023-01-01 RX ADMIN — TRAMADOL HYDROCHLORIDE 25 MG: 50 TABLET ORAL at 18:13

## 2023-01-01 RX ADMIN — CARVEDILOL 6.25 MG: 6.25 TABLET, FILM COATED ORAL at 18:13

## 2023-01-01 RX ADMIN — DORZOLAMIDE HYDROCHLORIDE AND TIMOLOL MALEATE 1 DROP: 20; 5 SOLUTION/ DROPS OPHTHALMIC at 07:57

## 2023-01-01 RX ADMIN — ESCITALOPRAM 10 MG: 5 TABLET, FILM COATED ORAL at 08:32

## 2023-01-01 RX ADMIN — ACETAMINOPHEN 1000 MG: 500 TABLET, FILM COATED ORAL at 08:32

## 2023-01-01 RX ADMIN — CEPHALEXIN 250 MG: 250 CAPSULE ORAL at 11:17

## 2023-01-01 RX ADMIN — ESCITALOPRAM OXALATE 10 MG: 10 TABLET, FILM COATED ORAL at 08:44

## 2023-01-01 RX ADMIN — LATANOPROST 1 DROP: 50 SOLUTION OPHTHALMIC at 16:11

## 2023-01-01 RX ADMIN — CEFTRIAXONE 1 G: 1 INJECTION, POWDER, FOR SOLUTION INTRAMUSCULAR; INTRAVENOUS at 16:11

## 2023-01-01 RX ADMIN — ASPIRIN 81 MG: 81 TABLET, CHEWABLE ORAL at 11:44

## 2023-01-01 RX ADMIN — OLANZAPINE 2.5 MG: 10 INJECTION, POWDER, FOR SOLUTION INTRAMUSCULAR at 14:38

## 2023-01-01 RX ADMIN — ESCITALOPRAM 10 MG: 5 TABLET, FILM COATED ORAL at 07:47

## 2023-01-01 RX ADMIN — ASPIRIN 81 MG: 81 TABLET, CHEWABLE ORAL at 08:46

## 2023-01-01 RX ADMIN — ATORVASTATIN CALCIUM 40 MG: 40 TABLET, FILM COATED ORAL at 21:51

## 2023-01-01 RX ADMIN — CEFTRIAXONE 1 G: 1 INJECTION, POWDER, FOR SOLUTION INTRAMUSCULAR; INTRAVENOUS at 19:55

## 2023-01-01 RX ADMIN — LATANOPROST 1 DROP: 50 SOLUTION OPHTHALMIC at 15:57

## 2023-01-01 RX ADMIN — CEFTRIAXONE 2 G: 2 INJECTION, POWDER, FOR SOLUTION INTRAMUSCULAR; INTRAVENOUS at 20:52

## 2023-01-01 RX ADMIN — TRAMADOL HYDROCHLORIDE 25 MG: 50 TABLET ORAL at 19:53

## 2023-01-01 RX ADMIN — CARVEDILOL 6.25 MG: 6.25 TABLET, FILM COATED ORAL at 18:21

## 2023-01-01 RX ADMIN — ACETAMINOPHEN 975 MG: 325 TABLET, FILM COATED ORAL at 11:44

## 2023-01-01 RX ADMIN — ESCITALOPRAM OXALATE 10 MG: 10 TABLET, FILM COATED ORAL at 11:44

## 2023-01-01 RX ADMIN — CEFTRIAXONE 1 G: 1 INJECTION, POWDER, FOR SOLUTION INTRAMUSCULAR; INTRAVENOUS at 17:07

## 2023-01-01 ASSESSMENT — ACTIVITIES OF DAILY LIVING (ADL)
ADLS_ACUITY_SCORE: 47
ADLS_ACUITY_SCORE: 47
ADLS_ACUITY_SCORE: 39
ADLS_ACUITY_SCORE: 49
ADLS_ACUITY_SCORE: 29
ADLS_ACUITY_SCORE: 47
ADLS_ACUITY_SCORE: 35
ADLS_ACUITY_SCORE: 41
ADLS_ACUITY_SCORE: 47
ADLS_ACUITY_SCORE: 35
ADLS_ACUITY_SCORE: 41
DEPENDENT_IADLS:: CLEANING;COOKING;LAUNDRY;SHOPPING;MEAL PREPARATION;MEDICATION MANAGEMENT;TRANSPORTATION
ADLS_ACUITY_SCORE: 47
ADLS_ACUITY_SCORE: 41
ADLS_ACUITY_SCORE: 35
ADLS_ACUITY_SCORE: 47
ADLS_ACUITY_SCORE: 35
ADLS_ACUITY_SCORE: 41
ADLS_ACUITY_SCORE: 33
ADLS_ACUITY_SCORE: 47
ADLS_ACUITY_SCORE: 41
ADLS_ACUITY_SCORE: 47
ADLS_ACUITY_SCORE: 41
ADLS_ACUITY_SCORE: 47
ADLS_ACUITY_SCORE: 47
ADLS_ACUITY_SCORE: 43
ADLS_ACUITY_SCORE: 47
ADLS_ACUITY_SCORE: 41
ADLS_ACUITY_SCORE: 47
ADLS_ACUITY_SCORE: 47
ADLS_ACUITY_SCORE: 43
ADLS_ACUITY_SCORE: 29
ADLS_ACUITY_SCORE: 43
ADLS_ACUITY_SCORE: 47
ADLS_ACUITY_SCORE: 47
ADLS_ACUITY_SCORE: 35
ADLS_ACUITY_SCORE: 39
ADLS_ACUITY_SCORE: 35
ADLS_ACUITY_SCORE: 39
ADLS_ACUITY_SCORE: 47
ADLS_ACUITY_SCORE: 49
ADLS_ACUITY_SCORE: 43
ADLS_ACUITY_SCORE: 47
ADLS_ACUITY_SCORE: 35
ADLS_ACUITY_SCORE: 41
ADLS_ACUITY_SCORE: 39
ADLS_ACUITY_SCORE: 39
ADLS_ACUITY_SCORE: 45
ADLS_ACUITY_SCORE: 39
ADLS_ACUITY_SCORE: 39
ADLS_ACUITY_SCORE: 29
ADLS_ACUITY_SCORE: 45
ADLS_ACUITY_SCORE: 35

## 2023-01-01 ASSESSMENT — COLUMBIA-SUICIDE SEVERITY RATING SCALE - C-SSRS
1. IN THE PAST MONTH, HAVE YOU WISHED YOU WERE DEAD OR WISHED YOU COULD GO TO SLEEP AND NOT WAKE UP?: NO
5. HAVE YOU STARTED TO WORK OUT OR WORKED OUT THE DETAILS OF HOW TO KILL YOURSELF? DO YOU INTEND TO CARRY OUT THIS PLAN?: NO
6. HAVE YOU EVER DONE ANYTHING, STARTED TO DO ANYTHING, OR PREPARED TO DO ANYTHING TO END YOUR LIFE?: NO
2. HAVE YOU ACTUALLY HAD ANY THOUGHTS OF KILLING YOURSELF IN THE PAST MONTH?: NO
4. HAVE YOU HAD THESE THOUGHTS AND HAD SOME INTENTION OF ACTING ON THEM?: NO
3. HAVE YOU BEEN THINKING ABOUT HOW YOU MIGHT KILL YOURSELF?: NO

## 2023-01-05 NOTE — PROGRESS NOTES
This patient's medication list and chart were reviewed as part of the service provided by [facility] Geriatric [facility].    Assessment/Recommendations:    No recommendations for changes at this time.    Suki Ureña, Pharm.D.,[facility]  Board Certified Geriatric Pharmacist  Medication Therapy Management Pharmacist  [phone]

## 2023-01-07 NOTE — TELEPHONE ENCOUNTER
Nursing calling in a UA report.  Resident is hitting and not sleeping well.    Component      Latest Ref Rng & Units 1/6/2023   Color Urine      Colorless, Straw, Light Yellow, Yellow Light Yellow   Appearance Urine      Clear Slightly Cloudy (A)   Glucose Urine      Negative mg/dL Negative   Bilirubin Urine      Negative Negative   Ketones Urine      Negative mg/dL Negative   Specific Gravity Urine      1.003 - 1.035 1.020   Blood Urine      Negative Moderate (A)   pH Urine      5.0 - 7.0 6.5   Protein Albumin Urine      Negative mg/dL 20 (A)   Urobilinogen mg/dL      Normal, 2.0 mg/dL Normal   Nitrite Urine      Negative Negative   Leukocyte Esterase Urine      Negative Moderate (A)   Bacteria Urine      None Seen /HPF Many (A)   Mucus Urine      None Seen /LPF Present (A)   RBC Urine      <=2 /HPF 28 (H)   WBC Urine      <=5 /HPF 58 (H)       Orders:  Keflex 500mg po TID for 3 days    Electronically signed by Alessia Kirby RN, CNP

## 2023-01-09 NOTE — LETTER
1/9/2023        RE: Alejandrina Whatley  [facility]  [address] [address] [address]        [facility] GERIATRICS    Chief Complaint   Patient presents with     Nursing Home Acute     HPI:  Alejandrina Whatley is a 89 year old  (8/22/1923), who is being seen today for an episodic care visit at: [facility] [[id number]].     Today's concern is: Notified last Friday that patient was having active hallucinations and agitation.  Historically this has been a sign of a UTI.  Orders were placed for UA that was obtained late that evening and resulted the next morning.  On-call ordered Keflex x3 days.  Cultures have since returned with > 100k CFU Klebsiella resistant to ampicillin.    Discussed with RN and she reports patient is back to baseline    Alejandrina Noble is seen resting in her room as she typically does after breakfast.  She does not appear agitated.  Wonders if they will get her up soon for lunch.  Denies dysuria or abdominal pain    Additionally, called and spoke separately to daughter Pat.  She agrees with nursing assessment that hallucinations and agitation has resolved and she is back to baseline.  Discussed given positive culture will extend Keflex for a longer course.    Allergies, and PMH/PSH reviewed in EP[facility] today.  REVIEW OF SYSTEMS:  Limited secondary to cognitive impairment but today pt reports no dysuria or abdominal pain    Objective:   /61   Pulse 67   Temp 97.5  F (36.4  C)   Resp 15   Ht 1.524 m (5')   Wt 58.1 kg (128 lb)   SpO2 93%   BMI 25.00 kg/m    Physical Exam  Constitutional:       Appearance: Normal appearance.   HENT:      Head: Normocephalic and atraumatic.   Eyes:      General: No scleral icterus.  Cardiovascular:      Rate and Rhythm: Normal rate and regular rhythm.      Heart sounds: No murmur heard.  Pulmonary:      Effort: Pulmonary effort is normal.   Abdominal:      Tenderness: There is no abdominal tenderness.    Musculoskeletal:      Right lower leg: No edema.      Left lower leg: No edema.   Neurological:      Mental Status: She is alert. Mental status is at baseline.   Psychiatric:         Behavior: Behavior normal.           Recent labs in EPIC reviewed by me today.  and   Most Recent Urinalysis:  Recent Labs   Lab Test 01/06/23  1700 09/19/21  1454 09/04/20  1030   COLOR Light Yellow   < > Yellow   APPEARANCE Slightly Cloudy*   < > Clear   URINEGLC Negative   < > Negative   URINEBILI Negative   < > Negative   URINEKETONE Negative   < > Negative   SG 1.020   < > 1.012   UBLD Moderate*   < > Negative   URINEPH 6.5   < > 6.5   PROTEIN 20*   < > Negative   UROBILINOGEN  --   --  <2.0 E.U./dL   NITRITE Negative   < > Negative   LEUKEST Moderate*   < > Large*   RBCU 28*   < > 0-2   WBCU 58*   < > 25-50*    < > = values in this interval not displayed.     UC 1/6 with 100k CFU Klebsiella     Assessment/Plan:  1. Acute cystitis with hematuria    2. Metabolic encephalopathy    3. Dementia without behavioral disturbance (H)    Developed acute hallucinations and agitation which is changed from baseline.  Urine culture with Klebsiella UTI.  Initiated on Keflex which is an appropriate antibiotic and now returned to baseline with chronic dementia but no agitation or hallucinations.  - Will extend Keflex for 5 days total  - Continue to monitor  - Plan of care discussed with daughter/POA as well as nursing    MED REC REQUIRED  Post Medication Reconciliation Status: patient was not discharged from an inpatient facility or TCU        Electronically signed by: Sara Britt PA-C             Sincerely,        Sara Britt PA-C

## 2023-01-09 NOTE — PROGRESS NOTES
[facility] GERIATRICS    Chief Complaint   Patient presents with     Nursing Home Acute     HPI:  Alejandrina Whatley is a 89 year old  (8/22/1923), who is being seen today for an episodic care visit at: [facility] (S) [[id number]].     Today's concern is: Notified last Friday that patient was having active hallucinations and agitation.  Historically this has been a sign of a UTI.  Orders were placed for UA that was obtained late that evening and resulted the next morning.  On-call ordered Keflex x3 days.  Cultures have since returned with > 100k CFU Klebsiella resistant to ampicillin.    Discussed with RN and she reports patient is back to baseline    Alejandrina Noble is seen resting in her room as she typically does after breakfast.  She does not appear agitated.  Wonders if they will get her up soon for lunch.  Denies dysuria or abdominal pain    Additionally, called and spoke separately to daughter Pat.  She agrees with nursing assessment that hallucinations and agitation has resolved and she is back to baseline.  Discussed given positive culture will extend Keflex for a longer course.    Allergies, and PMH/PSH reviewed in [facility] today.  REVIEW OF SYSTEMS:  Limited secondary to cognitive impairment but today pt reports no dysuria or abdominal pain    Objective:   /61   Pulse 67   Temp 97.5  F (36.4  C)   Resp 15   Ht 1.524 m (5')   Wt 58.1 kg (128 lb)   SpO2 93%   BMI 25.00 kg/m    Physical Exam  Constitutional:       Appearance: Normal appearance.   HENT:      Head: Normocephalic and atraumatic.   Eyes:      General: No scleral icterus.  Cardiovascular:      Rate and Rhythm: Normal rate and regular rhythm.      Heart sounds: No murmur heard.  Pulmonary:      Effort: Pulmonary effort is normal.   Abdominal:      Tenderness: There is no abdominal tenderness.   Musculoskeletal:      Right lower leg: No edema.      Left lower leg: No edema.   Neurological:      Mental Status:  She is alert. Mental status is at baseline.   Psychiatric:         Behavior: Behavior normal.           Recent labs in EPIC reviewed by me today.  and   Most Recent Urinalysis:  Recent Labs   Lab Test 01/06/23  1700 09/19/21  1454 09/04/20  1030   COLOR Light Yellow   < > Yellow   APPEARANCE Slightly Cloudy*   < > Clear   URINEGLC Negative   < > Negative   URINEBILI Negative   < > Negative   URINEKETONE Negative   < > Negative   SG 1.020   < > 1.012   UBLD Moderate*   < > Negative   URINEPH 6.5   < > 6.5   PROTEIN 20*   < > Negative   UROBILINOGEN  --   --  <2.0 E.U./dL   NITRITE Negative   < > Negative   LEUKEST Moderate*   < > Large*   RBCU 28*   < > 0-2   WBCU 58*   < > 25-50*    < > = values in this interval not displayed.     UC 1/6 with 100k CFU Klebsiella     Assessment/Plan:  1. Acute cystitis with hematuria    2. Metabolic encephalopathy    3. Dementia without behavioral disturbance (H)    Developed acute hallucinations and agitation which is changed from baseline.  Urine culture with Klebsiella UTI.  Initiated on Keflex which is an appropriate antibiotic and now returned to baseline with chronic dementia but no agitation or hallucinations.  - Will extend Keflex for 5 days total  - Continue to monitor  - Plan of care discussed with daughter/POA as well as nursing    MED REC REQUIRED  Post Medication Reconciliation Status: patient was not discharged from an inpatient facility or TCU        Electronically signed by: Sara Britt PA-C

## 2023-01-11 NOTE — PROGRESS NOTES
[facility] GERIATRICS    Chief Complaint   Patient presents with     RECHECK     HPI:  Alejandrina Whatley is a 89 year old  (8/22/1923), who is being seen today for an episodic care visit at: [facility] ([facility]) [[id number]].     Today's concern is: Asked to re-evaluate Alejandrina Noble due to recurrent hallucinations. See progress note 1/9 for suspected metabolic encephalopathy in the setting of UTI. Mental status had improved to baseline dementia but received a call from daughter last night that patient was hallucinating again. CBC and BMP today WNL. Daughter had questioned if it could have been related to renal cyst/mass seen on     CT of the abdomen 11/18 and follow up renal US recommended. Dr. Garcia had previously discussed likely not a candidate for any specific treatment but Pat thought she might want to know. Also note CT of chest on 12/5 refers to it as Staghorn calculus.     On exam today Alejandrina Noble is sleeping after lunch. Pat is at bedside and states she is back to normal. Discussed reassuring labs. Discussed delirium related to infection can wax and wane. Given culture results, labs and normal vitals feels she is on an appropriate antibiotic. Reviewed CT results. Ultimately, Pat would like to proceed with renal US to further eval cyst vs mass vs staghorn calculus though understands likely not candidate for aggressive therapy. If staghorn calculus may explain recent recurrent UTIs    Allergies, and PMH/PSH reviewed in EP[facility] today.  REVIEW OF SYSTEMS:  Unobtainable secondary to cognitive impairment.     Objective:   /86   Pulse 67   Temp 98  F (36.7  C)   Resp 15   Ht 1.524 m (5')   Wt 58.1 kg (128 lb)   SpO2 93%   BMI 25.00 kg/m    Physical Exam  Constitutional:       Appearance: Normal appearance.      Comments: Sleeping in bed   Pulmonary:      Effort: Pulmonary effort is normal.   Skin:     General: Skin is warm and dry.      Findings: No rash.   Neurological:       Mental Status: She is alert.   Psychiatric:         Behavior: Behavior normal.           Recent labs in EPIC reviewed by me today.  and   Most Recent 3 CBC's:  Recent Labs   Lab Test 01/11/23  0617 12/06/22  0850 12/05/22 1926   WBC 6.9 10.7 13.4*   HGB 11.5* 11.4* 11.6*   * 107* 104*    169 191     Most Recent 3 BMP's:  Recent Labs   Lab Test 01/11/23  0617 12/06/22  0850 12/05/22  1926    140 140   POTASSIUM 3.8 3.9 3.9   CHLORIDE 110* 107 106   CO2 19* 22 21*   BUN 27.0* 18.6 15.3   CR 0.85 0.70 0.70   ANIONGAP 14 11 13   TRANG 9.2 8.8 9.0   GLC 85 184* 160*       Assessment/Plan:  1. UTI due to Klebsiella species    2. Metabolic encephalopathy    3. Dementia without behavioral disturbance (H)    4. Renal lesion    Hallucinations seemed to have resolved again. Waxing and waning pattern fits with delirium related to infection. Labs, vitals, cultures reassuring. Daughter wants to proceed with eval of renal mass vs cyst vs staghorn calculus  - Continue Keflex to complete course  - Continue to provide supportive cares, reorient as able, maintain sleep/wake cycle  - Renal US    MED REC REQUIRED  Post Medication Reconciliation Status: patient was not discharged from an inpatient facility or TCU      Orders:  As above    Electronically signed by: Sara Britt PA-C

## 2023-01-11 NOTE — LETTER
1/11/2023        RE: Alejandrina Whatley  [facility]  [address] [address] [address]        [facility] GERIATRICS    Chief Complaint   Patient presents with     RECHECK     HPI:  Alejandrina Whatley is a 89 year old  (8/22/1923), who is being seen today for an episodic care visit at: [facility] ([facility]) [[id number]].     Today's concern is: Asked to re-evaluate Alejandrina Noble due to recurrent hallucinations. See progress note 1/9 for suspected metabolic encephalopathy in the setting of UTI. Mental status had improved to baseline dementia but received a call from daughter last night that patient was hallucinating again. CBC and BMP today WNL. Daughter had questioned if it could have been related to renal cyst/mass seen on     CT of the abdomen 11/18 and follow up renal US recommended. Dr. Garcia had previously discussed likely not a candidate for any specific treatment but Pat thought she might want to know. Also note CT of chest on 12/5 refers to it as Staghorn calculus.     On exam today Alejandrina Noble is sleeping after lunch. Pat is at bedside and states she is back to normal. Discussed reassuring labs. Discussed delirium related to infection can wax and wane. Given culture results, labs and normal vitals feels she is on an appropriate antibiotic. Reviewed CT results. Ultimately, Pat would like to proceed with renal US to further eval cyst vs mass vs staghorn calculus though understands likely not candidate for aggressive therapy. If staghorn calculus may explain recent recurrent UTIs    Allergies, and PMH/PSH reviewed in [facility] today.  REVIEW OF SYSTEMS:  Unobtainable secondary to cognitive impairment.     Objective:   /86   Pulse 67   Temp 98  F (36.7  C)   Resp 15   Ht 1.524 m (5')   Wt 58.1 kg (128 lb)   SpO2 93%   BMI 25.00 kg/m    Physical Exam  Constitutional:       Appearance: Normal appearance.      Comments: Sleeping in bed   Pulmonary:      Effort:  Pulmonary effort is normal.   Skin:     General: Skin is warm and dry.      Findings: No rash.   Neurological:      Mental Status: She is alert.   Psychiatric:         Behavior: Behavior normal.           Recent labs in EPIC reviewed by me today.  and   Most Recent 3 CBC's:  Recent Labs   Lab Test 01/11/23  0617 12/06/22  0850 12/05/22 1926   WBC 6.9 10.7 13.4*   HGB 11.5* 11.4* 11.6*   * 107* 104*    169 191     Most Recent 3 BMP's:  Recent Labs   Lab Test 01/11/23  0617 12/06/22  0850 12/05/22 1926    140 140   POTASSIUM 3.8 3.9 3.9   CHLORIDE 110* 107 106   CO2 19* 22 21*   BUN 27.0* 18.6 15.3   CR 0.85 0.70 0.70   ANIONGAP 14 11 13   TRANG 9.2 8.8 9.0   GLC 85 184* 160*       Assessment/Plan:  1. UTI due to Klebsiella species    2. Metabolic encephalopathy    3. Dementia without behavioral disturbance (H)    4. Renal lesion    Hallucinations seemed to have resolved again. Waxing and waning pattern fits with delirium related to infection. Labs, vitals, cultures reassuring. Daughter wants to proceed with eval of renal mass vs cyst vs staghorn calculus  - Continue Keflex to complete course  - Continue to provide supportive cares, reorient as able, maintain sleep/wake cycle  - Renal US    MED REC REQUIRED  Post Medication Reconciliation Status: patient was not discharged from an inpatient facility or TCU      Orders:  As above    Electronically signed by: Sara Britt PA-C             Sincerely,        Sara Britt PA-C

## 2023-01-25 NOTE — LETTER
1/25/2023        RE: Alejandrina Whatley  [facility]  [address]  [facility] [address]        [facility] GERIATRICS  Chief Complaint   Patient presents with     Annual Comprehensive Nursing Home     [place] [facility] Record Number:  [id number]  Place of Service where encounter took place:  [facility] ([facility]) [[id number]]    HPI:    Alejandrina Whatley  is a 89 year old  (8/22/1923), who is being seen today for an annual comprehensive visit. HPI information obtained from: facility chart records, patient report and family/first contact Pat report.    Alejandrina Whatley 89-year-old female with a past medical history of previous CVA, previous left femur fracture, dementia, hypertension, hyperlipidemia, depression and recurrent UTI.  Previously admitted to the facility in early 2020 following a stroke and followed by [place] team.  Ultimately discharged 9/2022 Group Home.  She unfortunately suffered a fall with associated left femur fracture status postsurgical repair 2/22.  Readmitted to long-term care and has been followed by the [facility] team.  Family requested transfer over to [place] as of 8/2022    Alejandrina Noble is seen resting in her room. Denies concerns or complaints. Treated for UTI earlier this month with associated hallucinations. No recent hallucinations. Denies dysuria. History limited due to dementia    Review of nursing home EMR: -123. Weights down 135-127 the past 6 months, but did have hospitalization for RSV. Recent BIM 12/15 and PHQ9c 2    Spoke with NM-pharmacy suggested GDR of lexapro    Spoke with daughter Pat. Patient overdo for Tdap.  Discussed risk benefit but Pat ultimately declines.  Discussed possibility of GDR of Lexapro per pharmacy recommendation.  Pat has in the past expressed concern that her mother is very depressed.  Seems to be tolerating Lexapro at current dose.  Agreed to keep the same.  Pat question follow-up regarding the  conversation she had with Dr. Garcia a couple months ago regarding Tylenol formulation.  The extra strength Tylenol tablets are difficult for her mother to swallow.  Sanjana apparently does not like her pills crushed which eliminates that option.  Pat feels in the hospital the regular Tylenol had a coating on it which allowed it to be swallowed more easily.  Agreed to reach out to pharmacy to look into Tylenol options at the facility.    Reached out to pharmacist to discuss regular vs extra strenght tylenol    ALLERGIES: Actonel [bisphosphonates], Conjugated estrogens, Macrobid [nitrofuran derivatives], Nitrofurantoin, Premarin, Sulfa drugs, Hydrocodone, Oxycodone, and Risedronate  PAST MEDICAL HISTORY:   Past Medical History:   Diagnosis Date     Abdominal aortic aneurysm 2001    had a stent placed 2009     AK (actinic keratosis) 11/11/2009     Cataract 02/22/2011     Compression Fractures of Lumbar Vertebra 02/04/2010     CVA (cerebral vascular accident) (H)     Dec 2019 and Jan 2020     Dementia (H)      DJD (degenerative joint disease)      Generalised Weakness and Fatigue 03/03/2011     Glaucoma (increased eye pressure) 2009    Dr. 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  IMMUNIZATIONS:  Immunization History   Administered Date(s) Administered     COVID-19 Vaccine 18+ (Moderna) 02/22/2021, 03/04/2022     FLUAD(HD)65+ QUAD 10/18/2022     Flu 65+ Years 09/19/2017     Influenza (High Dose) 3 valent vaccine 10/27/2020     Influenza Vaccine >6 months (Alfuria,Fluzone) 10/01/2015     Pneumo Conj 13-V (2010&after) 09/12/2016     Pneumococcal 23 valent 11/01/2006, 01/01/2009     TD (ADULT, 7+) 11/01/2006, 11/01/2008     TDAP Vaccine (Adacel) 01/24/2013     Zoster vaccine, live 03/23/2017     Above immunizations pulled from [facility]. [facility] and facility records also reconciled. Outstanding information sent to  to update [facility] .  Future immunizations needed:  yearly influenza per facility protocol and family declined TDAP      Current Outpatient Medications:      acetaminophen (TYLENOL) 500 MG tablet, Take 2 tablets (1,000 mg) by mouth 2 times daily, Disp: , Rfl:      amLODIPine (NORVASC) 5 MG tablet, Take 1 tablet (5 mg) by mouth daily, Disp: , Rfl:      aspirin (ASA) 81 MG chewable tablet, Take 81 mg by mouth daily, Disp: , Rfl:      atorvastatin (LIPITOR) 40 MG tablet, Take 40 mg by mouth every evening, Disp: , Rfl:      carvedilol (COREG) 6.25 MG tablet, Take 6.25 mg by mouth 2 times daily (with meals), Disp: , Rfl:      Cholecalciferol (VITAMIN D3) 50 MCG (2000 UT) TABS, Take 2,000 Units by mouth daily, Disp: , Rfl:      escitalopram (LEXAPRO) 10 MG tablet, Take 10 mg by mouth daily, Disp: , Rfl:      latanoprost (XALATAN) 0.005 % ophthalmic solution, Place 1 drop into both eyes daily, Disp: , Rfl:      polyethylene glycol (MIRALAX) 17 g packet, Take 1 packet by mouth every other day, Disp: , Rfl:      traMADol (ULTRAM) 50 MG tablet, Take 0.5 tablets (25 mg) by mouth daily At 1900, Disp: 45 tablet, Rfl: 0     benzocaine-menthol (CHLORASEPTIC) 6-10 MG lozenge, Place 1 lozenge inside cheek every hour as needed for sore throat, Disp: 20 lozenge, Rfl:  0     dorzolamide-timolol (COSOPT) 2-0.5 % ophthalmic solution, Place 1 drop into both eyes 2 times daily, Disp: 1 Bottle, Rfl: 11     melatonin 3 MG tablet, Take 3 mg by mouth nightly as needed for sleep, Disp: , Rfl:      menthol-zinc oxide (CALMOSEPTINE) 0.44-20.6 % OINT ointment, Apply topically as needed, Disp: , Rfl:      ondansetron (ZOFRAN ODT) 4 MG ODT tab, Take 1 tablet (4 mg) by mouth every 6 hours as needed for nausea or vomiting, Disp: , Rfl:      MED REC REQUIRED  Post Medication Reconciliation Status: patient was not discharged from an inpatient facility or TCU      Case Management:  I have reviewed the facility/SNF care plan/MDS, including the falls risk, nutrition and pain screening. I also reviewed the current immunizations, and preventive care.. Future cancer screening is not clinically indicated secondary to age/goals of care Patient's desire to return to the community is not assessible due to cognitive impairment. Current Level of Care is appropriate.    Advance Directive Discussion:    I reviewed the current advanced directives as reflected in EPIC, the POLST and the facility chart, and verified the congruency of orders . I contacted the first party Pat garcia and discussed the plan of Care.  I did not due to cognitive impairment review the advance directives with the resident. Patient's goal is overall comfort but will continue hospitalization for acute issues    Team Discussion:  I communicated with the appropriate disciplines involved with the Plan of Care:   Nursing  ,    and Dietitian    Information reviewed:  Medications, vital signs, orders, and nursing notes.    ROS:  Limited secondary to cognitive impairment but today pt reports no pain or dysuria    Vitals:  /77   Pulse 75   Temp 98  F (36.7  C)   Resp 16   Ht 1.524 m (5')   Wt 57.1 kg (125 lb 12.8 oz)   SpO2 97%   BMI 24.57 kg/m   Body mass index is 24.57 kg/m .  Exam:  Physical Exam  Constitutional:        General: She is not in acute distress.     Appearance: Normal appearance.   HENT:      Head: Normocephalic and atraumatic.   Eyes:      General: No scleral icterus.  Cardiovascular:      Rate and Rhythm: Normal rate and regular rhythm.      Heart sounds: No murmur heard.  Abdominal:      General: Abdomen is flat.      Tenderness: There is no abdominal tenderness.   Musculoskeletal:      Right lower leg: No edema.      Left lower leg: No edema.   Skin:     General: Skin is warm and dry.   Neurological:      General: No focal deficit present.      Mental Status: She is alert.           Lab/Diagnostic data:   Recent labs in [facility] reviewed by me today.  and   Most Recent 3 CBC's:  Recent Labs   Lab Test 01/11/23  0617 12/06/22  0850 12/05/22 1926   WBC 6.9 10.7 13.4*   HGB 11.5* 11.4* 11.6*   * 107* 104*    169 191     Most Recent 3 BMP's:  Recent Labs   Lab Test 01/11/23  0617 12/06/22  0850 12/05/22  1926    140 140   POTASSIUM 3.8 3.9 3.9   CHLORIDE 110* 107 106   CO2 19* 22 21*   BUN 27.0* 18.6 15.3   CR 0.85 0.70 0.70   ANIONGAP 14 11 13   TRANG 9.2 8.8 9.0   GLC 85 184* 160*     Right Kidney US 1/12: Normal sonographic appearance of kidney without evidence of renal cyst or calculus    ASSESSMENT/PLAN  History of recurrent UTI: History of recurrent UTI. 2 recent back to back UTIs with associated hallucinations. UC with normal ese followed by Jun cherry. Symptoms resolved with treated. Renal US obtained due to CT mention of mass vs renal calculus and read from 1/12 reviewed and negative  - Continue to work with facility to ensure good tanya care  - Monitor for reoccurrence of infection    Bronchomalacia: Recent hospitalization in Dec for RSV with associated bronchitis. CT noted evidence of bronchiomalacia.  - No recent use of albuterol, robitussin or tessalon. Will d/c  - Bronchiomalacia may put patient at increase of respiratory distress with viral infections     Hypertension  H/o AAA  without rupture s/p bypass graph: Well-controlled.  SBP's 120-130. Amlodipine added during recent hospital stay  - Continue Coreg 6.25 mg twice daily and amlodipine 5 mg/d    History of CVA in Dec 2019 and Jan 2020  - Continue aspirin 81 mg daily and Lipitor    Depression: Most recent PHQ 1  - Continue Lexapro 10 mg daily, daughter declines GDR today    Constipation, unspecified  - Continue Miralax qod    CKD, stage 3: Baseline Cr 0.8-1  - Monitor periodically. Most recently stable 1/11/223    Chronic Macrocytic Anemia: Baseline hemoglobin 10/11  - Monitor periodically. Most recently stable 1/11/23    Lumbar Stenosis  Chronic Pain  - Continue scheduled tylenol and as needed tramadol. Daughter questioned if regular tylenol would be easier for her to swallow. Did speak with pharmacy. The regular strength tylenol is similar size and no coating. Will reach out to [facility] to see if she would like to trial suspension of tylenol    Dementia without behavioral disturbances  Weight Loss: CPT in 2019 4.5. BIMS 7/2022 7/15  - Down 5-7lbs in recent months. Recent RSV/hospitalization may have contributed. Nutrition following  - Spends much of her time in her room. Daughter visits daily      Orders:  NNO    A total 65 min were spent on this f/u visit including chart review, lab/radiology report review, discussion with NM, visiting with patient, discussing POC of with daughter, coordinating with pharmacy and documenting.       Electronically signed by:  Sara Britt PA-C             Sincerely,        Sara Britt PA-C

## 2023-01-25 NOTE — PROGRESS NOTES
[facility] GERIATRICS  Chief Complaint   Patient presents with     Annual Comprehensive Nursing Home     [place] Medical Record Number:  [id number]  Place of Service where encounter took place:  [facility] ([facility]) [[id number]]    HPI:    Alejandrina Whatley  is a 89 year old  (8/22/1923), who is being seen today for an annual comprehensive visit. HPI information obtained from: facility chart records, patient report and family/first contact Pat report.    Alejandrina Whatley 89-year-old female with a past medical history of previous CVA, previous left femur fracture, dementia, hypertension, hyperlipidemia, depression and recurrent UTI.  Previously admitted to the facility in early 2020 following a stroke and followed by [place] team.  Ultimately discharged 9/2022 Group Home.  She unfortunately suffered a fall with associated left femur fracture status postsurgical repair 2/22.  Readmitted to long-term care and has been followed by the [facility] team.  Family requested transfer over to [place] as of 8/2022    Alejandrina Noble is seen resting in her room. Denies concerns or complaints. Treated for UTI earlier this month with associated hallucinations. No recent hallucinations. Denies dysuria. History limited due to dementia    Review of nursing home EMR: -123. Weights down 135-127 the past 6 months, but did have hospitalization for RSV. Recent BIM 12/15 and PHQ9c 2    Spoke with NM-pharmacy suggested GDR of lexapro    Spoke with daughter Pat. Patient overdo for Tdap.  Discussed risk benefit but Pat ultimately declines.  Discussed possibility of GDR of Lexapro per pharmacy recommendation.  Pat has in the past expressed concern that her mother is very depressed.  Seems to be tolerating Lexapro at current dose.  Agreed to keep the same.  Pat question follow-up regarding the conversation she had with Dr. Garcia a couple months ago regarding Tylenol formulation.  The extra strength Tylenol  tablets are difficult for her mother to swallow.  Sanjana apparently does not like her pills crushed which eliminates that option.  Pat feels in the hospital the regular Tylenol had a coating on it which allowed it to be swallowed more easily.  Agreed to reach out to pharmacy to look into Tylenol options at the facility.    Reached out to pharmacist to discuss regular vs extra strenght tylenol    ALLERGIES: Actonel [bisphosphonates], Conjugated estrogens, Macrobid [nitrofuran derivatives], Nitrofurantoin, Premarin, Sulfa drugs, Hydrocodone, Oxycodone, and Risedronate  PAST MEDICAL HISTORY:   Past Medical History:   Diagnosis Date     Abdominal aortic aneurysm 2001    had a stent placed 2009     AK (actinic keratosis) 11/11/2009     Cataract 02/22/2011     Compression Fractures of Lumbar Vertebra 02/04/2010     CVA (cerebral vascular accident) (H)     Dec 2019 and Jan 2020     Dementia (H)      DJD (degenerative joint disease)      Generalised Weakness and Fatigue 03/03/2011     Glaucoma (increased eye pressure) 2009    Dr. 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  IMMUNIZATIONS:  Immunization History   Administered Date(s) Administered     COVID-19 Vaccine 18+ (Moderna)  02/22/2021, 03/04/2022     FLUAD(HD)65+ QUAD 10/18/2022     Flu 65+ Years 09/19/2017     Influenza (High Dose) 3 valent vaccine 10/27/2020     Influenza Vaccine >6 months (Alfuria,Fluzone) 10/01/2015     Pneumo Conj 13-V (2010&after) 09/12/2016     Pneumococcal 23 valent 11/01/2006, 01/01/2009     TD (ADULT, 7+) 11/01/2006, 11/01/2008     TDAP Vaccine (Adacel) 01/24/2013     Zoster vaccine, live 03/23/2017     Above immunizations pulled from [facility]. [facility] and facility records also reconciled. Outstanding information sent to  to update [facility] .  Future immunizations needed:  yearly influenza per facility protocol and family declined TDAP      Current Outpatient Medications:      acetaminophen (TYLENOL) 500 MG tablet, Take 2 tablets (1,000 mg) by mouth 2 times daily, Disp: , Rfl:      amLODIPine (NORVASC) 5 MG tablet, Take 1 tablet (5 mg) by mouth daily, Disp: , Rfl:      aspirin (ASA) 81 MG chewable tablet, Take 81 mg by mouth daily, Disp: , Rfl:      atorvastatin (LIPITOR) 40 MG tablet, Take 40 mg by mouth every evening, Disp: , Rfl:      carvedilol (COREG) 6.25 MG tablet, Take 6.25 mg by mouth 2 times daily (with meals), Disp: , Rfl:      Cholecalciferol (VITAMIN D3) 50 MCG (2000 UT) TABS, Take 2,000 Units by mouth daily, Disp: , Rfl:      escitalopram (LEXAPRO) 10 MG tablet, Take 10 mg by mouth daily, Disp: , Rfl:      latanoprost (XALATAN) 0.005 % ophthalmic solution, Place 1 drop into both eyes daily, Disp: , Rfl:      polyethylene glycol (MIRALAX) 17 g packet, Take 1 packet by mouth every other day, Disp: , Rfl:      traMADol (ULTRAM) 50 MG tablet, Take 0.5 tablets (25 mg) by mouth daily At 1900, Disp: 45 tablet, Rfl: 0     benzocaine-menthol (CHLORASEPTIC) 6-10 MG lozenge, Place 1 lozenge inside cheek every hour as needed for sore throat, Disp: 20 lozenge, Rfl: 0     dorzolamide-timolol (COSOPT) 2-0.5 % ophthalmic solution, Place 1 drop into both eyes 2 times daily, Disp: 1 Bottle,  Rfl: 11     melatonin 3 MG tablet, Take 3 mg by mouth nightly as needed for sleep, Disp: , Rfl:      menthol-zinc oxide (CALMOSEPTINE) 0.44-20.6 % OINT ointment, Apply topically as needed, Disp: , Rfl:      ondansetron (ZOFRAN ODT) 4 MG ODT tab, Take 1 tablet (4 mg) by mouth every 6 hours as needed for nausea or vomiting, Disp: , Rfl:      MED REC REQUIRED  Post Medication Reconciliation Status: patient was not discharged from an inpatient facility or TCU      Case Management:  I have reviewed the facility/SNF care plan/MDS, including the falls risk, nutrition and pain screening. I also reviewed the current immunizations, and preventive care.. Future cancer screening is not clinically indicated secondary to age/goals of care Patient's desire to return to the community is not assessible due to cognitive impairment. Current Level of Care is appropriate.    Advance Directive Discussion:    I reviewed the current advanced directives as reflected in EPIC, the POLST and the facility chart, and verified the congruency of orders . I contacted the first party Pat garcia and discussed the plan of Care.  I did not due to cognitive impairment review the advance directives with the resident. Patient's goal is overall comfort but will continue hospitalization for acute issues    Team Discussion:  I communicated with the appropriate disciplines involved with the Plan of Care:   Nursing  ,    and Dietitian    Information reviewed:  Medications, vital signs, orders, and nursing notes.    ROS:  Limited secondary to cognitive impairment but today pt reports no pain or dysuria    Vitals:  /77   Pulse 75   Temp 98  F (36.7  C)   Resp 16   Ht 1.524 m (5')   Wt 57.1 kg (125 lb 12.8 oz)   SpO2 97%   BMI 24.57 kg/m   Body mass index is 24.57 kg/m .  Exam:  Physical Exam  Constitutional:       General: She is not in acute distress.     Appearance: Normal appearance.   HENT:      Head: Normocephalic and atraumatic.    Eyes:      General: No scleral icterus.  Cardiovascular:      Rate and Rhythm: Normal rate and regular rhythm.      Heart sounds: No murmur heard.  Abdominal:      General: Abdomen is flat.      Tenderness: There is no abdominal tenderness.   Musculoskeletal:      Right lower leg: No edema.      Left lower leg: No edema.   Skin:     General: Skin is warm and dry.   Neurological:      General: No focal deficit present.      Mental Status: She is alert.           Lab/Diagnostic data:   Recent labs in [facility] reviewed by me today.  and   Most Recent 3 CBC's:  Recent Labs   Lab Test 01/11/23 0617 12/06/22  0850 12/05/22 1926   WBC 6.9 10.7 13.4*   HGB 11.5* 11.4* 11.6*   * 107* 104*    169 191     Most Recent 3 BMP's:  Recent Labs   Lab Test 01/11/23 0617 12/06/22  0850 12/05/22 1926    140 140   POTASSIUM 3.8 3.9 3.9   CHLORIDE 110* 107 106   CO2 19* 22 21*   BUN 27.0* 18.6 15.3   CR 0.85 0.70 0.70   ANIONGAP 14 11 13   TRANG 9.2 8.8 9.0   GLC 85 184* 160*     Right Kidney US 1/12: Normal sonographic appearance of kidney without evidence of renal cyst or calculus    ASSESSMENT/PLAN  History of recurrent UTI: History of recurrent UTI. 2 recent back to back UTIs with associated hallucinations. UC with normal ese followed by Jun cherry. Symptoms resolved with treated. Renal US obtained due to CT mention of mass vs renal calculus and read from 1/12 reviewed and negative  - Continue to work with facility to ensure good tanya care  - Monitor for reoccurrence of infection    Bronchomalacia: Recent hospitalization in Dec for RSV with associated bronchitis. CT noted evidence of bronchiomalacia.  - No recent use of albuterol, robitussin or tessalon. Will d/c  - Bronchiomalacia may put patient at increase of respiratory distress with viral infections     Hypertension  H/o AAA without rupture s/p bypass graph: Well-controlled.  SBP's 120-130. Amlodipine added during recent hospital stay  - Continue  Coreg 6.25 mg twice daily and amlodipine 5 mg/d    History of CVA in Dec 2019 and Jan 2020  - Continue aspirin 81 mg daily and Lipitor    Depression: Most recent PHQ 1  - Continue Lexapro 10 mg daily, daughter declines GDR today    Constipation, unspecified  - Continue Miralax qod    CKD, stage 3: Baseline Cr 0.8-1  - Monitor periodically. Most recently stable 1/11/223    Chronic Macrocytic Anemia: Baseline hemoglobin 10/11  - Monitor periodically. Most recently stable 1/11/23    Lumbar Stenosis  Chronic Pain  - Continue scheduled tylenol and as needed tramadol. Daughter questioned if regular tylenol would be easier for her to swallow. Did speak with pharmacy. The regular strength tylenol is similar size and no coating. Will reach out to [facility] to see if she would like to trial suspension of tylenol    Dementia without behavioral disturbances  Weight Loss: CPT in 2019 4.5. BIMS 7/2022 7/15  - Down 5-7lbs in recent months. Recent RSV/hospitalization may have contributed. Nutrition following  - Spends much of her time in her room. Daughter visits daily      Orders:  NNO    A total 65 min were spent on this f/u visit including chart review, lab/radiology report review, discussion with NM, visiting with patient, discussing POC of with daughter, coordinating with pharmacy and documenting.       Electronically signed by:  Sara Britt PA-C

## 2023-02-02 NOTE — PROGRESS NOTES
CC attended care conference for member on 02/02/23 at [facility].   Present at care conference member, this care coordinator, NH  (Jigna), NH RN (Roshan ALEXANDER) and Lillian DIAMOND RD, .  OT Report: NA  Cognitive testing results: 12/16 indicated moderate cognitive loss.    PT Report:  NA    Nursing Report: Hospitalization 11/18. Bronchitis diagnosed.   Hospitalization 12/5. Pneumonia diagnosed.   Falls during last quarter: none  Skin issues: intact  POLST/code status: DNR/DNI   Vaccination status (indicate up to date, eligible for booster, or due for vaccine):  COVID-19 (including boosters): family refuses  Influenza: up to date  Pneumonia: up to date  Appointments: none on record at this time    Dietician Report: Diet: Regular  Intakes: 26-50% which is baseline; discussed addition of nutritional supplements with daughter, but not interested at this time  Weight: 125.8# (12/25) - noted gradual decline from usual 130# likely r/t RSV and bronchitis   Risks: None    Social Work Report: Daughter continues to monitor cares and will ofter ask staff what they are doing and why. Also questions training and has staff hold badge to camera so she can write down name. SW meets with daughter frequently and addresses concerns or directs them to appropriate person. Member stays in her room as she is easily fatigued but is able to participate in leisure activities of her choice and listens to music programs from her room.      CC Report:  CC held and Care Coordinator attended via telephone. Member and her daughter chose not to attend as these was a care concern that they wanted to discuss with NM at facility. NM stated that daughter was concerned about a bruise that occurred during EZ stand transfer. Family has frequent concerns and complaints and as these are reported facility tries to address them. NM stated that members daughter is having difficulty letting go of control of members care and Care  coordinator agrees that this may be cause of Daughters anxiety. Member was seen in December for annual assessment. Family was given contact information on how to reach care coordinator but has not reached out since. Care Coordinator remains available for Member, Family and Facility to assist with any concerns     Alba Haines RN, PHN  Intuitional Care Coordinator [facility]  [address] [place] [address]  [address] [place] [id number]  [email or web address]  Cell [phone] Fax [phone]

## 2023-02-02 NOTE — PROGRESS NOTES
[facility] GERIATRICS    Chief Complaint   Patient presents with     Nursing Home Acute     HPI:  Alejandrina Whatley is a 89 year old  (8/22/1923), who is being seen today for an episodic care visit at: [facility] ([facility]) [[id number]].     Today's concern is: Alejandrina Noble is seen at request of her daughter to discuss following issues    Right Hand Injury: Unfortunately patient sustained a small bruise and abrasion to her right dorsal surface of her hand this morning during a bath. Daughter reports that the aide injured it with the arm of the bath chair.  Daughter expresses significant frustration if she feels that this has been a recurrent issue on bath day.  Hand does have some ecchymosis and a small abrasion but Sanjana is able to wiggle her fingers and .  Denies significant pain.  No bony tenderness.  Discussed overall suspicion for bony injury is low but certainly can obtain an x-ray if desired.  Pat declines for now.    HTN: Historically maintained on Coreg.  When she was hospitalized at the end of last year she was initiated on amlodipine in the hospital.  Blood pressures are 110-120s. Alejandrina Noble denies dizziness but Pat questions if it can be discontinued which seems appropriate given age and goal SBP < 150    Chronic Pain: During her last visit daughter had questioned if patient could be switched to an alternative Tylenol.  She indicates she received some Tylenol in the hospital that had a coating on the tablets that made it easier to swallow and she was interested in her mother receiving that.  Had previously reached out to facility pharmacist who indicated neither the extra strength or regular Tylenol are coated and both are of similar size.  Suggested to daughter we trial Tylenol suspension and she is in agreement.  Did warn her given the dose of of Tylenol her mother is on it will be approximately 2 tablespoons of medicine which patient and Alejandrina Noble were agreeable  to    Review of nursing home EMR: -120    Allergies, and PMH/PSH reviewed in [employer] today.  REVIEW OF SYSTEMS:  4 point ROS including Respiratory, CV, GI and , other than that noted in the HPI,  is negative    Objective:   /80   Pulse 78   Temp 97.6  F (36.4  C)   Resp 15   Ht 1.524 m (5')   Wt 58.5 kg (129 lb)   SpO2 96%   BMI 25.19 kg/m    Physical Exam  Constitutional:       Appearance: Normal appearance.   Eyes:      General: No scleral icterus.  Cardiovascular:      Heart sounds: No murmur heard.  Pulmonary:      Effort: Pulmonary effort is normal.   Musculoskeletal:      Comments: Right hand with 2 cm area of ecchymosis and small superficial abrasion. Good  strength. Can move all fingers. No bony tenderness to palpation   Neurological:      Mental Status: She is alert. Mental status is at baseline.   Psychiatric:         Behavior: Behavior normal.             Assessment/Plan:  Right Hand Trauma: Her daughter was the result of the bath chair arm being pressed down during am bath.  Patient denies significant pain.  Does have localized bruising and a small abrasion.  No bony tenderness.  Discussed overall suspicion for bony injury is low.   - Offered x-ray which family declined.  Encouraged them to address nursing concerns with facility    Essential HTN: Historically maintained on Coreg 6.25 mg twice daily.  During hospitalization December amlodipine was added due to elevated BPs in the hospital.  Daughter questions if this can be discontinued and blood pressure certainly has been far below goal of SBP < 150  -Discontinue amlodipine  - Continue Coreg    Spinal Stenosis  Chronic Pain: Daughter previously had inquired if regular strength Tylenol would have a coating on it which she believes is easier for her mother to swallow.  This had previously been discussed with facility pharmacist who indicates neither the regular or extra strength Tylenol as coded.  Offered we switch her to suspension  Tylenol and patient and daughter in agreement  - Adjust Tylenol to suspension.  Keep it approximately 1000 mg twice daily    MED REC REQUIRED  Post Medication Reconciliation Status: patient was not discharged from an inpatient facility or TCU      Orders:  As above    Electronically signed by: Sara Britt PA-C

## 2023-02-02 NOTE — LETTER
2/2/2023        RE: Alejandrina Whatley  [facility]  [address] [address] [facility] [address]        [facility] GERIATRICS    Chief Complaint   Patient presents with     Nursing Home Acute     HPI:  Alejandrina Whatley is a 89 year old  (8/22/1923), who is being seen today for an episodic care visit at: [facility] ([facility]) [[id number]].     Today's concern is: Alejandrina Noble is seen at request of her daughter to discuss following issues    Right Hand Injury: Unfortunately patient sustained a small bruise and abrasion to her right dorsal surface of her hand this morning during a bath. Daughter reports that the aide injured it with the arm of the bath chair.  Daughter expresses significant frustration if she feels that this has been a recurrent issue on bath day.  Hand does have some ecchymosis and a small abrasion but Sanjana is able to wiggle her fingers and .  Denies significant pain.  No bony tenderness.  Discussed overall suspicion for bony injury is low but certainly can obtain an x-ray if desired.  Pat declines for now.    HTN: Historically maintained on Coreg.  When she was hospitalized at the end of last year she was initiated on amlodipine in the hospital.  Blood pressures are 110-120s. Alejandrina Noble denies dizziness but Pat questions if it can be discontinued which seems appropriate given age and goal SBP < 150    Chronic Pain: During her last visit daughter had questioned if patient could be switched to an alternative Tylenol.  She indicates she received some Tylenol in the hospital that had a coating on the tablets that made it easier to swallow and she was interested in her mother receiving that.  Had previously reached out to facility pharmacist who indicated neither the extra strength or regular Tylenol are coated and both are of similar size.  Suggested to daughter we trial Tylenol suspension and she is in agreement.  Did warn her given the dose of of Tylenol her  mother is on it will be approximately 2 tablespoons of medicine which patient and Alejandrina Noble were agreeable to    Review of nursing home EMR: -120    Allergies, and PMH/PSH reviewed in [employer] today.  REVIEW OF SYSTEMS:  4 point ROS including Respiratory, CV, GI and , other than that noted in the HPI,  is negative    Objective:   /80   Pulse 78   Temp 97.6  F (36.4  C)   Resp 15   Ht 1.524 m (5')   Wt 58.5 kg (129 lb)   SpO2 96%   BMI 25.19 kg/m    Physical Exam  Constitutional:       Appearance: Normal appearance.   Eyes:      General: No scleral icterus.  Cardiovascular:      Heart sounds: No murmur heard.  Pulmonary:      Effort: Pulmonary effort is normal.   Musculoskeletal:      Comments: Right hand with 2 cm area of ecchymosis and small superficial abrasion. Good  strength. Can move all fingers. No bony tenderness to palpation   Neurological:      Mental Status: She is alert. Mental status is at baseline.   Psychiatric:         Behavior: Behavior normal.             Assessment/Plan:  Right Hand Trauma: Her daughter was the result of the bath chair arm being pressed down during am bath.  Patient denies significant pain.  Does have localized bruising and a small abrasion.  No bony tenderness.  Discussed overall suspicion for bony injury is low.   - Offered x-ray which family declined.  Encouraged them to address nursing concerns with facility    Essential HTN: Historically maintained on Coreg 6.25 mg twice daily.  During hospitalization December amlodipine was added due to elevated BPs in the hospital.  Daughter questions if this can be discontinued and blood pressure certainly has been far below goal of SBP < 150  -Discontinue amlodipine  - Continue Coreg    Spinal Stenosis  Chronic Pain: Daughter previously had inquired if regular strength Tylenol would have a coating on it which she believes is easier for her mother to swallow.  This had previously been discussed with facility pharmacist  who indicates neither the regular or extra strength Tylenol as coded.  Offered we switch her to suspension Tylenol and patient and daughter in agreement  - Adjust Tylenol to suspension.  Keep it approximately 1000 mg twice daily    MED REC REQUIRED  Post Medication Reconciliation Status: patient was not discharged from an inpatient facility or TCU      Orders:  As above    Electronically signed by: Sara Britt PA-C             Sincerely,        Sara Britt PA-C

## 2023-03-10 NOTE — LETTER
"    3/10/2023        RE: Alejandrina Whatley  [facility]  [address]  [facility] [address]        [place] GERIATRIC SERVICES  PHYSICIAN NOTE    Chief Complaint   Patient presents with     [facility] Regulatory       HPI:    Alejandrina Whatley is a 89 year old  (8/22/1923), who is being seen today for a federally mandated E/M visit at [facility]. Admitted to LT in early 2022 after a L hip fracture s/p repair with comorbid dementia and h/o CVAs.    This winter had RSV infection and some UTIs with associated hallucinations at the time; rebounded yet again. Weight stable 120s. Amlodipine discontinued d/t plenty well controlled BP in early Feb 2023; recent -150/60-80s which is good control for her age. She's also back on tylenol tablets per on call provider order as of 2/11/23 after initially being on tylenol suspension d/t h/o pill dysphagia. Daughter Pat is actively involved in her life and seems to visit most days but I didn't catch her today.    Alejandrina Noble is seen in her room; lying tucked in bed just for afternoon rest but is awake and welcomes the visit. Says she is \"doing just fine\" in a calm/content voice; denies pain, dyspnea or dysuria. Thinks the staff are doing a good job caring for her when I ask about this and she denies any specific concerns that she has. Doesn't remember me from prior visits d/t her dementia and asks my name a couple times. Admits her daughter Pat is very involved and \"she does a good job\"; is proud of how she raised her.    Past Medical History:   Diagnosis Date     Abdominal aortic aneurysm 2001    had a stent placed 2009     AK (actinic keratosis) 11/11/2009     Cataract 02/22/2011     Compression Fractures of Lumbar Vertebra 02/04/2010     CVA (cerebral vascular accident) (H)     Dec 2019 and Jan 2020     Dementia (H)      DJD (degenerative joint disease)      Generalised Weakness and Fatigue 03/03/2011     Glaucoma (increased " eye pressure) 2009    Dr. 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     Strain of shoulder, left 03/03/2011     Urinary incontinence 11/10/2009        CODE STATUS: DNR/I    ALLERGIES: Actonel [bisphosphonates], Conjugated estrogens, Macrobid [nitrofuran derivatives], Nitrofurantoin, Premarin, Sulfa drugs, Hydrocodone, Oxycodone, and Risedronate    MEDICATIONS: Reviewed and updated in Epic according to facility MAR  Current Outpatient Medications   Medication Sig Dispense Refill     acetaminophen (TYLENOL) 500 MG tablet Take 1,000 mg by mouth 2 times daily       aspirin (ASA) 81 MG chewable tablet Take 81 mg by mouth daily       atorvastatin (LIPITOR) 40 MG tablet Take 40 mg by mouth every evening       benzocaine-menthol (CHLORASEPTIC) 6-10 MG lozenge Place 1 lozenge inside cheek every hour as needed for sore throat 20 lozenge 0     carvedilol (COREG) 6.25 MG tablet Take 6.25 mg by mouth 2 times daily (with meals)       Cholecalciferol (VITAMIN D3) 50 MCG (2000 UT) TABS Take 2,000 Units by mouth daily       dorzolamide-timolol (COSOPT) 2-0.5 % ophthalmic solution Place 1 drop into both eyes 2 times daily 1 Bottle 11     escitalopram (LEXAPRO) 10 MG tablet Take 10 mg by mouth daily       latanoprost (XALATAN) 0.005 % ophthalmic solution Place 1 drop into both eyes daily       melatonin 3 MG tablet Take 3 mg by mouth nightly as needed for sleep       menthol-zinc oxide (CALMOSEPTINE) 0.44-20.6 % OINT ointment Apply topically as needed       ondansetron (ZOFRAN ODT) 4 MG ODT tab Take 1 tablet (4 mg) by mouth every 6 hours as needed for nausea or vomiting       polyethylene glycol (MIRALAX) 17 g packet Take 1 packet by mouth every other day       traMADol (ULTRAM) 50 MG tablet Take 0.5 tablets (25 mg) by  mouth daily At 1900 45 tablet 0       ROS:  Limited secondary to cognitive impairment but today pt reports as above in HPI    Exam:  BP (!) 141/69   Pulse 70   Temp 97.4  F (36.3  C)   Resp 16   Ht 1.524 m (5')   Wt 56.7 kg (125 lb)   SpO2 97%   BMI 24.41 kg/m    Resting comfortably in bed awake  Breathing non-labored on RA  Calm, cheerful, content disposition at this time    Lab/Diagnostic Data:    Jan 2023 Renal US unremarkable, Hgb 11.5, Na 143, K 3.8, Cr 0.85    ASSESSMENT/PLAN:  Essential hypertension  Amlodipine discontinued d/t plenty well controlled BP in early Feb 2023; recent -150/60-80s which is good control for her age    Dementia without behavioral disturbance (H)  Anxiety and depression  Mood euthymic on Lexapro, seems at peace  Goals of care are generally comfort based from Alejandrina Noble's previous conversations and daughter does want this as well though tends to also ask a lot of questions about testing, etc    Spinal stenosis of lumbar region, unspecified whether neurogenic claudication present  She reports she feels comfortable today  On Tylenol (back on tablets rather than suspension) and low dose Tramadol      Electronically signed by:  Yulissa Garcia, DO        Sincerely,        Yulissa Garcia, DO

## 2023-03-13 NOTE — TELEPHONE ENCOUNTER
Call to daughter Pat in follow up of visit last week. She asked questions re: elevated (non-fasting) glucose in hospital. Later fasting glucose in January within normal limits. Mom continues to be uncomfortable with waist band of pants; reviewed recent imaging without malignant concern. Daughter found out that with larger waist band pants and encouraging staff to put pants down around natural waist rather than hike up high, Alejandrina Noble seems more comfortable. I reassured daughter that Alejandrina Noble was comfortable during my visit last week which was good for her to hear.    Yulissa Garcia, DO

## 2023-03-17 NOTE — PROGRESS NOTES
[address] [place] [address]   Office [phone]  Fax [phone]     TRANSITIONS OF CARE (IRENE) LOG   IRENE tasks should be completed by the CC within one (1) business day of notification of each transition. Follow up contact with member is required after return to their usual care setting.  Note:  If CC finds out about the transitions fifteen (15) days or more after the member has returned to their usual care setting, no IRENE log is needed. However, the CC should check in with the member to discuss the transition process, any changes needed to the care plan and document it in a case note.    Member Name:  Alejandrina Whatley [id number]   Product: [facility] Care Coordinator Contact:  RACQUEL Ga, CMC covering for Alba Haines RN, PHN Agency/County/Care System: [facility]   Transition Communication Actions from Care Management Contact   Transition #1   Notification Date: 12/8/22 Transition Date:   12/5/22 Transition From: Nursing Home, [facility]     Is this the member s usual care setting?               yes Transition To: [facility], [facility]   Transition Type:  Unplanned    Reason for Admission/Comments:  Alejanrdina Noble went to the Emergency Department at [facility] on 12/5/2022 for shortness of breath and cough.  Admitted to observation with diagnosis of RSV infection.      Contact member/responsible party to offer assistance with transition Date completed: 12/8/22     Notes from conversation with the member/responsible party, provider, discharging and receiving facility (as applicable): CC contacted Hospital /discharge planner via the [facility] Transitional Care Hand-In Process, with community care plan included.  CC reached out to HARI Benito at [facility].   Reviewed and update care plan as needed.  Transition log initiated.   PCP notified of hospitalization via EMR.    RACQUEL Ga, [facility]  [phone]   Shared CC contact info,  care plan/services with receiving setting--Date completed: 12/8/22   Name & Title of receiving setting contact: [facility]   Notified PCP of transition--Date completed:  12/5/22     via  EMR  Name of PCP: Sara Britt     Transition #2   Notification Date: 12/8/22 Transition Date:   12/8/22 Transition From: Hospital, [facility]     Is this the member s usual care setting?               no Transition To: Nursing Home, [facility]   Transition Type:  Planned     Reason for Admission/Comments:  Discharged back to [facility] on this date.    Contact member/responsible party to offer assistance with transition Date completed: 12/8/22     Notes from conversation with the member/responsible party, provider, discharging and receiving facility (as applicable):   CC reached out to HARI Benito at [facility]. ,  Alejandrina will be seen by on-site provider team.  Reviewed and update care plan as needed.  Transition log initiated.   PCP notified of hospitalization via EMR.    RACQUEL Ga, [facility]  [phone]     Shared CC contact info, care plan/services with receiving setting--Date completed: 12/8/22      Notified PCP of transition--Date completed:  12/8/22     via  EMR  Name of PCP: Sara Britt      *RETURN TO USUAL CARE SETTING: *Complete tasks below when the member is discharging TO their usual care setting within one (1) business day of notification..      For situations where the Care Coordinator is notified of the discharge prior to the date of discharge, the Care Coordinator must follow up with the member or designated representative to confirm that discharge actually occurred and discuss required IRENE tasks as outlined in the IRENE Instructions.  (This includes situations where it may be a  new  usual care setting for the member. (i.e., a community member who decides upon permanent nursing home  superimposed on dermatitis leandra   - Nevin Colon (Attending Physician)   Signed By:  Yash Zhou MD     Family Medicine Resident placement following hospitalization and rehab).    Discuss with Member/Responsible Party:    Check  Yes  - if the member, family member and/or SNF/facility staff manages the following:    If  No  provide explanation in the comments section.          Date completed: 12/8/22 Communicated with member or their designated representative about the following:  care transition process; about changes to the member s health status; plan of care updates; education about transitions and how to prevent unplanned transitions/readmissions    Four Pillars for Optimal Transition:    Check  Yes  - if the member, family member and/or SNF/facility staff manages the following:    If  No  provide explanation in the comments section.          [x]  Yes     []  No Does the member have a follow-up appointment scheduled with primary care or specialist? (Mental health hospitalizations--the appt. should be w/in 7 days)              For mental health hospitalizations:  []  Yes     []  No     Does the member have a follow-up appointment scheduled with a mental health practitioner within 7 days of discharge?  [x]  Yes     []  No     Has a medication review been completed with member? If no, refer to PCP, home care nurse, MTM, pharmacist  [x]  Yes     []  No     Can the member manage their medications or is there a system in place to manage medications (e.g. home care set-up)?         [x]  Yes     []  No     Can the member verbalize warning signs and symptoms to watch for and how to respond?  [x]  Yes     []  No     Does the member have a copy of and understand their discharge instructions?  If no, assist to obtain copy of discharge instructions, review discharge instructions, and assist to contact PCP to discuss questions about their recent hospitalization.  [x]  Yes     []  No     Does the member have adequate food, housing and transportation?  If no, add goal and discuss additional supports available to the member                                                                                                                                                                                  [x]  Yes     []  No     Is the member safe in their home?  If no, document needs and support provided                                                                                                                                                                          []  Yes     [x]  No     Are there any concerns of vulnerability, abuse, or neglect?  If yes, document concerns and actions taken by Care Coordinator as a mandated                                                                                                                                                                              [x]  Yes     []  No     Does the member use a Personal Health Care Record?  Check  Yes  if visit summary, discharge summary, and/or healthcare summary are being used as a PHR.                                                                                                                                                                                  [x]  Yes     []  No     Have you reviewed the discharge summary with the member? If  No  provide explanation in comments.  [x]  Yes     []  No     Have you updated the member s care plan/support plan? Add new diagnosis, medications, treatments, goals & interventions, as applicable. If No, provide explanation in comments.    Comments:           RACQUEL Ga, [facility]  [phone]

## 2023-03-31 NOTE — ED TRIAGE NOTES
No clearance needed Pt presents via EMS from [facility] for increased wheezing over the last 3 days and x2 months of cough with yellow sputum. Since wheezing has begun she has been treated with albuteral inhalers. Daughter requested she be brought in for further evaluation. ABCs intact A&Ox4

## 2023-04-01 NOTE — TELEPHONE ENCOUNTER
"Staff paged today requesting a refill of medication - noting \"script ran out\". No answer with return call.    Of note - patient is on Tramadol 0.5 tablet daily and a script for 45 tablets was given on 3/6/23 - this was a 90 day supply; should not require refill at this time.    Dr. Erin Perez, APRN, DNP, AGNP-BC, PMHNP-BC  [facility]  Office Hours: Tues-Fri [phone]  Office: [address] [place] [address]  Fax - [phone]  Triage Phone- [phone]  Business Office- [phone]      Email: [email or web address]           "

## 2023-04-11 NOTE — PROGRESS NOTES
[facility] Six-Month Assessment    6 month assessment completed on 04/11/23 with Jigna CLEMENT at [facility] .    ER visits: Yes -  [facility]  Hospitalizations: Yes -  [facility]  TCU stays: No  Significant health status changes: NA  Falls/Injuries: No  ADL/IADL changes: No    Reviewed Institutional Assessment and updated as needed.     Will see member in 6 months for an annual health risk assessment.   Encouraged member to call CC with any questions or concerns in the meantime.     Alba Haines RN, PHN  Intuitional Care Coordinator [facility]  Cell [phone] Fax [phone]

## 2023-04-12 NOTE — TELEPHONE ENCOUNTER
[facility] Triage Nurse Telephone Encounter    Provider: Sara Britt PA-C  Facility: [facility] Type:  LTC    Caller: Patrice  Call Back Number: [phone]    Allergies:    Allergies   Allergen Reactions     Actonel [Bisphosphonates] Rash     Conjugated Estrogens Rash     Macrobid [Nitrofuran Derivatives] Rash     Nitrofurantoin Rash     Premarin Rash     Sulfa Drugs Rash     Hydrocodone Nausea and Vomiting     Oxycodone Nausea and Vomiting     Risedronate      leg pain        Reason for call: Nurse is reporting that patient is c/o constipation.  She had a small BM today, but still feels constipated.  Currently has orders for Miralax 17g every other day.      Verbal Order/Direction given by Provider: Add Miralax 17g daily PRN.      Provider giving Order:  Sara Britt PA-C    Verbal Order given to: Patrice Bermeo RN

## 2023-05-04 NOTE — PROGRESS NOTES
CC attended care conference for member on [facility]  at [facility].   Present at care conference this care coordinator, adult daughter (Pat), NH  (Jigna ), NH RN (Vick Patel) and Lillian Martinez RD.  OT Report: NA  Cognitive testing results: No Change  PT Report:  NA  Nursing Report: No falls or other events in the past quarter. BMs daily to every 2-3 days at max.   Health is stable overall. POLST/code status: DNR/DNI Vaccination status (indicate up to date, eligible for booster, or due for vaccine):  COVID-19 (including boosters): family declines Influenza: up to date Pneumonia: family declines  Appointments: none on record at this time  Dietician Report: Diet: Regular Intakes: 33-54%, no nutritional supplements  Weight: Wt Used in MDs: 127.4# (4/14). Weights have been between 125-132# in the last 6 months. No significant changes at x30 days. No weights   available at x90d, x180d. Rsdt was down about 5-7# post RSV + UTI this past winter, but continues to remain in a healthy BMI. Dtr assists with meal   selections for rsdt.Risks: Dehydration with hx of UTI s, additional fluid push ordered for 300 mL q shift   Social Work Report: Resident has the ability to structure her own leisure pursuits, but is currently unable to engage in group activities d/t requiring extensive rest. She   usually rests in the afternoon and declines invitations to group activities. She enjoys watching television in her room. She is very friendly and enjoys   being around others. She eats her meals in the dining room, where she engages sometimes in light conversation. She is present for live dining music.   Resident receives daily visits from her daughter, 1:1 visits from TR for additional socialization, and spiritual care visits from the facility    CC Report:  Spoke to Sw last month for 6 month update. Continues to do well and denies any concerns. Gets anxious when  daughter unable to visit. Next assessment due in September    Alba Haines RN, PHN  Intuitional Care Coordinator [facility]  Cell [phone] Fax [phone]

## 2023-05-23 NOTE — PROGRESS NOTES
[facility] GERIATRICS  Chief Complaint   Patient presents with     retirement Regulatory     [facility] Medical Record Number:  [id number]  Place of Service where encounter took place:  [facility] [[id number]]    HPI:    Alejandrina Whatley  is 89 year old (8/22/1923), who is being seen today for a federally mandated E/M visit. Alejandrina Whatley 89-year-old female with a past medical history of previous CVA, previous left femur fracture, dementia, hypertension, hyperlipidemia, depression and recurrent UTI.  Previously admitted to the facility in early 2020 following a stroke and followed by [facility] team.  Ultimately discharged 9/2022 Group Home.  She unfortunately suffered a fall with associated left femur fracture status postsurgical repair 2/22.  Readmitted to long-term care and has been followed by the [facility] team.  Family requested transfer over to [facility] as of 8/2022    Today's concerns are: Spoke with RN.  No concerns.  Had some issues with constipation in April and received some as needed's.  Nursing note documenting regular bowel movements.    Alejandrina Noble is evaluated in her room.  Just about to get up for lunch.  Tends to take a nap in the late morning after breakfast.  Does not recall me from previous visits.  Denies any concerns other than wonders when her daughter will be arriving (typically visits daily at lunchtime).  Alejandrina Noble denies pain. No chest pain or SOB.     Review of nursing home EMR: -133, weights 124-132, BIMS 11/15.  PHQ-9 1      ALLERGIES:Actonel [bisphosphonates], Conjugated estrogens, Estrogens conjugated, Macrobid [nitrofuran derivatives], Nitrofurantoin, Sulfa antibiotics, Hydrocodone, Oxycodone, and Risedronate  PAST MEDICAL HISTORY:   Past Medical History:   Diagnosis Date     Abdominal aortic aneurysm 2001    had a stent placed 2009     AK (actinic keratosis) 11/11/2009     Cataract 02/22/2011     Compression Fractures of Lumbar Vertebra  02/04/2010     CVA (cerebral vascular accident) (H)     Dec 2019 and Jan 2020     Dementia (H)      DJD (degenerative joint disease)      Generalised Weakness and Fatigue 03/03/2011     Glaucoma (increased eye pressure) 2009    Dr. 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  FAMILY HISTORY: family history includes Heart Disease (age of onset: 79) in her father; Hypertension in her mother.  SOCIAL HISTORY:  reports that she has never smoked. She has never used smokeless tobacco. She reports that she does not drink alcohol and does not use drugs.    MEDICATIONS:  MED REC REQUIRED  Post Medication Reconciliation Status: patient was not discharged from an inpatient facility or TCU         Review of your medicines          Accurate as of May 23, 2023  2:24 PM. If you have any questions, ask your nurse or doctor.            CONTINUE these medicines which have NOT CHANGED      Dose / Directions   acetaminophen 500 MG tablet  Commonly known as: TYLENOL      Dose: 1,000 mg  Take 1,000 mg by mouth 2 times daily  Refills: 0     aspirin 81 MG chewable tablet  Commonly known as: ASA      Dose: 81 mg  Take 81 mg by mouth daily  Refills: 0     atorvastatin 40 MG tablet  Commonly known as:  LIPITOR      Dose: 40 mg  Take 40 mg by mouth every evening  Refills: 0     carvedilol 6.25 MG tablet  Commonly known as: COREG      Dose: 6.25 mg  Take 6.25 mg by mouth 2 times daily (with meals)  Refills: 0     dorzolamide-timolol 2-0.5 % ophthalmic solution  Commonly known as: COSOPT  Used for: Pseudoexfoliation glaucoma, moderate stage      Dose: 1 drop  Place 1 drop into both eyes 2 times daily  Quantity: 1 Bottle  Refills: 11     escitalopram 10 MG tablet  Commonly known as: LEXAPRO      Dose: 10 mg  Take 10 mg by mouth daily  Refills: 0     latanoprost 0.005 % ophthalmic solution  Commonly known as: XALATAN      Dose: 1 drop  Place 1 drop into both eyes daily  Refills: 0     melatonin 3 MG tablet      Dose: 3 mg  Take 3 mg by mouth nightly as needed for sleep  Refills: 0     menthol-zinc oxide 0.44-20.6 % Oint ointment  Commonly known as: CALMOSEPTINE      Apply topically as needed  Refills: 0     ondansetron 4 MG ODT tab  Commonly known as: ZOFRAN ODT  Used for: RSV infection      Dose: 4 mg  Take 1 tablet (4 mg) by mouth every 6 hours as needed for nausea or vomiting  Refills: 0     polyethylene glycol 17 g packet  Commonly known as: MIRALAX      Dose: 1 packet  Take 1 packet by mouth (every other day and daily PRN)  Refills: 0     traMADol 50 MG tablet  Commonly known as: ULTRAM  Used for: Other chronic pain      Dose: 25 mg  Take 0.5 tablets (25 mg) by mouth daily At 1900  Quantity: 45 tablet  Refills: 0     vitamin D3 50 mcg (2000 units) tablet  Commonly known as: CHOLECALCIFEROL      Dose: 2,000 Units  Take 2,000 Units by mouth daily  Refills: 0        STOP taking    benzocaine-menthol 6-10 MG lozenge  Commonly known as: CHLORASEPTIC  Stopped by: Sara Britt PA-C                Case Management:  I have reviewed the care plan and MDS and do agree with the plan. Patient's desire to return to the community is not assessible due to cognitive impairment. Information reviewed:  Medications, vital  signs, orders, and nursing notes.    ROS:  4 point ROS including Respiratory, CV, GI and , other than that noted in the HPI,  is negative    Vitals:  /72   Pulse 68   Temp 97.7  F (36.5  C)   Resp 16   Ht 1.524 m (5')   Wt 60.1 kg (132 lb 6.4 oz)   SpO2 96%   BMI 25.86 kg/m    Body mass index is 25.86 kg/m .  Exam:  Physical Exam  Constitutional:       General: She is not in acute distress.     Appearance: Normal appearance.   HENT:      Head: Normocephalic and atraumatic.   Cardiovascular:      Rate and Rhythm: Normal rate and regular rhythm.   Pulmonary:      Effort: Pulmonary effort is normal.      Breath sounds: No wheezing.   Abdominal:      General: Bowel sounds are normal.      Tenderness: There is no abdominal tenderness.   Musculoskeletal:      Right lower leg: No edema.      Left lower leg: No edema.   Skin:     General: Skin is warm and dry.   Neurological:      General: No focal deficit present.      Mental Status: She is alert. Mental status is at baseline.         Lab/Diagnostic data:   Recent labs in [facility] reviewed by me today.     ASSESSMENT/PLAN    Dementia without behavioral disturbances  Weight Loss, resolved: CPT in 2019 4.5. Recent BIMS 11/15  - Spends much of her time in her room. Daughter visits daily  -Weights improving after weight loss this winter following RSV infection and hospitalization.  Facility nutrition continues to follow    History of recurrent UTI: Recently stable with no UTIs.  Previously there was concern regarding staghorn calculus but follow-up renal Jan 2023 ultrasound was negative.   - Continue to work with facility to ensure good tanya care  - Monitor for reoccurrence of infection    Bronchomalacia: Hospitalization in Dec 2022 for RSV with associated bronchitis. CT noted evidence of bronchiomalacia.  - Bronchiomalacia may put patient at increase of respiratory distress with viral infections. Recently stable     Hypertension  H/o AAA without rupture s/p bypass  graph: Well-controlled.  SBP's 120-130.   - Continue Coreg 6.25 mg twice daily     History of CVA in Dec 2019 and Jan 2020  - Continue aspirin 81 mg daily and Lipitor    Depression: Most recent PHQ 1  - Continue Lexapro 10 mg daily    Constipation, unspecified: Isolated episode with constipation in April.  Resolved with as needed's.  Nursing and documenting regular bowel movements  - Continue Miralax qod    CKD, stage 3: Baseline Cr 0.8-1  - Monitor periodically. Most recently stable 1/11/223    Chronic Macrocytic Anemia: Baseline hemoglobin 10/11  - Monitor periodically. Most recently stable 1/11/23    Lumbar Stenosis  Chronic Pain  -Denies pain today  - Continue scheduled Tylenol and tramadol         ORders  NNO      Electronically signed by:  Sara Britt PA-C

## 2023-05-23 NOTE — LETTER
5/23/2023        RE: Alejandrina Whatley  C/o Pat Zamora  [address] [facility] [id number]        [facility] GERIATRICS  Chief Complaint   Patient presents with     prison Regulatory     [place] Medical Record Number:  [id number]  Place of Service where encounter took place:  [facility] [[id number]]    HPI:    Alejandrina Whatley  is 89 year old (8/22/1923), who is being seen today for a federally mandated E/M visit. Alejandrina Whatley 89-year-old female with a past medical history of previous CVA, previous left femur fracture, dementia, hypertension, hyperlipidemia, depression and recurrent UTI.  Previously admitted to the facility in early 2020 following a stroke and followed by [place] team.  Ultimately discharged 9/2022 Group Home.  She unfortunately suffered a fall with associated left femur fracture status postsurgical repair 2/22.  Readmitted to long-term care and has been followed by the [facility] team.  Family requested transfer over to [place] as of 8/2022    Today's concerns are: Spoke with RN.  No concerns.  Had some issues with constipation in April and received some as needed's.  Nursing note documenting regular bowel movements.    Alejandrina Noble is evaluated in her room.  Just about to get up for lunch.  Tends to take a nap in the late morning after breakfast.  Does not recall me from previous visits.  Denies any concerns other than wonders when her daughter will be arriving (typically visits daily at lunchtime).  Alejandrina Noble denies pain. No chest pain or SOB.     Review of nursing home EMR: -133, weights 124-132, BIMS 11/15.  PHQ-9 1      ALLERGIES:Actonel [bisphosphonates], Conjugated estrogens, Estrogens conjugated, Macrobid [nitrofuran derivatives], Nitrofurantoin, Sulfa antibiotics, Hydrocodone, Oxycodone, and Risedronate  PAST MEDICAL HISTORY:   Past Medical History:   Diagnosis Date     Abdominal aortic aneurysm 2001    had a stent placed 2009     AK  (actinic keratosis) 11/11/2009     Cataract 02/22/2011     Compression Fractures of Lumbar Vertebra 02/04/2010     CVA (cerebral vascular accident) (H)     Dec 2019 and Jan 2020     Dementia (H)      DJD (degenerative joint disease)      Generalised Weakness and Fatigue 03/03/2011     Glaucoma (increased eye pressure) 2009    Dr. 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  FAMILY HISTORY: family history includes Heart Disease (age of onset: 79) in her father; Hypertension in her mother.  SOCIAL HISTORY:  reports that she has never smoked. She has never used smokeless tobacco. She reports that she does not drink alcohol and does not use drugs.    MEDICATIONS:  MED REC REQUIRED  Post Medication Reconciliation Status: patient was not discharged from an inpatient facility or TCU         Review of your medicines          Accurate as of May 23, 2023  2:24 PM. If you have any questions, ask your nurse or doctor.            CONTINUE these medicines which have NOT CHANGED      Dose / Directions   acetaminophen 500 MG tablet  Commonly known as: TYLENOL      Dose: 1,000 mg  Take 1,000 mg by mouth 2 times daily  Refills: 0     aspirin 81 MG chewable tablet  Commonly known as: ASA      Dose:  81 mg  Take 81 mg by mouth daily  Refills: 0     atorvastatin 40 MG tablet  Commonly known as: LIPITOR      Dose: 40 mg  Take 40 mg by mouth every evening  Refills: 0     carvedilol 6.25 MG tablet  Commonly known as: COREG      Dose: 6.25 mg  Take 6.25 mg by mouth 2 times daily (with meals)  Refills: 0     dorzolamide-timolol 2-0.5 % ophthalmic solution  Commonly known as: COSOPT  Used for: Pseudoexfoliation glaucoma, moderate stage      Dose: 1 drop  Place 1 drop into both eyes 2 times daily  Quantity: 1 Bottle  Refills: 11     escitalopram 10 MG tablet  Commonly known as: LEXAPRO      Dose: 10 mg  Take 10 mg by mouth daily  Refills: 0     latanoprost 0.005 % ophthalmic solution  Commonly known as: XALATAN      Dose: 1 drop  Place 1 drop into both eyes daily  Refills: 0     melatonin 3 MG tablet      Dose: 3 mg  Take 3 mg by mouth nightly as needed for sleep  Refills: 0     menthol-zinc oxide 0.44-20.6 % Oint ointment  Commonly known as: CALMOSEPTINE      Apply topically as needed  Refills: 0     ondansetron 4 MG ODT tab  Commonly known as: ZOFRAN ODT  Used for: RSV infection      Dose: 4 mg  Take 1 tablet (4 mg) by mouth every 6 hours as needed for nausea or vomiting  Refills: 0     polyethylene glycol 17 g packet  Commonly known as: MIRALAX      Dose: 1 packet  Take 1 packet by mouth (every other day and daily PRN)  Refills: 0     traMADol 50 MG tablet  Commonly known as: ULTRAM  Used for: Other chronic pain      Dose: 25 mg  Take 0.5 tablets (25 mg) by mouth daily At 1900  Quantity: 45 tablet  Refills: 0     vitamin D3 50 mcg (2000 units) tablet  Commonly known as: CHOLECALCIFEROL      Dose: 2,000 Units  Take 2,000 Units by mouth daily  Refills: 0        STOP taking    benzocaine-menthol 6-10 MG lozenge  Commonly known as: CHLORASEPTIC  Stopped by: Sara Britt PA-C                Case Management:  I have reviewed the care plan and MDS and do agree with the plan. Patient's desire to return to the  community is not assessible due to cognitive impairment. Information reviewed:  Medications, vital signs, orders, and nursing notes.    ROS:  4 point ROS including Respiratory, CV, GI and , other than that noted in the HPI,  is negative    Vitals:  /72   Pulse 68   Temp 97.7  F (36.5  C)   Resp 16   Ht 1.524 m (5')   Wt 60.1 kg (132 lb 6.4 oz)   SpO2 96%   BMI 25.86 kg/m    Body mass index is 25.86 kg/m .  Exam:  Physical Exam  Constitutional:       General: She is not in acute distress.     Appearance: Normal appearance.   HENT:      Head: Normocephalic and atraumatic.   Cardiovascular:      Rate and Rhythm: Normal rate and regular rhythm.   Pulmonary:      Effort: Pulmonary effort is normal.      Breath sounds: No wheezing.   Abdominal:      General: Bowel sounds are normal.      Tenderness: There is no abdominal tenderness.   Musculoskeletal:      Right lower leg: No edema.      Left lower leg: No edema.   Skin:     General: Skin is warm and dry.   Neurological:      General: No focal deficit present.      Mental Status: She is alert. Mental status is at baseline.         Lab/Diagnostic data:   Recent labs in EPIC reviewed by me today.     ASSESSMENT/PLAN    Dementia without behavioral disturbances  Weight Loss, resolved: CPT in 2019 4.5. Recent BIMS 11/15  - Spends much of her time in her room. Daughter visits daily  -Weights improving after weight loss this winter following RSV infection and hospitalization.  Facility nutrition continues to follow    History of recurrent UTI: Recently stable with no UTIs.  Previously there was concern regarding staghorn calculus but follow-up renal Jan 2023 ultrasound was negative.   - Continue to work with facility to ensure good tanya care  - Monitor for reoccurrence of infection    Bronchomalacia: Hospitalization in Dec 2022 for RSV with associated bronchitis. CT noted evidence of bronchiomalacia.  - Bronchiomalacia may put patient at increase of respiratory  distress with viral infections. Recently stable     Hypertension  H/o AAA without rupture s/p bypass graph: Well-controlled.  SBP's 120-130.   - Continue Coreg 6.25 mg twice daily     History of CVA in Dec 2019 and Jan 2020  - Continue aspirin 81 mg daily and Lipitor    Depression: Most recent PHQ 1  - Continue Lexapro 10 mg daily    Constipation, unspecified: Isolated episode with constipation in April.  Resolved with as needed's.  Nursing and documenting regular bowel movements  - Continue Miralax qod    CKD, stage 3: Baseline Cr 0.8-1  - Monitor periodically. Most recently stable 1/11/223    Chronic Macrocytic Anemia: Baseline hemoglobin 10/11  - Monitor periodically. Most recently stable 1/11/23    Lumbar Stenosis  Chronic Pain  -Denies pain today  - Continue scheduled Tylenol and tramadol         ORders  NNO      Electronically signed by:  Sara Britt PA-C              Sincerely,        Sara Britt PA-C

## 2023-06-17 PROBLEM — G93.40 ENCEPHALOPATHY: Status: ACTIVE | Noted: 2023-01-01

## 2023-06-17 PROBLEM — N39.0 RECURRENT UTI: Status: ACTIVE | Noted: 2023-01-01

## 2023-06-17 PROBLEM — N30.00 ACUTE CYSTITIS WITHOUT HEMATURIA: Status: ACTIVE | Noted: 2023-01-01

## 2023-06-18 NOTE — ED NOTES
Bed: ED24  Expected date: 6/17/23  Expected time:   Means of arrival:   Comments:  For ED11- ready

## 2023-06-18 NOTE — PROGRESS NOTES
[facility]  Hospitalist Progress Note     Assessment & Plan     ASSESSMENT    89F with hx of HTN, dementia, stroke w/ residual left-sided weakness, and frequent UTIs presents from her facility with hallucinations. In the ED, VSS. WBC 10.4. UA with large LE, 104 WBCs, few bacteria. Hallucinations thought to be 2/2 UTI on admission.    Patient's mental status back to baseline. Urine cx pending. Can likely discharge back to facility tomorrow once urine cx returns.    PLAN    Acute Metabolic Encephalopathy vs Dementia w/ Behavior Disturbance  -Unclear if event truly due to UTI or just dementia w/ behavioral disturbance  -Mental status back to baseline  -Tx for UTI per below    Acute Cystitis vs Asymptomatic Bacteruria  -Ceftriaxone 1g every day  -Some resistance patterns in past so reasonable to wait until cx returns prior to discharge  -F/u cx  -May benefit from estrogen cream at time of discharge to help prevent future UTIs    Essential HTN  -Home Coreg    Stroke w/ Residual Left-sided Weakness  -Home ASA + Statin    DVT Prophy  -LMWH    Disposition  -Back to nursing facility tomorrow      Jay Bowling MD    Subjective     Patient seen at bedside along with daughter. Patient back to baseline mental status. Urine cx pending.        Objective   Blood pressure (!) 140/86, pulse 75, temperature 97.4  F (36.3  C), temperature source Tympanic, resp. rate 20, SpO2 96 %.    PHYSICAL EXAM  General: In no acute distress  CV: RRR.  Lungs: CTAB. Nl WOB.  Abd: Non-tender.  Ext: No edema.  Neuro: 4/5 strength in LUE and LLE (baseline since stroke)    LABS AND IMAGING  Reviewed and pertinent results discussed in assessment and plan.

## 2023-06-18 NOTE — ED NOTES
[facility]  ED Nurse Handoff Report    ED Chief complaint: UTI Concerns  . ED Diagnosis:   Final diagnoses:   Acute cystitis without hematuria   Recurrent UTI   Encephalopathy       Allergies:   Allergies   Allergen Reactions     Actonel [Bisphosphonates] Rash     Conjugated Estrogens Rash     Estrogens Conjugated Rash     Macrobid [Nitrofuran Derivatives] Rash     Nitrofurantoin Rash     Sulfa Antibiotics Rash     Hydrocodone Nausea and Vomiting     Oxycodone Nausea and Vomiting     Risedronate      leg pain       Code Status:     Activity level - Baseline/Home:   Total assist .  Activity Level - Current:    total assist .   Lift room needed: Yes.   Bariatric: No   Needed: No   Isolation: No.   Infection: Not Applicable.     Respiratory status: Room air    Vital Signs (within 30 minutes):   Vitals:    06/17/23 1925 06/17/23 2005 06/17/23 2035 06/17/23 2125   BP:  (!) 160/83 (!) 169/84 137/52   Pulse:       Resp:       Temp: 98.4  F (36.9  C)      TempSrc: Temporal      SpO2:  95% 94% 93%       Cardiac Rhythm:  ,      Pain level:    Patient confused: Yes. Dementia  Patient Falls Risk: bed/chair alarm on, nonskid shoes/slippers when out of bed, patient and family education, assistive device/personal items within reach, and toileting schedule implemented.   Elimination Status:  allen      Patient Report - Initial Complaint: hallucination poss r/t UTI.   Focused Assessment:    Alejandrina Whatley is a 89 year old female with history of recurrent UTIs and CVA with left-sided hemiparesis who presents with her daughter from [facility] with concern for UTI.  She lives in a nursing home but her daughter has a camera to monitor her.  Starting today she has been hallucinating and scared, calling out for help and telling her daughter she is seeing monsters.  Her daughter tells me every time this happens it is because Alejandrina Noble has a UTI which ultimately prompted her visit  today.  The patient denies any pain and no other symptoms are reported by her daughter.     Notably, her last UTI requiring admission was in July 2022 but her daughter tells me she had 3 UTIs in the fall.     Independent Historian: See above HPI for details.     Review of External Notes: Geriatrics nursing home visit 5/23/2023.  Paperwork provided from her care facility.     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  Lumbar spinal stenosis  Osteoporosis  PXF  Urinary incontinence  Hemiparesis  Recurrent UTI  Insomnia  CED  CKD stage 3  Pseudoexfoliation glaucoma  Vitamin D deficiency  Senile osteoporosis  Corneal guttata  Depression     Past Surgical History:    AAA repair  Cataract extraction with lens implant x2  Closed reduction, percutaneous pinning hip  Hysterectomy  Trabeculoplasty x2  Appendectomy  Anterior vesicourethropexy  Colonoscopy  Cystoscopy, transvaginal tape, colpocleisis  IR vertebroplasty     Physical Exam      Patient Vitals for the past 24 hrs:    BP Temp Temp src Pulse Resp SpO2   06/17/23 1925 -- 98.4  F (36.9  C) Temporal -- -- --   06/17/23 1918 (!) 190/88 -- -- 72 18 95 %         Physical Exam  General:          Well-developed and well-nourished. Well appearing elderly  woman. Cooperative.  Head:              Atraumatic.  Eyes:               Conjunctivae, lids, and sclerae are normal.  ENT:                Normal nose. Moist mucous membranes.  Neck:              Supple. Normal range of motion.  CV:                  Regular rate and rhythm. Normal heart sounds with no murmurs, rubs, or gallops detected.  Resp:              No respiratory distress. Clear to auscultation bilaterally without decreased breath sounds, wheezing, rales, or  rhonchi.  GI:                   Soft. Non-distended.  Mild suprapubic tenderness.                 MS:                  Normal ROM.   Skin:               Warm. Non-diaphoretic. No pallor.  Neuro:             Awake and alert.  Left-sided weakness.  Psych:            Normal mood and affect. Normal speech.    Abnormal Results:   Labs Ordered and Resulted from Time of ED Arrival to Time of ED Departure   ROUTINE UA WITH MICROSCOPIC REFLEX TO CULTURE - Abnormal       Result Value    Color Urine Light Yellow      Appearance Urine Slightly Cloudy (*)     Glucose Urine Negative      Bilirubin Urine Negative      Ketones Urine Negative      Specific Gravity Urine 1.020      Blood Urine Moderate (*)     pH Urine 6.5      Protein Albumin Urine 30 (*)     Urobilinogen Urine Normal      Nitrite Urine Negative      Leukocyte Esterase Urine Large (*)     Bacteria Urine Few (*)     Mucus Urine Present (*)     RBC Urine 94 (*)     WBC Urine 104 (*)     Squamous Epithelials Urine <1     BASIC METABOLIC PANEL - Abnormal    Sodium 143      Potassium 4.0      Chloride 107      Carbon Dioxide (CO2) 23      Anion Gap 13      Urea Nitrogen 22.1      Creatinine 0.76      Calcium 9.1      Glucose 142 (*)     GFR Estimate 70     CBC WITH PLATELETS AND DIFFERENTIAL - Abnormal    WBC Count 10.4      RBC Count 3.63 (*)     Hemoglobin 12.0      Hematocrit 37.0       (*)     MCH 33.1 (*)     MCHC 32.4      RDW 14.1      Platelet Count 209      % Neutrophils 74      % Lymphocytes 17      % Monocytes 7      % Eosinophils 1      % Basophils 1      % Immature Granulocytes 0      NRBCs per 100 WBC 0      Absolute Neutrophils 7.7      Absolute Lymphocytes 1.8      Absolute Monocytes 0.7      Absolute Eosinophils 0.1      Absolute Basophils 0.1      Absolute Immature Granulocytes 0.0      Absolute NRBCs 0.0     URINE CULTURE        No orders to display       Treatments provided: IV abx  Family Comments: Daughter left  OBS brochure/video  discussed/provided to patient:  Yes  ED Medications:   Medications   cefTRIAXone (ROCEPHIN) 2 g vial to attach to  ml bag for ADULTS or NS 50 ml bag for PEDS (2 g Intravenous $New Bag 6/17/23 2052)       Drips infusing:  No  For the majority of the shift this patient was Green.   Interventions performed were NA.    Sepsis treatment initiated: No    Cares/treatment/interventions/medications to be completed following ED care: NA    ED Nurse Name: Ilene Ching RN  10:05 PM    RECEIVING UNIT ED HANDOFF REVIEW    Above ED Nurse Handoff Report was reviewed: Yes  Reviewed by: Linda Burch RN on June 18, 2023 at 5:16 PM

## 2023-06-18 NOTE — H&P
[facility] History and Physical    Alejandrina Whatley MRN# [id number]   Age: 89 year old YOB: 1923     Date of Admission:  6/17/2023            Assessment and Plan:   Assessment:   Alejandrina Whatley is a 89 year old woman who resides in memory care.  She was brought to attention today at the request of her daughter who noted that the patient was having hallucinations at home.  In the past, this has been associated with UTI which made the daughter especially concerned.     ON presentation to the ED, Ms. Whatley was noted to be cooperative and comfortable. VS: /88 (came down to 134/52) with HR 72, RR 18.  Afeb and in NAD.  Labs: Creat 0.76 with normal electrolytes.  WBC 12.0, Hgb 10.4, Plt 209. UA consistent with UTI, includingg blood, LE, WBC, RRBC and bacteria but no epithelial cells. Culture is pending. She was started on Ceftriaxone and I was asked to admit her to obs.    PMH includes dementia, CVA, hypertension and dyslipidemia.  As a result of the CVA which occurred in 12/2019-1/2020, patient is no longer independently ambulatory.    DX:  1.  Hallucinations in the setting of dementia. Mild encephalopathy, presumed septic etiology.  Resides in memory care.   2.  UTI, most consistent with simple cystitis.  3.  Remote stroke with residual left sided weakness.   4.  HTN.   5.  Depression, managed with Lexapro.   6.  Chronic back pain.       Plan:   1.  Admit to observation. There is a high likelihood that she will need to advance to Inpatient.   2.  Ceftriaxone 1 gram daily.   3.  Usual home meds resumed.   4.  Olanzapine 2.5 mg q 8 hr prn agitation.              Chief Complaint:   Hallucinating.      History is obtained from the patient's daughter who was present for Dr. Esposito, ED physician and EMR.     Ms. Whatley's daughter apparently monitors the patient remotely with a camera.  Today, she observed the patient to be calling out in fear due to hallucinations.  She apparently described  "\"seeing monsters\".  Elsewhere in the chart, it is noted that she has visual hallucinations when she has been medically ill.  The daughter indicates that that is the case and often hallucinations reflect a urinary tract infection.    Otherwise, Ms. Whatley was pleasant and appeared appropriate to the situation when I interacted with her in the emergency department.  She denied pain and shortness of breath.  She tells me she has a persistent cough and I note that she was here in December for respiratory infection.        Past Medical History:     Past Medical History:   Diagnosis Date     Abdominal aortic aneurysm 2001    had a stent placed 2009     AK (actinic keratosis) 11/11/2009     Cataract 02/22/2011     Compression Fractures of Lumbar Vertebra 02/04/2010     CVA (cerebral vascular accident) (H)     Dec 2019 and Jan 2020     Dementia (H)      DJD (degenerative joint disease)      Generalised Weakness and Fatigue 03/03/2011     Glaucoma (increased eye pressure) 2009    Dr. 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  Dr. Clark.     HYSTERECTOMY, CERVIX STATUS UNKNOWN  1971     LASER SELECTIVE TRABECULOPLASTY  3/2010; 10/2015    left eye 1st Eduardo (notes show inf " treatment); inf 180 (MARI)     LASER SELECTIVE TRABECULOPLASTY  12/2017    left eye sup 180     ZZC ANTER VESICOURETHROPEXY,SIMPLE  2008    Helped     ZZC APPENDECTOMY  1971             Social History:     Social History     Tobacco Use     Smoking status: Never     Smokeless tobacco: Never     Tobacco comments:     No second hand exposure.   Vaping Use     Vaping status: Not on file   Substance Use Topics     Alcohol use: No             Family History:     Family History   Problem Relation Age of Onset     Heart Disease Father 79        MI     Hypertension Mother      Family history noted but not relevant to this hospitalization.           Allergies:     Allergies   Allergen Reactions     Actonel [Bisphosphonates] Rash     Conjugated Estrogens Rash     Estrogens Conjugated Rash     Macrobid [Nitrofuran Derivatives] Rash     Nitrofurantoin Rash     Sulfa Antibiotics Rash     Hydrocodone Nausea and Vomiting     Oxycodone Nausea and Vomiting     Risedronate      leg pain             Medications:   Not yet reconciled by pharmacy.  Aspirin 81 mg daily  Atorvastatin 40 mg daily  Carvedilol to 6.25 mg twice daily  Cosopt 1 drop each eye twice daily  Escitalopram 10 mg daily  Xalatan 0.005% 1 drop each eye daily  Tramadol 25 mg daily.  Melatonin 3 mg nightly as needed         Review of Systems:   Review of systems is limited by patient factors -baseline dementia.          Physical Exam:   Vitals were reviewed  Temp: 98.4  F (36.9  C) Temp src: Temporal BP: 137/52 Pulse: 72   Resp: 18 SpO2: 93 % O2 Device: None (Room air)    Constitutional: Awake, alert, cooperative, no apparent distress, and appears stated age.  Eyes: Lids and lashes normal, pupils equal, round and reactive to light, extra ocular muscles intact, sclera clear, conjunctiva normal.  ENT: Normocephalic, without obvious abnormality, atraumatic, gums normal and good dentition.  Neck: Supple, symmetrical, trachea midline, no adenopathy, thyroid symmetric, not  enlarged and no tenderness, skin normal.  Hematologic / Lymphatic: No cervical lymphadenopathy and no supraclavicular lymphadenopathy.  Lungs: No increased work of breathing, good air exchange, clear to auscultation bilaterally, no crackles or wheezing.  Cardiovascular: Regular rate and rhythm and no murmur noted.  Abdomen: Normal bowel sounds, soft, non-distended, non-tender, no masses palpated.  Musculoskeletal: No redness, warmth, or swelling of the joints.    Neurologic: Awake, alert, oriented to name, place and time.  Cranial nerves II-XII are grossly intact.  The patient is able to lift her left leg against gravity and move her toes around she also was able to grab my hand with her left hand but without normal strength.  She has full strength on the right.  Neuropsychiatric: No active hallucinations while I was evaluating her.  Appears pleasantly interactive.  Skin: No rashes, erythema, pallor, petechia or purpura.          Data:     Results for orders placed or performed during the hospital encounter of 06/17/23 (from the past 24 hour(s))   UA with Microscopic reflex to Culture    Specimen: Urine, Catheter   Result Value Ref Range    Color Urine Light Yellow Colorless, Straw, Light Yellow, Yellow    Appearance Urine Slightly Cloudy (A) Clear    Glucose Urine Negative Negative mg/dL    Bilirubin Urine Negative Negative    Ketones Urine Negative Negative mg/dL    Specific Gravity Urine 1.020 1.003 - 1.035    Blood Urine Moderate (A) Negative    pH Urine 6.5 5.0 - 7.0    Protein Albumin Urine 30 (A) Negative mg/dL    Urobilinogen Urine Normal Normal, 2.0 mg/dL    Nitrite Urine Negative Negative    Leukocyte Esterase Urine Large (A) Negative    Bacteria Urine Few (A) None Seen /HPF    Mucus Urine Present (A) None Seen /LPF    RBC Urine 94 (H) <=2 /HPF    WBC Urine 104 (H) <=5 /HPF    Squamous Epithelials Urine <1 <=1 /HPF    Narrative    Urine Culture ordered based on laboratory criteria   Extra Tube (Mobile  Draw)    Narrative    The following orders were created for panel order Extra Tube ([place] Draw).  Procedure                               Abnormality         Status                     ---------                               -----------         ------                     Extra Blue Top Tube[[id number]]                              Final result               Extra Red Top Tube[[id number]]                               Final result               Extra Green Top (Lithium...[[id number]]                      Final result               Extra Purple Top Tube[[id number]]                            Final result                 Please view results for these tests on the individual orders.   Extra Blue Top Tube   Result Value Ref Range    Hold Specimen JIC    Extra Red Top Tube   Result Value Ref Range    Hold Specimen JIC    Extra Green Top (Lithium Heparin) Tube   Result Value Ref Range    Hold Specimen JIC    Extra Purple Top Tube   Result Value Ref Range    Hold Specimen JIC    CBC with platelets differential    Narrative    The following orders were created for panel order CBC with platelets differential.  Procedure                               Abnormality         Status                     ---------                               -----------         ------                     CBC with platelets and d...[[id number]]  Abnormal            Final result                 Please view results for these tests on the individual orders.   Basic metabolic panel   Result Value Ref Range    Sodium 143 136 - 145 mmol/L    Potassium 4.0 3.4 - 5.3 mmol/L    Chloride 107 98 - 107 mmol/L    Carbon Dioxide (CO2) 23 22 - 29 mmol/L    Anion Gap 13 7 - 15 mmol/L    Urea Nitrogen 22.1 8.0 - 23.0 mg/dL    Creatinine 0.76 0.51 - 0.95 mg/dL    Calcium 9.1 8.2 - 9.6 mg/dL    Glucose 142 (H) 70 - 99 mg/dL    GFR Estimate 70 >60 mL/min/1.73m2   CBC with platelets and differential   Result Value Ref Range    WBC Count 10.4 4.0 - 11.0 10e3/uL    RBC Count  3.63 (L) 3.80 - 5.20 10e6/uL    Hemoglobin 12.0 11.7 - 15.7 g/dL    Hematocrit 37.0 35.0 - 47.0 %     (H) 78 - 100 fL    MCH 33.1 (H) 26.5 - 33.0 pg    MCHC 32.4 31.5 - 36.5 g/dL    RDW 14.1 10.0 - 15.0 %    Platelet Count 209 150 - 450 10e3/uL    % Neutrophils 74 %    % Lymphocytes 17 %    % Monocytes 7 %    % Eosinophils 1 %    % Basophils 1 %    % Immature Granulocytes 0 %    NRBCs per 100 WBC 0 <1 /100    Absolute Neutrophils 7.7 1.6 - 8.3 10e3/uL    Absolute Lymphocytes 1.8 0.8 - 5.3 10e3/uL    Absolute Monocytes 0.7 0.0 - 1.3 10e3/uL    Absolute Eosinophils 0.1 0.0 - 0.7 10e3/uL    Absolute Basophils 0.1 0.0 - 0.2 10e3/uL    Absolute Immature Granulocytes 0.0 <=0.4 10e3/uL    Absolute NRBCs 0.0 10e3/uL        Attestation:  I have reviewed today's vital signs, notes, medications, labs and imaging.     Yifan Jama MD

## 2023-06-18 NOTE — ED PROVIDER NOTES
History     Chief Complaint:  UTI Concern     The history is provided by a relative (daughter). History limited by: dementia.      Alejandrina Whatley is a 89 year old female with history of recurrent UTIs and CVA with left-sided hemiparesis who presents with her daughter from [facility] with concern for UTI.  She lives in a nursing home but her daughter has a camera to monitor her.  Starting today she has been hallucinating and scared, calling out for help and telling her daughter she is seeing monsters.  Her daughter tells me every time this happens it is because Alejandrina Noble has a UTI which ultimately prompted her visit today.  The patient denies any pain and no other symptoms are reported by her daughter.    Notably, her last UTI requiring admission was in July 2022 but her daughter tells me she had 3 UTIs in the fall.    Independent Historian: See above HPI for details.    Review of External Notes: Geriatrics nursing home visit 5/23/2023.  Paperwork provided from her care facility.    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  Lumbar spinal stenosis  Osteoporosis  PXF  Urinary incontinence  Hemiparesis  Recurrent UTI  Insomnia  CED  CKD stage 3  Pseudoexfoliation glaucoma  Vitamin D deficiency  Senile osteoporosis  Corneal guttata  Depression    Past Surgical History:    AAA repair  Cataract extraction with lens implant x2  Closed reduction, percutaneous pinning hip  Hysterectomy  Trabeculoplasty x2  Appendectomy  Anterior vesicourethropexy  Colonoscopy  Cystoscopy, transvaginal tape, colpocleisis  IR vertebroplasty    Physical Exam     Patient Vitals for the past 24 hrs:   BP Temp Temp src Pulse Resp SpO2   06/17/23 2125 137/52 -- -- -- -- 93 %    06/17/23 2035 (!) 169/84 -- -- -- -- 94 %   06/17/23 2005 (!) 160/83 -- -- -- -- 95 %   06/17/23 1925 -- 98.4  F (36.9  C) Temporal -- -- --   06/17/23 1918 (!) 190/88 -- -- 72 18 95 %      Physical Exam  General: Well-developed and well-nourished. Well appearing elderly  woman. Cooperative.  Head:  Atraumatic.  Eyes:  Conjunctivae, lids, and sclerae are normal.  ENT:    Normal nose. Moist mucous membranes.  Neck:  Supple. Normal range of motion.  CV:  Regular rate and rhythm. Normal heart sounds with no murmurs, rubs, or gallops detected.  Resp:  No respiratory distress. Clear to auscultation bilaterally without decreased breath sounds, wheezing, rales, or rhonchi.  GI:  Soft. Non-distended.  Mild suprapubic tenderness.    MS:  Normal ROM.   Skin:  Warm. Non-diaphoretic. No pallor.  Neuro:  Awake and alert.  Left-sided weakness.  Psych: Normal mood and affect. Normal speech.  Vitals reviewed.    Emergency Department Course     Laboratory:  Labs Ordered and Resulted from Time of ED Arrival to Time of ED Departure   ROUTINE UA WITH MICROSCOPIC REFLEX TO CULTURE - Abnormal       Result Value    Color Urine Light Yellow      Appearance Urine Slightly Cloudy (*)     Glucose Urine Negative      Bilirubin Urine Negative      Ketones Urine Negative      Specific Gravity Urine 1.020      Blood Urine Moderate (*)     pH Urine 6.5      Protein Albumin Urine 30 (*)     Urobilinogen Urine Normal      Nitrite Urine Negative      Leukocyte Esterase Urine Large (*)     Bacteria Urine Few (*)     Mucus Urine Present (*)     RBC Urine 94 (*)     WBC Urine 104 (*)     Squamous Epithelials Urine <1     BASIC METABOLIC PANEL - Abnormal    Sodium 143      Potassium 4.0      Chloride 107      Carbon Dioxide (CO2) 23      Anion Gap 13      Urea Nitrogen 22.1      Creatinine 0.76      Calcium 9.1      Glucose 142 (*)     GFR Estimate 70     CBC WITH PLATELETS AND DIFFERENTIAL - Abnormal    WBC Count 10.4      RBC Count 3.63 (*)   "   Hemoglobin 12.0      Hematocrit 37.0       (*)     MCH 33.1 (*)     MCHC 32.4      RDW 14.1      Platelet Count 209      % Neutrophils 74      % Lymphocytes 17      % Monocytes 7      % Eosinophils 1      % Basophils 1      % Immature Granulocytes 0      NRBCs per 100 WBC 0      Absolute Neutrophils 7.7      Absolute Lymphocytes 1.8      Absolute Monocytes 0.7      Absolute Eosinophils 0.1      Absolute Basophils 0.1      Absolute Immature Granulocytes 0.0      Absolute NRBCs 0.0     URINE CULTURE        Emergency Department Course & Assessments:  Interventions:  cefTRIAXone (ROCEPHIN) 2 g vial to attach to  ml bag for ADULTS or NS 50 ml bag for PEDS (0 g Intravenous Stopped 6/17/23 2210)      Assessments:  1925 I obtained history and examined the patient as noted above.  2115 I rechecked the patient and explained findings to her and her daughter.  Her daughter prefers admission as the patient does not get checked on overnight in her nursing home care facility for 12 hours.  Patient reluctantly agrees.    Independent Interpretation (X-rays, CTs, rhythm strip):  Not applicable.    Consultations/Discussion of Management or Tests:  2203 I spoke with Dr. Jama, hospitalist, who accepts admission.     Social Determinants of Health affecting care:   Supportive daughter. She also has 2 sons but describes her daughter as \"my robert.\"  Lives in a care facility.  Dementia    Disposition:  The patient was admitted to the hospital under the care of Dr. Jama.     Impression & Plan    Medical Decision Making:  Alejandrina Noble is an 89-year-old female whose daughter noticed she was hallucinating and encephalopathic today similar to when Alejandrina Noble has had urinary tract infections in the past.  No other concerns are reported by the patient or her daughter.  She appears well on exam with mild suprapubic tenderness.    Fortunately, there is no kidney injury or electrolyte derangement.  Similarly, there is no anemia or " leukocytosis.  However, urinalysis is indeed consistent with urinary tract infection with 104 white blood cells per high-powered field and few bacteria.  Urine culture was sent and she was empirically treated with Rocephin.  This is appropriate based on most recent urine culture from January with Klebsiella.    She required no further interventions while under my care.  However, particularly given her encephalopathy/hallucinations with advanced age and because her daughter tells me she does not have anyone to check on her overnight at her care facility, she was offered observation admission and accepts.  All of her and her daughter's questions were answered.  I discussed the patient's case with Dr. Jama, hospitalist, who accepts admission and has no further orders.    Diagnosis:    ICD-10-CM    1. Acute cystitis without hematuria  N30.00       2. Recurrent UTI  N39.0       3. Encephalopathy  G93.40          Scribe Disclosure:  I, Mo Cruzpk, am serving as a scribe at 7:27 PM on 6/17/2023 to document services personally performed by Genevieve Jordan MD based on my observations and the provider's statements to me.       Genevieve Jordan MD  06/17/23 8422

## 2023-06-18 NOTE — PROGRESS NOTES
ROOM # 223    Living Situation (if not independent, order SW consult):  Facility name: [facility]     Activity level at baseline: Bed bound  Activity level on admit: Bed bound     Who will be transporting you at discharge: EMS    Patient registered to observation; given Patient Bill of Rights; given the opportunity to ask questions about observation status and their plan of care.  Patient has been oriented to the observation room, bathroom and call light is in place.    Discussed discharge goals and expectations with patient/family.

## 2023-06-18 NOTE — PHARMACY-ADMISSION MEDICATION HISTORY
Pharmacist Admission Medication History    Admission medication history is complete. The information provided in this note is only as accurate as the sources available at the time of the update.    Medication reconciliation/reorder completed by provider prior to medication history? Yes    Information Source(s): Facility ([facility]/) medication list/MAR via N/A    Pertinent Information: None    Changes made to PTA medication list:    Added: Robitussin prn    Deleted: None    Changed: None     Allergies reviewed with patient and updates made in EHR: unable to assess    Medication History Completed By: LYN LAI [facility] 6/17/2023 10:26 PM    Prior to Admission medications    Medication Sig Last Dose Taking? Auth Provider Long Term End Date   acetaminophen (TYLENOL) 500 MG tablet Take 1,000 mg by mouth 2 times daily 6/17/2023 at 1842 Yes Reported, Patient No    aspirin (ASA) 81 MG chewable tablet Take 81 mg by mouth daily 6/17/2023 at 0837 Yes Unknown, Entered By History     atorvastatin (LIPITOR) 40 MG tablet Take 40 mg by mouth every evening 6/17/2023 at 1842 Yes Reported, Patient Yes    carvedilol (COREG) 6.25 MG tablet Take 6.25 mg by mouth 2 times daily (with meals) 6/17/2023 at 1842 Yes Reported, Patient Yes    Cholecalciferol (VITAMIN D3) 50 MCG (2000 UT) TABS Take 2,000 Units by mouth daily 6/17/2023 at 0837 Yes Unknown, Entered By History     dorzolamide-timolol (COSOPT) 2-0.5 % ophthalmic solution Place 1 drop into both eyes 2 times daily 6/17/2023 at 1842 Yes Prakash Gant MD     escitalopram (LEXAPRO) 10 MG tablet Take 10 mg by mouth daily 6/17/2023 at 0837 Yes Reported, Patient No    guaiFENesin (ROBITUSSIN) 20 mg/mL liquid Take 200 mg by mouth every 4 hours as needed for cough 6/9/2023 at 2254 Yes Unknown, Entered By History     latanoprost (XALATAN) 0.005 % ophthalmic solution Place 1 drop into both eyes daily 6/17/2023 at 1235 Yes Reported, Patient Yes    melatonin 3 MG tablet Take 3 mg by mouth  nightly as needed for sleep More than a month at 2246 Yes Unknown, Entered By History     menthol-zinc oxide (CALMOSEPTINE) 0.44-20.6 % OINT ointment Apply topically as needed More than a month at PRN Yes Unknown, Entered By History     ondansetron (ZOFRAN ODT) 4 MG ODT tab Take 1 tablet (4 mg) by mouth every 6 hours as needed for nausea or vomiting 6/7/2023 at 0753 Yes Jerome Martin APRN CNP     polyethylene glycol (MIRALAX) 17 g packet Take 1 packet by mouth daily  6/17/2023 at 0837 Yes Reported, Patient     traMADol (ULTRAM) 50 MG tablet Take 0.5 tablets (25 mg) by mouth daily At 1900 6/17/2023 at 1842 Yes Sara Britt PA-C

## 2023-06-18 NOTE — PLAN OF CARE
PRIMARY DIAGNOSIS: GENERALIZED WEAKNESS    OUTPATIENT/OBSERVATION GOALS TO BE MET BEFORE DISCHARGE  1. Orthostatic performed: N/A    2. Tolerating PO medications: Yes    3. Return to near baseline physical activity: Yes    4. Cleared for discharge by consultants (if involved): No    Discharge Planner Nurse   Safe discharge environment identified: Yes  Barriers to discharge: Yes       Entered by: Kaykay Benavides RN 06/18/2023 5:41 PM     Pt A/O to self only. Daughter at the bedside. Patient resting most of this shift. VSS but hypertensive. Scheduled tramadol and tylenol. Not OOB. Turn and repo. incontinent, not tolerating pure wick. Tolerating PO. Needs to be fed. Urine culture pending. Possible discharge tomorrow. PSC at the bedside.

## 2023-06-18 NOTE — ED TRIAGE NOTES
Pt arrives via EMS with UTI concerns. Per daughter pt has been having hallucinations since this morning. Per daughter pt has hallucinations whenever she has UTIs. Pt had 3 UTIs last year. Pt comes from memory care in [place], hx of dementia and left sided weakness related to a stroke 3 years ago. ABCs intact and Alert to person.     Triage Assessment     Row Name 06/17/23 7949       Triage Assessment (Adult)    Airway WDL WDL       Respiratory WDL    Respiratory WDL WDL       Cardiac WDL    Cardiac WDL X       Chest Pain Assessment    Associated Signs/Symptoms hypertension              
Adult

## 2023-06-18 NOTE — PLAN OF CARE
PRIMARY DIAGNOSIS: GENERALIZED WEAKNESS    OUTPATIENT/OBSERVATION GOALS TO BE MET BEFORE DISCHARGE  1. Orthostatic performed: N/A    2. Tolerating PO medications: Yes    3. Return to near baseline physical activity: Yes    4. Cleared for discharge by consultants (if involved): No    Discharge Planner Nurse   Safe discharge environment identified: Yes  Barriers to discharge: Yes       Entered by: Kaykay Benavides RN 06/18/2023 10.00 AM     Pt A/O to self only. Daughter at the bedside.

## 2023-06-18 NOTE — PLAN OF CARE
Admit Date: 6/17/2023  Admitting Diagnosis: Encephalopathy r/t UTI  Pertinent History: recurrent UTI's, CVA w/ Left-sided hemiparesis, dementia, HTN, CKD stage 3, HLD,     Isolation Precautions:   none.    Neuro: Alert to and Self  Activity: Ax-1/2 Lift. Pt is not ambulatory per pts daughter.  Telemetry Monitoring: No  Pain: denying pain.  GI: bowel sounds audible  : incontinent.   LDA's: Peripheral  Fluids: is Saline locked.  Diet: Regular  Living Situation: AV memory care  Discharge Disposition: TBD  Plan of Care:  Abx: Rocephin    Misc:   sitter at bedside.   Pt refused oral medication.  Pt increasingly became uncooperative, frustrated, agitated.  Haldol given. Medication was effective and calmed the pt allowing her to sleep.

## 2023-06-19 NOTE — PROGRESS NOTES
TRANSITIONS OF CARE (IRENE) LOG   IRENE tasks should be completed by the CC within one (1) business day of notification of each transition. Follow up contact with member is required after return to their usual care setting.  Note:  If CC finds out about the transitions fifteen (15) days or more after the member has returned to their usual care setting, no IRENE log is needed. However, the CC should check in with the member to discuss the transition process, any changes needed to the care plan and document it in a case note.    Member Name:  Alejandrina Whatley [facility] Member ID[facility]  [id number]   Product: [facility] Care Coordinator Contact:  Alba Haines RN, PHN Agency/County/Care System: [facility]   Transition Communication Actions from Care Management Contact   Transition #1   Notification Date: 06/19/23 Transition Date:   06/17/23 Transition From: Nursing Home, [facility]     Is this the member s usual care setting?               yes Transition To: [facility], [facility]   Transition Type:  Unplanned  Reason for Admission/Comments:  Suspected UTI. Hallucinations.   Contact member/responsible party to offer assistance with transition Date completed: 06/19/23    Notes from conversation with the member/responsible party, provider, discharging and receiving facility (as applicable):   Date 06/19/23:CC contacted Hospital /discharge planner via the [facility] Transitional Care Hand-In Process, with community care plan included.  CC reached out to this care coordinator, adult daughter Pat Zamora and Jigna Su [facility]  regarding transition and offered support as needed.  Reviewed and update care plan as needed.  Notified community service providers and placed services None on hold as needed.  Transition log initiated.   PCP, Sara Britt May, notified of hospitalization via EMR.    Alba Haines RN,  N  Intuitional Care Coordinator [facility]  Cell [phone] Fax [phone]         Shared CC contact info, care plan/services with receiving setting--Date completed: 06/19/23   Name & Title of receiving setting contact: [facility]   Notified PCP of transition--Date completed:  06/17/23     via  EMR  Name of PCP: Sara Britt     Transition #2   Notification Date: 06/19/23 Transition Date:   06/19/23 Transition From: Hospital, [facility]     Is this the member s usual care setting?               no Transition To: Nursing Home, [facility] SNF   Transition Type:  Planned  Reason for Admission/Comments:  Return post observation staf. PMD UTI with Hallucinations  Contact member/responsible party to offer assistance with transition Date completed: 06/19/23    Notes from conversation with the member/responsible party, provider, discharging and receiving facility (as applicable):   Date 06/19/23:CC contacted this care coordinator and adult daughter Pat  and reviewed discharge summary.  Member has a follow-up appointment with PCP in 7 days: Yes: scheduled on week of 06/19/23 when NP in building    Member has had a change in condition: Yes: Increased hallucinations due to UTI. Now on ABX to treat.   Home visit needed: No  Care plan reviewed and updated.  The following home based services  None were resumed.  New referrals placed: No  Transition log completed.   PCP, Sara Britt, notified of transition back to home via EMR.    Alba Haines RN, N  Intuitional Care Coordinator [facility]  Cell [phone] Fax [phone]         Shared CC contact info, care plan/services with receiving setting--Date completed: 06/19/23   Name & Title of receiving setting contact: [facility]   Notified PCP of transition--Date completed:  06/17/23     via  EMR  Name of PCP: Sara Britt      *RETURN  TO USUAL CARE SETTING: *Complete tasks below when the member is discharging TO their usual care setting within one (1) business day of notification..      For situations where the Care Coordinator is notified of the discharge prior to the date of discharge, the Care Coordinator must follow up with the member or designated representative to confirm that discharge actually occurred and discuss required IRENE tasks as outlined in the IRENE Instructions.  (This includes situations where it may be a  new  usual care setting for the member. (i.e., a community member who decides upon permanent nursing home placement following hospitalization and rehab).    Discuss with Member/Responsible Party:    Check  Yes  - if the member, family member and/or SNF/facility staff manages the following:    If  No  provide explanation in the comments section.          Date completed: 06/19/23 Communicated with member or their designated representative about the following:  care transition process; about changes to the member s health status; plan of care updates; education about transitions and how to prevent unplanned transitions/readmissions    Four Pillars for Optimal Transition:    Check  Yes  - if the member, family member and/or SNF/facility staff manages the following:    If  No  provide explanation in the comments section.          [x]  Yes     []  No Does the member have a follow-up appointment scheduled with primary care or specialist? (Mental health hospitalizations--the appt. should be w/in 7 days)              For mental health hospitalizations:  []  Yes     []  No     Does the member have a follow-up appointment scheduled with a mental health practitioner within 7 days of discharge?  [x]  Yes     []  No     Has a medication review been completed with member? If no, refer to PCP, home care nurse, MTM, pharmacist  [x]  Yes     []  No     Can the member manage their medications or is there a system in place to manage medications  (e.g. home care set-up)?         [x]  Yes     []  No     Can the member verbalize warning signs and symptoms to watch for and how to respond?  [x]  Yes     []  No     Does the member have a copy of and understand their discharge instructions?  If no, assist to obtain copy of discharge instructions, review discharge instructions, and assist to contact PCP to discuss questions about their recent hospitalization.  [x]  Yes     []  No     Does the member have adequate food, housing and transportation?  If no, add goal and discuss additional supports available to the member                                                                                                                                                                                 [x]  Yes     []  No     Is the member safe in their home?  If no, document needs and support provided                                                                                                                                                                          []  Yes     [x]  No     Are there any concerns of vulnerability, abuse, or neglect?  If yes, document concerns and actions taken by Care Coordinator as a mandated                                                                                                                                                                              [x]  Yes     []  No     Does the member use a Personal Health Care Record?  Check  Yes  if visit summary, discharge summary, and/or healthcare summary are being used as a PHR.                                                                                                                                                                                  [x]  Yes     []  No     Have you reviewed the discharge summary with the member? If  No  provide explanation in comments.  [x]  Yes     []  No     Have you updated the member s care plan/support plan? Add new diagnosis,  medications, treatments, goals & interventions, as applicable. If No, provide explanation in comments.    Comments:  Member having increased anxiety issues and hallucinations related to UTI. Now on antibiotic and will be monitored by facility staff for next few days for this.         Notes from conversation with the member/responsible party, provider, discharging and receiving facility (as applicable): Daughter has camera in place and will monitor member along with facility staff .     Alba Haines RN, PHN  Intuitional Care Coordinator [facility]  Cell [phone] Fax [phone]

## 2023-06-19 NOTE — CONSULTS
Care Management Discharge Note    Discharge Date: 06/19/2023     Discharge Disposition: Return to [facility]    Discharge Services:      Discharge DME:      Discharge Transportation:  [facility] stretcher    Education Provided on the Discharge Plan:  yes  Persons Notified of Discharge Plans: pt's dtr Pat, nursing, PA, Rosario in admissions at [facility]  Patient/Family in Agreement with the Plan:  yes    Handoff Referral Completed: No    Additional Information:  Pt admitted from [facility]. Pt is medically stable to discharge back there today.     [facility] stretcher ride arranged for today between 12:10-12:55. PCS completed.     Updated pt's dtr Pat who is in agreement with plan. Updated Rosario in admissions at [facility] on ride time.     Orders reviewed and faxed.    RYAN Hanks, W  Care Coordination  [phone]    RYAN Ramos

## 2023-06-19 NOTE — PLAN OF CARE
PRIMARY DIAGNOSIS: Mild Encephalopathy/UTI  OUTPATIENT/OBSERVATION GOALS TO BE MET BEFORE DISCHARGE:  1. ADLs back to baseline: Yes    2. Activity and level of assistance: Total care    3. Pain status: Pain free.    4. Return to near baseline physical activity: Yes     Discharge Planner Nurse   Safe discharge environment identified: Yes  Barriers to discharge: Yes       Entered by: Olga Marroquin RN 06/19/2023 4:53 AM     Please review provider order for any additional goals.   Nurse to notify provider when observation goals have been met and patient is ready for discharge.    No sitter from 11:00 pm, pt asleep in between cares.

## 2023-06-19 NOTE — DISCHARGE SUMMARY
[facility]  Hospitalist Discharge Summary      Date of Admission:  6/17/2023  Date of Discharge:  6/19/2023 12:50 PM  Discharging Provider: Arline Thornton PA-C  Discharge Service: Hospitalist Service    Discharge Diagnoses   Visual Hallucinations  Dementia   Asymptomatic Bacteruria vs UTI     Follow-ups Needed After Discharge   Follow-up Appointments     Follow-up and recommended labs and tests       Follow up with primary care provider, Sara Britt, within 7-14   days for hospital follow- up.             Unresulted Labs Ordered in the Past 30 Days of this Admission     No orders found from 5/18/2023 to 6/18/2023.      These results will be followed up by UCx sensitivities, will follow up final.     Discharge Disposition   Discharged back to LTC.   Condition at discharge: Stable    Hospital Course   Alejandrina Whatley is a 89 year old woman with hx of HTN, dementia, stroke w/ residual left-sided weakness, and frequent UTIs presents from her facility with hallucinations. In the ED, VSS. WBC 10.4. UA with large LE, 104 WBCs, few bacteria. Hallucinations thought to be 2/2 UTI on admission. Resolved with antibiotic treatment.  Remained coherent with no behavior disturbances returning to long-term care today on course of p.o. antibiotics to complete treatment.  History of recurrent UTIs to be hygiene related but has been historically intolerant to topical estrogen creams per allergy history reviewed.    Problem List:     Acute Metabolic Encephalopathy vs Dementia w/ Behavior Disturbance  -Unclear if event truly due to UTI or just dementia w/ behavioral disturbance  -On SSRI prior to admission but no antipsychotics for behavior disturbances  -Mental status back to baseline  -Tx for UTI per below       Klebsiella UTI vs Asymptomatic Bacteruria  Culture growing Klebsiella.  Treated while hospitalized with Rocephin, complete course of treatment with cephalosporin. Favor asymptomatic bacteruria  but unclear given improved treatment with antibiotics. Will follow susceptibilities.      Essential HTN  -Home Coreg     Stroke w/ Residual Left-sided Weakness  -Home ASA + Statin     Consultations This Hospital Stay   CARE MANAGEMENT / SOCIAL WORK IP CONSULT    Code Status   No CPR- Do NOT Intubate    Time Spent on this Encounter   Arline UPTON PA-C, personally saw the patient today and spent greater than 30 minutes discharging this patient.       Arline Thornton PA-C  ______________________________________________________________________    Interval History  Patient seen at bedside along with daughter. Patient back to baseline mental status. Afebrile. No abdominal pain, nausea, vomiting.     Physical Exam   Vital Signs: Temp: 98.5  F (36.9  C) Temp src: Oral BP: 112/63 Pulse: 71   Resp: 18 SpO2: 93 % O2 Device: None (Room air)    Weight: 138 lbs 10.71 oz    Constitutional: Awakens to voice.   Respiratory:  Normal work of breathing. Lungs clear to auscultation bilaterally, no crackles or wheezing.  Cardiovascular: Regular rate and rhythm, normal S1 and S2, and no murmur appreciated.   GI: Bowel sounds present. soft, non-distended, non-tender.   Skin/Integument: Warm, dry. no peripheral edema.   Neuro: Oriented to self. Follows commands. Disoriented to time, place, situation.  Chronic diminished strength on the left upper extremity compared to right due to CVA.    Psych: No current hallucinations or delusions.         Primary Care Physician   Sara Britt    Discharge Orders      Reason for your hospital stay    Hallucinations - UTI     Follow-up and recommended labs and tests     Follow up with primary care provider, Sara Britt, within 7-14 days for hospital follow- up.     Activity    Your activity upon discharge: activity as tolerated     When to contact your care team    Call your primary care doctor if you have any of the following: temperature greater than 101 F, worsening shortness of  breath, increased swelling, worsening pain, new or unrelenting diarrhea, or any other concerning symptoms. Call 911 or go to the emergency room if you need immediate assistance.     Discharge Instructions    Checks q 4 to ensure brief is clean and dry. Proper tanya cares - wiping front to back after toileting.     General info for SNF    Length of Stay Estimate: Long Term Care  Condition at Discharge: Stable  Level of care:skilled   Rehabilitation Potential: Fair  Admission H&P remains valid and up-to-date: Yes  Recent Chemotherapy: N/A  Use Nursing Home Standing Orders: Yes     Mantoux instructions    Give two-step Mantoux (PPD) Per Facility Policy Yes     Intake and output    Every shift     Fall precautions     Diet    Follow this diet upon discharge: Orders Placed This Encounter      Regular Diet Adult     Diet    Follow this diet upon discharge: Orders Placed This Encounter      Regular Diet Adult      Diet       Significant Results and Procedures   Results for orders placed or performed during the hospital encounter of 12/05/22   CT Chest Pulmonary Embolism w Contrast    Narrative    EXAM: CT CHEST PULMONARY EMBOLISM W CONTRAST  LOCATION: [facility]  DATE/TIME: 12/5/2022 9:55 PM    INDICATION: Chest pain, SOB, cough and elevated D dimer  COMPARISON: CT abdomen pelvis 11/18/2022  TECHNIQUE: CT chest pulmonary angiogram during arterial phase injection of IV contrast. Multiplanar reformats and MIP reconstructions were performed. Dose reduction techniques were used.   CONTRAST: 52mL Isovue 370    FINDINGS:  ANGIOGRAM CHEST: No pulmonary embolism. Pulmonary arteries normal in caliber. Thoracic aorta normal in caliber. No aortic dissection. Tortuous descending thoracic aorta.    HEART: Cardiac chambers within normal limits. No pericardial effusion. Severe coronary artery calcification.    MEDIASTINUM: No adenopathy or mass.    LUNGS AND PLEURA: Extensive patchy air trapping. Diffuse bronchial  wall thickening. Severe mainstem bronchial narrowing, presumably during expiration. No pulmonary mass, consolidation, or definite suspicious pulmonary nodule. Lungs partially obscured by   motion. No pleural effusion or pneumothorax.    LIMITED UPPER ABDOMEN: Please see report for recent CT abdomen pelvis from 11/18/2022 showing a staghorn calculus in the left kidney and moderate hydronephrosis. There are multiple nonobstructing stones in the right kidney.    MUSCULOSKELETAL: Diffuse osteopenia. Prior vertebroplasty at T6 and T7. Severe vertebral compression deformity at T10-T12.      Impression    IMPRESSION:  1.  No evidence for pulmonary embolism.   2.  Moderate bronchitis/bronchiolitis.  3.  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       Discharge Medications   Discharge Medication List as of 6/19/2023 12:25 PM      START taking these medications    Details   cephALEXin (KEFLEX) 250 MG capsule Take 1 capsule (250 mg) by mouth every 6 hours for 5 days, Disp-20 capsule, R-0, E-Prescribe         CONTINUE these medications which have NOT CHANGED    Details   acetaminophen (TYLENOL) 500 MG tablet Take 1,000 mg by mouth 2 times daily, Historical      aspirin (ASA) 81 MG chewable tablet Take 81 mg by mouth daily, Historical      atorvastatin (LIPITOR) 40 MG tablet Take 40 mg by mouth every evening, Historical      carvedilol (COREG) 6.25 MG tablet Take 6.25 mg by mouth 2 times daily (with meals), Historical      Cholecalciferol (VITAMIN D3) 50 MCG (2000 UT) TABS Take 2,000 Units by mouth daily, Historical      dorzolamide-timolol (COSOPT) 2-0.5 % ophthalmic solution Place 1 drop into both eyes 2 times daily, Disp-1 Bottle, R-11, Local PrintMay be substituted for Timolol and Dorzolamide separately if needed due to long term backorder of Cosopt.      escitalopram (LEXAPRO) 10 MG tablet Take 10 mg by mouth daily, Historical      guaiFENesin (ROBITUSSIN) 20  mg/mL liquid Take 200 mg by mouth every 4 hours as needed for cough, Historical      latanoprost (XALATAN) 0.005 % ophthalmic solution Place 1 drop into both eyes daily, Historical      melatonin 3 MG tablet Take 3 mg by mouth nightly as needed for sleep, Historical      menthol-zinc oxide (CALMOSEPTINE) 0.44-20.6 % OINT ointment Apply topically as neededHistorical      ondansetron (ZOFRAN ODT) 4 MG ODT tab Take 1 tablet (4 mg) by mouth every 6 hours as needed for nausea or vomiting, No Print Out      polyethylene glycol (MIRALAX) 17 g packet Take 1 packet by mouth every other day Odd days, Historical      traMADol (ULTRAM) 50 MG tablet Take 0.5 tablets (25 mg) by mouth daily At 1900, Disp-45 tablet, R-0, E-Prescribe           Allergies   Allergies   Allergen Reactions     Actonel [Bisphosphonates] Rash     Conjugated Estrogens Rash     Estrogens Conjugated Rash     Macrobid [Nitrofuran Derivatives] Rash     Nitrofurantoin Rash     Sulfa Antibiotics Rash     Hydrocodone Nausea and Vomiting     Oxycodone Nausea and Vomiting     Risedronate      leg pain

## 2023-06-19 NOTE — PLAN OF CARE
"PRIMARY DIAGNOSIS: Mild Encephalopathy/UTI  OUTPATIENT/OBSERVATION GOALS TO BE MET BEFORE DISCHARGE:  1. ADLs back to baseline: Yes. Bed bound at baseline     2. Activity and level of assistance: Total care    3. Pain status: Pain free.    4. Return to near baseline physical activity: Yes     Discharge Planner Nurse   Safe discharge environment identified: Yes  Barriers to discharge: Yes     A&Ox4, not oriented to situation and time. Drowsy, pleasant, and cooperative. IV abx q24h for UTI. Will likely transition to oral abx and return back to [facility]. Daughter at bedside.    BP (!) 156/74 (BP Location: Right arm)   Pulse 70   Temp 98.1  F (36.7  C) (Oral)   Resp 18   Ht 1.549 m (5' 1\")   Wt 62.9 kg (138 lb 10.7 oz)   SpO2 95%   BMI 26.20 kg/m          "

## 2023-06-19 NOTE — PROGRESS NOTES
"Patient's After Visit Summary was reviewed with patient and daughter.  Patient verbalized understanding of After Visit Summary, recommended follow up and was given an opportunity to ask questions.   Discharge medications sent home with patient/family: Not applicable   Discharged with other:EMS. Back to [facility] via stretcher.     /63 (BP Location: Right arm)   Pulse 71   Temp 98.5  F (36.9  C) (Oral)   Resp 18   Ht 1.549 m (5' 1\")   Wt 62.9 kg (138 lb 10.7 oz)   SpO2 93%   BMI 26.20 kg/m          "

## 2023-06-19 NOTE — PLAN OF CARE
PRIMARY DIAGNOSIS: Mild Encephalopathy/UTI  OUTPATIENT/OBSERVATION GOALS TO BE MET BEFORE DISCHARGE:  1. ADLs back to baseline: Yes    2. Activity and level of assistance: Total care    3. Pain status: Pain free.    4. Return to near baseline physical activity: Yes     Discharge Planner Nurse   Safe discharge environment identified: Yes  Barriers to discharge: Yes       Entered by: Olga Marroquin RN 06/19/2023 4:51 AM     Please review provider order for any additional goals.   Nurse to notify provider when observation goals have been met and patient is ready for discharge.    Sitter at bedside. No challenging behavior noted. PT calm and cooperative.

## 2023-06-19 NOTE — PLAN OF CARE
PRIMARY DIAGNOSIS: Mild Encephalopathy/UTI  OUTPATIENT/OBSERVATION GOALS TO BE MET BEFORE DISCHARGE:  1. ADLs back to baseline: Yes    2. Activity and level of assistance: Total care    3. Pain status: Pain free.    4. Return to near baseline physical activity: Yes     Discharge Planner Nurse   Safe discharge environment identified: Yes  Barriers to discharge: Yes       Entered by: Olga Marroquin RN 06/19/2023 4:54 AM     Please review provider order for any additional goals.   Nurse to notify provider when observation goals have been met and patient is ready for discharge.    Pt asleep in between cares. No hallucinations noted. Plan SW consult

## 2023-06-21 NOTE — LETTER
6/21/2023        RE: Alejandrina Whatley  [address] [facility] [place] [address]        [facility] GERIATRICS    PRIMARY CARE PROVIDER AND CLINIC:  Sara Britt PA-C, [address] / [place] [address]  Chief Complaint   Patient presents with     Hospital F/U      [facility] Medical Record Number:  [id number]  Place of Service where encounter took place:  [facility] ([facility] ([facility]) [[id number]]    Alejandrina Whatley  is a 89 year old  (8/22/1923), returned to the above facility from  [facility]. Hospital stay 6/17/23 - 6/19/23. .   HPI:      Notes from hospitalization reviewed including H&P and D/c summary    Alejandrina Whatley 89-year-old female with past medical history previous CVA, depression, recurrent UTIs and dementia who was recently hospitalized at [facility] for hallucinations/metabolic encephalopathy thought to be secondary to Klebsiella UTI.    Does have history of recurrent UTIs, approximately 3-4/year.  Occasionally, these are accompanied by hallucinations.  On this particular occasion developed acute change in mental status on Saturday 6/17.  Daughter ultimately elected to call EMS to have her evaluated.  Urine analysis abnormal but CBC and BMP unremarkable.  Initiated on ceftriaxone and cultures grew  CFU Klebsiella resistant to ampicillin otherwise sensitive.  Did require a sitter while hospitalized but ultimately returned to baseline and discharged back to LTC on 6/19    Spoke with RN, no concerns    Alejandrina Noble is evaluated with her daughter, Pat at bedside.  Pat provides additional history.  In retrospect she believes that hallucinations began approximately 4 days prior to her hospitalization.  Patient does have a history of dementia so daughter thought may have been related to that. Additionally, patient did not endorse symptoms of dysuria which typically is what she does in the setting of UTI.  Symptoms acutely worsened on  Saturday and patient was in clear distress and agitated.  Daughter did not feel patient could wait for evaluation at care facility given delays that can occur over the weekends with reaching on-call, labs and pharmacy.  She is very thankful she took her to the emergency department and felt she got exceptional care.  Overall back to baseline, though tired which is to be expected. Today she is getting up to go to the dinning room for meals. Pat had no further concerns. Discussed labs at baseline in ER so no plans to repeat    Alejandrina Noble does not provide much history but denies concerns or pain    CODE STATUS/ADVANCE DIRECTIVES DISCUSSION:  Prior  DNR / DNI  ALLERGIES:   Allergies   Allergen Reactions     Actonel [Bisphosphonates] Rash     Conjugated Estrogens Rash     Estrogens Conjugated Rash     Macrobid [Nitrofuran Derivatives] Rash     Nitrofurantoin Rash     Sulfa Antibiotics Rash     Hydrocodone Nausea and Vomiting     Oxycodone Nausea and Vomiting     Risedronate      leg pain      PAST MEDICAL HISTORY:   Past Medical History:   Diagnosis Date     Abdominal aortic aneurysm 2001    had a stent placed 2009     AK (actinic keratosis) 11/11/2009     Cataract 02/22/2011     Compression Fractures of Lumbar Vertebra 02/04/2010     CVA (cerebral vascular accident) (H)     Dec 2019 and Jan 2020     Dementia (H)      DJD (degenerative joint disease)      Generalised Weakness and Fatigue 03/03/2011     Glaucoma (increased eye pressure) 2009    Dr. 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  FAMILY HISTORY: family history includes Heart Disease (age of onset: 79) in her father; Hypertension in her mother.  SOCIAL HISTORY:   reports that she has never smoked. She has never used smokeless tobacco. She reports that she does not drink alcohol and does not use drugs.  Patient's living condition: lives in a nursing home    Post Discharge Medication Reconciliation Status:   MED REC REQUIRED  Post Medication Reconciliation Status: discharge medications reconciled and changed, per note/orders       Current Outpatient Medications   Medication Sig     aspirin (ASA) 81 MG chewable tablet Take 81 mg by mouth daily     atorvastatin (LIPITOR) 40 MG tablet Take 40 mg by mouth every evening     carvedilol (COREG) 6.25 MG tablet Take 6.25 mg by mouth 2 times daily (with meals)     cephALEXin (KEFLEX) 250 MG capsule Take 1 capsule (250 mg) by mouth every 6 hours for 5 days     Cholecalciferol (VITAMIN D3) 50 MCG (2000 UT) TABS Take 2,000 Units by mouth daily     dorzolamide-timolol (COSOPT) 2-0.5 % ophthalmic solution Place 1 drop into both eyes 2 times daily     escitalopram (LEXAPRO) 10 MG tablet Take 10 mg by mouth daily     guaiFENesin (ROBITUSSIN) 20 mg/mL liquid Take 200 mg by mouth every 4 hours as needed for cough     latanoprost (XALATAN) 0.005 % ophthalmic solution Place 1 drop into both eyes daily     melatonin 3 MG tablet Take 3 mg by mouth nightly as needed for sleep     menthol-zinc oxide (CALMOSEPTINE) 0.44-20.6 % OINT ointment Apply topically as needed     acetaminophen (TYLENOL) 500 MG tablet Take 1,000 mg by mouth 2 times daily     ondansetron (ZOFRAN ODT) 4 MG ODT tab Take 1 tablet (4 mg) by  "mouth every 6 hours as needed for nausea or vomiting     polyethylene glycol (MIRALAX) 17 g packet Take 1 packet by mouth every other day Odd days     traMADol (ULTRAM) 50 MG tablet Take 0.5 tablets (25 mg) by mouth daily At 1900     No current facility-administered medications for this visit.       ROS:  Limited secondary to cognitive impairment but today pt reports no pain    Vitals:  /72   Pulse 70   Temp 97.2  F (36.2  C)   Resp 18   Ht 1.549 m (5' 1\")   Wt 60.6 kg (133 lb 9.6 oz)   SpO2 98%   BMI 25.24 kg/m    Exam:  Physical Exam  Constitutional:       Appearance: Normal appearance.   HENT:      Head: Normocephalic and atraumatic.   Cardiovascular:      Rate and Rhythm: Normal rate and regular rhythm.   Pulmonary:      Effort: Pulmonary effort is normal.      Breath sounds: No wheezing.   Abdominal:      Tenderness: There is no abdominal tenderness.   Musculoskeletal:      Right lower leg: No edema.      Left lower leg: No edema.   Neurological:      Mental Status: She is alert. Mental status is at baseline.           Lab/Diagnostic data:  Recent labs in [facility] reviewed by me today.  and   Most Recent 3 CBC's:  Recent Labs   Lab Test 06/18/23  0813 06/17/23  1932 01/11/23  0617   WBC 9.1 10.4 6.9   HGB 12.2 12.0 11.5*   * 102* 109*    209 203     Most Recent 3 BMP's:  Recent Labs   Lab Test 06/18/23  0813 06/17/23 1932 01/11/23  0617    143 143   POTASSIUM 3.8 4.0 3.8   CHLORIDE 109* 107 110*   CO2 23 23 19*   BUN 19.3 22.1 27.0*   CR 0.71 0.76 0.85   ANIONGAP 10 13 14   TRANG 9.0 9.1 9.2   * 142* 85     Most Recent Urinalysis:  Recent Labs   Lab Test 06/17/23  1927 09/19/21  1454 09/04/20  1030   COLOR Light Yellow   < > Yellow   APPEARANCE Slightly Cloudy*   < > Clear   URINEGLC Negative   < > Negative   URINEBILI Negative   < > Negative   URINEKETONE Negative   < > Negative   SG 1.020   < > 1.012   UBLD Moderate*   < > Negative   URINEPH 6.5   < > 6.5   PROTEIN 30*   < > " Negative   UROBILINOGEN  --   --  <2.0 E.U./dL   NITRITE Negative   < > Negative   LEUKEST Large*   < > Large*   RBCU 94*   < > 0-2   WBCU 104*   < > 25-50*    < > = values in this interval not displayed.     Culture 50,000-100,000 CFU/mL Klebsiella pneumoniae Abnormal             Resulting Agency: [facility]     Susceptibility     Klebsiella pneumoniae     JOSEMANUEL     Ampicillin >=32 ug/mL Resistant 1     Ampicillin/ Sulbactam 4 ug/mL Susceptible     Cefazolin <=4 ug/mL Susceptible 2     Cefepime <=1 ug/mL Susceptible     Cefoxitin <=4 ug/mL Susceptible     Ceftazidime <=1 ug/mL Susceptible     Ceftriaxone <=1 ug/mL Susceptible     Ciprofloxacin <=0.25 ug/mL Susceptible     Gentamicin <=1 ug/mL Susceptible     Levofloxacin <=0.12 ug/mL Susceptible     Nitrofurantoin 32 ug/mL Susceptible     Piperacillin/Tazobactam <=4 ug/mL Susceptible     Tobramycin <=1 ug/mL Susceptible     Trimethoprim/Sulfamethoxazole <=1/19 ug/mL Susceptible                     ASSESSMENT/PLAN:  Klebsiella UTI: Urine culture with [id number] CFU Klebsiella resistant to ampicillin but otherwise sensitive  - Received ceftriaxone during hospital stay and transitioned to Keflex through 6/24.  We will continue antibiotics    Metabolic encephalopathy, resolved  Dementia: Developed hallucinations in the setting of baseline dementia and UTI.  Now back to baseline per staff and daughter  - Continue LTC support      Orders:  NNO    This note was completed in part using Dragon voice recognition software. Although reviewed after completion, some word and grammatical errors may occur.    Electronically signed by:  Sara Britt PA-C                       Sincerely,        Sara Britt PA-C

## 2023-06-21 NOTE — PROGRESS NOTES
[facility] GERIATRICS    PRIMARY CARE PROVIDER AND CLINIC:  Sara Britt PA-C, [address] / [place] [address]  Chief Complaint   Patient presents with     Hospital F/U      [facility] Medical Record Number:  [id number]  Place of Service where encounter took place:  [facility] ([facility]) [[id number]]    Alejandrina Whatley  is a 89 year old  (8/22/1923), returned to the above facility from  [facility]. Hospital stay 6/17/23 - 6/19/23. .   HPI:      Notes from hospitalization reviewed including H&P and D/c summary    Alejandrina Whatley 89-year-old female with past medical history previous CVA, depression, recurrent UTIs and dementia who was recently hospitalized at [facility] for hallucinations/metabolic encephalopathy thought to be secondary to Klebsiella UTI.    Does have history of recurrent UTIs, approximately 3-4/year.  Occasionally, these are accompanied by hallucinations.  On this particular occasion developed acute change in mental status on Saturday 6/17.  Daughter ultimately elected to call EMS to have her evaluated.  Urine analysis abnormal but CBC and BMP unremarkable.  Initiated on ceftriaxone and cultures grew  CFU Klebsiella resistant to ampicillin otherwise sensitive.  Did require a sitter while hospitalized but ultimately returned to baseline and discharged back to LTC on 6/19    Spoke with RN, no concerns    Alejandrina Noble is evaluated with her daughter, Pat at bedside.  Pat provides additional history.  In retrospect she believes that hallucinations began approximately 4 days prior to her hospitalization.  Patient does have a history of dementia so daughter thought may have been related to that. Additionally, patient did not endorse symptoms of dysuria which typically is what she does in the setting of UTI.  Symptoms acutely worsened on Saturday and patient was in clear distress and agitated.  Daughter did not feel patient  could wait for evaluation at care facility given delays that can occur over the weekends with reaching on-call, labs and pharmacy.  She is very thankful she took her to the emergency department and felt she got exceptional care.  Overall back to baseline, though tired which is to be expected. Today she is getting up to go to the dinning room for meals. Pat had no further concerns. Discussed labs at baseline in ER so no plans to repeat    Alejandrina Noble does not provide much history but denies concerns or pain    CODE STATUS/ADVANCE DIRECTIVES DISCUSSION:  Prior  DNR / DNI  ALLERGIES:   Allergies   Allergen Reactions     Actonel [Bisphosphonates] Rash     Conjugated Estrogens Rash     Estrogens Conjugated Rash     Macrobid [Nitrofuran Derivatives] Rash     Nitrofurantoin Rash     Sulfa Antibiotics Rash     Hydrocodone Nausea and Vomiting     Oxycodone Nausea and Vomiting     Risedronate      leg pain      PAST MEDICAL HISTORY:   Past Medical History:   Diagnosis Date     Abdominal aortic aneurysm 2001    had a stent placed 2009     AK (actinic keratosis) 11/11/2009     Cataract 02/22/2011     Compression Fractures of Lumbar Vertebra 02/04/2010     CVA (cerebral vascular accident) (H)     Dec 2019 and Jan 2020     Dementia (H)      DJD (degenerative joint disease)      Generalised Weakness and Fatigue 03/03/2011     Glaucoma (increased eye pressure) 2009    Dr. 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  FAMILY HISTORY: family history includes Heart Disease (age of onset: 79) in her father; Hypertension in her mother.  SOCIAL HISTORY:   reports that she has never smoked. She has never used smokeless tobacco. She reports that she does not drink alcohol and does not use drugs.  Patient's living condition: lives in a nursing home    Post Discharge Medication Reconciliation Status:   MED REC REQUIRED  Post Medication Reconciliation Status: discharge medications reconciled and changed, per note/orders       Current Outpatient Medications   Medication Sig     aspirin (ASA) 81 MG chewable tablet Take 81 mg by mouth daily     atorvastatin (LIPITOR) 40 MG tablet Take 40 mg by mouth every evening     carvedilol (COREG) 6.25 MG tablet Take 6.25 mg by mouth 2 times daily (with meals)     cephALEXin (KEFLEX) 250 MG capsule Take 1 capsule (250 mg) by mouth every 6 hours for 5 days     Cholecalciferol (VITAMIN D3) 50 MCG (2000 UT) TABS Take 2,000 Units by mouth daily     dorzolamide-timolol (COSOPT) 2-0.5 % ophthalmic solution Place 1 drop into both eyes 2 times daily     escitalopram (LEXAPRO) 10 MG tablet Take 10 mg by mouth daily     guaiFENesin (ROBITUSSIN) 20 mg/mL liquid Take 200 mg by mouth every 4 hours as needed for cough     latanoprost (XALATAN) 0.005 % ophthalmic solution Place 1 drop into both eyes daily     melatonin 3 MG tablet Take 3 mg by mouth nightly as needed for sleep     menthol-zinc oxide (CALMOSEPTINE) 0.44-20.6 % OINT ointment Apply topically as needed     acetaminophen (TYLENOL) 500 MG tablet Take 1,000 mg by mouth 2 times daily     ondansetron (ZOFRAN ODT) 4 MG ODT tab Take 1 tablet (4 mg) by mouth every 6 hours as needed for nausea or vomiting     polyethylene glycol (MIRALAX) 17 g  "packet Take 1 packet by mouth every other day Odd days     traMADol (ULTRAM) 50 MG tablet Take 0.5 tablets (25 mg) by mouth daily At 1900     No current facility-administered medications for this visit.       ROS:  Limited secondary to cognitive impairment but today pt reports no pain    Vitals:  /72   Pulse 70   Temp 97.2  F (36.2  C)   Resp 18   Ht 1.549 m (5' 1\")   Wt 60.6 kg (133 lb 9.6 oz)   SpO2 98%   BMI 25.24 kg/m    Exam:  Physical Exam  Constitutional:       Appearance: Normal appearance.   HENT:      Head: Normocephalic and atraumatic.   Cardiovascular:      Rate and Rhythm: Normal rate and regular rhythm.   Pulmonary:      Effort: Pulmonary effort is normal.      Breath sounds: No wheezing.   Abdominal:      Tenderness: There is no abdominal tenderness.   Musculoskeletal:      Right lower leg: No edema.      Left lower leg: No edema.   Neurological:      Mental Status: She is alert. Mental status is at baseline.           Lab/Diagnostic data:  Recent labs in [facility] reviewed by me today.  and   Most Recent 3 CBC's:  Recent Labs   Lab Test 06/18/23 0813 06/17/23 1932 01/11/23  0617   WBC 9.1 10.4 6.9   HGB 12.2 12.0 11.5*   * 102* 109*    209 203     Most Recent 3 BMP's:  Recent Labs   Lab Test 06/18/23 0813 06/17/23 1932 01/11/23  0617    143 143   POTASSIUM 3.8 4.0 3.8   CHLORIDE 109* 107 110*   CO2 23 23 19*   BUN 19.3 22.1 27.0*   CR 0.71 0.76 0.85   ANIONGAP 10 13 14   TRANG 9.0 9.1 9.2   * 142* 85     Most Recent Urinalysis:  Recent Labs   Lab Test 06/17/23 1927 09/19/21  1454 09/04/20  1030   COLOR Light Yellow   < > Yellow   APPEARANCE Slightly Cloudy*   < > Clear   URINEGLC Negative   < > Negative   URINEBILI Negative   < > Negative   URINEKETONE Negative   < > Negative   SG 1.020   < > 1.012   UBLD Moderate*   < > Negative   URINEPH 6.5   < > 6.5   PROTEIN 30*   < > Negative   UROBILINOGEN  --   --  <2.0 E.U./dL   NITRITE Negative   < > Negative   LEUKEST " Large*   < > Large*   RBCU 94*   < > 0-2   WBCU 104*   < > 25-50*    < > = values in this interval not displayed.     Culture 50,000-100,000 CFU/mL Klebsiella pneumoniae Abnormal             Resulting Agency: [facility]     Susceptibility     Klebsiella pneumoniae     JOSEMANUEL     Ampicillin >=32 ug/mL Resistant 1     Ampicillin/ Sulbactam 4 ug/mL Susceptible     Cefazolin <=4 ug/mL Susceptible 2     Cefepime <=1 ug/mL Susceptible     Cefoxitin <=4 ug/mL Susceptible     Ceftazidime <=1 ug/mL Susceptible     Ceftriaxone <=1 ug/mL Susceptible     Ciprofloxacin <=0.25 ug/mL Susceptible     Gentamicin <=1 ug/mL Susceptible     Levofloxacin <=0.12 ug/mL Susceptible     Nitrofurantoin 32 ug/mL Susceptible     Piperacillin/Tazobactam <=4 ug/mL Susceptible     Tobramycin <=1 ug/mL Susceptible     Trimethoprim/Sulfamethoxazole <=1/19 ug/mL Susceptible                     ASSESSMENT/PLAN:  Klebsiella UTI: Urine culture with [id number] CFU Klebsiella resistant to ampicillin but otherwise sensitive  - Received ceftriaxone during hospital stay and transitioned to Keflex through 6/24.  We will continue antibiotics    Metabolic encephalopathy, resolved  Dementia: Developed hallucinations in the setting of baseline dementia and UTI.  Now back to baseline per staff and daughter  - Continue LTC support      Orders:  NNO    This note was completed in part using Dragon voice recognition software. Although reviewed after completion, some word and grammatical errors may occur.    Electronically signed by:  Sara Britt PA-C

## 2023-07-19 NOTE — LETTER
"    7/19/2023        RE: Alejandrina Whatley  [id number] [facility] [id number]        [place] [facility]  PHYSICIAN NOTE    Chief Complaint   Patient presents with     penitentiary Regulatory       HPI:    Alejandrina Whatley is a 89 year old  (8/22/1923), who is being seen today for a federally mandated E/M visit at [facility]. Admitted to LT in early 2022 after a L hip fracture s/p repair with comorbid dementia and h/o CVAs.    Anni just woke up from her morning nap which she does after breakfast. She denies questions, says \"I just want to eat\".  Her daughter Pat then joins who is here to have lunch with her which is routine and Pat has several questions which is also common. Many are facility related and she is dealing with those accordingly.  But she also has ongoing concerns with her mom's health.  Namely, chronic pain, yet also chronic fatigue sleeping much of the day and only waking up for meals which has been on-going for quite some time with her advancing age/frailty.  Pat worries that her mom does not eat very much and so is sure to be present every day for lunch to help her eat as she feels that Alejandrina Noble struggles getting the fork to her mouth by herself.  Throughout our conversation, Pat continues to vacillate between comfort minded goals of care and yet being aggressive with her mom's care.  We ended up having a heart-to-heart conversation together Anni, Pat, and I again regarding goals of care which we have had before.  Alejandrina Noble again mentions she is ready every day to go to heaven and she does not like to live like this.  However, she also admits to being afraid of what it might look like at the end of her life.    Separately, while Alejandrina Noble does not have the insight into this when asked, Pat feels her mom has been coughing more the past 3 days without known inciting factor.  There were some COVID cases in the building a few weeks ago but " none recently.  She does not think her mom needs testing for COVID.  Pat is conflicted about whether she should do a chest x-ray which can sometimes be painful due to positioning needed to obtain this or what we should do. Doesn't think she aspirated. Alejandrina Noble did cough once during our visit but doesn't complain of any respiratory symptoms personally.    Past Medical History:   Diagnosis Date     Abdominal aortic aneurysm 2001    had a stent placed 2009     AK (actinic keratosis) 11/11/2009     Cataract 02/22/2011     Compression Fractures of Lumbar Vertebra 02/04/2010     CVA (cerebral vascular accident) (H)     Dec 2019 and Jan 2020     Dementia (H)      DJD (degenerative joint disease)      Generalised Weakness and Fatigue 03/03/2011     Glaucoma (increased eye pressure) 2009    Dr. 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     Strain of shoulder, left 03/03/2011     Urinary incontinence 11/10/2009        CODE STATUS: DNR/I    ALLERGIES: Actonel [bisphosphonates], Conjugated estrogens, Estrogens conjugated, Macrobid [nitrofuran derivatives], Nitrofurantoin, Sulfa antibiotics, Hydrocodone, Oxycodone, and Risedronate    MEDICATIONS: Reviewed and updated in Epic according to facility MAR  Current Outpatient Medications   Medication Sig Dispense Refill     acetaminophen (TYLENOL) 500 MG tablet Take 1000 mg with breakfast, 500 mg with lunch and 1000 mg with dinner       aspirin (ASA) 81 MG chewable tablet Take 81 mg by mouth daily       atorvastatin (LIPITOR) 40 MG tablet Take 40 mg by mouth every evening       carvedilol (COREG) 6.25 MG tablet Take 6.25 mg by mouth 2 times daily (with meals)       Cholecalciferol (VITAMIN D3) 50 MCG (2000 UT) TABS Take 2,000 Units by mouth daily        "dorzolamide-timolol (COSOPT) 2-0.5 % ophthalmic solution Place 1 drop into both eyes 2 times daily 1 Bottle 11     escitalopram (LEXAPRO) 10 MG tablet Take 10 mg by mouth daily       guaiFENesin (ROBITUSSIN) 20 mg/mL liquid Take 200 mg by mouth every 4 hours as needed for cough       latanoprost (XALATAN) 0.005 % ophthalmic solution Place 1 drop into both eyes daily       melatonin 3 MG tablet Take 3 mg by mouth nightly as needed for sleep       menthol-zinc oxide (CALMOSEPTINE) 0.44-20.6 % OINT ointment Apply topically as needed       ondansetron (ZOFRAN ODT) 4 MG ODT tab Take 1 tablet (4 mg) by mouth every 6 hours as needed for nausea or vomiting       polyethylene glycol (MIRALAX) 17 g packet Take 1 packet by mouth every other day Odd days       traMADol (ULTRAM) 50 MG tablet Take 0.5 tablets (25 mg) by mouth daily At 1900 45 tablet 0       ROS:  Limited secondary to cognitive impairment but today pt reports as above in HPI    Exam:  /78   Pulse 70   Temp 98  F (36.7  C)   Resp 17   Ht 1.549 m (5' 1\")   Wt 59.5 kg (131 lb 1.6 oz)   SpO2 99%   BMI 24.77 kg/m    Alert, pleasant, casually dressed  Transferred from bed to chair via standing lift  Breathing nonlabored, inspiratory bibasilar crackles which are fine, one harsh wet cough early on in the visit with repositioning  Heart tones regular  No edema  Participates in conversation    Lab/Diagnostic Data:    Hospital labs from June 2023 reviewed noting normal electrolytes and Cr 0.7 with unremarkable CBC    ASSESSMENT/PLAN:  Advanced care planning/counseling discussion  Arthralgia, unspecified joint  Frailty  Episode of recurrent major depressive disorder, unspecified depression episode severity (H)  I will also add a midday dose of Tylenol 500 mg with lunch; takes 1000 mg in AM and PM - due to aspiration risk has to have pills when up at mealtimes only and thus the midday only 500 mg dose to avoid getting too much Acetaminophen too close " "together  Discussed increasing Tramadol at daughter's request for multiple joint pain (ex: back/shoulders) but she'd only want it increased at night as she is fearful of worsening her mom's current lethargy as she only gets up for meals as it is, however, Alejandrina Noble does sleep well and by the time morning came around the higher dose Tramadol likely wouldn't help  Its conversations such as this that make sense to also include hospice as they'd help support the quandary Pat is in between trying to have her mom comfortable as Alejandrina Noble voices as priority (says she is ready to go to Heaven every day as is tired of chronic pain and not feeling energy to do anything but sleep) yet also continuing life prolonging measures (ex: make sure she eats, treats infection, hospitalizations)  She has had 4 hospitalizations in the past 12 months for infections (UTI, respiratory) and Pat can't seem to grasp that its an option not to treat these infections if we're on a comfort based goals of care plan  Remains on Lexapro; will continue - Pat somewhat jokingly was grasping for straws hoping I'd have a \"happy pill\" that would help fix some of her mom's issues - See below - consulting hospice for support as Alejandrina Noble says she'd love that support and extra attention - eventually consider switching to another selective serotonin reuptake inhibitor if need be  Ultimately Alejandrina Noble and her daughter Pat consented for initial [facility] eval and treat to further discuss if hospice would be something of benefit to Alejandrina Noble and family as they're  thinking about end-of-life cares  Alejandrian Noble again mentions she is ready every day to go to heaven and she does not like to live like this (\"in a nursing home\").  However, she also admits to being afraid of what it might look like at the end of her life.  She is DNR/I currently    Dementia without behavioral disturbance (H)  Comorbidity that effects these advanced care planning discussions though " she does get involved in the discussions  Past h/o hallucinations (ex: UTI June 2023 leading to hospitalization) but not on a regular basis  July 2023: BIMS: 10/15 indicating moderately impaired cognition and PHQ9c: 3    Acute cough  CXR today ordered; though we are potentially moving towards more comfort based goals of care based on discussion above, we're not there yet and historically daughter has wanted fairly aggressive treatment for her mother and it will take some time for daughter to process on-going goals of care discussions  Alejandrina Noble just has some minor bibasilar inspiratory crackles which could also be atelectasis; doesn't appear fluid overloaded  No current known COVID contacts and daughter declines COVID testing  Not acutely ill looking at all  Await CXR; if positive for infiltrate would treat with antibiotics        Total time spent with patient visit was 50 minutes (11:50 AM - 12:40 PM) including patient visit and review of past records. Greater than 50% of total time spent with counseling and coordinating care as noted above.    Electronically signed by:  Yulissa Garcia DO        Sincerely,        Yulissa Garcia DO

## 2023-07-19 NOTE — PROGRESS NOTES
"[place] [facility]  PHYSICIAN NOTE    Chief Complaint   Patient presents with     care home Regulatory       HPI:    Alejandrina Whatley is a 89 year old  (8/22/1923), who is being seen today for a federally mandated E/M visit at [facility]. Admitted to LT in early 2022 after a L hip fracture s/p repair with comorbid dementia and h/o CVAs.    Anni just woke up from her morning nap which she does after breakfast. She denies questions, says \"I just want to eat\".  Her daughter Pat then joins who is here to have lunch with her which is routine and Pat has several questions which is also common. Many are facility related and she is dealing with those accordingly.  But she also has ongoing concerns with her mom's health.  Namely, chronic pain, yet also chronic fatigue sleeping much of the day and only waking up for meals which has been on-going for quite some time with her advancing age/frailty.  Pat worries that her mom does not eat very much and so is sure to be present every day for lunch to help her eat as she feels that Alejandrina Noble struggles getting the fork to her mouth by herself.  Throughout our conversation, Pat continues to vacillate between comfort minded goals of care and yet being aggressive with her mom's care.  We ended up having a heart-to-heart conversation together Anni, Pat, and I again regarding goals of care which we have had before.  Alejandrina Noble again mentions she is ready every day to go to heaven and she does not like to live like this.  However, she also admits to being afraid of what it might look like at the end of her life.    Separately, while Alejandrina Noble does not have the insight into this when asked, Pat feels her mom has been coughing more the past 3 days without known inciting factor.  There were some COVID cases in the building a few weeks ago but none recently.  She does not think her mom needs testing for COVID.  Pat is conflicted " about whether she should do a chest x-ray which can sometimes be painful due to positioning needed to obtain this or what we should do. Doesn't think she aspirated. Alejandrina Noble did cough once during our visit but doesn't complain of any respiratory symptoms personally.    Past Medical History:   Diagnosis Date     Abdominal aortic aneurysm 2001    had a stent placed 2009     AK (actinic keratosis) 11/11/2009     Cataract 02/22/2011     Compression Fractures of Lumbar Vertebra 02/04/2010     CVA (cerebral vascular accident) (H)     Dec 2019 and Jan 2020     Dementia (H)      DJD (degenerative joint disease)      Generalised Weakness and Fatigue 03/03/2011     Glaucoma (increased eye pressure) 2009    Dr. 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     Strain of shoulder, left 03/03/2011     Urinary incontinence 11/10/2009        CODE STATUS: DNR/I    ALLERGIES: Actonel [bisphosphonates], Conjugated estrogens, Estrogens conjugated, Macrobid [nitrofuran derivatives], Nitrofurantoin, Sulfa antibiotics, Hydrocodone, Oxycodone, and Risedronate    MEDICATIONS: Reviewed and updated in [facility] according to facility MAR  Current Outpatient Medications   Medication Sig Dispense Refill     acetaminophen (TYLENOL) 500 MG tablet Take 1000 mg with breakfast, 500 mg with lunch and 1000 mg with dinner       aspirin (ASA) 81 MG chewable tablet Take 81 mg by mouth daily       atorvastatin (LIPITOR) 40 MG tablet Take 40 mg by mouth every evening       carvedilol (COREG) 6.25 MG tablet Take 6.25 mg by mouth 2 times daily (with meals)       Cholecalciferol (VITAMIN D3) 50 MCG (2000 UT) TABS Take 2,000 Units by mouth daily       dorzolamide-timolol (COSOPT) 2-0.5 % ophthalmic solution Place 1 drop into both eyes 2 times daily 1  "Bottle 11     escitalopram (LEXAPRO) 10 MG tablet Take 10 mg by mouth daily       guaiFENesin (ROBITUSSIN) 20 mg/mL liquid Take 200 mg by mouth every 4 hours as needed for cough       latanoprost (XALATAN) 0.005 % ophthalmic solution Place 1 drop into both eyes daily       melatonin 3 MG tablet Take 3 mg by mouth nightly as needed for sleep       menthol-zinc oxide (CALMOSEPTINE) 0.44-20.6 % OINT ointment Apply topically as needed       ondansetron (ZOFRAN ODT) 4 MG ODT tab Take 1 tablet (4 mg) by mouth every 6 hours as needed for nausea or vomiting       polyethylene glycol (MIRALAX) 17 g packet Take 1 packet by mouth every other day Odd days       traMADol (ULTRAM) 50 MG tablet Take 0.5 tablets (25 mg) by mouth daily At 1900 45 tablet 0       ROS:  Limited secondary to cognitive impairment but today pt reports as above in HPI    Exam:  /78   Pulse 70   Temp 98  F (36.7  C)   Resp 17   Ht 1.549 m (5' 1\")   Wt 59.5 kg (131 lb 1.6 oz)   SpO2 99%   BMI 24.77 kg/m    Alert, pleasant, casually dressed  Transferred from bed to chair via standing lift  Breathing nonlabored, inspiratory bibasilar crackles which are fine, one harsh wet cough early on in the visit with repositioning  Heart tones regular  No edema  Participates in conversation    Lab/Diagnostic Data:    Hospital labs from June 2023 reviewed noting normal electrolytes and Cr 0.7 with unremarkable CBC    ASSESSMENT/PLAN:  Advanced care planning/counseling discussion  Arthralgia, unspecified joint  Frailty  Episode of recurrent major depressive disorder, unspecified depression episode severity (H)  I will also add a midday dose of Tylenol 500 mg with lunch; takes 1000 mg in AM and PM - due to aspiration risk has to have pills when up at mealtimes only and thus the midday only 500 mg dose to avoid getting too much Acetaminophen too close together  Discussed increasing Tramadol at daughter's request for multiple joint pain (ex: back/shoulders) but " "she'd only want it increased at night as she is fearful of worsening her mom's current lethargy as she only gets up for meals as it is, however, Alejandrina Noble does sleep well and by the time morning came around the higher dose Tramadol likely wouldn't help  Its conversations such as this that make sense to also include hospice as they'd help support the quandary Pat is in between trying to have her mom comfortable as Alejandrina Noble voices as priority (says she is ready to go to [place] every day as is tired of chronic pain and not feeling energy to do anything but sleep) yet also continuing life prolonging measures (ex: make sure she eats, treats infection, hospitalizations)  She has had 4 hospitalizations in the past 12 months for infections (UTI, respiratory) and Pat can't seem to grasp that its an option not to treat these infections if we're on a comfort based goals of care plan  Remains on Lexapro; will continue - Pat somewhat jokingly was grasping for straws hoping I'd have a \"happy pill\" that would help fix some of her mom's issues - See below - consulting hospice for support as Alejandrina Noble says she'd love that support and extra attention - eventually consider switching to another selective serotonin reuptake inhibitor if need be  Ultimately Alejandrina Noble and her daughter Pat consented for initial [facility] eval and treat to further discuss if hospice would be something of benefit to Alejandrina Noble and family as they're  thinking about end-of-life cares  Alejandrina Noble again mentions she is ready every day to go to heaven and she does not like to live like this (\"in a nursing home\").  However, she also admits to being afraid of what it might look like at the end of her life.  She is DNR/I currently    Dementia without behavioral disturbance (H)  Comorbidity that effects these advanced care planning discussions though she does get involved in the discussions  Past h/o hallucinations (ex: UTI June 2023 leading to hospitalization) " but not on a regular basis  July 2023: BIMS: 10/15 indicating moderately impaired cognition and PHQ9c: 3    Acute cough  CXR today ordered; though we are potentially moving towards more comfort based goals of care based on discussion above, we're not there yet and historically daughter has wanted fairly aggressive treatment for her mother and it will take some time for daughter to process on-going goals of care discussions  Alejandrina Noble just has some minor bibasilar inspiratory crackles which could also be atelectasis; doesn't appear fluid overloaded  No current known COVID contacts and daughter declines COVID testing  Not acutely ill looking at all  Await CXR; if positive for infiltrate would treat with antibiotics        Total time spent with patient visit was 50 minutes (11:50 AM - 12:40 PM) including patient visit and review of past records. Greater than 50% of total time spent with counseling and coordinating care as noted above.    Electronically signed by:  Yulissa Garcia DO

## 2023-07-19 NOTE — Clinical Note
CXR not back yet; would still treat if positive for infiltrate. Will see if [facility] picks up; if not, at least will be nice for Pat/Anni to have had the conversation with them about what hospice really is.

## 2023-07-21 NOTE — PROGRESS NOTES
"[facility]ATR[facility]    Chief Complaint   Patient presents with     Nursing Home Acute     HPI:  Alejandrina Whatley is a 89 year old  (8/22/1923), who is being seen today for an episodic care visit at: [facility] [[id number]].     Today's concern is: Evaluated at request of daughter due to persistent cough.  Chest x-ray ordered after visit 7/19 for complaints of cough.  Returned yesterday is negative.  Personally reviewed x-ray and agree with no acute infiltrate.    Spoke with nursing.  Cough is intermittent.  Afebrile.  Not hypoxic    Alejandrina Noble is evaluated in her room resting in bed.  History somewhat limited due to dementia.  Does continue to complain of cough but states \"it is not so bad\".  Additionally, spoke with daughter, Pat, via the video monitoring daughter has set up in patient's LTC room.  Pat expresses concern that patient may have bronchitis.  She wonders if she should take her to the hospital to be evaluated as she has done that in the past when she has similar symptoms.  She expresses concern because her cough has not improved since Monday.  Additionally, wonders if Tessalon would be of benefit.    Allergies, and PMH/PSH reviewed in EPIC today.  REVIEW OF SYSTEMS:  Limited secondary to cognitive impairment but today pt reports stable cough and no SOB    Objective:   /78   Pulse 70   Temp 98  F (36.7  C)   Resp 17   Ht 1.549 m (5' 0.98\")   Wt 59 kg (130 lb)   SpO2 99%   BMI 24.58 kg/m    Physical Exam  Vitals (Facility EMR) reviewed.   Constitutional:       General: She is not in acute distress.  HENT:      Head: Normocephalic and atraumatic.   Eyes:      General: No scleral icterus.  Pulmonary:      Effort: Pulmonary effort is normal. No respiratory distress.      Comments: Faint wheezing, primarily at bases  Musculoskeletal:      Right lower leg: No edema.      Left lower leg: No edema.   Skin:     General: Skin is warm and dry.      Findings: No " rash.   Neurological:      Mental Status: She is alert. Mental status is at baseline.   Psychiatric:         Behavior: Behavior normal.               Additionally personally reviewed and compared to previous CXR in 2020  Assessment/Plan:  1. Acute cough    2. Acute bronchitis, unspecified organism    3. Bronchomalacia    Overall appears clinically stable.  Chest x-ray negative for acute pneumonia.  Daughter expresses ongoing concern regarding her history of bronchitis and persistent cough x4 days.  Reviewed with daughter that typical viral course can last 7-10 days.  Discussed reassuring findings including no signs of fever, lethargy or hypoxia.  Daughter expresses concern patient will decompensate as we had into the weekend and she will be forced to take her to the emergency department.  Patient does have history of bronchomalacia (CT 12/2022) and that combined with her advanced age will put her at increased risk for severe illness  - Azithromycin 500 mg x 1, 250 mg daily x4 days  - Tessalon 200 mg, will schedule 3 times daily as patient has dementia and does not know to ask for  - Given wheezing on exam will order DuoNeb scheduled twice daily.  Daughter expressed that historically she has not found nebulizers to be helpful.  Did review with daughter how nebulizer work, and how they do not directly treat cough but rather open the airways which is mainstay of treatment and acute bronchitis.  She agrees to try twice daily    MED REC REQUIRED  Post Medication Reconciliation Status: patient was not discharged from an inpatient facility or TCU      Orders:  As above  Electronically signed by: Sara Britt PA-C

## 2023-07-21 NOTE — LETTER
"    7/21/2023        RE: Alejandrina Whatley  [address] [facility] [address]        [facility] GERIATRICS    Chief Complaint   Patient presents with     Nursing Home Acute     HPI:  Alejandrina Whatley is a 89 year old  (8/22/1923), who is being seen today for an episodic care visit at: [facility] ([facility]) [[id number]].     Today's concern is: Evaluated at request of daughter due to persistent cough.  Chest x-ray ordered after visit 7/19 for complaints of cough.  Returned yesterday is negative.  Personally reviewed x-ray and agree with no acute infiltrate.    Spoke with nursing.  Cough is intermittent.  Afebrile.  Not hypoxic    Alejandrina Noble is evaluated in her room resting in bed.  History somewhat limited due to dementia.  Does continue to complain of cough but states \"it is not so bad\".  Additionally, spoke with daughter, Pat, via the video monitoring daughter has set up in patient's LTC room.  Pat expresses concern that patient may have bronchitis.  She wonders if she should take her to the hospital to be evaluated as she has done that in the past when she has similar symptoms.  She expresses concern because her cough has not improved since Monday.  Additionally, wonders if Tessalon would be of benefit.    Allergies, and PMH/PSH reviewed in EPIC today.  REVIEW OF SYSTEMS:  Limited secondary to cognitive impairment but today pt reports stable cough and no SOB    Objective:   /78   Pulse 70   Temp 98  F (36.7  C)   Resp 17   Ht 1.549 m (5' 0.98\")   Wt 59 kg (130 lb)   SpO2 99%   BMI 24.58 kg/m    Physical Exam  Vitals (Facility EMR) reviewed.   Constitutional:       General: She is not in acute distress.  HENT:      Head: Normocephalic and atraumatic.   Eyes:      General: No scleral icterus.  Pulmonary:      Effort: Pulmonary effort is normal. No respiratory distress.      Comments: Faint wheezing, primarily at bases  Musculoskeletal:      Right lower leg: No edema. "      Left lower leg: No edema.   Skin:     General: Skin is warm and dry.      Findings: No rash.   Neurological:      Mental Status: She is alert. Mental status is at baseline.   Psychiatric:         Behavior: Behavior normal.               Additionally personally reviewed and compared to previous CXR in 2020  Assessment/Plan:  1. Acute cough    2. Acute bronchitis, unspecified organism    3. Bronchomalacia    Overall appears clinically stable.  Chest x-ray negative for acute pneumonia.  Daughter expresses ongoing concern regarding her history of bronchitis and persistent cough x4 days.  Reviewed with daughter that typical viral course can last 7-10 days.  Discussed reassuring findings including no signs of fever, lethargy or hypoxia.  Daughter expresses concern patient will decompensate as we had into the weekend and she will be forced to take her to the emergency department.  Patient does have history of bronchomalacia (CT 12/2022) and that combined with her advanced age will put her at increased risk for severe illness  - Azithromycin 500 mg x 1, 250 mg daily x4 days  - Tessalon 200 mg, will schedule 3 times daily as patient has dementia and does not know to ask for  - Given wheezing on exam will order DuoNeb scheduled twice daily.  Daughter expressed that historically she has not found nebulizers to be helpful.  Did review with daughter how nebulizer work, and how they do not directly treat cough but rather open the airways which is mainstay of treatment and acute bronchitis.  She agrees to try twice daily    MED REC REQUIRED  Post Medication Reconciliation Status: patient was not discharged from an inpatient facility or TCU      Orders:  As above  Electronically signed by: Sara Britt PA-C             Sincerely,        Sara Britt PA-C

## 2023-07-25 NOTE — PROGRESS NOTES
"[facility] GERIATRICS    Chief Complaint   Patient presents with    Nursing Home Acute     HPI:  Alejandrina Whatley is a 89 year old  (8/22/1923), who is being seen today for an episodic care visit at: [facility] ([facility]) [[id number]]. Today's concern is: Asked to evaluated by daughter. Respiratory status improved. No further cough. Finished Azithromycin yesterday. Developed acute hallucinations over night that have persisted this am. Afebrile. Normal O2 saturation. No medication  changed. Tends to have hallucinations with UTI which daughter is concerned.       Allergies, and PMH/PSH reviewed in [facility] today.  REVIEW OF SYSTEMS:  Limited secondary to cognitive impairment but today pt reports no abdominal pain    Objective:   /78   Pulse 68   Temp 98  F (36.7  C)   Resp 15   Ht 1.549 m (5' 0.98\")   Wt 59.4 kg (131 lb)   SpO2 96%   BMI 24.77 kg/m    Physical Exam  Vitals and nursing note reviewed.   Constitutional:       General: She is not in acute distress.     Appearance: She is not toxic-appearing.   HENT:      Head: Normocephalic and atraumatic.   Eyes:      General: No scleral icterus.  Cardiovascular:      Rate and Rhythm: Normal rate and regular rhythm.      Heart sounds: No murmur heard.  Pulmonary:      Effort: Pulmonary effort is normal.   Abdominal:      Comments: No suprapubic tenderness or abdominal pain   Skin:     General: Skin is warm and dry.      Findings: No rash.   Neurological:      Mental Status: She is alert. Mental status is at baseline.   Psychiatric:         Behavior: Behavior normal.           Recent labs in [facility] reviewed by me today.     Assessment/Plan:  1. Hallucinations    2. Recurrent UTI    3. Dementia without behavioral disturbance (H)    H/o dementia but historically does not have hallucinations outside of infection. Developed acute hallucinations overnight. Vital stable. Recent finished antibiotics for bronchitis.  - UA/UC via straight cath  - " Ok to give Ceftriaxone 1 g  IM x 1 while await results.     MED REC REQUIRED  Post Medication Reconciliation Status: patient was not discharged from an inpatient facility or TCU      Orders:      Electronically signed by: Sara Britt PA-C

## 2023-07-25 NOTE — LETTER
"    7/25/2023        RE: Alejandrina Whatley  [address] [facility] [address]        [facility] GERIATRICS    Chief Complaint   Patient presents with     Nursing Home Acute     HPI:  Alejandrina Whatley is a 89 year old  (8/22/1923), who is being seen today for an episodic care visit at: [facility] ([facility]) [[id number]]. Today's concern is: Asked to evaluated by daughter. Respiratory status improved. No further cough. Finished Azithromycin yesterday. Developed acute hallucinations over night that have persisted this am. Afebrile. Normal O2 saturation. No medication  changed. Tends to have hallucinations with UTI which daughter is concerned.       Allergies, and PMH/PSH reviewed in [facility] today.  REVIEW OF SYSTEMS:  Limited secondary to cognitive impairment but today pt reports no abdominal pain    Objective:   /78   Pulse 68   Temp 98  F (36.7  C)   Resp 15   Ht 1.549 m (5' 0.98\")   Wt 59.4 kg (131 lb)   SpO2 96%   BMI 24.77 kg/m    Physical Exam  Vitals and nursing note reviewed.   Constitutional:       General: She is not in acute distress.     Appearance: She is not toxic-appearing.   HENT:      Head: Normocephalic and atraumatic.   Eyes:      General: No scleral icterus.  Cardiovascular:      Rate and Rhythm: Normal rate and regular rhythm.      Heart sounds: No murmur heard.  Pulmonary:      Effort: Pulmonary effort is normal.   Abdominal:      Comments: No suprapubic tenderness or abdominal pain   Skin:     General: Skin is warm and dry.      Findings: No rash.   Neurological:      Mental Status: She is alert. Mental status is at baseline.   Psychiatric:         Behavior: Behavior normal.           Recent labs in [facility] reviewed by me today.     Assessment/Plan:  1. Hallucinations    2. Recurrent UTI    3. Dementia without behavioral disturbance (H)    H/o dementia but historically does not have hallucinations outside of infection. Developed acute hallucinations " overnight. Vital stable. Recent finished antibiotics for bronchitis.  - UA/UC via straight cath  - Ok to give Ceftriaxone 1 g  IM x 1 while await results.     MED REC REQUIRED  Post Medication Reconciliation Status: patient was not discharged from an inpatient facility or TCU      Orders:      Electronically signed by: Sara Britt PA-C           Sincerely,        Sara Britt PA-C

## 2023-07-26 NOTE — PROGRESS NOTES
"[facility] GERIATRICS    Chief Complaint   Patient presents with    RECHECK     HPI:  Alejandrina Whatley is a 89 year old  (8/22/1923), who is being seen today for an episodic care visit at: [facility] ([facility]) [[id number]]. Today's concern is: Patient evaluated today to follow-up hallucinations and possible UTI. Spoke to RN this am.  No further hallucinations.  Patient a bit more fatigued today.  Was complaining of some nausea.  Encouraged RN to utilize as needed Zofran.    Sanjana is evaluated in her room.  Resting prior to lunch.  Breathing comfortably.  Denies pain.  No abdominal pain.  Continues to deny urinary symptoms    Later in the afternoon nurse indicated patient was having some mild wheezing.  Not complaining of shortness of breath and vitals are stable.  Encourage nursing to utilize as needed DuoNeb which remains available.    Daughter pulled me aside in the afternoon.  Reviewed results thus far.  Culture at the time of my visit was pending.  Daughter does feel as though she improved with the ceftriaxone.  Her respiratory status waxes and wanes.  Daughter does find that the nebulizers do help her.  Discussed fatigue not unexpected given agitation night prior as well as possible underlying UTI.  We will continue antibiotics for now and await final culture results    UC now returned > 100k CFU Aerococcus    Allergies, and PMH/PSH reviewed in [facility] today.  REVIEW OF SYSTEMS:  Limited secondary to cognitive impairment but today pt reports no pain or SOB    Objective:   /78   Pulse 68   Temp 98  F (36.7  C)   Resp 15   Ht 1.549 m (5' 1\")   Wt 59.4 kg (131 lb)   SpO2 96%   BMI 24.75 kg/m    Physical Exam  Vitals (Facility EMR) and nursing note reviewed.   Constitutional:       General: She is not in acute distress.     Appearance: Normal appearance.   HENT:      Head: Normocephalic and atraumatic.   Eyes:      General: No scleral icterus.  Cardiovascular:      Rate and " Rhythm: Normal rate and regular rhythm.      Heart sounds: No murmur heard.  Pulmonary:      Effort: Pulmonary effort is normal.      Breath sounds: No wheezing.   Abdominal:      Tenderness: There is no abdominal tenderness.   Skin:     General: Skin is warm and dry.      Findings: No rash.   Neurological:      Mental Status: She is alert. Mental status is at baseline.   Psychiatric:         Behavior: Behavior normal.           Recent labs in [facility] reviewed by me today.  and Most Recent Urinalysis:  Recent Labs   Lab Test 07/25/23  0946 09/19/21  1454 09/04/20  1030   COLOR Light Yellow   < > Yellow   APPEARANCE Clear   < > Clear   URINEGLC Negative   < > Negative   URINEBILI Negative   < > Negative   URINEKETONE Negative   < > Negative   SG 1.019   < > 1.012   UBLD Small*   < > Negative   URINEPH 6.0   < > 6.5   PROTEIN 20*   < > Negative   UROBILINOGEN  --   --  <2.0 E.U./dL   NITRITE Negative   < > Negative   LEUKEST Moderate*   < > Large*   RBCU 21*   < > 0-2   WBCU 30*   < > 25-50*    < > = values in this interval not displayed.     7-Day Micro Results       Collected Updated Procedure Result Status      07/25/2023 0946 07/26/2023 1421 Urine Culture [[id number]]   (Abnormal)   Urine, NOS    Preliminary result Component Value   Culture Culture in progress  [P]     >100,000 CFU/mL Aerococcus urinae  [P]     Identification obtained by MALDI-TOF mass spectrometry research use only database. Test characteristics determined and verified by the Infectious Diseases Diagnostic Laboratory.                      Assessment/Plan:  Aerococcus UTI  Nausea: Urine culture now growing greater than 100kCFU Aerococcus.  Sensitivities pending.  Received ceftriaxone x1 yesterday  - Keflex 250 3 times daily x5 additional days  - Zofran available for nausea  - Follow-up final urine culture    Dementia without behavioral disturbance  Metabolic encephalopathy, improved: Hallucinations yesterday.  Now improved  - Continue to monitor in  LTC support    Bronchomalacia  Bronchitis, improved: Treated last week for bronchitis in the setting of cough and known history of tracheomalacia.  Cough is improved though nursing and daughter note waxing and waning wheezing.  On my exam today breathing comfortably in no noted wheezing.  - Reassurance provided  - Encourage utilization of as needed DuoNebs with both nursing and daughter    MED REC REQUIRED  Post Medication Reconciliation Status: discharge medications reconciled and changed, per note/orders      Orders:    - cephALEXin (KEFLEX) 250 MG capsule; Take 1 capsule (250 mg) by mouth 3 times daily for 5 days  Dispense: 15 capsule; Refill: 0        Electronically signed by: Sara Britt PA-C

## 2023-07-26 NOTE — LETTER
"    7/26/2023        RE: Alejandrina Whatley  [address] [address] [facility] [address]        [facility] GERIATRICS    Chief Complaint   Patient presents with     RECHECK     HPI:  Alejandrina Whatley is a 89 year old  (8/22/1923), who is being seen today for an episodic care visit at: [facility] ([facility]) [[id number]]. Today's concern is: Patient evaluated today to follow-up hallucinations and possible UTI. Spoke to RN this am.  No further hallucinations.  Patient a bit more fatigued today.  Was complaining of some nausea.  Encouraged RN to utilize as needed Zofran.    Sanjaan is evaluated in her room.  Resting prior to lunch.  Breathing comfortably.  Denies pain.  No abdominal pain.  Continues to deny urinary symptoms    Later in the afternoon nurse indicated patient was having some mild wheezing.  Not complaining of shortness of breath and vitals are stable.  Encourage nursing to utilize as needed DuoNeb which remains available.    Daughter pulled me aside in the afternoon.  Reviewed results thus far.  Culture at the time of my visit was pending.  Daughter does feel as though she improved with the ceftriaxone.  Her respiratory status waxes and wanes.  Daughter does find that the nebulizers do help her.  Discussed fatigue not unexpected given agitation night prior as well as possible underlying UTI.  We will continue antibiotics for now and await final culture results    UC now returned > 100k CFU Aerococcus    Allergies, and PMH/PSH reviewed in [facility] today.  REVIEW OF SYSTEMS:  Limited secondary to cognitive impairment but today pt reports no pain or SOB    Objective:   /78   Pulse 68   Temp 98  F (36.7  C)   Resp 15   Ht 1.549 m (5' 1\")   Wt 59.4 kg (131 lb)   SpO2 96%   BMI 24.75 kg/m    Physical Exam  Vitals (Facility EMR) and nursing note reviewed.   Constitutional:       General: She is not in acute distress.     Appearance: Normal appearance.   HENT:      Head: " Normocephalic and atraumatic.   Eyes:      General: No scleral icterus.  Cardiovascular:      Rate and Rhythm: Normal rate and regular rhythm.      Heart sounds: No murmur heard.  Pulmonary:      Effort: Pulmonary effort is normal.      Breath sounds: No wheezing.   Abdominal:      Tenderness: There is no abdominal tenderness.   Skin:     General: Skin is warm and dry.      Findings: No rash.   Neurological:      Mental Status: She is alert. Mental status is at baseline.   Psychiatric:         Behavior: Behavior normal.           Recent labs in [facility] reviewed by me today.  and Most Recent Urinalysis:  Recent Labs   Lab Test 07/25/23  0946 09/19/21  1454 09/04/20  1030   COLOR Light Yellow   < > Yellow   APPEARANCE Clear   < > Clear   URINEGLC Negative   < > Negative   URINEBILI Negative   < > Negative   URINEKETONE Negative   < > Negative   SG 1.019   < > 1.012   UBLD Small*   < > Negative   URINEPH 6.0   < > 6.5   PROTEIN 20*   < > Negative   UROBILINOGEN  --   --  <2.0 E.U./dL   NITRITE Negative   < > Negative   LEUKEST Moderate*   < > Large*   RBCU 21*   < > 0-2   WBCU 30*   < > 25-50*    < > = values in this interval not displayed.     7-Day Micro Results       Collected Updated Procedure Result Status      07/25/2023 0946 07/26/2023 1421 Urine Culture [[id number]]   (Abnormal)   Urine, NOS    Preliminary result Component Value   Culture Culture in progress  [P]     >100,000 CFU/mL Aerococcus urinae  [P]     Identification obtained by MALDI-TOF mass spectrometry research use only database. Test characteristics determined and verified by the Infectious Diseases Diagnostic Laboratory.                      Assessment/Plan:  Aerococcus UTI  Nausea: Urine culture now growing greater than 100kCFU Aerococcus.  Sensitivities pending.  Received ceftriaxone x1 yesterday  - Keflex 250 3 times daily x5 additional days  - Zofran available for nausea  - Follow-up final urine culture    Dementia without behavioral  disturbance  Metabolic encephalopathy, improved: Hallucinations yesterday.  Now improved  - Continue to monitor in LTC support    Bronchomalacia  Bronchitis, improved: Treated last week for bronchitis in the setting of cough and known history of tracheomalacia.  Cough is improved though nursing and daughter note waxing and waning wheezing.  On my exam today breathing comfortably in no noted wheezing.  - Reassurance provided  - Encourage utilization of as needed DuoNebs with both nursing and daughter    MED REC REQUIRED  Post Medication Reconciliation Status: discharge medications reconciled and changed, per note/orders      Orders:    - cephALEXin (KEFLEX) 250 MG capsule; Take 1 capsule (250 mg) by mouth 3 times daily for 5 days  Dispense: 15 capsule; Refill: 0        Electronically signed by: Sara Britt PA-C         Sincerely,        Sara Britt PA-C

## 2023-07-28 PROBLEM — Z86.59 HISTORY OF DEMENTIA: Status: ACTIVE | Noted: 2023-01-01
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
## 2023-07-28 NOTE — ED NOTES
Patient lying in bed, she called out looking for her white sandals.  Patient did not arrive with sandals on, patient was reoriented that she is in the ER.

## 2023-07-28 NOTE — ED NOTES
Neuro Cognitive (Adult)Cognitive/Neuro/Behavioral WDL: all (pt comes in from [facility] with auditory and visual hallucinations per daughter and staff. pt was started on keflex 2 days ago for a UTI. pt does not normally walk but uses a stand and lift, last night pt slipped out of bed onto butt.)Level of Consciousness: confused (pt does not complain of any injuries. pt does have early onset dementia. confused on year, and pres.)Arousal Level: opens eyes spontaneouslyOrientation: disoriented x 4Speech: clear; spontaneousMood/Behavior: agitated  Christine Coma ScaleBest Eye Response: 4-->(E4) spontaneousBest Motor Response: 6-->(M6) obeys commandsBest Verbal Response: 4-->(V4) confusedGlasgow Coma Scale Score: 14  Hand /Ankle StrengthHand , Left: strongHand , Right: strongDorsiflexion, Left: strongDorsiflexion, Right: strongPlantarflexion, Left: strongPlantarflexion, Right: strong

## 2023-07-28 NOTE — ED PROVIDER NOTES
History     Chief Complaint:  Altered Mental Status       The history is provided by a relative.      Alejandrina Whatley is a 89 year old female with a history of early onset dementia, CVA, recurrent UTI who presents with visual hallucinations. The daughter states that the patient began to experience visual hallucinations for the past 2 days as well as slipping out of bed last night. The patient was started on Keflex for UTI started on 7/25. The patient denies any abdominal pain, headache, chest pain, hip pain, nausea.     Urine culture 7/25  Susceptibility     Aerococcus urinae     JOSEMANUEL     Cefotaxime Susceptible     Ceftriaxone Susceptible     Levofloxacin Susceptible     Penicillin Susceptible     Tetracycline Susceptible     Vancomycin Susceptible          Independent Historian:   Daughter- see above.    Review of External Notes:   I reviewed the ED note from 6/17 and note from 7/25 where urine collected and patient started on Keflex.     Medications:    Aspirin   Lipitor   Coreg   Keflex   Lexapro   Duoneb   Zofran   Miralax   Ultram     Past Medical History:    Abdominal aortic aneurysm   Actinic keratosis   Compression fracture lumbar vertebra   CVA  Dementia   Anxiety   Insomnia   CKD  Hypercholesterolemia   Dyslipidemia   Depression   DJD  General weakness and fatigue   Glaucoma   Hypertension   Hyperlipidemia   Sciatic nerve injury   Osteoporosis   Pseudoexfoliation lens capsule   Recurrent UTI    Past Surgical History:    AAA repair, stent placed   Cataracts   Closed reduction, percutaneous pinning hip   Cataracts extraction   Hysterctomy   Laser selective trabeculoplasty   Appendectomy      Physical Exam     Patient Vitals for the past 24 hrs:   BP Temp Pulse Resp SpO2   07/28/23 1230 (!) 142/78 -- 77 -- 95 %   07/28/23 1215 (!) 149/111 -- 83 -- 93 %   07/28/23 1200 (!) 150/71 -- 79 -- 93 %   07/28/23 1145 138/80 -- 73 -- 98 %   07/28/23 1142 138/70 -- 71 -- 93 %   07/28/23 1136 (!) 143/67 98  F (36.7  C)  72 18 94 %   07/28/23 1133 (!) 143/67 -- 73 -- --        Physical Exam  General: Alert, no acute distress  Neuro:  PERRL.  EOMI.  No focal deficits  HEENT:  Atraumatic. Moist mucous membranes.  Conjunctiva normal.   CV:  RRR, no m/r/g, skin warm and well perfused  Pulm:  CTAB, no wheezes/ronchi/rales.  No acute distress, breathing comfortably  GI:  Soft, nontender, nondistended.  No rebound or guarding.    MSK:  Moving all extremities.  No focal areas of edema, erythema  Skin:  WWP, no rashes, no lower extremity edema, skin color normal, no diaphoresis    Emergency Department Course     Imaging:  XR Pelvis 1/2 Views   Final Result   IMPRESSION: Osteopenia. No acute fractures are evident. Cannulated screw fixation across an old healed left femoral neck fracture. Old healed bilateral superior and inferior pubic rami fractures. Advanced degenerative changes in the right hip.    Endovascular stents. Surgical clips in the right inguinal region.      Head CT w/o contrast   Final Result   IMPRESSION:   1.  No intracranial hemorrhage, mass, or definite CT evidence of   recent ischemia.   2.  Moderate to severe burden of presumed chronic small vessel   ischemic changes. MRI would be considerably more sensitive for small   infarct in this patient.      DIANNE RODRIGUEZ MD            SYSTEM ID:  [id number]         Report per radiology    Laboratory:  Labs Ordered and Resulted from Time of ED Arrival to Time of ED Departure   BASIC METABOLIC PANEL - Abnormal       Result Value    Sodium 144      Potassium 4.5      Chloride 107      Carbon Dioxide (CO2) 25      Anion Gap 12      Urea Nitrogen 23.3 (*)     Creatinine 0.85      Calcium 9.5      Glucose 166 (*)     GFR Estimate 61     ROUTINE UA WITH MICROSCOPIC REFLEX TO CULTURE - Abnormal    Color Urine Light Yellow      Appearance Urine Clear      Glucose Urine Negative      Bilirubin Urine Negative      Ketones Urine Negative      Specific Gravity Urine 1.024      Blood Urine  Negative      pH Urine 6.5      Protein Albumin Urine 20 (*)     Urobilinogen Urine Normal      Nitrite Urine Negative      Leukocyte Esterase Urine Trace (*)     Mucus Urine Present (*)     RBC Urine 3 (*)     WBC Urine 11 (*)     Squamous Epithelials Urine <1     CBC WITH PLATELETS AND DIFFERENTIAL - Abnormal    WBC Count 10.6      RBC Count 3.61 (*)     Hemoglobin 12.2      Hematocrit 37.3       (*)     MCH 33.8 (*)     MCHC 32.7      RDW 14.1      Platelet Count 160      % Neutrophils 71      % Lymphocytes 19      % Monocytes 8      % Eosinophils 1      % Basophils 0      % Immature Granulocytes 1      NRBCs per 100 WBC 0      Absolute Neutrophils 7.5      Absolute Lymphocytes 2.0      Absolute Monocytes 0.9      Absolute Eosinophils 0.1      Absolute Basophils 0.0      Absolute Immature Granulocytes 0.1      Absolute NRBCs 0.0     ISTAT GASES LACTATE VENOUS POCT - Abnormal    Lactic Acid POCT 1.9      Bicarbonate Venous POCT 27      O2 Sat, Venous POCT 56 (*)     pCO2 Venous POCT 48      pH Venous POCT 7.36      pO2 Venous POCT 31     URINE CULTURE      Emergency Department Course & Assessments:       Interventions:  Medications   cefTRIAXone (ROCEPHIN) 1 g vial to attach to  mL bag for ADULTS or NS 50 mL bag for PEDS (has no administration in time range)   OLANZapine (zyPREXA) injection 2.5 mg (2.5 mg Intramuscular $Given 7/28/23 1438)      Assessments:  1331 I consulted with the patient and obtained history as shown above  1433 I rechecked the patient and consulted with the patients daughter who is now present    Independent Interpretation (X-rays, CTs, rhythm strip):  None    Consultations/Discussion of Management or Tests:  1432 I consulted with ED pharmacy about the patients lab results and recommended medications    1700 I consulted with Yulissa Slater PA-C about the patient and plan of care    Social Determinants of Health affecting care:   Stress/Adjustment Disorders, dementia      Disposition:  The patient was admitted to the hospital under the care of Dr. Mondragon.     Impression & Plan    CMS Diagnoses: None    Medical Decision Makin-year-old female with history of dementia and recurrent urinary tract infections presenting to the ER for evaluation of hallucinations.  Per daughter, patient gets hallucinations when she has UTI.  She was diagnosed with UTI  and started on Keflex.  Here, patient is afebrile and hemodynamically stable.  There is report of patient slipping out of bed yesterday.  CT head obtained fortunately negative for acute pathology.  Cervical spine is cleared clinically.  Pelvis x-ray shows no acute fracture.  The rest of her basic lab studies are unremarkable.  Her repeat urine here shows persistent pyuria, microscopic hematuria, trace leukocyte esterase.  Discussed antibiotic with ED pharmacy who recommended changing to third-generation cephalosporin based on sensitivities.  Patient is otherwise well-appearing and is hemodynamically stable.  No signs of sepsis.  However, daughter expresses concern for keeping patient safe at her nursing home as there are is not adequate staff to ensure patient safety with ambulation.  Patient be admitted under hospital observation status.  Discussed case with the medicine service who excepted the patient for this.    Diagnosis:    ICD-10-CM    1. Recurrent UTI  N39.0       2. Hallucinations  R44.3       3. History of dementia  Z86.59            Discharge Medications:  New Prescriptions    No medications on file      Scribe Disclosure:  I, Blake Lyons, am serving as a scribe at 1:23 PM on 2023 to document services personally performed by Jasmeet Coreas MD based on my observations and the provider's statements to me.     2023   Jasmeet Coreas MD [place], Edgar Ronald, MD  [phone]

## 2023-07-28 NOTE — ED NOTES
[facility]  ED Nurse Handoff Report    ED Chief complaint: Altered Mental Status  . ED Diagnosis:   Final diagnoses:   Recurrent UTI   Hallucinations   History of dementia       Allergies:   Allergies   Allergen Reactions    Actonel [Bisphosphonates] Rash    Conjugated Estrogens Rash    Estrogens Conjugated Rash    Macrobid [Nitrofuran Derivatives] Rash    Nitrofurantoin Rash    Sulfa Antibiotics Rash    Hydrocodone Nausea and Vomiting    Oxycodone Nausea and Vomiting    Risedronate      leg pain     Code Status: DNR / DNI    Activity level - Baseline/Home:  in bed.  Activity Level - Current:   in bed.   Lift room needed: No.   Bariatric: No   Needed: No   Isolation: No.   Infection: Not Applicable.     Respiratory status: Room air    Vital Signs (within 30 minutes):   Vitals:    07/28/23 1145 07/28/23 1200 07/28/23 1215 07/28/23 1230   BP: 138/80 (!) 150/71 (!) 149/111 (!) 142/78   Pulse: 73 79 83 77   Resp:       Temp:       SpO2: 98% 93% 93% 95%       Cardiac Rhythm:  ,      Pain level:    Patient confused: Yes.   Patient Falls Risk: bed/chair alarm on, arm band in place, patient and family education, activity supervised, room door open, and toileting schedule implemented.   Elimination Status: Has voided      Focused Assessment:      Neuro Cognitive (Adult)Cognitive/Neuro/Behavioral WDL: all (pt comes in from [facility] with auditory and visual hallucinations per daughter and staff. pt was started on keflex 2 days ago for a UTI. pt does not normally walk but uses a stand and lift, last night pt slipped out of bed onto butt.)Level of Consciousness: confused (pt does not complain of any injuries. pt does have early onset dementia. confused on year, and pres.)Arousal Level: opens eyes spontaneouslyOrientation: disoriented x 4Speech: clear; spontaneousMood/Behavior: agitated  [place] Coma ScaleBest Eye Response: 4-->(E4) spontaneousBest Motor Response: 6-->(M6) obeys commandsBest  Verbal Response: 4-->(V4) confusedGlasgow Coma Scale Score: 14  Hand /Ankle StrengthHand , Left: strongHand , Right: strongDorsiflexion, Left: strongDorsiflexion, Right: strongPlantarflexion, Left: strongPlantarflexion, Right: strong       Abnormal Results:   Labs Ordered and Resulted from Time of ED Arrival to Time of ED Departure   ROUTINE UA WITH MICROSCOPIC REFLEX TO CULTURE - Abnormal       Result Value    Color Urine Light Yellow      Appearance Urine Clear      Glucose Urine Negative      Bilirubin Urine Negative      Ketones Urine Negative      Specific Gravity Urine 1.024      Blood Urine Negative      pH Urine 6.5      Protein Albumin Urine 20 (*)     Urobilinogen Urine Normal      Nitrite Urine Negative      Leukocyte Esterase Urine Trace (*)     Mucus Urine Present (*)     RBC Urine 3 (*)     WBC Urine 11 (*)     Squamous Epithelials Urine <1     CBC WITH PLATELETS AND DIFFERENTIAL - Abnormal    WBC Count 10.6      RBC Count 3.61 (*)     Hemoglobin 12.2      Hematocrit 37.3       (*)     MCH 33.8 (*)     MCHC 32.7      RDW 14.1      Platelet Count 160      % Neutrophils 71      % Lymphocytes 19      % Monocytes 8      % Eosinophils 1      % Basophils 0      % Immature Granulocytes 1      NRBCs per 100 WBC 0      Absolute Neutrophils 7.5      Absolute Lymphocytes 2.0      Absolute Monocytes 0.9      Absolute Eosinophils 0.1      Absolute Basophils 0.0      Absolute Immature Granulocytes 0.1      Absolute NRBCs 0.0     ISTAT GASES LACTATE VENOUS POCT - Abnormal    Lactic Acid POCT 1.9      Bicarbonate Venous POCT 27      O2 Sat, Venous POCT 56 (*)     pCO2 Venous POCT 48      pH Venous POCT 7.36      pO2 Venous POCT 31     BASIC METABOLIC PANEL   URINE CULTURE        Head CT w/o contrast    (Results Pending)   XR Pelvis 1/2 Views    (Results Pending)       Treatments provided: PIV, LABS, XRAY, CT, ZYPREXA, ABX, 1-1, BED ALARM  Family Comments: DAUGHTER AT BEDSIDE  OBS brochure/video  discussed/provided to patient:  No  ED Medications:   Medications   cefTRIAXone (ROCEPHIN) 1 g vial to attach to  mL bag for ADULTS or NS 50 mL bag for PEDS (has no administration in time range)   OLANZapine (zyPREXA) injection 2.5 mg (2.5 mg Intramuscular $Given 7/28/23 2634)     Drips infusing:  Yes  For the majority of the shift this patient was Yellow.   Interventions performed were ZYPREXA, BED ALARM, FAMILY AT BEDSIDE AND 1-1 ORDERED.    Sepsis treatment initiated: No    Cares/treatment/interventions/medications to be completed following ED care: CONTINUE TO MONITOR, ABX, SAFETY ISSUE,     ED Nurse Name: Nico Grant RN  4:19 PM  RECEIVING UNIT ED HANDOFF REVIEW    Above ED Nurse Handoff Report was reviewed: Yes  Reviewed by: Eder Cool RN on July 28, 2023 at 6:20 PM

## 2023-07-28 NOTE — ED TRIAGE NOTES
pt comes in from [facility] with auditory and visual hallucinations per daughter and staff. pt was started on keflex 2 days ago for a UTI. pt does not normally walk but uses a stand and lift, last night pt slipped out of bed onto butt. pt dies not complain of any injuries. pt does have early onset dementia. confused on year, and pres.

## 2023-07-28 NOTE — ED NOTES
Pt getting slightly agitated, asking said writer to get her shoes from back room. When it is explained that she didn't cone with any shoes pt then stated that said writer was lying and again asked for her shoes so she could leave. Pt placed on bed alarm until daughter returned. Bed low and locked and call light in reach.

## 2023-07-29 NOTE — PLAN OF CARE
PRIMARY DIAGNOSIS: Hallucinations, UTI  OUTPATIENT/OBSERVATION GOALS TO BE MET BEFORE DISCHARGE:  ADLs back to baseline: No    Activity and level of assistance: Not OOB, Sitter at bedside    Pain status: Improved-controlled with oral pain medications.    Return to near baseline physical activity: No     Discharge Planner Nurse   Safe discharge environment identified: Yes  Barriers to discharge: Yes       Entered by: Kaykay Benavides RN 07/29/2023 10:05 AM    Pt sleeping. Daughter at the bedside. Attempted to explain why patient is not being sedated. Trying No Sitter at bedside. PSC pulled at 0930. Will continue to monitor.

## 2023-07-29 NOTE — PHARMACY-ADMISSION MEDICATION HISTORY
Pharmacist Admission Medication History    Admission medication history is complete. The information provided in this note is only as accurate as the sources available at the time of the update.    Medication reconciliation/reorder completed by provider prior to medication history? Yes    Information Source(s): Facility ([facility] medication list/MAR via N/A    Pertinent Information: Pt taking 5 day course of Keflex for UTI that started on 7/26/23, to end on 7/31/23.    Changes made to PTA medication list:  Added: None  Deleted: None  Changed: None    Allergies reviewed with patient and updates made in EHR: no    Medication History Completed By: Kiersten Grover RPH 7/28/2023 7:43 PM    Prior to Admission medications    Medication Sig Last Dose Taking? Auth Provider Long Term End Date   acetaminophen (TYLENOL) 500 MG tablet Take 1,000 mg by mouth 2 times daily 7/28/2023 at X1 Yes Reported, Patient No    aspirin (ASA) 81 MG chewable tablet Take 81 mg by mouth daily 7/28/2023 at AM Yes Unknown, Entered By History     atorvastatin (LIPITOR) 40 MG tablet Take 40 mg by mouth every evening 7/27/2023 at 1800 Yes Reported, Patient Yes    carvedilol (COREG) 6.25 MG tablet Take 6.25 mg by mouth 2 times daily (with meals) 7/28/2023 at X1 Yes Reported, Patient Yes    cephALEXin (KEFLEX) 250 MG capsule Take 1 capsule (250 mg) by mouth 3 times daily for 5 days 7/28/2023 at X1 Yes Sara Britt PA-C  7/31/23   Cholecalciferol (VITAMIN D3) 50 MCG (2000 UT) TABS Take 2,000 Units by mouth daily 7/28/2023 at AM Yes Unknown, Entered By History     dorzolamide-timolol (COSOPT) 2-0.5 % ophthalmic solution Place 1 drop into both eyes 2 times daily 7/28/2023 at X1 Yes Prakash Gant MD     escitalopram (LEXAPRO) 10 MG tablet Take 10 mg by mouth daily 7/28/2023 at AM Yes Reported, Patient No    guaiFENesin (ROBITUSSIN) 20 mg/mL liquid Take 200 mg by mouth every 4 hours as needed for cough Past Week at PRN Yes Unknown, Entered By  History     ipratropium - albuterol 0.5 mg/2.5 mg/3 mL (DUONEB) 0.5-2.5 (3) MG/3ML neb solution Take 1 vial (3 mLs) by nebulization 3 times daily as needed for shortness of breath, wheezing or cough 7/27/2023 at PRN Yes Sara Britt PA-C Yes    latanoprost (XALATAN) 0.005 % ophthalmic solution Place 1 drop into both eyes daily 7/27/2023 at 1500 Yes Reported, Patient Yes    melatonin 3 MG tablet Take 3 mg by mouth nightly as needed for sleep Unknown at PRN Yes Unknown, Entered By History     menthol-zinc oxide (CALMOSEPTINE) 0.44-20.6 % OINT ointment Apply topically as needed Unknown at PRN Yes Unknown, Entered By History     ondansetron (ZOFRAN ODT) 4 MG ODT tab Take 1 tablet (4 mg) by mouth every 6 hours as needed for nausea or vomiting Past Week at PRN Yes Jerome Martin APRN CNP     polyethylene glycol (MIRALAX) 17 g packet Take 1 packet by mouth every other day Odd days 7/27/2023 at AM Yes Reported, Patient     traMADol (ULTRAM) 50 MG tablet Take 0.5 tablets (25 mg) by mouth daily At 1900 7/27/2023 at 1800 Yes Sara Britt PA-C

## 2023-07-29 NOTE — CARE PLAN
PRIMARY DIAGNOSIS: Visual Hallucination/UTI  OUTPATIENT/OBSERVATION GOALS TO BE MET BEFORE DISCHARGE:  ADLs back to baseline: No    Activity and level of assistance: AX1 in bed    Pain status: Improved-controlled with oral pain medications.    Return to near baseline physical activity: No     Discharge Planner Nurse   Safe discharge environment identified: No  Barriers to discharge: Yes       Entered by: Eder Cool RN 07/28/2023      Please review provider order for any additional goals.   Nurse to notify provider when observation goals have been met and patient is ready for discharge.    Pt alert and confused .VSS on RA.IVF infusing.Pt refused for Purewick.IV Rocephin Q 24 for UTI.Pt has sitter at bed side.    /75 (BP Location: Left arm)   Pulse 85   Temp 97.1  F (36.2  C) (Oral)   Resp 20   SpO2 93%

## 2023-07-29 NOTE — PLAN OF CARE
PRIMARY DIAGNOSIS: Recurrent UTI/ Dementia  OUTPATIENT/OBSERVATION GOALS TO BE MET BEFORE DISCHARGE:  ADLs back to baseline: No    Activity and level of assistance: Up with maximum assistance. Consider SW and/or PT evaluation.     Pain status: Improved-controlled with oral pain medications.    Return to near baseline physical activity: No     Discharge Planner Nurse   Safe discharge environment identified: No  Barriers to discharge: Yes       Entered by: Dai Aguilera RN 07/29/2023 3:53 AM   Pt is irritable and agitation. Hard to redirect. With sitter at bedside. Was given Tramadol with good relief  Please review provider order for any additional goals.   Nurse to notify provider when observation goals have been met and patient is ready for discharge.

## 2023-07-29 NOTE — CARE PLAN
ROOM # [id number]    Living Situation (if not independent, order SW consult):[facility].  Facility name:  : Daughter    Activity level at baseline: AX1  Activity level on admit: Ax1    Who will be transporting you at discharge: n/a    Patient registered to observation; given Patient Bill of Rights; given the opportunity to ask questions about observation status and their plan of care.  Patient has been oriented to the observation room, bathroom and call light is in place.    Discussed discharge goals and expectations with patient/family.

## 2023-07-29 NOTE — PLAN OF CARE
PRIMARY DIAGNOSIS: Recurrent UTI/ Dementia  OUTPATIENT/OBSERVATION GOALS TO BE MET BEFORE DISCHARGE:  ADLs back to baseline: No    Activity and level of assistance: Up with maximum assistance. Consider SW and/or PT evaluation.     Pain status: Improved-controlled with oral pain medications.    Return to near baseline physical activity: No     Discharge Planner Nurse   Safe discharge environment identified: No  Barriers to discharge: Yes       Entered by: Dai Aguilera RN 07/29/2023 4:54 AM   Pt is in bed, quiet and calm. Reports pain with movement, still confused but calm. Given meds crushed with pudding. Will take meds with apple sauce as well. Sitter at bedside. Daughter called for updates and was upset that we did not sedate her mother. Explained to her that pt was sleeping without needing sedation medications and only woke up in between cares but stated she'd like her mother sedated so she can sleep non stop.   Please review provider order for any additional goals.   Nurse to notify provider when observation goals have been met and patient is ready for discharge.

## 2023-07-29 NOTE — PLAN OF CARE
PRIMARY DIAGNOSIS: Hallucinations, UTI  OUTPATIENT/OBSERVATION GOALS TO BE MET BEFORE DISCHARGE:  ADLs back to baseline: No    Activity and level of assistance: Not OOB, Sitter at bedside    Pain status: Improved-controlled with oral pain medications.    Return to near baseline physical activity: No     Discharge Planner Nurse   Safe discharge environment identified: Yes  Barriers to discharge: Yes       Entered by: Kaykay Benavides RN 07/29/2023 3:06 PM    Pt disoriented to time and situation. Pleasant. Daughter at the bedside. Explained to family why patient is not being sedated. Sitter discontinued  7/29 @ 0930.Tolerating PO. Will continue to monitor.

## 2023-07-30 NOTE — PLAN OF CARE
PRIMARY DIAGNOSIS: ACUTE PAIN  OUTPATIENT/OBSERVATION GOALS TO BE MET BEFORE DISCHARGE:  1. Pain Status: Improved-controlled with oral pain medications.    2. Return to near baseline physical activity: No    3. Cleared for discharge by consultants (if involved): No    Discharge Planner Nurse   Safe discharge environment identified: Yes  Barriers to discharge: Yes       Entered by: Lizzette Scott RN 07/30/2023 11:55 AM     Please review provider order for any additional goals.   Nurse to notify provider when observation goals have been met and patient is ready for discharge.Goal Outcome Evaluation:     Pt.will be staying in hospital until tomorrow as daughter needs a chance to speak with NM of facility. Pt. Is on ABX's for bronchitis and UTI. Daughter concerned as this is either the 3rd or 4th UTI she states her mother has gotten this month. Will con't to monitor and ensure pt. safety. Napping in between cares.

## 2023-07-30 NOTE — PROGRESS NOTES
[facility]  Hospitalist Progress Note  Jerome Nur MD 07/30/2023    Reason for Stay (Diagnosis): UTI, encephalopathy         Assessment and Plan:      Summary of Stay: Alejandrina Whatley is a 89 year old female with a history of HLD, CVA, Left Hemiparesis, dementia, Depression, CED, AAA, HTN, Recurrent UTI who presents to the ED from her LTC center with Hallucinations and recently diagnosed UTI.  Pt frequently gets hallucinations with UTI.      Symptoms have improved while hospitalized with treatment for UTI.  Hallucinations resolved     Plan:  - continue Rocephin  - anticipate discharge back to LTC facility tomorrow  - I updated dtr at bedside today     Hallucinations  Encephalopathy  UTI   - pt presents with visual hallucinations in the setting of UTI and hx of Dementia.    - Urine culture 7/25 growing >100k Aerococcus Urinae.  was being treated with Keflex pta.  - continue Rocephin  - follow up urine cx  - no recent med changes per dtr     Fall  Generalized Weakness   - due to UTI and hallucinations as above.  Pt slipped out of bed.  No LOC.  CTH negative for acute pathology.  Cervical spine was cleared clinically. Pelvis x-ray negative for acute fracture.      Dementia  CED - at baseline with short term memory impairment and repetitive questions.  Has a hx of hallucinations with acute infection in the past.  Resides at [facility].  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  Does not walk but uses a stand and lift.  - continue ASA, Atorvastatin     HTN - continue Coreg      Recent Bronchitis - improving cough after a course of Zithromax per daughter.  Continue prn nebs     Chronic arthritis/back pain  - increase scheduled Tylenol to TID     CODE: DNR/DNI  Diet/IVF: regular, NS  DVT ppx: scd  DISPO:  anticipate return to LTC center tomorrow        Interval History (Subjective):      Dtr reports that patient did not know where she  was this AM.  No further hallucinations reported.  Dtr plans to talk with LTC facility about care patient is receiving to help prevent future falls/injuries                  Physical Exam:      Last Vital Signs:  BP (!) 155/90 (BP Location: Left arm)   Pulse 72   Temp 97.8  F (36.6  C) (Oral)   Resp 16   SpO2 94%       Intake/Output Summary (Last 24 hours) at 7/30/2023 1048  Last data filed at 7/29/2023 1929  Gross per 24 hour   Intake 480 ml   Output --   Net 480 ml       Constitutional: Awake, alert, cooperative, no apparent distress     Respiratory: Clear to auscultation bilaterally, no crackles or wheezing   Cardiovascular: Regular rate and rhythm, normal S1 and S2, and no murmur noted   Abdomen: Normal bowel sounds, soft, non-distended, non-tender   Skin: No rashes, no cyanosis, dry to touch   Neuro: As above no weakness, numbness, +memory loss   Extremities: No edema, normal range of motion   Other(s):        All other systems: Negative          Medications:      All current medications were reviewed with changes reflected in problem list.         Data:      All new lab and imaging data was reviewed.   Labs:       Lab Results   Component Value Date     07/28/2023     06/18/2023     06/17/2023     03/03/2020     02/05/2020     01/28/2020    Lab Results   Component Value Date    CHLORIDE 107 07/28/2023    CHLORIDE 109 06/18/2023    CHLORIDE 107 06/17/2023    CHLORIDE 116 07/22/2022    CHLORIDE 114 07/20/2022    CHLORIDE 109 07/03/2022    CHLORIDE 107 03/03/2020    CHLORIDE 106 02/05/2020    CHLORIDE 108 01/28/2020    Lab Results   Component Value Date    BUN 23.3 07/28/2023    BUN 19.3 06/18/2023    BUN 22.1 06/17/2023    BUN 26 07/22/2022    BUN 32 07/20/2022    BUN 21 07/03/2022    BUN 19 03/03/2020    BUN 19 02/05/2020    BUN 29 01/28/2020      Lab Results   Component Value Date    POTASSIUM 4.5 07/28/2023    POTASSIUM 3.8 06/18/2023    POTASSIUM 4.0 06/17/2023     POTASSIUM 4.2 07/23/2022    POTASSIUM 4.3 07/22/2022    POTASSIUM 4.2 07/20/2022    POTASSIUM 3.9 03/03/2020    POTASSIUM 4.3 02/05/2020    POTASSIUM 4.2 01/28/2020    Lab Results   Component Value Date    CO2 25 07/28/2023    CO2 23 06/18/2023    CO2 23 06/17/2023    CO2 23 07/22/2022    CO2 22 07/20/2022    CO2 28 07/03/2022    CO2 27 03/03/2020    CO2 24 02/05/2020    CO2 25 01/28/2020    Lab Results   Component Value Date    CR 0.85 07/28/2023    CR 0.71 06/18/2023    CR 0.76 06/17/2023    CR 0.77 03/03/2020    CR 0.78 02/05/2020    CR 0.94 01/28/2020         Imaging:   No results found for this or any previous visit (from the past 24 hour(s)).

## 2023-07-30 NOTE — PLAN OF CARE
"PRIMARY DIAGNOSIS: \"GENERIC\" NURSING  OUTPATIENT/OBSERVATION GOALS TO BE MET BEFORE DISCHARGE:  ADLs back to baseline: No    Activity and level of assistance: Up with maximum assistance. Consider SW and/or PT evaluation.     Pain status: nonverbal     Return to near baseline physical activity: No     Discharge Planner Nurse   Safe discharge environment identified: Yes  Barriers to discharge: Yes       Entered by: Jacob Felix RN 07/29/2023     Patient is Disorientated to, Time, Place, and Situation. Able to open eyes with stimulation but goes back to sleep . Unable to take scheduled meds this time, pt sleeps during conversation/  instructions/cares, Pt assisted personal care x2. Pt  was repositioned with use of pillows, pt is not showing any nonverbal signs of being in pain. Daughter called stated that patient did not sleep for past 32hrs therefore, should not bother to give medications if there is no sign of pain. Pt's VSS except  /80 Right arm and 96/58 left arm, provider is aware.    Please review provider order for any additional goals.   Nurse to notify provider when observation goals have been met and patient is ready for discharge.Goal Outcome Evaluation:                        "

## 2023-07-30 NOTE — CONSULTS
Care Management Initial Consult    General Information  Assessment completed with: Patient, Children, pt, pt's dtr Pat  Type of CM/SW Visit: Initial Assessment    Primary Care Provider verified and updated as needed:     Readmission within the last 30 days:        Reason for Consult: discharge planning  Advance Care Planning: Advance Care Planning Reviewed: present on chart       Communication Assessment  Patient's communication style: spoken language (English or Bilingual)    Hearing Difficulty or Deaf: no   Wear Glasses or Blind: no    Cognitive  Cognitive/Neuro/Behavioral: .WDL except  Level of Consciousness: confused  Arousal Level: opens eyes spontaneously  Orientation: disoriented to, situation, time  Mood/Behavior: calm, cooperative  Best Language: 0 - No aphasia  Speech: clear    Living Environment:   People in home: facility resident     Current living Arrangements: other (see comments) (Long term care)  Name of Facility: [facility]   Able to return to prior arrangements: yes     Family/Social Support:  Care provided by: other (see comments) (LTC staff)  Provides care for: no one, unable/limited ability to care for self  Marital Status:   Children, Facility resident(s)/Staff          Description of Support System: Supportive, Involved    Support Assessment: Adequate family and caregiver support, Adequate social supports    Current Resources:   Patient receiving home care services: No     Community Resources: Skilled Nursing Facility  Equipment currently used at home:    Supplies currently used at home:      Employment/Financial:  Employment Status:          Financial Concerns: No concerns identified   Referral to Financial Worker: No    Lifestyle & Psychosocial Needs:  Social Determinants of Health     Tobacco Use: Low Risk  (10/17/2022)    Patient History     Smoking Tobacco Use: Never     Smokeless Tobacco Use: Never     Passive Exposure: Not on file   Alcohol Use: Not on file    Financial Resource Strain: Low Risk  (9/24/2020)    Overall Financial Resource Strain (CARDIA)     Difficulty of Paying Living Expenses: Not very hard   Food Insecurity: No Food Insecurity (9/24/2020)    Hunger Vital Sign     Worried About Running Out of Food in the Last Year: Never true     Ran Out of Food in the Last Year: Never true   Transportation Needs: No Transportation Needs (9/24/2020)    PRAPARE - Transportation     Lack of Transportation (Medical): No     Lack of Transportation (Non-Medical): No   Physical Activity: Inactive (9/24/2020)    Exercise Vital Sign     Days of Exercise per Week: 0 days     Minutes of Exercise per Session: 0 min   Stress: No Stress Concern Present (9/24/2020)    [place] [employer] - Occupational Stress Questionnaire     Feeling of Stress : Only a little   Social Connections: Moderately Integrated (9/24/2020)    Social Connection and Isolation Panel [NHANES]     Frequency of Communication with Friends and Family: More than three times a week     Frequency of Social Gatherings with Friends and Family: Three times a week     Attends Yarsani Services: More than 4 times per year     Active Member of Clubs or Organizations: Yes     Attends Club or Organization Meetings: 1 to 4 times per year     Marital Status:    Intimate Partner Violence: Not At Risk (9/24/2020)    Humiliation, Afraid, Rape, and Kick questionnaire     Fear of Current or Ex-Partner: No     Emotionally Abused: No     Physically Abused: No     Sexually Abused: No   Depression: Not at risk (10/17/2022)    PHQ-2     PHQ-2 Score: 2   Housing Stability: Not on file     Functional Status:  Prior to admission patient needed assistance:   Dependent ADLs:: Bathing, Dressing, Eating, Incontinence, Grooming, Positioning, Transfers, Wheelchair-with assist, Toileting  Dependent IADLs:: Cleaning, Cooking, Laundry, Shopping, Meal Preparation, Medication Management, Transportation     Mental Health  "Status:  Mental Health Status: No Current Concerns       Chemical Dependency Status:  Chemical Dependency Status: No Current Concerns           Values/Beliefs:  Spiritual, Cultural Beliefs, Christianity Practices, Values that affect care:  n/a             Additional Information:  Patient is 89 year old female admitted on 7/28/2023 with a chief complaint of \"Fall, Hallucinations, UTI \" (per H&P). Chart reviewed. Pt resides at [facility].     Met with pt and pt's dtr Pat and introduced self and social work role. Explained writer will assist in coordinating pt's return to [facility] once medically ready. Dtr Pat is in agreement. Previous admissions pt has used [employer] stretcher to return to LTC. Pat requests this be arranged once again.     Appears plan is for pt to discharge today vs tomorrow.     Social work will continue to follow and assist with discharge planning as needed.    RYAN Hanks, LSW  Care Coordination  [phone]    RYAN Ramos      "

## 2023-07-30 NOTE — PLAN OF CARE
PRIMARY DIAGNOSIS: Hallucinations, UTI  OUTPATIENT/OBSERVATION GOALS TO BE MET BEFORE DISCHARGE:  ADLs back to baseline: No    Activity and level of assistance: Not OOB, Sitter at bedside    Pain status: Improved-controlled with oral pain medications.    Return to near baseline physical activity: No     Discharge Planner Nurse   Safe discharge environment identified: Yes  Barriers to discharge: Yes       Entered by: Kaykay Benavides RN 07/29/2023 7:30 PM    Pt disoriented to time and situation. Daughter at the bedside. Sitter discontinued 7/29 @ 0930.   Patient has been sleeping in between cares. VSS but hypertensive. Incontinent of bowel and bladder. Tolerating PO. On IV rocephin. Possible discharge tomorrow.

## 2023-07-30 NOTE — PLAN OF CARE
PRIMARY DIAGNOSIS:Confusion  OUTPATIENT/OBSERVATION GOALS TO BE MET BEFORE DISCHARGE:  ADLs back to baseline: No    Activity and level of assistance: bedrest, assist of 2 for cares    Pain status: Improved-controlled with oral pain medications.    Return to near baseline physical activity: No     Discharge Planner Nurse   Safe discharge environment identified: No  Barriers to discharge: Yes       Entered by: Lizzette Scott RN 07/30/2023 11:48 AM     Please review provider order for any additional goals.   Nurse to notify provider when observation goals have been met and patient is ready for discharge.Goal Outcome Evaluation:        VSS, afeb this AM.Tired, as per nocs, pt. Slept soundly thru the night.No aggressive/combative behaviors noted.daughter here this AM.Very concerned about pt's welfare and care @ facility that she is at.Pt. has fallen out of bed evidently several times,according to daughter, pt. Get her needs attended to as quickly as they should be.MD and HARI and this RN all spoke to her.RN and MD suggested that she speak to the NM of the floor above to see if pt. can't be moved up there as it appears to be better managed and staff appear to be more attentive. Daughter stated that she would do this. Will con't to monitor.

## 2023-07-30 NOTE — PLAN OF CARE
"PRIMARY DIAGNOSIS: \"GENERIC\" NURSING  OUTPATIENT/OBSERVATION GOALS TO BE MET BEFORE DISCHARGE:  ADLs back to baseline: No    Activity and level of assistance: Up with maximum assistance. Consider SW and/or PT evaluation.     Pain status: nonverbal     Return to near baseline physical activity: No     Discharge Planner Nurse   Safe discharge environment identified: Yes  Barriers to discharge: Yes       Entered by: Jacob Felix RN 07/30/2023     Patient is Disorientated to, Time, Place, and Situation. Able to open eyes with stimulation but goes back to sleep . \"I want to sleep\".    Please review provider order for any additional goals.   Nurse to notify provider when observation goals have been met and patient is ready for discharge.Goal Outcome Evaluation:                        "

## 2023-07-31 NOTE — PLAN OF CARE
PRIMARY DIAGNOSIS: UTI/CONFUSION  OUTPATIENT/OBSERVATION GOALS TO BE MET BEFORE DISCHARGE:  Diagnostic test and consults (if applicable) complete: Yes    Vitals signs stable or return to baseline: Yes    Tolerate oral intake to maintain hydration: Yes    Pain status: Pain free.    Tolerating oral antibiotics or has plans for home infusion setup:  No, IV Rocephin    Return to near baseline physical activity: Yes, patient up with lift at facility    Discharge Planner Nurse   Safe discharge environment identified: No, need to coordinate discharge back to [facility].  Barriers to discharge: No       Entered by: Rachel Alexander RN 07/30/2023 7:35 PM     Please review provider order for any additional goals.   Nurse to notify provider when observation goals have been met and patient is ready for discharge.    Patient pleasant while daughter present at bedside, confused but pleasant. After daughter left, patient very agitated/anxious about spending the night in a strange place.

## 2023-07-31 NOTE — PLAN OF CARE
"PRIMARY DIAGNOSIS: Dementia  OUTPATIENT/OBSERVATION GOALS TO BE MET BEFORE DISCHARGE:  ADLs back to baseline: No    Activity and level of assistance: Up with maximum assistance. Consider SW and/or PT evaluation.     Pain status: nonverbal     Return to near baseline physical activity: No     Discharge Planner Nurse   Safe discharge environment identified: Yes  Barriers to discharge: Yes       Entered by: Jacob Felix RN 07/31/2023     Patient is Disorientated to, Time, Place, and Situation. Patient was restless this shift, tried to get out of the bed several times, Hallucinated stated \" Do you see the little children jumping in my bed\".  Haloperidol was given, patient is able to sleep.  Please review provider order for any additional goals.   Nurse to notify provider when observation goals have been met and patient is ready for discharge.Goal Outcome Evaluation:                        "

## 2023-07-31 NOTE — PROGRESS NOTES
Care Management Discharge Note    Discharge Date: 07/31/2023     Discharge Disposition: [facility]    Discharge Services:     Discharge DME:      Discharge Transportation: [employer] stretcher    Private pay costs discussed: transportation costs    Education Provided on the Discharge Plan: yes  Persons Notified of Discharge Plans: pt/pt's dtr, nursing, MD, LTC admissions Nicol/Rosario  Patient/Family in Agreement with the Plan: yes    Handoff Referral Completed: No    Additional Information:  Pt medically cleared to return to [facility].     Discussed with pt/pt's dtr MD Pat and nursing.     [employer] stretcher ride arranged for today between [id number]. PCS completed.     Orders reviewed and faxed.     RYAN Hanks, LSW  Care Coordination  [phone]    RYAN Ramos

## 2023-07-31 NOTE — PLAN OF CARE
PRIMARY DIAGNOSIS: UTI, AMS  OUTPATIENT/OBSERVATION GOALS TO BE MET BEFORE DISCHARGE:  ADLs back to baseline: No     Activity and level of assistance: Up with maximum assistance. Consider SW and/or PT evaluation.      Pain status: nonverbal      Return to near baseline physical activity: No          Discharge Planner Nurse   Safe discharge environment identified: Yes  Barriers to discharge: Yes       Patient is Disorientated to, Time, Place, and Situation. Calm and cooperative.

## 2023-07-31 NOTE — PLAN OF CARE
PRIMARY DIAGNOSIS: RECURRENT UTI. CONFUSE  OUTPATIENT/OBSERVATION GOALS TO BE MET BEFORE DISCHARGE:  ADLs back to baseline: No    Activity and level of assistance: Up with maximum assistance. Consider SW and/or PT evaluation.     Pain status: nonverbal     Return to near baseline physical activity: No     Discharge Planner Nurse   Safe discharge environment identified: Yes  Barriers to discharge: Yes       Entered by: Jacob Felix RN 07/30/2023     Vitals are Temp: 98.2  F (36.8  C) Temp src: Oral BP: (!) 144/67 Pulse: 75   Resp: 18 SpO2: 93 %.  Patient is Alert to and Self. They are 2 assist with Lift.Pt is a Regular diet.  Patient is Saline locked.     Please review provider order for any additional goals.   Nurse to notify provider when observation goals have been met and patient is ready for discharge.Goal Outcome Evaluation:

## 2023-07-31 NOTE — PROGRESS NOTES
I saw and examined this patient today.    I updated her daughter at bedside again today    Anticipate discharge back to her LTC center today

## 2023-07-31 NOTE — PROGRESS NOTES
[facility] Care Coordination Contact    [facility]  Ambulatory Care Coordination to Inpatient Care Management   Hand-In Communication    Date:  July 31, 2023  Name: Alejandrina Whatley is enrolled in [facility] Care Coordination program and I am the Lead Care Coordinator.  CC Contact Information:.   Payor Source: Payor: [facility] / Plan: [facility] DUAL / Product Type: HMO /   Current services in place:     Please see the CC Snaphot and Care Management Flowsheets for specific  details of this Alejandrina Whatley care plan.   Additional details/specific concerns r/t this admission:    Discharge Disposition Lives in LTC at [facility]  . Care Coordinator available for any questions or concerns     I will follow this admission in Epic. Please feel free to contact me with questions or for further collaboration in discharge planning.    Alba Haines RN, PHN  Intuitional Care Coordinator [facility]  Cell [phone] Fax [phone]

## 2023-07-31 NOTE — PROGRESS NOTES
TRANSITIONS OF CARE (IRENE) LOG   IRENE tasks should be completed by the CC within one (1) business day of notification of each transition. Follow up contact with member is required after return to their usual care setting.  Note:  If CC finds out about the transitions fifteen (15) days or more after the member has returned to their usual care setting, no IRENE log is needed. However, the CC should check in with the member to discuss the transition process, any changes needed to the care plan and document it in a case note.    Member Name:  Alejandrina Whatley [facility] Member ID#: [id number]   Product: [facility] Care Coordinator Contact:  Alba Haines RN, PHN Agency/County/Care System: [facility]   Transition Communication Actions from Care Management Contact   Transition #1   Notification Date: 07/31/23 Transition Date:   07/28/23 Transition From: Nursing Home, [facility] SNF     Is this the member s usual care setting?               yes Transition To: [facility], [facility]   Transition Type:  Unplanned  Reason for Admission/Comments:  Altered Memntal status, fall, hallucinations   Contact member/responsible party to offer assistance with transition Date completed: 07/31/23    Notes from conversation with the member/responsible party, provider, discharging and receiving facility (as applicable):   Date 07/31/23:CC contacted Hospital /discharge planner via the [employer] Transitional Care Hand-In Process, with community care plan included.  CC reached out to this care coordinator, adult daughter Pat, and Jigna EMMA [facility]  regarding transition and offered support as needed.  Reviewed and update care plan as needed.  Notified community service providers and placed services None on hold as needed.  Transition log initiated.   PCP, Sara Britt May, notified of hospitalization via EMR.    Alba Haines RN,  [facility]  Intuitional Care Coordinator [facility]  Cell [phone] Fax [phone]       Shared CC contact info, care plan/services with receiving setting--Date completed: 07/31/23   Name & Title of receiving setting contact: [facility]   Notified PCP of transition--Date completed:  07/28/23     via  EMR  Name of PCP: Sara Britt     Transition #2   Notification Date: 07/31/23 Transition Date:   07/31/23 Transition From: Hospital, [facility]     Is this the member s usual care setting?               no Transition To: Nursing Home, [facility] SNF   Transition Type:  Planned  Reason for Admission/Comments:  UTI, Hallucinations   Contact member/responsible party to offer assistance with transition Date completed: 07/31/23    Notes from conversation with the member/responsible party, provider, discharging and receiving facility (as applicable):   Date 07/31/23:CC contacted this care coordinator, adult daughter Pat, and HARI Benito  and reviewed discharge summary.  Member has a follow-up appointment with PCP in 7 days: Yes: scheduled on will be seen this week by in house NP/MD    Member has had a change in condition: No  Home visit needed: No  Care plan reviewed and updated.  The following home based services None were resumed.  New referrals placed: No  Transition log completed.   PCP, Sara Britt May, notified of transition back to home via EMR.    Alba Haines RN, [facility]  Intuitional Care Coordinator [facility]  Cell [phone] Fax [phone]          *RETURN TO USUAL CARE SETTING: *Complete tasks below when the member is discharging TO their usual care setting within one (1) business day of notification..      For situations where the Care Coordinator is notified of the discharge prior to the date of discharge, the Care Coordinator must follow up with the member or designated representative to confirm that  discharge actually occurred and discuss required IRENE tasks as outlined in the IRENE Instructions.  (This includes situations where it may be a  new  usual care setting for the member. (i.e., a community member who decides upon permanent nursing home placement following hospitalization and rehab).    Discuss with Member/Responsible Party:    Check  Yes  - if the member, family member and/or SNF/facility staff manages the following:    If  No  provide explanation in the comments section.          Date completed: 07/31/23 Communicated with member or their designated representative about the following:  care transition process; about changes to the member s health status; plan of care updates; education about transitions and how to prevent unplanned transitions/readmissions    Four Pillars for Optimal Transition:    Check  Yes  - if the member, family member and/or SNF/facility staff manages the following:    If  No  provide explanation in the comments section.          [x]  Yes     []  No Does the member have a follow-up appointment scheduled with primary care or specialist? (Mental health hospitalizations--the appt. should be w/in 7 days)              For mental health hospitalizations:  []  Yes     []  No     Does the member have a follow-up appointment scheduled with a mental health practitioner within 7 days of discharge?  [x]  Yes     []  No     Has a medication review been completed with member? If no, refer to PCP, home care nurse, MTM, pharmacist  [x]  Yes     []  No     Can the member manage their medications or is there a system in place to manage medications (e.g. home care set-up)?         [x]  Yes     []  No     Can the member verbalize warning signs and symptoms to watch for and how to respond?  [x]  Yes     []  No     Does the member have a copy of and understand their discharge instructions?  If no, assist to obtain copy of discharge instructions, review discharge instructions, and assist to contact PCP  to discuss questions about their recent hospitalization.  [x]  Yes     []  No     Does the member have adequate food, housing and transportation?  If no, add goal and discuss additional supports available to the member                                                                                                                                                                                 [x]  Yes     []  No     Is the member safe in their home?  If no, document needs and support provided                                                                                                                                                                          []  Yes     [x]  No     Are there any concerns of vulnerability, abuse, or neglect?  If yes, document concerns and actions taken by Care Coordinator as a mandated                                                                                                                                                                              [x]  Yes     []  No     Does the member use a Personal Health Care Record?  Check  Yes  if visit summary, discharge summary, and/or healthcare summary are being used as a PHR.                                                                                                                                                                                  [x]  Yes     []  No     Have you reviewed the discharge summary with the member? If  No  provide explanation in comments.  [x]  Yes     []  No     Have you updated the member s care plan/support plan? Add new diagnosis, medications, treatments, goals & interventions, as applicable. If No, provide explanation in comments.    Comments:       returning to NH on 07/31/23. PMD for inpatient stay was UTI.     Notes from conversation with the member/responsible party, provider, discharging and receiving facility (as applicable): NH deny any concern related to member returning to their  facility     Alba Haines RN, PHN  Intuitional Care Coordinator [facility]  Cell [phone] Fax [phone]

## 2023-07-31 NOTE — DISCHARGE SUMMARY
[facility]  Hospitalist Discharge Summary      Date of Admission:  7/28/2023  Date of Discharge:  7/31/2023  Discharging Provider: Jerome Nur MD  Discharge Service: Hospitalist Service    Discharge Diagnoses   - UTI.  Urine cx positive for pan-sensitive aerococcus this admission.  - Infectious encephalopathy/hallucinations due to UTI    PMH:    HLD  CVA with incomplete left Hemiparesis  Dementia, moderate.  Resides in LTC center  Depression  CED  AAA  HTN  Recurrent UTI   History of hallucinations due to UTI      Clinically Significant Risk Factors          Follow-ups Needed After Discharge   Follow-up Appointments     Follow Up and recommended labs and tests      Follow up with primary provider as needed            Unresulted Labs Ordered in the Past 30 Days of this Admission       No orders found from 6/28/2023 to 7/29/2023.          Discharge Disposition   Discharged to long-term care facility  Condition at discharge: Stable    Hospital Course   89 year old female with a history of HLD, CVA, Left Hemiparesis, dementia, Depression, CED, AAA, HTN, Recurrent UTI who presents to the ED from her LTC center with Hallucinations and recently diagnosed UTI.  Pt frequently gets hallucinations with UTI.       The patient was treated with IV Rocephin.  Symptoms have improved while hospitalized with treatment for UTI.  Urine cx grew pan-sensitive aerococcus.  She will discharge on Augmentin for two more day to complete her antibiotic course.  She appears stable for discharge back to LTC center today.      Consultations This Hospital Stay   CARE MANAGEMENT / SOCIAL WORK IP CONSULT    Code Status   No CPR- Do NOT Intubate    Time Spent on this Encounter   I, Jerome Nur MD, personally saw the patient today and spent less than or equal to 30 minutes discharging this patient.       Jerome Nur MD  [facility] OBSERVATION DEPT  [address] [place]  [address]  Phone: [phone]  ______________________________________________________________________    Physical Exam   Vital Signs: Temp: 97.8  F (36.6  C) Temp src: Oral BP: 136/79 Pulse: 72   Resp: 16 SpO2: 96 % O2 Device: None (Room air)    Weight: 0 lbs 0 oz  Awake, alert, interactive  RRR  CTA bilaterally  NT/ND.  Soft abdomen    Primary Care Physician   Sara Britt    Discharge Orders      General info for SNF    Length of Stay Estimate: Long Term Care  Condition at Discharge: Stable  Level of care:skilled   Rehabilitation Potential: Poor  Admission H&P remains valid and up-to-date: Yes  Recent Chemotherapy: N/A  Use Nursing Home Standing Orders: Yes     Mantoux instructions    Give two-step Mantoux (PPD) Per Facility Policy Yes     Follow Up and recommended labs and tests    Follow up with primary provider as needed     Reason for your hospital stay    UTI, confusion     Activity - Up with nursing assistance     No CPR- Do NOT Intubate     Fall precautions     Diet    Follow this diet upon discharge: regular diet       Significant Results and Procedures   Results for orders placed or performed during the hospital encounter of 07/28/23   Head CT w/o contrast    Narrative    CT HEAD W/O CONTRAST 7/28/2023 4:26 PM    INDICATION: Confusion, fall  TECHNIQUE: CT scan of the head without contrast. Dose reduction  techniques were used.  CONTRAST: None.  COMPARISON: Brain MRI 9/20/2021, head CT 9/19/2021    FINDINGS:   No intracranial hemorrhage, extraaxial collection, mass effect or CT  evidence of acute infarct.  Moderate to severe presumed chronic small  vessel ischemic changes. Mild to moderate generalized volume loss. The  ventricles are proportional to the sulci. Osseous structures are  intact. Unremarkable orbits. Mucosal and wall thickening right  sphenoid sinus consistent with chronic sphenoid sinusitis. Mild  mucosal thickening left sphenoid sinus. Clear mastoid air cells.      Impression     IMPRESSION:  1.  No intracranial hemorrhage, mass, or definite CT evidence of  recent ischemia.  2.  Moderate to severe burden of presumed chronic small vessel  ischemic changes. MRI would be considerably more sensitive for small  infarct in this patient.    DIANNE RODRIGUEZ MD         SYSTEM ID:  [id number]   XR Pelvis 1/2 Views    Narrative    EXAM: XR PELVIS 1/2 VIEWS  LOCATION: [facility]  DATE: 7/28/2023    INDICATION: Fall, confusion  COMPARISON: 03/02/2022      Impression    IMPRESSION: Osteopenia. No acute fractures are evident. Cannulated screw fixation across an old healed left femoral neck fracture. Old healed bilateral superior and inferior pubic rami fractures. Advanced degenerative changes in the right hip.   Endovascular stents. Surgical clips in the right inguinal region.       Discharge Medications   Current Discharge Medication List        START taking these medications    Details   amoxicillin-clavulanate (AUGMENTIN) 500-125 MG tablet Take 1 tablet by mouth 2 times daily for 2 days    Associated Diagnoses: Acute cystitis without hematuria      !! polyethylene glycol (MIRALAX) 17 g packet Take 17 g by mouth daily as needed for constipation    Associated Diagnoses: Constipation, unspecified constipation type      QUEtiapine (SEROQUEL) 25 MG tablet Take 1 tablet (25 mg) by mouth 2 times daily as needed (agitation)    Associated Diagnoses: Encephalopathy       !! - Potential duplicate medications found. Please discuss with provider.        CONTINUE these medications which have NOT CHANGED    Details   acetaminophen (TYLENOL) 500 MG tablet Take 1,000 mg by mouth 2 times daily      aspirin (ASA) 81 MG chewable tablet Take 81 mg by mouth daily      atorvastatin (LIPITOR) 40 MG tablet Take 40 mg by mouth every evening      carvedilol (COREG) 6.25 MG tablet Take 6.25 mg by mouth 2 times daily (with meals)      Cholecalciferol (VITAMIN D3) 50 MCG (2000 UT) TABS Take 2,000 Units by  mouth daily      dorzolamide-timolol (COSOPT) 2-0.5 % ophthalmic solution Place 1 drop into both eyes 2 times daily  Qty: 1 Bottle, Refills: 11    Comments: May be substituted for Timolol and Dorzolamide separately if needed due to long term backorder of Cosopt.  Associated Diagnoses: Pseudoexfoliation glaucoma, moderate stage      escitalopram (LEXAPRO) 10 MG tablet Take 10 mg by mouth daily      guaiFENesin (ROBITUSSIN) 20 mg/mL liquid Take 200 mg by mouth every 4 hours as needed for cough      ipratropium - albuterol 0.5 mg/2.5 mg/3 mL (DUONEB) 0.5-2.5 (3) MG/3ML neb solution Take 1 vial (3 mLs) by nebulization 3 times daily as needed for shortness of breath, wheezing or cough  Qty: 90 mL    Associated Diagnoses: Acute bronchitis, unspecified organism      latanoprost (XALATAN) 0.005 % ophthalmic solution Place 1 drop into both eyes daily      melatonin 3 MG tablet Take 3 mg by mouth nightly as needed for sleep      menthol-zinc oxide (CALMOSEPTINE) 0.44-20.6 % OINT ointment Apply topically as needed      ondansetron (ZOFRAN ODT) 4 MG ODT tab Take 1 tablet (4 mg) by mouth every 6 hours as needed for nausea or vomiting    Associated Diagnoses: RSV infection      !! polyethylene glycol (MIRALAX) 17 g packet Take 1 packet by mouth every other day Odd days      traMADol (ULTRAM) 50 MG tablet Take 0.5 tablets (25 mg) by mouth daily At 1900  Qty: 45 tablet, Refills: 0    Associated Diagnoses: Other chronic pain       !! - Potential duplicate medications found. Please discuss with provider.        STOP taking these medications       cephALEXin (KEFLEX) 250 MG capsule Comments:   Reason for Stopping:             Allergies   Allergies   Allergen Reactions    Actonel [Bisphosphonates] Rash    Conjugated Estrogens Rash    Estrogens Conjugated Rash    Macrobid [Nitrofuran Derivatives] Rash    Nitrofurantoin Rash    Sulfa Antibiotics Rash    Hydrocodone Nausea and Vomiting    Oxycodone Nausea and Vomiting    Risedronate       leg pain

## 2023-08-02 NOTE — PROGRESS NOTES
[facility]    PRIMARY CARE PROVIDER AND CLINIC:  Sara Britt PA-C, [address] / [place] [address]  Chief Complaint   Patient presents with    Hospital F/U      [facility] Medical Record Number:  [id number]  Place of Service where encounter took place:  [facility] [[id number]]    Alejandrina Whatley  is a 89 year old  (8/22/1923), returned to the above facility from  [facility]. Hospital stay 7/28/23 - 7/31/23. .   HPI:      Notes from hospitalization reviewed including H&P  and D/c summary    Alejandrina Whatley is a 89 yo female with a PMH of dementia, depression, HTN, recurrent UTI, and previous CVA who was recently hospitalized for encephalopathy in the setting of UTI.    Diagnosed at facility 7/25 with UTI after developing hallucinations. Received ceftriaxone x 1 and started on keflex. UC with aerococcus sensitive to cephalosporins. Ultimately presented to the ER for persistent hallucinations. Laboratory evaluation reassuring but admitted to observation and treated with ceftriaxone. Antibiotics adjusted to Augmentin at discharge. Seroquel added for restlessness/agitation    Spoke with SW and bedside RN. Last week not thought to qualify for hospice but [facility] now plans to admit later this am. Patient current resting comfortably but has had some restlessness in the evening.    Alejandrina Noble is evaluated in her room. Sleeping comfortably. Denies pain    CODE STATUS/ADVANCE DIRECTIVES DISCUSSION:  No CPR- Do NOT Intubate  DNR / DNI  ALLERGIES:   Allergies   Allergen Reactions    Actonel [Bisphosphonates] Rash    Conjugated Estrogens Rash    Estrogens Conjugated Rash    Macrobid [Nitrofuran Derivatives] Rash    Nitrofurantoin Rash    Sulfa Antibiotics Rash    Hydrocodone Nausea and Vomiting    Oxycodone Nausea and Vomiting    Risedronate      leg pain      PAST MEDICAL HISTORY:   Past Medical History:   Diagnosis Date     Abdominal aortic aneurysm 2001    had a stent placed 2009    AK (actinic keratosis) 11/11/2009    Cataract 02/22/2011    Compression Fractures of Lumbar Vertebra 02/04/2010    CVA (cerebral vascular accident) (H)     Dec 2019 and Jan 2020    Dementia (H)     DJD (degenerative joint disease)     Generalised Weakness and Fatigue 03/03/2011    Glaucoma (increased eye pressure) 2009    Dr. 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  FAMILY HISTORY: family history includes Heart Disease (age of onset: 79) in her father; Hypertension in her mother.  SOCIAL HISTORY:   reports that she has never smoked. She has never used smokeless tobacco. She reports that she does not drink alcohol and does not use drugs.  Patient's living condition: lives in a nursing home    Post Discharge Medication Reconciliation Status:   MED REC REQUIRED  Post Medication Reconciliation Status: discharge medications reconciled and changed, per note/orders       Current Outpatient Medications   Medication Sig    hyoscyamine 0.125 MG TBDP Take 0.125 mg by mouth every 4 hours as needed for other    latanoprost (XALATAN) 0.005 % ophthalmic solution Place 1 drop into both eyes daily    LORazepam (ATIVAN) 0.5 MG tablet Take 0.5  "mg by mouth 4 times daily as needed for anxiety    menthol-zinc oxide (CALMOSEPTINE) 0.44-20.6 % OINT ointment Apply topically as needed    morphine sulfate (ROXANOL) 20 mg/mL (HIGH CONC) soln Take 5 mg by mouth every 2 hours as needed for shortness of breath or breakthrough pain    morphine sulfate (ROXANOL) 20 mg/mL (HIGH CONC) soln Take 5 mg by mouth 3 times daily    ondansetron (ZOFRAN ODT) 4 MG ODT tab Take 1 tablet (4 mg) by mouth every 6 hours as needed for nausea or vomiting    polyethylene glycol (MIRALAX) 17 g packet Take 1 packet by mouth every other day Odd days    QUEtiapine (SEROQUEL) 25 MG tablet Take 1 tablet (25 mg) by mouth 2 times daily as needed (agitation)    traMADol (ULTRAM) 50 MG tablet Take 0.5 tablets (25 mg) by mouth daily At 1900    atorvastatin (LIPITOR) 40 MG tablet Take 40 mg by mouth every evening    dorzolamide-timolol (COSOPT) 2-0.5 % ophthalmic solution Place 1 drop into both eyes 2 times daily     No current facility-administered medications for this visit.       ROS:  Limited secondary to cognitive impairment but today pt reports denies pain    Vitals:  /75   Pulse 75   Temp 97.9  F (36.6  C)   Resp 17   Ht 1.549 m (5' 1\")   Wt 59.9 kg (132 lb)   SpO2 94%   BMI 24.94 kg/m    Exam:  Physical Exam  Vitals (Facility EMR) reviewed.   Constitutional:       General: She is not in acute distress.  HENT:      Head: Normocephalic and atraumatic.   Eyes:      General: No scleral icterus.  Cardiovascular:      Rate and Rhythm: Normal rate and regular rhythm.      Heart sounds: No murmur heard.  Pulmonary:      Effort: Pulmonary effort is normal.   Musculoskeletal:      Right lower leg: No edema.      Left lower leg: No edema.   Skin:     General: Skin is warm and dry.      Findings: No rash.   Neurological:      Mental Status: She is alert. Mental status is at baseline.   Psychiatric:         Behavior: Behavior normal.         Lab/Diagnostic data:  Recent labs in [facility] reviewed " by me today.  and   Recent Results (from the past 240 hour(s))   UA with Microscopic    Collection Time: 07/25/23  9:46 AM   Result Value Ref Range    Color Urine Light Yellow Colorless, Straw, Light Yellow, Yellow    Appearance Urine Clear Clear    Glucose Urine Negative Negative mg/dL    Bilirubin Urine Negative Negative    Ketones Urine Negative Negative mg/dL    Specific Gravity Urine 1.019 1.003 - 1.035    Blood Urine Small (A) Negative    pH Urine 6.0 5.0 - 7.0    Protein Albumin Urine 20 (A) Negative mg/dL    Urobilinogen Urine Normal Normal, 2.0 mg/dL    Nitrite Urine Negative Negative    Leukocyte Esterase Urine Moderate (A) Negative    Mucus Urine Present (A) None Seen /LPF    RBC Urine 21 (H) <=2 /HPF    WBC Urine 30 (H) <=5 /HPF   Urine Culture    Collection Time: 07/25/23  9:46 AM    Specimen: Urine, NOS   Result Value Ref Range    Culture >100,000 CFU/mL Aerococcus urinae (A)        Susceptibility    Aerococcus urinae - JOSEMANUEL     Penicillin <=0.03 Susceptible ug/mL     Cefotaxime <=0.25 Susceptible ug/mL     Ceftriaxone <=0.25 Susceptible ug/mL     Vancomycin 0.25 Susceptible ug/mL     Tetracycline <=0.5 Susceptible ug/mL     Levofloxacin 0.5 Susceptible ug/mL   UA with Microscopic reflex to Culture    Collection Time: 07/28/23  1:46 PM    Specimen: Urine, Catheter   Result Value Ref Range    Color Urine Light Yellow Colorless, Straw, Light Yellow, Yellow    Appearance Urine Clear Clear    Glucose Urine Negative Negative mg/dL    Bilirubin Urine Negative Negative    Ketones Urine Negative Negative mg/dL    Specific Gravity Urine 1.024 1.003 - 1.035    Blood Urine Negative Negative    pH Urine 6.5 5.0 - 7.0    Protein Albumin Urine 20 (A) Negative mg/dL    Urobilinogen Urine Normal Normal, 2.0 mg/dL    Nitrite Urine Negative Negative    Leukocyte Esterase Urine Trace (A) Negative    Mucus Urine Present (A) None Seen /LPF    RBC Urine 3 (H) <=2 /HPF    WBC Urine 11 (H) <=5 /HPF    Squamous Epithelials  Urine <1 <=1 /HPF   Urine Culture    Collection Time: 07/28/23  1:46 PM    Specimen: Urine, Catheter   Result Value Ref Range    Culture No Growth    Basic metabolic panel (BMP)    Collection Time: 07/28/23  3:54 PM   Result Value Ref Range    Sodium 144 136 - 145 mmol/L    Potassium 4.5 3.4 - 5.3 mmol/L    Chloride 107 98 - 107 mmol/L    Carbon Dioxide (CO2) 25 22 - 29 mmol/L    Anion Gap 12 7 - 15 mmol/L    Urea Nitrogen 23.3 (H) 8.0 - 23.0 mg/dL    Creatinine 0.85 0.51 - 0.95 mg/dL    Calcium 9.5 8.2 - 9.6 mg/dL    Glucose 166 (H) 70 - 99 mg/dL    GFR Estimate 61 >60 mL/min/1.73m2   CBC with platelets and differential    Collection Time: 07/28/23  3:54 PM   Result Value Ref Range    WBC Count 10.6 4.0 - 11.0 10e3/uL    RBC Count 3.61 (L) 3.80 - 5.20 10e6/uL    Hemoglobin 12.2 11.7 - 15.7 g/dL    Hematocrit 37.3 35.0 - 47.0 %     (H) 78 - 100 fL    MCH 33.8 (H) 26.5 - 33.0 pg    MCHC 32.7 31.5 - 36.5 g/dL    RDW 14.1 10.0 - 15.0 %    Platelet Count 160 150 - 450 10e3/uL    % Neutrophils 71 %    % Lymphocytes 19 %    % Monocytes 8 %    % Eosinophils 1 %    % Basophils 0 %    % Immature Granulocytes 1 %    NRBCs per 100 WBC 0 <1 /100    Absolute Neutrophils 7.5 1.6 - 8.3 10e3/uL    Absolute Lymphocytes 2.0 0.8 - 5.3 10e3/uL    Absolute Monocytes 0.9 0.0 - 1.3 10e3/uL    Absolute Eosinophils 0.1 0.0 - 0.7 10e3/uL    Absolute Basophils 0.0 0.0 - 0.2 10e3/uL    Absolute Immature Granulocytes 0.1 <=0.4 10e3/uL    Absolute NRBCs 0.0 10e3/uL   iStat Gases (lactate) venous, POCT    Collection Time: 07/28/23  3:59 PM   Result Value Ref Range    Lactic Acid POCT 1.9 <=2.0 mmol/L    Bicarbonate Venous POCT 27 21 - 28 mmol/L    O2 Sat, Venous POCT 56 (L) 94 - 100 %    pCO2 Venous POCT 48 40 - 50 mm Hg    pH Venous POCT 7.36 7.32 - 7.43    pO2 Venous POCT 31 25 - 47 mm Hg       ASSESSMENT/PLAN:  Hospice Care Patient  Aerococcus UTI  Dementia  Acute Encephalopathy  Recurrent UTI: Tx for UTI at facility 7/25 after  developing hallucinations and abnormal UA. Presented to the ER for persistent hallucinations. Treated with Ceftriaxone and transitioned to Augmentin  - Continue Augmentin though 8/2  - Continue Seroquel nightly and prn  - Planned enrollment with [facility] later this am. Will defer comfort med initiation to hospice team      Orders:  NNO      Electronically signed by:  Sara Britt PA-C

## 2023-08-14 ENCOUNTER — PATIENT OUTREACH (OUTPATIENT)
Dept: GERIATRIC MEDICINE | Facility: CLINIC | Age: 88
End: 2023-08-14
Payer: COMMERCIAL

## 2023-08-14 NOTE — PROGRESS NOTES
[facility] Care Coordination Contact    Notified by JUICE Carver that member passed away on 08/10/23 at Nursing Facility [facility]    PCP notified. Called all providers to cancel services.     For [facility]:  Death Notification form completed and faxed to [facility].    Chart reviewed and this CC's encounter closed. Chart handed off to CMS to process disenrollment tasks.    Alba Haines RN, PHN  Intuitional Care Coordinator [facility]  Cell [phone] Fax [phone]

## 2023-08-18 ENCOUNTER — TELEPHONE (OUTPATIENT)
Dept: GERIATRICS | Facility: CLINIC | Age: 88
End: 2023-08-18
Payer: COMMERCIAL

## 2023-08-18 NOTE — TELEPHONE ENCOUNTER
Yes, spoke with daughter, ok to adjust. Spoke with Zaria [place] but death cert office closed for weekend and VM left to call me back next week to addend.

## 2023-08-18 NOTE — TELEPHONE ENCOUNTER
"  FYI - Status Update    Who is Calling: family member, daughter Pat    Update: Daughter called stating that she received Pt's death certificate in the mail and is stating that the cause of death is incorrect. Dr. Garcia as medical certifier wrote \"encephalopathy\" but daughter says that Pt passed due to \"UTI that was resistant to Abx.\"     Does caller want a call/response back: Yes, please    Okay to leave a detailed message?: No at phone number: [phone]  "

## 2023-08-24 NOTE — TELEPHONE ENCOUNTER
I have completed form through [place] dept of health to change COD to UTI rather than encephalopathy    Form faxed    I called and spoke with her daughter Pat who was appreciative of the call and knows within a couple weeks she could get a new copy of her mom's death certificate through [facility] if she'd like    Yulissa Garcia, DO

## 2024-01-24 NOTE — LETTER
"    9/30/2022        RE: Alejandrina Whatley  [address] [facility] [id number]        [facility]  PHYSICIAN NOTE    Chief Complaint   Patient presents with     [facility]       HPI:    Alejandrina Whatley is a 89 year old  (8/22/1923), who is being seen today for a federally mandated E/M visit at [facility]. Admitted to LTC in early 2022. She'd been previously followed by our [facility] when she resided on site in [id number] but then she moved to a group home. Now back to our LT facility; had been followed recently by a different in house provider group but now family electing to switch back to [facility] team. My colleague, her PCP, Sara Britt PA-C did admission visit last month. Alejandrina Noble's daughter Pat is actively involved in her life and visits most days. Alejandrina Noble has h/o past CVAs, dementia, lumbar stenosis and osteoporosis with L hip fracture s/p repair in Feb 2022.    Alejandrina Noble is seen in her room today at 3:15 PM. She is resting in bed dozing but awakens fully to actively participate in visit and reports is glad to have me visit. Does perseverate on the time of day and wonders if she should get up for breakfast; redirected to afternoon. Had her blinds closed and welcomed my offer to open the blinds for her. She declined my offer to get help to have her get up out of bed to chair though. I ask re: mood and she says \"its ok once I get up and going\" but again doesn't decide she wants to get up. Is on Lexapro. I note that [facility] on site was recently offered to her daughter Pat but she ultimately decided to hold off on this referral at this time. I see Alejandrina Noble has recently enjoyed visits from spiritual health member. Denies pain or dyspnea nor vocalizes any acute concerns today and she thanks me for the visit. I spoke with nursing staff who note no concerns either at this time medically. Of note, her weight is up from 130s to 153 but this may " be an outlier as its been so stable lately; I asked for nurse to re-weigh her when up later on.    ALLERGIES: Actonel [bisphosphonates], Conjugated estrogens, Macrobid [nitrofuran derivatives], Nitrofurantoin, Premarin, Sulfa drugs, Hydrocodone, Oxycodone, and Risedronate    Past Medical History:   Diagnosis Date     Abdominal aortic aneurysm (H) 2001    had a stent placed 2009     AK (actinic keratosis) 11/11/2009     Cataract 02/22/2011     Compression Fractures of Lumbar Vertebra 02/04/2010     CVA (cerebral vascular accident) (H)     Dec 2019 and Jan 2020     Dementia (H)      DJD (degenerative joint disease)      Generalised Weakness and Fatigue 03/03/2011     Glaucoma (increased eye pressure) 2009    Dr. 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     Strain of shoulder, left 03/03/2011     Urinary incontinence 11/10/2009      CODE STATUS: DNR/I    MEDICATIONS: Reviewed and updated in [facility] according to facility MAR  Current Outpatient Medications   Medication Sig Dispense Refill     acetaminophen (TYLENOL) 500 MG tablet Take 1,000 mg by mouth 3 times daily       albuterol (PROVENTIL) (2.5 MG/3ML) 0.083% neb solution Take 2.5 mg by nebulization every 4 hours as needed for shortness of breath / dyspnea or wheezing       aspirin (ASA) 81 MG chewable tablet Take 81 mg by mouth daily       atorvastatin (LIPITOR) 40 MG tablet Take 40 mg by mouth every evening       benzonatate (TESSALON) 100 MG capsule Take 100 mg by mouth 2 times daily as needed for cough       carvedilol (COREG) 6.25 MG tablet Take 6.25 mg by mouth 2 times daily (with meals)       Cholecalciferol (VITAMIN D3) 50 MCG (2000 UT) TABS Take 2,000 Units by mouth daily       dorzolamide-timolol (COSOPT) 2-0.5 % ophthalmic solution Place 1  drop into both eyes 2 times daily 1 Bottle 11     escitalopram (LEXAPRO) 10 MG tablet Take 10 mg by mouth daily       latanoprost (XALATAN) 0.005 % ophthalmic solution Place 1 drop into both eyes daily       melatonin 3 MG tablet Take 1 mg by mouth nightly as needed for sleep       menthol-zinc oxide (CALMOSEPTINE) 0.44-20.6 % OINT ointment Apply topically as needed       polyethylene glycol (MIRALAX) 17 g packet Take 1 packet by mouth every other day       senna-docusate (SENOKOT-S/PERICOLACE) 8.6-50 MG tablet Take 1 tablet by mouth every other day       traMADol (ULTRAM) 50 MG tablet Take 25 mg by mouth daily At 1900         ROS:  Limited secondary to cognitive impairment but today pt reports as above in HPI    Exam:  BP (!) 161/88   Pulse 66   Temp 97.8  F (36.6  C)   Resp 16   Ht 1.524 m (5')   Wt 69.4 kg (153 lb)   SpO2 95%   BMI 29.88 kg/m    Alert, resting in bed casually dressed in no acute distress  Heart tones regular  Breathing non-labored  No edema  Mood euthymic, engages in visit  Perseverates on time of day as noted above    Lab/Diagnostic Data:    July 2022: BMP unremarkable with Cr 0.91 and Hgb 11.6    ASSESSMENT/PLAN:  (Z86.73) H/O: CVA (cerebrovascular accident)  (primary encounter diagnosis)  Noted chronic comorbidity  On ASA and Lipitor with BP control as noted below    (I10) Essential hypertension  Most BP acceptable for age ranging 120-160/70-80s with HR 60-70s on Coreg alone  Avoid hypotension given advanced age    (F03.90) Dementia without behavioral disturbance (H)  (F41.9,  F32.A) Anxiety and depression  Benefiting from support of her daughter Pat and facility staff  August scores: BIMS: 7/15 and PHQ9c: 1  Remains on Lexapro and [facility] visits not currently desired by Pat per notes  She does enjoy visits from spiritual health and seemed to enjoy my visit today  At risk for delirium; redirection as needed  No behaviors of note    (M81.0) Osteoporosis, unspecified  "osteoporosis type, unspecified pathological fracture presence  H/o left hip fracture s/p repair in Feb 2022  On Vitamin D  Also on scheduled Tylenol with evening low dose Tramadol for pain (h/o lumbar stenosis as well)  Has \"rash\" as past s/e from Actonel bisphosphonate noted in her chart; unlikely to benefit from addition of further bisphosphonate trial at this age    Weight gain???  Of note, her weight is up from 130s to 153 but this may be an outlier as its been so stable lately; I asked for nurse to re-weigh her when up later on  Seems euvolemic on exam  Regular diet      Electronically signed by:  Yulissa Garcia, DO        Sincerely,        Yulissa Garcia, DO      " absent

## (undated) DEVICE — SYR 10ML FINGER CONTROL W/O NDL 309695

## (undated) DEVICE — DRILL BIT QUICK COUPLING CAN 5.0X300MM 310.63

## (undated) DEVICE — LINEN ORTHO ACL PACK 5447

## (undated) DEVICE — GLOVE PROTEXIS MICRO 8.5  2D73PM85

## (undated) DEVICE — LINEN HALF SHEET 5512

## (undated) DEVICE — NDL 22GA 1.5"

## (undated) DEVICE — PREP CHLORAPREP 26ML TINTED HI-LITE ORANGE 930815

## (undated) DEVICE — DRAPE X-RAY TUBE 00-901169-01-OEC

## (undated) DEVICE — SU VICRYL 0 CT-1 36" J346H

## (undated) DEVICE — LINEN FULL SHEET 5511

## (undated) DEVICE — DRSG AQUACEL AG 3.5X6.0" HYDROFIBER 412010

## (undated) DEVICE — PACK HIP NAILING SOP32HPFC4

## (undated) DEVICE — DRAPE LEGGINGS 8421

## (undated) DEVICE — CAST PADDING 4" UNSTERILE 9044

## (undated) DEVICE — WIRE GUIDE 2.8X300MM NON-THRD W/FLUTES 292.81

## (undated) DEVICE — GOWN REINFORCED XXLG 9071

## (undated) DEVICE — GLOVE PROTEXIS W/NEU-THERA 6.5  2D73TE65

## (undated) DEVICE — ESU GROUND PAD ADULT W/CORD E7507

## (undated) DEVICE — BAG CLEAR TRASH 1.3M 39X33" P4040C

## (undated) DEVICE — SU VICRYL 2-0 CT-1 36" UND J945H

## (undated) DEVICE — STPL SKIN 35W 6.9MM  PXW35

## (undated) RX ORDER — DEXAMETHASONE SODIUM PHOSPHATE 4 MG/ML
INJECTION, SOLUTION INTRA-ARTICULAR; INTRALESIONAL; INTRAMUSCULAR; INTRAVENOUS; SOFT TISSUE
Status: DISPENSED
Start: 2022-02-16

## (undated) RX ORDER — LIDOCAINE HYDROCHLORIDE 10 MG/ML
INJECTION, SOLUTION EPIDURAL; INFILTRATION; INTRACAUDAL; PERINEURAL
Status: DISPENSED
Start: 2022-02-16

## (undated) RX ORDER — FENTANYL CITRATE 50 UG/ML
INJECTION, SOLUTION INTRAMUSCULAR; INTRAVENOUS
Status: DISPENSED
Start: 2022-02-16

## (undated) RX ORDER — TRANEXAMIC ACID 10 MG/ML
INJECTION, SOLUTION INTRAVENOUS
Status: DISPENSED
Start: 2022-02-16

## (undated) RX ORDER — FENTANYL CITRATE-0.9 % NACL/PF 10 MCG/ML
PLASTIC BAG, INJECTION (ML) INTRAVENOUS
Status: DISPENSED
Start: 2022-02-16

## (undated) RX ORDER — BUPIVACAINE HYDROCHLORIDE AND EPINEPHRINE 5; 5 MG/ML; UG/ML
INJECTION, SOLUTION EPIDURAL; INTRACAUDAL; PERINEURAL
Status: DISPENSED
Start: 2022-02-16

## (undated) RX ORDER — CEFAZOLIN SODIUM/WATER 2 G/20 ML
SYRINGE (ML) INTRAVENOUS
Status: DISPENSED
Start: 2022-02-16

## (undated) RX ORDER — EPHEDRINE SULFATE 50 MG/ML
INJECTION, SOLUTION INTRAMUSCULAR; INTRAVENOUS; SUBCUTANEOUS
Status: DISPENSED
Start: 2022-02-16

## (undated) RX ORDER — GLYCOPYRROLATE 0.2 MG/ML
INJECTION INTRAMUSCULAR; INTRAVENOUS
Status: DISPENSED
Start: 2022-02-16

## (undated) RX ORDER — ONDANSETRON 2 MG/ML
INJECTION INTRAMUSCULAR; INTRAVENOUS
Status: DISPENSED
Start: 2022-02-16